# Patient Record
Sex: FEMALE | Race: BLACK OR AFRICAN AMERICAN | Employment: OTHER | URBAN - METROPOLITAN AREA
[De-identification: names, ages, dates, MRNs, and addresses within clinical notes are randomized per-mention and may not be internally consistent; named-entity substitution may affect disease eponyms.]

---

## 2017-01-03 ENCOUNTER — NURSE ONLY (OUTPATIENT)
Dept: ENT CLINIC | Age: 57
End: 2017-01-03

## 2017-01-03 ENCOUNTER — OFFICE VISIT (OUTPATIENT)
Dept: INTERNAL MEDICINE CLINIC | Age: 57
End: 2017-01-03

## 2017-01-03 VITALS
WEIGHT: 232 LBS | BODY MASS INDEX: 41.1 KG/M2 | RESPIRATION RATE: 18 BRPM | HEART RATE: 100 BPM | SYSTOLIC BLOOD PRESSURE: 130 MMHG | TEMPERATURE: 98 F | DIASTOLIC BLOOD PRESSURE: 80 MMHG

## 2017-01-03 DIAGNOSIS — J20.9 ACUTE BRONCHITIS, UNSPECIFIED ORGANISM: ICD-10-CM

## 2017-01-03 DIAGNOSIS — J01.01 ACUTE RECURRENT MAXILLARY SINUSITIS: ICD-10-CM

## 2017-01-03 DIAGNOSIS — J45.909 REACTIVE AIRWAY DISEASE, UNSPECIFIED ASTHMA SEVERITY, UNCOMPLICATED: Primary | ICD-10-CM

## 2017-01-03 DIAGNOSIS — J30.2 SEASONAL ALLERGIC RHINITIS, UNSPECIFIED ALLERGIC RHINITIS TRIGGER: Primary | ICD-10-CM

## 2017-01-03 PROCEDURE — 99213 OFFICE O/P EST LOW 20 MIN: CPT | Performed by: INTERNAL MEDICINE

## 2017-01-03 PROCEDURE — 95117 IMMUNOTHERAPY INJECTIONS: CPT | Performed by: OTOLARYNGOLOGY

## 2017-01-03 RX ORDER — FLUTICASONE PROPIONATE 50 MCG
2 SPRAY, SUSPENSION (ML) NASAL DAILY
Qty: 1 BOTTLE | Refills: 3 | Status: SHIPPED | OUTPATIENT
Start: 2017-01-03 | End: 2017-02-15 | Stop reason: ALTCHOICE

## 2017-01-03 RX ORDER — FLUCONAZOLE 150 MG/1
150 TABLET ORAL ONCE
Qty: 1 TABLET | Refills: 1 | Status: SHIPPED | OUTPATIENT
Start: 2017-01-03 | End: 2017-01-03

## 2017-01-03 RX ORDER — PREDNISONE 10 MG/1
TABLET ORAL
Qty: 23 TABLET | Refills: 0 | Status: SHIPPED | OUTPATIENT
Start: 2017-01-03 | End: 2017-01-26 | Stop reason: ALTCHOICE

## 2017-01-10 ENCOUNTER — OFFICE VISIT (OUTPATIENT)
Dept: ENT CLINIC | Age: 57
End: 2017-01-10

## 2017-01-10 ENCOUNTER — NURSE ONLY (OUTPATIENT)
Dept: ENT CLINIC | Age: 57
End: 2017-01-10

## 2017-01-10 VITALS
HEIGHT: 63 IN | BODY MASS INDEX: 41.11 KG/M2 | WEIGHT: 232 LBS | DIASTOLIC BLOOD PRESSURE: 107 MMHG | HEART RATE: 108 BPM | SYSTOLIC BLOOD PRESSURE: 183 MMHG

## 2017-01-10 DIAGNOSIS — J30.2 SEASONAL ALLERGIC RHINITIS, UNSPECIFIED ALLERGIC RHINITIS TRIGGER: Primary | ICD-10-CM

## 2017-01-10 DIAGNOSIS — J30.1 SEASONAL ALLERGIC RHINITIS DUE TO POLLEN: Primary | ICD-10-CM

## 2017-01-10 PROCEDURE — 95117 IMMUNOTHERAPY INJECTIONS: CPT | Performed by: OTOLARYNGOLOGY

## 2017-01-16 ENCOUNTER — PATIENT MESSAGE (OUTPATIENT)
Dept: INTERNAL MEDICINE CLINIC | Age: 57
End: 2017-01-16

## 2017-01-23 ENCOUNTER — OFFICE VISIT (OUTPATIENT)
Dept: INTERNAL MEDICINE CLINIC | Age: 57
End: 2017-01-23

## 2017-01-23 VITALS
DIASTOLIC BLOOD PRESSURE: 82 MMHG | WEIGHT: 233 LBS | BODY MASS INDEX: 41.27 KG/M2 | HEART RATE: 106 BPM | SYSTOLIC BLOOD PRESSURE: 126 MMHG

## 2017-01-23 DIAGNOSIS — J45.30 REACTIVE AIRWAY DISEASE, MILD PERSISTENT, UNCOMPLICATED: ICD-10-CM

## 2017-01-23 DIAGNOSIS — R73.9 STEROID-INDUCED HYPERGLYCEMIA: ICD-10-CM

## 2017-01-23 DIAGNOSIS — T38.0X5A STEROID-INDUCED HYPERGLYCEMIA: ICD-10-CM

## 2017-01-23 PROCEDURE — 99213 OFFICE O/P EST LOW 20 MIN: CPT | Performed by: INTERNAL MEDICINE

## 2017-01-26 ENCOUNTER — OFFICE VISIT (OUTPATIENT)
Dept: GYNECOLOGY | Age: 57
End: 2017-01-26

## 2017-01-26 VITALS
BODY MASS INDEX: 41.18 KG/M2 | HEIGHT: 63 IN | HEART RATE: 100 BPM | WEIGHT: 232.38 LBS | RESPIRATION RATE: 17 BRPM | DIASTOLIC BLOOD PRESSURE: 78 MMHG | TEMPERATURE: 98.6 F | SYSTOLIC BLOOD PRESSURE: 117 MMHG

## 2017-01-26 DIAGNOSIS — Z78.0 MENOPAUSE: ICD-10-CM

## 2017-01-26 DIAGNOSIS — R10.2 PELVIC PAIN IN FEMALE: ICD-10-CM

## 2017-01-26 DIAGNOSIS — Z01.419 WELL WOMAN EXAM WITH ROUTINE GYNECOLOGICAL EXAM: Primary | ICD-10-CM

## 2017-01-26 DIAGNOSIS — Z12.31 ENCOUNTER FOR SCREENING MAMMOGRAM FOR MALIGNANT NEOPLASM OF BREAST: ICD-10-CM

## 2017-01-26 DIAGNOSIS — B37.9 YEAST INFECTION: ICD-10-CM

## 2017-01-26 PROCEDURE — G0101 CA SCREEN;PELVIC/BREAST EXAM: HCPCS | Performed by: OBSTETRICS & GYNECOLOGY

## 2017-01-26 RX ORDER — FLUCONAZOLE 150 MG/1
150 TABLET ORAL ONCE
Qty: 1 TABLET | Refills: 1 | Status: SHIPPED | OUTPATIENT
Start: 2017-01-26 | End: 2017-01-26

## 2017-01-27 ASSESSMENT — ENCOUNTER SYMPTOMS
RESPIRATORY NEGATIVE: 1
EYES NEGATIVE: 1
GASTROINTESTINAL NEGATIVE: 1

## 2017-01-31 ENCOUNTER — HOSPITAL ENCOUNTER (OUTPATIENT)
Dept: MAMMOGRAPHY | Age: 57
Discharge: HOME | End: 2017-01-31
Attending: OBSTETRICS & GYNECOLOGY | Admitting: OBSTETRICS & GYNECOLOGY

## 2017-01-31 DIAGNOSIS — Z12.31 VISIT FOR SCREENING MAMMOGRAM: ICD-10-CM

## 2017-01-31 DIAGNOSIS — R10.2 PELVIC PAIN IN FEMALE: ICD-10-CM

## 2017-01-31 DIAGNOSIS — R10.2 PELVIC AND PERINEAL PAIN: ICD-10-CM

## 2017-01-31 DIAGNOSIS — Z01.419 WELL WOMAN EXAM WITH ROUTINE GYNECOLOGICAL EXAM: ICD-10-CM

## 2017-01-31 DIAGNOSIS — Z12.31 ENCOUNTER FOR SCREENING MAMMOGRAM FOR MALIGNANT NEOPLASM OF BREAST: ICD-10-CM

## 2017-01-31 LAB
HPV COMMENT: NORMAL
HPV TYPE 16: NOT DETECTED
HPV TYPE 18: NOT DETECTED
HPVOH (OTHER TYPES): NOT DETECTED

## 2017-01-31 RX ORDER — PEN NEEDLE, DIABETIC 31 GX5/16"
NEEDLE, DISPOSABLE MISCELLANEOUS
Qty: 100 EACH | Refills: 3 | Status: SHIPPED | OUTPATIENT
Start: 2017-01-31 | End: 2019-10-29

## 2017-02-03 ENCOUNTER — NURSE ONLY (OUTPATIENT)
Dept: ENT CLINIC | Age: 57
End: 2017-02-03

## 2017-02-03 DIAGNOSIS — J30.2 SEASONAL ALLERGIC RHINITIS, UNSPECIFIED ALLERGIC RHINITIS TRIGGER: Primary | ICD-10-CM

## 2017-02-03 PROCEDURE — 95117 IMMUNOTHERAPY INJECTIONS: CPT | Performed by: OTOLARYNGOLOGY

## 2017-02-13 ENCOUNTER — OFFICE VISIT (OUTPATIENT)
Dept: PULMONOLOGY | Age: 57
End: 2017-02-13

## 2017-02-13 VITALS
OXYGEN SATURATION: 95 % | HEIGHT: 63 IN | DIASTOLIC BLOOD PRESSURE: 82 MMHG | HEART RATE: 110 BPM | RESPIRATION RATE: 16 BRPM | SYSTOLIC BLOOD PRESSURE: 130 MMHG | BODY MASS INDEX: 41.64 KG/M2 | WEIGHT: 235 LBS

## 2017-02-13 DIAGNOSIS — J68.3 REACTIVE AIRWAYS DYSFUNCTION SYNDROME, MILD INTERMITTENT, UNCOMPLICATED (HCC): Primary | ICD-10-CM

## 2017-02-13 DIAGNOSIS — R94.2 ABNORMAL PFTS: ICD-10-CM

## 2017-02-13 DIAGNOSIS — G47.33 OBSTRUCTIVE SLEEP APNEA (ADULT) (PEDIATRIC): ICD-10-CM

## 2017-02-13 PROCEDURE — 99215 OFFICE O/P EST HI 40 MIN: CPT | Performed by: INTERNAL MEDICINE

## 2017-02-15 ENCOUNTER — OFFICE VISIT (OUTPATIENT)
Dept: INTERNAL MEDICINE CLINIC | Age: 57
End: 2017-02-15

## 2017-02-15 VITALS
RESPIRATION RATE: 16 BRPM | HEART RATE: 100 BPM | SYSTOLIC BLOOD PRESSURE: 126 MMHG | BODY MASS INDEX: 39.78 KG/M2 | DIASTOLIC BLOOD PRESSURE: 80 MMHG | HEIGHT: 64 IN | WEIGHT: 233 LBS

## 2017-02-15 DIAGNOSIS — E55.9 VITAMIN D DEFICIENCY: ICD-10-CM

## 2017-02-15 DIAGNOSIS — E78.5 DYSLIPIDEMIA: ICD-10-CM

## 2017-02-15 DIAGNOSIS — E66.9 OBESITY (BMI 30-39.9): ICD-10-CM

## 2017-02-15 DIAGNOSIS — I10 BENIGN ESSENTIAL HTN: Chronic | ICD-10-CM

## 2017-02-15 PROCEDURE — 99214 OFFICE O/P EST MOD 30 MIN: CPT | Performed by: INTERNAL MEDICINE

## 2017-02-15 RX ORDER — LOSARTAN POTASSIUM 50 MG/1
TABLET ORAL
Qty: 30 TABLET | Refills: 0 | Status: SHIPPED | OUTPATIENT
Start: 2017-02-15 | End: 2017-05-01 | Stop reason: SDUPTHER

## 2017-02-15 ASSESSMENT — PATIENT HEALTH QUESTIONNAIRE - PHQ9
2. FEELING DOWN, DEPRESSED OR HOPELESS: 0
SUM OF ALL RESPONSES TO PHQ QUESTIONS 1-9: 0
SUM OF ALL RESPONSES TO PHQ9 QUESTIONS 1 & 2: 0
1. LITTLE INTEREST OR PLEASURE IN DOING THINGS: 0

## 2017-02-17 ENCOUNTER — TELEPHONE (OUTPATIENT)
Dept: INTERNAL MEDICINE CLINIC | Age: 57
End: 2017-02-17

## 2017-02-24 ENCOUNTER — NURSE ONLY (OUTPATIENT)
Dept: ENT CLINIC | Age: 57
End: 2017-02-24

## 2017-02-24 DIAGNOSIS — J30.2 SEASONAL ALLERGIC RHINITIS, UNSPECIFIED ALLERGIC RHINITIS TRIGGER: Primary | ICD-10-CM

## 2017-02-24 PROCEDURE — 95117 IMMUNOTHERAPY INJECTIONS: CPT | Performed by: OTOLARYNGOLOGY

## 2017-03-03 ENCOUNTER — OFFICE VISIT (OUTPATIENT)
Dept: SLEEP MEDICINE | Age: 57
End: 2017-03-03

## 2017-03-03 VITALS
SYSTOLIC BLOOD PRESSURE: 112 MMHG | DIASTOLIC BLOOD PRESSURE: 76 MMHG | HEART RATE: 116 BPM | WEIGHT: 235 LBS | BODY MASS INDEX: 41.64 KG/M2 | HEIGHT: 63 IN | OXYGEN SATURATION: 98 %

## 2017-03-03 DIAGNOSIS — G47.33 OBSTRUCTIVE SLEEP APNEA (ADULT) (PEDIATRIC): Primary | Chronic | ICD-10-CM

## 2017-03-03 DIAGNOSIS — E11.9 CONTROLLED TYPE 2 DIABETES MELLITUS WITHOUT COMPLICATION, UNSPECIFIED LONG TERM INSULIN USE STATUS: Chronic | ICD-10-CM

## 2017-03-03 DIAGNOSIS — E66.9 NON MORBID OBESITY, UNSPECIFIED OBESITY TYPE: Chronic | ICD-10-CM

## 2017-03-03 DIAGNOSIS — J30.2 SEASONAL ALLERGIC RHINITIS, UNSPECIFIED ALLERGIC RHINITIS TRIGGER: Chronic | ICD-10-CM

## 2017-03-03 DIAGNOSIS — J68.3 REACTIVE AIRWAYS DYSFUNCTION SYNDROME, MILD INTERMITTENT, UNCOMPLICATED (HCC): Chronic | ICD-10-CM

## 2017-03-03 DIAGNOSIS — I10 HYPERTENSION, ESSENTIAL: Chronic | ICD-10-CM

## 2017-03-03 PROCEDURE — 99214 OFFICE O/P EST MOD 30 MIN: CPT | Performed by: NURSE PRACTITIONER

## 2017-03-03 ASSESSMENT — ENCOUNTER SYMPTOMS
RHINORRHEA: 0
ABDOMINAL DISTENTION: 0
APNEA: 0
ABDOMINAL PAIN: 0
COUGH: 0
SINUS PRESSURE: 0
SHORTNESS OF BREATH: 0

## 2017-03-03 ASSESSMENT — SLEEP AND FATIGUE QUESTIONNAIRES
HOW LIKELY ARE YOU TO NOD OFF OR FALL ASLEEP WHEN YOU ARE A PASSENGER IN A CAR FOR AN HOUR WITHOUT A BREAK: 1
HOW LIKELY ARE YOU TO NOD OFF OR FALL ASLEEP WHILE SITTING QUIETLY AFTER LUNCH WITHOUT ALCOHOL: 1
ESS TOTAL SCORE: 10
HOW LIKELY ARE YOU TO NOD OFF OR FALL ASLEEP WHILE SITTING INACTIVE IN A PUBLIC PLACE: 1
HOW LIKELY ARE YOU TO NOD OFF OR FALL ASLEEP IN A CAR, WHILE STOPPED FOR A FEW MINUTES IN TRAFFIC: 1
HOW LIKELY ARE YOU TO NOD OFF OR FALL ASLEEP WHILE LYING DOWN TO REST IN THE AFTERNOON WHEN CIRCUMSTANCES PERMIT: 1
HOW LIKELY ARE YOU TO NOD OFF OR FALL ASLEEP WHILE WATCHING TV: 1
HOW LIKELY ARE YOU TO NOD OFF OR FALL ASLEEP WHILE SITTING AND READING: 2
HOW LIKELY ARE YOU TO NOD OFF OR FALL ASLEEP WHILE SITTING AND TALKING TO SOMEONE: 2

## 2017-03-07 ENCOUNTER — TELEPHONE (OUTPATIENT)
Dept: INTERNAL MEDICINE CLINIC | Age: 57
End: 2017-03-07

## 2017-03-07 RX ORDER — CLOTRIMAZOLE AND BETAMETHASONE DIPROPIONATE 10; .64 MG/G; MG/G
CREAM TOPICAL
Qty: 1 TUBE | Refills: 0 | Status: SHIPPED | OUTPATIENT
Start: 2017-03-07 | End: 2017-08-15 | Stop reason: ALTCHOICE

## 2017-03-10 ENCOUNTER — NURSE ONLY (OUTPATIENT)
Dept: ENT CLINIC | Age: 57
End: 2017-03-10

## 2017-03-10 DIAGNOSIS — J30.2 SEASONAL ALLERGIC RHINITIS, UNSPECIFIED ALLERGIC RHINITIS TRIGGER: Primary | ICD-10-CM

## 2017-03-10 PROCEDURE — 95117 IMMUNOTHERAPY INJECTIONS: CPT | Performed by: OTOLARYNGOLOGY

## 2017-04-04 ENCOUNTER — NURSE ONLY (OUTPATIENT)
Dept: ENT CLINIC | Age: 57
End: 2017-04-04

## 2017-04-04 DIAGNOSIS — J30.2 SEASONAL ALLERGIC RHINITIS, UNSPECIFIED ALLERGIC RHINITIS TRIGGER: Primary | ICD-10-CM

## 2017-04-04 PROCEDURE — 95117 IMMUNOTHERAPY INJECTIONS: CPT | Performed by: OTOLARYNGOLOGY

## 2017-04-18 ENCOUNTER — NURSE ONLY (OUTPATIENT)
Dept: ENT CLINIC | Age: 57
End: 2017-04-18

## 2017-04-18 ENCOUNTER — TELEPHONE (OUTPATIENT)
Dept: ENT CLINIC | Age: 57
End: 2017-04-18

## 2017-04-18 DIAGNOSIS — J32.9 CHRONIC SINUSITIS, UNSPECIFIED LOCATION: Primary | ICD-10-CM

## 2017-04-18 DIAGNOSIS — J30.1 SEASONAL ALLERGIC RHINITIS DUE TO POLLEN: Primary | ICD-10-CM

## 2017-04-18 DIAGNOSIS — J32.9 CHRONIC SINUSITIS, UNSPECIFIED LOCATION: ICD-10-CM

## 2017-04-18 PROCEDURE — 95117 IMMUNOTHERAPY INJECTIONS: CPT | Performed by: OTOLARYNGOLOGY

## 2017-04-18 PROCEDURE — 96372 THER/PROPH/DIAG INJ SC/IM: CPT | Performed by: OTOLARYNGOLOGY

## 2017-04-18 RX ORDER — AZITHROMYCIN 250 MG/1
250 TABLET, FILM COATED ORAL DAILY
Qty: 1 PACKET | Refills: 0 | Status: SHIPPED | OUTPATIENT
Start: 2017-04-18 | End: 2017-05-04 | Stop reason: ALTCHOICE

## 2017-04-18 RX ORDER — METHYLPREDNISOLONE ACETATE 40 MG/ML
40 INJECTION, SUSPENSION INTRA-ARTICULAR; INTRALESIONAL; INTRAMUSCULAR; SOFT TISSUE ONCE
Qty: 1 ML | Refills: 0
Start: 2017-04-18 | End: 2017-04-18

## 2017-04-26 LAB
A/G RATIO: 2 (ref 1.1–2.2)
ALBUMIN SERPL-MCNC: 4.8 G/DL (ref 3.4–5)
ALP BLD-CCNC: 98 U/L (ref 40–129)
ALT SERPL-CCNC: 29 U/L (ref 10–40)
ANION GAP SERPL CALCULATED.3IONS-SCNC: 16 MMOL/L (ref 3–16)
AST SERPL-CCNC: 18 U/L (ref 15–37)
BASOPHILS ABSOLUTE: 0.1 K/UL (ref 0–0.2)
BASOPHILS RELATIVE PERCENT: 0.8 %
BILIRUB SERPL-MCNC: 0.6 MG/DL (ref 0–1)
BUN BLDV-MCNC: 12 MG/DL (ref 7–20)
CALCIUM SERPL-MCNC: 10.1 MG/DL (ref 8.3–10.6)
CHLORIDE BLD-SCNC: 99 MMOL/L (ref 99–110)
CHOLESTEROL, TOTAL: 118 MG/DL (ref 0–199)
CO2: 24 MMOL/L (ref 21–32)
CREAT SERPL-MCNC: 0.5 MG/DL (ref 0.6–1.1)
EOSINOPHILS ABSOLUTE: 0.1 K/UL (ref 0–0.6)
EOSINOPHILS RELATIVE PERCENT: 0.8 %
GFR AFRICAN AMERICAN: >60
GFR NON-AFRICAN AMERICAN: >60
GLOBULIN: 2.4 G/DL
GLUCOSE BLD-MCNC: 100 MG/DL (ref 70–99)
HCT VFR BLD CALC: 41.5 % (ref 36–48)
HDLC SERPL-MCNC: 28 MG/DL (ref 40–60)
HEMOGLOBIN: 13.1 G/DL (ref 12–16)
LDL CHOLESTEROL CALCULATED: 72 MG/DL
LYMPHOCYTES ABSOLUTE: 2.9 K/UL (ref 1–5.1)
LYMPHOCYTES RELATIVE PERCENT: 22.5 %
MCH RBC QN AUTO: 26.5 PG (ref 26–34)
MCHC RBC AUTO-ENTMCNC: 31.5 G/DL (ref 31–36)
MCV RBC AUTO: 84.2 FL (ref 80–100)
MONOCYTES ABSOLUTE: 0.6 K/UL (ref 0–1.3)
MONOCYTES RELATIVE PERCENT: 4.4 %
NEUTROPHILS ABSOLUTE: 9.4 K/UL (ref 1.7–7.7)
NEUTROPHILS RELATIVE PERCENT: 71.5 %
PDW BLD-RTO: 13.7 % (ref 12.4–15.4)
PLATELET # BLD: 272 K/UL (ref 135–450)
PMV BLD AUTO: 10.7 FL (ref 5–10.5)
POTASSIUM SERPL-SCNC: 4.1 MMOL/L (ref 3.5–5.1)
RBC # BLD: 4.93 M/UL (ref 4–5.2)
SODIUM BLD-SCNC: 139 MMOL/L (ref 136–145)
TOTAL PROTEIN: 7.2 G/DL (ref 6.4–8.2)
TRIGL SERPL-MCNC: 92 MG/DL (ref 0–150)
VITAMIN D 25-HYDROXY: 43.5 NG/ML
VLDLC SERPL CALC-MCNC: 18 MG/DL
WBC # BLD: 13.1 K/UL (ref 4–11)

## 2017-04-27 LAB
ESTIMATED AVERAGE GLUCOSE: 116.9 MG/DL
HBA1C MFR BLD: 5.7 %

## 2017-04-28 ENCOUNTER — NURSE ONLY (OUTPATIENT)
Dept: ENT CLINIC | Age: 57
End: 2017-04-28

## 2017-04-28 DIAGNOSIS — J30.1 SEASONAL ALLERGIC RHINITIS DUE TO POLLEN: Primary | ICD-10-CM

## 2017-04-28 PROCEDURE — 95117 IMMUNOTHERAPY INJECTIONS: CPT | Performed by: OTOLARYNGOLOGY

## 2017-05-01 ENCOUNTER — TELEPHONE (OUTPATIENT)
Dept: INTERNAL MEDICINE CLINIC | Age: 57
End: 2017-05-01

## 2017-05-01 RX ORDER — LOSARTAN POTASSIUM 50 MG/1
TABLET ORAL
Qty: 10 TABLET | Refills: 0 | Status: SHIPPED | OUTPATIENT
Start: 2017-05-01 | End: 2017-05-04 | Stop reason: SDUPTHER

## 2017-05-01 RX ORDER — LOSARTAN POTASSIUM 50 MG/1
TABLET ORAL
Qty: 90 TABLET | Refills: 3 | Status: SHIPPED | OUTPATIENT
Start: 2017-05-01 | End: 2017-05-01 | Stop reason: SDUPTHER

## 2017-05-01 RX ORDER — LOSARTAN POTASSIUM 50 MG/1
TABLET ORAL
Qty: 90 TABLET | Refills: 1 | Status: SHIPPED | OUTPATIENT
Start: 2017-05-01 | End: 2018-07-23 | Stop reason: SDUPTHER

## 2017-05-04 ENCOUNTER — OFFICE VISIT (OUTPATIENT)
Dept: INTERNAL MEDICINE CLINIC | Age: 57
End: 2017-05-04

## 2017-05-04 VITALS
SYSTOLIC BLOOD PRESSURE: 141 MMHG | BODY MASS INDEX: 38.41 KG/M2 | HEIGHT: 64 IN | HEART RATE: 92 BPM | DIASTOLIC BLOOD PRESSURE: 89 MMHG | WEIGHT: 225 LBS

## 2017-05-04 DIAGNOSIS — E55.9 VITAMIN D DEFICIENCY: ICD-10-CM

## 2017-05-04 DIAGNOSIS — Z98.890 HISTORY OF HERNIA REPAIR: ICD-10-CM

## 2017-05-04 DIAGNOSIS — Z87.19 HISTORY OF HERNIA REPAIR: ICD-10-CM

## 2017-05-04 DIAGNOSIS — R10.33 PERIUMBILICAL ABDOMINAL PAIN: ICD-10-CM

## 2017-05-04 DIAGNOSIS — K43.9 VENTRAL HERNIA WITHOUT OBSTRUCTION OR GANGRENE: ICD-10-CM

## 2017-05-04 DIAGNOSIS — J45.20 REACTIVE AIRWAY DISEASE, MILD INTERMITTENT, UNCOMPLICATED: ICD-10-CM

## 2017-05-04 DIAGNOSIS — I10 BENIGN ESSENTIAL HTN: Chronic | ICD-10-CM

## 2017-05-04 DIAGNOSIS — E66.9 OBESITY (BMI 30-39.9): ICD-10-CM

## 2017-05-04 DIAGNOSIS — E78.5 DYSLIPIDEMIA: ICD-10-CM

## 2017-05-04 DIAGNOSIS — E11.9 TYPE 2 DIABETES MELLITUS WITHOUT COMPLICATION, WITHOUT LONG-TERM CURRENT USE OF INSULIN (HCC): Primary | ICD-10-CM

## 2017-05-04 PROCEDURE — 99214 OFFICE O/P EST MOD 30 MIN: CPT | Performed by: INTERNAL MEDICINE

## 2017-05-10 ENCOUNTER — HOSPITAL ENCOUNTER (OUTPATIENT)
Dept: CT IMAGING | Facility: MEDICAL CENTER | Age: 57
Discharge: HOME | End: 2017-05-11

## 2017-05-10 ENCOUNTER — HOSPITAL ENCOUNTER (OUTPATIENT)
Dept: GENERAL RADIOLOGY | Facility: MEDICAL CENTER | Age: 57
Discharge: HOME | End: 2017-05-10
Attending: INTERNAL MEDICINE | Admitting: INTERNAL MEDICINE

## 2017-05-10 DIAGNOSIS — K43.9 VENTRAL HERNIA WITHOUT OBSTRUCTION OR GANGRENE: ICD-10-CM

## 2017-05-10 DIAGNOSIS — Z98.890 HISTORY OF HERNIA REPAIR: ICD-10-CM

## 2017-05-10 DIAGNOSIS — R10.33 PERIUMBILICAL PAIN: ICD-10-CM

## 2017-05-10 DIAGNOSIS — N28.1 KIDNEY CYSTS: Primary | ICD-10-CM

## 2017-05-10 DIAGNOSIS — R10.33 PERIUMBILICAL ABDOMINAL PAIN: ICD-10-CM

## 2017-05-10 DIAGNOSIS — Z87.19 HISTORY OF HERNIA REPAIR: ICD-10-CM

## 2017-05-12 ENCOUNTER — NURSE ONLY (OUTPATIENT)
Dept: ENT CLINIC | Age: 57
End: 2017-05-12

## 2017-05-12 DIAGNOSIS — J30.1 SEASONAL ALLERGIC RHINITIS DUE TO POLLEN: Primary | ICD-10-CM

## 2017-05-12 PROCEDURE — 95117 IMMUNOTHERAPY INJECTIONS: CPT | Performed by: OTOLARYNGOLOGY

## 2017-05-23 ENCOUNTER — OFFICE VISIT (OUTPATIENT)
Dept: ENT CLINIC | Age: 57
End: 2017-05-23

## 2017-05-23 ENCOUNTER — NURSE ONLY (OUTPATIENT)
Dept: ENT CLINIC | Age: 57
End: 2017-05-23

## 2017-05-23 VITALS
HEART RATE: 95 BPM | BODY MASS INDEX: 39.87 KG/M2 | WEIGHT: 225 LBS | HEIGHT: 63 IN | DIASTOLIC BLOOD PRESSURE: 108 MMHG | SYSTOLIC BLOOD PRESSURE: 162 MMHG

## 2017-05-23 DIAGNOSIS — J30.1 SEASONAL ALLERGIC RHINITIS DUE TO POLLEN: ICD-10-CM

## 2017-05-23 DIAGNOSIS — J30.1 SEASONAL ALLERGIC RHINITIS DUE TO POLLEN: Primary | ICD-10-CM

## 2017-05-23 DIAGNOSIS — J32.9 CHRONIC SINUSITIS, UNSPECIFIED LOCATION: Primary | ICD-10-CM

## 2017-05-23 PROCEDURE — 96372 THER/PROPH/DIAG INJ SC/IM: CPT | Performed by: OTOLARYNGOLOGY

## 2017-05-23 PROCEDURE — 95117 IMMUNOTHERAPY INJECTIONS: CPT | Performed by: OTOLARYNGOLOGY

## 2017-05-23 PROCEDURE — 99213 OFFICE O/P EST LOW 20 MIN: CPT | Performed by: OTOLARYNGOLOGY

## 2017-05-23 RX ORDER — MONTELUKAST SODIUM 10 MG/1
10 TABLET ORAL NIGHTLY
Qty: 30 TABLET | Refills: 1 | Status: SHIPPED | OUTPATIENT
Start: 2017-05-23 | End: 2017-08-15 | Stop reason: ALTCHOICE

## 2017-05-23 RX ORDER — METHYLPREDNISOLONE ACETATE 40 MG/ML
40 INJECTION, SUSPENSION INTRA-ARTICULAR; INTRALESIONAL; INTRAMUSCULAR; SOFT TISSUE ONCE
Status: COMPLETED | OUTPATIENT
Start: 2017-05-23 | End: 2017-05-23

## 2017-05-23 RX ADMIN — METHYLPREDNISOLONE ACETATE 40 MG: 40 INJECTION, SUSPENSION INTRA-ARTICULAR; INTRALESIONAL; INTRAMUSCULAR; SOFT TISSUE at 15:55

## 2017-05-24 ENCOUNTER — TELEPHONE (OUTPATIENT)
Dept: ENT CLINIC | Age: 57
End: 2017-05-24

## 2017-06-01 ENCOUNTER — OFFICE VISIT (OUTPATIENT)
Dept: PULMONOLOGY | Age: 57
End: 2017-06-01

## 2017-06-01 VITALS
OXYGEN SATURATION: 95 % | BODY MASS INDEX: 41.22 KG/M2 | WEIGHT: 224 LBS | SYSTOLIC BLOOD PRESSURE: 127 MMHG | HEART RATE: 107 BPM | HEIGHT: 62 IN | DIASTOLIC BLOOD PRESSURE: 80 MMHG

## 2017-06-01 DIAGNOSIS — E66.9 NON MORBID OBESITY, UNSPECIFIED OBESITY TYPE: Chronic | ICD-10-CM

## 2017-06-01 DIAGNOSIS — I10 HYPERTENSION, ESSENTIAL: Chronic | ICD-10-CM

## 2017-06-01 DIAGNOSIS — G47.33 OBSTRUCTIVE SLEEP APNEA (ADULT) (PEDIATRIC): Primary | Chronic | ICD-10-CM

## 2017-06-01 DIAGNOSIS — J30.2 SEASONAL ALLERGIC RHINITIS, UNSPECIFIED ALLERGIC RHINITIS TRIGGER: Chronic | ICD-10-CM

## 2017-06-01 DIAGNOSIS — J68.3 REACTIVE AIRWAYS DYSFUNCTION SYNDROME, MILD INTERMITTENT, UNCOMPLICATED (HCC): Chronic | ICD-10-CM

## 2017-06-01 DIAGNOSIS — E11.9 CONTROLLED TYPE 2 DIABETES MELLITUS WITHOUT COMPLICATION, UNSPECIFIED LONG TERM INSULIN USE STATUS: Chronic | ICD-10-CM

## 2017-06-01 PROCEDURE — 99214 OFFICE O/P EST MOD 30 MIN: CPT | Performed by: NURSE PRACTITIONER

## 2017-06-01 ASSESSMENT — SLEEP AND FATIGUE QUESTIONNAIRES
HOW LIKELY ARE YOU TO NOD OFF OR FALL ASLEEP WHILE SITTING INACTIVE IN A PUBLIC PLACE: 3
HOW LIKELY ARE YOU TO NOD OFF OR FALL ASLEEP WHILE WATCHING TV: 2
HOW LIKELY ARE YOU TO NOD OFF OR FALL ASLEEP WHILE SITTING QUIETLY AFTER LUNCH WITHOUT ALCOHOL: 2
HOW LIKELY ARE YOU TO NOD OFF OR FALL ASLEEP IN A CAR, WHILE STOPPED FOR A FEW MINUTES IN TRAFFIC: 1
HOW LIKELY ARE YOU TO NOD OFF OR FALL ASLEEP WHILE SITTING AND READING: 2
HOW LIKELY ARE YOU TO NOD OFF OR FALL ASLEEP WHEN YOU ARE A PASSENGER IN A CAR FOR AN HOUR WITHOUT A BREAK: 2
HOW LIKELY ARE YOU TO NOD OFF OR FALL ASLEEP WHILE LYING DOWN TO REST IN THE AFTERNOON WHEN CIRCUMSTANCES PERMIT: 2
ESS TOTAL SCORE: 16
HOW LIKELY ARE YOU TO NOD OFF OR FALL ASLEEP WHILE SITTING AND TALKING TO SOMEONE: 2

## 2017-06-01 ASSESSMENT — ENCOUNTER SYMPTOMS
ABDOMINAL DISTENTION: 0
ABDOMINAL PAIN: 0
COUGH: 0
APNEA: 0
SINUS PRESSURE: 0
SHORTNESS OF BREATH: 0
RHINORRHEA: 0

## 2017-06-09 ENCOUNTER — NURSE ONLY (OUTPATIENT)
Dept: ENT CLINIC | Age: 57
End: 2017-06-09

## 2017-06-09 ENCOUNTER — TELEPHONE (OUTPATIENT)
Dept: ENT CLINIC | Age: 57
End: 2017-06-09

## 2017-06-09 DIAGNOSIS — J30.1 SEASONAL ALLERGIC RHINITIS DUE TO POLLEN: Primary | ICD-10-CM

## 2017-06-09 PROCEDURE — 95117 IMMUNOTHERAPY INJECTIONS: CPT | Performed by: OTOLARYNGOLOGY

## 2017-06-19 ENCOUNTER — NURSE ONLY (OUTPATIENT)
Dept: ENT CLINIC | Age: 57
End: 2017-06-19

## 2017-06-19 DIAGNOSIS — J30.1 SEASONAL ALLERGIC RHINITIS DUE TO POLLEN: Primary | ICD-10-CM

## 2017-06-19 PROCEDURE — 95117 IMMUNOTHERAPY INJECTIONS: CPT | Performed by: OTOLARYNGOLOGY

## 2017-06-30 ENCOUNTER — NURSE ONLY (OUTPATIENT)
Dept: ENT CLINIC | Age: 57
End: 2017-06-30

## 2017-06-30 DIAGNOSIS — J30.1 SEASONAL ALLERGIC RHINITIS DUE TO POLLEN: Primary | ICD-10-CM

## 2017-06-30 PROCEDURE — 95117 IMMUNOTHERAPY INJECTIONS: CPT | Performed by: OTOLARYNGOLOGY

## 2017-07-07 ENCOUNTER — NURSE ONLY (OUTPATIENT)
Dept: ENT CLINIC | Age: 57
End: 2017-07-07

## 2017-07-07 DIAGNOSIS — J30.1 SEASONAL ALLERGIC RHINITIS DUE TO POLLEN: Primary | ICD-10-CM

## 2017-07-07 PROCEDURE — 95117 IMMUNOTHERAPY INJECTIONS: CPT | Performed by: OTOLARYNGOLOGY

## 2017-07-24 ENCOUNTER — NURSE ONLY (OUTPATIENT)
Dept: ENT CLINIC | Age: 57
End: 2017-07-24

## 2017-07-24 DIAGNOSIS — J30.1 SEASONAL ALLERGIC RHINITIS DUE TO POLLEN: Primary | ICD-10-CM

## 2017-07-24 PROCEDURE — 95117 IMMUNOTHERAPY INJECTIONS: CPT | Performed by: OTOLARYNGOLOGY

## 2017-08-07 ENCOUNTER — NURSE ONLY (OUTPATIENT)
Dept: ENT CLINIC | Age: 57
End: 2017-08-07

## 2017-08-07 DIAGNOSIS — J30.1 SEASONAL ALLERGIC RHINITIS DUE TO POLLEN: Primary | ICD-10-CM

## 2017-08-07 PROCEDURE — 95117 IMMUNOTHERAPY INJECTIONS: CPT | Performed by: OTOLARYNGOLOGY

## 2017-08-08 ENCOUNTER — TELEPHONE (OUTPATIENT)
Dept: INTERNAL MEDICINE CLINIC | Age: 57
End: 2017-08-08

## 2017-08-08 RX ORDER — VALACYCLOVIR HYDROCHLORIDE 1 G/1
2000 TABLET, FILM COATED ORAL 2 TIMES DAILY
Qty: 4 TABLET | Refills: 0 | Status: SHIPPED | OUTPATIENT
Start: 2017-08-08 | End: 2017-08-09

## 2017-08-15 ENCOUNTER — NURSE ONLY (OUTPATIENT)
Dept: ENT CLINIC | Age: 57
End: 2017-08-15

## 2017-08-15 ENCOUNTER — OFFICE VISIT (OUTPATIENT)
Dept: INTERNAL MEDICINE CLINIC | Age: 57
End: 2017-08-15

## 2017-08-15 VITALS
SYSTOLIC BLOOD PRESSURE: 138 MMHG | WEIGHT: 226 LBS | BODY MASS INDEX: 40.04 KG/M2 | HEIGHT: 63 IN | DIASTOLIC BLOOD PRESSURE: 80 MMHG | HEART RATE: 110 BPM | RESPIRATION RATE: 16 BRPM

## 2017-08-15 DIAGNOSIS — J30.1 SEASONAL ALLERGIC RHINITIS DUE TO POLLEN: Primary | ICD-10-CM

## 2017-08-15 DIAGNOSIS — G47.33 OBSTRUCTIVE SLEEP APNEA (ADULT) (PEDIATRIC): Chronic | ICD-10-CM

## 2017-08-15 DIAGNOSIS — E78.2 HYPERLIPIDEMIA, MIXED: ICD-10-CM

## 2017-08-15 DIAGNOSIS — E11.9 TYPE 2 DIABETES MELLITUS WITHOUT COMPLICATION, WITHOUT LONG-TERM CURRENT USE OF INSULIN (HCC): Primary | ICD-10-CM

## 2017-08-15 DIAGNOSIS — E66.01 MORBID OBESITY WITH BMI OF 40.0-44.9, ADULT (HCC): ICD-10-CM

## 2017-08-15 DIAGNOSIS — I10 BENIGN ESSENTIAL HTN: Chronic | ICD-10-CM

## 2017-08-15 PROCEDURE — 95117 IMMUNOTHERAPY INJECTIONS: CPT | Performed by: OTOLARYNGOLOGY

## 2017-08-15 PROCEDURE — 99214 OFFICE O/P EST MOD 30 MIN: CPT | Performed by: INTERNAL MEDICINE

## 2017-08-23 ENCOUNTER — TELEPHONE (OUTPATIENT)
Dept: INTERNAL MEDICINE CLINIC | Age: 57
End: 2017-08-23

## 2017-08-23 LAB
A/G RATIO: 1.9 (ref 1.1–2.2)
ALBUMIN SERPL-MCNC: 4.4 G/DL (ref 3.4–5)
ALP BLD-CCNC: 115 U/L (ref 40–129)
ALT SERPL-CCNC: 25 U/L (ref 10–40)
ANION GAP SERPL CALCULATED.3IONS-SCNC: 17 MMOL/L (ref 3–16)
AST SERPL-CCNC: 19 U/L (ref 15–37)
BILIRUB SERPL-MCNC: 0.9 MG/DL (ref 0–1)
BUN BLDV-MCNC: 11 MG/DL (ref 7–20)
CALCIUM SERPL-MCNC: 9.8 MG/DL (ref 8.3–10.6)
CHLORIDE BLD-SCNC: 99 MMOL/L (ref 99–110)
CHOLESTEROL, TOTAL: 114 MG/DL (ref 0–199)
CO2: 25 MMOL/L (ref 21–32)
CREAT SERPL-MCNC: 0.6 MG/DL (ref 0.6–1.1)
GFR AFRICAN AMERICAN: >60
GFR NON-AFRICAN AMERICAN: >60
GLOBULIN: 2.3 G/DL
GLUCOSE BLD-MCNC: 139 MG/DL (ref 70–99)
HDLC SERPL-MCNC: 25 MG/DL (ref 40–60)
LDL CHOLESTEROL CALCULATED: 57 MG/DL
POTASSIUM SERPL-SCNC: 3.9 MMOL/L (ref 3.5–5.1)
SODIUM BLD-SCNC: 141 MMOL/L (ref 136–145)
TOTAL PROTEIN: 6.7 G/DL (ref 6.4–8.2)
TRIGL SERPL-MCNC: 160 MG/DL (ref 0–150)
VLDLC SERPL CALC-MCNC: 32 MG/DL

## 2017-08-24 LAB
ESTIMATED AVERAGE GLUCOSE: 125.5 MG/DL
HBA1C MFR BLD: 6 %

## 2017-08-25 ENCOUNTER — NURSE ONLY (OUTPATIENT)
Dept: ENT CLINIC | Age: 57
End: 2017-08-25

## 2017-08-25 DIAGNOSIS — J30.1 SEASONAL ALLERGIC RHINITIS DUE TO POLLEN: Primary | ICD-10-CM

## 2017-08-25 PROCEDURE — 95117 IMMUNOTHERAPY INJECTIONS: CPT | Performed by: OTOLARYNGOLOGY

## 2017-09-07 ENCOUNTER — HOSPITAL ENCOUNTER (OUTPATIENT)
Dept: ULTRASOUND IMAGING | Age: 57
Discharge: HOME | End: 2017-09-07
Attending: INTERNAL MEDICINE | Admitting: INTERNAL MEDICINE

## 2017-09-07 DIAGNOSIS — N28.1 ACQUIRED CYST OF KIDNEY: ICD-10-CM

## 2017-09-07 DIAGNOSIS — N28.1 KIDNEY CYSTS: ICD-10-CM

## 2017-09-08 ENCOUNTER — TELEPHONE (OUTPATIENT)
Dept: INTERNAL MEDICINE CLINIC | Age: 57
End: 2017-09-08

## 2017-09-11 ENCOUNTER — NURSE ONLY (OUTPATIENT)
Dept: ENT CLINIC | Age: 57
End: 2017-09-11

## 2017-09-11 DIAGNOSIS — J30.2 SEASONAL ALLERGIC RHINITIS, UNSPECIFIED ALLERGIC RHINITIS TRIGGER: ICD-10-CM

## 2017-09-11 PROCEDURE — 95117 IMMUNOTHERAPY INJECTIONS: CPT | Performed by: OTOLARYNGOLOGY

## 2017-09-15 RX ORDER — AMLODIPINE BESYLATE 5 MG/1
TABLET ORAL
Qty: 90 TABLET | Refills: 2 | Status: SHIPPED | OUTPATIENT
Start: 2017-09-15 | End: 2018-06-12 | Stop reason: SDUPTHER

## 2017-09-18 ENCOUNTER — NURSE ONLY (OUTPATIENT)
Dept: ENT CLINIC | Age: 57
End: 2017-09-18

## 2017-09-18 DIAGNOSIS — J30.2 SEASONAL ALLERGIC RHINITIS, UNSPECIFIED ALLERGIC RHINITIS TRIGGER: ICD-10-CM

## 2017-09-18 PROCEDURE — 95117 IMMUNOTHERAPY INJECTIONS: CPT | Performed by: OTOLARYNGOLOGY

## 2017-09-25 ENCOUNTER — NURSE ONLY (OUTPATIENT)
Dept: ENT CLINIC | Age: 57
End: 2017-09-25

## 2017-09-25 DIAGNOSIS — J30.2 SEASONAL ALLERGIC RHINITIS, UNSPECIFIED ALLERGIC RHINITIS TRIGGER: ICD-10-CM

## 2017-09-25 PROCEDURE — 95117 IMMUNOTHERAPY INJECTIONS: CPT | Performed by: OTOLARYNGOLOGY

## 2017-09-27 ENCOUNTER — OFFICE VISIT (OUTPATIENT)
Dept: INTERNAL MEDICINE CLINIC | Age: 57
End: 2017-09-27

## 2017-09-27 VITALS — WEIGHT: 225 LBS | DIASTOLIC BLOOD PRESSURE: 81 MMHG | SYSTOLIC BLOOD PRESSURE: 126 MMHG | BODY MASS INDEX: 39.86 KG/M2

## 2017-09-27 DIAGNOSIS — L91.8 INFLAMED SKIN TAG: Primary | ICD-10-CM

## 2017-09-27 DIAGNOSIS — Z23 FLU VACCINE NEED: ICD-10-CM

## 2017-09-27 PROCEDURE — 11200 RMVL SKIN TAGS UP TO&INC 15: CPT | Performed by: INTERNAL MEDICINE

## 2017-09-27 PROCEDURE — G0008 ADMIN INFLUENZA VIRUS VAC: HCPCS | Performed by: INTERNAL MEDICINE

## 2017-09-27 PROCEDURE — 99999 PR OFFICE/OUTPT VISIT,PROCEDURE ONLY: CPT | Performed by: INTERNAL MEDICINE

## 2017-09-27 PROCEDURE — 90686 IIV4 VACC NO PRSV 0.5 ML IM: CPT | Performed by: INTERNAL MEDICINE

## 2017-09-28 ENCOUNTER — TELEPHONE (OUTPATIENT)
Dept: ORTHOPEDIC SURGERY | Age: 57
End: 2017-09-28

## 2017-09-28 ENCOUNTER — OFFICE VISIT (OUTPATIENT)
Dept: ORTHOPEDIC SURGERY | Age: 57
End: 2017-09-28

## 2017-09-28 VITALS
RESPIRATION RATE: 16 BRPM | WEIGHT: 225 LBS | TEMPERATURE: 97.5 F | DIASTOLIC BLOOD PRESSURE: 85 MMHG | BODY MASS INDEX: 39.87 KG/M2 | HEIGHT: 63 IN | SYSTOLIC BLOOD PRESSURE: 139 MMHG | HEART RATE: 110 BPM

## 2017-09-28 DIAGNOSIS — M17.11 PRIMARY OSTEOARTHRITIS OF RIGHT KNEE: Primary | ICD-10-CM

## 2017-09-28 DIAGNOSIS — Z96.652 HX OF TOTAL KNEE ARTHROPLASTY, LEFT: ICD-10-CM

## 2017-09-28 PROCEDURE — 99214 OFFICE O/P EST MOD 30 MIN: CPT | Performed by: PHYSICIAN ASSISTANT

## 2017-09-28 PROCEDURE — 20610 DRAIN/INJ JOINT/BURSA W/O US: CPT | Performed by: PHYSICIAN ASSISTANT

## 2017-10-02 ENCOUNTER — OFFICE VISIT (OUTPATIENT)
Dept: ORTHOPEDIC SURGERY | Age: 57
End: 2017-10-02

## 2017-10-02 VITALS
DIASTOLIC BLOOD PRESSURE: 96 MMHG | SYSTOLIC BLOOD PRESSURE: 147 MMHG | WEIGHT: 225 LBS | HEIGHT: 63 IN | HEART RATE: 97 BPM | BODY MASS INDEX: 39.87 KG/M2 | TEMPERATURE: 97.4 F

## 2017-10-02 DIAGNOSIS — M50.30 DDD (DEGENERATIVE DISC DISEASE), CERVICAL: ICD-10-CM

## 2017-10-02 DIAGNOSIS — M54.12 CERVICAL RADICULAR PAIN: Primary | ICD-10-CM

## 2017-10-02 PROCEDURE — 99213 OFFICE O/P EST LOW 20 MIN: CPT | Performed by: PHYSICIAN ASSISTANT

## 2017-10-02 RX ORDER — CELECOXIB 200 MG/1
200 CAPSULE ORAL DAILY
Qty: 30 CAPSULE | Refills: 2 | Status: SHIPPED | OUTPATIENT
Start: 2017-10-02 | End: 2017-12-22 | Stop reason: ALTCHOICE

## 2017-10-03 NOTE — PROGRESS NOTES
Subjective:      Patient ID: Lul Peña is a 60 y.o.  female. Chief Complaint   Patient presents with    Neck Pain        HPI:She is here for an initial evaluation of cervical pain. Onset of symptoms Years. She has a history of previous ACDF 5/12/2011. These symptoms have been progressive in nature. There is not a history of injury. Pain is constant, moderate, severe. Pain description: dull, burning, throbbing, radiating to left shoulder, arm and hand. Pain occurs: Constant. Pain is aggravated with looking up, looking down, looking to the either side. Pain severity: moderate. Pain is worse following activities. Pain improves with rest.  Previous treatments: rest, ice, heat, acetaminophen- tylenol. Treatments have provided: no relief. Review of Systems:   A full list of the ROS have been reviewed. These are recorded and signed in the chart.     Past Medical History:   Diagnosis Date    Allergic rhinitis     Arthritis     Diverticulitis     Diverticulosis     Dyslipidemia     Dyslipidemia     Environmental allergies     GERD (gastroesophageal reflux disease)     Headache     HTN (hypertension)     Impaired fasting glucose     Miscarriage     Obstructive sleep apnea (adult) (pediatric)     Osteoarthritis     Reactive airway disease     Single stillborn delivery outcome     Type II or unspecified type diabetes mellitus without mention of complication, not stated as uncontrolled        Family History   Problem Relation Age of Onset    Diabetes Father     Heart Disease Father      CAD    High Blood Pressure Father     High Cholesterol Father     Kidney Disease Brother     Diabetes Son    [facility] Other Son      DEANA    No Known Problems Mother     Heart Disease Sister      1/2 sister    No Known Problems Brother     No Known Problems Sister     No Known Problems Sister     No Known Problems Sister     No Known Problems Maternal Aunt     No Known Problems Maternal Uncle     tablet Take 1 tablet by mouth 2 times daily (with meals) 180 tablet 1    losartan (COZAAR) 50 MG tablet TAKE 1 TABLET DAILY 90 tablet 1    ONE TOUCH LANCETS MISC 1 each by Does not apply route 2 times daily Please supply pt with Delica Lancets  Test blood sugars  each 3    Calcium Carb-Cholecalciferol (CALTRATE 600+D) 600-800 MG-UNIT TABS Take 1 tablet by mouth 2 times daily      CLICKFINE PEN NEEDLES 31G X 8 MM MISC USE ONCE DAILY 100 each 3    ONE TOUCH ULTRA TEST strip TEST BLOOD SUGAR BEFORE MEALS THREE TIMES A  strip 4    albuterol sulfate HFA (PROAIR HFA) 108 (90 BASE) MCG/ACT inhaler Inhale 2 puffs into the lungs every 6 hours as needed for Wheezing 1 Inhaler 0    ibuprofen (ADVIL;MOTRIN) 800 MG tablet Take 1 tablet by mouth every 8 hours as needed for Pain 30 tablet 1    cetirizine (ZYRTEC ALLERGY) 10 MG tablet Take 1 tablet by mouth daily      aspirin EC 81 MG EC tablet Take 81 mg by mouth daily. LAST DOSE 8-9-14 30 tablet 11     No current facility-administered medications for this visit. Objective:     She is alert, oriented x 3, pleasant, well nourished, developed and in no acute distress. BP (!) 147/96  Pulse 97  Temp 97.4 °F (36.3 °C) (Temporal)   Ht 5' 3\" (1.6 m)  Wt 225 lb (102.1 kg)  LMP 01/26/1996  BMI 39.86 kg/m2     Cervical Spine Exam:  There is not deformity. There is  loss of motion. There is  muscular spasm. There is  trapezius/ rhomboid tenderness. There is not spinous process tenderness. There is not cervical lymphadenopathy. Spurling Test is Positive. NEUROLOGICAL EXAM:  Examination of the upper and lower extremities are intact with sensation to light touch. Motor testing 4+ to 5/5 in all major motor groups including hand intrinsics. Normal heel to toe gait. Bourne's Sign absent. SLR negative.       Reflexes:  Biceps               Left 1+  Right 1+  Triceps              Left 1+  Right 1+  Brachioradialis  Left 1+  Right Therapy   2. DDD (degenerative disc disease), cervical M50.30 722.4 Ambulatory referral to Physical Therapy        Plan:       More than likely she is sustaining some cervical stenosis causing cervical radicular pain. I have recommended outpatient physical therapy. She was prescribed Celebrex 200 mg 1 daily with GI precautions  Follow-up in 4 weeks. If no improvement consider repeat  MRI with contrast of the cervical spine. Follow Up: 4 weeks. Call or return to clinic prn if these symptoms worsen or fail to improve as anticipated.

## 2017-10-10 ENCOUNTER — HOSPITAL ENCOUNTER (OUTPATIENT)
Dept: PHYSICAL THERAPY | Age: 57
Discharge: HOME | End: 2017-10-31
Attending: PHYSICIAN ASSISTANT | Admitting: PHYSICIAN ASSISTANT

## 2017-10-10 DIAGNOSIS — R10.33 PERIUMBILICAL PAIN: ICD-10-CM

## 2017-10-10 NOTE — FLOWSHEET NOTE
Physical Therapy Daily Treatment Note  Date:  10/10/2017    Patient Name:  Kathryn Fritz    :  1960  MRN: [id number]  Restrictions/Precautions:  HTN, OA, DM, L TKA, C4-7 ACDF , B shoulder impingement sx, L RCR, shortness of breath with new exercise  Medical/Treatment Diagnosis Information:  · Diagnosis: cervical radicular pain M54.12, cervical DDD M50.30   · Treatment Diagnosis: neck pain and L UE radiculopathy  Insurance/Certification information:  PT Insurance Information: [employer] Medicare, prior auth required  Physician Information:  Referring Practitioner: OLIVIA Jorge MD Follow-up: ?  Plan of care signed (Y/N): Sent to PA via [employer]   Visit# / total visits:   (POC: 10)  Pain level: 7/10     G-Code noted on 10/10/2017:   PT G-Codes  Functional Assessment Tool Used: NDI  Score: 46% impaired  Functional Limitation: Changing and maintaining body position  Changing and Maintaining Body Position Current Status (): At least 40 percent but less than 60 percent impaired, limited or restricted  Changing and Maintaining Body Position Goal Status (): At least 1 percent but less than 20 percent impaired, limited or restricted    Progress Note: []  Yes  [x]  No  Next due by: Visit #10      Subjective:  10/10/17: Pt reports history of C4-7 ACDF  with good results from surgery. However has had on and off complaints for about 4 years now with neck pain but about 6 months ago began experiencing increased L neck and head pain, and L UE pain rated 6-10/10 that is aching and sharp and also with N/T to L hand on occasion - not currently. Symptoms are worse with turning head, staying in one position too long such as sitting, ironing, sleeping and better with trying to relax and using ace wrap on arm and hand wrap. Pt has reported that she drops items more and sometimes has to pull over when driving due to intense pain. Pt has been limited in exercise as she like to do water aerobics.   Pt has weeks  Short term goal 1: Pt demo good postrual awareness to assist in preventing flare-ups  Short term goal 2: Decreased pain 5/10 at worst with decreased paresthesias 50% for improved sleep tolerance  Long term goals  Time Frame for Long term goals : 5 weeks  Long term goal 1: Pt will demo good understanding and knowledge of HEP  Long term goal 2: Decreased pain 2/10 at worst with no paresthesias for sleep/prolonged sit tolerance with rare complaints  Long term goal 3: Cervical AROM with = rot and SB without pain to allow for turning head when driving without increased pain  Long term goal 4: Strength L UE 5/5 with L  = to or greater than R to allow for no recent dropping of items, ironing without increased pain  Long term goal 5: Decreased impairment per NDI <20% impaired    Plan:   [] Continue per plan of care [] Alter current plan (see comments)  [x] Plan of care initiated [] Hold pending MD visit [] Discharge    Plan for Next Session:  Review HEP, add exercises as tolerated    Electronically signed by:   Melinda Izquierdo DPT [id number]

## 2017-10-10 NOTE — PROGRESS NOTES
cannot do any work at all   [address]  []A. I can lift heavy weights without extra pain  []B. I can lift heavy weights, but it gives me extra pain  []C. Pain prevents me from lifting heavy weights off the floor but I can manage if they are conveniently positioned, for example, on a table  [x]D. Pain prevents me from lifting heavy weights, but I can manage light to medium weights if they are conveniently positioned  []E. I can lift very light weights  []F. I cannot lift or carry anything at all SECTION 8 - Driving  []A. I can drive my car without any neck pain  []B. I can drive my car as long as I want with slight pain in my neck  []C. I can drive my car as long as I want with moderate pain in my neck  []D. I cannot drive my car as long as I want because of moderate pain  [x]E. I can hardly drive at all because of severe pain in my neck  []F. I cannot drive my car at all   SECTION 4 - Reading  []A. I can read as much as I want to with no pain in my neck  []B. I can read as much as I want to with slight pain in my neck  []C. I can read as much as I want to with moderate pain in my neck   [x]D. I cannot read as much as I want because of moderate pain in my neck  []E. I cannot read as much as I want because of severe pain in my neck  []F. I cannot read at all SECTION 9 - Sleeping  []A. I have no trouble sleeping  []B. My sleep is slightly disturbed (< 1 hour sleepless)  []C. My sleep is mildly disturbed (1-2 hours sleepless)  [x]D. My sleep is moderately disturbed (2-3 hours sleepless)  []E. My sleep is greatly disturbed (3-5 hours sleepless)  []F. My sleep is completely disturbed (5-7 hours sleepless)   SECTION 5 - Headaches  []A. I have no headaches at all  []B. I have slight headaches which come infrequently  []C. I have moderate headaches which come infrequently  [x]D. I have moderate headaches which come frequently  []E. I have severe headaches which come frequently  []F.  I have headaches almost all the time SECTION 10 - Recreation  []A. I am able to engage in all of my recreational activities with no neck pain at all  []B. I am able to engage in all of my recreational activities with some pain in my neck  []C. I am able to engage in most, but not all of my recreational activities because of pain in my neck  [x]D. I am able to engage in a few of my recreational activities because of my neck  []E. I can hardly do any recreational activities because of pain in my neck  []F. I cannot do any recreational activities at all     COMMENTS:     Patient Score: 23    Scoring Method for the Neck Disability Index      1. Each of the 10 sections is scored separately (0 to 5 points each) then added up (max. Total = 50). EXAMPLE:  Section 1. Pain Intensity  Item Score Item Description Point Value   A I have no pain at the moment 0   B The pain is very mild at the moment 1   C The pain is moderate at the moment 2   D The pain is fairly severe at the moment 3   E The pain is very severe at the moment 4   F The pain is the worst imaginable 5     2. If all 10 sections are completed, simply double the patient's score. 3. If a section is omitted, divide the patient's total score by the number of sections completed times 5. FORMULA: [(Patient's score) / (# Sections Completed X 5)] X 100 = ____% Disability    EXAMPLE:  If number of sections completed = 9  If patient's score = 22  The equation = [22 / (9 X 5)] X 100 = 48.9% Disability    4. Interpretation of disability scores:    SCORE   0-20% = Minimal disability   20-40% = Moderate disability   40-60% = Severe disability   60-80% = Crippled   % = Bed-bound or exaggerating     Rayray Dixon The Neck Disability Index: A study of reliability and validity.  J Manipulative Physiol Ther 1991;14:409-415    G-Code [place]:  NDI Total Score Disability Index CMS Modifier   0 0% []CH   1-19 1-19% []CI   10-19 20-39% []CJ   20-29 40-59% [x]CK   30-39 60-79% []CL   40-49 80-99% []CM   50 100% []CN

## 2017-10-10 NOTE — PLAN OF CARE
Outpatient Physical Therapy  Phone: [phone] Fax: [phone]    To: Referring Practitioner: OLIVIA Young  From: Emery Almanzar PT, DPT [id number]   Date: 10/10/2017  Patient: Jyothi Leblanc     : 1960 MRN: [id number]  Diagnosis: Diagnosis: cervical radicular pain M54.12, cervical DDD M50.30    Treatment Diagnosis: Treatment Diagnosis: neck pain and L UE radiculopathy     Physical Therapy Certification/Re-Certification Form  Dear OLIVIA Means,   The following patient has been evaluated for physical therapy services and for therapy to continue, Medicare requires monthly physician review of the treatment plan. Please review the attached evaluation and/or summary of the patient's plan of care, and verify that you agree therapy should continue by signing the attached document and sending it back to our office.     Plan of Care/Treatment to date:  [x] Therapeutic Exercise (Review/Progress HEP and provide verbal/tactile cueing for activities related to strengthening, flexibility,  endurance, ROM.)       [x] Therapeutic Activity (Provide verbal/tactile cueing for dynamic activities to promote functional tasks.)          [] Gait Training (Provide verbal/tactile/visual cueing for facilitation of normalized gait pattern without or with the least restrictive AD to decrease pain and/or risk for falling.)          [x] Neuromuscular Re-education (Review/Progress HEP and provide verbal/tactile cueing for activities related to improving balance, coordination, kinesthetic sense, posture, motor skill, proprioception.)         [x] Manual Therapy (Provide manual therapy to mobilize soft tissue/joints for the purpose of modulating pain, promoting relaxation, increasing ROM, reducing/eliminating soft tissue swelling/inflammation/tightness, improving soft tissue extensibility)                [x] Modalities (For modulating pain/tenderness/paresthesias, reducing swelling/inflammation/tightness, improving soft tissue

## 2017-10-10 NOTE — PROGRESS NOTES
currently. Symptoms are worse with turning head, staying in one position too long such as sitting, ironing, sleeping and better with trying to relax and using ace wrap on arm and hand wrap. Pt has reported that she drops items more and sometimes has to pull over when driving due to intense pain. Pt has been limited in exercise as she like to do water aerobics. Pt has been retired 10 years. PLOF: not with consistent pain to neck, no L UE pain and no limitations       Social/Functional History  Social/Functional History  Lives With: Alone  Type of Home: Apartment  Objective     Observation/Palpation  Posture:  (mild forward shoulder posture and forward head posture)  Palpation: mild tenderness along cervical spine, with moderate tightness L UT/levator    AROM RUE (degrees)  RUE AROM : WFL  AROM LUE (degrees)  LUE AROM : WFL  LUE General AROM: with the exception of ER reach to L UT  Spine  Cervical: flexion 75%, ext 25%, L rot 50%, R rot 25%, B SB 25% each with pain/pulling    Strength RUE  Strength RUE: WFL  Comment:  40#, 45#, 35# on JAN Lv 2  Strength LUE  Comment: elbow 5/5 but with pain, shoulder 4-/5 flexion/abduction, ER 3+/5 IR 4+/5, wrist 5/5,  16#, 16#, 11# on JAN Lv 1  Sensation  Overall Sensation Status:  (intact to light touch B UE)    Assessment   Conditions Requiring Skilled Therapeutic Intervention  Body structures, Functions, Activity limitations: Decreased functional mobility ; Decreased ROM; Decreased strength  Assessment: Pt presents with pain, paresthesias, decreased ROM/strength, postural deficits limiting N sleep, sitting, turning head, driving, ironing, holding items  Treatment Diagnosis: neck pain and L UE radiculopathy  Prognosis: Good  Decision Making: Medium Complexity  REQUIRES PT FOLLOW UP: Yes  Activity Tolerance  Activity Tolerance: Patient Tolerated treatment well         Plan   Plan  Times per week: 2  Plan weeks: 5  Plan Comment: Plan of care initiated    ADOLFO-Code  PT G-Codes  Functional Assessment Tool Used: NDI  Score: 46% impaired  Functional Limitation: Changing and maintaining body position  Changing and Maintaining Body Position Current Status (): At least 40 percent but less than 60 percent impaired, limited or restricted  Changing and Maintaining Body Position Goal Status ():  At least 1 percent but less than 20 percent impaired, limited or restricted    Goals  Short term goals  Time Frame for Short term goals: 3 weeks  Short term goal 1: Pt demo good postrual awareness to assist in preventing flare-ups  Short term goal 2: Decreased pain 5/10 at worst with decreased paresthesias 50% for improved sleep tolerance  Long term goals  Time Frame for Long term goals : 5 weeks  Long term goal 1: Pt will demo good understanding and knowledge of HEP  Long term goal 2: Decreased pain 2/10 at worst with no paresthesias for sleep/prolonged sit tolerance with rare complaints  Long term goal 3: Cervical AROM with = rot and SB without pain to allow for turning head when driving without increased pain  Long term goal 4: Strength L UE 5/5 with L  = to or greater than R to allow for no recent dropping of items, ironing without increased pain  Long term goal 5: Decreased impairment per NDI <20% impaired  Patient Goals   Patient goals : Dee Dee Bravo to stop the muscle strain in the head and neck\"       Therapy Time   Individual Concurrent Group Co-treatment   Time In 1126         Time Out 1200         Minutes 34         Timed Code Treatment Minutes: 14 Minutes + 20 min iris Ojeda, PT, DPT [id number]

## 2017-10-12 ENCOUNTER — NURSE ONLY (OUTPATIENT)
Dept: ENT CLINIC | Age: 57
End: 2017-10-12

## 2017-10-12 DIAGNOSIS — J30.2 SEASONAL ALLERGIC RHINITIS, UNSPECIFIED ALLERGIC RHINITIS TRIGGER: ICD-10-CM

## 2017-10-12 PROCEDURE — 95117 IMMUNOTHERAPY INJECTIONS: CPT | Performed by: OTOLARYNGOLOGY

## 2017-10-12 NOTE — PROGRESS NOTES
After obtaining consent, and per orders of Dr Carmen Hutton, injections of allergy serum given in left and right arm by Alma Rosa Mckinney. Patient instructed to remain in clinic for 20 minutes afterwards, and to report any adverse reaction to me immediately.

## 2017-10-30 ENCOUNTER — NURSE ONLY (OUTPATIENT)
Dept: ENT CLINIC | Age: 57
End: 2017-10-30

## 2017-10-30 ENCOUNTER — OFFICE VISIT (OUTPATIENT)
Dept: ORTHOPEDIC SURGERY | Age: 57
End: 2017-10-30

## 2017-10-30 VITALS
SYSTOLIC BLOOD PRESSURE: 142 MMHG | TEMPERATURE: 97.2 F | BODY MASS INDEX: 39.87 KG/M2 | WEIGHT: 225 LBS | HEART RATE: 104 BPM | RESPIRATION RATE: 16 BRPM | DIASTOLIC BLOOD PRESSURE: 97 MMHG | HEIGHT: 63 IN

## 2017-10-30 DIAGNOSIS — M75.82 ROTATOR CUFF TENDONITIS, LEFT: ICD-10-CM

## 2017-10-30 DIAGNOSIS — M54.12 CERVICAL RADICULAR PAIN: Primary | ICD-10-CM

## 2017-10-30 DIAGNOSIS — M17.11 PRIMARY OSTEOARTHRITIS OF RIGHT KNEE: ICD-10-CM

## 2017-10-30 DIAGNOSIS — J30.2 CHRONIC SEASONAL ALLERGIC RHINITIS DUE TO OTHER ALLERGEN: ICD-10-CM

## 2017-10-30 PROCEDURE — G8484 FLU IMMUNIZE NO ADMIN: HCPCS | Performed by: PHYSICIAN ASSISTANT

## 2017-10-30 PROCEDURE — G8427 DOCREV CUR MEDS BY ELIG CLIN: HCPCS | Performed by: PHYSICIAN ASSISTANT

## 2017-10-30 PROCEDURE — G8417 CALC BMI ABV UP PARAM F/U: HCPCS | Performed by: PHYSICIAN ASSISTANT

## 2017-10-30 PROCEDURE — 4004F PT TOBACCO SCREEN RCVD TLK: CPT | Performed by: PHYSICIAN ASSISTANT

## 2017-10-30 PROCEDURE — 20610 DRAIN/INJ JOINT/BURSA W/O US: CPT | Performed by: PHYSICIAN ASSISTANT

## 2017-10-30 PROCEDURE — 99213 OFFICE O/P EST LOW 20 MIN: CPT | Performed by: PHYSICIAN ASSISTANT

## 2017-10-30 PROCEDURE — 3014F SCREEN MAMMO DOC REV: CPT | Performed by: PHYSICIAN ASSISTANT

## 2017-10-30 PROCEDURE — 3017F COLORECTAL CA SCREEN DOC REV: CPT | Performed by: PHYSICIAN ASSISTANT

## 2017-10-30 PROCEDURE — 95117 IMMUNOTHERAPY INJECTIONS: CPT | Performed by: OTOLARYNGOLOGY

## 2017-10-30 NOTE — PROGRESS NOTES
After obtaining consent, and per orders of Dr Dorota Junior, injections of allergy serum given in left and right arm by Ayden Lewis. Patient instructed to remain in clinic for 20 minutes afterwards, and to report any adverse reaction to me immediately.

## 2017-10-30 NOTE — PROGRESS NOTES
Maternal Grandmother     No Known Problems Maternal Grandfather     No Known Problems Paternal Grandmother     No Known Problems Paternal Grandfather     No Known Problems Other     Rheum Arthritis Neg Hx     Osteoarthritis Neg Hx     Asthma Neg Hx     Breast Cancer Neg Hx     Cancer Neg Hx     Heart Failure Neg Hx     Hypertension Neg Hx     Migraines Neg Hx     Ovarian Cancer Neg Hx     Rashes/Skin Problems Neg Hx     Seizures Neg Hx     Stroke Neg Hx     Thyroid Disease Neg Hx        Past Surgical History:   Procedure Laterality Date    COLON SURGERY  2008    10\" removed   [address]    right still left    JOINT REPLACEMENT  2007    Left knee partial    KNEE SURGERY  1990's    rt knee reconstructed    NECK SURGERY  05/12/2011    c 4,5,6,7 (ACDF)    OVARY REMOVAL      ROTATOR CUFF REPAIR  2009    Left shoulder    SHOULDER SURGERY  1990's    lt & rt shoulder impingment    SINUS ENDOSCOPY  9-15-14    FUNCTIONAL ENDOSCOPIC SINUS SURGERY       TONSILLECTOMY AND ADENOIDECTOMY      TOTAL KNEE ARTHROPLASTY Left 03/09/2010    Eros to Total    TUBAL LIGATION      UMBILICAL HERNIA REPAIR  8/2010    Dr. Tova Jeffrey       Social History     Occupational History    retired      Social History Main Topics    Smoking status: Current Some Day Smoker     Packs/day: 1.00     Years: 20.00     Types: Cigars     Last attempt to quit: 8/11/2014    Smokeless tobacco: Never Used      Comment: 5 cig a week     Alcohol use No    Drug use: No    Sexual activity: No       Current Outpatient Prescriptions   Medication Sig Dispense Refill    celecoxib (CELEBREX) 200 MG capsule Take 1 capsule by mouth daily 30 capsule 2    amLODIPine (NORVASC) 5 MG tablet TAKE 1 TABLET DAILY 90 tablet 2    atorvastatin (LIPITOR) 10 MG tablet TAKE 1 TABLET DAILY 90 tablet 1    metFORMIN (GLUCOPHAGE) 1000 MG tablet Take 1 tablet by mouth 2 times daily (with meals) 180 tablet 1    losartan (COZAAR) 50 light touch. Motor testing  5/5 in all major motor groups including hand intrinsics. Radial, Median and Ulnar nerves are intact. Bourne's Sign absent. Examination of the upper extremities shows intact perfusion to all extremities. No cyanosis. Digits are warm to touch. Capillary refill is less than 2 seconds. No edema noted. Examination of the skin over the upper extremities:  Reveals the skin to be intact without lacerations or abrasions. There are no significant erythema, rashes or skin lesions. X Rays: not performed in the office today:   Diagnosis:        ICD-10-CM ICD-9-CM    1. Cervical radicular pain M54.12 723.4    2. Primary osteoarthritis of right knee M17.11 715.16 MD ARTHROCENTESIS ASPIR&/INJ MAJOR JT/BURSA W/O US      EUFLEXXA INJ PER DOSE   3. Rotator cuff tendonitis, left M75.82 726.10 MD ARTHROCENTESIS ASPIR&/INJ MAJOR JT/BURSA W/O US      MD TRIAMCINOLONE ACETONIDE INJ        Assessment/ Plan:      The natural history of the patient's diagnosis as well as the treatment options were discussed in full and questions were answered. Risks and benefits of the treatment options also reviewed in detail. She continues have neck pain related to degenerative disc disease. She'll continue physical therapy. She has left shoulder rotator cuff tendinitis possible rotator cuff tear. She's had one prior surgery on the left shoulder. I am recommending left shoulder subacromial injection today. Cortisone  Injection  PROCEDURE NOTE:  Pre op Diagnosis: Shoulder pain  Post op Diagnosis: Same  With the patient's permission, her left shoulder was prepped  in standard sterile fashion with  Alcohol and 2 cc of 0.25% Marcaine and 1 cc of Kenalog 40 mg was injected into the left lateral subacromial space  without difficulty. She   tolerated this well without difficulty. A band-aid was applied.  She   was advised to ice the shoulder for 15-20 minutes to relieve any injection site related pain. Discussed proceeding with Visco supplementation injection for treatment of her right knee arthritis. Discussed Euflexxa series of 3 to begin today. DIAGNOSIS: DJD Right knee  With the patient's permission, her Right knee was prepped in standard sterile fashion with  Alcohol. The prefilled injection of the preferred visco supplementation ( Euflexxa 20 mg/ 2 ML ) was injected into the Right lateral joint space compartment without difficulty. The patient tolerated the procedure(s) well without difficulty. A band-aid was applied. POST-PROCEDURE INSTRUCTIONS GIVEN TO PATIENT:   She was advised to ice the knee for 15-20 minutes to relieve any injection site related pain. Decrease activity for the next 24 to 48 hours. May use prescription or OTC pain relievers as needed    FOLLOW-UP:   As directed or call or return to clinic if these symptoms worsen or fail to improve as anticipated. If at any time you are concerned you may contact the office for further instructions or care.

## 2017-10-30 NOTE — PROGRESS NOTES
Euflexxa:  NDC: [address]-78  Lot #: [id number]  Expiration Date: 10.27.2018    Kenalog:  NDC: 0033-5901-73  Lot Number: [id number] Date: 02.2019

## 2017-11-01 ENCOUNTER — HOSPITAL ENCOUNTER (OUTPATIENT)
Dept: OTHER | Age: 57
Discharge: HOME | End: 2017-11-30
Attending: PHYSICIAN ASSISTANT | Admitting: PHYSICIAN ASSISTANT

## 2017-11-07 ENCOUNTER — OFFICE VISIT (OUTPATIENT)
Dept: INTERNAL MEDICINE CLINIC | Age: 57
End: 2017-11-07

## 2017-11-07 ENCOUNTER — OFFICE VISIT (OUTPATIENT)
Dept: ORTHOPEDIC SURGERY | Age: 57
End: 2017-11-07

## 2017-11-07 VITALS
BODY MASS INDEX: 39.5 KG/M2 | SYSTOLIC BLOOD PRESSURE: 138 MMHG | HEART RATE: 92 BPM | DIASTOLIC BLOOD PRESSURE: 92 MMHG | WEIGHT: 223 LBS | RESPIRATION RATE: 16 BRPM

## 2017-11-07 DIAGNOSIS — K52.9 GASTROENTERITIS: Primary | ICD-10-CM

## 2017-11-07 DIAGNOSIS — D72.829 LEUKOCYTOSIS, UNSPECIFIED TYPE: ICD-10-CM

## 2017-11-07 DIAGNOSIS — R10.9 ABDOMINAL PAIN, UNSPECIFIED ABDOMINAL LOCATION: Primary | ICD-10-CM

## 2017-11-07 DIAGNOSIS — R31.29 MICROSCOPIC HEMATURIA: ICD-10-CM

## 2017-11-07 DIAGNOSIS — M17.11 PRIMARY OSTEOARTHRITIS OF RIGHT KNEE: Primary | ICD-10-CM

## 2017-11-07 LAB
ANION GAP SERPL CALCULATED.3IONS-SCNC: 18 MMOL/L (ref 3–16)
BACTERIA: ABNORMAL /HPF
BASOPHILS ABSOLUTE: 0.1 K/UL (ref 0–0.2)
BASOPHILS RELATIVE PERCENT: 0.5 %
BILIRUBIN URINE: ABNORMAL
BLOOD, URINE: NEGATIVE
BUN BLDV-MCNC: 12 MG/DL (ref 7–20)
CALCIUM SERPL-MCNC: 10.3 MG/DL (ref 8.3–10.6)
CHLORIDE BLD-SCNC: 98 MMOL/L (ref 99–110)
CLARITY: ABNORMAL
CO2: 22 MMOL/L (ref 21–32)
COLOR: ABNORMAL
CREAT SERPL-MCNC: 0.6 MG/DL (ref 0.6–1.1)
EOSINOPHILS ABSOLUTE: 0.1 K/UL (ref 0–0.6)
EOSINOPHILS RELATIVE PERCENT: 0.6 %
EPITHELIAL CELLS, UA: ABNORMAL /HPF
GFR AFRICAN AMERICAN: >60
GFR NON-AFRICAN AMERICAN: >60
GLUCOSE BLD-MCNC: 139 MG/DL (ref 70–99)
GLUCOSE URINE: NEGATIVE MG/DL
HCT VFR BLD CALC: 46.7 % (ref 36–48)
HEMOGLOBIN: 14.6 G/DL (ref 12–16)
KETONES, URINE: NEGATIVE MG/DL
LEUKOCYTE ESTERASE, URINE: NEGATIVE
LYMPHOCYTES ABSOLUTE: 4.1 K/UL (ref 1–5.1)
LYMPHOCYTES RELATIVE PERCENT: 19.2 %
MCH RBC QN AUTO: 27.4 PG (ref 26–34)
MCHC RBC AUTO-ENTMCNC: 31.4 G/DL (ref 31–36)
MCV RBC AUTO: 87.3 FL (ref 80–100)
MICROSCOPIC EXAMINATION: YES
MONOCYTES ABSOLUTE: 1 K/UL (ref 0–1.3)
MONOCYTES RELATIVE PERCENT: 4.9 %
NEUTROPHILS ABSOLUTE: 15.8 K/UL (ref 1.7–7.7)
NEUTROPHILS RELATIVE PERCENT: 74.8 %
NITRITE, URINE: NEGATIVE
PDW BLD-RTO: 13.4 % (ref 12.4–15.4)
PH UA: 6
PLATELET # BLD: 303 K/UL (ref 135–450)
PLATELET SLIDE REVIEW: ABNORMAL
PMV BLD AUTO: 10.8 FL (ref 5–10.5)
POTASSIUM SERPL-SCNC: 4.1 MMOL/L (ref 3.5–5.1)
PROTEIN UA: NEGATIVE MG/DL
RBC # BLD: 5.35 M/UL (ref 4–5.2)
RBC UA: ABNORMAL /HPF (ref 0–2)
SODIUM BLD-SCNC: 138 MMOL/L (ref 136–145)
SPECIFIC GRAVITY UA: 1.02
UROBILINOGEN, URINE: 1 E.U./DL
WBC # BLD: 21.1 K/UL (ref 4–11)
WBC UA: ABNORMAL /HPF (ref 0–5)
YEAST: PRESENT /HPF

## 2017-11-07 PROCEDURE — G8427 DOCREV CUR MEDS BY ELIG CLIN: HCPCS | Performed by: INTERNAL MEDICINE

## 2017-11-07 PROCEDURE — G8484 FLU IMMUNIZE NO ADMIN: HCPCS | Performed by: INTERNAL MEDICINE

## 2017-11-07 PROCEDURE — 81001 URINALYSIS AUTO W/SCOPE: CPT | Performed by: INTERNAL MEDICINE

## 2017-11-07 PROCEDURE — G8417 CALC BMI ABV UP PARAM F/U: HCPCS | Performed by: INTERNAL MEDICINE

## 2017-11-07 PROCEDURE — 20610 DRAIN/INJ JOINT/BURSA W/O US: CPT | Performed by: PHYSICIAN ASSISTANT

## 2017-11-07 PROCEDURE — 4004F PT TOBACCO SCREEN RCVD TLK: CPT | Performed by: INTERNAL MEDICINE

## 2017-11-07 PROCEDURE — 99214 OFFICE O/P EST MOD 30 MIN: CPT | Performed by: INTERNAL MEDICINE

## 2017-11-07 PROCEDURE — 3014F SCREEN MAMMO DOC REV: CPT | Performed by: INTERNAL MEDICINE

## 2017-11-07 PROCEDURE — 3017F COLORECTAL CA SCREEN DOC REV: CPT | Performed by: INTERNAL MEDICINE

## 2017-11-07 RX ORDER — SACCHAROMYCES BOULARDII 250 MG
250 CAPSULE ORAL 2 TIMES DAILY
Qty: 28 CAPSULE | Refills: 0 | COMMUNITY
Start: 2017-11-07 | End: 2017-11-21

## 2017-11-07 NOTE — PATIENT INSTRUCTIONS
clean. Wash your hands, cutting boards, and countertops with hot soapy water frequently. · Cook meat until it is well done. · Do not eat raw eggs or uncooked sauces made with raw eggs. · Do not take chances. If food looks or tastes spoiled, throw it out. When should you call for help? Call 911 anytime you think you may need emergency care. For example, call if:  · You vomit blood or what looks like coffee grounds. · You passed out (lost consciousness). · You pass maroon or very bloody stools. Call your doctor now or seek immediate medical care if:  · You have severe belly pain. · You have signs of needing more fluids. You have sunken eyes, a dry mouth, and pass only a little dark urine. · You feel like you are going to faint. · You have increased belly pain that does not go away in 1 to 2 days. · You have new or increased nausea, or you are vomiting. · You have a new or higher fever. · Your stools are black and tarlike or have streaks of blood. Watch closely for changes in your health, and be sure to contact your doctor if:  · You are dizzy or lightheaded. · You urinate less than usual, or your urine is dark yellow or brown. · You do not feel better with each day that goes by. Where can you learn more? Go to https://Yingying Licaipechiomaeb.DataKraft. org and sign in to your [employer] account. Enter N142 in the [facility] box to learn more about \"Gastroenteritis: Care Instructions. \"     If you do not have an account, please click on the \"Sign Up Now\" link. Current as of: March 3, 2017  Content Version: 11.3  © 5981-2613 [employer]. Care instructions adapted under license by [address] If you have questions about a medical condition or this instruction, always ask your healthcare professional. [address] any warranty or liability for your use of this information.

## 2017-11-07 NOTE — PROGRESS NOTES
[facility]) Physicians  Internal Medicine  Patient Encounter  Miguel Meneses D.O., [facility]        Chief Complaint   Patient presents with    Follow-Up from Hospital    Diarrhea       HPI: 60 y.o. female Seen today status post ER visit. She presented to [facility] on 10/31/2017 with acute onset of right lower quadrant abdominal pain associated with multiple bouts of watery diarrhea. She states she was slumped over in pain. The pain was sharp and stabbing, but also cramping. She had 2 episodes of vomiting in the ER. Symptoms started, where she is locating now in the right mid and RLQ pain. The diarrhea started before the onset of the pain. She did have nausea when the pain started. No hematuria, hematochezia, Melena, hematemesis. No fever or chills. Hospital records were reviewed in the electronic medical record. Patient underwent lab work and CT scanning. Lab revealed a leukocytosis with a white blood cell count of 18,000 But no band forms. In addition her urinalysis showed large hematuria with  red blood cells per high-powered field. There were no bacteria. CT scan showed no evidence of colitis. She was diagnosed with gastroenteritis. Pt does report she was exposed to her great nephew who was ill with fever and GI illness. She was given IV antibiotics, but not sent home with any. She was told to see the Gastroenterologist.  She had a colonoscopy 4 years ago. She continues to have soreness in the abdomen, but not as bad. She is no longer having diarrhea. She no longer has nausea. She has more pain with pressure. She is able to eat without pain or nausea. Stools are forming. She denies heartburn.          Past Medical History:   Diagnosis Date    Allergic rhinitis     Arthritis     Diverticulitis     Diverticulosis     Dyslipidemia     Dyslipidemia     Environmental allergies     GERD (gastroesophageal reflux disease)     Headache(784.0)     HTN (hypertension)     Impaired fasting glucose     Miscarriage     Obstructive sleep apnea (adult) (pediatric)     Osteoarthritis     Reactive airway disease     Single stillborn delivery outcome     Type II or unspecified type diabetes mellitus without mention of complication, not stated as uncontrolled        Review of Systems - As per HPI      OBJECTIVE:  BP (!) 138/92   Pulse 92   Resp 16   Wt 223 lb (101.2 kg)   LMP 01/26/1996   BMI 39.50 kg/m²   GEN: NAD, A&O, Non-toxic  HEENT: NC/AT, BARBY, EOMI, Oral cavity Clear,  TM's NL, Nasal cavity clear. NECK: Supple. No thyromegaly. LYMPH: No C/SC nodes. CV: S1 S2 NL, RRR. No murmurs. No ectopy  PULM: CTA, symmentric air exchange  EXT: No C/C/E  GI: Abdomen is soft, BS+, No hepatomegaly. No RUQ TTP, (-) Jeong's. Minimal periumbilical TTP. No RLQ TTP. No R/R.        ASSESSMENT[de-identified]  Denita Laird was seen today for follow-up from hospital and diarrhea. Diagnoses and all orders for this visit:    Gastroenteritis  -     CBC Auto Differential  -     Basic Metabolic Panel  -     saccharomyces boulardii (FLORASTOR) 250 MG capsule; Take 1 capsule by mouth 2 times daily for 14 days    Leukocytosis, unspecified type  -     CBC Auto Differential    Microscopic hematuria  -     CBC Auto Differential  -     URINALYSIS WITH MICROSCOPIC        Additional Plan:  1. Advised patient to go to the emergency room should her symptoms worsen. 2. Patient may need gastroenterology. 3. Patient may need neurology    Discussed medications with patient who voiced understanding of their use, indication and potential side effects. Pt also understands the above recommendations. All questions answered.

## 2017-11-08 NOTE — PROGRESS NOTES
Subjective:    She  is here for Euflexxa injection #2 for the  right knee. Objective:   Last menstrual period 01/26/1996, not currently breastfeeding. There is a mild joint effusion noted of the right knee. There is mild pain with range of motion testing. There is no significant  instability noted. Assessment:    ICD-10-CM ICD-9-CM    1. Primary osteoarthritis of right knee M17.11 715.16 VA ARTHROCENTESIS ASPIR&/INJ MAJOR JT/BURSA W/O US      EUFLEXXA INJ PER DOSE       Plan:  Proceed with repeat injection in the series of 3 injections. PROCEDURE NOTE:  PRE-PROCEDURE DIAGNOSIS: DJD knee  POST-PROCEDURE DIAGNOSIS: DJD knee  With the patient's permission, her right knee was prepped in standard sterile fashion with  Alcohol. The prefilled injection of the preferred visco supplementation ( Euflexxa 20 mg/ 2 ML ) was injected into the  lateral joint space compartment without difficulty. The patient tolerated the procedure(s) well without difficulty. A band-aid was applied. POST-PROCEDURE INSTRUCTIONS GIVEN TO PATIENT:   She was advised to ice the knee for 15-20 minutes to relieve any injection site related pain. Decrease activity for the next 24 to 48 hours. May use prescription or OTC pain relievers as needed    FOLLOW-UP: 1 week  As directed or call or return to clinic if these symptoms worsen or fail to improve as anticipated. If at any time you are concerned you may contact the office for further instructions or care.

## 2017-11-09 DIAGNOSIS — D72.829 LEUKOCYTOSIS, UNSPECIFIED TYPE: ICD-10-CM

## 2017-11-09 DIAGNOSIS — R10.9 ABDOMINAL PAIN, UNSPECIFIED ABDOMINAL LOCATION: ICD-10-CM

## 2017-11-09 LAB
AMYLASE: 29 U/L (ref 25–115)
LIPASE: 29 U/L (ref 13–60)

## 2017-11-14 ENCOUNTER — OFFICE VISIT (OUTPATIENT)
Dept: ORTHOPEDIC SURGERY | Age: 57
End: 2017-11-14

## 2017-11-14 DIAGNOSIS — M17.11 PRIMARY OSTEOARTHRITIS OF RIGHT KNEE: Primary | ICD-10-CM

## 2017-11-14 PROCEDURE — 20610 DRAIN/INJ JOINT/BURSA W/O US: CPT | Performed by: PHYSICIAN ASSISTANT

## 2017-11-15 ENCOUNTER — HOSPITAL ENCOUNTER (OUTPATIENT)
Dept: ULTRASOUND IMAGING | Age: 57
Discharge: HOME | End: 2017-11-15
Attending: INTERNAL MEDICINE | Admitting: INTERNAL MEDICINE

## 2017-11-15 DIAGNOSIS — R10.9 ABDOMINAL PAIN, UNSPECIFIED ABDOMINAL LOCATION: ICD-10-CM

## 2017-11-15 DIAGNOSIS — D72.829 LEUKOCYTOSIS, UNSPECIFIED TYPE: ICD-10-CM

## 2017-11-15 DIAGNOSIS — R10.9 ABDOMINAL PAIN: ICD-10-CM

## 2017-11-27 ENCOUNTER — NURSE ONLY (OUTPATIENT)
Dept: ENT CLINIC | Age: 57
End: 2017-11-27

## 2017-11-27 DIAGNOSIS — J30.2 CHRONIC SEASONAL ALLERGIC RHINITIS DUE TO OTHER ALLERGEN: ICD-10-CM

## 2017-11-27 PROCEDURE — 95117 IMMUNOTHERAPY INJECTIONS: CPT | Performed by: OTOLARYNGOLOGY

## 2017-11-27 NOTE — PROGRESS NOTES
After obtaining consent, and per orders of Dr Barbara Senior, injections of allergy serum given in left and right arm by Idalmis Glez. Patient instructed to remain in clinic for 20 minutes afterwards, and to report any adverse reaction to me immediately.

## 2017-12-01 ENCOUNTER — HOSPITAL ENCOUNTER (OUTPATIENT)
Dept: OTHER | Age: 57
Discharge: HOME | End: 2017-12-31
Attending: PHYSICIAN ASSISTANT | Admitting: PHYSICIAN ASSISTANT

## 2017-12-07 ENCOUNTER — NURSE ONLY (OUTPATIENT)
Dept: ENT CLINIC | Age: 57
End: 2017-12-07

## 2017-12-07 DIAGNOSIS — J30.2 CHRONIC SEASONAL ALLERGIC RHINITIS DUE TO OTHER ALLERGEN: ICD-10-CM

## 2017-12-07 PROCEDURE — 95117 IMMUNOTHERAPY INJECTIONS: CPT | Performed by: OTOLARYNGOLOGY

## 2017-12-18 ENCOUNTER — NURSE ONLY (OUTPATIENT)
Dept: ENT CLINIC | Age: 57
End: 2017-12-18

## 2017-12-18 DIAGNOSIS — J30.2 CHRONIC SEASONAL ALLERGIC RHINITIS DUE TO OTHER ALLERGEN: ICD-10-CM

## 2017-12-18 PROCEDURE — 95117 IMMUNOTHERAPY INJECTIONS: CPT | Performed by: OTOLARYNGOLOGY

## 2017-12-18 NOTE — PROGRESS NOTES
After obtaining consent, and per orders of Dr Magdalena Morelos, injections of allergy serum given in left and right arm by Alex Alexander. Patient instructed to remain in clinic for 20 minutes afterwards, and to report any adverse reaction to me immediately.

## 2017-12-22 ENCOUNTER — OFFICE VISIT (OUTPATIENT)
Dept: ENT CLINIC | Age: 57
End: 2017-12-22

## 2017-12-22 VITALS — DIASTOLIC BLOOD PRESSURE: 80 MMHG | SYSTOLIC BLOOD PRESSURE: 138 MMHG | HEART RATE: 96 BPM

## 2017-12-22 DIAGNOSIS — J32.8 OTHER CHRONIC SINUSITIS: Primary | ICD-10-CM

## 2017-12-22 PROCEDURE — G8427 DOCREV CUR MEDS BY ELIG CLIN: HCPCS | Performed by: OTOLARYNGOLOGY

## 2017-12-22 PROCEDURE — G8484 FLU IMMUNIZE NO ADMIN: HCPCS | Performed by: OTOLARYNGOLOGY

## 2017-12-22 PROCEDURE — 3014F SCREEN MAMMO DOC REV: CPT | Performed by: OTOLARYNGOLOGY

## 2017-12-22 PROCEDURE — 99213 OFFICE O/P EST LOW 20 MIN: CPT | Performed by: OTOLARYNGOLOGY

## 2017-12-22 PROCEDURE — G8417 CALC BMI ABV UP PARAM F/U: HCPCS | Performed by: OTOLARYNGOLOGY

## 2017-12-22 PROCEDURE — 96372 THER/PROPH/DIAG INJ SC/IM: CPT | Performed by: OTOLARYNGOLOGY

## 2017-12-22 PROCEDURE — 1036F TOBACCO NON-USER: CPT | Performed by: OTOLARYNGOLOGY

## 2017-12-22 PROCEDURE — 3017F COLORECTAL CA SCREEN DOC REV: CPT | Performed by: OTOLARYNGOLOGY

## 2017-12-22 RX ORDER — DOXYCYCLINE HYCLATE 100 MG
100 TABLET ORAL 2 TIMES DAILY
Qty: 14 TABLET | Refills: 0 | Status: SHIPPED | OUTPATIENT
Start: 2017-12-22 | End: 2018-01-02 | Stop reason: ALTCHOICE

## 2017-12-22 RX ORDER — METHYLPREDNISOLONE ACETATE 40 MG/ML
40 INJECTION, SUSPENSION INTRA-ARTICULAR; INTRALESIONAL; INTRAMUSCULAR; SOFT TISSUE ONCE
Status: COMPLETED | OUTPATIENT
Start: 2017-12-22 | End: 2017-12-22

## 2017-12-22 RX ADMIN — METHYLPREDNISOLONE ACETATE 40 MG: 40 INJECTION, SUSPENSION INTRA-ARTICULAR; INTRALESIONAL; INTRAMUSCULAR; SOFT TISSUE at 13:25

## 2017-12-22 NOTE — PROGRESS NOTES
The patient's allergies had been relatively well controlled until approximately a week ago. As opposed to last December, she has been compliant with her shots  Today there is a one-week history of facial pressure, particularly with bending. Both nasal drainage and postnasal drips been discolored. She has been around children with suspected contagious disease  She had tempted to address these exacerbations with Zyrtec, Benadryl, and Flonase, but with no change  There have been no signs of infection such as fever, discolored drainage, or tender glands    There are no symptoms referable to the lower respiratory system      She has started no new medications in the last week that or prescription  Her hypertension and type II diabetes are well controlled medically  She has not been exposed to fumes, chemicals, or hazardous waste    Exam:    The patient appears well developed and well nourished.; oriented to person, place, and time. The head is normocephalic and atraumatic; no facial scars or weakness are noted. The facial skeleton is normal.The voice has a normal quality and tonality to it. Eyes: Conjunctivae and lids normal. Full EOM. The skin is warm and dry. No pallor. The pinnae are normal in appearance. Otoscopy reveals clear ear canals without cerumen or keratin debris. Both eardrums are normal with good aeration of the middle ear space. There is no attic retraction, and a normal light reflex. The external nose is unremarkable including the dorsum, columella, nasion, and alae. The turbinates are 2+ boggy , and respond well to vasoconstriction. The middle and inferior meatuses appeared clear. Yellow viscous drainage is evacuated from the floor of the nose on the left. It appears to be emanating from the middle meatus There is no polyp, ulceration, or masses visualized either naris. The septum is not deviated or deflected to a significant degree.        The mirror exam of the nasopharynx shows no mucosal disease or obstruction. The lips and oral cavity are normal with no discrete mucosal disease, and normally hydrated. Tongue mobility is normal. The fungiform and vallate papillae are normal appearing. Dentition is unremarkable including the gingiva The soft palate and uvula are of normal configuration, with no displacement of the palatoglossal or palatopharyngeal folds, and no obstruction to the oropharynx. The posterior pharyngeal wall is normal but with similar appearing mucus as above    The neck is supple with full range of motion. The bony and cartilaginous landmarks are normal to palpation. There is no suspicious mass or adenopathy. The thyroid gland is normal to palpation, and the trachea is midline. Chest reveals normal effort, no stridor. No tachypnea. No respiratory distress. Impression:  Allergic rhinitis controlled  Secondary sinusitis    Plan:  Continue allergy desensitization shots  Add Depo-Medrol 1 mL IM  Doxycycline 100 mg b.i.d.   Continue budesonide daily  Check back in a few days as needed

## 2017-12-26 RX ORDER — LOSARTAN POTASSIUM 50 MG/1
TABLET ORAL
Qty: 30 TABLET | Refills: 0 | Status: SHIPPED | OUTPATIENT
Start: 2017-12-26 | End: 2018-01-02 | Stop reason: SDUPTHER

## 2017-12-28 ENCOUNTER — TELEPHONE (OUTPATIENT)
Dept: ENT CLINIC | Age: 57
End: 2017-12-28

## 2017-12-28 DIAGNOSIS — J32.8 OTHER CHRONIC SINUSITIS: Primary | ICD-10-CM

## 2017-12-28 RX ORDER — GUAIFENESIN 1200 MG/1
TABLET, EXTENDED RELEASE ORAL
Qty: 30 TABLET | Refills: 1 | Status: SHIPPED | OUTPATIENT
Start: 2017-12-28 | End: 2018-04-02 | Stop reason: ALTCHOICE

## 2018-01-02 ENCOUNTER — OFFICE VISIT (OUTPATIENT)
Dept: INTERNAL MEDICINE CLINIC | Age: 58
End: 2018-01-02

## 2018-01-02 VITALS
WEIGHT: 222 LBS | SYSTOLIC BLOOD PRESSURE: 122 MMHG | RESPIRATION RATE: 12 BRPM | DIASTOLIC BLOOD PRESSURE: 90 MMHG | HEIGHT: 63 IN | BODY MASS INDEX: 39.34 KG/M2 | HEART RATE: 105 BPM

## 2018-01-02 DIAGNOSIS — I10 BENIGN ESSENTIAL HTN: Chronic | ICD-10-CM

## 2018-01-02 DIAGNOSIS — E78.2 HYPERLIPIDEMIA, MIXED: ICD-10-CM

## 2018-01-02 DIAGNOSIS — E66.9 OBESITY (BMI 30-39.9): ICD-10-CM

## 2018-01-02 DIAGNOSIS — E11.9 TYPE 2 DIABETES MELLITUS WITHOUT COMPLICATION, WITHOUT LONG-TERM CURRENT USE OF INSULIN (HCC): Primary | ICD-10-CM

## 2018-01-02 PROBLEM — E66.01 MORBID OBESITY WITH BMI OF 40.0-44.9, ADULT (HCC): Status: RESOLVED | Noted: 2017-08-15 | Resolved: 2018-01-02

## 2018-01-02 LAB
A/G RATIO: 1.6 (ref 1.1–2.2)
ALBUMIN SERPL-MCNC: 4.6 G/DL (ref 3.4–5)
ALP BLD-CCNC: 113 U/L (ref 40–129)
ALT SERPL-CCNC: 23 U/L (ref 10–40)
ANION GAP SERPL CALCULATED.3IONS-SCNC: 21 MMOL/L (ref 3–16)
AST SERPL-CCNC: 17 U/L (ref 15–37)
BILIRUB SERPL-MCNC: 0.7 MG/DL (ref 0–1)
BUN BLDV-MCNC: 13 MG/DL (ref 7–20)
CALCIUM SERPL-MCNC: 10.4 MG/DL (ref 8.3–10.6)
CHLORIDE BLD-SCNC: 95 MMOL/L (ref 99–110)
CHOLESTEROL, TOTAL: 143 MG/DL (ref 0–199)
CO2: 24 MMOL/L (ref 21–32)
CREAT SERPL-MCNC: 0.6 MG/DL (ref 0.6–1.1)
CREATININE URINE: 316 MG/DL (ref 28–259)
GFR AFRICAN AMERICAN: >60
GFR NON-AFRICAN AMERICAN: >60
GLOBULIN: 2.9 G/DL
GLUCOSE BLD-MCNC: 111 MG/DL (ref 70–99)
HDLC SERPL-MCNC: 33 MG/DL (ref 40–60)
LDL CHOLESTEROL CALCULATED: 74 MG/DL
MICROALBUMIN UR-MCNC: 15.9 MG/DL
MICROALBUMIN/CREAT UR-RTO: 50.3 MG/G (ref 0–30)
POTASSIUM SERPL-SCNC: 4.4 MMOL/L (ref 3.5–5.1)
SODIUM BLD-SCNC: 140 MMOL/L (ref 136–145)
TOTAL PROTEIN: 7.5 G/DL (ref 6.4–8.2)
TRIGL SERPL-MCNC: 179 MG/DL (ref 0–150)
VLDLC SERPL CALC-MCNC: 36 MG/DL

## 2018-01-02 PROCEDURE — 99214 OFFICE O/P EST MOD 30 MIN: CPT | Performed by: INTERNAL MEDICINE

## 2018-01-02 NOTE — PROGRESS NOTES
[facility]) Physicians  Internal Medicine  Patient Encounter  Gregory Burks D.O., [facility]        Chief Complaint   Patient presents with   Lexie Kenney    Medication Check       HPI: 59 y.o. female seen today for follow up regarding the status of her current chronic medical problems below along with a medication review. She does report left sided neck pain. She is seeing [facility] for Dr. Ernie Mckeon. Diabetes Mellitus Type II, Follow-up--   Lab Results   Component Value Date    LABA1C 6.0 08/23/2017      Lab Results   Component Value Date    .5 08/23/2017    Initial A1C 11/25/2014---> 11.4%. She has been compliant with glucometer checks-- 103-120's. She has been compliant with Metformin. She denies polyuria or polydipsia. No weight changes. She also denies dysesthesias in her feet. She does not exercise regularly. She is not very strict with her low carb diet. Last Eye Exam: 8/16/2017. She denies vision changes. Foot exam-- 2/15/2017  U. Microalbumin/Cr: 12/6/2016, Overdue    Pt is on  ARB-- Losartan. Complications-- None. Insulin Treated? No.   ASA: Yes. Tobacco: No   LDL-- 57  + Statin      HTN--  She has been compliant with her medications. She is on Losartan and Norvasc. She denies lightheadedness, syncope. She denies swelling or HA's. She does not add salt to foods. Hyperlipidemia--   Lab Results   Component Value Date    LDLCALC 57 08/23/2017   Pt has been compliant with Lipitor with onset of adverse effects. She denies new polymyalgias, weakness or cramping. She does not adhere to strict low fat diet. Obesity-- She has not been very successful with weight loss. She is not strict with lifestyle changes. No following a healthy diet.       Vit D Def-- last level was 43.5  She is on Calcium/Vit D BID, 600800 twice a day    Past Medical History:   Diagnosis Date    Allergic rhinitis     Arthritis     Diverticulitis     Diverticulosis     Dyslipidemia well controlled  Continue current medications  Continue to observe for hypertension  Continue to work on a no added salt diet  - Lipid Panel  - Comprehensive Metabolic Panel    3. Hyperlipidemia, mixed  Condition stability is uncertain. Due for lab  Continue Lipitor  Continue lifestyle approach with low-fat food options  - Lipid Panel  - Comprehensive Metabolic Panel    4. Obesity-- BMI 30.039.9  Counseled patient on the need for more aggressive lifestyle modification and weight loss efforts. Strategies discussed.   Literature provided

## 2018-01-02 NOTE — PATIENT INSTRUCTIONS
too.  · Use cookbooks or online recipes to plan several main meals. Plan some quick meals for busy nights. You also can double some recipes that freeze well. Then you can save half for other busy nights when you don't have time to cook. · Make sure you have the ingredients you need for your recipes. If you're running low on basic items, put these items on your shopping list too. · List foods that you use to make breakfasts, lunches, and snacks. List plenty of fruits and vegetables. · Post this list on the refrigerator. Add to it as you think of more things you need. · Take the list to the store to do your weekly shopping. Follow-up care is a key part of your treatment and safety. Be sure to make and go to all appointments, and call your doctor if you are having problems. It's also a good idea to know your test results and keep a list of the medicines you take. Where can you learn more? Go to https://Tutee.FanIQ. org and sign in to your [employer] account. Enter Y413 in the [employer] box to learn more about \"Learning About Meal Planning for Diabetes. \"     If you do not have an account, please click on the \"Sign Up Now\" link. Current as of: March 13, 2017  Content Version: 11.4  © 0400-8089 [employer]. Care instructions adapted under license by [facility] ([facility]). If you have questions about a medical condition or this instruction, always ask your healthcare professional. [place] [address] any warranty or liability for your use of this information. Patient Education        Starting a Weight Loss Plan: Care Instructions  Your Care Instructions    If you are thinking about losing weight, it can be hard to know where to start. Your doctor can help you set up a weight loss plan that best meets your needs. You may want to take a class on nutrition or exercise, or join a weight loss support group.  If you have questions about how to make changes to your eating or exercise habits, ask your doctor about seeing a registered dietitian or an exercise specialist.  It can be a big challenge to lose weight. But you do not have to make huge changes at once. Make small changes, and stick with them. When those changes become habit, add a few more changes. If you do not think you are ready to make changes right now, try to pick a date in the future. Make an appointment to see your doctor to discuss whether the time is right for you to start a plan. Follow-up care is a key part of your treatment and safety. Be sure to make and go to all appointments, and call your doctor if you are having problems. It's also a good idea to know your test results and keep a list of the medicines you take. How can you care for yourself at home? · Set realistic goals. Many people expect to lose much more weight than is likely. A weight loss of 5% to 10% of your body weight may be enough to improve your health. · Get family and friends involved to provide support. Talk to them about why you are trying to lose weight, and ask them to help. They can help by participating in exercise and having meals with you, even if they may be eating something different. · Find what works best for you. If you do not have time or do not like to cook, a program that offers meal replacement bars or shakes may be better for you. Or if you like to prepare meals, finding a plan that includes daily menus and recipes may be best.  · Ask your doctor about other health professionals who can help you achieve your weight loss goals. ¨ A dietitian can help you make healthy changes in your diet. ¨ An exercise specialist or  can help you develop a safe and effective exercise program.  ¨ A counselor or psychiatrist can help you cope with issues such as depression, anxiety, or family problems that can make it hard to focus on weight loss.   · Consider joining a support group for people who are trying to lose

## 2018-01-03 ENCOUNTER — TELEPHONE (OUTPATIENT)
Dept: ORTHOPEDIC SURGERY | Age: 58
End: 2018-01-03

## 2018-01-03 ENCOUNTER — OFFICE VISIT (OUTPATIENT)
Dept: ORTHOPEDIC SURGERY | Age: 58
End: 2018-01-03

## 2018-01-03 VITALS
TEMPERATURE: 97.5 F | HEIGHT: 63 IN | BODY MASS INDEX: 39.87 KG/M2 | HEART RATE: 113 BPM | WEIGHT: 225 LBS | DIASTOLIC BLOOD PRESSURE: 89 MMHG | SYSTOLIC BLOOD PRESSURE: 123 MMHG

## 2018-01-03 DIAGNOSIS — M50.30 DDD (DEGENERATIVE DISC DISEASE), CERVICAL: ICD-10-CM

## 2018-01-03 DIAGNOSIS — M54.12 CERVICAL RADICULAR PAIN: Primary | ICD-10-CM

## 2018-01-03 LAB
ESTIMATED AVERAGE GLUCOSE: 116.9 MG/DL
HBA1C MFR BLD: 5.7 %

## 2018-01-03 PROCEDURE — G8417 CALC BMI ABV UP PARAM F/U: HCPCS | Performed by: PHYSICIAN ASSISTANT

## 2018-01-03 PROCEDURE — G8484 FLU IMMUNIZE NO ADMIN: HCPCS | Performed by: PHYSICIAN ASSISTANT

## 2018-01-03 PROCEDURE — 99213 OFFICE O/P EST LOW 20 MIN: CPT | Performed by: PHYSICIAN ASSISTANT

## 2018-01-03 PROCEDURE — 3017F COLORECTAL CA SCREEN DOC REV: CPT | Performed by: PHYSICIAN ASSISTANT

## 2018-01-03 PROCEDURE — 1036F TOBACCO NON-USER: CPT | Performed by: PHYSICIAN ASSISTANT

## 2018-01-03 PROCEDURE — G8427 DOCREV CUR MEDS BY ELIG CLIN: HCPCS | Performed by: PHYSICIAN ASSISTANT

## 2018-01-03 PROCEDURE — 3014F SCREEN MAMMO DOC REV: CPT | Performed by: PHYSICIAN ASSISTANT

## 2018-01-03 NOTE — PROGRESS NOTES
Subjective:      Patient ID: Wilfred Grimaldo is a 59 y.o.  female. Chief Complaint   Patient presents with    Neck Pain     neck pain        HPI: She is here for follow-up treatment. She states her left shoulder is doing better since subacromial injection on 10/30/2017. She had been going to physical therapy but had to stop due to recent illness. Still having significant left-sided neck pain and left arm pain. She does have a history of prior cervical fusions. Review of Systems:   Negative for fever or chills. Positive for occasional numbness and/or tingling left upper extremity.     Past Medical History:   Diagnosis Date    Allergic rhinitis     Arthritis     Diverticulitis     Diverticulosis     Dyslipidemia     Dyslipidemia     Environmental allergies     GERD (gastroesophageal reflux disease)     Headache(784.0)     HTN (hypertension)     Impaired fasting glucose     Miscarriage     Obstructive sleep apnea (adult) (pediatric)     Osteoarthritis     Reactive airway disease     Single stillborn delivery outcome     Type II or unspecified type diabetes mellitus without mention of complication, not stated as uncontrolled        Family History   Problem Relation Age of Onset    Diabetes Father     Heart Disease Father      CAD    High Blood Pressure Father     High Cholesterol Father     Kidney Disease Brother     Diabetes Son     Other Son      DEANA    No Known Problems Mother     Heart Disease Sister      1/2 sister    No Known Problems Brother     No Known Problems Sister     No Known Problems Sister     No Known Problems Sister     No Known Problems Maternal Aunt     No Known Problems Maternal Uncle     No Known Problems Paternal Aunt     No Known Problems Paternal Uncle     No Known Problems Maternal Grandmother     No Known Problems Maternal Grandfather     No Known Problems Paternal Grandmother     No Known Problems Paternal Grandfather     No Known Problems Other     Rheum Arthritis Neg Hx     Osteoarthritis Neg Hx     Asthma Neg Hx     Breast Cancer Neg Hx     Cancer Neg Hx     Heart Failure Neg Hx     Hypertension Neg Hx     Migraines Neg Hx     Ovarian Cancer Neg Hx     Rashes/Skin Problems Neg Hx     Seizures Neg Hx     Stroke Neg Hx     Thyroid Disease Neg Hx        Past Surgical History:   Procedure Laterality Date    COLON SURGERY  2008    10\" removed   [address]    right still left    JOINT REPLACEMENT  2007    Left knee partial    KNEE SURGERY  1990's    rt knee reconstructed    NECK SURGERY  05/12/2011    c 4,5,6,7 (ACDF)    OVARY REMOVAL      ROTATOR CUFF REPAIR  2009    Left shoulder    SHOULDER SURGERY  1990's    lt & rt shoulder impingment    SINUS ENDOSCOPY  9-15-14    FUNCTIONAL ENDOSCOPIC SINUS SURGERY       TONSILLECTOMY AND ADENOIDECTOMY      TOTAL KNEE ARTHROPLASTY Left 03/09/2010    Eros to Total    TUBAL LIGATION      UMBILICAL HERNIA REPAIR  8/2010    Dr. Ioana Delaney       Social History     Occupational History    retired      Social History Main Topics    Smoking status: Former Smoker     Packs/day: 1.00     Years: 20.00     Types: Cigars     Quit date: 8/11/2014    Smokeless tobacco: Never Used    Alcohol use Yes      Comment: rare    Drug use: No    Sexual activity: No       Current Outpatient Prescriptions   Medication Sig Dispense Refill    guaiFENesin 1200 MG TB12 1 - 2 tabs evry 12 hours as needed for congestion 30 tablet 1    amLODIPine (NORVASC) 5 MG tablet TAKE 1 TABLET DAILY 90 tablet 2    atorvastatin (LIPITOR) 10 MG tablet TAKE 1 TABLET DAILY 90 tablet 1    metFORMIN (GLUCOPHAGE) 1000 MG tablet Take 1 tablet by mouth 2 times daily (with meals) 180 tablet 1    losartan (COZAAR) 50 MG tablet TAKE 1 TABLET DAILY 90 tablet 1    ONE TOUCH LANCETS MISC 1 each by Does not apply route 2 times daily Please supply pt with Delica Lancets  Test blood sugars  each 3    Calcium motion. NEUROLOGICAL EXAM:  Examination of the upper and lower extremities are intact with sensation to light touch, motor testing 4+ to 5/5 in all major motor groups including hand intrinsics. Normal heel to toe gait, Negative Romberg, Negative babinski's Sign, Bourne's Sign absent. SLR negative. VASCULAR EXAM:  Examination of the upper and lower extremities shows intact perfusion to all extremities, no cyanosis, digits are warm to touch, capillary refill is less than 2 seconds. No significant edema noted. SKIN:  Examination of the skin reveals the skin to be intact without lacerations, abrasions, significant erythema, rashes or skin lesions. X Rays: not performed in the office today:   Previous x-ray shows degenerative disc disease at C2 3 as well as C3 4. She has Cervical fusion from C4 through C7. Diagnosis:        ICD-10-CM ICD-9-CM    1. Cervical radicular pain M54.12 723.4 MRI Cervical Spine W WO Contrast   2. DDD (degenerative disc disease), cervical M50.30 722.4 MRI Cervical Spine W WO Contrast        Assessment/ Plan:      The natural history of the patient's diagnosis as well as the treatment options were discussed in full and questions were answered. Risks and benefits of the treatment options also reviewed in detail. She continues to have significant cervical radicular pain. Plan is to obtain an updated MRI of the cervical spine with contrast.  I will have her see Dr. Luz Dyer after MRI. Follow Up:   Call or return to clinic prn if these symptoms worsen or fail to improve as anticipated.

## 2018-01-11 ENCOUNTER — NURSE ONLY (OUTPATIENT)
Dept: ENT CLINIC | Age: 58
End: 2018-01-11

## 2018-01-11 DIAGNOSIS — J30.2 CHRONIC SEASONAL ALLERGIC RHINITIS DUE TO OTHER ALLERGEN: ICD-10-CM

## 2018-01-11 PROCEDURE — 95117 IMMUNOTHERAPY INJECTIONS: CPT | Performed by: OTOLARYNGOLOGY

## 2018-01-15 ENCOUNTER — TELEPHONE (OUTPATIENT)
Dept: INTERNAL MEDICINE CLINIC | Age: 58
End: 2018-01-15

## 2018-01-17 ENCOUNTER — OFFICE VISIT (OUTPATIENT)
Dept: ORTHOPEDIC SURGERY | Age: 58
End: 2018-01-17

## 2018-01-17 VITALS
HEIGHT: 63 IN | DIASTOLIC BLOOD PRESSURE: 86 MMHG | WEIGHT: 225 LBS | HEART RATE: 78 BPM | TEMPERATURE: 97.7 F | BODY MASS INDEX: 39.87 KG/M2 | SYSTOLIC BLOOD PRESSURE: 137 MMHG

## 2018-01-17 DIAGNOSIS — M50.30 DEGENERATIVE DISC DISEASE, CERVICAL: Primary | ICD-10-CM

## 2018-01-17 PROCEDURE — G8417 CALC BMI ABV UP PARAM F/U: HCPCS | Performed by: ORTHOPAEDIC SURGERY

## 2018-01-17 PROCEDURE — 3014F SCREEN MAMMO DOC REV: CPT | Performed by: ORTHOPAEDIC SURGERY

## 2018-01-17 PROCEDURE — 99214 OFFICE O/P EST MOD 30 MIN: CPT | Performed by: ORTHOPAEDIC SURGERY

## 2018-01-17 PROCEDURE — 3017F COLORECTAL CA SCREEN DOC REV: CPT | Performed by: ORTHOPAEDIC SURGERY

## 2018-01-17 PROCEDURE — G8427 DOCREV CUR MEDS BY ELIG CLIN: HCPCS | Performed by: ORTHOPAEDIC SURGERY

## 2018-01-17 PROCEDURE — G8484 FLU IMMUNIZE NO ADMIN: HCPCS | Performed by: ORTHOPAEDIC SURGERY

## 2018-01-17 NOTE — PROGRESS NOTES
Take 81 mg by mouth daily. LAST DOSE 8-9-14 30 tablet 11     No current facility-administered medications for this visit. Physical Exam:  Pain Score:   4 (Left)  /86   Pulse 78   Temp 97.7 °F (36.5 °C) (Oral)   Ht 5' 3\" (1.6 m)   Wt 225 lb (102.1 kg)   LMP 01/26/1996   BMI 39.86 kg/m²     Constitutional: Alert and in no distress. Oriented to person place and time. Neuro-Muscular exam:     Cervical spine:  Range of motion is limited in all planes. No obvious deformity or spasm is identified. The occipital nerves are not tender. Upper extremities:  Demonstrate a full free range of motion of the shoulders, elbows, wrists and hands. No gross asymmetry. Motor function is 5/5, sensory intact, and DTR's are 1-2+ bilaterally. Sensory exam is normal.  Pulses are 1+ bilaterally. Laura's reflexes are absent bilaterally. Shoulders: No evidence of any winging or atrophy. Impingement sign is negative on the right but positive on the left. Apprehension sign is negative bilaterally. Elbows: show no evidence of any asymmetry. There is no evidence of any effusion. Range of motion is full with no varus valgus laxity. Wrists and hands: show no evidence of any swelling or asymmetry. Digits maintain a full range of motion. There is no clubbing or cyanosis. Lower extremities: Standing limb alignment is normal. Gait is within normal limits without Trendelenburg sign. Hips: show negative logroll bilaterally. Trochanters are nontender. Knees: demonstrate normal alignment. There is no evidence of any effusion. Range of motion is full. Lachman sign negative anterior posterior stress testing Gibson sign negative bilaterally. No sign of laxity to varus valgus stress testing. The patellofemoral joint tracks normally without pain. Ankles and feet show full range of motion with no evidence of hind, mid or forefoot deformity. Lumbosacral spine: reveals no tenderness and no spasm.  Range of

## 2018-01-19 ENCOUNTER — OFFICE VISIT (OUTPATIENT)
Dept: ENT CLINIC | Age: 58
End: 2018-01-19

## 2018-01-19 VITALS — DIASTOLIC BLOOD PRESSURE: 80 MMHG | HEART RATE: 92 BPM | SYSTOLIC BLOOD PRESSURE: 126 MMHG

## 2018-01-19 DIAGNOSIS — M54.2 CHRONIC NECK PAIN: Primary | ICD-10-CM

## 2018-01-19 DIAGNOSIS — G89.29 CHRONIC NECK PAIN: Primary | ICD-10-CM

## 2018-01-19 PROCEDURE — 3017F COLORECTAL CA SCREEN DOC REV: CPT | Performed by: OTOLARYNGOLOGY

## 2018-01-19 PROCEDURE — 4004F PT TOBACCO SCREEN RCVD TLK: CPT | Performed by: OTOLARYNGOLOGY

## 2018-01-19 PROCEDURE — 3014F SCREEN MAMMO DOC REV: CPT | Performed by: OTOLARYNGOLOGY

## 2018-01-19 PROCEDURE — G8417 CALC BMI ABV UP PARAM F/U: HCPCS | Performed by: OTOLARYNGOLOGY

## 2018-01-19 PROCEDURE — G8428 CUR MEDS NOT DOCUMENT: HCPCS | Performed by: OTOLARYNGOLOGY

## 2018-01-19 PROCEDURE — 99213 OFFICE O/P EST LOW 20 MIN: CPT | Performed by: OTOLARYNGOLOGY

## 2018-01-19 PROCEDURE — G8484 FLU IMMUNIZE NO ADMIN: HCPCS | Performed by: OTOLARYNGOLOGY

## 2018-01-24 ENCOUNTER — HOSPITAL ENCOUNTER (OUTPATIENT)
Dept: CT IMAGING | Age: 58
Discharge: HOME | End: 2018-01-24
Attending: OTOLARYNGOLOGY | Admitting: OTOLARYNGOLOGY

## 2018-01-24 ENCOUNTER — TELEPHONE (OUTPATIENT)
Dept: ENT CLINIC | Age: 58
End: 2018-01-24

## 2018-01-24 DIAGNOSIS — R10.33 PERIUMBILICAL PAIN: ICD-10-CM

## 2018-01-24 DIAGNOSIS — E04.2 MULTIPLE THYROID NODULES: Primary | ICD-10-CM

## 2018-01-24 DIAGNOSIS — M54.2 CHRONIC NECK PAIN: ICD-10-CM

## 2018-01-24 DIAGNOSIS — G89.29 CHRONIC NECK PAIN: ICD-10-CM

## 2018-01-24 NOTE — PROGRESS NOTES
The patient is followed for neck pain thought to be due to arthritic changes in the cervical spine and status post cervical disc fusion. With ongoing pain it was suggested to her that the origin of this could be her thyroid nodules and she is seeking evaluation in this regard. Denies difficulty chewing or swallowing. No sores have been seen in the mouth. There has been no hoarseness or change in voice. There is no stridor or difficulty breathing. There is no past history of anterior neck or throat trauma. The patient is followed here for allergic rhinitis and has done very well with her desensitization shots    Exam:    The patient appears well developed and well nourished.; oriented to person, place, and time. The head is normocephalic and atraumatic; no facial scars or weakness are noted. The facial skeleton is normal.The voice has a normal quality and tonality to it. Eyes: Conjunctivae and lids normal. Full EOM. The skin is warm and dry. No pallor. The external nose is unremarkable including the dorsum, columella, nasion, and alae. The turbinates respond well to vasoconstriction. The middle and inferior meatuses appeared clear. There is no polyp, ulceration, or masses visualized either naris. The septum is not deviated or deflected to a significant degree. The lips and oral cavity are normal with no discrete mucosal disease, and normally hydrated. Tongue mobility is normal. The fungiform and vallate papillae are normal appearing. Dentition is unremarkable including the gingiva The soft palate and uvula are of normal configuration, with no displacement of the palatoglossal or palatopharyngeal folds, and no obstruction to the oropharynx. The posterior pharyngeal wall is normal appearing without exudate or drainage. IL: Mirror exam shows a normal base of tongue, vallecula, and epiglottis. The aryepiglottic folds appeared normal. No pooling of saliva in the piriform sinuses.     The post cricoid space is clear. Normal appearing plica ventricularis and arytenoids. Both cords are mobile on adduction and abduction. The neck is supple with full range of motion. The bony and cartilaginous landmarks are normal to palpation. There is no suspicious mass or adenopathy. The thyroid gland is normal to palpation, and the trachea is midline. There is no point tenderness anywhere in the neck. Impression:  The thyroid nodules are not palpable. I'm not finding any soft tissue reasons for neck pain.   Workup will ensue with further imaging  Plan:  CT neck

## 2018-01-25 ENCOUNTER — NURSE ONLY (OUTPATIENT)
Dept: ENT CLINIC | Age: 58
End: 2018-01-25

## 2018-01-25 DIAGNOSIS — J30.2 CHRONIC SEASONAL ALLERGIC RHINITIS DUE TO OTHER ALLERGEN: ICD-10-CM

## 2018-01-25 PROCEDURE — 95117 IMMUNOTHERAPY INJECTIONS: CPT | Performed by: OTOLARYNGOLOGY

## 2018-01-29 ENCOUNTER — HOSPITAL ENCOUNTER (OUTPATIENT)
Dept: ULTRASOUND IMAGING | Age: 58
Discharge: HOME | End: 2018-01-29
Attending: OTOLARYNGOLOGY | Admitting: OTOLARYNGOLOGY

## 2018-01-29 ENCOUNTER — TELEPHONE (OUTPATIENT)
Dept: ENT CLINIC | Age: 58
End: 2018-01-29

## 2018-01-29 DIAGNOSIS — E04.2 MULTIPLE THYROID NODULES: ICD-10-CM

## 2018-01-29 DIAGNOSIS — M54.2 CERVICALGIA: ICD-10-CM

## 2018-01-29 DIAGNOSIS — E07.9 THYROID LESION: Primary | ICD-10-CM

## 2018-01-30 ENCOUNTER — TELEPHONE (OUTPATIENT)
Dept: ENT CLINIC | Age: 58
End: 2018-01-30

## 2018-01-30 ENCOUNTER — TELEPHONE (OUTPATIENT)
Dept: INTERNAL MEDICINE CLINIC | Age: 58
End: 2018-01-30

## 2018-01-31 NOTE — TELEPHONE ENCOUNTER
Called and spoke to patient. Instructed to hold ASA 1 week before procedure. Patient expresses understanding. Also if taking fish oil and NSAIDS TO HOLD - Not taking. Instructed if at all possible to have someone come with her incase she needs ride home. Patient expresses understanding.

## 2018-02-05 ENCOUNTER — NURSE ONLY (OUTPATIENT)
Dept: ENT CLINIC | Age: 58
End: 2018-02-05

## 2018-02-05 DIAGNOSIS — J30.2 CHRONIC SEASONAL ALLERGIC RHINITIS DUE TO OTHER ALLERGEN: ICD-10-CM

## 2018-02-05 PROCEDURE — 95117 IMMUNOTHERAPY INJECTIONS: CPT | Performed by: OTOLARYNGOLOGY

## 2018-02-08 ENCOUNTER — HOSPITAL ENCOUNTER (OUTPATIENT)
Dept: ULTRASOUND IMAGING | Age: 58
Discharge: HOME | End: 2018-02-08

## 2018-02-08 VITALS
DIASTOLIC BLOOD PRESSURE: 84 MMHG | HEART RATE: 108 BPM | RESPIRATION RATE: 16 BRPM | SYSTOLIC BLOOD PRESSURE: 164 MMHG | OXYGEN SATURATION: 97 % | TEMPERATURE: 97.5 F

## 2018-02-08 DIAGNOSIS — E04.1 THYROID NODULE: ICD-10-CM

## 2018-02-08 RX ORDER — LIDOCAINE HYDROCHLORIDE 10 MG/ML
5 INJECTION, SOLUTION EPIDURAL; INFILTRATION; INTRACAUDAL; PERINEURAL ONCE
Status: COMPLETED | OUTPATIENT
Start: 2018-02-08 | End: 2018-02-08

## 2018-02-08 RX ORDER — FEXOFENADINE HCL 180 MG/1
180 TABLET ORAL DAILY
COMMUNITY
End: 2018-02-22 | Stop reason: SDUPTHER

## 2018-02-08 RX ORDER — BACITRACIN, NEOMYCIN, POLYMYXIN B 400; 3.5; 5 [USP'U]/G; MG/G; [USP'U]/G
OINTMENT TOPICAL ONCE
Status: COMPLETED | OUTPATIENT
Start: 2018-02-08 | End: 2018-02-08

## 2018-02-08 RX ADMIN — BACITRACIN, NEOMYCIN, POLYMYXIN B: 400; 3.5; 5 OINTMENT TOPICAL at 09:55

## 2018-02-08 RX ADMIN — LIDOCAINE HYDROCHLORIDE 5 ML: 10 INJECTION, SOLUTION EPIDURAL; INFILTRATION; INTRACAUDAL; PERINEURAL at 09:42

## 2018-02-08 ASSESSMENT — PAIN - FUNCTIONAL ASSESSMENT: PAIN_FUNCTIONAL_ASSESSMENT: 0-10

## 2018-02-12 ENCOUNTER — TELEPHONE (OUTPATIENT)
Dept: ENT CLINIC | Age: 58
End: 2018-02-12

## 2018-02-12 ENCOUNTER — TELEPHONE (OUTPATIENT)
Dept: INTERNAL MEDICINE CLINIC | Age: 58
End: 2018-02-12

## 2018-02-12 PROBLEM — J18.9 PNEUMONIA: Status: ACTIVE | Noted: 2018-02-12

## 2018-02-12 NOTE — TELEPHONE ENCOUNTER
US biopsy results called to patient. Instructed to get US in 1 year. Patient expresses understanding.

## 2018-02-14 ENCOUNTER — TELEPHONE (OUTPATIENT)
Dept: INTERNAL MEDICINE CLINIC | Age: 58
End: 2018-02-14

## 2018-02-15 ENCOUNTER — TELEPHONE (OUTPATIENT)
Dept: ENT CLINIC | Age: 58
End: 2018-02-15

## 2018-02-22 ENCOUNTER — OFFICE VISIT (OUTPATIENT)
Dept: INTERNAL MEDICINE CLINIC | Age: 58
End: 2018-02-22

## 2018-02-22 VITALS
DIASTOLIC BLOOD PRESSURE: 80 MMHG | SYSTOLIC BLOOD PRESSURE: 134 MMHG | RESPIRATION RATE: 16 BRPM | HEART RATE: 112 BPM | WEIGHT: 222 LBS | OXYGEN SATURATION: 95 % | BODY MASS INDEX: 39.33 KG/M2 | TEMPERATURE: 97.9 F

## 2018-02-22 DIAGNOSIS — D72.829 LEUKOCYTOSIS, UNSPECIFIED TYPE: ICD-10-CM

## 2018-02-22 DIAGNOSIS — J18.9 PNEUMONIA OF RIGHT UPPER LOBE DUE TO INFECTIOUS ORGANISM: Primary | ICD-10-CM

## 2018-02-22 DIAGNOSIS — B37.31 VAGINAL YEAST INFECTION: ICD-10-CM

## 2018-02-22 PROCEDURE — 99213 OFFICE O/P EST LOW 20 MIN: CPT | Performed by: INTERNAL MEDICINE

## 2018-02-22 PROCEDURE — G8484 FLU IMMUNIZE NO ADMIN: HCPCS | Performed by: INTERNAL MEDICINE

## 2018-02-22 PROCEDURE — G8428 CUR MEDS NOT DOCUMENT: HCPCS | Performed by: INTERNAL MEDICINE

## 2018-02-22 PROCEDURE — 4004F PT TOBACCO SCREEN RCVD TLK: CPT | Performed by: INTERNAL MEDICINE

## 2018-02-22 PROCEDURE — G8417 CALC BMI ABV UP PARAM F/U: HCPCS | Performed by: INTERNAL MEDICINE

## 2018-02-22 PROCEDURE — 3014F SCREEN MAMMO DOC REV: CPT | Performed by: INTERNAL MEDICINE

## 2018-02-22 PROCEDURE — 3017F COLORECTAL CA SCREEN DOC REV: CPT | Performed by: INTERNAL MEDICINE

## 2018-02-22 PROCEDURE — 1111F DSCHRG MED/CURRENT MED MERGE: CPT | Performed by: INTERNAL MEDICINE

## 2018-02-22 NOTE — PATIENT INSTRUCTIONS
OK to start a Probiotic-- Florastor 250 mg twice a day for 2 weeks. Use Over the counter treatment for yeast infection    Get repeat chest x-ray 3/2/2018      Patient Education        Pneumonia: Care Instructions  Your Care Instructions    Pneumonia is an infection of the lungs. Most cases are caused by infections from bacteria or viruses. Pneumonia may be mild or very severe. If it is caused by bacteria, you will be treated with antibiotics. It may take a few weeks to a few months to recover fully from pneumonia, depending on how sick you were and whether your overall health is good. Follow-up care is a key part of your treatment and safety. Be sure to make and go to all appointments, and call your doctor if you are having problems. It's also a good idea to know your test results and keep a list of the medicines you take. How can you care for yourself at home? · Take your antibiotics exactly as directed. Do not stop taking the medicine just because you are feeling better. You need to take the full course of antibiotics. · Take your medicines exactly as prescribed. Call your doctor if you think you are having a problem with your medicine. · Get plenty of rest and sleep. You may feel weak and tired for a while, but your energy level will improve with time. · To prevent dehydration, drink plenty of fluids, enough so that your urine is light yellow or clear like water. Choose water and other caffeine-free clear liquids until you feel better. If you have kidney, heart, or liver disease and have to limit fluids, talk with your doctor before you increase the amount of fluids you drink. · Take care of your cough so you can rest. A cough that brings up mucus from your lungs is common with pneumonia. It is one way your body gets rid of the infection. But if coughing keeps you from resting or causes severe fatigue and chest-wall pain, talk to your doctor.  He or she may suggest that you take a medicine to reduce the cough.  · Use a vaporizer or humidifier to add moisture to your bedroom. Follow the directions for cleaning the machine. · Do not smoke or allow others to smoke around you. Smoke will make your cough last longer. If you need help quitting, talk to your doctor about stop-smoking programs and medicines. These can increase your chances of quitting for good. · Take an over-the-counter pain medicine, such as acetaminophen (Tylenol), ibuprofen (Advil, Motrin), or naproxen (Aleve). Read and follow all instructions on the label. · Do not take two or more pain medicines at the same time unless the doctor told you to. Many pain medicines have acetaminophen, which is Tylenol. Too much acetaminophen (Tylenol) can be harmful. · If you were given a spirometer to measure how well your lungs are working, use it as instructed. This can help your doctor tell how your recovery is going. · To prevent pneumonia in the future, talk to your doctor about getting a flu vaccine (once a year) and a pneumococcal vaccine (one time only for most people). When should you call for help? Call 911 anytime you think you may need emergency care. For example, call if:  ? · You have severe trouble breathing. ?Call your doctor now or seek immediate medical care if:  ? · You cough up dark brown or bloody mucus (sputum). ? · You have new or worse trouble breathing. ? · You are dizzy or lightheaded, or you feel like you may faint. ? Watch closely for changes in your health, and be sure to contact your doctor if:  ? · You have a new or higher fever. ? · You are coughing more deeply or more often. ? · You are not getting better after 2 days (48 hours). ? · You do not get better as expected. Where can you learn more? Go to https://[email or web address].MoBank. org and sign in to your [employer] account. Enter D336 in the [employer] box to learn more about \"Pneumonia: Care Instructions. \"     If you do not have an account, please

## 2018-02-23 ENCOUNTER — TELEPHONE (OUTPATIENT)
Dept: INTERNAL MEDICINE CLINIC | Age: 58
End: 2018-02-23

## 2018-03-02 ENCOUNTER — TELEPHONE (OUTPATIENT)
Dept: INTERNAL MEDICINE CLINIC | Age: 58
End: 2018-03-02

## 2018-03-02 RX ORDER — BENZONATATE 100 MG/1
100 CAPSULE ORAL 3 TIMES DAILY PRN
Qty: 30 CAPSULE | Refills: 0 | Status: SHIPPED | OUTPATIENT
Start: 2018-03-02 | End: 2018-03-09

## 2018-03-05 ENCOUNTER — TELEPHONE (OUTPATIENT)
Dept: INTERNAL MEDICINE CLINIC | Age: 58
End: 2018-03-05

## 2018-03-05 NOTE — TELEPHONE ENCOUNTER
Pt is not feeling well. She is very fatigued and not having a appetite. And can't sleep. Very nauseated. Pt's blood sugars are 139-160 fasting. Pt is taking meds. Coughing up green -yellow mucous   No fever temp has been 97- 99.1  Pt stated that symptoms started last week. Please advise.

## 2018-03-06 ENCOUNTER — OFFICE VISIT (OUTPATIENT)
Dept: INTERNAL MEDICINE CLINIC | Age: 58
End: 2018-03-06

## 2018-03-06 VITALS
OXYGEN SATURATION: 94 % | RESPIRATION RATE: 16 BRPM | BODY MASS INDEX: 39.68 KG/M2 | WEIGHT: 224 LBS | DIASTOLIC BLOOD PRESSURE: 90 MMHG | HEART RATE: 110 BPM | SYSTOLIC BLOOD PRESSURE: 130 MMHG

## 2018-03-06 DIAGNOSIS — R05.8 PRODUCTIVE COUGH: Primary | ICD-10-CM

## 2018-03-06 DIAGNOSIS — Z87.01 HISTORY OF BACTERIAL PNEUMONIA: ICD-10-CM

## 2018-03-06 PROCEDURE — G8484 FLU IMMUNIZE NO ADMIN: HCPCS | Performed by: INTERNAL MEDICINE

## 2018-03-06 PROCEDURE — 3014F SCREEN MAMMO DOC REV: CPT | Performed by: INTERNAL MEDICINE

## 2018-03-06 PROCEDURE — 99213 OFFICE O/P EST LOW 20 MIN: CPT | Performed by: INTERNAL MEDICINE

## 2018-03-06 PROCEDURE — G8417 CALC BMI ABV UP PARAM F/U: HCPCS | Performed by: INTERNAL MEDICINE

## 2018-03-06 PROCEDURE — G8428 CUR MEDS NOT DOCUMENT: HCPCS | Performed by: INTERNAL MEDICINE

## 2018-03-06 PROCEDURE — 1111F DSCHRG MED/CURRENT MED MERGE: CPT | Performed by: INTERNAL MEDICINE

## 2018-03-06 PROCEDURE — 4004F PT TOBACCO SCREEN RCVD TLK: CPT | Performed by: INTERNAL MEDICINE

## 2018-03-06 PROCEDURE — 3017F COLORECTAL CA SCREEN DOC REV: CPT | Performed by: INTERNAL MEDICINE

## 2018-03-06 RX ORDER — GUAIFENESIN 600 MG/1
600 TABLET, EXTENDED RELEASE ORAL 2 TIMES DAILY
COMMUNITY
Start: 2018-03-06 | End: 2018-09-14 | Stop reason: ALTCHOICE

## 2018-03-06 RX ORDER — METHYLPREDNISOLONE 4 MG/1
4 TABLET ORAL SEE ADMIN INSTRUCTIONS
Qty: 1 KIT | Refills: 0 | Status: SHIPPED | OUTPATIENT
Start: 2018-03-06 | End: 2018-03-12

## 2018-03-06 RX ORDER — DOXYCYCLINE HYCLATE 100 MG
100 TABLET ORAL 2 TIMES DAILY
Qty: 20 TABLET | Refills: 0 | Status: SHIPPED | OUTPATIENT
Start: 2018-03-06 | End: 2018-03-16

## 2018-03-06 NOTE — PROGRESS NOTES
[facility]) Physicians  Internal Medicine  Patient Encounter  He Mcbride D.O., [facility]        Chief Complaint   Patient presents with    Cough       HPI: 59 y.o. female seen again today for a residual cough. As previously noted she was hospitalized 2/12/20182/16/2018 with a community acquired pneumonia the right lung. Patient had completed her course of prednisone and antibiotics. She really never filled the DuoNeb solution for nebulizer therapy. She was not wheezing. She thought she was doing much better but continued to cough up sputum. During her exam on 2/22/2018 during her hospital follow-up her lungs were clear. Patient was to repeat her chest x-ray on 3/2/2018. She has not done this yet. She states she has not had time. She is not feeling her normal self. She is easily exhausted. She is still experiencing a productive cough. Sputum is dark green. She is not wheezing. She denies coughing more with talking. Cough is more at night. She had been treated with Augmentin     Past Medical History:   Diagnosis Date    Allergic rhinitis     Arthritis     Diverticulitis     Diverticulosis     Dyslipidemia     Dyslipidemia     Environmental allergies     GERD (gastroesophageal reflux disease)     Headache(784.0)     HTN (hypertension)     Impaired fasting glucose     Miscarriage     Obstructive sleep apnea (adult) (pediatric)     Osteoarthritis     Reactive airway disease     Single stillborn delivery outcome     Thyroid lesion 1/29/2018    Type II or unspecified type diabetes mellitus without mention of complication, not stated as uncontrolled          MEDICATIONS:  Prior to Visit Medications    Medication Sig Taking? Authorizing Provider   doxycycline hyclate (VIBRA-TABS) 100 MG tablet Take 1 tablet by mouth 2 times daily for 10 days Yes John Green, DO   methylPREDNISolone (MEDROL, SHERINE,) 4 MG tablet Take 1 tablet by mouth See Admin Instructions for 6 days Take by mouth.

## 2018-03-07 ENCOUNTER — TELEPHONE (OUTPATIENT)
Dept: ENT CLINIC | Age: 58
End: 2018-03-07

## 2018-03-07 NOTE — TELEPHONE ENCOUNTER
Patient calling and asking about her allergy shots,     She says that she had pneumonia, and she was in the hospital for 5 days,  She is back to work ,     She is coughing up phlegm, she is asking when should she start her allergy shots again, ?  Please advise

## 2018-03-12 ENCOUNTER — HOSPITAL ENCOUNTER (OUTPATIENT)
Dept: OTHER | Age: 58
Discharge: HOME | End: 2018-03-12
Attending: INTERNAL MEDICINE | Admitting: INTERNAL MEDICINE

## 2018-03-12 ENCOUNTER — NURSE ONLY (OUTPATIENT)
Dept: ENT CLINIC | Age: 58
End: 2018-03-12

## 2018-03-12 ENCOUNTER — TELEPHONE (OUTPATIENT)
Dept: ENT CLINIC | Age: 58
End: 2018-03-12

## 2018-03-12 DIAGNOSIS — J30.2 CHRONIC SEASONAL ALLERGIC RHINITIS DUE TO OTHER ALLERGEN: ICD-10-CM

## 2018-03-12 DIAGNOSIS — R05.8 PRODUCTIVE COUGH: ICD-10-CM

## 2018-03-12 DIAGNOSIS — Z87.01 HISTORY OF BACTERIAL PNEUMONIA: ICD-10-CM

## 2018-03-12 PROCEDURE — 95117 IMMUNOTHERAPY INJECTIONS: CPT | Performed by: OTOLARYNGOLOGY

## 2018-03-12 NOTE — TELEPHONE ENCOUNTER
Record request faxed to [facility]

## 2018-03-22 ENCOUNTER — NURSE ONLY (OUTPATIENT)
Dept: ENT CLINIC | Age: 58
End: 2018-03-22

## 2018-03-22 DIAGNOSIS — J30.2 CHRONIC SEASONAL ALLERGIC RHINITIS DUE TO OTHER ALLERGEN: ICD-10-CM

## 2018-03-22 PROCEDURE — 95117 IMMUNOTHERAPY INJECTIONS: CPT | Performed by: OTOLARYNGOLOGY

## 2018-03-29 ENCOUNTER — NURSE ONLY (OUTPATIENT)
Dept: ENT CLINIC | Age: 58
End: 2018-03-29

## 2018-03-29 DIAGNOSIS — J30.2 CHRONIC SEASONAL ALLERGIC RHINITIS DUE TO OTHER ALLERGEN: ICD-10-CM

## 2018-03-29 PROCEDURE — 95117 IMMUNOTHERAPY INJECTIONS: CPT | Performed by: OTOLARYNGOLOGY

## 2018-03-29 NOTE — PROGRESS NOTES
After obtaining consent, and per orders of Dr Ariadna Naranjo, injections of allergy serum given in left and right arm by Nelly Miller. Patient instructed to remain in clinic for 20 minutes afterwards, and to report any adverse reaction to me immediately.

## 2018-04-02 ENCOUNTER — OFFICE VISIT (OUTPATIENT)
Dept: INTERNAL MEDICINE CLINIC | Age: 58
End: 2018-04-02

## 2018-04-02 VITALS
RESPIRATION RATE: 16 BRPM | BODY MASS INDEX: 39.69 KG/M2 | DIASTOLIC BLOOD PRESSURE: 80 MMHG | OXYGEN SATURATION: 94 % | SYSTOLIC BLOOD PRESSURE: 138 MMHG | HEIGHT: 63 IN | WEIGHT: 224 LBS | HEART RATE: 100 BPM

## 2018-04-02 DIAGNOSIS — I10 BENIGN ESSENTIAL HTN: Chronic | ICD-10-CM

## 2018-04-02 DIAGNOSIS — E78.2 HYPERLIPIDEMIA, MIXED: ICD-10-CM

## 2018-04-02 DIAGNOSIS — G47.33 OBSTRUCTIVE SLEEP APNEA: Chronic | ICD-10-CM

## 2018-04-02 DIAGNOSIS — E66.9 OBESITY (BMI 30-39.9): ICD-10-CM

## 2018-04-02 DIAGNOSIS — E11.9 TYPE 2 DIABETES MELLITUS WITHOUT COMPLICATION, WITHOUT LONG-TERM CURRENT USE OF INSULIN (HCC): Primary | ICD-10-CM

## 2018-04-02 DIAGNOSIS — E55.9 VITAMIN D DEFICIENCY: ICD-10-CM

## 2018-04-02 DIAGNOSIS — Z23 NEED FOR SHINGLES VACCINE: ICD-10-CM

## 2018-04-02 LAB
A/G RATIO: 1.7 (ref 1.1–2.2)
ALBUMIN SERPL-MCNC: 4.5 G/DL (ref 3.4–5)
ALP BLD-CCNC: 99 U/L (ref 40–129)
ALT SERPL-CCNC: 27 U/L (ref 10–40)
ANION GAP SERPL CALCULATED.3IONS-SCNC: 16 MMOL/L (ref 3–16)
AST SERPL-CCNC: 18 U/L (ref 15–37)
BILIRUB SERPL-MCNC: 0.5 MG/DL (ref 0–1)
BUN BLDV-MCNC: 7 MG/DL (ref 7–20)
CALCIUM SERPL-MCNC: 9.7 MG/DL (ref 8.3–10.6)
CHLORIDE BLD-SCNC: 100 MMOL/L (ref 99–110)
CHOLESTEROL, TOTAL: 115 MG/DL (ref 0–199)
CO2: 25 MMOL/L (ref 21–32)
CREAT SERPL-MCNC: <0.5 MG/DL (ref 0.6–1.1)
GFR AFRICAN AMERICAN: >60
GFR NON-AFRICAN AMERICAN: >60
GLOBULIN: 2.6 G/DL
GLUCOSE BLD-MCNC: 121 MG/DL (ref 70–99)
HDLC SERPL-MCNC: 27 MG/DL (ref 40–60)
LDL CHOLESTEROL CALCULATED: 62 MG/DL
POTASSIUM SERPL-SCNC: 4.2 MMOL/L (ref 3.5–5.1)
SODIUM BLD-SCNC: 141 MMOL/L (ref 136–145)
TOTAL PROTEIN: 7.1 G/DL (ref 6.4–8.2)
TRIGL SERPL-MCNC: 131 MG/DL (ref 0–150)
TSH SERPL DL<=0.05 MIU/L-ACNC: 0.82 UIU/ML (ref 0.27–4.2)
VITAMIN D 25-HYDROXY: 40.3 NG/ML
VLDLC SERPL CALC-MCNC: 26 MG/DL

## 2018-04-02 PROCEDURE — G8427 DOCREV CUR MEDS BY ELIG CLIN: HCPCS | Performed by: INTERNAL MEDICINE

## 2018-04-02 PROCEDURE — 1036F TOBACCO NON-USER: CPT | Performed by: INTERNAL MEDICINE

## 2018-04-02 PROCEDURE — 99214 OFFICE O/P EST MOD 30 MIN: CPT | Performed by: INTERNAL MEDICINE

## 2018-04-02 PROCEDURE — 3044F HG A1C LEVEL LT 7.0%: CPT | Performed by: INTERNAL MEDICINE

## 2018-04-02 PROCEDURE — 3017F COLORECTAL CA SCREEN DOC REV: CPT | Performed by: INTERNAL MEDICINE

## 2018-04-02 PROCEDURE — 3014F SCREEN MAMMO DOC REV: CPT | Performed by: INTERNAL MEDICINE

## 2018-04-02 PROCEDURE — G8417 CALC BMI ABV UP PARAM F/U: HCPCS | Performed by: INTERNAL MEDICINE

## 2018-04-03 LAB
ESTIMATED AVERAGE GLUCOSE: 128.4 MG/DL
HBA1C MFR BLD: 6.1 %

## 2018-04-04 ENCOUNTER — TELEPHONE (OUTPATIENT)
Dept: INTERNAL MEDICINE CLINIC | Age: 58
End: 2018-04-04

## 2018-04-04 RX ORDER — IBUPROFEN 800 MG/1
800 TABLET ORAL 3 TIMES DAILY
Qty: 15 TABLET | Refills: 0 | Status: SHIPPED | OUTPATIENT
Start: 2018-04-04 | End: 2018-05-16

## 2018-04-16 ENCOUNTER — NURSE ONLY (OUTPATIENT)
Dept: ENT CLINIC | Age: 58
End: 2018-04-16

## 2018-04-16 DIAGNOSIS — J30.2 CHRONIC SEASONAL ALLERGIC RHINITIS DUE TO OTHER ALLERGEN: ICD-10-CM

## 2018-04-16 PROCEDURE — 95117 IMMUNOTHERAPY INJECTIONS: CPT | Performed by: OTOLARYNGOLOGY

## 2018-04-24 ENCOUNTER — NURSE ONLY (OUTPATIENT)
Dept: ENT CLINIC | Age: 58
End: 2018-04-24

## 2018-04-24 DIAGNOSIS — J30.2 CHRONIC SEASONAL ALLERGIC RHINITIS DUE TO OTHER ALLERGEN: ICD-10-CM

## 2018-04-24 PROCEDURE — 95117 IMMUNOTHERAPY INJECTIONS: CPT | Performed by: OTOLARYNGOLOGY

## 2018-04-30 ENCOUNTER — NURSE ONLY (OUTPATIENT)
Dept: ENT CLINIC | Age: 58
End: 2018-04-30

## 2018-04-30 DIAGNOSIS — J30.2 CHRONIC SEASONAL ALLERGIC RHINITIS DUE TO OTHER ALLERGEN: ICD-10-CM

## 2018-04-30 PROCEDURE — 95117 IMMUNOTHERAPY INJECTIONS: CPT | Performed by: OTOLARYNGOLOGY

## 2018-05-10 ENCOUNTER — NURSE ONLY (OUTPATIENT)
Dept: ENT CLINIC | Age: 58
End: 2018-05-10

## 2018-05-10 DIAGNOSIS — J30.2 CHRONIC SEASONAL ALLERGIC RHINITIS DUE TO OTHER ALLERGEN: ICD-10-CM

## 2018-05-10 PROCEDURE — 95117 IMMUNOTHERAPY INJECTIONS: CPT | Performed by: OTOLARYNGOLOGY

## 2018-05-16 ENCOUNTER — OFFICE VISIT (OUTPATIENT)
Dept: ENT CLINIC | Age: 58
End: 2018-05-16

## 2018-05-16 VITALS — HEART RATE: 92 BPM | SYSTOLIC BLOOD PRESSURE: 114 MMHG | DIASTOLIC BLOOD PRESSURE: 70 MMHG

## 2018-05-16 DIAGNOSIS — J30.1 CHRONIC SEASONAL ALLERGIC RHINITIS DUE TO POLLEN: ICD-10-CM

## 2018-05-16 DIAGNOSIS — L23.9 ALLERGIC DERMATITIS: Primary | ICD-10-CM

## 2018-05-16 PROCEDURE — 3017F COLORECTAL CA SCREEN DOC REV: CPT | Performed by: OTOLARYNGOLOGY

## 2018-05-16 PROCEDURE — G8427 DOCREV CUR MEDS BY ELIG CLIN: HCPCS | Performed by: OTOLARYNGOLOGY

## 2018-05-16 PROCEDURE — 1036F TOBACCO NON-USER: CPT | Performed by: OTOLARYNGOLOGY

## 2018-05-16 PROCEDURE — G8417 CALC BMI ABV UP PARAM F/U: HCPCS | Performed by: OTOLARYNGOLOGY

## 2018-05-16 PROCEDURE — 99213 OFFICE O/P EST LOW 20 MIN: CPT | Performed by: OTOLARYNGOLOGY

## 2018-05-16 RX ORDER — METHYLPREDNISOLONE ACETATE 40 MG/ML
40 INJECTION, SUSPENSION INTRA-ARTICULAR; INTRALESIONAL; INTRAMUSCULAR; SOFT TISSUE ONCE
Status: DISCONTINUED | OUTPATIENT
Start: 2018-05-16 | End: 2018-05-16

## 2018-05-22 ENCOUNTER — OFFICE VISIT (OUTPATIENT)
Dept: INTERNAL MEDICINE CLINIC | Age: 58
End: 2018-05-22

## 2018-05-22 VITALS — BODY MASS INDEX: 39.33 KG/M2 | WEIGHT: 222 LBS | SYSTOLIC BLOOD PRESSURE: 130 MMHG | DIASTOLIC BLOOD PRESSURE: 84 MMHG

## 2018-05-22 DIAGNOSIS — I80.9 SUPERFICIAL PHLEBITIS: Primary | ICD-10-CM

## 2018-05-22 PROCEDURE — G8417 CALC BMI ABV UP PARAM F/U: HCPCS | Performed by: INTERNAL MEDICINE

## 2018-05-22 PROCEDURE — G8428 CUR MEDS NOT DOCUMENT: HCPCS | Performed by: INTERNAL MEDICINE

## 2018-05-22 PROCEDURE — 3017F COLORECTAL CA SCREEN DOC REV: CPT | Performed by: INTERNAL MEDICINE

## 2018-05-22 PROCEDURE — 1036F TOBACCO NON-USER: CPT | Performed by: INTERNAL MEDICINE

## 2018-05-22 PROCEDURE — 99213 OFFICE O/P EST LOW 20 MIN: CPT | Performed by: INTERNAL MEDICINE

## 2018-05-22 RX ORDER — IBUPROFEN 800 MG/1
800 TABLET ORAL
Qty: 30 TABLET | Refills: 1 | Status: SHIPPED | OUTPATIENT
Start: 2018-05-22 | End: 2018-07-23 | Stop reason: DRUGHIGH

## 2018-05-23 ENCOUNTER — NURSE ONLY (OUTPATIENT)
Dept: ENT CLINIC | Age: 58
End: 2018-05-23

## 2018-05-23 DIAGNOSIS — J30.2 CHRONIC SEASONAL ALLERGIC RHINITIS DUE TO OTHER ALLERGEN: ICD-10-CM

## 2018-05-23 PROCEDURE — 95004 PERQ TESTS W/ALRGNC XTRCS: CPT | Performed by: OTOLARYNGOLOGY

## 2018-05-31 ENCOUNTER — TELEPHONE (OUTPATIENT)
Dept: SLEEP MEDICINE | Age: 58
End: 2018-05-31

## 2018-06-11 ENCOUNTER — TELEPHONE (OUTPATIENT)
Dept: ENT CLINIC | Age: 58
End: 2018-06-11

## 2018-06-13 ENCOUNTER — TELEPHONE (OUTPATIENT)
Dept: ENT CLINIC | Age: 58
End: 2018-06-13

## 2018-06-13 DIAGNOSIS — J32.8 OTHER CHRONIC SINUSITIS: Primary | ICD-10-CM

## 2018-06-20 ENCOUNTER — TELEPHONE (OUTPATIENT)
Dept: ORTHOPEDIC SURGERY | Age: 58
End: 2018-06-20

## 2018-06-20 ENCOUNTER — OFFICE VISIT (OUTPATIENT)
Dept: ORTHOPEDIC SURGERY | Age: 58
End: 2018-06-20

## 2018-06-20 VITALS
WEIGHT: 222 LBS | HEIGHT: 63 IN | DIASTOLIC BLOOD PRESSURE: 74 MMHG | RESPIRATION RATE: 16 BRPM | BODY MASS INDEX: 39.34 KG/M2 | SYSTOLIC BLOOD PRESSURE: 116 MMHG | HEART RATE: 97 BPM | TEMPERATURE: 97.7 F

## 2018-06-20 DIAGNOSIS — M79.642 LEFT HAND PAIN: Primary | ICD-10-CM

## 2018-06-20 DIAGNOSIS — R22.42 MASS OF LEFT THIGH: ICD-10-CM

## 2018-06-20 PROCEDURE — 99214 OFFICE O/P EST MOD 30 MIN: CPT | Performed by: PHYSICIAN ASSISTANT

## 2018-06-20 PROCEDURE — L3908 WHO COCK-UP NONMOLDE PRE OTS: HCPCS | Performed by: PHYSICIAN ASSISTANT

## 2018-06-20 PROCEDURE — G8417 CALC BMI ABV UP PARAM F/U: HCPCS | Performed by: PHYSICIAN ASSISTANT

## 2018-06-20 PROCEDURE — 3017F COLORECTAL CA SCREEN DOC REV: CPT | Performed by: PHYSICIAN ASSISTANT

## 2018-06-20 PROCEDURE — G8427 DOCREV CUR MEDS BY ELIG CLIN: HCPCS | Performed by: PHYSICIAN ASSISTANT

## 2018-06-20 PROCEDURE — 1036F TOBACCO NON-USER: CPT | Performed by: PHYSICIAN ASSISTANT

## 2018-06-22 PROBLEM — R22.42 MASS OF LEFT THIGH: Status: ACTIVE | Noted: 2018-06-22

## 2018-06-22 PROBLEM — M79.642 LEFT HAND PAIN: Status: ACTIVE | Noted: 2018-06-22

## 2018-06-25 RX ORDER — LOSARTAN POTASSIUM 50 MG/1
TABLET ORAL
Qty: 90 TABLET | Refills: 3 | Status: SHIPPED | OUTPATIENT
Start: 2018-06-25 | End: 2018-10-24 | Stop reason: SDUPTHER

## 2018-07-23 ENCOUNTER — OFFICE VISIT (OUTPATIENT)
Dept: INTERNAL MEDICINE CLINIC | Age: 58
End: 2018-07-23

## 2018-07-23 VITALS
WEIGHT: 223 LBS | BODY MASS INDEX: 39.51 KG/M2 | HEIGHT: 63 IN | SYSTOLIC BLOOD PRESSURE: 130 MMHG | DIASTOLIC BLOOD PRESSURE: 80 MMHG | RESPIRATION RATE: 16 BRPM | HEART RATE: 100 BPM

## 2018-07-23 DIAGNOSIS — E11.9 TYPE 2 DIABETES MELLITUS WITHOUT COMPLICATION, WITHOUT LONG-TERM CURRENT USE OF INSULIN (HCC): Primary | ICD-10-CM

## 2018-07-23 DIAGNOSIS — I10 BENIGN ESSENTIAL HTN: Chronic | ICD-10-CM

## 2018-07-23 DIAGNOSIS — Z23 NEED FOR SHINGLES VACCINE: ICD-10-CM

## 2018-07-23 DIAGNOSIS — M65.4 DE QUERVAIN'S TENOSYNOVITIS, LEFT: ICD-10-CM

## 2018-07-23 DIAGNOSIS — E78.2 HYPERLIPIDEMIA, MIXED: ICD-10-CM

## 2018-07-23 DIAGNOSIS — E66.9 OBESITY (BMI 30-39.9): ICD-10-CM

## 2018-07-23 PROBLEM — J18.9 PNEUMONIA: Status: RESOLVED | Noted: 2018-02-12 | Resolved: 2018-07-23

## 2018-07-23 PROBLEM — M79.642 LEFT HAND PAIN: Status: RESOLVED | Noted: 2018-06-22 | Resolved: 2018-07-23

## 2018-07-23 PROCEDURE — 3017F COLORECTAL CA SCREEN DOC REV: CPT | Performed by: INTERNAL MEDICINE

## 2018-07-23 PROCEDURE — 3044F HG A1C LEVEL LT 7.0%: CPT | Performed by: INTERNAL MEDICINE

## 2018-07-23 PROCEDURE — G8428 CUR MEDS NOT DOCUMENT: HCPCS | Performed by: INTERNAL MEDICINE

## 2018-07-23 PROCEDURE — 2022F DILAT RTA XM EVC RTNOPTHY: CPT | Performed by: INTERNAL MEDICINE

## 2018-07-23 PROCEDURE — G8417 CALC BMI ABV UP PARAM F/U: HCPCS | Performed by: INTERNAL MEDICINE

## 2018-07-23 PROCEDURE — 99214 OFFICE O/P EST MOD 30 MIN: CPT | Performed by: INTERNAL MEDICINE

## 2018-07-23 PROCEDURE — 1036F TOBACCO NON-USER: CPT | Performed by: INTERNAL MEDICINE

## 2018-07-23 RX ORDER — IBUPROFEN 800 MG/1
800 TABLET ORAL 2 TIMES DAILY WITH MEALS
Qty: 30 TABLET | Refills: 1 | Status: SHIPPED | OUTPATIENT
Start: 2018-07-23 | End: 2018-08-15 | Stop reason: ALTCHOICE

## 2018-07-23 ASSESSMENT — PATIENT HEALTH QUESTIONNAIRE - PHQ9
2. FEELING DOWN, DEPRESSED OR HOPELESS: 0
1. LITTLE INTEREST OR PLEASURE IN DOING THINGS: 0
SUM OF ALL RESPONSES TO PHQ9 QUESTIONS 1 & 2: 0
SUM OF ALL RESPONSES TO PHQ QUESTIONS 1-9: 0

## 2018-07-23 NOTE — PATIENT INSTRUCTIONS
Patient Education        Janicelina Rezatravis Tenosynovitis: Care Instructions  Your Care Instructions  Ashley Rea (say \"Eugenio\") tenosynovitis is a problem that makes the bottom of your thumb and the side of your wrist hurt. When you have de Quervain's, the ropey fiber (tendon) that helps move your thumb away from your fingers becomes swollen. You may have pain when you move your wrist or pick things up. You may hear a creaking sound when you move your wrist or thumb. Symptoms often get better in a few weeks with home care. Your doctor may want you to start some gentle stretching exercises once your symptoms are gone. Sometimes treatment with an injection or surgery is needed. Follow-up care is a key part of your treatment and safety. Be sure to make and go to all appointments, and call your doctor if you are having problems. It's also a good idea to know your test results and keep a list of the medicines you take. How can you care for yourself at home? · Until your symptoms are better, stop the activities that caused the pain. · Avoid moving the hand and wrist that hurt. · Follow your doctor's directions for wearing a splint to keep your thumb and wrist from moving. · Try ice or heat. ¨ Put ice or a cold pack on your thumb and wrist for 10 to 20 minutes at a time. Put a thin cloth between the ice and your skin. ¨ You can use heat for 20 to 30 minutes, 2 or 3 times a day. Try using a heating pad, hot shower, or hot pack. · Ask your doctor if you can take an over-the-counter pain medicine, such as acetaminophen (Tylenol), ibuprofen (Advil, Motrin), or naproxen (Aleve). Be safe with medicines. Read and follow all instructions on the label. When should you call for help?   Watch closely for changes in your health, and be sure to contact your doctor if:    · You have new or worse pain.     · You have new or worse numbness or tingling in your hand or fingers.     · Your hand feels weaker.     · You do not get better as expected. Where can you learn more? Go to https://chpepiceweb.healthReferrizer. org and sign in to your [employer] account. Enter P739 in the [employer] box to learn more about \"De Quervain's Tenosynovitis: Care Instructions. \"     If you do not have an account, please click on the \"Sign Up Now\" link. Current as of: November 29, 2017  Content Version: 11.6  © 8243-6689 [employer], Guest[employer]. Care instructions adapted under license by [facility] ([facility]). If you have questions about a medical condition or this instruction, always ask your healthcare professional. [place] [address] any warranty or liability for your use of this information. Patient Education        Enma Bleacher Disease: Exercises  Your Care Instructions  Here are some examples of typical rehabilitation exercises for your condition. Start each exercise slowly. Ease off the exercise if you start to have pain. Your doctor or your physical or occupational therapist will tell you when you can start these exercises and which ones will work best for you. How to do the exercises  Thumb lifts    1. Place your hand on a flat surface, with your palm up. 2. Lift your thumb away from your palm to make a \"C\" shape. 3. Hold for about 6 seconds. 4. Repeat 8 to 12 times. Passive thumb MP flexion    1. Hold your hand in front of you, and turn your hand so your little finger faces down and your thumb faces up. (Your hand should be in the position used for shaking someone's hand.) You may also rest your hand on a flat surface. 2. Use the fingers on your other hand to bend your thumb down at the point where your thumb connects to your palm. 3. Hold for at least 15 to 30 seconds. 4. Repeat 2 to 4 times. Finkelstein stretch    1. Hold your arms out in front of you. (Your hand should be in the position used for shaking someone's hand.)  2. Bend your thumb toward your palm.   3. Use your other hand to gently stretch your thumb and wrist downward until you feel the stretch on the thumb side of your wrist.  4. Hold for at least 15 to 30 seconds. 5. Repeat 2 to 4 times. Resisted ulnar deviation    For this exercise, you will need elastic exercise material, such as surgical tubing or Thera-Band. 1. Sit leaning forward with your legs slightly spread and your elbow on your thigh. 2. Grasp one end of the band with your palm down, and step on the other end with the foot opposite the hand holding the band. 3. Slowly bend your wrist sideways and away from your knee. 4. Repeat 8 to 12 times. Follow-up care is a key part of your treatment and safety. Be sure to make and go to all appointments, and call your doctor if you are having problems. It's also a good idea to know your test results and keep a list of the medicines you take. Where can you learn more? Go to https://GungroopeTailored.happin!. org and sign in to your [employer] account. Enter M247 in the [employer] box to learn more about \"De Quervain's Disease: Exercises. \"     If you do not have an account, please click on the \"Sign Up Now\" link. Current as of: November 29, 2017  Content Version: 11.6  © 9386-8216 DearLocal[employer]orporated. Care instructions adapted under license by [facility] ([facility]). If you have questions about a medical condition or this instruction, always ask your healthcare professional. [place] [address] any warranty or liability for your use of this information.

## 2018-07-23 NOTE — PROGRESS NOTES
[facility]) Physicians  Internal Medicine  Patient Encounter  Romulo Brand D.O., Dennis Maharaj        Chief Complaint   Patient presents with   [facility]    Medication Check       HPI: 59 y.o. female seen today for follow up regarding the status of her current chronic medical problems below along with a medication review. Patient has no showed her last 2 appointments. She has some lab testing for food allergies ordered by ENT. She is seeing the PA for ortho. She was placed in a splint. Not sure what the diagnosis is. X-ray was done. Pt states that when she uses the wrist.  The pain is located over the lateral wrist involving the 1st dorsal compartment (snuff box). The pain radiates up the arm. The pain is worse with flexing the thumb. Pain is worse with repetitive usage. She had been using free weights. She has pain with gripping. Diabetes Mellitus Type II, Follow-up--   Lab Results   Component Value Date    LABA1C 6.1 04/02/2018      Lab Results   Component Value Date    .4 04/02/2018   Initial A1C 11/25/2014---> 11.4%. Her blood sugars have been good. She checks every morning. She denies hypoglycemia, dysesthesias, visual changes. Sugars <130. Last Eye Exam: 8/16/2017. Diabetic eye exam due in August 2018. Foot exam--1/2/2018  U. Microalbumin/Cr-- 1/3/2018  Pt is on  ARB-- Losartan. Complications-- None. Insulin Treated? No.   ASA: Yes. Tobacco: No   LDL-- 62  + Statin      HTN--  Patient remains compliant with her medical regimen. She denies any adverse side effects. She specifically denies HA's, lightheadedness, dizziness, swelling, stroke symptoms. She is staying hydrated. She does not add salt to her foods. Hyperlipidemia--   Lab Results   Component Value Date    LDLCALC 62 04/02/2018   Patient remains on atorvastatin 10 mg daily. She denies polymyalgias, weakness, cramping. Obesity-- her weight remains unchanged.   She puts forth very little

## 2018-07-25 ENCOUNTER — OFFICE VISIT (OUTPATIENT)
Dept: ORTHOPEDIC SURGERY | Age: 58
End: 2018-07-25

## 2018-07-25 VITALS
DIASTOLIC BLOOD PRESSURE: 96 MMHG | TEMPERATURE: 97.6 F | HEART RATE: 104 BPM | HEIGHT: 63 IN | SYSTOLIC BLOOD PRESSURE: 130 MMHG

## 2018-07-25 DIAGNOSIS — M65.4 DE QUERVAIN'S TENOSYNOVITIS, LEFT: Primary | ICD-10-CM

## 2018-07-25 PROCEDURE — 99213 OFFICE O/P EST LOW 20 MIN: CPT | Performed by: PHYSICIAN ASSISTANT

## 2018-07-25 PROCEDURE — L3908 WHO COCK-UP NONMOLDE PRE OTS: HCPCS | Performed by: PHYSICIAN ASSISTANT

## 2018-07-25 PROCEDURE — G8427 DOCREV CUR MEDS BY ELIG CLIN: HCPCS | Performed by: PHYSICIAN ASSISTANT

## 2018-07-25 PROCEDURE — 3017F COLORECTAL CA SCREEN DOC REV: CPT | Performed by: PHYSICIAN ASSISTANT

## 2018-07-25 PROCEDURE — G8417 CALC BMI ABV UP PARAM F/U: HCPCS | Performed by: PHYSICIAN ASSISTANT

## 2018-07-25 PROCEDURE — 1036F TOBACCO NON-USER: CPT | Performed by: PHYSICIAN ASSISTANT

## 2018-07-25 PROCEDURE — 20550 NJX 1 TENDON SHEATH/LIGAMENT: CPT | Performed by: PHYSICIAN ASSISTANT

## 2018-07-25 NOTE — PROGRESS NOTES
Use of a thumb abduction splint as well as NSAID's discussed. Procedures    Titan Thumb Orthosis Brittany-Schwartz Brace     Patient was prescribed a Brittany Schwartz Titan Wrist and Thumb Brace. The left wrist and thumb will require stabilization / immobilization from this semi-rigid / rigid orthosis to improve their function. The orthosis will assist in protecting the affected area, provide functional support and facilitate healing. The patient was educated and fit by a healthcare professional with expert knowledge and specialization in brace application while under the direct supervision of the treating physician. Verbal and written instructions for the use of and application of this item were provided. They were instructed to contact the office immediately should the brace result in increased pain, decreased sensation, increased swelling or worsening of the condition. Avoid repetitive activity when possible. Discussed the use of cortisone injections to help with the Tendonitis at the First Dorsal Compartment. Risks and benefits discussed. Cortisone Injection agreed upon today. PROCEDURE NOTE:   Pre op Diagnosis: Dequervain's Tenosynovitis Left wrist.  Post op Diagnosis: Same   With her permission, left first dorsal compartment was prepped in standard sterile fashion with Alcohol and 2 cc of 0.25% Marcaine and 1 cc of Kenalog 40 mg was injected into the first dorsal compartment tendon sheath  without difficulty. The patient tolerated this well without difficulty. A band-aid was applied. The patient was advised to ice the area for 15-20 minutes to relieve any injection site related pain. Follow Up: 6 weeks. Call or return to clinic prn if these symptoms worsen or fail to improve as anticipated.

## 2018-07-26 ENCOUNTER — TELEPHONE (OUTPATIENT)
Dept: INTERNAL MEDICINE CLINIC | Age: 58
End: 2018-07-26

## 2018-08-06 ENCOUNTER — HOSPITAL ENCOUNTER (EMERGENCY)
Age: 58
Discharge: HOME | End: 2018-08-06
Attending: EMERGENCY MEDICINE
Payer: MEDICARE

## 2018-08-06 ENCOUNTER — APPOINTMENT (OUTPATIENT)
Dept: GENERAL RADIOLOGY | Age: 58
End: 2018-08-06
Payer: MEDICARE

## 2018-08-06 VITALS
SYSTOLIC BLOOD PRESSURE: 138 MMHG | OXYGEN SATURATION: 92 % | TEMPERATURE: 97.1 F | HEART RATE: 93 BPM | RESPIRATION RATE: 16 BRPM | BODY MASS INDEX: 39.33 KG/M2 | DIASTOLIC BLOOD PRESSURE: 78 MMHG | WEIGHT: 222 LBS

## 2018-08-06 DIAGNOSIS — R73.9 HYPERGLYCEMIA: Primary | ICD-10-CM

## 2018-08-06 LAB
BACTERIA: ABNORMAL /HPF
BASE EXCESS VENOUS: 4 (ref -3–3)
BASOPHILS ABSOLUTE: 0.2 K/UL (ref 0–0.2)
BASOPHILS RELATIVE PERCENT: 1.2 %
BILIRUBIN URINE: NEGATIVE MG/DL
BLOOD, URINE: NEGATIVE
CALCIUM IONIZED: 1.12 MMOL/L (ref 1.12–1.32)
CASTS: ABNORMAL /LPF
CLARITY: ABNORMAL
CO2: 27 MMOL/L (ref 21–32)
COLOR: ABNORMAL
CRYSTALS, UA: ABNORMAL /HPF
EOSINOPHILS ABSOLUTE: 0.1 K/UL (ref 0–0.6)
EOSINOPHILS RELATIVE PERCENT: 0.6 %
EPITHELIAL CELLS, UA: ABNORMAL /HPF
GFR AFRICAN AMERICAN: >60
GFR NON-AFRICAN AMERICAN: >60
GLUCOSE BLD-MCNC: 257 MG/DL (ref 70–99)
GLUCOSE BLD-MCNC: 270 MG/DL (ref 70–99)
GLUCOSE BLD-MCNC: 357 MG/DL (ref 70–99)
GLUCOSE BLD-MCNC: 407 MG/DL (ref 70–99)
GLUCOSE URINE: 500 MG/DL
HCO3 VENOUS: 27.9 MMOL/L (ref 23–29)
HCT VFR BLD CALC: 42.9 % (ref 36–48)
HEMOGLOBIN: 13.7 G/DL (ref 12–16)
KETONES, URINE: ABNORMAL MG/DL
LACTATE: 1.38 MMOL/L (ref 0.4–2)
LACTATE: 2.53 MMOL/L (ref 0.4–2)
LEUKOCYTE ESTERASE, URINE: NEGATIVE
LYMPHOCYTES ABSOLUTE: 3 K/UL (ref 1–5.1)
LYMPHOCYTES RELATIVE PERCENT: 18.4 %
MCH RBC QN AUTO: 26.6 PG (ref 26–34)
MCHC RBC AUTO-ENTMCNC: 31.9 G/DL (ref 31–36)
MCV RBC AUTO: 83.4 FL (ref 80–100)
MICROSCOPIC EXAMINATION: YES
MONOCYTES ABSOLUTE: 0.8 K/UL (ref 0–1.3)
MONOCYTES RELATIVE PERCENT: 5 %
MUCUS: ABNORMAL /LPF
NEUTROPHILS ABSOLUTE: 12 K/UL (ref 1.7–7.7)
NEUTROPHILS RELATIVE PERCENT: 74.8 %
NITRITE, URINE: NEGATIVE
O2 SAT, VEN: 78 %
PCO2, VEN: 40.7 MM HG (ref 40–50)
PDW BLD-RTO: 13.2 % (ref 12.4–15.4)
PERFORMED ON: ABNORMAL
PERFORMED ON: NORMAL
PERFORMED ON: NORMAL
PH UA: 5
PH VENOUS: 7.44 (ref 7.35–7.45)
PLATELET # BLD: 244 K/UL (ref 135–450)
PMV BLD AUTO: 11 FL (ref 5–10.5)
PO2, VEN: 41 MM HG
POC ANION GAP: 11 (ref 10–20)
POC BUN: 14 MG/DL (ref 7–18)
POC CHLORIDE: 98 MMOL/L (ref 99–110)
POC CREATININE: 0.6 MG/DL (ref 0.6–1.1)
POC POTASSIUM: 3.8 MMOL/L (ref 3.5–5.1)
POC SAMPLE TYPE: ABNORMAL
POC SAMPLE TYPE: NORMAL
POC SAMPLE TYPE: NORMAL
POC SODIUM: 136 MMOL/L (ref 136–145)
POC TROPONIN I: 0 NG/ML (ref 0–0.1)
PROTEIN UA: ABNORMAL MG/DL
RBC # BLD: 5.15 M/UL (ref 4–5.2)
RBC UA: ABNORMAL /HPF (ref 0–2)
SPECIFIC GRAVITY UA: 1.02
TCO2 CALC VENOUS: 29 MMOL/L
UROBILINOGEN, URINE: 0.2 E.U./DL
WBC # BLD: 16.1 K/UL (ref 4–11)
WBC UA: ABNORMAL /HPF (ref 0–5)
YEAST: PRESENT /HPF

## 2018-08-06 PROCEDURE — 85025 COMPLETE CBC W/AUTO DIFF WBC: CPT

## 2018-08-06 PROCEDURE — 96361 HYDRATE IV INFUSION ADD-ON: CPT

## 2018-08-06 PROCEDURE — 84484 ASSAY OF TROPONIN QUANT: CPT

## 2018-08-06 PROCEDURE — 81001 URINALYSIS AUTO W/SCOPE: CPT

## 2018-08-06 PROCEDURE — 93005 ELECTROCARDIOGRAM TRACING: CPT | Performed by: PHYSICIAN ASSISTANT

## 2018-08-06 PROCEDURE — 2580000003 HC RX 258: Performed by: PHYSICIAN ASSISTANT

## 2018-08-06 PROCEDURE — 99285 EMERGENCY DEPT VISIT HI MDM: CPT

## 2018-08-06 PROCEDURE — 71046 X-RAY EXAM CHEST 2 VIEWS: CPT

## 2018-08-06 PROCEDURE — 6370000000 HC RX 637 (ALT 250 FOR IP): Performed by: PHYSICIAN ASSISTANT

## 2018-08-06 PROCEDURE — 80047 BASIC METABLC PNL IONIZED CA: CPT

## 2018-08-06 PROCEDURE — 82803 BLOOD GASES ANY COMBINATION: CPT

## 2018-08-06 PROCEDURE — 83605 ASSAY OF LACTIC ACID: CPT

## 2018-08-06 PROCEDURE — 96374 THER/PROPH/DIAG INJ IV PUSH: CPT

## 2018-08-06 RX ORDER — 0.9 % SODIUM CHLORIDE 0.9 %
1000 INTRAVENOUS SOLUTION INTRAVENOUS ONCE
Status: COMPLETED | OUTPATIENT
Start: 2018-08-06 | End: 2018-08-06

## 2018-08-06 RX ORDER — SODIUM CHLORIDE 9 MG/ML
1000 INJECTION, SOLUTION INTRAVENOUS ONCE
Status: COMPLETED | OUTPATIENT
Start: 2018-08-06 | End: 2018-08-06

## 2018-08-06 RX ADMIN — SODIUM CHLORIDE 1000 ML: 9 INJECTION, SOLUTION INTRAVENOUS at 18:29

## 2018-08-06 RX ADMIN — INSULIN HUMAN 5 UNITS: 100 INJECTION, SOLUTION PARENTERAL at 18:28

## 2018-08-06 RX ADMIN — SODIUM CHLORIDE 1000 ML: 9 INJECTION, SOLUTION INTRAVENOUS at 19:53

## 2018-08-06 RX ADMIN — METFORMIN HYDROCHLORIDE 1000 MG: 1000 TABLET, FILM COATED ORAL at 18:52

## 2018-08-06 ASSESSMENT — ENCOUNTER SYMPTOMS
ABDOMINAL PAIN: 0
DIARRHEA: 0
SORE THROAT: 0
CHEST TIGHTNESS: 0
TROUBLE SWALLOWING: 0
VOMITING: 0
WHEEZING: 0
BACK PAIN: 0
COUGH: 0
FACIAL SWELLING: 0
NAUSEA: 0
EYE PAIN: 0
SHORTNESS OF BREATH: 0

## 2018-08-06 NOTE — ED PROVIDER NOTES
[facility] [address] [facility]          PHYSICIAN ASSISTANT NOTE       Date of evaluation: 8/6/2018    Chief Complaint     Hyperglycemia      History of Present Illness     Hillary Medrano is a 59 y.o. female who presents Complaining of hyperglycemia and feeling lightheaded and dizzy. Patient states she is non-insulin-dependent diabetic and takes metformin. Patient recently went to [place] and since she's been back is having difficulty controlling her glucose. Patient states it's been in the 300-400 range at home. She did take her metformin dose this morning. She states she's been feeling lightheaded and dizzy which is similar to previous episodes of hypoglycemia. She denies any headache or syncope. Denies any chest pain or shortness of breath. Denies any abdominal pain, nausea, vomiting or diarrhea. Denies any recent illness or fevers or chills. Denies any syncopal episode. Patient denies any vertigo. Denies any paresthesias or weakness of extremities. Review of Systems     Review of Systems   Constitutional: Negative for chills, diaphoresis, fatigue and fever. HENT: Negative for congestion, facial swelling, sore throat and trouble swallowing. Eyes: Negative for pain and visual disturbance. Respiratory: Negative for cough, chest tightness, shortness of breath and wheezing. Cardiovascular: Negative for chest pain, palpitations and leg swelling. Gastrointestinal: Negative for abdominal pain, diarrhea, nausea and vomiting. Genitourinary: Negative for dysuria, flank pain and hematuria. Musculoskeletal: Negative for back pain, joint swelling and neck pain. Skin: Negative for rash. Neurological: Positive for dizziness and light-headedness. Negative for syncope, speech difficulty, weakness, numbness and headaches. Past Medical, Surgical, Family, and Social History     She has a past medical history of Allergic rhinitis; Arthritis;  Diverticulitis; Diverticulosis; Dyslipidemia; Dyslipidemia; Environmental allergies; GERD (gastroesophageal reflux disease); Headache(784.0); HTN (hypertension); Impaired fasting glucose; Miscarriage; Obstructive sleep apnea (adult) (pediatric); Osteoarthritis; Reactive airway disease; Single stillborn delivery outcome; Thyroid lesion; and Type II or unspecified type diabetes mellitus without mention of complication, not stated as uncontrolled. She has a past surgical history that includes joint replacement (2007); Rotator cuff repair (2009); Colon surgery (2008); shoulder surgery (1990's); knee surgery (1990's); Neck surgery (05/12/2011); Umbilical hernia repair (8/2010); hernia repair; Ovary removal; Sinus endoscopy (9-15-14); Tonsillectomy and adenoidectomy; Tubal ligation; Hysterectomy (1996); and Total knee arthroplasty (Left, 03/09/2010). Her family history includes Diabetes in her father and son; Heart Disease in her father and sister; High Blood Pressure in her father; High Cholesterol in her father; Kidney Disease in her brother; No Known Problems in her brother, maternal aunt, maternal grandfather, maternal grandmother, maternal uncle, mother, paternal aunt, paternal grandfather, paternal grandmother, paternal uncle, sister, sister, sister, and another family member; Other in her son. She reports that she quit smoking about 8 months ago. Her smoking use included Cigars. She has a 5.00 pack-year smoking history. She has never used smokeless tobacco. She reports that she drinks alcohol. She reports that she does not use drugs. Medications     Previous Medications    AMLODIPINE (NORVASC) 5 MG TABLET    TAKE 1 TABLET DAILY    ASPIRIN EC 81 MG EC TABLET    Take 81 mg by mouth daily.  LAST DOSE 8-9-14    ATORVASTATIN (LIPITOR) 10 MG TABLET    TAKE 1 TABLET DAILY    BLOOD GLUCOSE MONITORING SUPPL (ACURA BLOOD GLUCOSE METER) W/DEVICE KIT    1 Device by Does not apply route 2 times daily Please dispense Accuchek   E11.9 UA Present (A) /HPF    Crystals 1+ Ca.  Oxalate (A) /HPF   POCT Glucose   Result Value Ref Range    POC Glucose 357 (H) 70 - 99 mg/dl    Performed on ACCU-CHEK    POC URINE with Microscopic   Result Value Ref Range    Color, UA Not Entered Straw/Yellow    Clarity, UA Not Entered Clear    Glucose, Ur 500 (A) Negative mg/dL    Bilirubin Urine Negative Negative mg/dL    Ketones, Urine TRACE (A) Negative mg/dL    Specific Gravity, UA 1.020 1.005 - 1.030    Blood, Urine Negative Negative    pH, UA 5.0 5.0 - 8.0    Protein, UA TRACE (A) Negative mg/dL    Urobilinogen, Urine 0.2 <2.0 E.U./dL    Nitrite, Urine Negative Negative    Leukocyte Esterase, Urine Negative Negative    Microscopic Examination Yes    POCT Venous   Result Value Ref Range    POC Sodium 136 136 - 145 mmol/L    POC Potassium 3.8 3.5 - 5.1 mmol/L    POC Chloride 98 (L) 99 - 110 mmol/L    CO2 27 21 - 32 mmol/L    POC Anion Gap 11 10 - 20    POC Glucose 407 (H) 70 - 99 mg/dl    POC BUN 14 7 - 18 mg/dL    POC Creatinine 0.6 0.6 - 1.1 mg/dL    GFR Non-African American >60 >60    GFR African American >60     Calcium, Ion 1.12 1.12 - 1.32 mmol/L    Sample Type URSZULA     Performed on SEE BELOW    POCT Venous   Result Value Ref Range    pH, Urszula 7.444 7.350 - 7.450    pCO2, Urszula 40.7 40.0 - 50.0 mm Hg    pO2, Urszula 41 Not Established mm Hg    HCO3, Venous 27.9 23.0 - 29.0 mmol/L    Base Excess, Urszula 4 (H) -3 - 3    O2 Sat, Urszula 78 Not Established %    TC02 (Calc), Urszula 29 Not Established mmol/L    Sample Type URSZULA     Performed on SEE BELOW    POCT Venous   Result Value Ref Range    POC Troponin I 0.00 0.00 - 0.10 ng/mL    Sample Type URSZULA     Performed on SEE BELOW    POCT Venous   Result Value Ref Range    Lactate 2.53 (H) 0.40 - 2.00 mmol/L    Sample Type URSZULA     Performed on SEE BELOW    POCT Glucose   Result Value Ref Range    POC Glucose 257 (H) 70 - 99 mg/dl    Performed on ACCU-CHEK    POCT Venous   Result Value Ref Range    Lactate 1.38 0.40 - 2.00 mmol/L    Sample hemoglobin of 13.7 and 244 platelets. Upon medical record review patient does have baseline leukocytosis. She has no acute UTI or infection of her lungs. Initial lactate was 2.5. She did receive IV fluids with repeat lactate of 1.38. Tachycardia is resolving with IV fluids. She will be referred to her PCP for follow-up for her hyperglycemia. She is to continue metformin. She was given IV fluids, metformin and 5 units of insulin for hyperglycemia here. Repeat glucose was 270. Patient is to continue checking her glucose at home. Follow-up with her PCP this week. If fevers, vomiting, chest pain, short of breath or worsening symptoms she can return emergency department. At this point she is stable for discharge. This patient was also evaluated by the attending physician. All care plans were discussed and agreed upon. Clinical Impression     1.  Hyperglycemia        Disposition     PATIENT REFERRED TO:  Rivas Velazquez, [address]  [phone]    Schedule an appointment as soon as possible for a visit in 3 days      [facility] Emergency Department  [address] [facility] [id number] [place] [address] [address]  [phone]    If symptoms worsen      DISCHARGE MEDICATIONS:  New Prescriptions    No medications on file       DISPOSITION   discharged     [place]  08/06/18 [id number]

## 2018-08-07 NOTE — ED NOTES
Patient prepared for and ready to be discharged. Patient discharged at this time in no acute distress after verbalizing understanding of discharge instructions. Patient left after receiving After Visit Summary instructions.         Meet Ford RN  08/06/18 [id number]

## 2018-08-15 ENCOUNTER — OFFICE VISIT (OUTPATIENT)
Dept: INTERNAL MEDICINE CLINIC | Age: 58
End: 2018-08-15

## 2018-08-15 VITALS — BODY MASS INDEX: 38.09 KG/M2 | SYSTOLIC BLOOD PRESSURE: 120 MMHG | WEIGHT: 215 LBS | DIASTOLIC BLOOD PRESSURE: 80 MMHG

## 2018-08-15 DIAGNOSIS — E11.65 TYPE 2 DIABETES MELLITUS WITH HYPERGLYCEMIA, WITHOUT LONG-TERM CURRENT USE OF INSULIN (HCC): Primary | ICD-10-CM

## 2018-08-15 LAB — HBA1C MFR BLD: 9.3 %

## 2018-08-15 PROCEDURE — G8427 DOCREV CUR MEDS BY ELIG CLIN: HCPCS | Performed by: INTERNAL MEDICINE

## 2018-08-15 PROCEDURE — 1036F TOBACCO NON-USER: CPT | Performed by: INTERNAL MEDICINE

## 2018-08-15 PROCEDURE — 2022F DILAT RTA XM EVC RTNOPTHY: CPT | Performed by: INTERNAL MEDICINE

## 2018-08-15 PROCEDURE — 83036 HEMOGLOBIN GLYCOSYLATED A1C: CPT | Performed by: INTERNAL MEDICINE

## 2018-08-15 PROCEDURE — G8417 CALC BMI ABV UP PARAM F/U: HCPCS | Performed by: INTERNAL MEDICINE

## 2018-08-15 PROCEDURE — 3017F COLORECTAL CA SCREEN DOC REV: CPT | Performed by: INTERNAL MEDICINE

## 2018-08-15 PROCEDURE — 99214 OFFICE O/P EST MOD 30 MIN: CPT | Performed by: INTERNAL MEDICINE

## 2018-08-15 PROCEDURE — 3046F HEMOGLOBIN A1C LEVEL >9.0%: CPT | Performed by: INTERNAL MEDICINE

## 2018-08-15 NOTE — PATIENT INSTRUCTIONS
alcohol, talk to your doctor. It may not be recommended when you are taking certain diabetes medicines. Where can you learn more? Go to [email or web address] org and sign in to your [employer] account. Enter J897 in the [facility] box to learn more about \"Counting Carbohydrates: Care Instructions. \"     If you do not have an account, please click on the \"Sign Up Now\" link. Current as of: December 7, 2017  Content Version: 11.7  © 5334-7707 [employer]. Care instructions adapted under license by [facility] ([facility]). If you have questions about a medical condition or this instruction, always ask your healthcare professional. [address] any warranty or liability for your use of this information. Patient Education        Learning About Diabetes Food Guidelines  Your Care Instructions    Meal planning is important to manage diabetes. It helps keep your blood sugar at a target level (which you set with your doctor). You don't have to eat special foods. You can eat what your family eats, including sweets once in a while. But you do have to pay attention to how often you eat and how much you eat of certain foods. You may want to work with a dietitian or a certified diabetes educator (CDE) to help you plan meals and snacks. A dietitian or CDE can also help you lose weight if that is one of your goals. What should you know about eating carbs? Managing the amount of carbohydrate (carbs) you eat is an important part of healthy meals when you have diabetes. Carbohydrate is found in many foods. · Learn which foods have carbs. And learn the amounts of carbs in different foods. ¨ Bread, cereal, pasta, and rice have about 15 grams of carbs in a serving. A serving is 1 slice of bread (1 ounce), ½ cup of cooked cereal, or 1/3 cup of cooked pasta or rice. ¨ Fruits have 15 grams of carbs in a serving.  A serving is 1 small fresh fruit, such as an apple or orange; ½ of restaurant food. · If the meal you order has too much carbohydrate (such as potatoes, corn, or baked beans), ask to have a low-carbohydrate food instead. Ask for a salad or green vegetables. · If you use insulin, check your blood sugar before and after eating out to help you plan how much to eat in the future. · If you eat more carbohydrate at a meal than you had planned, take a walk or do other exercise. This will help lower your blood sugar. What else should you know? · Limit saturated fat, such as the fat from meat and dairy products. This is a healthy choice because people who have diabetes are at higher risk of heart disease. So choose lean cuts of meat and nonfat or low-fat dairy products. Use olive or canola oil instead of butter or shortening when cooking. · Don't skip meals. Your blood sugar may drop too low if you skip meals and take insulin or certain medicines for diabetes. · Check with your doctor before you drink alcohol. Alcohol can cause your blood sugar to drop too low. Alcohol can also cause a bad reaction if you take certain diabetes medicines. Follow-up care is a key part of your treatment and safety. Be sure to make and go to all appointments, and call your doctor if you are having problems. It's also a good idea to know your test results and keep a list of the medicines you take. Where can you learn more? Go to https://Restarocarolin.[employer]. org and sign in to your [employer] account. Enter Y155 in the [facility] box to learn more about \"Learning About Diabetes Food Guidelines. \"     If you do not have an account, please click on the \"Sign Up Now\" link. Current as of: December 7, 2017  Content Version: 11.7  © 8177-9788 [employer]. Care instructions adapted under license by [facility] ([facility]).  If you have questions about a medical condition or this instruction, always ask your healthcare professional. [facility] disclaims any warranty or liability for your use of this information. Patient Education        Learning About Meal Planning for Diabetes  Why plan your meals? Meal planning can be a key part of managing diabetes. Planning meals and snacks with the right balance of carbohydrate, protein, and fat can help you keep your blood sugar at the target level you set with your doctor. You don't have to eat special foods. You can eat what your family eats, including sweets once in a while. But you do have to pay attention to how often you eat and how much you eat of certain foods. You may want to work with a dietitian or a certified diabetes educator. He or she can give you tips and meal ideas and can answer your questions about meal planning. This health professional can also help you reach a healthy weight if that is one of your goals. What plan is right for you? Your dietitian or diabetes educator may suggest that you start with the plate format or carbohydrate counting. The plate format  The plate format is a simple way to help you manage how you eat. You plan meals by learning how much space each food should take on a plate. Using the plate format helps you spread carbohydrate throughout the day. It can make it easier to keep your blood sugar level within your target range. It also helps you see if you're eating healthy portion sizes. To use the plate format, you put non-starchy vegetables on half your plate. Add meat or meat substitutes on one-quarter of the plate. Put a grain or starchy vegetable (such as brown rice or a potato) on the final quarter of the plate. You can add a small piece of fruit and some low-fat or fat-free milk or yogurt, depending on your carbohydrate goal for each meal.  Here are some tips for using the plate format:  · Make sure that you are not using an oversized plate. A 9-inch plate is best. Many restaurants use larger plates. · Get used to using the plate format at home.  Then you can use it when you eat

## 2018-08-15 NOTE — PROGRESS NOTES
[facility]) Physicians  Internal Medicine  Patient Encounter  Farnaz Rome D.O., [facility]        Chief Complaint   Patient presents with    Follow-up    Diabetes       HPI: 59 y.o. female seen today for her diabetes. She was initially diagnosed with type 2 diabetes on 11/25/2014 when she presented with signs of glucose toxicity. At that time her A1c was 11.4%. Briefly, patient was usually treated with Janumet as well as Lantus. She entered what was likely a honeymoon period. She no longer required Lantus and she was able to come off of Janumet and transitioned to metformin alone. Her A1c was well controlled for the next 3 years. On 7/23/2018 her A1c was 7.8% up from 6.1% on 4/2/2018. On 8/6/2018 the patient presented to the emergency room with high sugars and feeling lightheaded and dizzy. Patient indicated that she had recently traveled to [place] and was really not paying much attention to her diet. She reported her blood sugars were running in the 300 and 400 range. Her blood glucose in the emergency room was 357. It was then checked via a basic metabolic profile and her glucose was 407. She was not acidotic. Her urinalysis showed glucosuria with trace ketones. She was given some IV fluids and a low dose of regular insulin (5 units). Repeat glucose was 270. She was discharged and recommended to follow-up her in the office. Patient has since been restarted on Januvia 100 mg. Patient was taking this incorrectly (twice a day despite the fact that she was told to take it once a day). Patient states that she would mean apology for not telling me the truth regarding her blood sugars. She told me she was checking her blood sugars when in fact, she was not. Since being on the Januvia her blood sugars have come down but are still running high. Her morning blood sugars are ranging from 250270. She no longer has polyuria, polydipsia. She did lose some weight.   She has not yet started exercising again the plans to go back to the [facility] and start her treadmill walking. She does not like to walk in her neighborhood that she lives in a [place] part of town. \"        Past Medical History:   Diagnosis Date    Allergic rhinitis     Arthritis     Diverticulitis     Diverticulosis     Dyslipidemia     Dyslipidemia     Environmental allergies     GERD (gastroesophageal reflux disease)     Headache(784.0)     HTN (hypertension)     Impaired fasting glucose     Miscarriage     Obstructive sleep apnea (adult) (pediatric)     Osteoarthritis     Reactive airway disease     Single stillborn delivery outcome     Thyroid lesion 1/29/2018    Type II or unspecified type diabetes mellitus without mention of complication, not stated as uncontrolled          MEDICATIONS:  Prior to Visit Medications    Medication Sig Taking?  Authorizing Provider   SITagliptin (JANUVIA) 100 MG tablet Take 100 mg by mouth daily Yes Historical Provider, MD   losartan (COZAAR) 50 MG tablet TAKE 1 TABLET DAILY Yes John Green DO   metFORMIN (GLUCOPHAGE) 1000 MG tablet TAKE 1 TABLET TWICE A DAY WITH MEALS Yes John Green DO   amLODIPine (NORVASC) 5 MG tablet TAKE 1 TABLET DAILY Yes John Green DO   atorvastatin (LIPITOR) 10 MG tablet TAKE 1 TABLET DAILY Yes John Green DO   SOFT TOUCH LANCETS MISC 1 kit by Does not apply route 2 times daily Test BS BID E11.9 Yes John Green DO   Blood Glucose Monitoring Suppl (ACURA BLOOD GLUCOSE METER) w/Device KIT 1 Device by Does not apply route 2 times daily Please dispense Accuchek   E11.9 Yes John Green DO   glucose blood VI test strips (ONE TOUCH ULTRA TEST) strip TEST BLOOD SUGAR QD Yes John Green DO   Calcium Carb-Cholecalciferol (CALTRATE 600+D) 600-800 MG-UNIT TABS Take 1 tablet by mouth 2 times daily Yes Historical Provider, MD JACOBSENFINE PEN NEEDLES 31G X 8 MM MISC USE ONCE DAILY Yes John Green DO   cetirizine (ZYRTEC ALLERGY) 10 MG tablet Take 1 tablet by mouth daily Yes John Green DO   aspirin EC 81 MG EC tablet Take 81 mg by mouth daily. LAST DOSE 8-9-14 Yes John Green DO   guaiFENesin (MUCINEX) 600 MG extended release tablet Take 1 tablet by mouth 2 times daily  John Green DO   fluticasone (FLONASE) 50 MCG/ACT nasal spray 2 sprays by Nasal route daily  Miguel Vance MD           Review of Systems - As per HPI      OBJECTIVE:  /80   Wt 215 lb (97.5 kg)   LMP 01/26/1996   BMI 38.09 kg/m²    Wt Readings from Last 3 Encounters:   08/15/18 215 lb (97.5 kg)   08/06/18 222 lb (100.7 kg)   07/23/18 223 lb (101.2 kg)       GEN: NAD, A&O, Non-toxic  HEENT: NC/AT, BARBY, EOMI, Oral cavity Clear,  TM's NL, Nasal cavity clear. NECK: Supple. No thyromegaly. LYMPH: No C/SC nodes. CV: S1 S2 NL, RRR. No murmurs, clicks or rubs. PULM: CTA  EXT: No edema. GI: Abdomen is soft, NT, BS+, No hepatomegaly. Obese    Results for POC orders placed in visit on 08/15/18   POCT glycosylated hemoglobin (Hb A1C)   Result Value Ref Range    Hemoglobin A1C 9.3 %          ASSESSMENT[de-identified]  Laney Guillaume was seen today for follow-up and diabetes. Diagnoses and all orders for this visit:    Type 2 diabetes mellitus with hyperglycemia, without long-term current use of insulin ([facility])  -     Diabetic Education-[facility]  -     POCT glycosylated hemoglobin (Hb A1C)        Additional Plan:  1. Will send patient back to diabetic education to get back on track with her ADA diet  2. Patient counseled on exercise. She agreed to engage in aerobic exercise for 30 minutes at least 5 days a week. 3.  Patient may need to start insulin again. Discussed medications with patient who voiced understanding of their use, indication and potential side effects. Pt also understands the above recommendations. All questions answered. 4.  She will return in one week to review blood sugars.   A log sheet has been provided to the patient for her to write down her blood

## 2018-08-16 LAB
EKG ATRIAL RATE: 102 BPM
EKG DIAGNOSIS: NORMAL
EKG P AXIS: 56 DEGREES
EKG P-R INTERVAL: 164 MS
EKG Q-T INTERVAL: 360 MS
EKG QRS DURATION: 84 MS
EKG QTC CALCULATION (BAZETT): 469 MS
EKG R AXIS: 34 DEGREES
EKG T AXIS: 30 DEGREES
EKG VENTRICULAR RATE: 102 BPM

## 2018-08-17 ENCOUNTER — TELEPHONE (OUTPATIENT)
Dept: INTERNAL MEDICINE CLINIC | Age: 58
End: 2018-08-17

## 2018-08-17 DIAGNOSIS — E11.65 TYPE 2 DIABETES MELLITUS WITH HYPERGLYCEMIA, WITHOUT LONG-TERM CURRENT USE OF INSULIN (HCC): ICD-10-CM

## 2018-08-31 ENCOUNTER — OFFICE VISIT (OUTPATIENT)
Dept: INTERNAL MEDICINE CLINIC | Age: 58
End: 2018-08-31

## 2018-08-31 VITALS
WEIGHT: 213.8 LBS | BODY MASS INDEX: 37.87 KG/M2 | DIASTOLIC BLOOD PRESSURE: 84 MMHG | RESPIRATION RATE: 14 BRPM | SYSTOLIC BLOOD PRESSURE: 128 MMHG | HEART RATE: 78 BPM

## 2018-08-31 DIAGNOSIS — E11.65 TYPE 2 DIABETES MELLITUS WITH HYPERGLYCEMIA, WITHOUT LONG-TERM CURRENT USE OF INSULIN (HCC): ICD-10-CM

## 2018-08-31 PROCEDURE — 2022F DILAT RTA XM EVC RTNOPTHY: CPT | Performed by: INTERNAL MEDICINE

## 2018-08-31 PROCEDURE — G8417 CALC BMI ABV UP PARAM F/U: HCPCS | Performed by: INTERNAL MEDICINE

## 2018-08-31 PROCEDURE — 3017F COLORECTAL CA SCREEN DOC REV: CPT | Performed by: INTERNAL MEDICINE

## 2018-08-31 PROCEDURE — 3046F HEMOGLOBIN A1C LEVEL >9.0%: CPT | Performed by: INTERNAL MEDICINE

## 2018-08-31 PROCEDURE — 1036F TOBACCO NON-USER: CPT | Performed by: INTERNAL MEDICINE

## 2018-08-31 PROCEDURE — 99213 OFFICE O/P EST LOW 20 MIN: CPT | Performed by: INTERNAL MEDICINE

## 2018-08-31 PROCEDURE — G8427 DOCREV CUR MEDS BY ELIG CLIN: HCPCS | Performed by: INTERNAL MEDICINE

## 2018-08-31 NOTE — PATIENT INSTRUCTIONS
Visit with Dr. Gregory Luna here in the office  Patient Education        Learning About Diabetes Food Guidelines  Your Care Instructions    Meal planning is important to manage diabetes. It helps keep your blood sugar at a target level (which you set with your doctor). You don't have to eat special foods. You can eat what your family eats, including sweets once in a while. But you do have to pay attention to how often you eat and how much you eat of certain foods. You may want to work with a dietitian or a certified diabetes educator (CDE) to help you plan meals and snacks. A dietitian or CDE can also help you lose weight if that is one of your goals. What should you know about eating carbs? Managing the amount of carbohydrate (carbs) you eat is an important part of healthy meals when you have diabetes. Carbohydrate is found in many foods. · Learn which foods have carbs. And learn the amounts of carbs in different foods. ¨ Bread, cereal, pasta, and rice have about 15 grams of carbs in a serving. A serving is 1 slice of bread (1 ounce), ½ cup of cooked cereal, or 1/3 cup of cooked pasta or rice. ¨ Fruits have 15 grams of carbs in a serving. A serving is 1 small fresh fruit, such as an apple or orange; ½ of a banana; ½ cup of cooked or canned fruit; ½ cup of fruit juice; 1 cup of melon or raspberries; or 2 tablespoons of dried fruit. ¨ Milk and no-sugar-added yogurt have 15 grams of carbs in a serving. A serving is 1 cup of milk or 2/3 cup of no-sugar-added yogurt. ¨ Starchy vegetables have 15 grams of carbs in a serving. A serving is ½ cup of mashed potatoes or sweet potato; 1 cup winter squash; ½ of a small baked potato; ½ cup of cooked beans; or ½ cup cooked corn or green peas. · Learn how much carbs to eat each day and at each meal. A dietitian or CDE can teach you how to keep track of the amount of carbs you eat. This is called carbohydrate counting.   · If you are not sure how to count carbohydrate grams, use the Plate Method to plan meals. It is a good, quick way to make sure that you have a balanced meal. It also helps you spread carbs throughout the day. ¨ Divide your plate by types of foods. Put non-starchy vegetables on half the plate, meat or other protein food on one-quarter of the plate, and a grain or starchy vegetable in the final quarter of the plate. To this you can add a small piece of fruit and 1 cup of milk or yogurt, depending on how many carbs you are supposed to eat at a meal.  · Try to eat about the same amount of carbs at each meal. Do not \"save up\" your daily allowance of carbs to eat at one meal.  · Proteins have very little or no carbs per serving. Examples of proteins are beef, chicken, turkey, fish, eggs, tofu, cheese, cottage cheese, and peanut butter. A serving size of meat is 3 ounces, which is about the size of a deck of cards. Examples of meat substitute serving sizes (equal to 1 ounce of meat) are 1/4 cup of cottage cheese, 1 egg, 1 tablespoon of peanut butter, and ½ cup of tofu. How can you eat out and still eat healthy? · Learn to estimate the serving sizes of foods that have carbohydrate. If you measure food at home, it will be easier to estimate the amount in a serving of restaurant food. · If the meal you order has too much carbohydrate (such as potatoes, corn, or baked beans), ask to have a low-carbohydrate food instead. Ask for a salad or green vegetables. · If you use insulin, check your blood sugar before and after eating out to help you plan how much to eat in the future. · If you eat more carbohydrate at a meal than you had planned, take a walk or do other exercise. This will help lower your blood sugar. What else should you know? · Limit saturated fat, such as the fat from meat and dairy products. This is a healthy choice because people who have diabetes are at higher risk of heart disease. So choose lean cuts of meat and nonfat or low-fat dairy products.  Use olive or canola oil instead of butter or shortening when cooking. · Don't skip meals. Your blood sugar may drop too low if you skip meals and take insulin or certain medicines for diabetes. · Check with your doctor before you drink alcohol. Alcohol can cause your blood sugar to drop too low. Alcohol can also cause a bad reaction if you take certain diabetes medicines. Follow-up care is a key part of your treatment and safety. Be sure to make and go to all appointments, and call your doctor if you are having problems. It's also a good idea to know your test results and keep a list of the medicines you take. Where can you learn more? Go to https://Revon Systemspepiceweb.Visionary Fun. org and sign in to your [employer] account. Enter Y944 in the KODA box to learn more about \"Learning About Diabetes Food Guidelines. \"     If you do not have an account, please click on the \"Sign Up Now\" link. Current as of: December 7, 2017  Content Version: 11.7  © 9326-9193 [employer]. Care instructions adapted under license by [facility] ([facility]). If you have questions about a medical condition or this instruction, always ask your healthcare professional. [place] [address] any warranty or liability for your use of this information. Patient Education        Learning About Meal Planning for Diabetes  Why plan your meals? Meal planning can be a key part of managing diabetes. Planning meals and snacks with the right balance of carbohydrate, protein, and fat can help you keep your blood sugar at the target level you set with your doctor. You don't have to eat special foods. You can eat what your family eats, including sweets once in a while. But you do have to pay attention to how often you eat and how much you eat of certain foods. You may want to work with a dietitian or a certified diabetes educator. He or she can give you tips and meal ideas and can answer your questions about meal planning.  This at a time. Don't forget to include snacks too. · Use cookbooks or online recipes to plan several main meals. Plan some quick meals for busy nights. You also can double some recipes that freeze well. Then you can save half for other busy nights when you don't have time to cook. · Make sure you have the ingredients you need for your recipes. If you're running low on basic items, put these items on your shopping list too. · List foods that you use to make breakfasts, lunches, and snacks. List plenty of fruits and vegetables. · Post this list on the refrigerator. Add to it as you think of more things you need. · Take the list to the store to do your weekly shopping. Follow-up care is a key part of your treatment and safety. Be sure to make and go to all appointments, and call your doctor if you are having problems. It's also a good idea to know your test results and keep a list of the medicines you take. Where can you learn more? Go to https://Appeon CorporationpeuMentioned.Jammit. org and sign in to your [employer] account. Enter H832 in the Platter box to learn more about \"Learning About Meal Planning for Diabetes. \"     If you do not have an account, please click on the \"Sign Up Now\" link. Current as of: December 7, 2017  Content Version: 11.7  © 4537-0657 [employer]. Care instructions adapted under license by [facility] ([facility]). If you have questions about a medical condition or this instruction, always ask your healthcare professional. [place] [address] any warranty or liability for your use of this information. Patient Education        Diabetes Blood Sugar Emergencies: Your Action Plan  How can you prevent a blood sugar emergency? An important part of living with diabetes is keeping your blood sugar in your target range. You'll need to know what to do if it's too high or too low. Managing your blood sugar levels helps you avoid emergencies.  This care sheet will teach you about the signs of high and low blood sugar. It will help you make an action plan with your doctor for when these signs occur. Low blood sugar is more likely to happen if you take certain medicines for diabetes. It can also happen if you skip a meal, drink alcohol, or exercise more than usual.  You may get high blood sugar if you eat differently than you normally do. One example is eating more carbohydrate than usual. Having a cold, the flu, or other sudden illness can also cause high blood sugar levels. Levels can also rise if you miss a dose of medicine. Any change in how you take your medicine may affect your blood sugar level. So it's important to work with your doctor before you make any changes. Check your blood sugar  Work with your doctor to fill in the blank spaces below that apply to you. Track your levels, know your target range, and write down ways you can get your blood sugar back in your target range. A log book can help you track your levels. Take the book to all of your medical appointments. · Check your blood sugar _____ times a day, at these times:________________________________________________. (For example: Before meals, at bedtime, before exercise, during exercise, other.)  · Your blood sugar target range before a meal is ___________________. Your blood sugar target range after a meal is _______________________. · Do toqi-___________________________________________________-er get your blood sugar back within your safe range if your blood sugar results are _________________________________________. (For example: Less than 70 or above 250 mg/dL.)  Call your doctor when your blood sugar results are ___________________________________. (For example: Less than 70 or above 250 mg/dL.)  What are the symptoms of low and high blood sugar? Common symptoms of low blood sugar are sweating and feeling shaky, weak, hungry, or confused. Symptoms can start quickly.   Common symptoms of high blood sugar

## 2018-09-02 ENCOUNTER — HOSPITAL ENCOUNTER (EMERGENCY)
Age: 58
Discharge: HOME | End: 2018-09-02
Attending: EMERGENCY MEDICINE
Payer: MEDICARE

## 2018-09-02 VITALS
OXYGEN SATURATION: 96 % | DIASTOLIC BLOOD PRESSURE: 105 MMHG | TEMPERATURE: 98.3 F | RESPIRATION RATE: 18 BRPM | SYSTOLIC BLOOD PRESSURE: 166 MMHG | HEART RATE: 102 BPM

## 2018-09-02 DIAGNOSIS — J30.9 CHRONIC ALLERGIC RHINITIS: ICD-10-CM

## 2018-09-02 DIAGNOSIS — J01.90 ACUTE SINUSITIS, RECURRENCE NOT SPECIFIED, UNSPECIFIED LOCATION: Primary | ICD-10-CM

## 2018-09-02 PROCEDURE — 99283 EMERGENCY DEPT VISIT LOW MDM: CPT

## 2018-09-02 RX ORDER — GUAIFENESIN/DEXTROMETHORPHAN 100-10MG/5
5 SYRUP ORAL 3 TIMES DAILY PRN
Qty: 120 ML | Refills: 0 | Status: SHIPPED | OUTPATIENT
Start: 2018-09-02 | End: 2018-09-12

## 2018-09-02 RX ORDER — DIPHENHYDRAMINE HCL 25 MG
25 CAPSULE ORAL EVERY 6 HOURS PRN
COMMUNITY
End: 2018-09-14 | Stop reason: ALTCHOICE

## 2018-09-02 RX ORDER — DOXYCYCLINE HYCLATE 100 MG
100 TABLET ORAL 2 TIMES DAILY
Qty: 14 TABLET | Refills: 0 | Status: SHIPPED | OUTPATIENT
Start: 2018-09-02 | End: 2018-09-09

## 2018-09-02 ASSESSMENT — ENCOUNTER SYMPTOMS
TROUBLE SWALLOWING: 0
SINUS PRESSURE: 1
COUGH: 1
VOMITING: 0
VOICE CHANGE: 0
ABDOMINAL PAIN: 0
SORE THROAT: 1
NAUSEA: 0
RHINORRHEA: 1

## 2018-09-02 ASSESSMENT — PAIN SCALES - GENERAL: PAINLEVEL_OUTOF10: 8

## 2018-09-02 ASSESSMENT — PAIN DESCRIPTION - LOCATION: LOCATION: FACE

## 2018-09-02 ASSESSMENT — PAIN DESCRIPTION - PAIN TYPE: TYPE: ACUTE PAIN

## 2018-09-02 ASSESSMENT — PAIN DESCRIPTION - DESCRIPTORS: DESCRIPTORS: PRESSURE

## 2018-09-02 NOTE — ED PROVIDER NOTES
1 [facility]  EMERGENCY DEPARTMENT ENCOUNTER          NURSE PRACTITIONER NOTE       Date of evaluation: 9/2/2018    Chief Complaint     Nasal Congestion (Pt reports congestion, sore throat, and cough for the past 2 days. Pt states she feels like she has a sinus infection, she has been taking zyrtec and benadryl with little relief in symptoms); Pharyngitis; and Cough    History of Present Illness     Marvel Fernandes is a 59 y.o. female who presents to the emergency department with a complaint Of nasal congestion, sore throat, cough for the past 2 days. Patient states that she has a history of sinus infections and chronic allergies. She states that she usually is followed by ENT who gives her allergy shots. She states that her symptoms began on Friday. She states that she has a pressure in her face and notes some puffiness to her right cheek. She states that she has some postnasal drainage that is causing her to have a sore throat as well as cough. She states when she blows her nose her mucous is green to yellow. She has had no measured fevers but felt like she may have had chills. States that she typically gets put on a Z-Adrián one her symptoms start like this as she is prone to developing sinusitis or pneumonia. With the exception of the above, there are no further alleviating or exacerbating factors, and no other associated symptoms. Review of Systems     Review of Systems   Constitutional: Positive for chills. Negative for fever. HENT: Positive for congestion, postnasal drip, rhinorrhea, sinus pressure and sore throat. Negative for trouble swallowing and voice change. Respiratory: Positive for cough. Cardiovascular: Negative for chest pain. Gastrointestinal: Negative for abdominal pain, nausea and vomiting. All other systems reviewed and are negative. Past Medical, Surgical, Family, and Social History     She has a past medical history of Allergic rhinitis;  Arthritis; Accuchek   E11.9    CALCIUM CARB-CHOLECALCIFEROL (CALTRATE 600+D) 600-800 MG-UNIT TABS    Take 1 tablet by mouth 2 times daily    CETIRIZINE (ZYRTEC ALLERGY) 10 MG TABLET    Take 1 tablet by mouth daily    CLICKFINE PEN NEEDLES 31G X 8 MM MISC    USE ONCE DAILY    FLUTICASONE (FLONASE) 50 MCG/ACT NASAL SPRAY    2 sprays by Nasal route daily    GLUCOSE BLOOD VI TEST STRIPS (ONE TOUCH ULTRA TEST) STRIP    TEST BLOOD SUGAR QD    GUAIFENESIN (MUCINEX) 600 MG EXTENDED RELEASE TABLET    Take 1 tablet by mouth 2 times daily    INSULIN GLARGINE (LANTUS SOLOSTAR) 100 UNIT/ML INJECTION PEN    Inject 10 Units into the skin nightly    INSULIN PEN NEEDLE 32G X 4 MM MISC    1 each by Does not apply route daily    LOSARTAN (COZAAR) 50 MG TABLET    TAKE 1 TABLET DAILY    METFORMIN (GLUCOPHAGE) 1000 MG TABLET    TAKE 1 TABLET TWICE A DAY WITH MEALS    SITAGLIPTIN (JANUVIA) 100 MG TABLET    Take 100 mg by mouth daily    SOFT TOUCH LANCETS MISC    1 kit by Does not apply route 2 times daily Test BS BID E11.9       Allergies     She is allergic to demerol; levaquin [levofloxacin in d5w]; morphine; and tramadol. Physical Exam     INITIAL VITALS: BP: (!) 166/105, Temp: 98.3 °F (36.8 °C), Pulse: 102, Resp: 18, SpO2: 96 %     Physical Exam   Constitutional: She is oriented to person, place, and time. She appears well-developed and well-nourished. HENT:   Head: Normocephalic and atraumatic. Right Ear: Tympanic membrane normal.   Left Ear: Tympanic membrane normal.   Nose: Mucosal edema and rhinorrhea present. No sinus tenderness. Right sinus exhibits maxillary sinus tenderness and frontal sinus tenderness. Mouth/Throat: Oropharynx is clear and moist. No oropharyngeal exudate. Eyes: Pupils are equal, round, and reactive to light. EOM are normal. Right eye exhibits no discharge. Left eye exhibits no discharge. No scleral icterus. Neck: Neck supple. Cardiovascular: Normal rate and regular rhythm.     Pulmonary/Chest: Effort normal and breath sounds normal. No respiratory distress. She has no wheezes. She has no rales. She exhibits no tenderness. Abdominal: Soft. Musculoskeletal: Normal range of motion. Neurological: She is alert and oriented to person, place, and time. Skin: Skin is warm and dry. Psychiatric: She has a normal mood and affect. Nursing note and vitals reviewed. Diagnostic Results     RADIOLOGY:  No orders to display       LABS:   No results found for this visit on 09/02/18. RECENT VITALS:  BP: (!) 166/105, Temp: 98.3 °F (36.8 °C), Pulse: 102, Resp: 18, SpO2: 96 %     Procedures     NA    ED Course     Nursing Notes, Past Medical Hx, Past Surgical Hx, Social Hx, Allergies, and Family Hx were reviewed. The patient was given the following medications:  No orders of the defined types were placed in this encounter. CONSULTS:  None    MEDICAL DECISION MAKING / ASSESSMENT / PLAN     Briefly this is a is a 59 y.o. female who presents to the emergency department with nasal congestion. Patient presented afebrile with normal vitals. Patient was in no acute distress, nontoxic and non-hypoxic. Patient was able to complete full sentences at bedside. Patient was not using accessory muscles. Patient was able to cooperate with history and physical exam.  Patient's previous charts, labs and imaging was reviewed. Please see HPI and physical for further details. Patient presents with URI type symptoms. She is well-appearing and in no acute distress. On examination she has mucosal edema and boggy turbinates. Her oropharynx is mildly erythematous with no evidence of PTA or RPA. She has some postnasal drainage. Her lung sounds are clear. I did have a long discussion with the patient regarding chronic rhinitis as well as the use of antibiotics with a viral URI type symptoms, however the patient states that her ENT physician will prescribe her antibiotics when her secretions are yellow or green.   I

## 2018-09-05 ENCOUNTER — TELEPHONE (OUTPATIENT)
Dept: INTERNAL MEDICINE CLINIC | Age: 58
End: 2018-09-05

## 2018-09-05 NOTE — TELEPHONE ENCOUNTER
Contacted pt regarding visit to ED on 18. Patient states she started feeling bad on Friday & vomitted. Went to ED on  was given Doxy & Robitussin. Head is pounding, face/sinus swelling, diarrhea, productive cough with yellow/green sputum, muscle cramping, nausea, unable to eat but trying to stay hydrated. Blood sugar was 224 this morning. No fever. Has been taking her 800mg ibuprofen BID along with Doxy & Robitussin since Monday. Patient miserable. Please advise      [facility] Transitions Initial Follow Up Call    Outreach made within 2 business days of discharge: Yes    Patient: Ashlie Ricketts Patient : 1960   MRN: [id number]  Reason for Admission: There are no discharge diagnoses documented for the most recent discharge. Discharge Date: 18       Spoke with: Mickiel Schwab     TCM Interactive Patient Contact:  Was patient able to fill all prescriptions: Yes  Is patient taking all medications as directed in the discharge summary? Yes  Does patient understand their discharge instructions:  Yes

## 2018-09-06 NOTE — TELEPHONE ENCOUNTER
Called patient she is feeling better. She will call if symptoms do not improve.  Given follow up for next week

## 2018-09-11 ENCOUNTER — TELEPHONE (OUTPATIENT)
Dept: ENT CLINIC | Age: 58
End: 2018-09-11

## 2018-09-11 DIAGNOSIS — J30.9 ALLERGIC SINUSITIS: Primary | ICD-10-CM

## 2018-09-11 RX ORDER — MONTELUKAST SODIUM 10 MG/1
10 TABLET ORAL NIGHTLY
Qty: 15 TABLET | Refills: 1 | Status: SHIPPED | OUTPATIENT
Start: 2018-09-11 | End: 2018-09-14 | Stop reason: ALTCHOICE

## 2018-09-11 NOTE — TELEPHONE ENCOUNTER
Singulair sent to pharmacy per Dr. Estephania Perez. Patient called with above info. Patient expresses understanding.

## 2018-09-14 ENCOUNTER — OFFICE VISIT (OUTPATIENT)
Dept: INTERNAL MEDICINE CLINIC | Age: 58
End: 2018-09-14

## 2018-09-14 VITALS
DIASTOLIC BLOOD PRESSURE: 82 MMHG | OXYGEN SATURATION: 95 % | WEIGHT: 213 LBS | RESPIRATION RATE: 16 BRPM | BODY MASS INDEX: 37.73 KG/M2 | TEMPERATURE: 98.3 F | HEART RATE: 90 BPM | SYSTOLIC BLOOD PRESSURE: 130 MMHG

## 2018-09-14 DIAGNOSIS — J06.9 URI WITH COUGH AND CONGESTION: Primary | ICD-10-CM

## 2018-09-14 PROCEDURE — 99213 OFFICE O/P EST LOW 20 MIN: CPT | Performed by: INTERNAL MEDICINE

## 2018-09-14 PROCEDURE — G8427 DOCREV CUR MEDS BY ELIG CLIN: HCPCS | Performed by: INTERNAL MEDICINE

## 2018-09-14 PROCEDURE — 1036F TOBACCO NON-USER: CPT | Performed by: INTERNAL MEDICINE

## 2018-09-14 PROCEDURE — G8417 CALC BMI ABV UP PARAM F/U: HCPCS | Performed by: INTERNAL MEDICINE

## 2018-09-14 PROCEDURE — 3017F COLORECTAL CA SCREEN DOC REV: CPT | Performed by: INTERNAL MEDICINE

## 2018-09-14 RX ORDER — GUAIFENESIN AND DEXTROMETHORPHAN HYDROBROMIDE 600; 30 MG/1; MG/1
1 TABLET, EXTENDED RELEASE ORAL 2 TIMES DAILY
Qty: 28 TABLET | Refills: 0 | COMMUNITY
Start: 2018-09-14 | End: 2018-10-24 | Stop reason: ALTCHOICE

## 2018-09-14 RX ORDER — BENZONATATE 200 MG/1
200 CAPSULE ORAL 3 TIMES DAILY PRN
Qty: 30 CAPSULE | Refills: 0 | Status: SHIPPED | OUTPATIENT
Start: 2018-09-14 | End: 2018-10-24 | Stop reason: ALTCHOICE

## 2018-09-14 NOTE — PATIENT INSTRUCTIONS
mucus from your lungs by breathing deeply and coughing. · Gargle with warm salt water once an hour. This can help reduce swelling and throat pain. Use 1 teaspoon of salt mixed in 1 cup of warm water. · Do not smoke or allow others to smoke around you. If you need help quitting, talk to your doctor about stop-smoking programs and medicines. These can increase your chances of quitting for good. To avoid spreading the virus  · Cough or sneeze into a tissue. Then throw the tissue away. · If you don't have a tissue, use your hand to cover your cough or sneeze. Then clean your hand. You can also cough into your sleeve. · Wash your hands often. Use soap and warm water. Wash for 15 to 20 seconds each time. · If you don't have soap and water near you, you can clean your hands with alcohol wipes or gel. When should you call for help? Call your doctor now or seek immediate medical care if:    · You have a new or higher fever.     · Your fever lasts more than 48 hours.     · You have trouble breathing.     · You have a fever with a stiff neck or a severe headache.     · You are sensitive to light.     · You feel very sleepy or confused.    Watch closely for changes in your health, and be sure to contact your doctor if:    · You do not get better as expected. Where can you learn more? Go to https://Piaochong.compeCloudSwayeb.IRIS-RFID. org and sign in to your [employer] account. Enter U847 in the Pharmworks box to learn more about \"Viral Respiratory Infection: Care Instructions. \"     If you do not have an account, please click on the \"Sign Up Now\" link. Current as of: December 6, 2017  Content Version: 11.7  © 8267-5520 [employer]. Care instructions adapted under license by [facility] ([facility]). If you have questions about a medical condition or this instruction, always ask your healthcare professional. [address] any warranty or liability for your use of this information.

## 2018-09-14 NOTE — PROGRESS NOTES
[facility]) Physicians  Internal Medicine  Patient Encounter  Darcy Flor D.O., Nevin Fuentes        Chief Complaint   Patient presents with    Follow-Up from Hospital     seen at [facility] on 9/2/18 for sinus problem       HPI: 59 y.o. female seen status post ER visit on 9/2/2018 for a sinus issue. She had presented with a sore throat, cough, sinus congestion. Symptoms started 2 days prior to presentation. She does have a history of recurrent allergies and sinus problems. She has a remote problem of fungal sinusitis. On exam the ER physician noted mucosal edema, rhinorrhea, maxillary sinus tenderness and frontal sinus tenderness. She was diagnosed with an upper respiratory infection. This was attributed to a viral infection she was recommended to follow-up with her ENT. Interestingly, she was diagnosed with acute maxillary sinusitis and in fact was put on an antibiotic. She was given doxycycline. The ENT office responded to a telephone call-- She was given Singulair. She states she is feeling better. She continues to cough. She was using Robitussin DM. She remains on Zyrtec 10 mg daily. She is using the Flonase. Cough is more at night. Past Medical History:   Diagnosis Date    Allergic rhinitis     Arthritis     Diverticulitis     Diverticulosis     Dyslipidemia     Dyslipidemia     Environmental allergies     GERD (gastroesophageal reflux disease)     Headache(784.0)     HTN (hypertension)     Impaired fasting glucose     Miscarriage     Obstructive sleep apnea (adult) (pediatric)     Osteoarthritis     Reactive airway disease     Single stillborn delivery outcome     Thyroid lesion 1/29/2018    Type II or unspecified type diabetes mellitus without mention of complication, not stated as uncontrolled          MEDICATIONS:  Prior to Visit Medications    Medication Sig Taking?  Authorizing Provider   benzonatate (TESSALON) 200 MG capsule Take 1 capsule by mouth 3 times daily as needed for Cough Yes John Green DO   Dextromethorphan-Guaifenesin (MUCINEX DM)  MG TB12 Take 1 tablet by mouth 2 times daily Yes John Green DO   insulin glargine (LANTUS SOLOSTAR) 100 UNIT/ML injection pen Inject 10 Units into the skin nightly Yes John Green DO   SITagliptin (JANUVIA) 100 MG tablet Take 100 mg by mouth daily Yes Historical Provider, MD   losartan (COZAAR) 50 MG tablet TAKE 1 TABLET DAILY Yes John Green DO   metFORMIN (GLUCOPHAGE) 1000 MG tablet TAKE 1 TABLET TWICE A DAY WITH MEALS Yes John Green DO   amLODIPine (NORVASC) 5 MG tablet TAKE 1 TABLET DAILY Yes John Green DO   atorvastatin (LIPITOR) 10 MG tablet TAKE 1 TABLET DAILY Yes John Green DO   Calcium Carb-Cholecalciferol (CALTRATE 600+D) 600-800 MG-UNIT TABS Take 1 tablet by mouth 2 times daily Yes Historical Provider, MD   cetirizine (ZYRTEC ALLERGY) 10 MG tablet Take 1 tablet by mouth daily Yes Jhon Green DO   aspirin EC 81 MG EC tablet Take 81 mg by mouth daily.  LAST DOSE 8-9-14 Yes John Green DO   Insulin Pen Needle 32G X 4 MM MISC 1 each by Does not apply route daily  John Green DO   fluticasone (FLONASE) 50 MCG/ACT nasal spray 2 sprays by Nasal route daily  Saroj Yu MD   SOFT TOUCH LANCETS MISC 1 kit by Does not apply route 2 times daily Test BS BID E11.9  John Green DO   Blood Glucose Monitoring Suppl (ACURA BLOOD GLUCOSE METER) w/Device KIT 1 Device by Does not apply route 2 times daily Please dispense Accuchek   E11.9  John Green DO   glucose blood VI test strips (ONE TOUCH ULTRA TEST) strip TEST BLOOD SUGAR QD  John Green DO   CLICKFINE PEN NEEDLES 31G X 8 MM MISC USE ONCE DAILY  John Green DO           Review of Systems - As per HPI      OBJECTIVE:  /82   Pulse 90   Temp 98.3 °F (36.8 °C)   Resp 16   Wt 213 lb (96.6 kg)   LMP 01/26/1996   SpO2 95%   BMI 37.73 kg/m²   GEN: NAD, A&O, Non-toxic  HEENT: NC/AT, BARBY, EOMI, Oral cavity Clear, TM's NL, + Nasal mucosal congestion, CLEAR rhinorrhea.  + Postnasal drainage  NECK: Supple. No thyromegaly. LYMPH: No C/SC nodes. CV: Reg rhythm, tachy. PULM: CTA, symmentric air exchange. No wheezing. ASSESSMENT[de-identified]  Laney Guillaume was seen today for follow-up from hospital.    Diagnoses and all orders for this visit:    URI with cough and congestion  -     benzonatate (TESSALON) 200 MG capsule; Take 1 capsule by mouth 3 times daily as needed for Cough  -     Dextromethorphan-Guaifenesin (MUCINEX DM)  MG TB12; Take 1 tablet by mouth 2 times daily      No evidence of a bacterial infection and thus antibiotics are not required at this time. Additional Plan:  1. Continue to push fluids and stay well hydrated  2. Continue blood sugar control  3. Stop Singulair. Discussed medications with patient who voiced understanding of their use, indication and potential side effects. Pt also understands the above recommendations. All questions answered.

## 2018-09-19 ENCOUNTER — TELEPHONE (OUTPATIENT)
Dept: INTERNAL MEDICINE CLINIC | Age: 58
End: 2018-09-19

## 2018-09-26 ENCOUNTER — HOSPITAL ENCOUNTER (OUTPATIENT)
Dept: DIABETES SERVICES | Age: 58
Setting detail: THERAPIES SERIES
Discharge: HOME | End: 2018-09-26
Payer: MEDICARE

## 2018-09-26 DIAGNOSIS — E11.9 TYPE 2 DIABETES MELLITUS WITHOUT COMPLICATION, WITHOUT LONG-TERM CURRENT USE OF INSULIN (HCC): Primary | ICD-10-CM

## 2018-09-26 PROCEDURE — 97803 MED NUTRITION INDIV SUBSEQ: CPT | Performed by: DIETITIAN, REGISTERED

## 2018-09-26 ASSESSMENT — SLEEP AND FATIGUE QUESTIONNAIRES: HAVE YOU EVER BEEN TESTED FOR SLEEP APNEA: YES

## 2018-09-26 ASSESSMENT — PROBLEM AREAS IN DIABETES QUESTIONNAIRE (PAID)
FEELING DEPRESSED WHEN YOU THINK ABOUT LIVING WITH DIABETES: 1
COPING WITH COMPLICATIONS OF DIABETES: 0
WORRYING ABOUT THE FUTURE AND THE POSSIBILITY OF SERIOUS COMPLICATIONS: 0
FEELING THAT DIABETES IS TAKING UP TOO MUCH OF YOUR MENTAL AND PHYSICAL ENERGY EVERY DAY: 0

## 2018-09-26 NOTE — PROGRESS NOTES
Medical Nutrition Therapy for Diabetes    Patient returned for Annual Review. States she had \"fallen off\" in taking care of DM, but is now back on track and motivated to reduce A1c. DIABETES ASESSMENT  Health Literacy: adequate   Pre-Test Score:7/8   /Sleep Screen: Patient indicates previous dx of Obstructive Sleep Apnea, but does not currently use C-PAP or BI-PAP due to needs filters.   PAID5 2      NUTRITION ASSESSMENT    Biochemical Data:    A1c: 9.3      Anthropometric Measurements:    Ht: 5'3\"  Wt: 203#   BMI: 36      Food and Nutrition History:   -eats breakfast, lunch and snack but no dinner  -has recently given up bread        NUTRITION DIAGNOSIS    #1 Problem: Altered Nutrition-Related Laboratory Values   Related to: Endocrine/Diabetes   As Evidenced by: Elevated Plasma glucose and/or HgbA1c levels        NUTRITION INTERVENTION  Nutrition Prescription: 30 g carb per meal      Initial instruction included:  [x] carb counting  [] activity/exercise  [x] label reading  [x] monitoring   [x] medications  [] hypoglycemia prevention/treatment  [] insulin management  [] other:    Interventions:  [x] Control Carbohydrate intake by   [x] using \"Healthy Plate Guide\"   [] carb counting   [] label reading      [x] Improve nutrient intake by    [x] add vegetables at snack       Education and Support Plan: Use Annual Review benefit for one class    Referring Provider: Solange Hope, DO      Note routed to PCP

## 2018-10-24 ENCOUNTER — OFFICE VISIT (OUTPATIENT)
Dept: INTERNAL MEDICINE CLINIC | Age: 58
End: 2018-10-24
Payer: MEDICARE

## 2018-10-24 VITALS
DIASTOLIC BLOOD PRESSURE: 78 MMHG | SYSTOLIC BLOOD PRESSURE: 120 MMHG | BODY MASS INDEX: 38.26 KG/M2 | WEIGHT: 216 LBS | HEART RATE: 98 BPM

## 2018-10-24 DIAGNOSIS — H00.022 HORDEOLUM INTERNUM OF RIGHT LOWER EYELID: Primary | ICD-10-CM

## 2018-10-24 DIAGNOSIS — J30.2 SEASONAL ALLERGIC RHINITIS, UNSPECIFIED TRIGGER: ICD-10-CM

## 2018-10-24 PROCEDURE — 99213 OFFICE O/P EST LOW 20 MIN: CPT | Performed by: INTERNAL MEDICINE

## 2018-10-24 PROCEDURE — 3017F COLORECTAL CA SCREEN DOC REV: CPT | Performed by: INTERNAL MEDICINE

## 2018-10-24 PROCEDURE — G8484 FLU IMMUNIZE NO ADMIN: HCPCS | Performed by: INTERNAL MEDICINE

## 2018-10-24 PROCEDURE — 1036F TOBACCO NON-USER: CPT | Performed by: INTERNAL MEDICINE

## 2018-10-24 PROCEDURE — G8417 CALC BMI ABV UP PARAM F/U: HCPCS | Performed by: INTERNAL MEDICINE

## 2018-10-24 PROCEDURE — G8428 CUR MEDS NOT DOCUMENT: HCPCS | Performed by: INTERNAL MEDICINE

## 2018-10-24 RX ORDER — LOSARTAN POTASSIUM 50 MG/1
TABLET ORAL
Qty: 90 TABLET | Refills: 0 | Status: SHIPPED | OUTPATIENT
Start: 2018-10-24 | End: 2019-02-18 | Stop reason: SDUPTHER

## 2018-10-24 RX ORDER — ATORVASTATIN CALCIUM 10 MG/1
TABLET, FILM COATED ORAL
Qty: 90 TABLET | Refills: 1 | Status: SHIPPED | OUTPATIENT
Start: 2018-10-24 | End: 2019-04-20 | Stop reason: SDUPTHER

## 2018-10-24 RX ORDER — NEOMYCIN/POLYMYXIN B/HYDROCORT 3.5-10K-1
2 SUSPENSION, DROPS(FINAL DOSAGE FORM)(ML) OPHTHALMIC (EYE) 4 TIMES DAILY
Qty: 1 BOTTLE | Refills: 0 | Status: SHIPPED | OUTPATIENT
Start: 2018-10-24 | End: 2018-11-03

## 2018-10-24 NOTE — PATIENT INSTRUCTIONS
Patient Education        Styes and Chalazia: Care Instructions  Your Care Instructions    Styes and chalazia (say \"vtv-SES-mua-uh\") are both conditions that can cause swelling of the eyelid. A stye is an infection in the root of an eyelash. The infection causes a tender red lump on the edge of the eyelid. The infection can spread until the whole eyelid becomes red and inflamed. Styes usually break open, and a tiny amount of pus drains. They usually clear up on their own in about a week, but they sometimes need treatment with antibiotics. A chalazion is a lump or cyst in the eyelid (chalazion is singular; chalazia is plural). It is caused by swelling and inflammation of deep oil glands inside the eyelid. Chalazia are usually not infected. They can take a few months to heal.  If a chalazion becomes more swollen and painful or does not go away, you may need to have it drained by your doctor. Follow-up care is a key part of your treatment and safety. Be sure to make and go to all appointments, and call your doctor if you are having problems. It's also a good idea to know your test results and keep a list of the medicines you take. How can you care for yourself at home? · Do not rub your eyes. Do not squeeze or try to open a stye or chalazion. · To help a stye or chalazion heal faster:  ¨ Put a warm, moist compress on your eye for 5 to 10 minutes, 3 to 6 times a day. Heat often brings a stye to a point where it drains on its own. Keep in mind that warm compresses will often increase swelling a little at first.  ¨ Do not use hot water or heat a wet cloth in a microwave oven. The compress may get too hot and can burn the eyelid. · Always wash your hands before and after you use a compress or touch your eyes. · If the doctor gave you antibiotic drops or ointment, use the medicine exactly as directed. Use the medicine for as long as instructed, even if your eye starts to feel better.   · To put in eyedrops or

## 2018-10-29 ENCOUNTER — TELEPHONE (OUTPATIENT)
Dept: INTERNAL MEDICINE CLINIC | Age: 58
End: 2018-10-29

## 2018-10-31 ENCOUNTER — HOSPITAL ENCOUNTER (OUTPATIENT)
Dept: DIABETES SERVICES | Age: 58
Setting detail: THERAPIES SERIES
Discharge: HOME | End: 2018-10-31
Payer: MEDICARE

## 2018-10-31 PROCEDURE — G0109 DIAB MANAGE TRN IND/GROUP: HCPCS | Performed by: DIETITIAN, REGISTERED

## 2019-01-03 ENCOUNTER — OFFICE VISIT (OUTPATIENT)
Dept: INTERNAL MEDICINE CLINIC | Age: 59
End: 2019-01-03
Payer: MEDICARE

## 2019-01-03 VITALS
BODY MASS INDEX: 38.62 KG/M2 | RESPIRATION RATE: 12 BRPM | HEIGHT: 63 IN | SYSTOLIC BLOOD PRESSURE: 130 MMHG | DIASTOLIC BLOOD PRESSURE: 80 MMHG | WEIGHT: 218 LBS | HEART RATE: 94 BPM

## 2019-01-03 DIAGNOSIS — M18.12 ARTHRITIS OF CARPOMETACARPAL (CMC) JOINT OF LEFT THUMB: ICD-10-CM

## 2019-01-03 DIAGNOSIS — E55.9 VITAMIN D DEFICIENCY: ICD-10-CM

## 2019-01-03 DIAGNOSIS — I10 BENIGN ESSENTIAL HTN: Chronic | ICD-10-CM

## 2019-01-03 DIAGNOSIS — E11.65 UNCONTROLLED TYPE 2 DIABETES MELLITUS WITH HYPERGLYCEMIA (HCC): Primary | ICD-10-CM

## 2019-01-03 DIAGNOSIS — E78.2 HYPERLIPIDEMIA, MIXED: ICD-10-CM

## 2019-01-03 PROBLEM — E11.9 TYPE 2 DIABETES MELLITUS WITHOUT COMPLICATION, WITHOUT LONG-TERM CURRENT USE OF INSULIN (HCC): Status: RESOLVED | Noted: 2017-01-23 | Resolved: 2019-01-03

## 2019-01-03 PROBLEM — E07.9 THYROID LESION: Status: RESOLVED | Noted: 2018-01-29 | Resolved: 2019-01-03

## 2019-01-03 PROCEDURE — 1036F TOBACCO NON-USER: CPT | Performed by: INTERNAL MEDICINE

## 2019-01-03 PROCEDURE — 3046F HEMOGLOBIN A1C LEVEL >9.0%: CPT | Performed by: INTERNAL MEDICINE

## 2019-01-03 PROCEDURE — G8417 CALC BMI ABV UP PARAM F/U: HCPCS | Performed by: INTERNAL MEDICINE

## 2019-01-03 PROCEDURE — G8427 DOCREV CUR MEDS BY ELIG CLIN: HCPCS | Performed by: INTERNAL MEDICINE

## 2019-01-03 PROCEDURE — 99214 OFFICE O/P EST MOD 30 MIN: CPT | Performed by: INTERNAL MEDICINE

## 2019-01-03 PROCEDURE — 2022F DILAT RTA XM EVC RTNOPTHY: CPT | Performed by: INTERNAL MEDICINE

## 2019-01-03 PROCEDURE — 3017F COLORECTAL CA SCREEN DOC REV: CPT | Performed by: INTERNAL MEDICINE

## 2019-01-03 PROCEDURE — G8484 FLU IMMUNIZE NO ADMIN: HCPCS | Performed by: INTERNAL MEDICINE

## 2019-01-07 RX ORDER — IBUPROFEN 800 MG/1
800 TABLET ORAL 2 TIMES DAILY WITH MEALS
Qty: 30 TABLET | Refills: 1 | Status: SHIPPED | OUTPATIENT
Start: 2019-01-07 | End: 2019-04-02 | Stop reason: SDUPTHER

## 2019-01-16 ENCOUNTER — TELEPHONE (OUTPATIENT)
Dept: INTERNAL MEDICINE CLINIC | Age: 59
End: 2019-01-16

## 2019-01-16 ENCOUNTER — OFFICE VISIT (OUTPATIENT)
Dept: ORTHOPEDIC SURGERY | Age: 59
End: 2019-01-16
Payer: MEDICARE

## 2019-01-16 VITALS
WEIGHT: 218 LBS | SYSTOLIC BLOOD PRESSURE: 153 MMHG | HEIGHT: 63 IN | BODY MASS INDEX: 38.62 KG/M2 | HEART RATE: 89 BPM | DIASTOLIC BLOOD PRESSURE: 106 MMHG

## 2019-01-16 DIAGNOSIS — M65.4 DE QUERVAIN'S TENOSYNOVITIS, LEFT: Primary | ICD-10-CM

## 2019-01-16 PROCEDURE — G8484 FLU IMMUNIZE NO ADMIN: HCPCS | Performed by: PHYSICIAN ASSISTANT

## 2019-01-16 PROCEDURE — G8427 DOCREV CUR MEDS BY ELIG CLIN: HCPCS | Performed by: PHYSICIAN ASSISTANT

## 2019-01-16 PROCEDURE — 1036F TOBACCO NON-USER: CPT | Performed by: PHYSICIAN ASSISTANT

## 2019-01-16 PROCEDURE — 99214 OFFICE O/P EST MOD 30 MIN: CPT | Performed by: PHYSICIAN ASSISTANT

## 2019-01-16 PROCEDURE — 3017F COLORECTAL CA SCREEN DOC REV: CPT | Performed by: PHYSICIAN ASSISTANT

## 2019-01-16 PROCEDURE — G8417 CALC BMI ABV UP PARAM F/U: HCPCS | Performed by: PHYSICIAN ASSISTANT

## 2019-01-21 ENCOUNTER — TELEPHONE (OUTPATIENT)
Dept: INTERNAL MEDICINE CLINIC | Age: 59
End: 2019-01-21

## 2019-01-22 ENCOUNTER — TELEPHONE (OUTPATIENT)
Dept: INTERNAL MEDICINE CLINIC | Age: 59
End: 2019-01-22

## 2019-01-25 ENCOUNTER — OFFICE VISIT (OUTPATIENT)
Dept: INTERNAL MEDICINE CLINIC | Age: 59
End: 2019-01-25
Payer: MEDICARE

## 2019-01-25 VITALS
WEIGHT: 222 LBS | DIASTOLIC BLOOD PRESSURE: 80 MMHG | SYSTOLIC BLOOD PRESSURE: 120 MMHG | HEIGHT: 63 IN | HEART RATE: 80 BPM | BODY MASS INDEX: 39.34 KG/M2 | RESPIRATION RATE: 12 BRPM

## 2019-01-25 DIAGNOSIS — E11.9 TYPE 2 DIABETES MELLITUS WITHOUT COMPLICATION, WITH LONG-TERM CURRENT USE OF INSULIN (HCC): ICD-10-CM

## 2019-01-25 DIAGNOSIS — M65.4 DE QUERVAIN'S TENOSYNOVITIS, LEFT: ICD-10-CM

## 2019-01-25 DIAGNOSIS — I10 BENIGN ESSENTIAL HTN: Chronic | ICD-10-CM

## 2019-01-25 DIAGNOSIS — Z01.818 PREOP EXAM FOR INTERNAL MEDICINE: Primary | ICD-10-CM

## 2019-01-25 DIAGNOSIS — G47.33 OBSTRUCTIVE SLEEP APNEA: Chronic | ICD-10-CM

## 2019-01-25 DIAGNOSIS — Z79.4 TYPE 2 DIABETES MELLITUS WITHOUT COMPLICATION, WITH LONG-TERM CURRENT USE OF INSULIN (HCC): ICD-10-CM

## 2019-01-25 PROBLEM — E11.65 UNCONTROLLED TYPE 2 DIABETES MELLITUS WITH HYPERGLYCEMIA (HCC): Status: RESOLVED | Noted: 2019-01-03 | Resolved: 2019-01-25

## 2019-01-25 LAB
CREATININE URINE: 278.8 MG/DL (ref 28–259)
MICROALBUMIN UR-MCNC: 5.6 MG/DL
MICROALBUMIN/CREAT UR-RTO: 20.1 MG/G (ref 0–30)

## 2019-01-25 PROCEDURE — G8484 FLU IMMUNIZE NO ADMIN: HCPCS | Performed by: INTERNAL MEDICINE

## 2019-01-25 PROCEDURE — 2022F DILAT RTA XM EVC RTNOPTHY: CPT | Performed by: INTERNAL MEDICINE

## 2019-01-25 PROCEDURE — 93000 ELECTROCARDIOGRAM COMPLETE: CPT | Performed by: INTERNAL MEDICINE

## 2019-01-25 PROCEDURE — 1036F TOBACCO NON-USER: CPT | Performed by: INTERNAL MEDICINE

## 2019-01-25 PROCEDURE — 99214 OFFICE O/P EST MOD 30 MIN: CPT | Performed by: INTERNAL MEDICINE

## 2019-01-25 PROCEDURE — G8427 DOCREV CUR MEDS BY ELIG CLIN: HCPCS | Performed by: INTERNAL MEDICINE

## 2019-01-25 PROCEDURE — G8417 CALC BMI ABV UP PARAM F/U: HCPCS | Performed by: INTERNAL MEDICINE

## 2019-01-25 PROCEDURE — 3044F HG A1C LEVEL LT 7.0%: CPT | Performed by: INTERNAL MEDICINE

## 2019-01-25 PROCEDURE — 3017F COLORECTAL CA SCREEN DOC REV: CPT | Performed by: INTERNAL MEDICINE

## 2019-01-28 ENCOUNTER — ANESTHESIA EVENT (OUTPATIENT)
Dept: OPERATING ROOM | Age: 59
End: 2019-01-28
Payer: MEDICARE

## 2019-01-29 ENCOUNTER — HOSPITAL ENCOUNTER (OUTPATIENT)
Age: 59
Setting detail: OUTPATIENT SURGERY
Discharge: HOME | End: 2019-01-29
Attending: ORTHOPAEDIC SURGERY | Admitting: ORTHOPAEDIC SURGERY
Payer: MEDICARE

## 2019-01-29 ENCOUNTER — ANESTHESIA (OUTPATIENT)
Dept: OPERATING ROOM | Age: 59
End: 2019-01-29
Payer: MEDICARE

## 2019-01-29 VITALS
SYSTOLIC BLOOD PRESSURE: 189 MMHG | DIASTOLIC BLOOD PRESSURE: 79 MMHG | OXYGEN SATURATION: 90 % | RESPIRATION RATE: 13 BRPM

## 2019-01-29 VITALS
RESPIRATION RATE: 18 BRPM | WEIGHT: 218 LBS | SYSTOLIC BLOOD PRESSURE: 134 MMHG | HEIGHT: 63 IN | OXYGEN SATURATION: 94 % | HEART RATE: 80 BPM | BODY MASS INDEX: 38.62 KG/M2 | TEMPERATURE: 97.6 F | DIASTOLIC BLOOD PRESSURE: 91 MMHG

## 2019-01-29 LAB
GLUCOSE BLD-MCNC: 124 MG/DL (ref 70–99)
GLUCOSE BLD-MCNC: 156 MG/DL (ref 70–99)
PERFORMED ON: ABNORMAL
PERFORMED ON: ABNORMAL

## 2019-01-29 PROCEDURE — 2709999900 HC NON-CHARGEABLE SUPPLY: Performed by: ORTHOPAEDIC SURGERY

## 2019-01-29 PROCEDURE — 2580000003 HC RX 258: Performed by: ORTHOPAEDIC SURGERY

## 2019-01-29 PROCEDURE — 7100000011 HC PHASE II RECOVERY - ADDTL 15 MIN: Performed by: ORTHOPAEDIC SURGERY

## 2019-01-29 PROCEDURE — 2500000003 HC RX 250 WO HCPCS: Performed by: ANESTHESIOLOGY

## 2019-01-29 PROCEDURE — 7100000010 HC PHASE II RECOVERY - FIRST 15 MIN: Performed by: ORTHOPAEDIC SURGERY

## 2019-01-29 PROCEDURE — 2580000003 HC RX 258: Performed by: ANESTHESIOLOGY

## 2019-01-29 PROCEDURE — 6360000002 HC RX W HCPCS: Performed by: NURSE ANESTHETIST, CERTIFIED REGISTERED

## 2019-01-29 PROCEDURE — 2500000003 HC RX 250 WO HCPCS: Performed by: NURSE ANESTHETIST, CERTIFIED REGISTERED

## 2019-01-29 PROCEDURE — 3700000001 HC ADD 15 MINUTES (ANESTHESIA): Performed by: ORTHOPAEDIC SURGERY

## 2019-01-29 PROCEDURE — 3700000000 HC ANESTHESIA ATTENDED CARE: Performed by: ORTHOPAEDIC SURGERY

## 2019-01-29 PROCEDURE — 7100000000 HC PACU RECOVERY - FIRST 15 MIN: Performed by: ORTHOPAEDIC SURGERY

## 2019-01-29 PROCEDURE — 3600000014 HC SURGERY LEVEL 4 ADDTL 15MIN: Performed by: ORTHOPAEDIC SURGERY

## 2019-01-29 PROCEDURE — 3600000004 HC SURGERY LEVEL 4 BASE: Performed by: ORTHOPAEDIC SURGERY

## 2019-01-29 PROCEDURE — 7100000001 HC PACU RECOVERY - ADDTL 15 MIN: Performed by: ORTHOPAEDIC SURGERY

## 2019-01-29 RX ORDER — SODIUM CHLORIDE 0.9 % (FLUSH) 0.9 %
10 SYRINGE (ML) INJECTION PRN
Status: DISCONTINUED | OUTPATIENT
Start: 2019-01-29 | End: 2019-01-29 | Stop reason: HOSPADM

## 2019-01-29 RX ORDER — SODIUM CHLORIDE, SODIUM LACTATE, POTASSIUM CHLORIDE, CALCIUM CHLORIDE 600; 310; 30; 20 MG/100ML; MG/100ML; MG/100ML; MG/100ML
INJECTION, SOLUTION INTRAVENOUS CONTINUOUS
Status: DISCONTINUED | OUTPATIENT
Start: 2019-01-29 | End: 2019-01-29 | Stop reason: HOSPADM

## 2019-01-29 RX ORDER — MAGNESIUM HYDROXIDE 1200 MG/15ML
LIQUID ORAL CONTINUOUS PRN
Status: DISCONTINUED | OUTPATIENT
Start: 2019-01-29 | End: 2019-01-29 | Stop reason: HOSPADM

## 2019-01-29 RX ORDER — SODIUM CHLORIDE 0.9 % (FLUSH) 0.9 %
10 SYRINGE (ML) INJECTION EVERY 12 HOURS SCHEDULED
Status: DISCONTINUED | OUTPATIENT
Start: 2019-01-29 | End: 2019-01-29 | Stop reason: HOSPADM

## 2019-01-29 RX ORDER — ONDANSETRON 2 MG/ML
4 INJECTION INTRAMUSCULAR; INTRAVENOUS
Status: DISCONTINUED | OUTPATIENT
Start: 2019-01-29 | End: 2019-01-29 | Stop reason: HOSPADM

## 2019-01-29 RX ORDER — LIDOCAINE HYDROCHLORIDE 20 MG/ML
INJECTION, SOLUTION INFILTRATION; PERINEURAL PRN
Status: DISCONTINUED | OUTPATIENT
Start: 2019-01-29 | End: 2019-01-29 | Stop reason: SDUPTHER

## 2019-01-29 RX ORDER — PROPOFOL 10 MG/ML
INJECTION, EMULSION INTRAVENOUS PRN
Status: DISCONTINUED | OUTPATIENT
Start: 2019-01-29 | End: 2019-01-29 | Stop reason: SDUPTHER

## 2019-01-29 RX ORDER — FENTANYL CITRATE 50 UG/ML
50 INJECTION, SOLUTION INTRAMUSCULAR; INTRAVENOUS EVERY 5 MIN PRN
Status: DISCONTINUED | OUTPATIENT
Start: 2019-01-29 | End: 2019-01-29 | Stop reason: HOSPADM

## 2019-01-29 RX ORDER — MEPERIDINE HYDROCHLORIDE 25 MG/ML
12.5 INJECTION INTRAMUSCULAR; INTRAVENOUS; SUBCUTANEOUS EVERY 5 MIN PRN
Status: DISCONTINUED | OUTPATIENT
Start: 2019-01-29 | End: 2019-01-29 | Stop reason: HOSPADM

## 2019-01-29 RX ORDER — LABETALOL HYDROCHLORIDE 5 MG/ML
5 INJECTION, SOLUTION INTRAVENOUS EVERY 10 MIN PRN
Status: DISCONTINUED | OUTPATIENT
Start: 2019-01-29 | End: 2019-01-29 | Stop reason: HOSPADM

## 2019-01-29 RX ORDER — MIDAZOLAM HYDROCHLORIDE 1 MG/ML
INJECTION INTRAMUSCULAR; INTRAVENOUS PRN
Status: DISCONTINUED | OUTPATIENT
Start: 2019-01-29 | End: 2019-01-29 | Stop reason: SDUPTHER

## 2019-01-29 RX ORDER — MORPHINE SULFATE 4 MG/ML
2 INJECTION, SOLUTION INTRAMUSCULAR; INTRAVENOUS EVERY 5 MIN PRN
Status: DISCONTINUED | OUTPATIENT
Start: 2019-01-29 | End: 2019-01-29 | Stop reason: HOSPADM

## 2019-01-29 RX ADMIN — PROPOFOL 30 MG: 10 INJECTION, EMULSION INTRAVENOUS at 08:58

## 2019-01-29 RX ADMIN — MIDAZOLAM HYDROCHLORIDE 2 MG: 2 INJECTION, SOLUTION INTRAMUSCULAR; INTRAVENOUS at 08:54

## 2019-01-29 RX ADMIN — PROPOFOL 40 MG: 10 INJECTION, EMULSION INTRAVENOUS at 09:05

## 2019-01-29 RX ADMIN — PROPOFOL 50 MG: 10 INJECTION, EMULSION INTRAVENOUS at 08:56

## 2019-01-29 RX ADMIN — PROPOFOL 40 MG: 10 INJECTION, EMULSION INTRAVENOUS at 09:02

## 2019-01-29 RX ADMIN — LABETALOL 20 MG/4 ML (5 MG/ML) INTRAVENOUS SYRINGE 5 MG: at 09:56

## 2019-01-29 RX ADMIN — SODIUM CHLORIDE, POTASSIUM CHLORIDE, SODIUM LACTATE AND CALCIUM CHLORIDE: 600; 310; 30; 20 INJECTION, SOLUTION INTRAVENOUS at 07:23

## 2019-01-29 RX ADMIN — LIDOCAINE HYDROCHLORIDE 60 MG: 20 INJECTION, SOLUTION INFILTRATION; PERINEURAL at 08:56

## 2019-01-29 ASSESSMENT — PULMONARY FUNCTION TESTS
PIF_VALUE: 0
PIF_VALUE: 1
PIF_VALUE: 0
PIF_VALUE: 0
PIF_VALUE: 1
PIF_VALUE: 1
PIF_VALUE: 0
PIF_VALUE: 1
PIF_VALUE: 1
PIF_VALUE: 0

## 2019-01-29 ASSESSMENT — PAIN SCALES - GENERAL
PAINLEVEL_OUTOF10: 0
PAINLEVEL_OUTOF10: 0

## 2019-01-29 ASSESSMENT — PAIN - FUNCTIONAL ASSESSMENT: PAIN_FUNCTIONAL_ASSESSMENT: 0-10

## 2019-01-29 ASSESSMENT — PAIN DESCRIPTION - DESCRIPTORS: DESCRIPTORS: PRESSURE;THROBBING;SHARP

## 2019-02-06 ENCOUNTER — OFFICE VISIT (OUTPATIENT)
Dept: ORTHOPEDIC SURGERY | Age: 59
End: 2019-02-06

## 2019-02-06 VITALS — BODY MASS INDEX: 38.62 KG/M2 | HEIGHT: 63 IN | WEIGHT: 218 LBS | RESPIRATION RATE: 16 BRPM

## 2019-02-06 DIAGNOSIS — M50.30 DEGENERATIVE DISC DISEASE, CERVICAL: Primary | ICD-10-CM

## 2019-02-06 DIAGNOSIS — M65.4 DE QUERVAIN'S TENOSYNOVITIS, LEFT: ICD-10-CM

## 2019-02-06 PROCEDURE — 99024 POSTOP FOLLOW-UP VISIT: CPT | Performed by: PHYSICIAN ASSISTANT

## 2019-02-06 PROCEDURE — APPSS15 APP SPLIT SHARED TIME 0-15 MINUTES: Performed by: PHYSICIAN ASSISTANT

## 2019-02-11 ENCOUNTER — TELEPHONE (OUTPATIENT)
Dept: INTERNAL MEDICINE CLINIC | Age: 59
End: 2019-02-11

## 2019-02-11 ENCOUNTER — TELEPHONE (OUTPATIENT)
Dept: ORTHOPEDIC SURGERY | Age: 59
End: 2019-02-11

## 2019-02-13 ENCOUNTER — HOSPITAL ENCOUNTER (EMERGENCY)
Age: 59
Discharge: HOME | End: 2019-02-14
Payer: MEDICARE

## 2019-02-13 VITALS
TEMPERATURE: 98.6 F | DIASTOLIC BLOOD PRESSURE: 82 MMHG | OXYGEN SATURATION: 99 % | SYSTOLIC BLOOD PRESSURE: 120 MMHG | RESPIRATION RATE: 19 BRPM | HEART RATE: 78 BPM

## 2019-02-13 DIAGNOSIS — Z48.02 VISIT FOR SUTURE REMOVAL: Primary | ICD-10-CM

## 2019-02-13 PROCEDURE — 4500000002 HC ER NO CHARGE

## 2019-02-13 ASSESSMENT — PAIN SCALES - GENERAL: PAINLEVEL_OUTOF10: 8

## 2019-02-14 ASSESSMENT — ENCOUNTER SYMPTOMS
CHEST TIGHTNESS: 0
WHEEZING: 0
COUGH: 0
DIARRHEA: 0
VOMITING: 0
SHORTNESS OF BREATH: 0
ABDOMINAL PAIN: 0
NAUSEA: 0

## 2019-02-18 RX ORDER — LOSARTAN POTASSIUM 50 MG/1
TABLET ORAL
Qty: 90 TABLET | Refills: 0 | Status: SHIPPED | OUTPATIENT
Start: 2019-02-18 | End: 2019-05-20 | Stop reason: DRUGHIGH

## 2019-04-02 ENCOUNTER — TELEPHONE (OUTPATIENT)
Dept: INTERNAL MEDICINE CLINIC | Age: 59
End: 2019-04-02

## 2019-04-02 RX ORDER — IBUPROFEN 800 MG/1
800 TABLET ORAL 2 TIMES DAILY WITH MEALS
Qty: 30 TABLET | Refills: 1 | Status: SHIPPED | OUTPATIENT
Start: 2019-04-02 | End: 2019-08-08 | Stop reason: SDUPTHER

## 2019-04-02 RX ORDER — AMLODIPINE BESYLATE 5 MG/1
TABLET ORAL
Qty: 30 TABLET | Refills: 3 | Status: SHIPPED | OUTPATIENT
Start: 2019-04-02 | End: 2019-08-08 | Stop reason: SDUPTHER

## 2019-04-02 NOTE — TELEPHONE ENCOUNTER
Patient requesting refill for the following medication: ibuprofen (ADVIL;MOTRIN) 800 MG tablet  Please send to pharmacy:     [facility] [address], [facility] [address]

## 2019-04-22 RX ORDER — ATORVASTATIN CALCIUM 10 MG/1
TABLET, FILM COATED ORAL
Qty: 90 TABLET | Refills: 0 | Status: SHIPPED | OUTPATIENT
Start: 2019-04-22 | End: 2019-07-30 | Stop reason: SDUPTHER

## 2019-05-07 RX ORDER — CLOTRIMAZOLE AND BETAMETHASONE DIPROPIONATE 10; .64 MG/G; MG/G
CREAM TOPICAL 2 TIMES DAILY
Qty: 15 G | Refills: 0 | Status: SHIPPED | OUTPATIENT
Start: 2019-05-07 | End: 2019-05-08 | Stop reason: ALTCHOICE

## 2019-05-08 ENCOUNTER — OFFICE VISIT (OUTPATIENT)
Dept: INTERNAL MEDICINE CLINIC | Age: 59
End: 2019-05-08
Payer: MEDICARE

## 2019-05-08 VITALS
WEIGHT: 216 LBS | DIASTOLIC BLOOD PRESSURE: 82 MMHG | SYSTOLIC BLOOD PRESSURE: 140 MMHG | HEART RATE: 100 BPM | BODY MASS INDEX: 38.26 KG/M2

## 2019-05-08 DIAGNOSIS — B37.2 CANDIDAL INTERTRIGO: Primary | ICD-10-CM

## 2019-05-08 DIAGNOSIS — E11.65 TYPE 2 DIABETES MELLITUS WITH HYPERGLYCEMIA, WITHOUT LONG-TERM CURRENT USE OF INSULIN (HCC): ICD-10-CM

## 2019-05-08 PROCEDURE — 1036F TOBACCO NON-USER: CPT | Performed by: INTERNAL MEDICINE

## 2019-05-08 PROCEDURE — G8417 CALC BMI ABV UP PARAM F/U: HCPCS | Performed by: INTERNAL MEDICINE

## 2019-05-08 PROCEDURE — 3044F HG A1C LEVEL LT 7.0%: CPT | Performed by: INTERNAL MEDICINE

## 2019-05-08 PROCEDURE — 3017F COLORECTAL CA SCREEN DOC REV: CPT | Performed by: INTERNAL MEDICINE

## 2019-05-08 PROCEDURE — G8428 CUR MEDS NOT DOCUMENT: HCPCS | Performed by: INTERNAL MEDICINE

## 2019-05-08 PROCEDURE — 2022F DILAT RTA XM EVC RTNOPTHY: CPT | Performed by: INTERNAL MEDICINE

## 2019-05-08 PROCEDURE — 99213 OFFICE O/P EST LOW 20 MIN: CPT | Performed by: INTERNAL MEDICINE

## 2019-05-08 NOTE — PROGRESS NOTES
[facility]) Physicians  Internal Medicine  Patient Encounter  Dennys José D.O., Manuel Rick        Chief Complaint   Patient presents with    Rash       HPI: 59 y.o. female seen today with C/O recurrent red akbar under the left breast.  She has used Lotrisone. She states the rash is malodorous. She denies cracking skin. The rash itches. She has used cornstarch. She tries to keep the area clean and dry. Her sugarss have been 170-266. She ran out of insulin 2 weeks ago. On the insulin, AM sugars are . Past Medical History:   Diagnosis Date    Allergic rhinitis     Arthritis     Diverticulitis     Diverticulosis     Dyslipidemia     Dyslipidemia     Environmental allergies     GERD (gastroesophageal reflux disease)     Headache(784.0)     HTN (hypertension)     Impaired fasting glucose     Miscarriage     Obstructive sleep apnea (adult) (pediatric)     Osteoarthritis     Reactive airway disease     Single stillborn delivery outcome     Thyroid lesion 1/29/2018    Type II or unspecified type diabetes mellitus without mention of complication, not stated as uncontrolled          MEDICATIONS:  Prior to Visit Medications    Medication Sig Taking?  Authorizing Provider   insulin glargine (LANTUS SOLOSTAR) 100 UNIT/ML injection pen Inject 10 Units into the skin nightly Yes John Green DO   econazole nitrate 1 % cream Apply topically 2 times daily for 2-4 weeks Yes John Green DO   atorvastatin (LIPITOR) 10 MG tablet TAKE 1 TABLET BY MOUTH ONE TIME A DAY  John Grene DO   metFORMIN (GLUCOPHAGE) 1000 MG tablet TAKE 1 TABLET TWICE A DAY WITH MEALS  John Green DO   amLODIPine (NORVASC) 5 MG tablet TAKE 1 TABLET DAILY  John Green DO   ibuprofen (ADVIL;MOTRIN) 800 MG tablet Take 1 tablet by mouth 2 times daily (with meals)  John Green DO   losartan (COZAAR) 50 MG tablet TAKE ONE TABLET BY MOUTH DAILY  John Green DO   Insulin Pen Needle 32G X 4 MM MISC 1 each by Does not apply route daily  John Green DO   fluticasone (FLONASE) 50 MCG/ACT nasal spray 2 sprays by Nasal route daily  Laurie Larsen MD   SOFT TOUCH LANCETS MISC 1 kit by Does not apply route 2 times daily Test BS BID E11.9  John Green DO   Blood Glucose Monitoring Suppl (ACURA BLOOD GLUCOSE METER) w/Device KIT 1 Device by Does not apply route 2 times daily Please dispense Accuchek   E11.9  John Green DO   glucose blood VI test strips (ONE TOUCH ULTRA TEST) strip TEST BLOOD SUGAR QD  John Green DO   Calcium Carb-Cholecalciferol (CALTRATE 600+D) 600-800 MG-UNIT TABS Take 1 tablet by mouth 2 times daily  Historical Provider, MD CHRISTIANSEN PEN NEEDLES 31G X 8 MM MISC USE ONCE DAILY  John Green DO   cetirizine (ZYRTEC ALLERGY) 10 MG tablet Take 1 tablet by mouth daily  John Green DO   aspirin EC 81 MG EC tablet Take 81 mg by mouth daily. LAST DOSE 8-9-14  John Green DO           Review of Systems - As per HPI    OBJECTIVE:  BP (!) 140/82   Pulse 100   Wt 216 lb (98 kg)   LMP 01/26/1996   BMI 38.26 kg/m²   GEN: NAD, A&O, Non-toxic  SKIN: SKIN: The inframammary intertriginous fold reveals a lightly erythematous circular rash. No evidence of maceration, cracking, fissuring. No exudate noted. ASSESSMENT[de-identified]  Gina Rizvi was seen today for rash. Diagnoses and all orders for this visit:    Candidal intertrigo  -     econazole nitrate 1 % cream; Apply topically 2 times daily for 2-4 weeks    Type 2 diabetes mellitus with hyperglycemia, without long-term current use of insulin (HCC)  -     insulin glargine (LANTUS SOLOSTAR) 100 UNIT/ML injection pen; Inject 10 Units into the skin nightly        Additional Plan:  1. Advised patient never grown out of her insulin. This was refilled today. 2.  Need to get the sugars back under control which will help prevent the recurrent yeast infections  3. Will use an azole agent due to its antifungal and anti-inflammatory properties.     Discussed medications with patient who voiced understanding of their use, indication and potential side effects. Pt also understands the above recommendations. All questions answered. This note was generated completely or in part utilizing Dragon dictation speech recognition software. Occasionally, words are mistranscribed and despite editing, the text may contain inaccuracies due to incorrect word recognition.   If further clarification is needed please contact the office at [phone]

## 2019-05-20 ENCOUNTER — OFFICE VISIT (OUTPATIENT)
Dept: INTERNAL MEDICINE CLINIC | Age: 59
End: 2019-05-20
Payer: MEDICARE

## 2019-05-20 VITALS
WEIGHT: 215 LBS | DIASTOLIC BLOOD PRESSURE: 90 MMHG | SYSTOLIC BLOOD PRESSURE: 130 MMHG | TEMPERATURE: 98.2 F | OXYGEN SATURATION: 97 % | RESPIRATION RATE: 16 BRPM | BODY MASS INDEX: 38.09 KG/M2 | HEART RATE: 108 BPM

## 2019-05-20 DIAGNOSIS — J06.9 VIRAL URI WITH COUGH: ICD-10-CM

## 2019-05-20 DIAGNOSIS — E78.2 HYPERLIPIDEMIA, MIXED: ICD-10-CM

## 2019-05-20 DIAGNOSIS — E11.65 TYPE 2 DIABETES MELLITUS WITH HYPERGLYCEMIA, WITHOUT LONG-TERM CURRENT USE OF INSULIN (HCC): Primary | ICD-10-CM

## 2019-05-20 DIAGNOSIS — Z12.39 SCREENING FOR BREAST CANCER: ICD-10-CM

## 2019-05-20 DIAGNOSIS — I10 BENIGN ESSENTIAL HTN: ICD-10-CM

## 2019-05-20 PROCEDURE — G8417 CALC BMI ABV UP PARAM F/U: HCPCS | Performed by: INTERNAL MEDICINE

## 2019-05-20 PROCEDURE — 3017F COLORECTAL CA SCREEN DOC REV: CPT | Performed by: INTERNAL MEDICINE

## 2019-05-20 PROCEDURE — 2022F DILAT RTA XM EVC RTNOPTHY: CPT | Performed by: INTERNAL MEDICINE

## 2019-05-20 PROCEDURE — 3044F HG A1C LEVEL LT 7.0%: CPT | Performed by: INTERNAL MEDICINE

## 2019-05-20 PROCEDURE — G8427 DOCREV CUR MEDS BY ELIG CLIN: HCPCS | Performed by: INTERNAL MEDICINE

## 2019-05-20 PROCEDURE — 1036F TOBACCO NON-USER: CPT | Performed by: INTERNAL MEDICINE

## 2019-05-20 PROCEDURE — 99214 OFFICE O/P EST MOD 30 MIN: CPT | Performed by: INTERNAL MEDICINE

## 2019-05-20 RX ORDER — LOSARTAN POTASSIUM 100 MG/1
100 TABLET ORAL DAILY
Qty: 90 TABLET | Refills: 1 | Status: SHIPPED | OUTPATIENT
Start: 2019-05-20 | End: 2019-08-09 | Stop reason: SDUPTHER

## 2019-05-20 RX ORDER — GUAIFENESIN 600 MG/1
600 TABLET, EXTENDED RELEASE ORAL 2 TIMES DAILY
Qty: 30 TABLET | Refills: 0 | COMMUNITY
Start: 2019-05-20 | End: 2019-06-04

## 2019-05-20 RX ORDER — BENZONATATE 200 MG/1
200 CAPSULE ORAL 3 TIMES DAILY PRN
Qty: 30 CAPSULE | Refills: 0 | Status: SHIPPED | OUTPATIENT
Start: 2019-05-20 | End: 2019-09-12 | Stop reason: CLARIF

## 2019-05-20 NOTE — PATIENT INSTRUCTIONS
Your blood pressure is running too high. We are increasing your Losartan to 100 mg daily. Continue monitoring her blood pressure at home and report readings in one week. Please do better with Lifestyle modification including low calorie diet focusing on Low fat/low cholesterol and low carbohydrate intake, along with  increasing cardiovascular (aerobic) exercise.

## 2019-05-20 NOTE — PROGRESS NOTES
[facility]) Physicians  Internal Medicine  Patient Encounter  Valdemar Olmstead D.O., [facility]        Chief Complaint   Patient presents with    Cough    Nasal Congestion       HPI: 59 y.o. female seen today for follow up regarding the status of her current chronic medical problems below along with a medication review. She states she has developed a cough and nasal congestion. She had hoarseness. Her symptoms started 6 days ago. She had a temp of 101 the first night and then 100.9. She has not had a temp since then. She states her throat is sore. She is blowing out mucous from her nose. She has used Mucinex as needed. She has been using the Flonase, but not regularly. Health Maintenance overdue. Mammogram      Diabetes Mellitus Type II, Follow-up--   Lab Results   Component Value Date    LABA1C 6.6 01/03/2019      Lab Results   Component Value Date    .7 01/03/2019   Initial A1C 11/25/2014---> 11.4%. Previous history --->  Patient presented back in August with symptoms of recurrent glucose toxicity such as polyuria and polydipsia. Patient had become noncompliant with her medications and diet regimen. Her A1c had increased to 9.5%. Patient got back on track with her medications and her A1c improved. She is overdue for lab. She denies any recurrent problems with excessive thirst or frequent urination. She struggles to make any impact with regards to her weight. She states she is taking her medication. She has been non-compliant with diet and exercise. AM-- 132-179,  Before Lunch--160-179, Before dinner-- 200's. Last Eye Exam: 1/2/2019. Foot exa1/3/2019   U. Microalbumin/Cr: 1/25/2019  Pt is on  ARB-- Losartan. Complications-- None. Insulin Treated? No.   ASA: Yes. Tobacco: No   LDL-- 72   + Statin      HTN--  Patient is on amlodipine 5 mg daily, losartan 50 mg daily. She denies any headaches, lightheadedness, syncope, swelling.   She does not adhere to a strict low sodium diet. Hyperlipidemia--   Lab Results   Component Value Date    LDLCALC 72 01/03/2019   Patient has been on Lipitor for several years. She denies any statin-induced myalgias or muscle cramping. Obesity-- Patient admits that she continues to struggle making any impact with weight loss. She is non-compliant with dietary modification. DEANA-- Patient states she has not used her CPAP over a year. She states she needs parts for her machine. She has not made any follow-up with the sleep specialist.    Past Medical History:   Diagnosis Date    Allergic rhinitis     Arthritis     Diverticulitis     Diverticulosis     Dyslipidemia     Dyslipidemia     Environmental allergies     GERD (gastroesophageal reflux disease)     Headache(784.0)     HTN (hypertension)     Impaired fasting glucose     Miscarriage     Obstructive sleep apnea (adult) (pediatric)     Osteoarthritis     Reactive airway disease     Single stillborn delivery outcome     Thyroid lesion 1/29/2018    Type II or unspecified type diabetes mellitus without mention of complication, not stated as uncontrolled        Prior to Visit Medications    Medication Sig Taking?  Authorizing Provider   insulin glargine (LANTUS SOLOSTAR) 100 UNIT/ML injection pen Inject 10 Units into the skin nightly Yes John Green DO   atorvastatin (LIPITOR) 10 MG tablet TAKE 1 TABLET BY MOUTH ONE TIME A DAY Yes John Green DO   metFORMIN (GLUCOPHAGE) 1000 MG tablet TAKE 1 TABLET TWICE A DAY WITH MEALS Yes John Green DO   amLODIPine (NORVASC) 5 MG tablet TAKE 1 TABLET DAILY Yes John Green DO   losartan (COZAAR) 50 MG tablet TAKE ONE TABLET BY MOUTH DAILY Yes Jonh Green DO   Calcium Carb-Cholecalciferol (CALTRATE 600+D) 600-800 MG-UNIT TABS Take 1 tablet by mouth 2 times daily Yes Historical Provider, MD   cetirizine (ZYRTEC ALLERGY) 10 MG tablet Take 1 tablet by mouth daily Yes John Green DO   aspirin EC 81 MG EC tablet Take 81 mg by mouth daily. LAST DOSE 8-9-14 Yes John Green DO   econazole nitrate 1 % cream Apply topically 2 times daily for 2-4 weeks  John Green DO   ibuprofen (ADVIL;MOTRIN) 800 MG tablet Take 1 tablet by mouth 2 times daily (with meals)  John Green DO   Insulin Pen Needle 32G X 4 MM MISC 1 each by Does not apply route daily  John Green DO   fluticasone (FLONASE) 50 MCG/ACT nasal spray 2 sprays by Nasal route daily  Trent López MD   SOFT TOUCH LANCETS MISC 1 kit by Does not apply route 2 times daily Test BS BID E11.9  John Green DO   Blood Glucose Monitoring Suppl (ACURA BLOOD GLUCOSE METER) w/Device KIT 1 Device by Does not apply route 2 times daily Please dispense Accuchek   E11.9  John Green DO   glucose blood VI test strips (ONE TOUCH ULTRA TEST) strip TEST BLOOD SUGAR QD  John Green DO   CLICKFINE PEN NEEDLES 31G X 8 MM MISC USE ONCE DAILY  John Green DO       Review of Systems - As per HPI   Skin: Skin tag on her thigh can become irritated and caught on clothing. She would like this removed      OBJECTIVE:  BP (!) 130/90   Pulse 108   Temp 98.2 °F (36.8 °C)   Resp 16   Wt 215 lb (97.5 kg)   LMP 01/26/1996   SpO2 97%   BMI 38.09 kg/m²   Wt Readings from Last 3 Encounters:   05/20/19 215 lb (97.5 kg)   05/08/19 216 lb (98 kg)   02/06/19 218 lb (98.9 kg)       GEN: NAD, A&O, Non-toxic, Obese  HEENT: NC/AT, BARBY, EOMI, Oral cavity Clear, anicteric, TM's NL, tongue midline.  + Nasal mucosal congestion, pale mucosa. No discharge seen. NECK: Supple. No thyromegaly or nodules. Had no JVD. LYMPH: No C/SC nodes. CV: Rhythm is regular. Rate normal.  No murmurs. No ectopy  PULM: CTA  EXT: No edema  NEURO: Monofilament testing intact both feet. Vibratory testing in both feet is normal  VASC:  No carotid bruits. Pedal Pulses symmetric  SKIN:  Feet normal color and temperature, no large calluses, ulcers or wounds.     MS: No

## 2019-05-23 ENCOUNTER — TELEPHONE (OUTPATIENT)
Dept: INTERNAL MEDICINE CLINIC | Age: 59
End: 2019-05-23

## 2019-07-01 ENCOUNTER — TELEPHONE (OUTPATIENT)
Dept: INTERNAL MEDICINE CLINIC | Age: 59
End: 2019-07-01

## 2019-07-30 RX ORDER — ATORVASTATIN CALCIUM 10 MG/1
TABLET, FILM COATED ORAL
Qty: 90 TABLET | Refills: 0 | Status: SHIPPED | OUTPATIENT
Start: 2019-07-30 | End: 2019-08-08 | Stop reason: SDUPTHER

## 2019-07-30 NOTE — TELEPHONE ENCOUNTER
Last appointment: 5/20/2019  Next appointment: 8/22/2019  Last refill: 4/22/19    Please advise as DOD  Thank you

## 2019-08-05 RX ORDER — LANCETS
EACH MISCELLANEOUS
Qty: 100 EACH | Refills: 4 | Status: SHIPPED | OUTPATIENT
Start: 2019-08-05

## 2019-08-05 RX ORDER — IBUPROFEN 800 MG/1
TABLET ORAL
Qty: 30 TABLET | Refills: 0 | OUTPATIENT
Start: 2019-08-05

## 2019-08-05 RX ORDER — BLOOD SUGAR DIAGNOSTIC
STRIP MISCELLANEOUS
Qty: 100 STRIP | Refills: 2 | Status: SHIPPED | OUTPATIENT
Start: 2019-08-05 | End: 2020-06-01

## 2019-08-07 ENCOUNTER — TELEPHONE (OUTPATIENT)
Dept: ORTHOPEDIC SURGERY | Age: 59
End: 2019-08-07

## 2019-08-08 ENCOUNTER — TELEPHONE (OUTPATIENT)
Dept: INTERNAL MEDICINE CLINIC | Age: 59
End: 2019-08-08

## 2019-08-08 DIAGNOSIS — I10 BENIGN ESSENTIAL HTN: ICD-10-CM

## 2019-08-08 DIAGNOSIS — E11.65 TYPE 2 DIABETES MELLITUS WITH HYPERGLYCEMIA, WITHOUT LONG-TERM CURRENT USE OF INSULIN (HCC): ICD-10-CM

## 2019-08-09 RX ORDER — IBUPROFEN 800 MG/1
800 TABLET ORAL 2 TIMES DAILY PRN
Qty: 180 TABLET | Refills: 0 | Status: SHIPPED | OUTPATIENT
Start: 2019-08-09 | End: 2020-06-01 | Stop reason: SDUPTHER

## 2019-08-09 RX ORDER — IBUPROFEN 800 MG/1
TABLET ORAL
Qty: 30 TABLET | Refills: 0 | Status: SHIPPED | OUTPATIENT
Start: 2019-08-09 | End: 2019-08-16 | Stop reason: SDUPTHER

## 2019-08-09 RX ORDER — AMLODIPINE BESYLATE 5 MG/1
TABLET ORAL
Qty: 90 TABLET | Refills: 3 | Status: SHIPPED | OUTPATIENT
Start: 2019-08-09 | End: 2020-03-26

## 2019-08-09 RX ORDER — LOSARTAN POTASSIUM 100 MG/1
100 TABLET ORAL DAILY
Qty: 90 TABLET | Refills: 1 | Status: SHIPPED | OUTPATIENT
Start: 2019-08-09 | End: 2020-05-21 | Stop reason: SDUPTHER

## 2019-08-09 RX ORDER — ATORVASTATIN CALCIUM 10 MG/1
10 TABLET, FILM COATED ORAL NIGHTLY
Qty: 90 TABLET | Refills: 3 | Status: SHIPPED | OUTPATIENT
Start: 2019-08-09 | End: 2019-11-01

## 2019-08-14 ENCOUNTER — HOSPITAL ENCOUNTER (OUTPATIENT)
Dept: MAMMOGRAPHY | Age: 59
Discharge: HOME | End: 2019-08-14
Payer: MEDICARE

## 2019-08-14 DIAGNOSIS — Z12.39 SCREENING FOR BREAST CANCER: ICD-10-CM

## 2019-08-14 PROCEDURE — 77063 BREAST TOMOSYNTHESIS BI: CPT

## 2019-08-15 ENCOUNTER — OFFICE VISIT (OUTPATIENT)
Dept: ORTHOPEDIC SURGERY | Age: 59
End: 2019-08-15
Payer: MEDICARE

## 2019-08-15 VITALS
BODY MASS INDEX: 38.09 KG/M2 | DIASTOLIC BLOOD PRESSURE: 86 MMHG | WEIGHT: 215 LBS | TEMPERATURE: 97.1 F | SYSTOLIC BLOOD PRESSURE: 139 MMHG | HEIGHT: 63 IN | HEART RATE: 99 BPM | RESPIRATION RATE: 16 BRPM

## 2019-08-15 DIAGNOSIS — Z96.652 TOTAL KNEE REPLACEMENT STATUS, LEFT: ICD-10-CM

## 2019-08-15 DIAGNOSIS — M25.562 ACUTE PAIN OF LEFT KNEE: Primary | ICD-10-CM

## 2019-08-15 PROCEDURE — G8417 CALC BMI ABV UP PARAM F/U: HCPCS | Performed by: ORTHOPAEDIC SURGERY

## 2019-08-15 PROCEDURE — 99213 OFFICE O/P EST LOW 20 MIN: CPT | Performed by: ORTHOPAEDIC SURGERY

## 2019-08-15 PROCEDURE — 1036F TOBACCO NON-USER: CPT | Performed by: ORTHOPAEDIC SURGERY

## 2019-08-15 PROCEDURE — G8427 DOCREV CUR MEDS BY ELIG CLIN: HCPCS | Performed by: ORTHOPAEDIC SURGERY

## 2019-08-15 PROCEDURE — 3017F COLORECTAL CA SCREEN DOC REV: CPT | Performed by: ORTHOPAEDIC SURGERY

## 2019-08-16 RX ORDER — IBUPROFEN 800 MG/1
TABLET ORAL
Qty: 60 TABLET | Refills: 0 | Status: SHIPPED | OUTPATIENT
Start: 2019-08-16 | End: 2019-09-26 | Stop reason: SDUPTHER

## 2019-08-19 ENCOUNTER — TELEPHONE (OUTPATIENT)
Dept: ORTHOPEDIC SURGERY | Age: 59
End: 2019-08-19

## 2019-08-19 DIAGNOSIS — Z96.652 TOTAL KNEE REPLACEMENT STATUS, LEFT: Primary | ICD-10-CM

## 2019-08-19 DIAGNOSIS — M25.562 ACUTE PAIN OF LEFT KNEE: ICD-10-CM

## 2019-08-19 NOTE — TELEPHONE ENCOUNTER
Pt called and said she's in a lot of pain and doesn't know what to do. She is taking ibuprofen and it helps a little but not enough and wants to know should she come in earlier or is there something else that can be recommended. Please call to advise.

## 2019-08-21 RX ORDER — ACETAMINOPHEN AND CODEINE PHOSPHATE 300; 30 MG/1; MG/1
1 TABLET ORAL EVERY 8 HOURS PRN
Qty: 21 TABLET | Refills: 0 | Status: SHIPPED | OUTPATIENT
Start: 2019-08-21 | End: 2019-08-28

## 2019-09-10 RX ORDER — AMLODIPINE BESYLATE 5 MG/1
TABLET ORAL
Qty: 60 TABLET | Refills: 2 | Status: SHIPPED | OUTPATIENT
Start: 2019-09-10 | End: 2019-09-12 | Stop reason: CLARIF

## 2019-09-10 NOTE — TELEPHONE ENCOUNTER
Last appointment: 5/20/2019  Next appointment: Visit date not found  Last refill: script was sent to [facility] 8/9/19 was sent incorrectly.  Needs to go to local pharmacy  Thank you

## 2019-09-11 ENCOUNTER — OFFICE VISIT (OUTPATIENT)
Dept: ORTHOPEDIC SURGERY | Age: 59
End: 2019-09-11
Payer: MEDICARE

## 2019-09-11 VITALS
BODY MASS INDEX: 33.86 KG/M2 | SYSTOLIC BLOOD PRESSURE: 128 MMHG | RESPIRATION RATE: 16 BRPM | HEART RATE: 92 BPM | HEIGHT: 63 IN | DIASTOLIC BLOOD PRESSURE: 74 MMHG | WEIGHT: 191.1 LBS

## 2019-09-11 DIAGNOSIS — M25.552 PAIN OF LEFT HIP JOINT: ICD-10-CM

## 2019-09-11 DIAGNOSIS — M77.8 TENDONITIS OF WRIST, LEFT: ICD-10-CM

## 2019-09-11 DIAGNOSIS — M25.532 LEFT WRIST PAIN: Primary | ICD-10-CM

## 2019-09-11 DIAGNOSIS — M70.62 TROCHANTERIC BURSITIS OF LEFT HIP: ICD-10-CM

## 2019-09-11 PROCEDURE — G8427 DOCREV CUR MEDS BY ELIG CLIN: HCPCS | Performed by: PHYSICIAN ASSISTANT

## 2019-09-11 PROCEDURE — 1036F TOBACCO NON-USER: CPT | Performed by: PHYSICIAN ASSISTANT

## 2019-09-11 PROCEDURE — 20610 DRAIN/INJ JOINT/BURSA W/O US: CPT | Performed by: PHYSICIAN ASSISTANT

## 2019-09-11 PROCEDURE — G8417 CALC BMI ABV UP PARAM F/U: HCPCS | Performed by: PHYSICIAN ASSISTANT

## 2019-09-11 PROCEDURE — 3017F COLORECTAL CA SCREEN DOC REV: CPT | Performed by: PHYSICIAN ASSISTANT

## 2019-09-11 PROCEDURE — 99213 OFFICE O/P EST LOW 20 MIN: CPT | Performed by: PHYSICIAN ASSISTANT

## 2019-09-11 PROCEDURE — 20605 DRAIN/INJ JOINT/BURSA W/O US: CPT | Performed by: PHYSICIAN ASSISTANT

## 2019-09-12 ENCOUNTER — OFFICE VISIT (OUTPATIENT)
Dept: INTERNAL MEDICINE CLINIC | Age: 59
End: 2019-09-12
Payer: MEDICARE

## 2019-09-12 VITALS
BODY MASS INDEX: 33.83 KG/M2 | SYSTOLIC BLOOD PRESSURE: 130 MMHG | WEIGHT: 191 LBS | DIASTOLIC BLOOD PRESSURE: 80 MMHG | OXYGEN SATURATION: 98 % | TEMPERATURE: 98.6 F | HEART RATE: 78 BPM

## 2019-09-12 DIAGNOSIS — R81 GLUCOSURIA: ICD-10-CM

## 2019-09-12 DIAGNOSIS — Z79.4 TYPE 2 DIABETES MELLITUS WITH HYPERGLYCEMIA, WITH LONG-TERM CURRENT USE OF INSULIN (HCC): Primary | ICD-10-CM

## 2019-09-12 DIAGNOSIS — E78.2 HYPERLIPIDEMIA, MIXED: ICD-10-CM

## 2019-09-12 DIAGNOSIS — I10 BENIGN ESSENTIAL HTN: ICD-10-CM

## 2019-09-12 DIAGNOSIS — E11.65 TYPE 2 DIABETES MELLITUS WITH HYPERGLYCEMIA, WITH LONG-TERM CURRENT USE OF INSULIN (HCC): Primary | ICD-10-CM

## 2019-09-12 LAB
BILIRUBIN, POC: NORMAL
BLOOD URINE, POC: NORMAL
CLARITY, POC: CLEAR
COLOR, POC: YELLOW
GLUCOSE URINE, POC: 2000
HBA1C MFR BLD: 11.1 %
KETONES, POC: NORMAL
LEUKOCYTE EST, POC: NORMAL
NITRITE, POC: NORMAL
PH, POC: 5
PROTEIN, POC: NORMAL
SPECIFIC GRAVITY, POC: 1.02
UROBILINOGEN, POC: 0.2

## 2019-09-12 PROCEDURE — 99214 OFFICE O/P EST MOD 30 MIN: CPT | Performed by: INTERNAL MEDICINE

## 2019-09-12 PROCEDURE — 2022F DILAT RTA XM EVC RTNOPTHY: CPT | Performed by: INTERNAL MEDICINE

## 2019-09-12 PROCEDURE — 83036 HEMOGLOBIN GLYCOSYLATED A1C: CPT | Performed by: INTERNAL MEDICINE

## 2019-09-12 PROCEDURE — 81002 URINALYSIS NONAUTO W/O SCOPE: CPT | Performed by: INTERNAL MEDICINE

## 2019-09-12 PROCEDURE — G8427 DOCREV CUR MEDS BY ELIG CLIN: HCPCS | Performed by: INTERNAL MEDICINE

## 2019-09-12 PROCEDURE — 1036F TOBACCO NON-USER: CPT | Performed by: INTERNAL MEDICINE

## 2019-09-12 PROCEDURE — G8417 CALC BMI ABV UP PARAM F/U: HCPCS | Performed by: INTERNAL MEDICINE

## 2019-09-12 PROCEDURE — 3046F HEMOGLOBIN A1C LEVEL >9.0%: CPT | Performed by: INTERNAL MEDICINE

## 2019-09-12 PROCEDURE — 3017F COLORECTAL CA SCREEN DOC REV: CPT | Performed by: INTERNAL MEDICINE

## 2019-09-12 NOTE — PROGRESS NOTES
This dictation was done with [employer] dictation and may contain mechanical errors related to translation. The review of systems was currently provided by the patient and reviewed with the medical assistant at today's visit. Please see media. Subjective:  Luís Villalobos is a 59 y.o. who is here as an established patient of [facility] complaining of pain around her left wrist and her left hip. The left hip has been diagnosed with bursitis and she has de Quervain's tenosynovitis in the left wrist.  Since July 21 things have become worse she has edema and feels 10 out of 10 pain. She does have a history of a de Quervain's release since 7/25/2018. The wrist symptoms  has gone away and has had only returned recently      Patient Active Problem List   Diagnosis    Alkaline phosphatase elevation    Vitamin D deficiency    Benign essential HTN    Osteoarthritis of right knee    Sinusitis, chronic    Seasonal allergies    Hx of total knee arthroplasty    Obesity (BMI 30-39. 9)    Obstructive sleep apnea    Hyperlipidemia, mixed    Degenerative disc disease, cervical    Cervical radicular pain    Allergic dermatitis    Mass of left thigh    De Quervain's tenosynovitis, left    Glucosuria    Type 2 diabetes mellitus with hyperglycemia, with long-term current use of insulin ([facility])           Current Outpatient Medications on File Prior to Visit   Medication Sig Dispense Refill    ibuprofen (IBU) 800 MG tablet TAKE ONE TABLET BY MOUTH TWICE A DAY WITH MEALS 60 tablet 0    metFORMIN (GLUCOPHAGE) 1000 MG tablet TAKE 1 TABLET TWICE A DAY WITH MEALS 180 tablet 3    insulin glargine (LANTUS SOLOSTAR) 100 UNIT/ML injection pen Inject 10 Units into the skin nightly 15 pen 3    amLODIPine (NORVASC) 5 MG tablet TAKE 1 TABLET DAILY 90 tablet 3    losartan (COZAAR) 100 MG tablet Take 1 tablet by mouth daily 90 tablet 1    atorvastatin (LIPITOR) 10 MG tablet Take 1 tablet by mouth nightly 90 tablet 3    Finkelstein's test consistent with de Quervain's tenosynovitis. She has a well-healed incision no signs of infection she has good capillary refill good wrist extension and finger opposition strength testing. Neuro exam grossly intact both lower extremities. Intact sensation to light touch. Motor exam 4+ to 5/5 in all major motor groups. Negative Bourne's sign. Skin is warm, dry and intact with out erythema or significant increased temperature around the knee joint(s). There are no cutaneous lesions or lymphadenopathy present. X-RAYS:  X-rays were done of her wrist today including AP lateral and oblique this showed that the carpal bones were intact no fractures or other bone abnormalities were noted no widening of scapholunate bone no fracture seen in the navicular. And this was shown to the patient she has very minimal early [facility] arthritis of the left thumb. X-rays of the hip show very minimal degenerative changes in the hip sockets bilaterally there is soft tissue swelling in the lateral compartment consistent with bursitis on the left hip. An AP pelvis and a 2 view left hip were taken and this was shown to the patient    Assessment:  Left hip bursitis and left wrist tenosynovitis    Plan:  During today's visit, there was approximately 30 minutes of face-to-face discussion in regards to the patient's current condition and treatment options. More than 50 % of the time was counseling and coordination of care. With her consent she was injected with 1 cc of Kenalog and 2 cc of Marcaine into the left hip lateral trochanteric area for the bursitis. She is also given an injection of cortisone into the first compartment for relief of the Jamia synovitis around with a de Quervain's release was done. This was 1 cc Kenalog and 2 cc of Marcaine and she tolerated well.       PROCEDURE NOTE:   She was given injections we talked about the use of wraps and compression and she will follow-up with us in 3 to

## 2019-09-12 NOTE — PROGRESS NOTES
stillborn delivery outcome     Thyroid lesion 1/29/2018    Type II or unspecified type diabetes mellitus without mention of complication, not stated as uncontrolled          MEDICATIONS:  Prior to Visit Medications    Medication Sig Taking?  Authorizing Provider   SITagliptin (JANUVIA) 100 MG tablet Take 1 tablet by mouth daily Yes John Green DO   ibuprofen (IBU) 800 MG tablet TAKE ONE TABLET BY MOUTH TWICE A DAY WITH MEALS Yes John Green DO   metFORMIN (GLUCOPHAGE) 1000 MG tablet TAKE 1 TABLET TWICE A DAY WITH MEALS Yes John Green DO   insulin glargine (LANTUS SOLOSTAR) 100 UNIT/ML injection pen Inject 10 Units into the skin nightly Yes John Green DO   amLODIPine (NORVASC) 5 MG tablet TAKE 1 TABLET DAILY Yes John Green DO   losartan (COZAAR) 100 MG tablet Take 1 tablet by mouth daily Yes John Green DO   atorvastatin (LIPITOR) 10 MG tablet Take 1 tablet by mouth nightly Yes John Green DO   ibuprofen (ADVIL;MOTRIN) 800 MG tablet Take 1 tablet by mouth 2 times daily as needed for Pain (.  Take with food) Yes John Green DO   ACCU-CHEK DALE PLUS strip TEST BLOOD SUGAR TWO TIMES A DAY Yes John Green DO   ACCU-CHEK SOFTCLIX LANCETS MISC TEST BLOOD SUGAR TWO TIMES A DAY Yes John Green DO   econazole nitrate 1 % cream Apply topically 2 times daily for 2-4 weeks Yes John Green DO   Insulin Pen Needle 32G X 4 MM MISC 1 each by Does not apply route daily Yes John Green DO   fluticasone (FLONASE) 50 MCG/ACT nasal spray 2 sprays by Nasal route daily Yes Paty Salgado MD   Blood Glucose Monitoring Suppl (ACURA BLOOD GLUCOSE METER) w/Device KIT 1 Device by Does not apply route 2 times daily Please dispense Accuchek   E11.9 Yes John Green DO   glucose blood VI test strips (ONE TOUCH ULTRA TEST) strip TEST BLOOD SUGAR QD Yes John Green DO   Calcium Carb-Cholecalciferol (CALTRATE 600+D) 600-800 MG-UNIT TABS Take 1 tablet by mouth 2 times daily Yes Historical

## 2019-09-12 NOTE — PATIENT INSTRUCTIONS
Stay well-hydrated and satisfy her thirst    She is to increase her Lantus to 15 units nightly    Check blood sugars before each meal and at bedtime    Continue metformin    Get back on track with your diabetic diet

## 2019-09-13 ENCOUNTER — TELEPHONE (OUTPATIENT)
Dept: ORTHOPEDIC SURGERY | Age: 59
End: 2019-09-13

## 2019-09-13 RX ORDER — CYCLOBENZAPRINE HCL 10 MG
10 TABLET ORAL 3 TIMES DAILY PRN
Qty: 60 TABLET | Refills: 0 | Status: SHIPPED | OUTPATIENT
Start: 2019-09-13 | End: 2019-09-26 | Stop reason: ALTCHOICE

## 2019-09-21 ENCOUNTER — HOSPITAL ENCOUNTER (EMERGENCY)
Age: 59
Discharge: HOME | End: 2019-09-21
Attending: EMERGENCY MEDICINE
Payer: MEDICARE

## 2019-09-21 VITALS
SYSTOLIC BLOOD PRESSURE: 155 MMHG | OXYGEN SATURATION: 94 % | HEART RATE: 92 BPM | DIASTOLIC BLOOD PRESSURE: 111 MMHG | WEIGHT: 191 LBS | BODY MASS INDEX: 33.84 KG/M2 | HEIGHT: 63 IN | TEMPERATURE: 98.1 F | RESPIRATION RATE: 18 BRPM

## 2019-09-21 DIAGNOSIS — J02.9 ACUTE PHARYNGITIS, UNSPECIFIED ETIOLOGY: Primary | ICD-10-CM

## 2019-09-21 LAB — S PYO AG THROAT QL: NEGATIVE

## 2019-09-21 PROCEDURE — 6370000000 HC RX 637 (ALT 250 FOR IP): Performed by: EMERGENCY MEDICINE

## 2019-09-21 PROCEDURE — 93005 ELECTROCARDIOGRAM TRACING: CPT | Performed by: EMERGENCY MEDICINE

## 2019-09-21 PROCEDURE — 6360000002 HC RX W HCPCS: Performed by: EMERGENCY MEDICINE

## 2019-09-21 PROCEDURE — 87880 STREP A ASSAY W/OPTIC: CPT

## 2019-09-21 PROCEDURE — 99283 EMERGENCY DEPT VISIT LOW MDM: CPT

## 2019-09-21 PROCEDURE — 87081 CULTURE SCREEN ONLY: CPT

## 2019-09-21 RX ORDER — DEXAMETHASONE SODIUM PHOSPHATE 4 MG/ML
10 INJECTION, SOLUTION INTRA-ARTICULAR; INTRALESIONAL; INTRAMUSCULAR; INTRAVENOUS; SOFT TISSUE ONCE
Status: COMPLETED | OUTPATIENT
Start: 2019-09-21 | End: 2019-09-21

## 2019-09-21 RX ADMIN — LIDOCAINE HYDROCHLORIDE: 20 SOLUTION ORAL; TOPICAL at 13:30

## 2019-09-21 RX ADMIN — DEXAMETHASONE SODIUM PHOSPHATE 10 MG: 4 INJECTION, SOLUTION INTRAMUSCULAR; INTRAVENOUS at 14:06

## 2019-09-21 ASSESSMENT — ENCOUNTER SYMPTOMS
FACIAL SWELLING: 0
NAUSEA: 0
DIARRHEA: 0
VOMITING: 0
COUGH: 0
SHORTNESS OF BREATH: 0
SORE THROAT: 1
VOICE CHANGE: 0
ABDOMINAL PAIN: 0

## 2019-09-21 ASSESSMENT — PAIN DESCRIPTION - PAIN TYPE: TYPE: ACUTE PAIN

## 2019-09-21 ASSESSMENT — PAIN DESCRIPTION - LOCATION: LOCATION: THROAT

## 2019-09-21 ASSESSMENT — PAIN SCALES - GENERAL: PAINLEVEL_OUTOF10: 8

## 2019-09-21 NOTE — ED PROVIDER NOTES
encounter of 09/21/19   Strep Screen Group A Throat   Result Value Ref Range    Rapid Strep A Screen Negative Negative       ED BEDSIDE ULTRASOUND:  None. RECENT VITALS:  BP: (!) 155/111,Temp: 98.1 °F (36.7 °C), Pulse: 92, Resp: 18, SpO2: 94 %     Procedures     None. ED Course     Nursing Notes, Past Medical Hx, Past Surgical Hx, Social Hx,Allergies, and Family Hx were reviewed. The patient was given the following medications:  Orders Placed This Encounter   Medications    aluminum & magnesium hydroxide-simethicone (MAALOX) 30 mL, lidocaine viscous hcl (XYLOCAINE) 5 mL (GI COCKTAIL)    dexamethasone (DECADRON) injection 10 mg       CONSULTS:  None    MEDICAL DECISIONMAKING / ASSESSMENT / Balbina Rafaela is a 59 y.o. female who presents to the emergency department with a chief complaint of sore throat. She relates that she had some upper respiratory infection type symptoms over the past few days has had some worsening sore throat. She describes it as equal on both sides, and she had discomfort with swallowing some soup prompting her to come in. She is able to tolerate other liquids without any problem. She has an injected posterior oropharynx with no sign of peritonsillar abscess. She has no palpable neck tenderness. Described symptoms and history seem inconsistent with a deep space infection at this point, and she has no signs of Ludewig's angina. She initially had some tachycardia upon walking back to the room, although on recheck this has resolved. She does not appear systemically ill. At this point, does favor that this is representative of a viral pharyngitis. She is given a GI cocktail here which did \"sooth\" her throat, and she states she is feeling better on recheck. We discussed that a single dose of oral steroids can be of some benefit in such cases where the symptoms are particularly bothersome.   She does understand after our discussion that this would likely raise up her blood sugars, but she states she can keep an eye on it at home and adjust with her insulin as needed and does prefer to go ahead and try this. She is given a single 10 mg oral dose of Decadron here. Strict return precautions are given. Clinical Impression     1.  Acute pharyngitis, unspecified etiology        Disposition     PATIENT REFERRED TO:  Ghazala Tay, [address]    In 2 days        DISCHARGE MEDICATIONS:  New Prescriptions    No medications on file       DISPOSITION Discharge - Pending Orders Complete 09/21/2019 01:57:42 PM       Dirk Ho MD  09/21/19 [id number]

## 2019-09-23 LAB
EKG ATRIAL RATE: 101 BPM
EKG DIAGNOSIS: NORMAL
EKG P AXIS: 44 DEGREES
EKG P-R INTERVAL: 164 MS
EKG Q-T INTERVAL: 366 MS
EKG QRS DURATION: 80 MS
EKG QTC CALCULATION (BAZETT): 474 MS
EKG R AXIS: 14 DEGREES
EKG T AXIS: 20 DEGREES
EKG VENTRICULAR RATE: 101 BPM
S PYO THROAT QL CULT: NORMAL

## 2019-09-26 ENCOUNTER — OFFICE VISIT (OUTPATIENT)
Dept: INTERNAL MEDICINE CLINIC | Age: 59
End: 2019-09-26
Payer: MEDICARE

## 2019-09-26 VITALS
WEIGHT: 184 LBS | HEART RATE: 98 BPM | OXYGEN SATURATION: 100 % | SYSTOLIC BLOOD PRESSURE: 130 MMHG | DIASTOLIC BLOOD PRESSURE: 90 MMHG | BODY MASS INDEX: 32.59 KG/M2 | RESPIRATION RATE: 16 BRPM | TEMPERATURE: 98.6 F

## 2019-09-26 DIAGNOSIS — R47.81 SLURRED SPEECH: ICD-10-CM

## 2019-09-26 DIAGNOSIS — Z79.4 TYPE 2 DIABETES MELLITUS WITH HYPERGLYCEMIA, WITH LONG-TERM CURRENT USE OF INSULIN (HCC): Primary | ICD-10-CM

## 2019-09-26 DIAGNOSIS — Z13.31 POSITIVE DEPRESSION SCREENING: ICD-10-CM

## 2019-09-26 DIAGNOSIS — R41.82 ALTERED MENTAL STATUS, UNSPECIFIED ALTERED MENTAL STATUS TYPE: ICD-10-CM

## 2019-09-26 DIAGNOSIS — F32.2 SEVERE DEPRESSION (HCC): ICD-10-CM

## 2019-09-26 DIAGNOSIS — E11.65 TYPE 2 DIABETES MELLITUS WITH HYPERGLYCEMIA, WITH LONG-TERM CURRENT USE OF INSULIN (HCC): Primary | ICD-10-CM

## 2019-09-26 LAB
AMPHETAMINE SCREEN, URINE: ABNORMAL
BARBITURATE SCREEN URINE: ABNORMAL
BENZODIAZEPINE SCREEN, URINE: ABNORMAL
CANNABINOID SCREEN URINE: ABNORMAL
COCAINE METABOLITE SCREEN URINE: POSITIVE
Lab: ABNORMAL
METHADONE SCREEN, URINE: ABNORMAL
OPIATE SCREEN URINE: ABNORMAL
OXYCODONE URINE: ABNORMAL
PH UA: 5
PHENCYCLIDINE SCREEN URINE: ABNORMAL
PROPOXYPHENE SCREEN: ABNORMAL

## 2019-09-26 PROCEDURE — G8417 CALC BMI ABV UP PARAM F/U: HCPCS | Performed by: INTERNAL MEDICINE

## 2019-09-26 PROCEDURE — 3046F HEMOGLOBIN A1C LEVEL >9.0%: CPT | Performed by: INTERNAL MEDICINE

## 2019-09-26 PROCEDURE — 99214 OFFICE O/P EST MOD 30 MIN: CPT | Performed by: INTERNAL MEDICINE

## 2019-09-26 PROCEDURE — 3017F COLORECTAL CA SCREEN DOC REV: CPT | Performed by: INTERNAL MEDICINE

## 2019-09-26 PROCEDURE — G8431 POS CLIN DEPRES SCRN F/U DOC: HCPCS | Performed by: INTERNAL MEDICINE

## 2019-09-26 PROCEDURE — G8428 CUR MEDS NOT DOCUMENT: HCPCS | Performed by: INTERNAL MEDICINE

## 2019-09-26 PROCEDURE — 2022F DILAT RTA XM EVC RTNOPTHY: CPT | Performed by: INTERNAL MEDICINE

## 2019-09-26 PROCEDURE — 1036F TOBACCO NON-USER: CPT | Performed by: INTERNAL MEDICINE

## 2019-09-26 RX ORDER — ESCITALOPRAM OXALATE 10 MG/1
10 TABLET ORAL DAILY
Qty: 30 TABLET | Refills: 3 | Status: SHIPPED | OUTPATIENT
Start: 2019-09-26 | End: 2019-12-12

## 2019-09-26 ASSESSMENT — PATIENT HEALTH QUESTIONNAIRE - PHQ9
SUM OF ALL RESPONSES TO PHQ9 QUESTIONS 1 & 2: 6
3. TROUBLE FALLING OR STAYING ASLEEP: 3
1. LITTLE INTEREST OR PLEASURE IN DOING THINGS: 3
10. IF YOU CHECKED OFF ANY PROBLEMS, HOW DIFFICULT HAVE THESE PROBLEMS MADE IT FOR YOU TO DO YOUR WORK, TAKE CARE OF THINGS AT HOME, OR GET ALONG WITH OTHER PEOPLE: 1
2. FEELING DOWN, DEPRESSED OR HOPELESS: 3
4. FEELING TIRED OR HAVING LITTLE ENERGY: 3
5. POOR APPETITE OR OVEREATING: 3
7. TROUBLE CONCENTRATING ON THINGS, SUCH AS READING THE NEWSPAPER OR WATCHING TELEVISION: 3
6. FEELING BAD ABOUT YOURSELF - OR THAT YOU ARE A FAILURE OR HAVE LET YOURSELF OR YOUR FAMILY DOWN: 3
9. THOUGHTS THAT YOU WOULD BE BETTER OFF DEAD, OR OF HURTING YOURSELF: 0
SUM OF ALL RESPONSES TO PHQ QUESTIONS 1-9: 24
8. MOVING OR SPEAKING SO SLOWLY THAT OTHER PEOPLE COULD HAVE NOTICED. OR THE OPPOSITE, BEING SO FIGETY OR RESTLESS THAT YOU HAVE BEEN MOVING AROUND A LOT MORE THAN USUAL: 3
SUM OF ALL RESPONSES TO PHQ QUESTIONS 1-9: 24

## 2019-09-26 ASSESSMENT — COLUMBIA-SUICIDE SEVERITY RATING SCALE - C-SSRS
1. WITHIN THE PAST MONTH, HAVE YOU WISHED YOU WERE DEAD OR WISHED YOU COULD GO TO SLEEP AND NOT WAKE UP?: NO
6. HAVE YOU EVER DONE ANYTHING, STARTED TO DO ANYTHING, OR PREPARED TO DO ANYTHING TO END YOUR LIFE?: NO
2. HAVE YOU ACTUALLY HAD ANY THOUGHTS OF KILLING YOURSELF?: NO

## 2019-09-26 NOTE — PATIENT INSTRUCTIONS
Version: 12.1  © 5710-7845 Healthwise, [place]. Care instructions adapted under license by [facility] ([facility]). If you have questions about a medical condition or this instruction, always ask your healthcare professional. [address] any warranty or liability for your use of this information. Patient Education        Counting Carbohydrates: Care Instructions  Your Care Instructions    You don't have to eat special foods when you have diabetes. You just have to be careful to eat healthy foods. Carbohydrates (carbs) raise blood sugar higher and quicker than any other nutrient. Carbs are found in desserts, breads and cereals, and fruit. They're also in starchy vegetables. These include potatoes, corn, and grains such as rice and pasta. Carbs are also in milk and yogurt. The more carbs you eat at one time, the higher your blood sugar will rise. Spreading carbs all through the day helps keep your blood sugar levels within your target range. Counting carbs is one of the best ways to keep your blood sugar under control. If you use insulin, counting carbs helps you match the right amount of insulin to the number of grams of carbs in a meal. Then you can change your diet and insulin dose as needed. Testing your blood sugar several times a day can help you learn how carbs affect your blood sugar. A registered dietitian or certified diabetes educator can help you plan meals and snacks. Follow-up care is a key part of your treatment and safety. Be sure to make and go to all appointments, and call your doctor if you are having problems. It's also a good idea to know your test results and keep a list of the medicines you take. How can you care for yourself at home? Know your daily amount of carbohydrates  Your daily amount depends on several things, such as your weight, how active you are, which diabetes medicines you take, and what your goals are for your blood sugar levels.  A registered dietitian or certified diabetes educator can help you plan how many carbs to include in each meal and snack. For most adults, a guideline for the daily amount of carbs is:  · 45 to 60 grams at each meal. That's about the same as 3 to 4 carbohydrate servings. · 15 to 20 grams at each snack. That's about the same as 1 carbohydrate serving. Count carbs  Counting carbs lets you know how much rapid-acting insulin to take before you eat. If you use an insulin pump, you get a constant rate of insulin during the day. So the pump must be programmed at meals. This gives you extra insulin to cover the rise in blood sugar after meals. If you take insulin:  · Learn your own insulin-to-carb ratio. You and your diabetes health professional will figure out the ratio. You can do this by testing your blood sugar after meals. For example, you may need a certain amount of insulin for every 15 grams of carbs. · Add up the carb grams in a meal. Then you can figure out how many units of insulin to take based on your insulin-to-carb ratio. · Exercise lowers blood sugar. You can use less insulin than you would if you were not doing exercise. Keep in mind that timing matters. If you exercise within 1 hour after a meal, your body may need less insulin for that meal than it would if you exercised 3 hours after the meal. Test your blood sugar to find out how exercise affects your need for insulin. If you do or don't take insulin:  · Look at labels on packaged foods. This can tell you how many carbs are in a serving. You can also use guides from the American Diabetes Association. · Be aware of portions, or serving sizes. If a package has two servings and you eat the whole package, you need to double the number of grams of carbohydrate listed for one serving. · Protein, fat, and fiber do not raise blood sugar as much as carbs do.  If you eat a lot of these nutrients in a meal, your blood sugar will rise more slowly than it would

## 2019-09-26 NOTE — PROGRESS NOTES
call for any significant medication side effects or worsening symptoms of depression. Patient will follow-up in 1 week(s) with PCP.

## 2019-09-27 ENCOUNTER — TELEPHONE (OUTPATIENT)
Dept: INTERNAL MEDICINE CLINIC | Age: 59
End: 2019-09-27

## 2019-09-27 NOTE — TELEPHONE ENCOUNTER
Patient states pharmacy asked her to notify physician that a PA is needed for insulin aspart (NOVOLOG FLEXPEN) 100 UNIT/ML injection pen. Patient can be reached at phone number provided.

## 2019-09-30 ENCOUNTER — OFFICE VISIT (OUTPATIENT)
Dept: PSYCHOLOGY | Age: 59
End: 2019-09-30
Payer: MEDICARE

## 2019-09-30 ENCOUNTER — OFFICE VISIT (OUTPATIENT)
Dept: INTERNAL MEDICINE CLINIC | Age: 59
End: 2019-09-30

## 2019-09-30 DIAGNOSIS — Z79.4 TYPE 2 DIABETES MELLITUS WITH HYPERGLYCEMIA, WITH LONG-TERM CURRENT USE OF INSULIN (HCC): Primary | ICD-10-CM

## 2019-09-30 DIAGNOSIS — E11.65 TYPE 2 DIABETES MELLITUS WITH HYPERGLYCEMIA, WITH LONG-TERM CURRENT USE OF INSULIN (HCC): Primary | ICD-10-CM

## 2019-09-30 DIAGNOSIS — F17.200 TOBACCO USE DISORDER: ICD-10-CM

## 2019-09-30 DIAGNOSIS — F32.A DEPRESSION, UNSPECIFIED DEPRESSION TYPE: Primary | ICD-10-CM

## 2019-09-30 PROCEDURE — 90791 PSYCH DIAGNOSTIC EVALUATION: CPT | Performed by: PSYCHOLOGIST

## 2019-09-30 ASSESSMENT — PATIENT HEALTH QUESTIONNAIRE - PHQ9
7. TROUBLE CONCENTRATING ON THINGS, SUCH AS READING THE NEWSPAPER OR WATCHING TELEVISION: 0
5. POOR APPETITE OR OVEREATING: 0
SUM OF ALL RESPONSES TO PHQ QUESTIONS 1-9: 8
6. FEELING BAD ABOUT YOURSELF - OR THAT YOU ARE A FAILURE OR HAVE LET YOURSELF OR YOUR FAMILY DOWN: 1
SUM OF ALL RESPONSES TO PHQ QUESTIONS 1-9: 8
3. TROUBLE FALLING OR STAYING ASLEEP: 3
4. FEELING TIRED OR HAVING LITTLE ENERGY: 1
1. LITTLE INTEREST OR PLEASURE IN DOING THINGS: 1
2. FEELING DOWN, DEPRESSED OR HOPELESS: 1
9. THOUGHTS THAT YOU WOULD BE BETTER OFF DEAD, OR OF HURTING YOURSELF: 0
8. MOVING OR SPEAKING SO SLOWLY THAT OTHER PEOPLE COULD HAVE NOTICED. OR THE OPPOSITE, BEING SO FIGETY OR RESTLESS THAT YOU HAVE BEEN MOVING AROUND A LOT MORE THAN USUAL: 1
SUM OF ALL RESPONSES TO PHQ9 QUESTIONS 1 & 2: 2

## 2019-09-30 ASSESSMENT — ANXIETY QUESTIONNAIRES
4. TROUBLE RELAXING: 1-SEVERAL DAYS
2. NOT BEING ABLE TO STOP OR CONTROL WORRYING: 0-NOT AT ALL SURE
GAD7 TOTAL SCORE: 1
5. BEING SO RESTLESS THAT IT IS HARD TO SIT STILL: 0-NOT AT ALL SURE
3. WORRYING TOO MUCH ABOUT DIFFERENT THINGS: 0-NOT AT ALL SURE
7. FEELING AFRAID AS IF SOMETHING AWFUL MIGHT HAPPEN: 0-NOT AT ALL SURE
6. BECOMING EASILY ANNOYED OR IRRITABLE: 0-NOT AT ALL SURE
1. FEELING NERVOUS, ANXIOUS, OR ON EDGE: 0-NOT AT ALL SURE

## 2019-09-30 NOTE — PROGRESS NOTES
Medical Nutrition Therapy for Diabetes    Alli Montero  September 30, 2019      Patient Care Team:  David Kat DO as PCP - General (Internal Medicine)  David Kat DO as PCP - [facility] Empaneled Provider  Colette Whaley MD as Consulting Physician (Sleep Medicine)  NOHELIA Guevara CNP as Nurse Practitioner (Sleep Medicine)    Reason for visit: Needs help controlling DM    ASSESSMENT/PLAN:   NUTRITION DIAGNOSIS    #1 Problem: Altered Nutrition-Related Laboratory Values (NC-2.2)  Related to: Endocrine/Diabetes   As Evidenced by: Elevated Plasma glucose and/or HgbA1c levels           #2 Problem: Overweight/Obesity (NC-3.3)  Related to: Excessive energy intake or physical inactivity  As Evidenced by: BMI more than normative standard for age and sex (BMI=32.59)      NUTRITION INTERVENTION  Nutrition Prescription: Aim for 30  g Carb per meal; 15 - 20 g Carb per snack      Diabetes Education/Counseling included:  Carbohydrate Control, Activity/exercise, Label-reading, Monitoring and Hypoyglycemia prevention/treatment    Interventions:  Control Carbohydrate Intake using Plate Guide, Control Carbohydrate Intake using Carb Counting, Increase intake of vegetables, Increase intake of whole grains and Increase activity level by adding home exercises on days not going to gym    [address]    Patient Instructions   BEHAVIOR GOALS     When to eat: Eat first meal within an hour of waking, then have meal or snack every five hours while awake. What and how much to eat:   1) Plan meals to follow Plate Guide, aiming for three servings of vegetables, fruits, and whole grains daily.   2) Aim for 30 g Carb per meal (2 carb servings); 15 - 20 g Carb per snack (1 carb serving)    Physical Activity: [employer] 3 days per week, add home exercises 3 days per week    Checking Blood Sugar: Check blood sugar before and after meals    Medications: Rotate your injection sites for insulin       Keeping Rx for mealtime insulin, hasn't yet started  Stores  medications properlyYes  Comments:     Monitoring:   Has BG meter: Yes  Testing frequency: 4 to 6 times daily  Recent results:  - 578, other readings similar  Hypoglycemia? No    Anthropometric Measurements: Wt:   Wt Readings from Last 3 Encounters:   09/26/19 184 lb (83.5 kg)   09/21/19 191 lb (86.6 kg)   09/12/19 191 lb (86.6 kg)      BMI:   BMI Readings from Last 3 Encounters:   09/26/19 32.59 kg/m²   09/21/19 33.83 kg/m²   09/12/19 33.83 kg/m²       Patient's stated goal weight: 160 lb (20 years ago)  7% Weight loss goal weight: 178 lb    Physical Activity History:   Physical activity: [employer] Walking  Frequency of activity: 3 days per week  Duration of activity: 45 - 60 minutes  Obstacles to activity: feeling bad    Food and Nutrition History:   Nutrition Awareness/Previous DSMES: has attended DM class within past 3 month  Beverage consumption: regular soda, alkaline water  Alcohol consumption: just occasionally  Frequency of Meals Eaten away from home:daily  Food Availability Problems: No  Usual Food consumption:   FOOD RECALL  Sleep is disturbed due to pain--in bed 9pm - 5am    First meal--Usual time: 6a  Today: on the run [employer] and coffee  Different Day: grits (doesn't keep bread in house so she won't eat it)    Snack  Part of a meal    Second meal--Usual time: 11:30a  Recent: baked sweet potato, edgar greens, baked chicken  Different Day: kids meal at [employer]  Part of a meal    Third meal--Usual time: 5 - 7p  Recent: pork chop, sweet potato, greens  Different Day:  [employer] salad and personal pizza traditional crust (2 pieces)    Snack  none       Barriers:   -suffers from depression          Follow Up Plan: group class    Referring Provider: Hilario Lyons DO    Time spent with patient: 60 minutes

## 2019-09-30 NOTE — PATIENT INSTRUCTIONS
Deep breathing instructions:    1 Sit comfortably, with your knees bent and your shoulders, head and neck relaxed. You can close your eyes if comfortable or find a comfortable place to focus your eyes (e.g. spot on the wall/floor where eyes are not strained)  2 Place one hand on your upper chest and the other just below your rib cage. This will allow you to feel your diaphragm move as you breathe. 3 Breathe in slowly through your nose so that your stomach moves out against your hand. The hand on your chest should remain as still as possible. 4 Tighten your stomach muscles, letting them fall inward as you exhale through pursed lips. The hand on your upper chest must remain as still as possible. Note: You may notice an increased effort will be needed to use the diaphragm correctly. At first, you'll probably get tired while doing this exercise or find your thoughts wondering. But keep at it, because with continued practice, diaphragmatic breathing will become easy and automatic. How often should I practice this exercise? At first, practice this exercise 5-10 minutes about 3-4 times per day. Gradually increase the amount of time you spend doing this exercise, and perhaps even increase the effort of the exercise by placing a book on your abdomen. It is important to practice this skill regularly, even when you don't \"need\" it.     Lovely: [email or web address]    [employer] video:  [email or web address]

## 2019-10-03 ENCOUNTER — OFFICE VISIT (OUTPATIENT)
Dept: INTERNAL MEDICINE CLINIC | Age: 59
End: 2019-10-03
Payer: MEDICARE

## 2019-10-03 VITALS
SYSTOLIC BLOOD PRESSURE: 144 MMHG | BODY MASS INDEX: 32.77 KG/M2 | DIASTOLIC BLOOD PRESSURE: 80 MMHG | HEART RATE: 92 BPM | WEIGHT: 185 LBS

## 2019-10-03 DIAGNOSIS — R82.5 POSITIVE URINE DRUG SCREEN: ICD-10-CM

## 2019-10-03 DIAGNOSIS — Z79.4 TYPE 2 DIABETES MELLITUS WITH HYPERGLYCEMIA, WITH LONG-TERM CURRENT USE OF INSULIN (HCC): Primary | ICD-10-CM

## 2019-10-03 DIAGNOSIS — E11.65 TYPE 2 DIABETES MELLITUS WITH HYPERGLYCEMIA, WITH LONG-TERM CURRENT USE OF INSULIN (HCC): Primary | ICD-10-CM

## 2019-10-03 PROCEDURE — G8484 FLU IMMUNIZE NO ADMIN: HCPCS | Performed by: INTERNAL MEDICINE

## 2019-10-03 PROCEDURE — G8417 CALC BMI ABV UP PARAM F/U: HCPCS | Performed by: INTERNAL MEDICINE

## 2019-10-03 PROCEDURE — 3017F COLORECTAL CA SCREEN DOC REV: CPT | Performed by: INTERNAL MEDICINE

## 2019-10-03 PROCEDURE — G8428 CUR MEDS NOT DOCUMENT: HCPCS | Performed by: INTERNAL MEDICINE

## 2019-10-03 PROCEDURE — 3046F HEMOGLOBIN A1C LEVEL >9.0%: CPT | Performed by: INTERNAL MEDICINE

## 2019-10-03 PROCEDURE — 2022F DILAT RTA XM EVC RTNOPTHY: CPT | Performed by: INTERNAL MEDICINE

## 2019-10-03 PROCEDURE — 99213 OFFICE O/P EST LOW 20 MIN: CPT | Performed by: INTERNAL MEDICINE

## 2019-10-03 PROCEDURE — 1036F TOBACCO NON-USER: CPT | Performed by: INTERNAL MEDICINE

## 2019-10-10 ENCOUNTER — OFFICE VISIT (OUTPATIENT)
Dept: INTERNAL MEDICINE CLINIC | Age: 59
End: 2019-10-10
Payer: MEDICARE

## 2019-10-10 VITALS
DIASTOLIC BLOOD PRESSURE: 78 MMHG | BODY MASS INDEX: 32.95 KG/M2 | WEIGHT: 186 LBS | HEART RATE: 74 BPM | SYSTOLIC BLOOD PRESSURE: 122 MMHG | OXYGEN SATURATION: 98 %

## 2019-10-10 DIAGNOSIS — Z79.4 TYPE 2 DIABETES MELLITUS WITH HYPERGLYCEMIA, WITH LONG-TERM CURRENT USE OF INSULIN (HCC): Primary | ICD-10-CM

## 2019-10-10 DIAGNOSIS — E11.65 TYPE 2 DIABETES MELLITUS WITH HYPERGLYCEMIA, WITH LONG-TERM CURRENT USE OF INSULIN (HCC): Primary | ICD-10-CM

## 2019-10-10 PROCEDURE — 1036F TOBACCO NON-USER: CPT | Performed by: INTERNAL MEDICINE

## 2019-10-10 PROCEDURE — 3046F HEMOGLOBIN A1C LEVEL >9.0%: CPT | Performed by: INTERNAL MEDICINE

## 2019-10-10 PROCEDURE — 3017F COLORECTAL CA SCREEN DOC REV: CPT | Performed by: INTERNAL MEDICINE

## 2019-10-10 PROCEDURE — G8417 CALC BMI ABV UP PARAM F/U: HCPCS | Performed by: INTERNAL MEDICINE

## 2019-10-10 PROCEDURE — 99213 OFFICE O/P EST LOW 20 MIN: CPT | Performed by: INTERNAL MEDICINE

## 2019-10-10 PROCEDURE — G8427 DOCREV CUR MEDS BY ELIG CLIN: HCPCS | Performed by: INTERNAL MEDICINE

## 2019-10-10 PROCEDURE — G8484 FLU IMMUNIZE NO ADMIN: HCPCS | Performed by: INTERNAL MEDICINE

## 2019-10-10 PROCEDURE — 2022F DILAT RTA XM EVC RTNOPTHY: CPT | Performed by: INTERNAL MEDICINE

## 2019-10-10 RX ORDER — CLINDAMYCIN HYDROCHLORIDE 300 MG/1
300 CAPSULE ORAL 4 TIMES DAILY
COMMUNITY
End: 2019-12-12 | Stop reason: ALTCHOICE

## 2019-10-11 ENCOUNTER — OFFICE VISIT (OUTPATIENT)
Dept: PSYCHOLOGY | Age: 59
End: 2019-10-11
Payer: MEDICARE

## 2019-10-11 DIAGNOSIS — F17.200 TOBACCO USE DISORDER: ICD-10-CM

## 2019-10-11 DIAGNOSIS — F32.A DEPRESSION, UNSPECIFIED DEPRESSION TYPE: Primary | ICD-10-CM

## 2019-10-11 PROCEDURE — 90832 PSYTX W PT 30 MINUTES: CPT | Performed by: PSYCHOLOGIST

## 2019-10-11 ASSESSMENT — ANXIETY QUESTIONNAIRES
GAD7 TOTAL SCORE: 0
3. WORRYING TOO MUCH ABOUT DIFFERENT THINGS: 0-NOT AT ALL SURE
5. BEING SO RESTLESS THAT IT IS HARD TO SIT STILL: 0-NOT AT ALL SURE
6. BECOMING EASILY ANNOYED OR IRRITABLE: 0-NOT AT ALL SURE
2. NOT BEING ABLE TO STOP OR CONTROL WORRYING: 0-NOT AT ALL SURE
7. FEELING AFRAID AS IF SOMETHING AWFUL MIGHT HAPPEN: 0-NOT AT ALL SURE
1. FEELING NERVOUS, ANXIOUS, OR ON EDGE: 0-NOT AT ALL SURE
4. TROUBLE RELAXING: 0-NOT AT ALL SURE

## 2019-10-11 ASSESSMENT — PATIENT HEALTH QUESTIONNAIRE - PHQ9
5. POOR APPETITE OR OVEREATING: 0
8. MOVING OR SPEAKING SO SLOWLY THAT OTHER PEOPLE COULD HAVE NOTICED. OR THE OPPOSITE, BEING SO FIGETY OR RESTLESS THAT YOU HAVE BEEN MOVING AROUND A LOT MORE THAN USUAL: 0
2. FEELING DOWN, DEPRESSED OR HOPELESS: 0
3. TROUBLE FALLING OR STAYING ASLEEP: 0
4. FEELING TIRED OR HAVING LITTLE ENERGY: 0
SUM OF ALL RESPONSES TO PHQ QUESTIONS 1-9: 0
6. FEELING BAD ABOUT YOURSELF - OR THAT YOU ARE A FAILURE OR HAVE LET YOURSELF OR YOUR FAMILY DOWN: 0
SUM OF ALL RESPONSES TO PHQ9 QUESTIONS 1 & 2: 0
SUM OF ALL RESPONSES TO PHQ QUESTIONS 1-9: 0
7. TROUBLE CONCENTRATING ON THINGS, SUCH AS READING THE NEWSPAPER OR WATCHING TELEVISION: 0
1. LITTLE INTEREST OR PLEASURE IN DOING THINGS: 0
9. THOUGHTS THAT YOU WOULD BE BETTER OFF DEAD, OR OF HURTING YOURSELF: 0

## 2019-10-16 ENCOUNTER — TELEPHONE (OUTPATIENT)
Dept: INTERNAL MEDICINE CLINIC | Age: 59
End: 2019-10-16

## 2019-10-17 ENCOUNTER — OFFICE VISIT (OUTPATIENT)
Dept: ORTHOPEDIC SURGERY | Age: 59
End: 2019-10-17
Payer: MEDICARE

## 2019-10-17 VITALS — WEIGHT: 184 LBS | HEIGHT: 63 IN | BODY MASS INDEX: 32.6 KG/M2 | TEMPERATURE: 97.7 F

## 2019-10-17 DIAGNOSIS — M62.81 QUADRICEPS WEAKNESS: Primary | ICD-10-CM

## 2019-10-17 DIAGNOSIS — Z96.652 TOTAL KNEE REPLACEMENT STATUS, LEFT: ICD-10-CM

## 2019-10-17 PROCEDURE — G8427 DOCREV CUR MEDS BY ELIG CLIN: HCPCS | Performed by: PHYSICIAN ASSISTANT

## 2019-10-17 PROCEDURE — 99213 OFFICE O/P EST LOW 20 MIN: CPT | Performed by: PHYSICIAN ASSISTANT

## 2019-10-17 PROCEDURE — 3017F COLORECTAL CA SCREEN DOC REV: CPT | Performed by: PHYSICIAN ASSISTANT

## 2019-10-17 PROCEDURE — L1812 KO ELASTIC W/JOINTS PRE OTS: HCPCS | Performed by: PHYSICIAN ASSISTANT

## 2019-10-17 PROCEDURE — G8484 FLU IMMUNIZE NO ADMIN: HCPCS | Performed by: PHYSICIAN ASSISTANT

## 2019-10-17 PROCEDURE — G8417 CALC BMI ABV UP PARAM F/U: HCPCS | Performed by: PHYSICIAN ASSISTANT

## 2019-10-17 PROCEDURE — 1036F TOBACCO NON-USER: CPT | Performed by: PHYSICIAN ASSISTANT

## 2019-10-22 ENCOUNTER — TELEPHONE (OUTPATIENT)
Dept: INTERNAL MEDICINE CLINIC | Age: 59
End: 2019-10-22

## 2019-10-23 ENCOUNTER — TELEPHONE (OUTPATIENT)
Dept: INTERNAL MEDICINE CLINIC | Age: 59
End: 2019-10-23

## 2019-10-23 DIAGNOSIS — H53.8 BLURRY VISION: ICD-10-CM

## 2019-10-23 DIAGNOSIS — Z79.4 TYPE 2 DIABETES MELLITUS WITH HYPERGLYCEMIA, WITH LONG-TERM CURRENT USE OF INSULIN (HCC): Primary | ICD-10-CM

## 2019-10-23 DIAGNOSIS — E11.65 TYPE 2 DIABETES MELLITUS WITH HYPERGLYCEMIA, WITH LONG-TERM CURRENT USE OF INSULIN (HCC): Primary | ICD-10-CM

## 2019-10-24 ENCOUNTER — TELEPHONE (OUTPATIENT)
Dept: INTERNAL MEDICINE CLINIC | Age: 59
End: 2019-10-24

## 2019-10-25 ENCOUNTER — TELEPHONE (OUTPATIENT)
Dept: INTERNAL MEDICINE CLINIC | Age: 59
End: 2019-10-25

## 2019-10-29 ENCOUNTER — TELEPHONE (OUTPATIENT)
Dept: INTERNAL MEDICINE CLINIC | Age: 59
End: 2019-10-29

## 2019-10-29 DIAGNOSIS — E11.65 TYPE 2 DIABETES MELLITUS WITH HYPERGLYCEMIA, WITHOUT LONG-TERM CURRENT USE OF INSULIN (HCC): ICD-10-CM

## 2019-10-29 RX ORDER — PEN NEEDLE, DIABETIC 30 GX3/16"
NEEDLE, DISPOSABLE MISCELLANEOUS
Qty: 100 EACH | Refills: 3 | Status: SHIPPED | OUTPATIENT
Start: 2019-10-29 | End: 2019-10-29 | Stop reason: SDUPTHER

## 2019-10-29 RX ORDER — PEN NEEDLE, DIABETIC 30 GX3/16"
NEEDLE, DISPOSABLE MISCELLANEOUS
Qty: 200 EACH | Refills: 5 | Status: SHIPPED | OUTPATIENT
Start: 2019-10-29 | End: 2019-12-12 | Stop reason: SDUPTHER

## 2019-11-01 RX ORDER — ATORVASTATIN CALCIUM 10 MG/1
TABLET, FILM COATED ORAL
Qty: 90 TABLET | Refills: 0 | Status: SHIPPED | OUTPATIENT
Start: 2019-11-01 | End: 2020-07-02 | Stop reason: SDUPTHER

## 2019-11-04 RX ORDER — BLOOD-GLUCOSE METER
EACH MISCELLANEOUS
Qty: 1 KIT | Refills: 0 | Status: SHIPPED | OUTPATIENT
Start: 2019-11-04 | End: 2019-11-07 | Stop reason: SDUPTHER

## 2019-11-07 RX ORDER — BLOOD-GLUCOSE METER
EACH MISCELLANEOUS
Qty: 1 KIT | Refills: 0 | Status: SHIPPED | OUTPATIENT
Start: 2019-11-07 | End: 2021-01-12

## 2019-11-11 ENCOUNTER — CARE COORDINATION (OUTPATIENT)
Dept: CARE COORDINATION | Age: 59
End: 2019-11-11

## 2019-11-13 ENCOUNTER — TELEPHONE (OUTPATIENT)
Dept: INTERNAL MEDICINE CLINIC | Age: 59
End: 2019-11-13

## 2019-11-20 DIAGNOSIS — I10 BENIGN ESSENTIAL HTN: ICD-10-CM

## 2019-11-20 RX ORDER — LOSARTAN POTASSIUM 100 MG/1
TABLET ORAL
Qty: 30 TABLET | Refills: 6 | Status: SHIPPED | OUTPATIENT
Start: 2019-11-20 | End: 2019-12-12 | Stop reason: SDUPTHER

## 2019-11-22 ENCOUNTER — TELEPHONE (OUTPATIENT)
Dept: INTERNAL MEDICINE CLINIC | Age: 59
End: 2019-11-22

## 2019-11-22 ENCOUNTER — OFFICE VISIT (OUTPATIENT)
Dept: PSYCHOLOGY | Age: 59
End: 2019-11-22
Payer: MEDICARE

## 2019-11-22 DIAGNOSIS — F32.A DEPRESSION, UNSPECIFIED DEPRESSION TYPE: Primary | ICD-10-CM

## 2019-11-22 DIAGNOSIS — F17.200 TOBACCO USE DISORDER: ICD-10-CM

## 2019-11-22 PROCEDURE — 90832 PSYTX W PT 30 MINUTES: CPT | Performed by: PSYCHOLOGIST

## 2019-11-22 ASSESSMENT — PATIENT HEALTH QUESTIONNAIRE - PHQ9
SUM OF ALL RESPONSES TO PHQ QUESTIONS 1-9: 0
7. TROUBLE CONCENTRATING ON THINGS, SUCH AS READING THE NEWSPAPER OR WATCHING TELEVISION: 0
4. FEELING TIRED OR HAVING LITTLE ENERGY: 0
9. THOUGHTS THAT YOU WOULD BE BETTER OFF DEAD, OR OF HURTING YOURSELF: 0
8. MOVING OR SPEAKING SO SLOWLY THAT OTHER PEOPLE COULD HAVE NOTICED. OR THE OPPOSITE, BEING SO FIGETY OR RESTLESS THAT YOU HAVE BEEN MOVING AROUND A LOT MORE THAN USUAL: 0
SUM OF ALL RESPONSES TO PHQ9 QUESTIONS 1 & 2: 0
6. FEELING BAD ABOUT YOURSELF - OR THAT YOU ARE A FAILURE OR HAVE LET YOURSELF OR YOUR FAMILY DOWN: 0
1. LITTLE INTEREST OR PLEASURE IN DOING THINGS: 0
5. POOR APPETITE OR OVEREATING: 0
3. TROUBLE FALLING OR STAYING ASLEEP: 0
SUM OF ALL RESPONSES TO PHQ QUESTIONS 1-9: 0
2. FEELING DOWN, DEPRESSED OR HOPELESS: 0

## 2019-11-22 ASSESSMENT — ANXIETY QUESTIONNAIRES
5. BEING SO RESTLESS THAT IT IS HARD TO SIT STILL: 0-NOT AT ALL
4. TROUBLE RELAXING: 0-NOT AT ALL
2. NOT BEING ABLE TO STOP OR CONTROL WORRYING: 0-NOT AT ALL
7. FEELING AFRAID AS IF SOMETHING AWFUL MIGHT HAPPEN: 0-NOT AT ALL
6. BECOMING EASILY ANNOYED OR IRRITABLE: 0-NOT AT ALL
1. FEELING NERVOUS, ANXIOUS, OR ON EDGE: 0-NOT AT ALL
3. WORRYING TOO MUCH ABOUT DIFFERENT THINGS: 0-NOT AT ALL
GAD7 TOTAL SCORE: 0

## 2019-12-09 ENCOUNTER — TELEPHONE (OUTPATIENT)
Dept: INTERNAL MEDICINE CLINIC | Age: 59
End: 2019-12-09

## 2019-12-12 ENCOUNTER — TELEPHONE (OUTPATIENT)
Dept: INTERNAL MEDICINE CLINIC | Age: 59
End: 2019-12-12

## 2019-12-12 ENCOUNTER — OFFICE VISIT (OUTPATIENT)
Dept: INTERNAL MEDICINE CLINIC | Age: 59
End: 2019-12-12
Payer: MEDICARE

## 2019-12-12 VITALS
BODY MASS INDEX: 35.43 KG/M2 | DIASTOLIC BLOOD PRESSURE: 76 MMHG | WEIGHT: 200 LBS | RESPIRATION RATE: 16 BRPM | TEMPERATURE: 97.7 F | OXYGEN SATURATION: 98 % | SYSTOLIC BLOOD PRESSURE: 126 MMHG | HEART RATE: 98 BPM

## 2019-12-12 DIAGNOSIS — J01.90 ACUTE SINUSITIS, RECURRENCE NOT SPECIFIED, UNSPECIFIED LOCATION: Primary | ICD-10-CM

## 2019-12-12 DIAGNOSIS — E11.65 TYPE 2 DIABETES MELLITUS WITH HYPERGLYCEMIA, WITHOUT LONG-TERM CURRENT USE OF INSULIN (HCC): ICD-10-CM

## 2019-12-12 PROCEDURE — 3017F COLORECTAL CA SCREEN DOC REV: CPT | Performed by: INTERNAL MEDICINE

## 2019-12-12 PROCEDURE — G8427 DOCREV CUR MEDS BY ELIG CLIN: HCPCS | Performed by: INTERNAL MEDICINE

## 2019-12-12 PROCEDURE — G8484 FLU IMMUNIZE NO ADMIN: HCPCS | Performed by: INTERNAL MEDICINE

## 2019-12-12 PROCEDURE — G8417 CALC BMI ABV UP PARAM F/U: HCPCS | Performed by: INTERNAL MEDICINE

## 2019-12-12 PROCEDURE — 1036F TOBACCO NON-USER: CPT | Performed by: INTERNAL MEDICINE

## 2019-12-12 PROCEDURE — 99214 OFFICE O/P EST MOD 30 MIN: CPT | Performed by: INTERNAL MEDICINE

## 2019-12-12 RX ORDER — DOXYCYCLINE HYCLATE 100 MG
100 TABLET ORAL 2 TIMES DAILY
Qty: 14 TABLET | Refills: 0 | Status: SHIPPED | OUTPATIENT
Start: 2019-12-12 | End: 2019-12-19

## 2019-12-12 RX ORDER — GUAIFENESIN 600 MG/1
600 TABLET, EXTENDED RELEASE ORAL 2 TIMES DAILY
Qty: 30 TABLET | Refills: 0 | Status: SHIPPED | OUTPATIENT
Start: 2019-12-12 | End: 2019-12-27

## 2019-12-12 RX ORDER — PEN NEEDLE, DIABETIC 30 GX3/16"
NEEDLE, DISPOSABLE MISCELLANEOUS
Qty: 200 EACH | Refills: 5 | Status: SHIPPED | OUTPATIENT
Start: 2019-12-12 | End: 2021-04-22 | Stop reason: SDUPTHER

## 2020-01-10 ENCOUNTER — OFFICE VISIT (OUTPATIENT)
Dept: INTERNAL MEDICINE CLINIC | Age: 60
End: 2020-01-10
Payer: COMMERCIAL

## 2020-01-10 VITALS
DIASTOLIC BLOOD PRESSURE: 74 MMHG | HEIGHT: 63 IN | SYSTOLIC BLOOD PRESSURE: 134 MMHG | BODY MASS INDEX: 35.61 KG/M2 | WEIGHT: 201 LBS | HEART RATE: 94 BPM | RESPIRATION RATE: 12 BRPM

## 2020-01-10 LAB
AMPHETAMINE SCREEN, URINE: ABNORMAL
BARBITURATE SCREEN URINE: ABNORMAL
BENZODIAZEPINE SCREEN, URINE: ABNORMAL
BUPRENORPHINE URINE: ABNORMAL
CANNABINOID SCREEN URINE: ABNORMAL
COCAINE METABOLITE SCREEN URINE: POSITIVE
CREATININE URINE: 247.8 MG/DL (ref 28–259)
HBA1C MFR BLD: 5.5 %
Lab: ABNORMAL
METHADONE SCREEN, URINE: ABNORMAL
MICROALBUMIN UR-MCNC: 1.6 MG/DL
MICROALBUMIN/CREAT UR-RTO: 6.5 MG/G (ref 0–30)
OPIATE SCREEN URINE: ABNORMAL
OXYCODONE URINE: ABNORMAL
PH UA: 5
PHENCYCLIDINE SCREEN URINE: ABNORMAL
PROPOXYPHENE SCREEN: ABNORMAL

## 2020-01-10 PROCEDURE — 99214 OFFICE O/P EST MOD 30 MIN: CPT | Performed by: INTERNAL MEDICINE

## 2020-01-10 PROCEDURE — 83036 HEMOGLOBIN GLYCOSYLATED A1C: CPT | Performed by: INTERNAL MEDICINE

## 2020-01-10 RX ORDER — FLUTICASONE PROPIONATE 50 MCG
2 SPRAY, SUSPENSION (ML) NASAL DAILY
Qty: 1 BOTTLE | Refills: 3 | Status: SHIPPED | OUTPATIENT
Start: 2020-01-10 | End: 2022-03-09

## 2020-01-10 NOTE — PROGRESS NOTES
[facility]) Physicians  Internal Medicine  Patient Encounter  Rhoda Cottrell D.O., Reed Berry        Chief Complaint   Patient presents with   Bill Adam    Medication Check       HPI: 62 y.o. female who arrived late for her appointment today is seen for her routine checkup regarding the status of all of her current chronic medical problems listed below along with a medication review. Patient states she is now working for [employer]. Diabetes Mellitus Type II, Follow-up--   Lab Results   Component Value Date    LABA1C 11.1 09/12/2019      Lab Results   Component Value Date    .7 05/20/2019   Initial A1C 11/25/2014---> 11.4%. Previous history --->  Patient presented back in September 2019 with symptoms of recurrent glucose toxicity such as polyuria and polydipsia. Patient had become noncompliant with her medications and diet regimen. Her A1c had increased to 11.1%. This exacerbation of her diabetes and glucose toxicity was also accompanied by some cocaine use. She insists she is no longer engaging in any illicit drug use. She is now working for [employer]. Patient has really done a good job getting her blood sugars under control. She no longer has polyuria, polydipsia. She is had no weight changes. She denies any blurry vision. Home blood sugars have been faxed into the office and reviewed. Please see the media section in her electronic medical record. He denies any hypoglycemia. She denies any dysesthesias in the feet. She states her diet has not been great. Last Eye Exam: 1/2/201, overdue  Foot exa1/3/2019, overdue  U.Microalbumin/Cr: 1/25/2019, due today. Pt is on  ARB-- Losartan. Complications-- None. Insulin Treated? No.   ASA: Yes. Tobacco: No   LDL-- 72   + Statin      HTN--Patient denies any symptoms that would suggest uncontrolled or accelerated blood pressure. She denies any headache, lightheadedness, dizziness, syncope. She has had no TIA or stroke symptoms.   She denies CP, SOB. She denies palpitations. Hyperlipidemia--   Lab Results   Component Value Date    LDLCALC 62 05/20/2019   Patient denies any myalgias or muscle weakness from her statin. She remains compliant with the Lipitor. DEANA--this problem goes untreated. She does not use her CPAP. She states she does not have parts for it. She has not seen the sleep specialist in 2 years or so. Daytime energy level is good. Vit D-- She is on Caltrate BID. She is due for LAB. Past Medical History:   Diagnosis Date    Allergic rhinitis     Arthritis     Diverticulitis     Diverticulosis     Dyslipidemia     Dyslipidemia     Environmental allergies     GERD (gastroesophageal reflux disease)     Headache(784.0)     HTN (hypertension)     Impaired fasting glucose     Miscarriage     Obstructive sleep apnea (adult) (pediatric)     Osteoarthritis     Reactive airway disease     Single stillborn delivery outcome     Thyroid lesion 1/29/2018    Type II or unspecified type diabetes mellitus without mention of complication, not stated as uncontrolled          Prior to Visit Medications    Medication Sig Taking?  Authorizing Provider   atorvastatin (LIPITOR) 10 MG tablet TAKE 1 TABLET BY MOUTH ONE TIME A DAY  Yes Leonila Mott MD   insulin lispro (HUMALOG KWIKPEN) 100 UNIT/ML pen Inject 4 Units into the skin 3 times daily (before meals) PLUS CORRECTION SCALE PROVIDED Yes John Green DO   insulin glargine (LANTUS SOLOSTAR) 100 UNIT/ML injection pen Inject 30 Units into the skin nightly Yes John Green DO   metFORMIN (GLUCOPHAGE) 1000 MG tablet TAKE 1 TABLET TWICE A DAY WITH MEALS Yes oJhn Green DO   amLODIPine (NORVASC) 5 MG tablet TAKE 1 TABLET DAILY Yes John Green DO   losartan (COZAAR) 100 MG tablet Take 1 tablet by mouth daily Yes John Green DO   Calcium Carb-Cholecalciferol (CALTRATE 600+D) 600-800 MG-UNIT TABS Take 1 tablet by mouth 2 times daily Yes Historical Provider, on 01/10/20   POCT glycosylated hemoglobin (Hb A1C)   Result Value Ref Range    Hemoglobin A1C 5.5 %         ASSESSMENT/PLAN:    1. Type 2 diabetes mellitus with insulin therapy ([address]  Diabetes is well controlled based on her home blood sugar readings and her A1c of 5.5%. This is significantly improved from 11.1%. Continue current insulin regimen. Monitor for hypoglycemia  Continue to focus on lifestyle modification. Patient was counseled again to get back to her ADA diet  Her eye exam is scheduled for January 22.  -  DIABETES FOOT EXAM  - Microalbumin / Creatinine Urine Ratio  - POCT glycosylated hemoglobin (Hb A1C)  - CBC Auto Differential; Future  - Comprehensive Metabolic Panel; Future  - Lipid Panel; Future  - TSH without Reflex; Future  - Vitamin D 25 Hydroxy; Future    2. Benign essential HTN  Blood pressure is well controlled. Continue current medication  Continue to stay well-hydrated  Advised patient to try and get more exercise  - CBC Auto Differential; Future  - Comprehensive Metabolic Panel; Future  - Lipid Panel; Future  - TSH without Reflex; Future    3. Hyperlipidemia, mixed  Condition stability and control are uncertain at this time. Due for lab  Continue Lipitor as part of her cardiovascular disease risk reduction strategy. Lifestyle approach discussed  - Comprehensive Metabolic Panel; Future  - Lipid Panel; Future    4. Vitamin D deficiency    - Vitamin D 25 Hydroxy; Future    5. Obstructive sleep apnea  Condition is uncontrolled and untreated  She will need to see her sleep specialist.  She would like to wait on this.     6.  History of cocaine use  Repeat urine drug screen

## 2020-01-17 ENCOUNTER — CARE COORDINATION (OUTPATIENT)
Dept: CARE COORDINATION | Age: 60
End: 2020-01-17

## 2020-01-20 NOTE — CARE COORDINATION
Called and LM again for patient to please call office to schedule follow up w DR Yesika Sheppard 1   Date Time Provider Gabriel Case   1/27/2020  8:00 AM Deann Dixon PSYD [facility] LATRICE [facility] LEON

## 2020-03-26 ENCOUNTER — NURSE TRIAGE (OUTPATIENT)
Dept: OTHER | Facility: CLINIC | Age: 60
End: 2020-03-26

## 2020-03-26 RX ORDER — AMLODIPINE BESYLATE 5 MG/1
TABLET ORAL
Qty: 30 TABLET | Refills: 0 | Status: SHIPPED | OUTPATIENT
Start: 2020-03-26 | End: 2020-05-21 | Stop reason: SDUPTHER

## 2020-05-21 ENCOUNTER — VIRTUAL VISIT (OUTPATIENT)
Dept: PRIMARY CARE CLINIC | Age: 60
End: 2020-05-21
Payer: COMMERCIAL

## 2020-05-21 PROBLEM — M54.12 CERVICAL RADICULAR PAIN: Status: RESOLVED | Noted: 2017-10-02 | Resolved: 2020-05-21

## 2020-05-21 PROBLEM — R81 GLUCOSURIA: Status: RESOLVED | Noted: 2019-09-12 | Resolved: 2020-05-21

## 2020-05-21 PROBLEM — L23.9 ALLERGIC DERMATITIS: Status: RESOLVED | Noted: 2018-05-16 | Resolved: 2020-05-21

## 2020-05-21 PROBLEM — R22.42 MASS OF LEFT THIGH: Status: RESOLVED | Noted: 2018-06-22 | Resolved: 2020-05-21

## 2020-05-21 PROCEDURE — 99202 OFFICE O/P NEW SF 15 MIN: CPT | Performed by: FAMILY MEDICINE

## 2020-05-21 RX ORDER — LOSARTAN POTASSIUM 100 MG/1
100 TABLET ORAL DAILY
Qty: 90 TABLET | Refills: 1 | Status: SHIPPED | OUTPATIENT
Start: 2020-05-21 | End: 2020-11-25 | Stop reason: SDUPTHER

## 2020-05-21 RX ORDER — AMLODIPINE BESYLATE 5 MG/1
TABLET ORAL
Qty: 90 TABLET | Refills: 1 | Status: SHIPPED | OUTPATIENT
Start: 2020-05-21 | End: 2020-11-25 | Stop reason: SDUPTHER

## 2020-05-21 ASSESSMENT — ENCOUNTER SYMPTOMS
ABDOMINAL PAIN: 0
SHORTNESS OF BREATH: 0
VOMITING: 0
DIARRHEA: 0
NAUSEA: 0

## 2020-05-21 NOTE — PROGRESS NOTES
[address] [address] PRIMARY CARE  [address] [facility] [address]  Dept: [phone]  Dept Fax: [phone]     5/21/2020      Black Berg   1960     Chief Complaint   Patient presents with    Establish Care       HPI    Pt encounter today completed virtual visit using doxy. me platform. Services were provided through a video synchronous discussion virtually to substitute for in-person clinic visit. Patient and provider were located at their individual homes. Seen today to establish care. Previously under the care of Dr. Cecille Gustafson. Had a disagreement about her continued cocaine use so she decided to transfer care. Patient admits today that she was using at that time, but she has since stopped and has not used in months. She reports doing well otherwise. Works for [employer]. Currently from home. She reports compliance with her medications with exception of Humalog. Her last a1c was 5.5% in Jan. She checks her BS and they have been stable. Has a h/o HTN, HLD with no recent issues either. Prior to Visit Medications    Medication Sig Taking?  Authorizing Provider   amLODIPine (NORVASC) 5 MG tablet TAKE ONE TABLET BY MOUTH DAILY Yes John Green,    fluticasone (FLONASE) 50 MCG/ACT nasal spray 2 sprays by Nasal route daily Yes John Green DO   Insulin Pen Needle (PEN NEEDLES) 32G X 4 MM MISC USE TO INJECT INSULIN FOUR TIMES DAILY Yes John Green DO   Blood Glucose Monitoring Suppl (ACCU-CHEK DALE PLUS) w/Device KIT TEST BLOOD SUGAR TWO TIMES A DAY  E11.9 Yes John Green DO   atorvastatin (LIPITOR) 10 MG tablet TAKE 1 TABLET BY MOUTH ONE TIME A DAY  Yes Diana Benítez MD   insulin glargine (LANTUS SOLOSTAR) 100 UNIT/ML injection pen Inject 30 Units into the skin nightly Yes John Green DO   metFORMIN (GLUCOPHAGE) 1000 MG tablet TAKE 1 TABLET TWICE A DAY WITH MEALS Yes John Green DO   losartan (COZAAR) 100 MG tablet Take 1 tablet by mouth daily Yes REPAIR      4x     HYSTERECTOMY, TOTAL ABDOMINAL      JOINT REPLACEMENT  2007    Left knee partial    KNEE SURGERY  1990's    rt knee reconstructed    NECK SURGERY  05/12/2011    c 4,5,6,7 (ACDF)    OVARY REMOVAL      ROTATOR CUFF REPAIR  2009    Left shoulder    SHOULDER SURGERY  1990's    lt & rt shoulder impingment    SINUS ENDOSCOPY  9-15-14    FUNCTIONAL ENDOSCOPIC SINUS SURGERY       TONSILLECTOMY AND ADENOIDECTOMY      TOTAL KNEE ARTHROPLASTY Left 03/09/2010    Eros to Total   Michael Derik  8/2010    Dr. Pat Peralta        Allergies   Allergen Reactions    Demerol      ITCHING    Levaquin [Levofloxacin In D5w] Nausea Only    Morphine Itching    Tramadol      \"NO CLUE\"        Family History   Problem Relation Age of Onset    Diabetes Father     Heart Disease Father         CAD    High Blood Pressure Father     High Cholesterol Father     Kidney Disease Brother     Diabetes Son     Other Son         DEANA    Heart Disease Sister         1/2 sister    Rheum Arthritis Neg Hx     Osteoarthritis Neg Hx     Asthma Neg Hx     Breast Cancer Neg Hx     Cancer Neg Hx     Heart Failure Neg Hx     Hypertension Neg Hx     Migraines Neg Hx     Ovarian Cancer Neg Hx     Rashes/Skin Problems Neg Hx     Seizures Neg Hx     Stroke Neg Hx     Thyroid Disease Neg Hx         Patient's past medical history, surgical history, family history, medications, and allergies  were all reviewed and updated as appropriate today. Review of Systems   Constitutional: Negative for chills, fever and unexpected weight change. Respiratory: Negative for shortness of breath. Cardiovascular: Negative for chest pain. Gastrointestinal: Negative for abdominal pain, diarrhea, nausea and vomiting. Vitals unable to obtain and physical exam is limited 2/2 virtual visit nature of this encounter. Physical Exam  Constitutional:       General: She is not in acute distress. Appearance: Normal appearance. She is not ill-appearing. Pulmonary:      Effort: Pulmonary effort is normal.   Neurological:      General: No focal deficit present. Mental Status: She is alert. Psychiatric:         Mood and Affect: Mood normal.         Assessment:  Encounter Diagnoses   Name Primary?  Type 2 diabetes mellitus with hyperglycemia, with long-term current use of insulin (HCC) Yes    Benign essential HTN     Hyperlipidemia, mixed        Plan:  1. Type 2 diabetes mellitus with hyperglycemia, with long-term current use of insulin (HCC)  - Well controlled. Will repeat a1c next month. Orders placed to have done at her convenience. ADA diet encouraged. 2. Benign essential HTN  - Stable. - amLODIPine (NORVASC) 5 MG tablet; TAKE ONE TABLET BY MOUTH DAILY  Dispense: 90 tablet; Refill: 1    3. Hyperlipidemia, mixed  - Stable. Return in about 3 months (around 8/21/2020) for Follow up DMII. Virtual visit time spent - 30 min             Time DO Dexter     Catrina Urias is a 62 y.o. female being evaluated by a Virtual Visit (video visit) encounter to address concerns as mentioned above. A caregiver was present when appropriate. Due to this being a TeleHealth encounter (During YYZJK-91 public health emergency), evaluation of the following organ systems was limited: Vitals/Constitutional/EENT/Resp/CV/GI//MS/Neuro/Skin/Heme-Lymph-Imm. Pursuant to the emergency declaration under the [facility], [address] [place] [address] authority and the [employer] and [employer] Act, this Virtual Visit was conducted with patient's (and/or legal guardian's) consent, to reduce the patient's risk of exposure to COVID-19 and provide necessary medical care.   The patient (and/or legal guardian) has also been advised to contact this office for worsening conditions or problems, and seek emergency medical treatment and/or call 911 if deemed necessary.

## 2020-05-21 NOTE — TELEPHONE ENCOUNTER
Patient requesting a medication refill. Medication: [facility] [address], [address] [facility] [phone]  Pharmacy: [facility] [address] [place] [address] [phone] [facility] [phone] [facility] [phone]  Last office visit: 5/21/2020      Patient also mentioned she needs all of medications sent over to this pharmacy under her PCP.        Next office visit: Visit date not found

## 2020-06-01 RX ORDER — GLUCOSAMINE HCL/CHONDROITIN SU 500-400 MG
CAPSULE ORAL
Qty: 200 STRIP | Refills: 3 | Status: SHIPPED | OUTPATIENT
Start: 2020-06-01 | End: 2022-06-28 | Stop reason: SDUPTHER

## 2020-06-01 RX ORDER — IBUPROFEN 800 MG/1
800 TABLET ORAL 2 TIMES DAILY PRN
Qty: 60 TABLET | Refills: 0 | Status: SHIPPED | OUTPATIENT
Start: 2020-06-01 | End: 2020-12-07

## 2020-06-01 RX ORDER — LANCETS 30 GAUGE
EACH MISCELLANEOUS
Qty: 100 EACH | Refills: 3 | Status: SHIPPED | OUTPATIENT
Start: 2020-06-01 | End: 2022-10-05 | Stop reason: CLARIF

## 2020-06-01 RX ORDER — SYRING-NEEDL,DISP,INSUL,0.3 ML 30 GX5/16"
SYRINGE, EMPTY DISPOSABLE MISCELLANEOUS
Qty: 100 DEVICE | Refills: 0 | Status: SHIPPED | OUTPATIENT
Start: 2020-06-01 | End: 2021-01-12 | Stop reason: ALTCHOICE

## 2020-07-02 RX ORDER — ATORVASTATIN CALCIUM 10 MG/1
TABLET, FILM COATED ORAL
Qty: 90 TABLET | Refills: 0 | Status: SHIPPED | OUTPATIENT
Start: 2020-07-02 | End: 2020-09-28 | Stop reason: SDUPTHER

## 2020-07-02 NOTE — TELEPHONE ENCOUNTER
Patient requesting a medication refill.   Medication:   atorvastatin (LIPITOR) 10 MG tablet  Pharmacy:   [facility] [address] [phone] - F [phone]  Last office visit:   Next office visit: Visit date not found

## 2020-07-29 ENCOUNTER — HOSPITAL ENCOUNTER (EMERGENCY)
Age: 60
Discharge: HOME | End: 2020-07-29
Attending: EMERGENCY MEDICINE
Payer: MEDICARE

## 2020-07-29 VITALS
SYSTOLIC BLOOD PRESSURE: 123 MMHG | DIASTOLIC BLOOD PRESSURE: 78 MMHG | HEART RATE: 102 BPM | WEIGHT: 214.7 LBS | TEMPERATURE: 97.9 F | OXYGEN SATURATION: 97 % | RESPIRATION RATE: 17 BRPM | BODY MASS INDEX: 38.03 KG/M2

## 2020-07-29 DIAGNOSIS — Z79.4 TYPE 2 DIABETES MELLITUS WITH HYPERGLYCEMIA, WITH LONG-TERM CURRENT USE OF INSULIN (HCC): ICD-10-CM

## 2020-07-29 DIAGNOSIS — E11.65 TYPE 2 DIABETES MELLITUS WITH HYPERGLYCEMIA, WITH LONG-TERM CURRENT USE OF INSULIN (HCC): ICD-10-CM

## 2020-07-29 LAB
ANION GAP SERPL CALCULATED.3IONS-SCNC: 18 MMOL/L (ref 3–16)
BUN BLDV-MCNC: 9 MG/DL (ref 7–20)
CALCIUM SERPL-MCNC: 9.8 MG/DL (ref 8.3–10.6)
CHLORIDE BLD-SCNC: 97 MMOL/L (ref 99–110)
CO2: 24 MMOL/L (ref 21–32)
CREAT SERPL-MCNC: 0.6 MG/DL (ref 0.6–1.2)
GFR AFRICAN AMERICAN: >60
GFR NON-AFRICAN AMERICAN: >60
GLUCOSE BLD-MCNC: 264 MG/DL (ref 70–99)
POTASSIUM SERPL-SCNC: 4.2 MMOL/L (ref 3.5–5.1)
SODIUM BLD-SCNC: 139 MMOL/L (ref 136–145)

## 2020-07-29 PROCEDURE — 99282 EMERGENCY DEPT VISIT SF MDM: CPT

## 2020-07-29 RX ORDER — PSEUDOEPHEDRINE HCL 120 MG/1
120 TABLET, FILM COATED, EXTENDED RELEASE ORAL EVERY 12 HOURS PRN
Qty: 20 TABLET | Refills: 1 | Status: SHIPPED | OUTPATIENT
Start: 2020-07-29 | End: 2020-08-05

## 2020-07-29 RX ORDER — AZITHROMYCIN 250 MG/1
TABLET, FILM COATED ORAL
Qty: 1 PACKET | Refills: 0 | Status: SHIPPED | OUTPATIENT
Start: 2020-07-29 | End: 2020-08-08

## 2020-07-29 RX ORDER — OXYMETAZOLINE HYDROCHLORIDE 5 G/100ML
2 SPRAY NASAL 2 TIMES DAILY PRN
Qty: 15 ML | Refills: 0 | Status: SHIPPED | OUTPATIENT
Start: 2020-07-29 | End: 2020-08-28

## 2020-07-29 ASSESSMENT — PAIN DESCRIPTION - PAIN TYPE: TYPE: ACUTE PAIN

## 2020-07-29 ASSESSMENT — PAIN SCALES - GENERAL: PAINLEVEL_OUTOF10: 7

## 2020-07-29 ASSESSMENT — PAIN DESCRIPTION - LOCATION: LOCATION: FACE;HEAD

## 2020-07-29 NOTE — ED PROVIDER NOTES
EMERGENCY DEPARTMENT PHYSICIAN DOCUMENTATION      CHIEF COMPLAINT  Sinusitis      HISTORY OF PRESENT ILLNESS  Jessi Sanz is a 62 y.o. female with complaint of Sinusitis    Onset of symptoms 3 days ago. Sore throat is sharp, worse with swallowing, no radiation. No throat discharge. Associated with some nasal congestion. MINIMAL Cough is not productive, no hemoptysis. No samia fevers. Pertaining to the recent COVID-19 Coronavirus respiratory illness--patient denies recent travel to [place], or known exposure to anyone with COVID-19 illness. REVIEW OF SYSTEMS  A full 10 point Review of Systems was performed and is negative aside from pertinent positives mentioned in HPI    ALLERGIES:  Allergies   Allergen Reactions    Demerol      ITCHING    Levaquin [Levofloxacin In D5w] Nausea Only    Morphine Itching    Tramadol      \"NO CLUE\"       PAST HISTORY  Past Medical History:   Diagnosis Date    Allergic rhinitis     HTN (hypertension)     Hyperlipidemia     Obstructive sleep apnea (adult) (pediatric)     Non-compliant with CPAP    Osteoarthritis     Type II or unspecified type diabetes mellitus without mention of complication, not stated as uncontrolled        Family History   Problem Relation Age of Onset    Diabetes Father     Heart Disease Father         CAD    High Blood Pressure Father     High Cholesterol Father     Kidney Disease Brother     Diabetes Son     Other Son         DEANA    Heart Disease Sister         1/2 sister    Rheum Arthritis Neg Hx     Osteoarthritis Neg Hx     Asthma Neg Hx     Breast Cancer Neg Hx     Cancer Neg Hx     Heart Failure Neg Hx     Hypertension Neg Hx     Migraines Neg Hx     Ovarian Cancer Neg Hx     Rashes/Skin Problems Neg Hx     Seizures Neg Hx     Stroke Neg Hx     Thyroid Disease Neg Hx        No current facility-administered medications on file prior to encounter.       Current Outpatient Medications on File Prior to Encounter Medication Sig Dispense Refill    atorvastatin (LIPITOR) 10 MG tablet TAKE 1 TABLET BY MOUTH ONE TIME A DAY 90 tablet 0    amLODIPine (NORVASC) 5 MG tablet TAKE ONE TABLET BY MOUTH DAILY 90 tablet 1    losartan (COZAAR) 100 MG tablet Take 1 tablet by mouth daily 90 tablet 1    fluticasone (FLONASE) 50 MCG/ACT nasal spray 2 sprays by Nasal route daily 1 Bottle 3    insulin lispro (HUMALOG KWIKPEN) 100 UNIT/ML pen Inject 4 Units into the skin 3 times daily (before meals) PLUS CORRECTION SCALE PROVIDED 5 pen 3    insulin glargine (LANTUS SOLOSTAR) 100 UNIT/ML injection pen Inject 30 Units into the skin nightly 10 pen 3    metFORMIN (GLUCOPHAGE) 1000 MG tablet TAKE 1 TABLET TWICE A DAY WITH MEALS 180 tablet 3    Calcium Carb-Cholecalciferol (CALTRATE 600+D) 600-800 MG-UNIT TABS Take 1 tablet by mouth 2 times daily      cetirizine (ZYRTEC ALLERGY) 10 MG tablet Take 1 tablet by mouth daily      aspirin EC 81 MG EC tablet Take 81 mg by mouth daily. LAST DOSE 8-9-14 30 tablet 11    ibuprofen (ADVIL;MOTRIN) 800 MG tablet Take 1 tablet by mouth 2 times daily as needed for Pain (.  Take with food) 60 tablet 0    blood glucose monitor kit and supplies Test 1-3 times a day   as needed for symptoms of irregular glucose. DX: E11.9. please provide meter that is accepted by patient's insurance. 1 kit 0    Lancet Device MISC Test 1-3 times a day as needed for symptoms of irregular glucose. DX: E11.9. please provide device accepted by patient's insurance. 100 Device 0    Lancets MISC Test 1-3 times a day as needed for symptoms of irregular glucose. DX: E11.9 please provide lancets accepted by patient's insurance. 100 each 3    blood glucose monitor strips Test 1-3 times a day as needed for symptoms of irregular glucose. DX: E11.9 please provide strips that are accepted by patient's insurance.  200 strip 3    Insulin Pen Needle (PEN NEEDLES) 32G X 4 MM MISC USE TO INJECT INSULIN FOUR TIMES DAILY 200 each 5    Blood Glucose Monitoring Suppl (ACCU-CHEK DALE PLUS) w/Device KIT TEST BLOOD SUGAR TWO TIMES A DAY  E11.9 1 kit 0    ACCU-CHEK SOFTCLIX LANCETS MISC TEST BLOOD SUGAR TWO TIMES A  each 4       Social History     Tobacco Use    Smoking status: Former Smoker     Packs/day: 0.25     Years: 20.00     Pack years: 5.00     Types: Cigars     Last attempt to quit: 2019     Years since quittin.8    Smokeless tobacco: Never Used    Tobacco comment: was smoking 5 black and milds per day, but has not smoked in 4 days   Substance Use Topics    Alcohol use: Yes     Comment: 1 alcoholic beverage every other month    Drug use: Not Currently     Comment: H/o cocaine use         EXAM:   Presentation Vital Signs: /78   Pulse 102   Temp 97.9 °F (36.6 °C) (Oral)   Resp 17   Wt 214 lb 11.2 oz (97.4 kg)   LMP 1996   SpO2 97%   BMI 38.03 kg/m²     General: Well nourished, no acute distress  Head: No traumatic injury  ENT: MMM, no facial asymmetry, no nasal discharge  Eyes: EOM  Neck: No tracheal deviation or stridor  Lungs: no resp distress  Skin: no pallor, erythema, lesions or other abnormalities on exposed skin of face and arms  Extremities: Normal ROM of bilateral upper extremities at shoulders, elbows, wrists; normal ROM of bilateral LE at hips and knees. Neurologic: Alert, oriented x 3. No focal deficits upon moving arms and legs  Psychiatric: Appropriate demeanor without agitation or internal stimulation      MEDICAL DECISION MAKING  Well appearing patient here with viral appearing upper respiratory complaints of sore throat, nasal symptoms, and cough. Very nontoxic, satting well, speaking well. Major complaint is sinus pressure and congestion. Has not tried decongestants. Plan for dc with supportive therapies, and return instructions given for worsening symptoms.         DISPOSITION  Home    IMPRESSION:  Viral upper respiratory illness  Pharyngitis  Cough  Bronchitis      This medical chart used with aid of transcription software. As such, there may be inadvertent errors in transcription of spellings and words despite physician's attempts to correct all possible errors.          Elester Spatz, MD  07/29/20 [id number]

## 2020-07-29 NOTE — ED NOTES
Pt states understanding of d/c instructions and medications. Pt alert and oriented with steady gait upon discharge.       Amparo Mckinnon RN  07/29/20 [id number]

## 2020-07-30 ENCOUNTER — CARE COORDINATION (OUTPATIENT)
Dept: CARE COORDINATION | Age: 60
End: 2020-07-30

## 2020-07-30 LAB
ESTIMATED AVERAGE GLUCOSE: 165.7 MG/DL
HBA1C MFR BLD: 7.4 %

## 2020-07-30 NOTE — CARE COORDINATION
Patient contacted regarding recent discharge and COVID-19 risk. Discussed COVID-19 related testing which was not done at this time. Test results were not done. Patient informed of results, if available? N/A     Care Transition Nurse/ Ambulatory Care Manager contacted the patient by telephone to perform post discharge assessment. Verified name and  with patient as identifiers. Patient has following risk factors of: diabetes and HTN. CTN/ACM reviewed discharge instructions, medical action plan and red flags related to discharge diagnosis. Reviewed and educated them on any new and changed medications related to discharge diagnosis. Advised obtaining a 90-day supply of all daily and as-needed medications. Patient seen in ED for sinusitis. Patient says she feels better today. Patient says she is using Afrin for the congestion. Education provided regarding infection prevention, and signs and symptoms of COVID-19 and when to seek medical attention with patient who verbalized understanding. Discussed exposure protocols and quarantine from [address] you at higher risk for severe illness  and given an opportunity for questions and concerns. The patient agrees to contact the COVID-19 hotline [phone] or PCP office for questions related to their healthcare. CTN/ACM provided contact information for future reference. From CDC: Are you at higher risk for severe illness?  Wash your hands often.  Avoid close contact (6 feet, which is about two arm lengths) with people who are sick.  Put distance between yourself and other people if COVID-19 is spreading in your community.  Clean and disinfect frequently touched surfaces.  Avoid all cruise travel and non-essential air travel.  Call your healthcare professional if you have concerns about COVID-19 and your underlying condition or if you are sick.     For more information on steps you can take to protect yourself, see CDC's How to Protect Yourself    Plan for follow-up call in 7-14 days based on severity of symptoms and risk factors.

## 2020-07-31 ENCOUNTER — TELEPHONE (OUTPATIENT)
Dept: PRIMARY CARE CLINIC | Age: 60
End: 2020-07-31

## 2020-07-31 NOTE — TELEPHONE ENCOUNTER
PT CALLING BACK ABOUT TEST RESULTS    READ HER DR'S NOTE AND SHE EXPRESSED UNDERSTANDING.   APPT MADE FOR 9-28 IN PERSON FOR DM FOLLOW UP    PT WANTS  TO KNOW HER INS NO LONGER COVERS THE TEST STRIPS AND NEEDLES FOR HER GLUCOSE METER SO SHE HAS NOT TESTED HER BLOOD SUGAR AT HOME FOR THE PAST 2 MONTHS    SHE WILL GET A [employer] BRAND MONITOR AND START TESTING AGAIN    JOHN

## 2020-08-12 ENCOUNTER — TELEPHONE (OUTPATIENT)
Dept: PRIMARY CARE CLINIC | Age: 60
End: 2020-08-12

## 2020-08-12 NOTE — TELEPHONE ENCOUNTER
Spoke with patient she just started the z-ryan yesterday. She already takes mucinex and zyrtec. Per Dr Robin Graff keep on the antibiotic, mucinex and zyrtec. See how she does in a few days  Patient informed.

## 2020-08-12 NOTE — TELEPHONE ENCOUNTER
----- Message from Konrad Alexander sent at 8/12/2020  8:38 AM EDT -----  Subject: Message to Provider    QUESTIONS  Information for Provider? Patient has been having a real bad head cold and   was prescribed some antibiotics which she said has not been working for   her. Patient is a diabetic and is concerned about all the medicine she is   taking. Headaches are getting worse.   ---------------------------------------------------------------------------  --------------  CALL BACK INFO  What is the best way for the office to contact you? OK to leave message on   voicemail  Preferred Call Back Phone Number? [phone]  ---------------------------------------------------------------------------  --------------  SCRIPT ANSWERS  Relationship to Patient?  Self

## 2020-08-19 ENCOUNTER — CARE COORDINATION (OUTPATIENT)
Dept: CARE COORDINATION | Age: 60
End: 2020-08-19

## 2020-08-27 ENCOUNTER — TELEPHONE (OUTPATIENT)
Dept: PRIMARY CARE CLINIC | Age: 60
End: 2020-08-27

## 2020-08-27 NOTE — TELEPHONE ENCOUNTER
----- Message from Roseann Torres sent at 8/27/2020  9:09 AM EDT -----  Subject: Message to Provider    QUESTIONS  Information for Provider? Patient is having severe sinus issues and would   like to know if she should be seen or if there is something that can be   called in.  ---------------------------------------------------------------------------  --------------  CALL BACK INFO  What is the best way for the office to contact you? OK to leave message on   voicemail  Preferred Call Back Phone Number? [phone]  ---------------------------------------------------------------------------  --------------  SCRIPT ANSWERS  Relationship to Patient?  Self

## 2020-08-28 ENCOUNTER — VIRTUAL VISIT (OUTPATIENT)
Dept: PRIMARY CARE CLINIC | Age: 60
End: 2020-08-28
Payer: COMMERCIAL

## 2020-08-28 PROCEDURE — 99213 OFFICE O/P EST LOW 20 MIN: CPT | Performed by: FAMILY MEDICINE

## 2020-08-28 RX ORDER — AMOXICILLIN AND CLAVULANATE POTASSIUM 875; 125 MG/1; MG/1
1 TABLET, FILM COATED ORAL 2 TIMES DAILY
Qty: 20 TABLET | Refills: 0 | Status: SHIPPED | OUTPATIENT
Start: 2020-08-28 | End: 2020-09-07

## 2020-08-28 RX ORDER — FLUCONAZOLE 150 MG/1
150 TABLET ORAL
Qty: 2 TABLET | Refills: 0 | Status: SHIPPED | OUTPATIENT
Start: 2020-08-28 | End: 2020-09-03

## 2020-08-28 ASSESSMENT — ENCOUNTER SYMPTOMS
TROUBLE SWALLOWING: 0
SINUS PAIN: 1
SORE THROAT: 1
VOICE CHANGE: 1
RHINORRHEA: 1
COUGH: 0
SHORTNESS OF BREATH: 0
SINUS PRESSURE: 1

## 2020-08-28 NOTE — PROGRESS NOTES
[address] [facility] [address] PRIMARY CARE  [address] [place] [id number]  Dept: [phone]  Dept Fax: [phone]     8/28/2020      Angelique Martin   1960     Chief Complaint   Patient presents with    Sinus Problem       HPI    Pt encounter today completed virtual visit using doxy. me platform. Services were provided through a video synchronous discussion virtually to substitute for in-person clinic visit. Patient and provider were located at their individual homes. Patient seen today for c/o left sinus pressure/pain and nasal congestion with PND/sore throat. Symptoms initially started 4 weeks ago. Went to the ER and was given rx for zpack. She waited to take this until ~ 8/12/20. She initially felt better but then within 2 days, sx returned and worse. No fever. She has a h/o sinus issues and has required sinus procedure in the past due to persistent infection. Prior to Visit Medications    Medication Sig Taking? Authorizing Provider   oxymetazoline (12 HOUR NASAL SPRAY) 0.05 % nasal spray 2 sprays by Nasal route 2 times daily as needed for Congestion (or to stop nose bleed) Yes Sheila Perez MD   atorvastatin (LIPITOR) 10 MG tablet TAKE 1 TABLET BY MOUTH ONE TIME A DAY Yes Isabel Rico, DO   ibuprofen (ADVIL;MOTRIN) 800 MG tablet Take 1 tablet by mouth 2 times daily as needed for Pain (.  Take with food) Yes Isabel Do, DO   blood glucose monitor kit and supplies Test 1-3 times a day   as needed for symptoms of irregular glucose. DX: E11.9. please provide meter that is accepted by patient's insurance. Yes Isabel Elder, DO   Lancet Device MISC Test 1-3 times a day as needed for symptoms of irregular glucose. DX: E11.9. please provide device accepted by patient's insurance. Yes Isabel Rico, DO   Lancets MISC Test 1-3 times a day as needed for symptoms of irregular glucose.  DX: E11.9 please provide lancets accepted by caregiver was present when appropriate. Due to this being a TeleHealth encounter (During [facility] [facility] emergency), evaluation of the following organ systems was limited: Vitals/Constitutional/EENT/Resp/CV/GI//MS/Neuro/Skin/Heme-Lymph-Imm. Pursuant to the emergency declaration under the [address] [place] [address], [address] [place] [address] and the [employer] Act, this Virtual Visit was conducted with patient's (and/or legal guardian's) consent, to reduce the patient's risk of exposure to COVID-19 and provide necessary medical care. The patient (and/or legal guardian) has also been advised to contact this office for worsening conditions or problems, and seek emergency medical treatment and/or call 911 if deemed necessary.

## 2020-09-02 ENCOUNTER — TELEPHONE (OUTPATIENT)
Dept: PRIMARY CARE CLINIC | Age: 60
End: 2020-09-02

## 2020-09-02 NOTE — TELEPHONE ENCOUNTER
PT CALLING SAYING SHE DID A VIDEO VISIT LAST WEEK AND TODAY IS MISERABLE    SHE SAYS AMOXICILLIN WAS ORDERED, SHE CAN TELL IT IS WORKING BECAUSE SHE FEELS BETTER.  BUT, SHE CANNOT BREATHE THRU HER NOSE. HAS TRIED WARM COMPRESSES TO HER FACE, TRIED NASAL SPRAY, AND SALINE SOLUTION WITH NO SUCCESS. SHE SAYS SHE IS BLOWING \"BIG GOBS\" OF GREEN.     SHE SAYS NOW HER GLANDS ARE SWOLLEN, HARD TO SWALLOW, EARS TINGLING AND NO SLEEP IN 3 DAYS    SHE SAYS SHE IS MISERABLE AND WANTS TO KNOW WHAT TO DO

## 2020-09-02 NOTE — TELEPHONE ENCOUNTER
Okay, would she like to come in for a steroid shot? This will unfortunately raise her blood sugars, but will give her some relief.

## 2020-09-02 NOTE — TELEPHONE ENCOUNTER
Spoke with patient she states that she is using Afrin, Flonase and saline rinse. States is is causing her to be swollen. She has runny eyes and nasal drip. Using double dose of benedryl to dry herself up. Then complains her mucous is so thick it is hard to blow out. She has an appointment with her new ENT (her last one retired) on 9.9.20.   she wonders  If you have any other suggestions.

## 2020-09-02 NOTE — TELEPHONE ENCOUNTER
Okay, I would recommend OTC afrin to help with the nasal congestion. I also think she should go back to ENT.  I have placed referral.

## 2020-09-03 NOTE — TELEPHONE ENCOUNTER
Patient would like to have the steroid shot. She will be here tomorrow at 9:00am.  What should we give her? At present we only have depo medrol 40 mg.

## 2020-09-04 ENCOUNTER — NURSE ONLY (OUTPATIENT)
Dept: PRIMARY CARE CLINIC | Age: 60
End: 2020-09-04
Payer: COMMERCIAL

## 2020-09-04 PROCEDURE — 96372 THER/PROPH/DIAG INJ SC/IM: CPT | Performed by: FAMILY MEDICINE

## 2020-09-04 RX ORDER — METHYLPREDNISOLONE ACETATE 40 MG/ML
40 INJECTION, SUSPENSION INTRA-ARTICULAR; INTRALESIONAL; INTRAMUSCULAR; SOFT TISSUE ONCE
Status: COMPLETED | OUTPATIENT
Start: 2020-09-04 | End: 2020-09-04

## 2020-09-04 RX ADMIN — METHYLPREDNISOLONE ACETATE 40 MG: 40 INJECTION, SUSPENSION INTRA-ARTICULAR; INTRALESIONAL; INTRAMUSCULAR; SOFT TISSUE at 10:32

## 2020-09-08 ENCOUNTER — APPOINTMENT (OUTPATIENT)
Dept: CT IMAGING | Age: 60
End: 2020-09-08
Payer: MEDICARE

## 2020-09-08 ENCOUNTER — TELEPHONE (OUTPATIENT)
Dept: PRIMARY CARE CLINIC | Age: 60
End: 2020-09-08

## 2020-09-08 ENCOUNTER — HOSPITAL ENCOUNTER (EMERGENCY)
Age: 60
Discharge: HOME | End: 2020-09-08
Attending: EMERGENCY MEDICINE
Payer: MEDICARE

## 2020-09-08 ENCOUNTER — NURSE TRIAGE (OUTPATIENT)
Dept: OTHER | Facility: CLINIC | Age: 60
End: 2020-09-08

## 2020-09-08 VITALS
SYSTOLIC BLOOD PRESSURE: 147 MMHG | OXYGEN SATURATION: 95 % | RESPIRATION RATE: 16 BRPM | HEIGHT: 63 IN | DIASTOLIC BLOOD PRESSURE: 84 MMHG | HEART RATE: 86 BPM | WEIGHT: 214 LBS | BODY MASS INDEX: 37.92 KG/M2 | TEMPERATURE: 98.2 F

## 2020-09-08 LAB
ALBUMIN SERPL-MCNC: 4.2 G/DL (ref 3.4–5)
ALP BLD-CCNC: 114 U/L (ref 40–129)
ALT SERPL-CCNC: 19 U/L (ref 10–40)
ANION GAP SERPL CALCULATED.3IONS-SCNC: 13 MMOL/L (ref 3–16)
AST SERPL-CCNC: 22 U/L (ref 15–37)
BASOPHILS ABSOLUTE: 0.3 K/UL (ref 0–0.2)
BASOPHILS RELATIVE PERCENT: 1.3 %
BILIRUB SERPL-MCNC: 0.4 MG/DL (ref 0–1)
BILIRUBIN DIRECT: <0.2 MG/DL (ref 0–0.3)
BILIRUBIN, INDIRECT: NORMAL MG/DL (ref 0–1)
BUN BLDV-MCNC: 10 MG/DL (ref 7–20)
CALCIUM SERPL-MCNC: 9.5 MG/DL (ref 8.3–10.6)
CHLORIDE BLD-SCNC: 99 MMOL/L (ref 99–110)
CO2: 24 MMOL/L (ref 21–32)
CREAT SERPL-MCNC: 0.5 MG/DL (ref 0.6–1.2)
EOSINOPHILS ABSOLUTE: 0.6 K/UL (ref 0–0.6)
EOSINOPHILS RELATIVE PERCENT: 3.1 %
GFR AFRICAN AMERICAN: >60
GFR NON-AFRICAN AMERICAN: >60
GLUCOSE BLD-MCNC: 175 MG/DL (ref 70–99)
GLUCOSE BLD-MCNC: 190 MG/DL (ref 70–99)
HCT VFR BLD CALC: 39.6 % (ref 36–48)
HEMOGLOBIN: 12.7 G/DL (ref 12–16)
LIPASE: 20 U/L (ref 13–60)
LYMPHOCYTES ABSOLUTE: 2.9 K/UL (ref 1–5.1)
LYMPHOCYTES RELATIVE PERCENT: 13.5 %
MCH RBC QN AUTO: 27 PG (ref 26–34)
MCHC RBC AUTO-ENTMCNC: 32.2 G/DL (ref 31–36)
MCV RBC AUTO: 83.8 FL (ref 80–100)
MONOCYTES ABSOLUTE: 1.2 K/UL (ref 0–1.3)
MONOCYTES RELATIVE PERCENT: 5.7 %
NEUTROPHILS ABSOLUTE: 16.2 K/UL (ref 1.7–7.7)
NEUTROPHILS RELATIVE PERCENT: 76.4 %
PDW BLD-RTO: 13.6 % (ref 12.4–15.4)
PERFORMED ON: ABNORMAL
PLATELET # BLD: 299 K/UL (ref 135–450)
PMV BLD AUTO: 10.2 FL (ref 5–10.5)
POTASSIUM REFLEX MAGNESIUM: 4.4 MMOL/L (ref 3.5–5.1)
RBC # BLD: 4.72 M/UL (ref 4–5.2)
SODIUM BLD-SCNC: 136 MMOL/L (ref 136–145)
TOTAL PROTEIN: 7.3 G/DL (ref 6.4–8.2)
WBC # BLD: 21.2 K/UL (ref 4–11)

## 2020-09-08 PROCEDURE — 74177 CT ABD & PELVIS W/CONTRAST: CPT

## 2020-09-08 PROCEDURE — 6360000002 HC RX W HCPCS: Performed by: STUDENT IN AN ORGANIZED HEALTH CARE EDUCATION/TRAINING PROGRAM

## 2020-09-08 PROCEDURE — 6370000000 HC RX 637 (ALT 250 FOR IP): Performed by: STUDENT IN AN ORGANIZED HEALTH CARE EDUCATION/TRAINING PROGRAM

## 2020-09-08 PROCEDURE — 96374 THER/PROPH/DIAG INJ IV PUSH: CPT

## 2020-09-08 PROCEDURE — 2580000003 HC RX 258: Performed by: STUDENT IN AN ORGANIZED HEALTH CARE EDUCATION/TRAINING PROGRAM

## 2020-09-08 PROCEDURE — 80048 BASIC METABOLIC PNL TOTAL CA: CPT

## 2020-09-08 PROCEDURE — 96375 TX/PRO/DX INJ NEW DRUG ADDON: CPT

## 2020-09-08 PROCEDURE — 99284 EMERGENCY DEPT VISIT MOD MDM: CPT

## 2020-09-08 PROCEDURE — 85025 COMPLETE CBC W/AUTO DIFF WBC: CPT

## 2020-09-08 PROCEDURE — 82272 OCCULT BLD FECES 1-3 TESTS: CPT

## 2020-09-08 PROCEDURE — 80076 HEPATIC FUNCTION PANEL: CPT

## 2020-09-08 PROCEDURE — 6360000004 HC RX CONTRAST MEDICATION: Performed by: STUDENT IN AN ORGANIZED HEALTH CARE EDUCATION/TRAINING PROGRAM

## 2020-09-08 PROCEDURE — 83690 ASSAY OF LIPASE: CPT

## 2020-09-08 RX ORDER — AMOXICILLIN AND CLAVULANATE POTASSIUM 875; 125 MG/1; MG/1
1 TABLET, FILM COATED ORAL EVERY 8 HOURS
Qty: 20 TABLET | Refills: 0 | Status: SHIPPED | OUTPATIENT
Start: 2020-09-08 | End: 2020-09-15

## 2020-09-08 RX ORDER — ONDANSETRON 2 MG/ML
4 INJECTION INTRAMUSCULAR; INTRAVENOUS ONCE
Status: COMPLETED | OUTPATIENT
Start: 2020-09-08 | End: 2020-09-08

## 2020-09-08 RX ORDER — OXYCODONE HYDROCHLORIDE 5 MG/1
5 TABLET ORAL EVERY 6 HOURS PRN
Qty: 10 TABLET | Refills: 0 | Status: SHIPPED | OUTPATIENT
Start: 2020-09-08 | End: 2020-09-09

## 2020-09-08 RX ORDER — AMOXICILLIN AND CLAVULANATE POTASSIUM 875; 125 MG/1; MG/1
1 TABLET, FILM COATED ORAL ONCE
Status: COMPLETED | OUTPATIENT
Start: 2020-09-08 | End: 2020-09-08

## 2020-09-08 RX ORDER — SODIUM CHLORIDE, SODIUM LACTATE, POTASSIUM CHLORIDE, AND CALCIUM CHLORIDE .6; .31; .03; .02 G/100ML; G/100ML; G/100ML; G/100ML
1000 INJECTION, SOLUTION INTRAVENOUS ONCE
Status: COMPLETED | OUTPATIENT
Start: 2020-09-08 | End: 2020-09-08

## 2020-09-08 RX ADMIN — SODIUM CHLORIDE, SODIUM LACTATE, POTASSIUM CHLORIDE, AND CALCIUM CHLORIDE 1000 ML: .6; .31; .03; .02 INJECTION, SOLUTION INTRAVENOUS at 10:50

## 2020-09-08 RX ADMIN — HYDROMORPHONE HYDROCHLORIDE 0.5 MG: 1 INJECTION, SOLUTION INTRAMUSCULAR; INTRAVENOUS; SUBCUTANEOUS at 10:40

## 2020-09-08 RX ADMIN — ONDANSETRON 4 MG: 2 INJECTION INTRAMUSCULAR; INTRAVENOUS at 10:40

## 2020-09-08 RX ADMIN — IOPAMIDOL 80 ML: 755 INJECTION, SOLUTION INTRAVENOUS at 11:21

## 2020-09-08 RX ADMIN — AMOXICILLIN AND CLAVULANATE POTASSIUM 1 TABLET: 875; 125 TABLET, FILM COATED ORAL at 12:06

## 2020-09-08 ASSESSMENT — PAIN SCALES - GENERAL
PAINLEVEL_OUTOF10: 9
PAINLEVEL_OUTOF10: 10
PAINLEVEL_OUTOF10: 9

## 2020-09-08 ASSESSMENT — ENCOUNTER SYMPTOMS
DIARRHEA: 1
ABDOMINAL PAIN: 1
BACK PAIN: 0
NAUSEA: 0
SORE THROAT: 0
VOMITING: 0
SINUS PRESSURE: 1
COUGH: 0
BLOOD IN STOOL: 0
CHEST TIGHTNESS: 0
RHINORRHEA: 1
SHORTNESS OF BREATH: 0
SINUS PAIN: 1
EYES NEGATIVE: 1
CONSTIPATION: 0

## 2020-09-08 ASSESSMENT — PAIN DESCRIPTION - PAIN TYPE
TYPE: ACUTE PAIN
TYPE: ACUTE PAIN

## 2020-09-08 ASSESSMENT — PAIN DESCRIPTION - LOCATION
LOCATION: ABDOMEN
LOCATION: ABDOMEN

## 2020-09-08 ASSESSMENT — PAIN DESCRIPTION - DESCRIPTORS: DESCRIPTORS: CRAMPING

## 2020-09-08 ASSESSMENT — PAIN DESCRIPTION - ORIENTATION: ORIENTATION: RIGHT;LEFT;LOWER

## 2020-09-08 NOTE — ED PROVIDER NOTES
ED Attending Attestation Note     Date of evaluation: 9/8/2020    This patient was seen by the resident. I have seen and examined the patient, agree with the workup, evaluation, management and diagnosis. The care plan has been discussed. My assessment reveals patient has diffuse lower abdominal pain mostly left-sided. She finished Augmentin yesterday and her pain started after she stopped her Augmentin.      Matias Brown MD  09/08/20 1038

## 2020-09-08 NOTE — TELEPHONE ENCOUNTER
ABDOMINAL PAIN [facility]    Patient called pre-service center [facility]) to schedule appointment, with red flag complaint, transferred to RN access for triage. See above questions and answers. Caller talking full sentences and sounds in a lot of pain on phone. Discussed disposition and patient agreeable. Discussed potential consequences for not following disposition recommendation. Aware to call back with any concerns or persistent, worsening, or new symptoms develop. Please do not respond to the triage nurse through this encounter. Any subsequent communication should be directly with the patient.

## 2020-09-08 NOTE — ED PROVIDER NOTES
[address] [facility] RESIDENT NOTE       Date of evaluation: 9/8/2020    Chief Complaint     Diarrhea and Abdominal Pain      History of Present Illness     Broderick Ruffin is a 62 y.o. female with a history of allergic rhinitis status post sinus surgery approximately 7 years ago, hypertension, hyperlipidemia, DEANA, and diabetes who presents with 2 chief complaints. Her initial complaint is approximately 2 months of congestion. She reports that she has seen her primary care doctor twice and has been on 2 rounds of antibiotics including a Z-Adrián and Augmentin. She reports that she has had no relief and continues to have congestion and drainage of mucus from her nose. She denies fevers. She denies sore throat. She does report some fullness in her ears. She reports that she is seeing an ENT doctor tomorrow to have her sinuses evaluated. Review of Systems     Review of Systems   Constitutional: Positive for appetite change and chills. Negative for activity change, fatigue and fever. HENT: Positive for congestion, postnasal drip, rhinorrhea, sinus pressure and sinus pain. Negative for ear discharge, ear pain, hearing loss, sore throat and tinnitus. Eyes: Negative. Respiratory: Negative for cough, chest tightness and shortness of breath. Cardiovascular: Negative for chest pain and leg swelling. Gastrointestinal: Positive for abdominal pain and diarrhea. Negative for blood in stool, constipation, nausea and vomiting. Genitourinary: Negative for dysuria and frequency. Musculoskeletal: Negative for back pain and myalgias. Skin: Negative for pallor, rash and wound. Neurological: Negative for dizziness, weakness and headaches. All other systems reviewed and are negative.       Past Medical, Surgical, Family, and Social History     She has a past medical history of Allergic rhinitis, HTN (hypertension), Hyperlipidemia, Obstructive sleep apnea (adult) (pediatric), Osteoarthritis, and Type II or unspecified type diabetes mellitus without mention of complication, not stated as uncontrolled. She has a past surgical history that includes joint replacement (2007); Rotator cuff repair (2009); Colon surgery (2008); shoulder surgery (1990's); knee surgery (1990's); Neck surgery (05/12/2011); Umbilical hernia repair (8/2010); Ovary removal; Sinus endoscopy (9-15-14); Tonsillectomy and adenoidectomy; Tubal ligation; Total knee arthroplasty (Left, 03/09/2010); dupuytrens contracture surgery (Left, 1/29/2019); hernia repair; and Hysterectomy, total abdominal.  Her family history includes Diabetes in her father and son; Heart Disease in her father and sister; High Blood Pressure in her father; High Cholesterol in her father; Kidney Disease in her brother; Other in her son. She reports that she quit smoking about a year ago. Her smoking use included cigars. She has a 5.00 pack-year smoking history. She has never used smokeless tobacco. She reports current alcohol use. She reports previous drug use. Medications     Previous Medications    ACCU-CHEK SOFTCLIX LANCETS MISC    TEST BLOOD SUGAR TWO TIMES A DAY    AMLODIPINE (NORVASC) 5 MG TABLET    TAKE ONE TABLET BY MOUTH DAILY    ASPIRIN EC 81 MG EC TABLET    Take 81 mg by mouth daily. LAST DOSE 8-9-14    ATORVASTATIN (LIPITOR) 10 MG TABLET    TAKE 1 TABLET BY MOUTH ONE TIME A DAY    BLOOD GLUCOSE MONITOR KIT AND SUPPLIES    Test 1-3 times a day   as needed for symptoms of irregular glucose. DX: E11.9. please provide meter that is accepted by patient's insurance. BLOOD GLUCOSE MONITOR STRIPS    Test 1-3 times a day as needed for symptoms of irregular glucose. DX: E11.9 please provide strips that are accepted by patient's insurance.     BLOOD GLUCOSE MONITORING SUPPL (ACCU-CHEK DALE PLUS) W/DEVICE KIT    TEST BLOOD SUGAR TWO TIMES A DAY  E11.9    CALCIUM CARB-CHOLECALCIFEROL (CALTRATE 600+D) 600-800 MG-UNIT TABS    Take 1 tablet by mouth 2 times daily    CETIRIZINE (ZYRTEC ALLERGY) 10 MG TABLET    Take 1 tablet by mouth daily    FLUTICASONE (FLONASE) 50 MCG/ACT NASAL SPRAY    2 sprays by Nasal route daily    IBUPROFEN (ADVIL;MOTRIN) 800 MG TABLET    Take 1 tablet by mouth 2 times daily as needed for Pain (.  Take with food)    INSULIN GLARGINE (LANTUS SOLOSTAR) 100 UNIT/ML INJECTION PEN    Inject 30 Units into the skin nightly    INSULIN LISPRO (HUMALOG KWIKPEN) 100 UNIT/ML PEN    Inject 4 Units into the skin 3 times daily (before meals) PLUS CORRECTION SCALE PROVIDED    INSULIN PEN NEEDLE (PEN NEEDLES) 32G X 4 MM MISC    USE TO INJECT INSULIN FOUR TIMES DAILY    LANCET DEVICE MISC    Test 1-3 times a day as needed for symptoms of irregular glucose. DX: E11.9. please provide device accepted by patient's insurance. LANCETS MISC    Test 1-3 times a day as needed for symptoms of irregular glucose. DX: E11.9 please provide lancets accepted by patient's insurance. LOSARTAN (COZAAR) 100 MG TABLET    Take 1 tablet by mouth daily    METFORMIN (GLUCOPHAGE) 1000 MG TABLET    TAKE 1 TABLET TWICE A DAY WITH MEALS       Allergies     She is allergic to demerol; levaquin [levofloxacin in d5w]; morphine; and tramadol. Physical Exam     INITIAL VITALS: BP: (!) 121/91, Temp: 98.2 °F (36.8 °C), Pulse: 109, Resp: 18, SpO2: 95 %   Physical Exam  Vitals signs reviewed. Constitutional:       Appearance: She is well-developed. She is not toxic-appearing. HENT:      Head: Normocephalic and atraumatic. Mouth/Throat:      Mouth: Mucous membranes are moist.      Pharynx: No pharyngeal swelling or oropharyngeal exudate. Eyes:      General: No scleral icterus. Extraocular Movements: Extraocular movements intact. Pupils: Pupils are equal, round, and reactive to light. Cardiovascular:      Rate and Rhythm: Normal rate and regular rhythm. Pulmonary:      Effort: Pulmonary effort is normal. No respiratory distress.       Breath sounds: Normal breath sounds. No wheezing. Abdominal:      General: Bowel sounds are normal. There is no distension. Palpations: Abdomen is soft. Comments: LLQ > RLQ tenderness with no rebound. Genitourinary:     Rectum: Normal. No mass or tenderness. Skin:     General: Skin is warm and dry. Neurological:      General: No focal deficit present. Mental Status: She is alert and oriented to person, place, and time. DiagnosticResults     EKG not done. RADIOLOGY:  CT ABDOMEN PELVIS W IV CONTRAST Additional Contrast? None   Final Result   1. Uncomplicated colonic diverticulitis involving the distal aspect of the descending colon. 2. Hepatic steatosis. 3. Bilateral renal cortical cysts.              LABS:   Results for orders placed or performed during the hospital encounter of 49/11/91   Basic Metabolic Panel w/ Reflex to MG   Result Value Ref Range    Sodium 136 136 - 145 mmol/L    Potassium reflex Magnesium 4.4 3.5 - 5.1 mmol/L    Chloride 99 99 - 110 mmol/L    CO2 24 21 - 32 mmol/L    Anion Gap 13 3 - 16    Glucose 190 (H) 70 - 99 mg/dL    BUN 10 7 - 20 mg/dL    CREATININE 0.5 (L) 0.6 - 1.2 mg/dL    GFR Non-African American >60 >60    GFR African American >60 >60    Calcium 9.5 8.3 - 10.6 mg/dL   CBC Auto Differential   Result Value Ref Range    WBC 21.2 (H) 4.0 - 11.0 K/uL    RBC 4.72 4.00 - 5.20 M/uL    Hemoglobin 12.7 12.0 - 16.0 g/dL    Hematocrit 39.6 36.0 - 48.0 %    MCV 83.8 80.0 - 100.0 fL    MCH 27.0 26.0 - 34.0 pg    MCHC 32.2 31.0 - 36.0 g/dL    RDW 13.6 12.4 - 15.4 %    Platelets 886 574 - 931 K/uL    MPV 10.2 5.0 - 10.5 fL    Neutrophils % 76.4 %    Lymphocytes % 13.5 %    Monocytes % 5.7 %    Eosinophils % 3.1 %    Basophils % 1.3 %    Neutrophils Absolute 16.2 (H) 1.7 - 7.7 K/uL    Lymphocytes Absolute 2.9 1.0 - 5.1 K/uL    Monocytes Absolute 1.2 0.0 - 1.3 K/uL    Eosinophils Absolute 0.6 0.0 - 0.6 K/uL    Basophils Absolute 0.3 (H) 0.0 - 0.2 K/uL   Hepatic Function Panel   Result Value Ref Range    Total Protein 7.3 6.4 - 8.2 g/dL    Alb 4.2 3.4 - 5.0 g/dL    Alkaline Phosphatase 114 40 - 129 U/L    ALT 19 10 - 40 U/L    AST 22 15 - 37 U/L    Total Bilirubin 0.4 0.0 - 1.0 mg/dL    Bilirubin, Direct <0.2 0.0 - 0.3 mg/dL    Bilirubin, Indirect see below 0.0 - 1.0 mg/dL   Lipase   Result Value Ref Range    Lipase 20.0 13.0 - 60.0 U/L   POCT Glucose   Result Value Ref Range    POC Glucose 175 (H) 70 - 99 mg/dl    Performed on ACCU-CHEK        ED BEDSIDE ULTRASOUND:  None    RECENT VITALS:  BP: (!) 159/82, Temp: 98.2 °F (36.8 °C), Pulse: 86,Resp: 16, SpO2: 94 %     Procedures     None    ED Course     Nursing Notes, Past Medical Hx, Past Surgical Hx, Social Hx, Allergies, and Family Hx were reviewed. The patient was given the followingmedications:  Orders Placed This Encounter   Medications    ondansetron (ZOFRAN) injection 4 mg    HYDROmorphone (DILAUDID) injection 0.5 mg    lactated ringers bolus    iopamidol (ISOVUE-370) 76 % injection 80 mL    amoxicillin-clavulanate (AUGMENTIN) 875-125 MG per tablet 1 tablet    amoxicillin-clavulanate (AUGMENTIN) 875-125 MG per tablet     Sig: Take 1 tablet by mouth every 8 hours for 7 days     Dispense:  20 tablet     Refill:  0    oxyCODONE (ROXICODONE) 5 MG immediate release tablet     Sig: Take 1 tablet by mouth every 6 hours as needed for Pain for up to 1 day. WARNING:  May cause drowsiness. May impair ability to operate vehicles or machinery. Do not use in combination with alcohol. Dispense:  10 tablet     Refill:  0       CONSULTS:  None    MEDICAL DECISION MAKING / ASSESSMENT / Wendy Emely is a 62 y.o. female with a history of diverticulitis currently status post 2 rounds of antibiotics for sinusitis he presents with diarrhea and abdominal pain for the past 24 hours. She was well-appearing on exam with normal vital signs. She did have lower abdominal tenderness. White count returned at 21.   This, accommodation with her recent antibiotic use prompt of the decision to evaluate for abscess in the setting of possible diverticulitis. CT scan was ordered and showed no obvious intradermal abscess. It did show uncomplicated diverticulitis. I believe that her white count is probably related to both the diverticulitis and her steroid use for her sinusitis. She has no other systemic signs of infection including no fever, and normal vital signs. On reevaluation after 1 L of fluids, pain medication, nausea medication, the patient reported improvement in her pain. She does endorse some continued lower abdominal pain with movement. Have any diarrhea while in the department so we're unable to collect c diff. Given her known diverticulitis, I do think that C. diff is less likely. Given the patient wishes on Augmentin, chart review was performed to determine the best possible antibiotic choice. Given her symptoms started yesterday, it is unclear whether or not she has failed Augmentin or if her diverticulitis started after the last Augmentin dose. She did also have a Z-Adrián earlier this month. Given her recent antibiotic usage and desired to decrease her risks for C. diff, plan will be for 7 days of Augmentin to treat her diverticulitis  I informed the patient that she should return to the emergency department with any fevers, continued abdominal pain, or any other concerning symptoms. She was understanding of strict return precautions. Renal panel was remarkable for slight hyperglycemia likely related to her steroid use. Her creatinine was normal.    She has an appointment with ENT tomorrow to discuss her sinusitis. I also told her to call her primary doctor to be seen later this week for reevaluation of her abdominal pain. She was understanding and was discharged with a prescription for Augmentin and pain medication. This patient was also evaluated by the attending physician.  All care plans

## 2020-09-08 NOTE — TELEPHONE ENCOUNTER
PT CALLED EARLIER TODAY FROM [facility] SAYING SHE WAS THERE AND DX'D WITH DIVERTICULITIS, GIVEN ANTIBIOTICS AND PAIN MEDS.    SHE SAYS SHE WAS TOLD TO FOLLOW UP WITH PCP IN 3 DAYS    TOLD HER I HAD NO AVAILABILITY UNTIL NEXT WEEK AND NEEDED TO SPEAK WITH

## 2020-09-08 NOTE — ED TRIAGE NOTES
Pt is alert and oriented times four. Pt here today for diarrhea and lower abdominal pain. Pt states she had diarrhea all day yesterday. Pt states she started to get dark stool later on during the day. Pt rates her pain a 9 out of 10 to her lower abdominal area. Pt states her LBM was this am. Pt's abdomen is soft and tender to palpation. Pt also having congestion that she has had for 2 months. Pt states she will be seeing the ENT MD tomorrow. Pt placed on monitor. Call light in reach will continue to monitor.

## 2020-09-09 ENCOUNTER — CARE COORDINATION (OUTPATIENT)
Dept: CARE COORDINATION | Age: 60
End: 2020-09-09

## 2020-09-09 ENCOUNTER — OFFICE VISIT (OUTPATIENT)
Dept: ENT CLINIC | Age: 60
End: 2020-09-09
Payer: COMMERCIAL

## 2020-09-09 VITALS — TEMPERATURE: 97.8 F | DIASTOLIC BLOOD PRESSURE: 92 MMHG | SYSTOLIC BLOOD PRESSURE: 146 MMHG | HEART RATE: 94 BPM

## 2020-09-09 PROCEDURE — 99213 OFFICE O/P EST LOW 20 MIN: CPT | Performed by: OTOLARYNGOLOGY

## 2020-09-09 RX ORDER — METHYLPREDNISOLONE 4 MG/1
4 TABLET ORAL SEE ADMIN INSTRUCTIONS
Qty: 1 KIT | Refills: 0 | Status: SHIPPED | OUTPATIENT
Start: 2020-09-09 | End: 2020-09-28 | Stop reason: ALTCHOICE

## 2020-09-09 RX ORDER — MONTELUKAST SODIUM 10 MG/1
10 TABLET ORAL NIGHTLY
Qty: 30 TABLET | Refills: 1 | Status: SHIPPED | OUTPATIENT
Start: 2020-09-09 | End: 2020-11-25 | Stop reason: SDUPTHER

## 2020-09-09 NOTE — PROGRESS NOTES
Over the course of the past couple of months, the patient has been having significant problems relative to her sinuses with marked nasal congestion particularly severe on the left side. She has had no fever. She saw her PCP initially and was treated with Augmentin with some degree of improvement following a trial of a Z-Adrián which did not improve the situation. However, symptoms recurred and more recently she was diagnosed with diverticulitis for which she had been now placed on another course of Augmentin. She continues to have significant facial pressure and pain greater on the left than the right. She is tried Allegra as well as Zyrtec and Astelin nasal spray. She has been using Mucinex and occasionally irrigating her sinus. She does not smoke. Currently, she is in no obvious acute distress. Ear examination reveals normal-appearing tympanic membranes and ear canals. Oral examination is unremarkable. The neck is free of adenopathy, mass, thyroid enlargement. Nasal mucosa treated with cotton pledgets  impregnated with Afrin and lidocaine placed in middle meatuses against turbinates. Pledgets left in place for five minutes and removed enabling enhanced visualization. The exam revealed positive edema of mucosa. it was positive for erythema indicative of infection. There was not evidence of deviation of the septum which was not significant. There were not polyps present. .There were not other masses present. The turbinates were not enlarged beyond normal.  She did relate that several years ago she had undergone what appears to have been an FBSS with improvement initially. Findings currently are those of a sinus infection likely greater on the left than the right. Augmentin should be appropriate but we would like to add a Medrol Dosepak along with a sinus wash and continuation of nasal spray but the addition also of Singulair. Sinus CT scan will be obtained as well.   She may well need allergy testing if the results indicate more of an allergic problem. It is also suggested that she monitor her blood sugar levels little more closely while she is on the Medrol.

## 2020-09-09 NOTE — TELEPHONE ENCOUNTER
I CALLED AND SPOKE WITH PT.  SHE WILL DO VIRTUAL VISIT TOMORROW, THURS 9-10, AT 1030AM PER NOTE BELOW

## 2020-09-10 ENCOUNTER — VIRTUAL VISIT (OUTPATIENT)
Dept: PRIMARY CARE CLINIC | Age: 60
End: 2020-09-10
Payer: COMMERCIAL

## 2020-09-10 PROCEDURE — 99213 OFFICE O/P EST LOW 20 MIN: CPT | Performed by: FAMILY MEDICINE

## 2020-09-10 ASSESSMENT — ENCOUNTER SYMPTOMS
DIARRHEA: 0
SINUS PRESSURE: 1
ABDOMINAL PAIN: 1
VOMITING: 0
NAUSEA: 1
BLOOD IN STOOL: 0
COUGH: 0
CHEST TIGHTNESS: 0

## 2020-09-10 NOTE — PROGRESS NOTES
[address] PRIMARY CARE  [address] [place] [id number]  Dept: [phone]  Dept Fax: [phone]     9/10/2020      Toniann Pallas   1960     Chief Complaint   Patient presents with    ED Follow-up       HPI    Pt encounter today completed virtual visit using doxy. me platform. Services were provided through a video synchronous discussion virtually to substitute for in-person clinic visit. Patient and provider were located at their individual homes. Patient seen today for ER follow up. Recently seen and treated for recurrent sinusitis with Augmentin on 8/28/20. Seen by ENT x 1. Added steroid pack. Planning for CT scan of sinuses. In the interim, she developed LLQ abdominal pan and loose stools. Went to ER. CT scan with mild uncomplicated diverticulitis. Given additional course of augmentin to continue. She is improved. Pain is less and loose stools have stopped. Appetite is low, mostly doing liquids and yogurt. No vomiting or fever. Prior to Visit Medications    Medication Sig Taking? Authorizing Provider   methylPREDNISolone (MEDROL, SHERINE,) 4 MG tablet Take 1 tablet by mouth See Admin Instructions Take by mouth. Yes Nancy Kong MD   montelukast (SINGULAIR) 10 MG tablet Take 1 tablet by mouth nightly Yes Nancy Kong MD   amoxicillin-clavulanate (AUGMENTIN) 875-125 MG per tablet Take 1 tablet by mouth every 8 hours for 7 days Yes Stoney Shahid MD   atorvastatin (LIPITOR) 10 MG tablet TAKE 1 TABLET BY MOUTH ONE TIME A DAY Yes Isabel Rico, DO   ibuprofen (ADVIL;MOTRIN) 800 MG tablet Take 1 tablet by mouth 2 times daily as needed for Pain (.  Take with food) Yes Flor Altamirano, DO   blood glucose monitor kit and supplies Test 1-3 times a day   as needed for symptoms of irregular glucose. DX: E11.9. please provide meter that is accepted by patient's insurance.  Yes Isabel Alexandra, DO   Lancet Device MISC Test 1-3 times a day as needed for symptoms of irregular glucose. DX: E11.9. please provide device accepted by patient's insurance. Yes Isabel Rico, DO   Lancets MISC Test 1-3 times a day as needed for symptoms of irregular glucose. DX: E11.9 please provide lancets accepted by patient's insurance. Yes [address], DO   blood glucose monitor strips Test 1-3 times a day as needed for symptoms of irregular glucose. DX: E11.9 please provide strips that are accepted by patient's insurance. Yes [address], DO   amLODIPine (NORVASC) 5 MG tablet TAKE ONE TABLET BY MOUTH DAILY Yes Isabel Rico, DO   losartan (COZAAR) 100 MG tablet Take 1 tablet by mouth daily Yes [address], DO   fluticasone (FLONASE) 50 MCG/ACT nasal spray 2 sprays by Nasal route daily Yes John Green DO   Insulin Pen Needle (PEN NEEDLES) 32G X 4 MM MISC USE TO INJECT INSULIN FOUR TIMES DAILY Yes John Green DO   Blood Glucose Monitoring Suppl (ACCU-CHEK DALE PLUS) w/Device KIT TEST BLOOD SUGAR TWO TIMES A DAY  E11.9 Yes John Green DO   insulin lispro (HUMALOG KWIKPEN) 100 UNIT/ML pen Inject 4 Units into the skin 3 times daily (before meals) PLUS CORRECTION SCALE PROVIDED Yes John Green DO   insulin glargine (LANTUS SOLOSTAR) 100 UNIT/ML injection pen Inject 30 Units into the skin nightly Yes John Green DO   metFORMIN (GLUCOPHAGE) 1000 MG tablet TAKE 1 TABLET TWICE A DAY WITH MEALS Yes John Green DO   ACCU-CHEK SOFTCLIX LANCETS MISC TEST BLOOD SUGAR TWO TIMES A DAY Yes John Green DO   Calcium Carb-Cholecalciferol (CALTRATE 600+D) 600-800 MG-UNIT TABS Take 1 tablet by mouth 2 times daily Yes Historical Provider, MD   cetirizine (ZYRTEC ALLERGY) 10 MG tablet Take 1 tablet by mouth daily Yes John Green DO   aspirin EC 81 MG EC tablet Take 81 mg by mouth daily.  LAST DOSE 8-9-14 Yes Leonora Smith,        Past Medical History:   Diagnosis Date    Allergic rhinitis  HTN (hypertension)     Hyperlipidemia     Obstructive sleep apnea (adult) (pediatric)     Non-compliant with CPAP    Osteoarthritis     Type II or unspecified type diabetes mellitus without mention of complication, not stated as uncontrolled         Social History     Tobacco Use    Smoking status: Former Smoker     Packs/day: 0.25     Years: 20.00     Pack years: 5.00     Types: Cigars     Last attempt to quit: 2019     Years since quittin.9    Smokeless tobacco: Never Used    Tobacco comment: was smoking 5 black and milds per day, but has not smoked in 4 days   Substance Use Topics    Alcohol use: Yes     Comment: 1 alcoholic beverage every other month    Drug use: Not Currently     Comment: H/o cocaine use        Past Surgical History:   Procedure Laterality Date    COLON SURGERY  2008    10\" removed - chronic constipation    DUPUYTRENS CONTRACTURE SURGERY Left 2019    LEFT 1ST DORSAL COMPARMENT DE' [address] performed by Chao Aguilar MD at [facility]      4x     HYSTERECTOMY, TOTAL ABDOMINAL      JOINT REPLACEMENT      Left knee partial    KNEE SURGERY  's    rt knee reconstructed    NECK SURGERY  2011    c 4,5,6,7 (ACDF)    OVARY REMOVAL      ROTATOR CUFF REPAIR      Left shoulder    SHOULDER SURGERY  's    lt & rt shoulder impingment    SINUS ENDOSCOPY  9-15-14    FUNCTIONAL ENDOSCOPIC SINUS SURGERY       TONSILLECTOMY AND ADENOIDECTOMY      TOTAL KNEE ARTHROPLASTY Left 2010    Eros to Total    TUBAL LIGATION      UMBILICAL HERNIA REPAIR  2010    Dr. Linsey Beltran        Allergies   Allergen Reactions    Demerol      ITCHING    Levaquin [Levofloxacin In D5w] Nausea Only    Morphine Itching    Tramadol      \"NO CLUE\"        Family History   Problem Relation Age of Onset    Diabetes Father     Heart Disease Father         CAD    High Blood Pressure Father     High Cholesterol Father     Kidney Disease Brother    Harshal Tomas Diabetes Son    Osvaldo Ayala Other Son         DEANA    Heart Disease Sister         1/2 sister   Osvaldo Ayala Rheum Arthritis Neg Hx     Osteoarthritis Neg Hx     Asthma Neg Hx     Breast Cancer Neg Hx     Cancer Neg Hx     Heart Failure Neg Hx     Hypertension Neg Hx     Migraines Neg Hx     Ovarian Cancer Neg Hx     Rashes/Skin Problems Neg Hx     Seizures Neg Hx     Stroke Neg Hx     Thyroid Disease Neg Hx         Patient's past medical history, surgical history, family history, medications, and allergies  were all reviewed and updated as appropriate today. Review of Systems   Constitutional: Positive for appetite change. Negative for fever and unexpected weight change. HENT: Positive for congestion and sinus pressure. Respiratory: Negative for cough and chest tightness. Cardiovascular: Negative for chest pain. Gastrointestinal: Positive for abdominal pain (improved) and nausea. Negative for blood in stool, diarrhea and vomiting. Vitals unable to obtain and physical exam is limited 2/2 virtual visit nature of this encounter. Physical Exam  Constitutional:       General: She is not in acute distress. Appearance: Normal appearance. She is not ill-appearing. Pulmonary:      Effort: Pulmonary effort is normal.   Neurological:      General: No focal deficit present. Mental Status: She is alert. Psychiatric:         Mood and Affect: Mood normal.         Assessment:  Encounter Diagnoses   Name Primary?  Diverticulitis of colon Yes    Chronic recurrent sinusitis        Plan:    - Clinically improved. Continue course of Augmentin + steroids for recurrent sinusitis. Shirleyann Soda, full liquid diet, advance as tolerated. - Has follow up with ENT scheduled. - In office visit 9/28/20.   - Monitor blood sugars with oral steroids given by ENT. Call for persistent elevations. New Prescriptions    No medications on file        No orders of the defined types were placed in this encounter.

## 2020-09-11 LAB — POC OCCULT BLOOD STOOL: NEGATIVE

## 2020-09-15 ENCOUNTER — TELEPHONE (OUTPATIENT)
Dept: PRIMARY CARE CLINIC | Age: 60
End: 2020-09-15

## 2020-09-15 NOTE — TELEPHONE ENCOUNTER
Patient is requesting refill of medication metphormin.   Patient also says she has had no control over her bladder for the last 3 to 4 days has had to use adult pull ups not sure if its the steroids she has been on wanted to be advised of this

## 2020-09-16 ENCOUNTER — HOSPITAL ENCOUNTER (OUTPATIENT)
Dept: CT IMAGING | Age: 60
Discharge: HOME | End: 2020-09-16
Payer: MEDICARE

## 2020-09-16 PROCEDURE — 70486 CT MAXILLOFACIAL W/O DYE: CPT

## 2020-09-21 ENCOUNTER — TELEPHONE (OUTPATIENT)
Dept: ENT CLINIC | Age: 60
End: 2020-09-21

## 2020-09-21 NOTE — TELEPHONE ENCOUNTER
Call to pt, patient verbalize understand the Ct result, will give us call in a week to schedule a allergy test.

## 2020-09-28 ENCOUNTER — OFFICE VISIT (OUTPATIENT)
Dept: PRIMARY CARE CLINIC | Age: 60
End: 2020-09-28
Payer: COMMERCIAL

## 2020-09-28 VITALS
BODY MASS INDEX: 37.34 KG/M2 | HEART RATE: 107 BPM | WEIGHT: 210.8 LBS | SYSTOLIC BLOOD PRESSURE: 128 MMHG | DIASTOLIC BLOOD PRESSURE: 78 MMHG | TEMPERATURE: 97.2 F | OXYGEN SATURATION: 99 %

## 2020-09-28 PROBLEM — M65.4 DE QUERVAIN'S TENOSYNOVITIS, LEFT: Status: RESOLVED | Noted: 2018-07-25 | Resolved: 2020-09-28

## 2020-09-28 PROBLEM — E66.01 MORBIDLY OBESE (HCC): Status: ACTIVE | Noted: 2020-09-28

## 2020-09-28 PROCEDURE — 3051F HG A1C>EQUAL 7.0%<8.0%: CPT | Performed by: FAMILY MEDICINE

## 2020-09-28 PROCEDURE — 90686 IIV4 VACC NO PRSV 0.5 ML IM: CPT | Performed by: FAMILY MEDICINE

## 2020-09-28 PROCEDURE — 99214 OFFICE O/P EST MOD 30 MIN: CPT | Performed by: FAMILY MEDICINE

## 2020-09-28 PROCEDURE — 90471 IMMUNIZATION ADMIN: CPT | Performed by: FAMILY MEDICINE

## 2020-09-28 RX ORDER — ATORVASTATIN CALCIUM 10 MG/1
TABLET, FILM COATED ORAL
Qty: 90 TABLET | Refills: 1 | Status: SHIPPED | OUTPATIENT
Start: 2020-09-28 | End: 2020-11-25 | Stop reason: SDUPTHER

## 2020-09-28 RX ORDER — INSULIN LISPRO 100 [IU]/ML
4 INJECTION, SOLUTION INTRAVENOUS; SUBCUTANEOUS
Qty: 5 PEN | Refills: 3 | Status: SHIPPED | OUTPATIENT
Start: 2020-09-28 | End: 2022-03-09

## 2020-09-28 ASSESSMENT — ENCOUNTER SYMPTOMS
TROUBLE SWALLOWING: 0
NAUSEA: 0
VOMITING: 0
COUGH: 0
CONSTIPATION: 0
DIARRHEA: 0
ABDOMINAL PAIN: 0
BLOOD IN STOOL: 0
CHEST TIGHTNESS: 0
SHORTNESS OF BREATH: 0

## 2020-09-28 ASSESSMENT — PATIENT HEALTH QUESTIONNAIRE - PHQ9
SUM OF ALL RESPONSES TO PHQ QUESTIONS 1-9: 0
SUM OF ALL RESPONSES TO PHQ9 QUESTIONS 1 & 2: 0
1. LITTLE INTEREST OR PLEASURE IN DOING THINGS: 0
2. FEELING DOWN, DEPRESSED OR HOPELESS: 0
SUM OF ALL RESPONSES TO PHQ QUESTIONS 1-9: 0

## 2020-09-28 NOTE — PROGRESS NOTES
[address] [facility] [address] PRIMARY CARE  [address] [place] [id number]  Dept: [phone]  Dept Fax: [phone]     9/28/2020      Jess Blocker   1960     Chief Complaint   Patient presents with    Diabetes     first in office visit       HPI  Pt comes in today for diabetes follow up. No acute concerns. BS have started improve since stopping the steroids. She has been dealing with chronic sinusitis. Following with ENT. Is undergoing allergy testing. Much improved at present. Recent flare of diverticulitis has resolved as well. Last a1c = 7.4% in July. She is using humalog for premeal and correction scale which has helped. Taking meds as directed. No side effects. Denies hypoglycemia. She will be traveling to [place] for the the month of November. PHQ-9 Total Score: 0 (9/28/2020  9:20 AM)       Prior to Visit Medications    Medication Sig Taking? Authorizing Provider   atorvastatin (LIPITOR) 10 MG tablet TAKE 1 TABLET BY MOUTH ONE TIME A DAY Yes Isabel Rico DO   insulin lispro, 1 Unit Dial, (HUMALOG KWIKPEN) 100 UNIT/ML SOPN Inject 4 Units into the skin 3 times daily (before meals) Then USE AS CORRECTION SCALE PER PATIENT HOME INSTRUCTIONS Yes Gustabo Florez DO   metFORMIN (GLUCOPHAGE) 1000 MG tablet TAKE 1 TABLET TWICE A DAY WITH MEALS Yes Isabel Rico DO   montelukast (SINGULAIR) 10 MG tablet Take 1 tablet by mouth nightly Yes Dania Ibanez MD   ibuprofen (ADVIL;MOTRIN) 800 MG tablet Take 1 tablet by mouth 2 times daily as needed for Pain (.  Take with food) Yes Gustabo Florez DO   blood glucose monitor kit and supplies Test 1-3 times a day   as needed for symptoms of irregular glucose. DX: E11.9. please provide meter that is accepted by patient's insurance. Yes Isabel Elder DO   Lancet Device MISC Test 1-3 times a day as needed for symptoms of irregular glucose.  DX: E11.9. please provide device Years: 20.00     Pack years: 5.00     Types: Cigars     Last attempt to quit: 2019     Years since quittin.0    Smokeless tobacco: Never Used    Tobacco comment: was smoking 5 black and milds per day, but has not smoked in 4 days   Substance Use Topics    Alcohol use: Yes     Comment: 1 alcoholic beverage every other month    Drug use: Not Currently     Comment: H/o cocaine use        Past Surgical History:   Procedure Laterality Date    COLON SURGERY  2008    10\" removed - chronic constipation    DUPUYTRENS CONTRACTURE SURGERY Left 2019    LEFT 1ST DORSAL COMPARMENT DE' [facility] performed by Lamont Freeman MD at [facility] [address]       4x     HYSTERECTOMY, TOTAL ABDOMINAL      JOINT REPLACEMENT      Left knee partial    KNEE SURGERY  's    rt knee reconstructed    NECK SURGERY  2011    c 4,5,6,7 (ACDF)    OVARY REMOVAL      ROTATOR CUFF REPAIR  2009    Left shoulder    SHOULDER SURGERY      lt & rt shoulder impingment    SINUS ENDOSCOPY  9-15-14    FUNCTIONAL ENDOSCOPIC SINUS SURGERY       TONSILLECTOMY AND ADENOIDECTOMY      TOTAL KNEE ARTHROPLASTY Left 2010    Eros to Total   [address]  2010    Dr. Goldy Bermeo        Allergies   Allergen Reactions    Demerol      ITCHING    Levaquin [Levofloxacin In D5w] Nausea Only    Morphine Itching    Tramadol      \"NO CLUE\"        Family History   Problem Relation Age of Onset    Diabetes Father     Heart Disease Father         CAD    High Blood Pressure Father     High Cholesterol Father     Kidney Disease Brother     Diabetes Son     Other Son         DEANA    Heart Disease Sister         1/2 sister    Rheum Arthritis Neg Hx     Osteoarthritis Neg Hx     Asthma Neg Hx     Breast Cancer Neg Hx     Cancer Neg Hx     Heart Failure Neg Hx     Hypertension Neg Hx     Migraines Neg Hx     Ovarian Cancer Neg Hx     Rashes/Skin Problems Neg Hx     Effort: Pulmonary effort is normal. No respiratory distress. Breath sounds: Normal breath sounds. Abdominal:      General: Bowel sounds are normal. There is no distension. Palpations: Abdomen is soft. Tenderness: There is no abdominal tenderness. Musculoskeletal:      Right lower leg: No edema. Left lower leg: No edema. Lymphadenopathy:      Cervical: No cervical adenopathy. Skin:     General: Skin is warm and dry. Capillary Refill: Capillary refill takes less than 2 seconds. Neurological:      General: No focal deficit present. Mental Status: She is alert and oriented to person, place, and time. Mental status is at baseline. Psychiatric:         Mood and Affect: Mood normal.         Assessment:  Encounter Diagnoses   Name Primary?  Type 2 diabetes mellitus with hyperglycemia, with long-term current use of insulin (HCC) Yes    Hyperlipidemia, mixed     Benign essential HTN     Morbidly obese (HCC)     Seasonal allergies     Flu vaccine need        Plan:    - Fasting labs to be done in December before appt. - Chronic conditions stable. - Continue working on diet and exercise. - flu + mammo ordered. New Prescriptions    INSULIN LISPRO, 1 UNIT DIAL, (HUMALOG KWIKPEN) 100 UNIT/ML SOPN    Inject 4 Units into the skin 3 times daily (before meals) Then USE AS CORRECTION SCALE PER PATIENT HOME INSTRUCTIONS        Orders Placed This Encounter   Procedures    INFLUENZA, QUADV, 0.5ML, 6 MO AND OLDER, IM, PF, PREFILL SYR OR SDV (Perez Stover, PF)        Return in about 3 months (around 12/28/2020) for Follow up DMII - labs ahead of visit.           [address], DO

## 2020-09-30 ENCOUNTER — TELEPHONE (OUTPATIENT)
Dept: PRIMARY CARE CLINIC | Age: 60
End: 2020-09-30

## 2020-09-30 NOTE — TELEPHONE ENCOUNTER
Told patient that a microalbumin was ordered so when she has her other blood tests done she will need to give a urine specimen at that time.   She will be getting her blood/urine done in December when she comes back from [place]

## 2020-10-01 ENCOUNTER — HOSPITAL ENCOUNTER (OUTPATIENT)
Dept: MAMMOGRAPHY | Age: 60
Discharge: HOME | End: 2020-10-01
Payer: MEDICARE

## 2020-10-01 PROCEDURE — 77063 BREAST TOMOSYNTHESIS BI: CPT

## 2020-10-16 ENCOUNTER — TELEPHONE (OUTPATIENT)
Dept: PRIMARY CARE CLINIC | Age: 60
End: 2020-10-16

## 2020-10-16 RX ORDER — FLUCONAZOLE 150 MG/1
150 TABLET ORAL ONCE
Qty: 1 TABLET | Refills: 0 | Status: SHIPPED | OUTPATIENT
Start: 2020-10-16 | End: 2020-10-16

## 2020-10-16 NOTE — TELEPHONE ENCOUNTER
PT CALLING FOR 2 REASONS :    1) SHE SAYS [facility] WAS DISCUSSED AND RECENTLY HAS BEEN FEELING \"GASSY\" AND WANTED TO KNOW WHO IS RECOMMENDED. GAVE HER [facility] PHONE NUMBER & TOLD HER IF THEY WANT A FORMAL REFERRAL THEN ONE COULD BE FAXED TO THEM    2)  SHE SAYS FOR THE PAST 3 WKS SHE HAS HAD YEAST INFECTION. SHE SAYS SHE WAS DRINKING SOFT DRINKS TO GET RID OF THE \"GASSY\" FEELING. SHE SAYS SHE HAS BEEN USING MONOSTAT CREAM AND IT IS NOT WORKING. SHE SAYS SHE IS VERY UNCOMFORTABLE.   SHE WANTS TO KNOW IF \"THE PILL\" CAN BE CALLED IN    PLEASE LET HER KNOW WHAT DR DECIDES

## 2020-11-25 RX ORDER — IBUPROFEN 800 MG/1
800 TABLET ORAL 2 TIMES DAILY PRN
Qty: 5 TABLET | Refills: 0 | Status: CANCELLED | OUTPATIENT
Start: 2020-11-25

## 2020-11-28 RX ORDER — AMLODIPINE BESYLATE 5 MG/1
TABLET ORAL
Qty: 5 TABLET | Refills: 0 | Status: SHIPPED | OUTPATIENT
Start: 2020-11-28 | End: 2020-12-07

## 2020-11-28 RX ORDER — LOSARTAN POTASSIUM 100 MG/1
100 TABLET ORAL DAILY
Qty: 5 TABLET | Refills: 0 | Status: SHIPPED | OUTPATIENT
Start: 2020-11-28 | End: 2020-12-07

## 2020-11-28 RX ORDER — MONTELUKAST SODIUM 10 MG/1
10 TABLET ORAL NIGHTLY
Qty: 5 TABLET | Refills: 0 | Status: SHIPPED | OUTPATIENT
Start: 2020-11-28 | End: 2020-12-07

## 2020-11-28 RX ORDER — ATORVASTATIN CALCIUM 10 MG/1
TABLET, FILM COATED ORAL
Qty: 5 TABLET | Refills: 0 | Status: SHIPPED | OUTPATIENT
Start: 2020-11-28 | End: 2021-05-19

## 2020-12-04 ENCOUNTER — TELEPHONE (OUTPATIENT)
Dept: PRIMARY CARE CLINIC | Age: 60
End: 2020-12-04

## 2020-12-04 NOTE — TELEPHONE ENCOUNTER
PT CALLING SAYING [facility] FROM [place], [place] YESTERDAY    SHE SAYS SHE WAS ADVISED TO GET A COVID TEST DUE TO POSSIBLE EXPOSURE.   PROVIDED HER WITH [facility] TESTING SITE PHONE NUMBER    SHE ALSO RESCHEDULED HER IN-PERSON OFFICE VISIT NEXT WEEK TO A VIRTUAL VISIT ON 12-15 TO GIVE HERSELF ENOUGH TIME  [facility] AND STILL HAVE LABS DRAWN

## 2020-12-07 RX ORDER — MONTELUKAST SODIUM 10 MG/1
TABLET ORAL
Qty: 90 TABLET | Refills: 1 | Status: SHIPPED | OUTPATIENT
Start: 2020-12-07 | End: 2021-06-21

## 2020-12-07 RX ORDER — AMLODIPINE BESYLATE 5 MG/1
TABLET ORAL
Qty: 90 TABLET | Refills: 1 | Status: SHIPPED | OUTPATIENT
Start: 2020-12-07 | End: 2021-07-08

## 2020-12-07 RX ORDER — LOSARTAN POTASSIUM 100 MG/1
TABLET ORAL
Qty: 90 TABLET | Refills: 1 | Status: SHIPPED | OUTPATIENT
Start: 2020-12-07 | End: 2021-07-08

## 2020-12-07 RX ORDER — IBUPROFEN 800 MG/1
TABLET ORAL
Qty: 60 TABLET | Refills: 0 | Status: SHIPPED | OUTPATIENT
Start: 2020-12-07 | End: 2021-06-11 | Stop reason: ALTCHOICE

## 2020-12-10 ENCOUNTER — OFFICE VISIT (OUTPATIENT)
Dept: PRIMARY CARE CLINIC | Age: 60
End: 2020-12-10
Payer: MEDICARE

## 2020-12-10 PROCEDURE — 99211 OFF/OP EST MAY X REQ PHY/QHP: CPT | Performed by: NURSE PRACTITIONER

## 2020-12-11 LAB — SARS-COV-2, NAA: NOT DETECTED

## 2021-01-03 DIAGNOSIS — E11.65 TYPE 2 DIABETES MELLITUS WITH HYPERGLYCEMIA, WITH LONG-TERM CURRENT USE OF INSULIN (HCC): ICD-10-CM

## 2021-01-03 DIAGNOSIS — Z79.4 TYPE 2 DIABETES MELLITUS WITH HYPERGLYCEMIA, WITH LONG-TERM CURRENT USE OF INSULIN (HCC): ICD-10-CM

## 2021-01-03 RX ORDER — INSULIN GLARGINE 100 [IU]/ML
INJECTION, SOLUTION SUBCUTANEOUS
Qty: 5 PEN | Refills: 2 | OUTPATIENT
Start: 2021-01-03

## 2021-01-04 DIAGNOSIS — E11.65 TYPE 2 DIABETES MELLITUS WITH HYPERGLYCEMIA, WITH LONG-TERM CURRENT USE OF INSULIN (HCC): ICD-10-CM

## 2021-01-04 DIAGNOSIS — Z79.4 TYPE 2 DIABETES MELLITUS WITH HYPERGLYCEMIA, WITH LONG-TERM CURRENT USE OF INSULIN (HCC): ICD-10-CM

## 2021-01-04 RX ORDER — INSULIN GLARGINE 100 [IU]/ML
INJECTION, SOLUTION SUBCUTANEOUS
Qty: 5 PEN | Refills: 2 | OUTPATIENT
Start: 2021-01-04

## 2021-01-05 DIAGNOSIS — E11.65 TYPE 2 DIABETES MELLITUS WITH HYPERGLYCEMIA, WITH LONG-TERM CURRENT USE OF INSULIN (HCC): ICD-10-CM

## 2021-01-05 DIAGNOSIS — Z79.4 TYPE 2 DIABETES MELLITUS WITH HYPERGLYCEMIA, WITH LONG-TERM CURRENT USE OF INSULIN (HCC): ICD-10-CM

## 2021-01-06 RX ORDER — INSULIN GLARGINE 100 [IU]/ML
30 INJECTION, SOLUTION SUBCUTANEOUS NIGHTLY
Qty: 10 PEN | Refills: 3 | Status: SHIPPED | OUTPATIENT
Start: 2021-01-06 | End: 2022-03-07

## 2021-01-11 DIAGNOSIS — E11.65 TYPE 2 DIABETES MELLITUS WITH HYPERGLYCEMIA, WITH LONG-TERM CURRENT USE OF INSULIN (HCC): ICD-10-CM

## 2021-01-11 DIAGNOSIS — Z79.4 TYPE 2 DIABETES MELLITUS WITH HYPERGLYCEMIA, WITH LONG-TERM CURRENT USE OF INSULIN (HCC): ICD-10-CM

## 2021-01-11 LAB
A/G RATIO: 1.5 (ref 1.1–2.2)
ALBUMIN SERPL-MCNC: 4.2 G/DL (ref 3.4–5)
ALP BLD-CCNC: 120 U/L (ref 40–129)
ALT SERPL-CCNC: 10 U/L (ref 10–40)
ANION GAP SERPL CALCULATED.3IONS-SCNC: 17 MMOL/L (ref 3–16)
AST SERPL-CCNC: 11 U/L (ref 15–37)
BASOPHILS ABSOLUTE: 0.1 K/UL (ref 0–0.2)
BASOPHILS RELATIVE PERCENT: 0.7 %
BILIRUB SERPL-MCNC: 0.6 MG/DL (ref 0–1)
BUN BLDV-MCNC: 11 MG/DL (ref 7–20)
CALCIUM SERPL-MCNC: 10 MG/DL (ref 8.3–10.6)
CHLORIDE BLD-SCNC: 95 MMOL/L (ref 99–110)
CHOLESTEROL, TOTAL: 143 MG/DL (ref 0–199)
CO2: 23 MMOL/L (ref 21–32)
CREAT SERPL-MCNC: <0.5 MG/DL (ref 0.6–1.2)
CREATININE URINE: 189.4 MG/DL (ref 28–259)
EOSINOPHILS ABSOLUTE: 0.2 K/UL (ref 0–0.6)
EOSINOPHILS RELATIVE PERCENT: 1.2 %
GFR AFRICAN AMERICAN: >60
GFR NON-AFRICAN AMERICAN: >60
GLOBULIN: 2.8 G/DL
GLUCOSE BLD-MCNC: 272 MG/DL (ref 70–99)
HCT VFR BLD CALC: 39.7 % (ref 36–48)
HDLC SERPL-MCNC: 26 MG/DL (ref 40–60)
HEMOGLOBIN: 12.7 G/DL (ref 12–16)
LDL CHOLESTEROL CALCULATED: 83 MG/DL
LYMPHOCYTES ABSOLUTE: 3.3 K/UL (ref 1–5.1)
LYMPHOCYTES RELATIVE PERCENT: 23.9 %
MCH RBC QN AUTO: 26.7 PG (ref 26–34)
MCHC RBC AUTO-ENTMCNC: 31.9 G/DL (ref 31–36)
MCV RBC AUTO: 83.5 FL (ref 80–100)
MICROALBUMIN UR-MCNC: 10.3 MG/DL
MICROALBUMIN/CREAT UR-RTO: 54.4 MG/G (ref 0–30)
MONOCYTES ABSOLUTE: 0.7 K/UL (ref 0–1.3)
MONOCYTES RELATIVE PERCENT: 5.3 %
NEUTROPHILS ABSOLUTE: 9.4 K/UL (ref 1.7–7.7)
NEUTROPHILS RELATIVE PERCENT: 68.9 %
PDW BLD-RTO: 12.9 % (ref 12.4–15.4)
PLATELET # BLD: 279 K/UL (ref 135–450)
PMV BLD AUTO: 11.6 FL (ref 5–10.5)
POTASSIUM SERPL-SCNC: 4.1 MMOL/L (ref 3.5–5.1)
RBC # BLD: 4.75 M/UL (ref 4–5.2)
SODIUM BLD-SCNC: 135 MMOL/L (ref 136–145)
TOTAL PROTEIN: 7 G/DL (ref 6.4–8.2)
TRIGL SERPL-MCNC: 171 MG/DL (ref 0–150)
VLDLC SERPL CALC-MCNC: 34 MG/DL
WBC # BLD: 13.6 K/UL (ref 4–11)

## 2021-01-12 ENCOUNTER — VIRTUAL VISIT (OUTPATIENT)
Dept: PRIMARY CARE CLINIC | Age: 61
End: 2021-01-12
Payer: MEDICARE

## 2021-01-12 DIAGNOSIS — E66.01 MORBIDLY OBESE (HCC): ICD-10-CM

## 2021-01-12 DIAGNOSIS — E78.2 HYPERLIPIDEMIA, MIXED: ICD-10-CM

## 2021-01-12 DIAGNOSIS — Z79.4 TYPE 2 DIABETES MELLITUS WITH HYPERGLYCEMIA, WITH LONG-TERM CURRENT USE OF INSULIN (HCC): ICD-10-CM

## 2021-01-12 DIAGNOSIS — E11.65 TYPE 2 DIABETES MELLITUS WITH HYPERGLYCEMIA, WITH LONG-TERM CURRENT USE OF INSULIN (HCC): ICD-10-CM

## 2021-01-12 DIAGNOSIS — I10 BENIGN ESSENTIAL HTN: Primary | Chronic | ICD-10-CM

## 2021-01-12 LAB
ESTIMATED AVERAGE GLUCOSE: 248.9 MG/DL
HBA1C MFR BLD: 10.3 %

## 2021-01-12 PROCEDURE — 99213 OFFICE O/P EST LOW 20 MIN: CPT | Performed by: FAMILY MEDICINE

## 2021-01-12 ASSESSMENT — ENCOUNTER SYMPTOMS
VOMITING: 0
DIARRHEA: 0
SHORTNESS OF BREATH: 0
ABDOMINAL PAIN: 0
NAUSEA: 0

## 2021-01-12 NOTE — PROGRESS NOTES
[address] [facility] [address] PRIMARY CARE  [address] [place] [id number]  Dept: [phone]  Dept Fax: [phone]     1/12/2021      Shyla Gooden   1960     Chief Complaint   Patient presents with    Diabetes       HPI    Pt encounter today completed virtual visit using doxy. me platform. Services were provided through a video synchronous discussion virtually to substitute for in-person clinic visit. Patient and provider were located at their individual homes. DMII - Historically well controlled. Last a1c = 7.4%. Currently taking basal bolus regimen. Has only been taking 20 units of the Lantus instead of the 30 units and then doing 10-11 units of humalog pre-meal TID. Taking her metformin reliably. Admits to very poor diet in the last few months. Is working towards correcting that. She has not been reliable with her pre-meal insulin either. + abnormal urine micro/cr = 54. Previously normal a year ago. She is working for [employer] now. Has had some increased work related stress. Prior to Visit Medications    Medication Sig Taking?  Authorizing Provider   insulin glargine (LANTUS SOLOSTAR) 100 UNIT/ML injection pen Inject 30 Units into the skin nightly Yes Isabel Rico DO   montelukast (SINGULAIR) 10 MG tablet TAKE ONE TABLET BY MOUTH ONCE NIGHTLY Yes Isabel Rico DO   amLODIPine (NORVASC) 5 MG tablet TAKE ONE TABLET BY MOUTH DAILY Yes Isabel Rico DO   losartan (COZAAR) 100 MG tablet TAKE ONE TABLET BY MOUTH DAILY Yes Isabel Rico DO   ibuprofen (ADVIL;MOTRIN) 800 MG tablet TAKE ONE TABLET BY MOUTH TWICE A DAY AS NEEDED FOR PAIN , TAKE WITH FOOD Yes Isabel Rico DO   atorvastatin (LIPITOR) 10 MG tablet TAKE 1 TABLET BY MOUTH ONE TIME A DAY Yes Isabel Rico DO   insulin lispro, 1 Unit Dial, (HUMALOG KWIKPEN) 100 UNIT/ML SOPN Inject 4 Units into the skin 3 times daily (before meals) Then USE AS CORRECTION SCALE PER PATIENT HOME INSTRUCTIONS Yes Nidia Palomares, DO   metFORMIN (GLUCOPHAGE) 1000 MG tablet TAKE 1 TABLET TWICE A DAY WITH MEALS Yes Isabel Rico, DO   Lancets MISC Test 1-3 times a day as needed for symptoms of irregular glucose. DX: E11.9 please provide lancets accepted by patient's insurance. Yes Nidia Palomares, DO   blood glucose monitor strips Test 1-3 times a day as needed for symptoms of irregular glucose. DX: E11.9 please provide strips that are accepted by patient's insurance. Yes Isabel Rico, DO   fluticasone (FLONASE) 50 MCG/ACT nasal spray 2 sprays by Nasal route daily Yes John Green, DO   Insulin Pen Needle (PEN NEEDLES) 32G X 4 MM MISC USE TO INJECT INSULIN FOUR TIMES DAILY Yes John Green, DO   ACCU-CHEK SOFTCLIX LANCETS MISC TEST BLOOD SUGAR TWO TIMES A DAY Yes John Green DO   Calcium Carb-Cholecalciferol (CALTRATE 600+D) 600-800 MG-UNIT TABS Take 1 tablet by mouth 2 times daily Yes Historical Provider, MD   cetirizine (ZYRTEC ALLERGY) 10 MG tablet Take 1 tablet by mouth daily Yes John Green DO   aspirin EC 81 MG EC tablet Take 81 mg by mouth daily.  LAST DOSE 14 Yes John Green DO       Past Medical History:   Diagnosis Date    Allergic rhinitis     HTN (hypertension)     Hyperlipidemia     Obstructive sleep apnea (adult) (pediatric)     Non-compliant with CPAP    Osteoarthritis     Type II or unspecified type diabetes mellitus without mention of complication, not stated as uncontrolled         Social History     Tobacco Use    Smoking status: Former Smoker     Packs/day: 0.25     Years: 20.00     Pack years: 5.00     Types: Cigars     Quit date: 2019     Years since quittin.3    Smokeless tobacco: Never Used    Tobacco comment: was smoking 5 black and milds per day, but has not smoked in 4 days   Substance Use Topics    Alcohol use: Yes     Comment: 1 alcoholic beverage every other month    Drug use: Not Currently     Comment: H/o cocaine use        Past Surgical History:   Procedure Laterality Date    COLON SURGERY  2008    10\" removed - chronic constipation    DUPUYTRENS CONTRACTURE SURGERY Left 1/29/2019    LEFT 1ST DORSAL COMPARMENT DE' [facility] performed by Gabriel Olson MD at [address]      4x     HYSTERECTOMY, TOTAL ABDOMINAL      JOINT REPLACEMENT  2007    Left knee partial    KNEE SURGERY  1990's    rt knee reconstructed    NECK SURGERY  05/12/2011    c 4,5,6,7 (ACDF)    OVARY REMOVAL      ROTATOR CUFF REPAIR  2009    Left shoulder    SHOULDER SURGERY  1990's    lt & rt shoulder impingment    SINUS ENDOSCOPY  9-15-14    FUNCTIONAL ENDOSCOPIC SINUS SURGERY       TONSILLECTOMY AND ADENOIDECTOMY      TOTAL KNEE ARTHROPLASTY Left 03/09/2010    [place]ros to Total   [place] [facility]  8/2010    Dr. Saniya Blanca        Allergies   Allergen Reactions    Demerol Dermatitis     ITCHING    Levaquin [Levofloxacin In D5w] Nausea Only    Morphine Itching    Tramadol Other (See Comments)     \"NO CLUE\"        Family History   Problem Relation Age of Onset    Diabetes Father     Heart Disease Father         CAD    High Blood Pressure Father     High Cholesterol Father     Kidney Disease Brother     Diabetes Son     Other Son         DEANA    Heart Disease Sister         1/2 sister    Rheum Arthritis Neg Hx     Osteoarthritis Neg Hx     Asthma Neg Hx     Breast Cancer Neg Hx     Cancer Neg Hx     Heart Failure Neg Hx     Hypertension Neg Hx     Migraines Neg Hx     Ovarian Cancer Neg Hx     Rashes/Skin Problems Neg Hx     Seizures Neg Hx     Stroke Neg Hx     Thyroid Disease Neg Hx         Patient's past medical history, surgical history, family history, medications, and allergies  were all reviewed and updated as appropriate today. Review of Systems   Constitutional: Negative for chills, fever and unexpected weight change. Respiratory: Negative for shortness of breath. Cardiovascular: Negative for chest pain. Gastrointestinal: Negative for abdominal pain, diarrhea, nausea and vomiting. Vitals unable to obtain and physical exam is limited 2/2 virtual visit nature of this encounter. Physical Exam  Constitutional:       General: She is not in acute distress. Appearance: Normal appearance. She is not ill-appearing. Pulmonary:      Effort: Pulmonary effort is normal.   Neurological:      General: No focal deficit present. Mental Status: She is alert. Psychiatric:         Mood and Affect: Mood normal.         Assessment:  Encounter Diagnoses   Name Primary?  Type 2 diabetes mellitus with hyperglycemia, with long-term current use of insulin ([facility] [address]     Morbidly obese (HCC)     Benign essential HTN Yes    Hyperlipidemia, mixed        Plan:    - DMII uncontrolled. Reminded to go back to Lantus 30 units nightly with encouraged adherence to Humalog premeal + correction scale. She is motivated to improve her diet as well. - Repeat urine micro/cr ratio at next set of labs. - Chronic conditions stable otherwise. - Has colonoscopy scheduled in the near future. New Prescriptions    No medications on file        No orders of the defined types were placed in this encounter. Return in about 2 months (around 3/12/2021) for Follow up DMII - video visit. Virtual visit time spent (minutes) - [address] [place] [address]     Cesia Harris is a 56 y.o. female being evaluated by a Virtual Visit (video visit) encounter to address concerns as mentioned above. A caregiver was present when appropriate. Due to this being a TeleHealth encounter (During [facility]- public health emergency), evaluation of the following organ systems was limited: Vitals/Constitutional/EENT/Resp/CV/GI//MS/Neuro/Skin/Heme-Lymph-Imm.   Pursuant to the emergency declaration under the [facility], [facility] waiver authority and the [facility] Appropriations Act, this Virtual Visit was conducted with patient's (and/or legal guardian's) consent, to reduce the patient's risk of exposure to COVID-19 and provide necessary medical care. The patient (and/or legal guardian) has also been advised to contact this office for worsening conditions or problems, and seek emergency medical treatment and/or call 911 if deemed necessary.

## 2021-01-30 ENCOUNTER — NURSE TRIAGE (OUTPATIENT)
Dept: OTHER | Facility: CLINIC | Age: 61
End: 2021-01-30

## 2021-01-30 ENCOUNTER — HOSPITAL ENCOUNTER (EMERGENCY)
Age: 61
Discharge: HOME | End: 2021-01-30
Attending: EMERGENCY MEDICINE
Payer: MEDICARE

## 2021-01-30 ENCOUNTER — APPOINTMENT (OUTPATIENT)
Dept: GENERAL RADIOLOGY | Age: 61
End: 2021-01-30
Payer: MEDICARE

## 2021-01-30 VITALS
OXYGEN SATURATION: 94 % | SYSTOLIC BLOOD PRESSURE: 125 MMHG | HEART RATE: 94 BPM | RESPIRATION RATE: 18 BRPM | TEMPERATURE: 99.5 F | DIASTOLIC BLOOD PRESSURE: 57 MMHG

## 2021-01-30 DIAGNOSIS — S29.012A STRAIN OF MID-BACK, INITIAL ENCOUNTER: Primary | ICD-10-CM

## 2021-01-30 LAB
ANION GAP SERPL CALCULATED.3IONS-SCNC: 17 MMOL/L (ref 3–16)
BASOPHILS ABSOLUTE: 0 K/UL (ref 0–0.2)
BASOPHILS RELATIVE PERCENT: 0.3 %
BUN BLDV-MCNC: 12 MG/DL (ref 7–20)
CALCIUM SERPL-MCNC: 10 MG/DL (ref 8.3–10.6)
CHLORIDE BLD-SCNC: 97 MMOL/L (ref 99–110)
CO2: 21 MMOL/L (ref 21–32)
CREAT SERPL-MCNC: 0.7 MG/DL (ref 0.6–1.2)
D DIMER: <200 NG/ML DDU (ref 0–229)
EOSINOPHILS ABSOLUTE: 0.1 K/UL (ref 0–0.6)
EOSINOPHILS RELATIVE PERCENT: 0.8 %
GFR AFRICAN AMERICAN: >60
GFR NON-AFRICAN AMERICAN: >60
GLUCOSE BLD-MCNC: 361 MG/DL (ref 70–99)
HCT VFR BLD CALC: 40.4 % (ref 36–48)
HEMOGLOBIN: 12.7 G/DL (ref 12–16)
LYMPHOCYTES ABSOLUTE: 3.2 K/UL (ref 1–5.1)
LYMPHOCYTES RELATIVE PERCENT: 24.9 %
MCH RBC QN AUTO: 26.7 PG (ref 26–34)
MCHC RBC AUTO-ENTMCNC: 31.5 G/DL (ref 31–36)
MCV RBC AUTO: 84.5 FL (ref 80–100)
MONOCYTES ABSOLUTE: 0.9 K/UL (ref 0–1.3)
MONOCYTES RELATIVE PERCENT: 7.1 %
NEUTROPHILS ABSOLUTE: 8.6 K/UL (ref 1.7–7.7)
NEUTROPHILS RELATIVE PERCENT: 66.9 %
PDW BLD-RTO: 13.2 % (ref 12.4–15.4)
PLATELET # BLD: 281 K/UL (ref 135–450)
PMV BLD AUTO: 10.3 FL (ref 5–10.5)
PRO-BNP: 48 PG/ML (ref 0–124)
RAPID INFLUENZA  B AGN: NEGATIVE
RAPID INFLUENZA A AGN: NEGATIVE
RBC # BLD: 4.78 M/UL (ref 4–5.2)
SODIUM BLD-SCNC: 135 MMOL/L (ref 136–145)
TROPONIN: 0.01 NG/ML
WBC # BLD: 12.8 K/UL (ref 4–11)

## 2021-01-30 PROCEDURE — 83880 ASSAY OF NATRIURETIC PEPTIDE: CPT

## 2021-01-30 PROCEDURE — 36415 COLL VENOUS BLD VENIPUNCTURE: CPT

## 2021-01-30 PROCEDURE — 99283 EMERGENCY DEPT VISIT LOW MDM: CPT

## 2021-01-30 PROCEDURE — 71045 X-RAY EXAM CHEST 1 VIEW: CPT

## 2021-01-30 PROCEDURE — 80048 BASIC METABOLIC PNL TOTAL CA: CPT

## 2021-01-30 PROCEDURE — 93005 ELECTROCARDIOGRAM TRACING: CPT | Performed by: PHYSICIAN ASSISTANT

## 2021-01-30 PROCEDURE — 6370000000 HC RX 637 (ALT 250 FOR IP): Performed by: PHYSICIAN ASSISTANT

## 2021-01-30 PROCEDURE — 84484 ASSAY OF TROPONIN QUANT: CPT

## 2021-01-30 PROCEDURE — 85379 FIBRIN DEGRADATION QUANT: CPT

## 2021-01-30 PROCEDURE — U0003 INFECTIOUS AGENT DETECTION BY NUCLEIC ACID (DNA OR RNA); SEVERE ACUTE RESPIRATORY SYNDROME CORONAVIRUS 2 (SARS-COV-2) (CORONAVIRUS DISEASE [COVID-19]), AMPLIFIED PROBE TECHNIQUE, MAKING USE OF HIGH THROUGHPUT TECHNOLOGIES AS DESCRIBED BY CMS-2020-01-R: HCPCS

## 2021-01-30 PROCEDURE — 85025 COMPLETE CBC W/AUTO DIFF WBC: CPT

## 2021-01-30 PROCEDURE — 87804 INFLUENZA ASSAY W/OPTIC: CPT

## 2021-01-30 RX ORDER — ACETAMINOPHEN 500 MG
1000 TABLET ORAL ONCE
Status: COMPLETED | OUTPATIENT
Start: 2021-01-30 | End: 2021-01-30

## 2021-01-30 RX ORDER — METHOCARBAMOL 500 MG/1
500 TABLET, FILM COATED ORAL 3 TIMES DAILY PRN
Qty: 20 TABLET | Refills: 0 | Status: SHIPPED | OUTPATIENT
Start: 2021-01-30 | End: 2021-02-09

## 2021-01-30 RX ORDER — LIDOCAINE 4 G/G
1 PATCH TOPICAL ONCE
Status: DISCONTINUED | OUTPATIENT
Start: 2021-01-30 | End: 2021-01-30 | Stop reason: HOSPADM

## 2021-01-30 RX ADMIN — ACETAMINOPHEN 1000 MG: 500 TABLET ORAL at 15:59

## 2021-01-30 ASSESSMENT — PAIN SCALES - GENERAL
PAINLEVEL_OUTOF10: 10
PAINLEVEL_OUTOF10: 10

## 2021-01-30 ASSESSMENT — PAIN DESCRIPTION - ORIENTATION: ORIENTATION: LEFT

## 2021-01-30 ASSESSMENT — PAIN DESCRIPTION - DESCRIPTORS: DESCRIPTORS: SHARP

## 2021-01-30 NOTE — TELEPHONE ENCOUNTER
Reason for Disposition   [1] SEVERE back pain (e.g., excruciating) AND [2] sudden onset AND [3] age > 61    Answer Assessment - Initial Assessment Questions  1. ONSET: \"When did the pain begin? \"       10 days     2. LOCATION: \"Where does it hurt? \" (upper, mid or lower back)      Left shoulder blade    3. SEVERITY: \"How bad is the pain? \"  (e.g., Scale 1-10; mild, moderate, or severe)    - MILD (1-3): doesn't interfere with normal activities     - MODERATE (4-7): interferes with normal activities or awakens from sleep     - SEVERE (8-10): excruciating pain, unable to do any normal activities       10 sharp    4. PATTERN: \"Is the pain constant? \" (e.g., yes, no; constant, intermittent)       Depends on position pt is in, bending over makes worse, sometimes way pt is laying makes worse    5. RADIATION: \"Does the pain shoot into your legs or elsewhere? \"      Starting to feel pain in left arm    6. CAUSE:  \"What do you think is causing the back pain? \"       Thought it was from gas, but took gas medication and didn't make better    7. BACK OVERUSE:  Tash Dry recent lifting of heavy objects, strenuous work or exercise? \"      No    8. MEDICATIONS: \"What have you taken so far for the pain? \" (e.g., nothing, acetaminophen, NSAIDS)      Tums, Gas-X    9. NEUROLOGIC SYMPTOMS: \"Do you have any weakness, numbness, or problems with bowel/bladder control? \"     No    10. OTHER SYMPTOMS: \"Do you have any other symptoms? \" (e.g., fever, abdominal pain, burning with urination, blood in urine)        Some abdominal pain    11. PREGNANCY: \"Is there any chance you are pregnant? \" (e.g., yes, no; LMP)        No    Protocols used: BACK PAIN-ADULT-AH    Patient called Gloria Corw at [facility])  with red flag complaint. Brief description of triage: Left shoulder blade pain x10 days    Triage indicates for patient to go to ER now.     Care advice provided, patient verbalizes understanding; denies any other questions or concerns; instructed to call back for any new or worsening symptoms. Attention Provider: Thank you for allowing me to participate in the care of your patient. The patient was connected to triage in response to information provided to the ECC. Please do not respond through this encounter as the response is not directed to a shared pool.

## 2021-01-30 NOTE — ED PROVIDER NOTES
ED Attending Attestation Note     Date of evaluation: 1/30/2021    This patient was seen by the advance practice provider, Cristo Selby PA-C. I have seen and examined the patient, agree with the workup, evaluation, management and diagnosis. The care plan has been discussed. I have reviewed the ECG and concur with the EVY's interpretation. This is a 77-year-old female with a past medical history of diabetes, hyperlipidemia, and hypertension that presents today for left-sided shoulder pain. Patient states that the pain is been going on for almost a week. She states that the pain worsens with range of motion. Denies fevers. She states that she has had multiple problems with her left shoulder including rotator cuff issues. She denies fevers or chills. She denies cough. She denies any history of blood clots in her lungs in her legs. When asked about cardiac disease stratification, she relates that she had a stress test \"many years ago\" but was unsure how long ago that was. Denies any calf tenderness. On exam, she is in no acute distress, she does have pain with range of motion of the left shoulder, and there appears to be a scar on the same left shoulder. She does have normal sensation. She has 5 out of 5 strength. She has limited range of motion secondary to pain but she is able to flex and extend at the elbow. She has a 2+ radial pulse noted. Her belly is soft. She has no edema in the lower extremities. EKG: Performed at 1542, shows a normal sinus rhythm at a rate of 93. Normal axis. Intervals are normal and are as follows: MO = 180, QRS = 86, QTC = 484. She does have Q waves noted in room normal 3. No ST segment elevation. She has T wave inversions noted in V1, V2. Abnormal EKG. This EKG looks similar to the EKG on 9/21/2019. Did have a cardiac work-up with EKG, chest x-ray, and labs. Labs are reassuring with a negative D-dimer.   Suspicion for acute coronary syndrome or

## 2021-01-30 NOTE — ED PROVIDER NOTES
[facility]          PHYSICIAN ASSISTANT NOTE       Date of evaluation: 1/30/2021    Chief Complaint     Shoulder Pain (left shoulder pain x 12 days; worse with movement and deep breath; pt has used ice pack)      History of Present Illness     Darylene Downing is a 56 y.o. female with past medical history significant for hypertension, hyperlipidemia, obesity, insulin-dependent type 2 diabetes, DEANA, arthritis who presents with complaints of left-sided upper back pain. Patient rates this pain 10 out of 10 that worsens with certain movements, position changes, use of her left upper extremity, and with deep inspiration. Does also state that pain is starting to radiate down left upper extremity into her fingertips. Also endorses intermittent numbness in her left forearm. Denies any history of blood clots or use of estrogen supplements. Denies any peripheral edema or calf pain. States that she originally thought that her symptoms could be related to digestion issues, so she took Tums and magnesium with no relief of symptoms. Denies any sick contacts. Does endorse cough that she states is somewhat normal for her, but does worsen her symptoms. Denies any history of cardiac disease. Denies fevers, recent exposure to Covid, anterior chest pain, nausea, vomiting, diarrhea, difficulty breathing. Review of Systems     Review of Systems   See HPI, all others negative. Past Medical, Surgical, Family, and Social History     She has a past medical history of Allergic rhinitis, HTN (hypertension), Hyperlipidemia, Obstructive sleep apnea (adult) (pediatric), Osteoarthritis, and Type II or unspecified type diabetes mellitus without mention of complication, not stated as uncontrolled. She has a past surgical history that includes joint replacement (2007); Rotator cuff repair (2009); Colon surgery (2008); shoulder surgery (1990's); knee surgery (1990's);  Neck surgery (05/12/2011); Umbilical hernia repair (8/2010); Ovary removal; Sinus endoscopy (9-15-14); Tonsillectomy and adenoidectomy; Tubal ligation; Total knee arthroplasty (Left, 03/09/2010); dupuytrens contracture surgery (Left, 1/29/2019); hernia repair; and Hysterectomy, total abdominal.  Her family history includes Diabetes in her father and son; Heart Disease in her father and sister; High Blood Pressure in her father; High Cholesterol in her father; Kidney Disease in her brother; Other in her son. She reports that she quit smoking about 16 months ago. Her smoking use included cigars. She has a 5.00 pack-year smoking history. She has never used smokeless tobacco. She reports current alcohol use. She reports previous drug use. Medications     Previous Medications    ACCU-CHEK SOFTCLIX LANCETS MISC    TEST BLOOD SUGAR TWO TIMES A DAY    AMLODIPINE (NORVASC) 5 MG TABLET    TAKE ONE TABLET BY MOUTH DAILY    ASPIRIN EC 81 MG EC TABLET    Take 81 mg by mouth daily. LAST DOSE 8-9-14    ATORVASTATIN (LIPITOR) 10 MG TABLET    TAKE 1 TABLET BY MOUTH ONE TIME A DAY    BLOOD GLUCOSE MONITOR STRIPS    Test 1-3 times a day as needed for symptoms of irregular glucose. DX: E11.9 please provide strips that are accepted by patient's insurance.     CALCIUM CARB-CHOLECALCIFEROL (CALTRATE 600+D) 600-800 MG-UNIT TABS    Take 1 tablet by mouth 2 times daily    CETIRIZINE (ZYRTEC ALLERGY) 10 MG TABLET    Take 1 tablet by mouth daily    FLUTICASONE (FLONASE) 50 MCG/ACT NASAL SPRAY    2 sprays by Nasal route daily    IBUPROFEN (ADVIL;MOTRIN) 800 MG TABLET    TAKE ONE TABLET BY MOUTH TWICE A DAY AS NEEDED FOR PAIN , TAKE WITH FOOD    INSULIN GLARGINE (LANTUS SOLOSTAR) 100 UNIT/ML INJECTION PEN    Inject 30 Units into the skin nightly    INSULIN LISPRO, 1 UNIT DIAL, (HUMALOG KWIKPEN) 100 UNIT/ML SOPN    Inject 4 Units into the skin 3 times daily (before meals) Then USE AS CORRECTION SCALE PER PATIENT HOME INSTRUCTIONS    INSULIN PEN NEEDLE (PEN NEEDLES) 32G X 4 MM MISC    USE TO INJECT INSULIN FOUR TIMES DAILY    LANCETS MISC    Test 1-3 times a day as needed for symptoms of irregular glucose. DX: E11.9 please provide lancets accepted by patient's insurance. LOSARTAN (COZAAR) 100 MG TABLET    TAKE ONE TABLET BY MOUTH DAILY    METFORMIN (GLUCOPHAGE) 1000 MG TABLET    TAKE 1 TABLET TWICE A DAY WITH MEALS    MONTELUKAST (SINGULAIR) 10 MG TABLET    TAKE ONE TABLET BY MOUTH ONCE NIGHTLY       Allergies     She is allergic to demerol; levaquin [levofloxacin in d5w]; morphine; and tramadol. Physical Exam     INITIAL VITALS: BP: 115/79, Temp: 99.5 °F (37.5 °C), Pulse: 104, Resp: 18, SpO2: 96 %  Physical Exam  Constitutional:       General: She is not in acute distress. Appearance: Normal appearance. She is not ill-appearing or toxic-appearing. Comments: Overweight -American female   HENT:      Head: Normocephalic and atraumatic. Nose: Nose normal.      Mouth/Throat:      Mouth: Mucous membranes are moist.   Eyes:      Conjunctiva/sclera: Conjunctivae normal.   Neck:      Musculoskeletal: Normal range of motion. Cardiovascular:      Rate and Rhythm: Tachycardia present. Pulmonary:      Effort: Pulmonary effort is normal. No respiratory distress. Comments: 95% on room air. Abdominal:      General: There is no distension. Palpations: Abdomen is soft. Tenderness: There is no abdominal tenderness. There is no guarding or rebound. Musculoskeletal: Normal range of motion. Comments: Mild tenderness to left thoracic paraspinal musculature. No overlying skin changes noted. No calf tenderness or calf swelling bilaterally. Negative Homans bilaterally. Skin:     General: Skin is warm and dry. Neurological:      General: No focal deficit present. Mental Status: She is alert and oriented to person, place, and time. Diagnostic Results     EKG   Interpreted in conjunction with emergency department physician No att. providers found  Rhythm: normal sinus   Rate: 93  Axis: normal  Ectopy: none  Conduction: normal  ST Segments: normal  T Waves: normal  Q Waves:none  Clinical Impression: no acute changes  Comparison:  Consistent with old from Sept 2019 with exception of new T wave inversions in V2    RADIOLOGY:  XR CHEST PORTABLE   Final Result      No acute radiographic abnormality of the chest.          LABS:   Results for orders placed or performed during the hospital encounter of 01/30/21   Rapid influenza A/B antigens    Specimen: Nasopharyngeal   Result Value Ref Range    Rapid Influenza A Ag Negative Negative    Rapid Influenza B Ag Negative Negative   CBC Auto Differential   Result Value Ref Range    WBC 12.8 (H) 4.0 - 11.0 K/uL    RBC 4.78 4.00 - 5.20 M/uL    Hemoglobin 12.7 12.0 - 16.0 g/dL    Hematocrit 40.4 36.0 - 48.0 %    MCV 84.5 80.0 - 100.0 fL    MCH 26.7 26.0 - 34.0 pg    MCHC 31.5 31.0 - 36.0 g/dL    RDW 13.2 12.4 - 15.4 %    Platelets 055 975 - 027 K/uL    MPV 10.3 5.0 - 10.5 fL    Neutrophils % 66.9 %    Lymphocytes % 24.9 %    Monocytes % 7.1 %    Eosinophils % 0.8 %    Basophils % 0.3 %    Neutrophils Absolute 8.6 (H) 1.7 - 7.7 K/uL    Lymphocytes Absolute 3.2 1.0 - 5.1 K/uL    Monocytes Absolute 0.9 0.0 - 1.3 K/uL    Eosinophils Absolute 0.1 0.0 - 0.6 K/uL    Basophils Absolute 0.0 0.0 - 0.2 K/uL   Basic Metabolic Panel w/ Reflex to MG   Result Value Ref Range    Sodium 135 (L) 136 - 145 mmol/L    Chloride 97 (L) 99 - 110 mmol/L    CO2 21 21 - 32 mmol/L    Anion Gap 17 (H) 3 - 16    Glucose 361 (H) 70 - 99 mg/dL    BUN 12 7 - 20 mg/dL    CREATININE 0.7 0.6 - 1.2 mg/dL    GFR Non-African American >60 >60    GFR African American >60 >60    Calcium 10.0 8.3 - 10.6 mg/dL   Troponin   Result Value Ref Range    Troponin 0.01 <0.01 ng/mL   Brain Natriuretic Peptide   Result Value Ref Range    Pro-BNP 48 0 - 124 pg/mL   D-Dimer, Quantitative   Result Value Ref Range    D-Dimer, Quant <200 0 - 229 ng/mL DDU RECENT VITALS:  BP: 109/78, Temp: 99.5 °F (37.5 °C), Pulse: 95, Resp: 18, SpO2: 94 %     Procedures       ED Course     Nursing Notes, Past Medical Hx,Past Surgical Hx, Social Hx, Allergies, and Family Hx were reviewed. The patient was given the following medications:  Orders Placed This Encounter   Medications    lidocaine 4 % external patch 1 patch    acetaminophen (TYLENOL) tablet 1,000 mg    methocarbamol (ROBAXIN) 500 MG tablet     Sig: Take 1 tablet by mouth 3 times daily as needed (muscle spasms/pain)     Dispense:  20 tablet     Refill:  0       CONSULTS:  None    MEDICAL DECISION MAKING / ASSESSMENT / [place] Evelyn is a 56 y.o. female presenting with complaints of left scapular pain x10 days that has been progressively worsening. Lidoderm patch was applied to area and patient was given 1000 mg Tylenol for symptoms. Symptoms are reproducible on exam to left thoracic region with no overlying skin changes, making musculoskeletal diagnosis most likely. With patient being female, greater than 61, and diabetic, will also perform basic labs and chest x-ray to rule out atypical chest pain. D-dimer was also ordered due to patient's complaints of pleuritic symptoms, tachycardia, and O2 saturation of 95%. D-dimer was negative, CBC, BMP, troponin, BNP all also unremarkable. Chest x-ray shows no abnormality. Covid swab was sent and is pending at this time. Upon reevaluation, patient stated that she was feeling slightly better with the Tylenol and Lidoderm. Discussed that symptoms are likely musculoskeletal in nature, so encourage patient to perform gentle stretching exercises, heat therapy, alternate Tylenol and ibuprofen, and patient will be given a prescription for Robaxin to take as needed for muscle spasms/pain. Patient was agreeable with this plan and was discharged in stable condition with normal vitals and able to ambulate out of the ED without any difficulty.   Patient was given strict return precautions regarding any new or worsening complaints including anterior chest pain, difficulty breathing, or intractable pain. This patient was also evaluated by the attending physician. All care plans were discussed and agreed upon. Clinical Impression     1.  Strain of mid-back, initial encounter        Disposition     PATIENT REFERRED TO:  Lauryn Cage  [phone]    Call   As needed      DISCHARGE MEDICATIONS:  New Prescriptions    METHOCARBAMOL (ROBAXIN) 500 MG TABLET    Take 1 tablet by mouth 3 times daily as needed (muscle spasms/pain)       DISPOSITION         Salvador Nassar PA-C  01/30/21 0713

## 2021-01-31 LAB
EKG ATRIAL RATE: 93 BPM
EKG DIAGNOSIS: NORMAL
EKG P AXIS: 48 DEGREES
EKG P-R INTERVAL: 180 MS
EKG Q-T INTERVAL: 390 MS
EKG QRS DURATION: 86 MS
EKG QTC CALCULATION (BAZETT): 484 MS
EKG R AXIS: 18 DEGREES
EKG T AXIS: 30 DEGREES
EKG VENTRICULAR RATE: 93 BPM
SARS-COV-2, PCR: NOT DETECTED

## 2021-02-02 ENCOUNTER — VIRTUAL VISIT (OUTPATIENT)
Dept: PRIMARY CARE CLINIC | Age: 61
End: 2021-02-02
Payer: MEDICARE

## 2021-02-02 DIAGNOSIS — J06.9 VIRAL UPPER RESPIRATORY TRACT INFECTION: Primary | ICD-10-CM

## 2021-02-02 DIAGNOSIS — S46.912A MUSCLE STRAIN, SHOULDER REGION, LEFT, INITIAL ENCOUNTER: ICD-10-CM

## 2021-02-02 PROCEDURE — 99213 OFFICE O/P EST LOW 20 MIN: CPT | Performed by: NURSE PRACTITIONER

## 2021-02-02 ASSESSMENT — ENCOUNTER SYMPTOMS
VOMITING: 0
RHINORRHEA: 1
DIARRHEA: 0
COUGH: 0
NAUSEA: 0
ABDOMINAL PAIN: 0
SHORTNESS OF BREATH: 0
FACIAL SWELLING: 0
SINUS PRESSURE: 1
SORE THROAT: 0

## 2021-02-02 NOTE — PROGRESS NOTES
[address] [facility] [address] [facility]  [address] [place] [id number]  Dept: [phone]  Dept Fax: [phone]     2/2/2021      Srinivas Plaza   1960     Chief Complaint   Patient presents with    Fever       HPI  Pt encounter today completed virtual visit using doxy. me platform. Services were provided through a video synchronous discussion virtually to substitute for in-person clinic visit. Patient and provider were located at their individual homes. Fever-  C/o fever that started last night; max temp 100.7F. She called off work today. She has been taking ibuprofen and is now afebrile. She was seen in ER this past weekend for shoulder pain and covid test at that time was negative. She c/o fatigue, congestion, sneezing, rhinorrhea. She has been taking Zyrtec, Singulair, and Flonase daily. Denies n/v, diarrhea, sick contacts. She reports overall she is feeling better today. Left shoulder pain  -Patient reports she was having pain in her left shoulder for about 12 days before she went to ER on Saturday. She thought it was gas in the muscle so she was taking OTC gas-x, gas relief, heat, ice, shower, and \"beat the muscle\". She states she was diagnosed with a muscle strain in the ER and was sent home with tylenol and lidocaine patches. Pain in shoulder is improved with pain medications. Prior to Visit Medications    Medication Sig Taking?  Authorizing Provider   methocarbamol (ROBAXIN) 500 MG tablet Take 1 tablet by mouth 3 times daily as needed (muscle spasms/pain)  Leyla Cervantes PA-C   insulin glargine (LANTUS SOLOSTAR) 100 UNIT/ML injection pen Inject 30 Units into the skin nightly  Isabel Rico DO   montelukast (SINGULAIR) 10 MG tablet TAKE ONE TABLET BY MOUTH ONCE NIGHTLY  Isabel Rico DO   amLODIPine (NORVASC) 5 MG tablet TAKE ONE TABLET BY MOUTH DAILY  Isabel Rico,    losartan (COZAAR) 100 MG tablet TAKE ONE TABLET BY MOUTH DAILY  Isabel Rico DO   ibuprofen (ADVIL;MOTRIN) 800 MG tablet TAKE ONE TABLET BY MOUTH TWICE A DAY AS NEEDED FOR PAIN , TAKE WITH FOOD  Isabel Rico DO   atorvastatin (LIPITOR) 10 MG tablet TAKE 1 TABLET BY MOUTH ONE TIME A DAY  Isabel Rico DO   insulin lispro, 1 Unit Dial, (HUMALOG KWIKPEN) 100 UNIT/ML SOPN Inject 4 Units into the skin 3 times daily (before meals) Then USE AS CORRECTION SCALE PER PATIENT HOME INSTRUCTIONS  Isabel Torres DO   metFORMIN (GLUCOPHAGE) 1000 MG tablet TAKE 1 TABLET TWICE A DAY WITH MEALS  Isabel Elder DO   Lancets MISC Test 1-3 times a day as needed for symptoms of irregular glucose. DX: E11.9 please provide lancets accepted by patient's insurance. [address], DO   blood glucose monitor strips Test 1-3 times a day as needed for symptoms of irregular glucose. DX: E11.9 please provide strips that are accepted by patient's insurance. Isabel Rico DO   fluticasone (FLONASE) 50 MCG/ACT nasal spray 2 sprays by Nasal route daily  John Green DO   Insulin Pen Needle (PEN NEEDLES) 32G X 4 MM MISC USE TO INJECT INSULIN FOUR TIMES DAILY  John Green DO   ACCU-CHEK SOFTCLIX LANCETS MISC TEST BLOOD SUGAR TWO TIMES A DAY  John Green DO   Calcium Carb-Cholecalciferol (CALTRATE 600+D) 600-800 MG-UNIT TABS Take 1 tablet by mouth 2 times daily  Historical Provider, MD   cetirizine (ZYRTEC ALLERGY) 10 MG tablet Take 1 tablet by mouth daily  John Green DO   aspirin EC 81 MG EC tablet Take 81 mg by mouth daily.  LAST DOSE 8-9-14  Miguel Denson DO       Past Medical History:   Diagnosis Date    Allergic rhinitis     HTN (hypertension)     Hyperlipidemia     Obstructive sleep apnea (adult) (pediatric)     Non-compliant with CPAP    Osteoarthritis     Type II or unspecified type diabetes mellitus without mention of complication, not stated as uncontrolled         Social History     Tobacco Use    Smoking status: Former Smoker     Packs/day: 0.25     Years: 20.00     Pack years: 5.00     Types: Cigars     Quit date: 2019     Years since quittin.3    Smokeless tobacco: Never Used    Tobacco comment: was smoking 5 black and milds per day, but has not smoked in 4 days   Substance Use Topics    Alcohol use: Yes     Comment: 1 alcoholic beverage every other month    Drug use: Not Currently     Comment: H/o cocaine use        Past Surgical History:   Procedure Laterality Date    COLON SURGERY  2008    10\" removed - chronic constipation    DUPUYTRENS CONTRACTURE SURGERY Left 2019    LEFT 1ST DORSAL COMPARMENT DE' [facility] by Roberto Nunez MD at . [address]      4x     HYSTERECTOMY, TOTAL ABDOMINAL      JOINT REPLACEMENT      Left knee partial    KNEE SURGERY  's    rt knee reconstructed    NECK SURGERY  2011    c 4,5,6,7 (ACDF)    OVARY REMOVAL      ROTATOR CUFF REPAIR  2009    Left shoulder    SHOULDER SURGERY      lt & rt shoulder impingment    SINUS ENDOSCOPY  9-15-14    FUNCTIONAL ENDOSCOPIC SINUS SURGERY       TONSILLECTOMY AND ADENOIDECTOMY      TOTAL KNEE ARTHROPLASTY Left 2010    Eros to Total   [address]  2010    Dr. Manuel Lennon        Allergies   Allergen Reactions    Demerol Dermatitis     ITCHING    Levaquin [Levofloxacin In D5w] Nausea Only    Morphine Itching    Tramadol Other (See Comments)     \"NO CLUE\"        Family History   Problem Relation Age of Onset    Diabetes Father     Heart Disease Father         CAD    High Blood Pressure Father     High Cholesterol Father     Kidney Disease Brother     Diabetes Son    Viola Delaney Other Son         DEANA    Heart Disease Sister         1/2 sister    Rheum Arthritis Neg Hx     Osteoarthritis Neg Hx     Asthma Neg Hx     Breast Cancer Neg Hx     Cancer Neg Hx     Heart Failure Neg Hx     Hypertension Neg Hx     Migraines Neg Hx     Ovarian Cancer Neg Hx     Rashes/Skin Problems Neg Hx     Seizures Neg Hx     Stroke Neg Hx     Thyroid Disease Neg Hx         Patient's past medical history, surgical history, family history, medications, and allergies  were all reviewed and updated as appropriate today. Review of Systems   Constitutional: Positive for appetite change, diaphoresis, fatigue and fever. HENT: Positive for congestion, rhinorrhea, sinus pressure and sneezing. Negative for ear pain, facial swelling and sore throat. Respiratory: Negative for cough and shortness of breath. Cardiovascular: Negative for chest pain. Gastrointestinal: Negative for abdominal pain, diarrhea, nausea and vomiting. Musculoskeletal: Positive for myalgias (left shoulder, improving). Skin: Negative for rash. Neurological: Negative for dizziness and headaches. Hematological: Negative for adenopathy. Vitals unable to obtain 2/2 virtual visit nature of this encounter. Physical Exam  Constitutional:       Appearance: Normal appearance. HENT:      Head: Normocephalic and atraumatic. Eyes:      Extraocular Movements: Extraocular movements intact. Pulmonary:      Effort: Pulmonary effort is normal.   Neurological:      General: No focal deficit present. Mental Status: She is alert and oriented to person, place, and time. Psychiatric:         Mood and Affect: Mood normal.         Behavior: Behavior normal.         Thought Content: Thought content normal.         Judgment: Judgment normal.         Assessment:  Encounter Diagnoses   Name Primary?  Viral upper respiratory tract infection Yes    Muscle strain, shoulder region, left, initial encounter        Plan:  1. Viral upper respiratory tract infection  Improving; low suspicion for bacterial infection at this time.  Continue to treat symptomatically with Zyrtec, Flonase, Singulair; may take ibuprofen or acetaminophen prn.   F/u if sx not improving or worsening. 2. Muscle strain, shoulder region, left, initial encounter  Pain improving; continue to use lidocaine patches and prn ibuprofen. Encourage gentle stretching. May use heat/ice as needed. Discussed f/u if pain is worsening or new sx such as n/t. Return if symptoms worsen or fail to improve. Baron Laird, APRN - CNP     Dwayne Chakraborty is a 56 y.o. female being evaluated by a Virtual Visit (video visit) encounter to address concerns as mentioned above. A caregiver was present when appropriate. Due to this being a TeleHealth encounter (During [facility] emergency), evaluation of the following organ systems was limited: Vitals/Constitutional/EENT/Resp/CV/GI//MS/Neuro/Skin/Heme-Lymph-Imm. Pursuant to the emergency declaration under the [facility], [address] [place] [address] authority and the [employer] Act, this Virtual Visit was conducted with patient's (and/or legal guardian's) consent, to reduce the patient's risk of exposure to COVID-19 and provide necessary medical care. The patient (and/or legal guardian) has also been advised to contact this office for worsening conditions or problems, and seek emergency medical treatment and/or call 911 if deemed necessary.

## 2021-02-23 ENCOUNTER — TELEPHONE (OUTPATIENT)
Dept: PRIMARY CARE CLINIC | Age: 61
End: 2021-02-23

## 2021-02-23 ENCOUNTER — NURSE TRIAGE (OUTPATIENT)
Dept: OTHER | Facility: CLINIC | Age: 61
End: 2021-02-23

## 2021-02-23 NOTE — TELEPHONE ENCOUNTER
Reason for Disposition   Symptoms interfere with work or school    Answer Assessment - Initial Assessment Questions  1. CONCERN: \"What happened that made you call today? \"      \"This year has been hard for me\"- started a new job and has been overwhelmed and anxious - \"trying to deal with it\"- \"feel like I need some time out\"     2. ANXIETY SYMPTOM SCREENING: \"Can you describe how you have been feeling? \"  (e.g., tense, restless, panicky, anxious, keyed up, trouble sleeping, trouble concentrating)      Tense, restless, panicky, anxious, keyed up, trouble sleeping, trouble concentrating    3. ONSET: \"How long have you been feeling this way? \"      For about a month     4. RECURRENT: \"Have you felt this way before? \"  If yes: \"What happened that time? \" \"What helped these feelings go away in the past?\"      \" Never\"  felt this way before     5. RISK OF HARM - SUICIDAL IDEATION:  \"Do you ever have thoughts of hurting or killing yourself? \"  (e.g., yes, no, no but preoccupation with thoughts about death)    - INTENT:  \"Do you have thoughts of hurting or killing yourself right NOW? \" (e.g., yes, no, N/A)    - PLAN: \"Do you have a specific plan for how you would do this? \" (e.g., gun, knife, overdose, no plan, N/A)      Denies- no plans     6. RISK OF HARM - HOMICIDAL IDEATION:  \"Do you ever have thoughts of hurting or killing someone else? \"  (e.g., yes, no, no but preoccupation with thoughts about death)    - INTENT:  \"Do you have thoughts of hurting or killing someone right NOW? \" (e.g., yes, no, N/A)    - PLAN: \"Do you have a specific plan for how you would do this? \" (e.g., gun, knife, no plan, N/A)       \"Thoughts but no I would not do that\"     7. FUNCTIONAL IMPAIRMENT: \"How have things been going for you overall in your life? Have you had any more difficulties than usual doing your normal daily activities? \"  (e.g., better, same, worse; self-care, school, work, interactions)     Work has been more difficult - up half the night     8. SUPPORT: \"Who is with you now? \" \"Who do you live with?\" \"Do you have family or friends nearby who you can talk to?\"       States she is at work now- [employer]-   States she lives alone and is at peace alone - has friends that support each other     5. THERAPIST: \"Do you have a counselor or therapist? Name? \"      Denies    10. STRESSORS: \"Has there been any new stress or recent changes in your life? \"       Just work related stress    11. CAFFEINE ABUSE: \"Do you drink caffeinated beverages, and how much each day? \" (e.g., coffee, tea, milton)        Decaf only     12. SUBSTANCE ABUSE: \"Do you use any illegal drugs or alcohol? \"        Denies    13. OTHER SYMPTOMS: \"Do you have any other physical symptoms right now? \" (e.g., chest pain, palpitations, difficulty breathing, fever)        Denies     14. PREGNANCY: \"Is there any chance you are pregnant? \" \"When was your last menstrual period? \"        N/A    Protocols used: ANXIETY AND PANIC ATTACK-ADULT-[place]    Patient called  at [facility])  with red flag complaint. Brief description of triage: anxiety. See assessment above. Triage indicates for patient to See in office within 3 days. Pt denies feeling homicidal or suicidal. Pt states she does not need the suicide hot line number. Care advice provided, patient verbalizes understanding; denies any other questions or concerns; instructed to call back for any new or worsening symptoms. Writer provided warm transfer to Itzel Chavira  at [facility] for appointment scheduling. Attention Provider: Thank you for allowing me to participate in the care of your patient. The patient was connected to triage in response to information provided to the [facility]. Please do not respond through this encounter as the response is not directed to a shared pool.

## 2021-02-24 ENCOUNTER — VIRTUAL VISIT (OUTPATIENT)
Dept: PRIMARY CARE CLINIC | Age: 61
End: 2021-02-24
Payer: MEDICARE

## 2021-02-24 ENCOUNTER — TELEPHONE (OUTPATIENT)
Dept: PRIMARY CARE CLINIC | Age: 61
End: 2021-02-24

## 2021-02-24 DIAGNOSIS — F43.0 ACUTE STRESS DISORDER: Primary | ICD-10-CM

## 2021-02-24 PROCEDURE — G2012 BRIEF CHECK IN BY MD/QHP: HCPCS | Performed by: FAMILY MEDICINE

## 2021-02-24 NOTE — PROGRESS NOTES
Shyla Gooden is a 56 y.o. female evaluated via telephone on 2/24/2021. Consent:  She and/or health care decision maker is aware that that she may receive a bill for this telephone service, depending on her insurance coverage, and has provided verbal consent to proceed: Yes    Documentation:  I communicated with the patient and/or health care decision maker about anxiety/stress. HPI:     Patient reports work related stress for the last 1-2 months. States she works with a lot of lazy people and she is very frustrated. She is not eating or sleeping well as a result. Feels this is affecting her blood sugar control as her readings have been higher. 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 She plans to only work thru April 15th, once tax season has concluded. Patient is also stressed due to COVID risk and does not feel the office is following CDC guidelines to keep her safe.      Current Outpatient Medications on File Prior to Visit   Medication Sig Dispense Refill    insulin glargine (LANTUS SOLOSTAR) 100 UNIT/ML injection pen Inject 30 Units into the skin nightly 10 pen 3    montelukast (SINGULAIR) 10 MG tablet TAKE ONE TABLET BY MOUTH ONCE NIGHTLY 90 tablet 1    amLODIPine (NORVASC) 5 MG tablet TAKE ONE TABLET BY MOUTH DAILY 90 tablet 1    losartan (COZAAR) 100 MG tablet TAKE ONE TABLET BY MOUTH DAILY 90 tablet 1    ibuprofen (ADVIL;MOTRIN) 800 MG tablet TAKE ONE TABLET BY MOUTH TWICE A DAY AS NEEDED FOR PAIN , TAKE WITH FOOD 60 tablet 0    atorvastatin (LIPITOR) 10 MG tablet TAKE 1 TABLET BY MOUTH ONE TIME A DAY 5 tablet 0    insulin lispro, 1 Unit Dial, (HUMALOG KWIKPEN) 100 UNIT/ML SOPN Inject 4 Units into the skin 3 times daily (before meals) Then USE AS CORRECTION SCALE PER PATIENT HOME INSTRUCTIONS 5 pen 3    metFORMIN (GLUCOPHAGE) 1000 MG tablet TAKE 1 TABLET TWICE A DAY WITH MEALS 180 tablet 3    Lancets MISC Test 1-3 times a day as needed for symptoms of irregular glucose. DX: E11.9 please provide lancets accepted by patient's insurance. 100 each 3    blood glucose monitor strips Test 1-3 times a day as needed for symptoms of irregular glucose. DX: E11.9 please provide strips that are accepted by patient's insurance. 200 strip 3    fluticasone (FLONASE) 50 MCG/ACT nasal spray 2 sprays by Nasal route daily 1 Bottle 3    Insulin Pen Needle (PEN NEEDLES) 32G X 4 MM MISC USE TO INJECT INSULIN FOUR TIMES DAILY 200 each 5    ACCU-CHEK SOFTCLIX LANCETS MISC TEST BLOOD SUGAR TWO TIMES A  each 4    Calcium Carb-Cholecalciferol (CALTRATE 600+D) 600-800 MG-UNIT TABS Take 1 tablet by mouth 2 times daily      cetirizine (ZYRTEC ALLERGY) 10 MG tablet Take 1 tablet by mouth daily      aspirin EC 81 MG EC tablet Take 81 mg by mouth daily. LAST DOSE 8-9-14 30 tablet 11     No current facility-administered medications on file prior to visit. 1. Acute stress disorder  - I have recommend brief ENRICO due to mental health stress and the impact on her physical health. I have completed [facility] paperwork for leave to be the dates of 3/1/21 - 3/21/21. She will return on 3/22/21 with the expectation to complete her job duties thru 4/15/21. We have discussed stress management at length as well as coping skills. Has good support system with her [employer] and friend groups. No orders of the defined types were placed in this encounter. I affirm this is a Patient Initiated Episode with a Patient who has not had a related appointment within my department in the past 7 days or scheduled within the next 24 hours. Patient identification was verified at the start of the visit: Yes    Total Time: minutes: 11-20 minutes    Return if symptoms worsen or fail to improve.      Note: not billable if this call serves to triage the patient into an appointment for the relevant concern      [facility]

## 2021-02-24 NOTE — LETTER
Anselmo Stevens   :     FMLA  Certification of Health Care Provider for  Employees Serious Health Condition  (Family and Medical Leave Act)    SECTION I: For Completion by the Herberth Browne to the EMPLOYER:   The Family and Medical Leave Act [employer]) provides that an employer may require an employee seeking FMLA protections because of a need for leave due to a serious health condition to submit a medical certification issued by the employee's health care provider. Please complete Section I before giving this form to your employee. Your response is  voluntary. While you are not required to use this form, you may not ask the employee to provide more information than allowed under the [address] [facility] regulations, 29 [employer]. [employer]. [employer] [place] [phone]. Employers must generally maintain records and documents relating to medical certifications, recertifications, or medical histories if employees created for FMLA purposes as confidential medical records in separate files/records from the usual personnel files and in accordance with 29 C. F.R. § 1630.14(c)(1), if the Americans with Disabilities Act applies.     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 The [place] permits an employer to require that you submit a timely, complete, and sufficient medical certification to support a request for FMLA leave due to your own serious health condition. If requested by your employer, your response is required to obtain or retain the benefit of FMLA protections. 29 U. S.C. §§ 2613, 3443 (c)(3). Failure to provide a complete and sufficient medical certification may result in a denial of your FMLA request. 20 C. F.R. § 825.313. Your employer must give you at least 15 calendar days to return this form. 29 C. F.R. § 825.305(b). Your name: Michelle Trevino          SECTION III: For Completion by the Chava Regalado to the HEALTH CARE PROVIDER:  Your patient has requested leave under the FMLA. Answer, fully and completely, all applicable parts. Several questions seek a response as to the frequency or duration of condition, treatment, etc. Your answer should be your best estimate based on your medical knowledge, experience, and examination of the patient. Be as specific as you can; terms such as \"lifetime,\" \"unknown,\" or \"indeterminate\" may not be sufficient to determine FMLA coverage. Limit your responses to the condition for which the employee is seeking leave. Please be sure to sign the form on the last page. Provider's name: [address],       Type of practice/Medical specialty and business address: [address] [place] [address] PRIMARY CARE  [address] [place] [id number] [phone]    Telephone: Medical Correspondence: [phone] or [address]: [phone] or [phone]     PART A: MEDICAL FACTS    1. Approximate date condition commenced: 2/24/21. Probable duration of condition: TBD.          You below as applicable: Was the patient admitted for an overnight stay in a hospital, hospice, or           residential medical care facility? No  If so, dates of admission:       N/A. Date(s) you treated the patient for condition: 2/24/21. Will the patient need to have treatment visits at least twice per year due to the       Condition? Yes. Was medication, other than over-the counter medication, prescribed? No.                                                        Was the patient referred to other health care provider(s) for evaluation or       treatment (e.g., physical therapist)? No. If so, state the nature of        such treatments and expected duration of treatment:         N/A.     2.  Is the medical condition pregnancy? No. If so, expected delivery         date: N/A.    3.  Use the information provided by the employer in Section I to answer this         question. If the employer fails to provide a list of the employee's essential       functions or a job description, answer these questions based upon the        employee's own description of his/her job functions. Is the employee unable to perform any of his/her job functions due to the       condition: Yes. If so, Identify the job functions the employee is unable to perform:       Patient is unable to complete all of her duties at this time. 4.  Describe other relevant medical facts, if any, related to the condition for which       the employee seeks leave (such medical facts may include symptoms,         diagnosis, or any regimen of continuing treatment such as the use of        Specialized equipment): Patient is experiencing significant anxiety/stress/depressed mood that is affecting her physical and mental health. I have recommended for the sake of her chronic conditions that she take a brief leave of absence for medical reasons.      PART B: AMOUNT OF LEAVE NEEDED 5.  Will the employee be incapacitated for a single continuous period of time due       to his/her medical condition, including any time for treatment and recovery? Yes. If so, estimate the beginning and ending dates for the period of incapacity:       3/1/21 - 3/21/21. 6.  Will the employee need to attend follow-up treatment appointments or work        part-time or on a reduced schedule because of the employee's medical        condition? Yes. - Will have follow up appts, but does not need reduced hours upon return. If so, are the treatments or the reduced number of hours of work medically        Necessary? yes. Estimated treatment schedule, if any, including the dates of any scheduled          appointments and the time required for each appointment, including any            recovery period: She will be seen quarterly for follow up. .          Estimate the part-time or reduced work schedule the employee needs, if any:       N/A hour(s) per day:       N/A days per week from        N/A through N/A.     7.  Will the condition cause episodic flare-ups periodically preventing the       employee from performing his/her job functions? No.         Is it medically necessary for the employee to be absent from work during         flare-ups? N/A. If so, explain N/A. Based upon the patient's medical history and your knowledge of the medical condition, estimate the frequency of flare-ups and the duration of related incapacity that the patients may have over the next 6 months (e.g., 1 episode every 3 months lasting 1-2 days):    Frequency: N/A times per N/A week (s) N/A month(s)    Duration: N/A hours or N/A day(s) per episode.     Provider Signature:               Electronic Signature      Date and Time: 2/24/21 9:00 AM      PAPERWORK REDUCTION ACT NOTICE AND PUBLIC BURDEN STATEMENT If submitted, it is mandatory for employers to retain a copy of this disclosure in their records for three years. [address] [employer] 2779;86 C. F.R. § 825.500. Persons are not required to respond to this collection of information unless it displays a currently valid OMB control number. The Department of Labor estimates that it will take an average of 20 minutes for respondents to complete this collection of information, including the time for reviewing instructions, searching existing data sources, gathering and maintaining the data needed, and completing and reviewing the collection of information. If you have any comments regarding this burden estimate or any other aspect of this collection information, including suggestions for reducing this burden, send them to the , Wage and Hour Division, U.[place] Department of Labor, Room , [address] [place] [address], [facility]. DO NOT SEND COMPLETED FORM TO THE DEPARTMENT OF LABOR; RETURN TO THE PATIENT.

## 2021-02-24 NOTE — TELEPHONE ENCOUNTER
----- Message from Angi Dale DO sent at 2/24/2021  9:46 AM EST -----  Regarding: Soledad Fung,    Please see [facility] paperwork that was completed under Letters. Please print and have ready as patient will come to the office to  on Monday.      Dr. Catherine Sinclair

## 2021-03-30 ENCOUNTER — VIRTUAL VISIT (OUTPATIENT)
Dept: PRIMARY CARE CLINIC | Age: 61
End: 2021-03-30
Payer: MEDICARE

## 2021-03-30 DIAGNOSIS — E78.2 HYPERLIPIDEMIA, MIXED: ICD-10-CM

## 2021-03-30 DIAGNOSIS — E11.65 TYPE 2 DIABETES MELLITUS WITH HYPERGLYCEMIA, WITH LONG-TERM CURRENT USE OF INSULIN (HCC): Primary | ICD-10-CM

## 2021-03-30 DIAGNOSIS — I10 BENIGN ESSENTIAL HTN: Chronic | ICD-10-CM

## 2021-03-30 DIAGNOSIS — Z79.4 TYPE 2 DIABETES MELLITUS WITH HYPERGLYCEMIA, WITH LONG-TERM CURRENT USE OF INSULIN (HCC): Primary | ICD-10-CM

## 2021-03-30 PROCEDURE — G2012 BRIEF CHECK IN BY MD/QHP: HCPCS | Performed by: FAMILY MEDICINE

## 2021-03-30 NOTE — PROGRESS NOTES
Zulma Blanca is a 56 y.o. female evaluated via telephone on 3/30/2021. Consent:  She and/or health care decision maker is aware that that she may receive a bill for this telephone service, depending on her insurance coverage, and has provided verbal consent to proceed: Yes    Documentation:  I communicated with the patient and/or health care decision maker about DMII.     HPI:     Patient unable to connect for video visit. Telephone visit completed due to connection issues. She has a h/o uncontrolled DMII. Last a1c had increased to 10.3% from 7.4%. Insulin was adjusted to Lantus 30 units nightly and stressed importance of adherence to premeal insulin as prescribed + correction scale if needed. + associated microalbuminuria on last labs. She is on ARB. Reports BS running higher than usual in Jan/Feb due to work stress. 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 Is back at work for the next 2 weeks then will be done for the season. She states BS now running 100s consistently and no hypoglycemia. She is back trying to work on diet and exercise as well.      Current Outpatient Medications on File Prior to Visit   Medication Sig Dispense Refill    insulin glargine (LANTUS SOLOSTAR) 100 UNIT/ML injection pen Inject 30 Units into the skin nightly 10 pen 3    montelukast (SINGULAIR) 10 MG tablet TAKE ONE TABLET BY MOUTH ONCE NIGHTLY 90 tablet 1    amLODIPine (NORVASC) 5 MG tablet TAKE ONE TABLET BY MOUTH DAILY 90 tablet 1    losartan (COZAAR) 100 MG tablet TAKE ONE TABLET BY MOUTH DAILY 90 tablet 1    ibuprofen (ADVIL;MOTRIN) 800 MG tablet TAKE ONE TABLET BY MOUTH TWICE A DAY AS NEEDED FOR PAIN , TAKE WITH FOOD 60 tablet 0    atorvastatin (LIPITOR) 10 MG tablet TAKE 1 TABLET BY MOUTH ONE TIME A DAY 5 tablet 0    insulin lispro, 1 Unit Dial, (HUMALOG KWIKPEN) 100 UNIT/ML SOPN Inject 4 Units into the skin 3 times daily (before meals) Then USE AS CORRECTION SCALE PER PATIENT HOME INSTRUCTIONS 5 pen 3    metFORMIN (GLUCOPHAGE) 1000 MG tablet TAKE 1 TABLET TWICE A DAY WITH MEALS 180 tablet 3    Lancets MISC Test 1-3 times a day as needed for symptoms of irregular glucose. DX: E11.9 please provide lancets accepted by patient's insurance. 100 each 3    blood glucose monitor strips Test 1-3 times a day as needed for symptoms of irregular glucose. DX: E11.9 please provide strips that are accepted by patient's insurance. 200 strip 3    fluticasone (FLONASE) 50 MCG/ACT nasal spray 2 sprays by Nasal route daily 1 Bottle 3    Insulin Pen Needle (PEN NEEDLES) 32G X 4 MM MISC USE TO INJECT INSULIN FOUR TIMES DAILY 200 each 5    ACCU-CHEK SOFTCLIX LANCETS MISC TEST BLOOD SUGAR TWO TIMES A  each 4    Calcium Carb-Cholecalciferol (CALTRATE 600+D) 600-800 MG-UNIT TABS Take 1 tablet by mouth 2 times daily      cetirizine (ZYRTEC ALLERGY) 10 MG tablet Take 1 tablet by mouth daily      aspirin EC 81 MG EC tablet Take 81 mg by mouth daily. LAST DOSE 8-9-14 30 tablet 11     No current facility-administered medications on file prior to visit. 1. Type 2 diabetes mellitus with hyperglycemia, with long-term current use of insulin ([address]  - Home readings improved. Repeat a1c.   - HEMOGLOBIN A1C; Future  - BASIC METABOLIC PANEL; Future  - MICROALBUMIN / CREATININE URINE RATIO; Future    2. Benign essential HTN  - Stable. 3. Hyperlipidemia, mixed  - Stable. Heart healthy diet. Orders Placed This Encounter   Procedures    HEMOGLOBIN A1C    BASIC METABOLIC PANEL    MICROALBUMIN / CREATININE URINE RATIO        I affirm this is a Patient Initiated Episode with a Patient who has not had a related appointment within my department in the past 7 days or scheduled within the next 24 hours.     Patient identification was verified at the start of the visit: Yes    Total Time: minutes: 11-20 minutes    Return in about 3 months (around 6/30/2021) for Follow up DMII.      Note: not billable if this call serves to triage the patient into an appointment for the relevant concern      [facility]

## 2021-04-20 DIAGNOSIS — E11.65 TYPE 2 DIABETES MELLITUS WITH HYPERGLYCEMIA, WITHOUT LONG-TERM CURRENT USE OF INSULIN (HCC): ICD-10-CM

## 2021-04-20 NOTE — TELEPHONE ENCOUNTER
PT CALLING FOR REFILL ON PEN NEEDLES TO BE CALLED IN TO [facility] [phone]    LAST OV 3-30-21 HC VIRTUAL  NO FUTURE OV

## 2021-04-22 RX ORDER — PEN NEEDLE, DIABETIC 30 GX3/16"
NEEDLE, DISPOSABLE MISCELLANEOUS
Qty: 200 EACH | Refills: 5 | Status: SHIPPED | OUTPATIENT
Start: 2021-04-22 | End: 2022-09-22

## 2021-04-26 ENCOUNTER — APPOINTMENT (OUTPATIENT)
Dept: GENERAL RADIOLOGY | Age: 61
End: 2021-04-26
Payer: MEDICARE

## 2021-04-26 ENCOUNTER — HOSPITAL ENCOUNTER (EMERGENCY)
Age: 61
Discharge: HOME | End: 2021-04-26
Attending: EMERGENCY MEDICINE
Payer: MEDICARE

## 2021-04-26 VITALS
RESPIRATION RATE: 16 BRPM | OXYGEN SATURATION: 94 % | SYSTOLIC BLOOD PRESSURE: 145 MMHG | HEIGHT: 63 IN | DIASTOLIC BLOOD PRESSURE: 87 MMHG | TEMPERATURE: 98.1 F | HEART RATE: 94 BPM | BODY MASS INDEX: 36.09 KG/M2 | WEIGHT: 203.7 LBS

## 2021-04-26 DIAGNOSIS — G56.02 CARPAL TUNNEL SYNDROME OF LEFT WRIST: ICD-10-CM

## 2021-04-26 DIAGNOSIS — M19.032 OSTEOARTHRITIS OF LEFT WRIST, UNSPECIFIED OSTEOARTHRITIS TYPE: Primary | ICD-10-CM

## 2021-04-26 LAB
ANION GAP SERPL CALCULATED.3IONS-SCNC: 12 MMOL/L (ref 3–16)
BASOPHILS ABSOLUTE: 0.2 K/UL (ref 0–0.2)
BASOPHILS RELATIVE PERCENT: 1.3 %
BUN BLDV-MCNC: 8 MG/DL (ref 7–20)
CALCIUM SERPL-MCNC: 9.7 MG/DL (ref 8.3–10.6)
CHLORIDE BLD-SCNC: 102 MMOL/L (ref 99–110)
CO2: 26 MMOL/L (ref 21–32)
CREAT SERPL-MCNC: <0.5 MG/DL (ref 0.6–1.2)
EKG ATRIAL RATE: 83 BPM
EKG DIAGNOSIS: NORMAL
EKG P AXIS: 57 DEGREES
EKG P-R INTERVAL: 182 MS
EKG Q-T INTERVAL: 392 MS
EKG QRS DURATION: 80 MS
EKG QTC CALCULATION (BAZETT): 460 MS
EKG R AXIS: 21 DEGREES
EKG T AXIS: 31 DEGREES
EKG VENTRICULAR RATE: 83 BPM
EOSINOPHILS ABSOLUTE: 0.2 K/UL (ref 0–0.6)
EOSINOPHILS RELATIVE PERCENT: 1.2 %
GFR AFRICAN AMERICAN: >60
GFR NON-AFRICAN AMERICAN: >60
GLUCOSE BLD-MCNC: 227 MG/DL (ref 70–99)
HCT VFR BLD CALC: 38.5 % (ref 36–48)
HEMOGLOBIN: 12.2 G/DL (ref 12–16)
LYMPHOCYTES ABSOLUTE: 2.7 K/UL (ref 1–5.1)
LYMPHOCYTES RELATIVE PERCENT: 18.3 %
MCH RBC QN AUTO: 26.7 PG (ref 26–34)
MCHC RBC AUTO-ENTMCNC: 31.6 G/DL (ref 31–36)
MCV RBC AUTO: 84.5 FL (ref 80–100)
MONOCYTES ABSOLUTE: 1 K/UL (ref 0–1.3)
MONOCYTES RELATIVE PERCENT: 6.6 %
NEUTROPHILS ABSOLUTE: 10.6 K/UL (ref 1.7–7.7)
NEUTROPHILS RELATIVE PERCENT: 72.6 %
PDW BLD-RTO: 13.3 % (ref 12.4–15.4)
PLATELET # BLD: 283 K/UL (ref 135–450)
PMV BLD AUTO: 10.2 FL (ref 5–10.5)
POTASSIUM REFLEX MAGNESIUM: 3.8 MMOL/L (ref 3.5–5.1)
RBC # BLD: 4.56 M/UL (ref 4–5.2)
SODIUM BLD-SCNC: 140 MMOL/L (ref 136–145)
TROPONIN: <0.01 NG/ML
WBC # BLD: 14.6 K/UL (ref 4–11)

## 2021-04-26 PROCEDURE — 6370000000 HC RX 637 (ALT 250 FOR IP): Performed by: EMERGENCY MEDICINE

## 2021-04-26 PROCEDURE — 93010 ELECTROCARDIOGRAM REPORT: CPT | Performed by: INTERNAL MEDICINE

## 2021-04-26 PROCEDURE — 99283 EMERGENCY DEPT VISIT LOW MDM: CPT

## 2021-04-26 PROCEDURE — 73110 X-RAY EXAM OF WRIST: CPT

## 2021-04-26 PROCEDURE — 85025 COMPLETE CBC W/AUTO DIFF WBC: CPT

## 2021-04-26 PROCEDURE — 84484 ASSAY OF TROPONIN QUANT: CPT

## 2021-04-26 PROCEDURE — 80048 BASIC METABOLIC PNL TOTAL CA: CPT

## 2021-04-26 PROCEDURE — 93005 ELECTROCARDIOGRAM TRACING: CPT | Performed by: EMERGENCY MEDICINE

## 2021-04-26 RX ORDER — IBUPROFEN 400 MG/1
800 TABLET ORAL ONCE
Status: COMPLETED | OUTPATIENT
Start: 2021-04-26 | End: 2021-04-26

## 2021-04-26 RX ADMIN — IBUPROFEN 800 MG: 400 TABLET, FILM COATED ORAL at 12:13

## 2021-04-26 ASSESSMENT — ENCOUNTER SYMPTOMS
PHOTOPHOBIA: 0
DIARRHEA: 0
RHINORRHEA: 0
WHEEZING: 0
VOMITING: 0
SHORTNESS OF BREATH: 0
BACK PAIN: 0
ABDOMINAL DISTENTION: 0
NAUSEA: 0
COUGH: 0

## 2021-04-26 ASSESSMENT — PAIN DESCRIPTION - PAIN TYPE: TYPE: CHRONIC PAIN

## 2021-04-26 ASSESSMENT — PAIN SCALES - GENERAL: PAINLEVEL_OUTOF10: 10

## 2021-04-26 ASSESSMENT — PAIN DESCRIPTION - ORIENTATION: ORIENTATION: LEFT

## 2021-04-26 NOTE — ED PROVIDER NOTES
Emergency Department Provider Note  Location: [address] [place] [address]  4/26/2021     Patient Identification  Alvaro Calabrese is a 56 y.o. female    Chief Complaint  Arm Pain (pt thinks she has arthritis and is having pain in left arm from her fingers up to her shoulder for weeks)          HPI  (History provided by patient)  Patient is a 78-year-old female with history of hypertension hyperlipidemia, diabetes, arthritis of the wrists status post carpal tunnel surgery who presents with left wrist pain progressive waxing and waning over the past month. Patient states has had this pain for but is become more severe moderate to severe in nature. No known exacerbating factors however seems to be better when she easily rolls her wrist around in a [place] and rubs it. It radiates to the palmar aspect of the hand and up to mid volar forearm. Does not cross the elbow. Is not exacerbated with any other movements that she is aware of. There is no exertional symptoms no chest pain back pain shoulder pain no lightheadedness diaphoresis or other autonomic symptoms. She has tried Tylenol with little relief. Denies injury. I have reviewed the following nursing documentation:  Allergies: Allergies   Allergen Reactions    Demerol Dermatitis     ITCHING    Levaquin [Levofloxacin In D5w] Nausea Only    Morphine Itching    Tramadol Other (See Comments)     \"NO CLUE\"       Past medical history:  has a past medical history of Allergic rhinitis, HTN (hypertension), Hyperlipidemia, Obstructive sleep apnea (adult) (pediatric), Osteoarthritis, and Type II or unspecified type diabetes mellitus without mention of complication, not stated as uncontrolled. Past surgical history:  has a past surgical history that includes joint replacement (2007); Rotator cuff repair (2009); Colon surgery (2008); shoulder surgery (1990's); knee surgery (1990's); Neck surgery (05/12/2011); Umbilical hernia repair (8/2010);  Ovary removal; Sinus endoscopy (9-15-14); Tonsillectomy and adenoidectomy; Tubal ligation; Total knee arthroplasty (Left, 03/09/2010); dupuytrens contracture surgery (Left, 1/29/2019); hernia repair; and Hysterectomy, total abdominal.    Home medications:   Prior to Admission medications    Medication Sig Start Date End Date Taking? Authorizing Provider   Insulin Pen Needle (PEN NEEDLES) 32G X 4 MM MISC USE TO INJECT INSULIN FOUR TIMES DAILY 4/22/21   Isabel Rico,    insulin glargine (LANTUS SOLOSTAR) 100 UNIT/ML injection pen Inject 30 Units into the skin nightly 1/6/21   Isabel Rico DO   montelukast (SINGULAIR) 10 MG tablet TAKE ONE TABLET BY MOUTH ONCE NIGHTLY 12/7/20   Isabel Rico, DO   amLODIPine (NORVASC) 5 MG tablet TAKE ONE TABLET BY MOUTH DAILY 12/7/20   Isabel Rico, DO   losartan (COZAAR) 100 MG tablet TAKE ONE TABLET BY MOUTH DAILY 12/7/20   Isabel Amara Rico, DO   ibuprofen (ADVIL;MOTRIN) 800 MG tablet TAKE ONE TABLET BY MOUTH TWICE A DAY AS NEEDED FOR PAIN , TAKE WITH FOOD 12/7/20   [facility], [facility]   atorvastatin (LIPITOR) 10 MG tablet TAKE 1 TABLET BY MOUTH ONE TIME A DAY 11/28/20   [facility] De Souzah, DO   insulin lispro, 1 Unit Dial, (HUMALOG KWIKPEN) 100 UNIT/ML SOPN Inject 4 Units into the skin 3 times daily (before meals) Then USE AS CORRECTION SCALE PER PATIENT HOME INSTRUCTIONS 9/28/20   Isabeleleni Suarez, DO   metFORMIN (GLUCOPHAGE) 1000 MG tablet TAKE 1 TABLET TWICE A DAY WITH MEALS 9/15/20   Isabel Rico DO   Lancets MISC Test 1-3 times a day as needed for symptoms of irregular glucose. DX: E11.9 please provide lancets accepted by patient's insurance. 6/1/20   Opal Fee, DO   blood glucose monitor strips Test 1-3 times a day as needed for symptoms of irregular glucose. DX: E11.9 please provide strips that are accepted by patient's insurance.  6/1/20   Opal Fee, DO action: Given - by James Guerrero on 04/26/21 at 1213 JESSE HIRSCH          EKG  Normal sinus rhythm rate of 80 normal axis normal intervals no evidence of conduction normalities no diagnostic ischemic changes noted, . Radiology  Xr Wrist Left (min 3 Views)    Result Date: 4/26/2021  Left wrist HISTORY: Wrist pain     3 views demonstrate no fracture.  Mild arthritic changes are seen in the lateral wrist.        Labs  Results for orders placed or performed during the hospital encounter of 04/26/21   CBC Auto Differential   Result Value Ref Range    WBC 14.6 (H) 4.0 - 11.0 K/uL    RBC 4.56 4.00 - 5.20 M/uL    Hemoglobin 12.2 12.0 - 16.0 g/dL    Hematocrit 38.5 36.0 - 48.0 %    MCV 84.5 80.0 - 100.0 fL    MCH 26.7 26.0 - 34.0 pg    MCHC 31.6 31.0 - 36.0 g/dL    RDW 13.3 12.4 - 15.4 %    Platelets 015 694 - 997 K/uL    MPV 10.2 5.0 - 10.5 fL    Neutrophils % 72.6 %    Lymphocytes % 18.3 %    Monocytes % 6.6 %    Eosinophils % 1.2 %    Basophils % 1.3 %    Neutrophils Absolute 10.6 (H) 1.7 - 7.7 K/uL    Lymphocytes Absolute 2.7 1.0 - 5.1 K/uL    Monocytes Absolute 1.0 0.0 - 1.3 K/uL    Eosinophils Absolute 0.2 0.0 - 0.6 K/uL    Basophils Absolute 0.2 0.0 - 0.2 K/uL   Basic Metabolic Panel w/ Reflex to MG   Result Value Ref Range    Sodium 140 136 - 145 mmol/L    Potassium reflex Magnesium 3.8 3.5 - 5.1 mmol/L    Chloride 102 99 - 110 mmol/L    CO2 26 21 - 32 mmol/L    Anion Gap 12 3 - 16    Glucose 227 (H) 70 - 99 mg/dL    BUN 8 7 - 20 mg/dL    CREATININE <0.5 (L) 0.6 - 1.2 mg/dL    GFR Non-African American >60 >60    GFR African American >60 >60    Calcium 9.7 8.3 - 10.6 mg/dL   Troponin   Result Value Ref Range    Troponin <0.01 <0.01 ng/mL   EKG 12 Lead   Result Value Ref Range    Ventricular Rate 83 BPM    Atrial Rate 83 BPM    P-R Interval 182 ms    QRS Duration 80 ms    Q-T Interval 392 ms    QTc Calculation (Bazett) 460 ms    P Axis 57 degrees    R Axis 21 degrees    T Axis 31 degrees Diagnosis        Poor data quality, interpretation may be adversely affectedNormal sinus rhythmNormal ECG         MDM  Patient seen and evaluated. Relevant records reviewed. 26-year-old female who presents with left-sided wrist pain radiates to the forearm and hand. On exam patient is well-appearing no acute distress overall reassuring vital signs. Her exam is notable for equivocal Tinel sign. Her story seems consistent with worsening osteoarthritis. I have low concern for any septic joint or other active infection. Does not seem to be consistent with ACS and EKG and troponin are negative. Her symptoms been persistent for the past few days I do not see indication for serial troponins. She may be developing recurrent carpal tunnel as well. Patient would benefit from NSAID therapy, wrist splint which she already has and PCP follow-up with possible orthopedic follow-up. We will plan to discharge at this time. I doubt other acute process require emergent intervention at this point. Patient agreeable to plan expressed understanding of plan. Clinical Impression:  1. Osteoarthritis of left wrist, unspecified osteoarthritis type    2. Carpal tunnel syndrome of left wrist          Disposition:  Discharge to home in good condition. Blood pressure (!) 145/87, pulse 94, temperature 98.1 °F (36.7 °C), temperature source Oral, resp. rate 16, height 5' 3\" (1.6 m), weight 203 lb 11.2 oz (92.4 kg), last menstrual period 01/26/1996, SpO2 94 %, not currently breastfeeding. Patient was given scripts for the following medications. I counseled patient how to take these medications.    New Prescriptions    No medications on file       Disposition referral (if applicable):  DO Jose De Jesus [address] [place] [address]  [phone]    Schedule an appointment as soon as possible for a visit           Total critical care time is 0 minutes, which excludes separately billable procedures and updating family. Time spent is specifically for management of the presenting complaint and symptoms initially, direct bedside care, reevaluation, review of records, and consultation. There was a high probability of clinically significant life-threatening deterioration in the patient's condition, which required my urgent intervention. This chart was generated in part by using Dragon Dictation system and may contain errors related to that system including errors in grammar, punctuation, and spelling, as well as words and phrases that may be inappropriate. If there are any questions or concerns please feel free to contact the dictating provider for clarification.      Pennie Penaloza MD  [address] MD  04/26/21 2046

## 2021-04-26 NOTE — ED NOTES
Pt states that she has been having left arm pain for the past several weeks from her fingers to her shoulder and pt thinks it is arthritis although pt states that she cannot take the pain anymore and has been taking over the counter medication and wearing a brace on left wrist and pain not getting any better.       Hamida Belcher RN  04/26/21 4200

## 2021-05-03 ENCOUNTER — OFFICE VISIT (OUTPATIENT)
Dept: PRIMARY CARE CLINIC | Age: 61
End: 2021-05-03
Payer: MEDICARE

## 2021-05-03 VITALS
DIASTOLIC BLOOD PRESSURE: 71 MMHG | HEART RATE: 97 BPM | OXYGEN SATURATION: 98 % | BODY MASS INDEX: 36.31 KG/M2 | WEIGHT: 205 LBS | SYSTOLIC BLOOD PRESSURE: 108 MMHG | TEMPERATURE: 97 F

## 2021-05-03 DIAGNOSIS — M25.532 LEFT WRIST PAIN: Primary | ICD-10-CM

## 2021-05-03 DIAGNOSIS — R20.0 NUMBNESS AND TINGLING IN LEFT HAND: ICD-10-CM

## 2021-05-03 DIAGNOSIS — M19.032 ARTHRITIS OF LEFT WRIST: ICD-10-CM

## 2021-05-03 DIAGNOSIS — R20.2 NUMBNESS AND TINGLING IN LEFT HAND: ICD-10-CM

## 2021-05-03 PROCEDURE — 99213 OFFICE O/P EST LOW 20 MIN: CPT | Performed by: NURSE PRACTITIONER

## 2021-05-03 ASSESSMENT — ENCOUNTER SYMPTOMS
COUGH: 0
SHORTNESS OF BREATH: 0
COLOR CHANGE: 0

## 2021-05-03 NOTE — PROGRESS NOTES
[address] [facility] [address] PR[facility]  [address] [place] [id number]  Dept: [phone]  Dept Fax: [phone]     5/3/2021      Crystal Roe   1960     Chief Complaint   Patient presents with    Wrist Pain     follow to ER, [facility]       HPI     Patient presents for follow up from ER for left wrist pain. Pain in wrist has been ongoing for a couple of months but has been worsening over time; pain waxes and wanes. Pain starts in wrist and radiates into anterior forearm; it also at times is in fingers and hand. She describes pain as achy but at times is sharp, burning. She has tried splinting, ibuprofen, heat with minimal-moderate relief. Pain is worse with certain movements, typing, and when sleeping. Pain wakes her up in the middle of the night. At times she has tingling in fingers. She reports she always has tingling in last two fingers of left hand but at times will be in palm and thumb area. Xray in ER showed mild arthritis in left lateral wrist.       PHQ Scores 9/28/2020 11/22/2019 10/11/2019 9/30/2019 9/26/2019 7/23/2018 2/15/2017   PHQ2 Score 0 0 0 2 6 0 0   PHQ9 Score 0 0 0 8 24 0 0     Interpretation of Total Score Depression Severity: 1-4 = Minimal depression, 5-9 = Mild depression, 10-14 = Moderate depression, 15-19 = Moderately severe depression, 20-27 = Severe depression     Prior to Visit Medications    Medication Sig Taking?  Authorizing Provider   Insulin Pen Needle (PEN NEEDLES) 32G X 4 MM MISC USE TO INJECT INSULIN FOUR TIMES DAILY Yes Isabel Rico, DO   insulin glargine (LANTUS SOLOSTAR) 100 UNIT/ML injection pen Inject 30 Units into the skin nightly Yes Isabel Rico, DO   montelukast (SINGULAIR) 10 MG tablet TAKE ONE TABLET BY MOUTH ONCE NIGHTLY Yes Isabel Rico, DO   amLODIPine (NORVASC) 5 MG tablet TAKE ONE TABLET BY MOUTH DAILY Yes Isabel Rico, DO   losartan (COZAAR) 100 MG tablet TAKE ONE TABLET BY MOUTH DAILY Yes Isabel Rico DO   ibuprofen (ADVIL;MOTRIN) 800 MG tablet TAKE ONE TABLET BY MOUTH TWICE A DAY AS NEEDED FOR PAIN , TAKE WITH FOOD Yes Isabel Rico DO   atorvastatin (LIPITOR) 10 MG tablet TAKE 1 TABLET BY MOUTH ONE TIME A DAY Yes Isabel Rico DO   insulin lispro, 1 Unit Dial, (HUMALOG KWIKPEN) 100 UNIT/ML SOPN Inject 4 Units into the skin 3 times daily (before meals) Then USE AS CORRECTION SCALE PER PATIENT HOME INSTRUCTIONS Yes Jose Shields DO   metFORMIN (GLUCOPHAGE) 1000 MG tablet TAKE 1 TABLET TWICE A DAY WITH MEALS Yes Isabel Bagley DO   Lancets MISC Test 1-3 times a day as needed for symptoms of irregular glucose. DX: E11.9 please provide lancets accepted by patient's insurance. Yes Jose Shields DO   blood glucose monitor strips Test 1-3 times a day as needed for symptoms of irregular glucose. DX: E11.9 please provide strips that are accepted by patient's insurance. Yes Isabel Rico DO   fluticasone (FLONASE) 50 MCG/ACT nasal spray 2 sprays by Nasal route daily Yes John Green DO   ACCU-CHEK SOFTCLIX LANCETS MISC TEST BLOOD SUGAR TWO TIMES A DAY Yes John Green DO   Calcium Carb-Cholecalciferol (CALTRATE 600+D) 600-800 MG-UNIT TABS Take 1 tablet by mouth 2 times daily Yes Historical Provider, MD   cetirizine (ZYRTEC ALLERGY) 10 MG tablet Take 1 tablet by mouth daily Yes John Green DO   aspirin EC 81 MG EC tablet Take 81 mg by mouth daily.  LAST DOSE 8-9-14 Yes John Green DO       Past Medical History:   Diagnosis Date    Allergic rhinitis     HTN (hypertension)     Hyperlipidemia     Obstructive sleep apnea (adult) (pediatric)     Non-compliant with CPAP    Osteoarthritis     Type II or unspecified type diabetes mellitus without mention of complication, not stated as uncontrolled         Social History     Tobacco Use    Smoking status: Former Smoker Packs/day: 0.25     Years: 20.00     Pack years: 5.00     Types: Cigars     Quit date: 2019     Years since quittin.6    Smokeless tobacco: Never Used    Tobacco comment: was smoking 5 black and milds per day, but has not smoked in 4 days   Substance Use Topics    Alcohol use: Yes     Comment: 1 alcoholic beverage every other month    Drug use: Not Currently     Comment: H/o cocaine use        Past Surgical History:   Procedure Laterality Date    COLON SURGERY  2008    10\" removed - chronic constipation    DUPUYTRENS CONTRACTURE SURGERY Left 2019    LEFT 1ST DORSAL COMPARMENT DE' [address] [facility] performed by Dale Becerra MD at [facility]      4x     HYSTERECTOMY, TOTAL ABDOMINAL      JOINT REPLACEMENT      Left knee partial    KNEE SURGERY  's    rt knee reconstructed    NECK SURGERY  2011    c 4,5,6,7 (ACDF)    OVARY REMOVAL      ROTATOR CUFF REPAIR  2009    Left shoulder    SHOULDER SURGERY  's    lt & rt shoulder impingment    SINUS ENDOSCOPY  9-15-14    FUNCTIONAL ENDOSCOPIC SINUS SURGERY       TONSILLECTOMY AND ADENOIDECTOMY      TOTAL KNEE ARTHROPLASTY Left 2010    Eros to Total   [address]  2010    Dr. Spicer Hearing        Allergies   Allergen Reactions    Demerol Dermatitis     ITCHING    Levaquin [Levofloxacin In D5w] Nausea Only    Morphine Itching    Tramadol Other (See Comments)     \"NO CLUE\"        Family History   Problem Relation Age of Onset    Diabetes Father     Heart Disease Father         CAD    High Blood Pressure Father     High Cholesterol Father     Kidney Disease Brother     Diabetes Son     Other Son         DEANA    Heart Disease Sister         1/2 sister    Rheum Arthritis Neg Hx     Osteoarthritis Neg Hx     Asthma Neg Hx     Breast Cancer Neg Hx     Cancer Neg Hx     Heart Failure Neg Hx     Hypertension Neg Hx     Migraines Neg Hx     Ovarian Cancer Neg Hx     time.      Cranial Nerves: No cranial nerve deficit. Psychiatric:         Mood and Affect: Mood normal.         Behavior: Behavior normal.         Assessment:  Encounter Diagnoses   Name Primary?  Left wrist pain Yes    Numbness and tingling in left hand     Arthritis of left wrist        Plan:  1. Left wrist pain  2. Numbness and tingling in left hand  3. Arthritis of left wrist  -Pain could be from a combination of arthritic pain with some possible carpal tunnel or tenosynovitis. She has seen Dr. Sonal Fernandes in the past for other wrist issus (De quervain tenosynovitis). Will place referral; may need EMG/further evaluation. Discussed pain management with prn nsaids, topical analgesics, ice, use of splint/brace. - Pineda Matthew MD, Hand Surgery (Hand, Wrist, Upper Extremity), [place]          Return if symptoms worsen or fail to improve.              NOHELIA Hernadez - CNP

## 2021-05-06 ENCOUNTER — TELEPHONE (OUTPATIENT)
Dept: PRIMARY CARE CLINIC | Age: 61
End: 2021-05-06

## 2021-05-06 ENCOUNTER — NURSE TRIAGE (OUTPATIENT)
Dept: OTHER | Facility: CLINIC | Age: 61
End: 2021-05-06

## 2021-05-06 NOTE — TELEPHONE ENCOUNTER
C/o joint pain in left hand. Numbness, tingling and general pain in this location. Dx arthritis and tendonitis. It has been getting worse. Went to urgent care for this 4/26. Reason for Disposition   SEVERE pain (e.g., excruciating, unable to use hand at all)    Answer Assessment - Initial Assessment Questions  1. ONSET: \"When did the pain start? \"      Months     2. LOCATION: \"Where is the pain located? \"      Left hand     3. PAIN: \"How bad is the pain? \" (Scale 1-10; or mild, moderate, severe)    - MILD (1-3): doesn't interfere with normal activities    - MODERATE (4-7): interferes with normal activities (e.g., work or school) or awakens from sleep    - SEVERE (8-10): excruciating pain, unable to use hand at all      7-8/10 right now, worst at night. 4. WORK OR EXERCISE: \"Has there been any recent work or exercise that involved this part of the body? \"      *No Answer*    5. CAUSE: \"What do you think is causing the pain? \"      Arthritis     6. AGGRAVATING FACTORS: \"What makes the pain worse? \" (e.g., using computer)      Night time is the worst.     7. OTHER SYMPTOMS: \"Do you have any other symptoms? \" (e.g., neck pain, swelling, rash, numbness, fever)      Numbness and tingling. 8. PREGNANCY: \"Is there any chance you are pregnant? \" \"When was your last menstrual period? \"      No    Protocols used: HAND AND WRIST PAIN-ADULT-[place]    Received call from [address] at [facility] with Red Flag Complaint. Brief description of triage: joint pain in hand. Triage indicates for patient to be seen today, I recommended [facility] if unable to get in to PCP. Care advice provided, patient verbalizes understanding; denies any other questions or concerns; instructed to call back for any new or worsening symptoms. Writer provided warm transfer to [facility] for appointment scheduling. Attention Provider: Thank you for allowing me to participate in the care of your patient.   The patient was connected to triage in response to information provided to the [facility]. Please do not respond through this encounter as the response is not directed to a shared pool.

## 2021-05-06 NOTE — TELEPHONE ENCOUNTER
----- Message from Londonderry Amauri sent at 5/6/2021  3:37 PM EDT -----  Subject: Message to Provider    QUESTIONS  Information for Provider? Omega Parker was transferred by the nurse Triage   for severe joint pain in her left hand with a disposition to be seen   today. No availability at the office. I attempted to warm transfer her,   but she refused saying she needed to see a specialist and would wait to   get in to see Dr. Rhina Rasmussen as recommended. ---------------------------------------------------------------------------  --------------  Ezio URIOSTEGUI  What is the best way for the office to contact you? OK to leave message on   voicemail  Preferred Call Back Phone Number? [phone]  ---------------------------------------------------------------------------  --------------  SCRIPT ANSWERS  Relationship to Patient?  Self

## 2021-05-19 DIAGNOSIS — E78.2 HYPERLIPIDEMIA, MIXED: ICD-10-CM

## 2021-05-19 RX ORDER — ATORVASTATIN CALCIUM 10 MG/1
TABLET, FILM COATED ORAL
Qty: 30 TABLET | Refills: 0 | Status: SHIPPED | OUTPATIENT
Start: 2021-05-19 | End: 2021-06-21

## 2021-06-08 ENCOUNTER — OFFICE VISIT (OUTPATIENT)
Dept: ORTHOPEDIC SURGERY | Age: 61
End: 2021-06-08
Payer: MEDICARE

## 2021-06-08 VITALS — RESPIRATION RATE: 16 BRPM | HEIGHT: 62 IN | WEIGHT: 190 LBS | BODY MASS INDEX: 34.96 KG/M2

## 2021-06-08 DIAGNOSIS — M65.832 EXTENSOR TENOSYNOVITIS OF WRIST, LEFT: Primary | ICD-10-CM

## 2021-06-08 DIAGNOSIS — M65.30 TRIGGER FINGER, ACQUIRED: ICD-10-CM

## 2021-06-08 PROBLEM — M65.932 EXTENSOR TENOSYNOVITIS OF WRIST, LEFT: Status: ACTIVE | Noted: 2021-06-08

## 2021-06-08 PROCEDURE — 99213 OFFICE O/P EST LOW 20 MIN: CPT | Performed by: ORTHOPAEDIC SURGERY

## 2021-06-11 ENCOUNTER — TELEPHONE (OUTPATIENT)
Dept: PRIMARY CARE CLINIC | Age: 61
End: 2021-06-11

## 2021-06-11 DIAGNOSIS — M65.9 TENOSYNOVITIS: Primary | ICD-10-CM

## 2021-06-11 RX ORDER — IBUPROFEN 800 MG/1
800 TABLET ORAL 2 TIMES DAILY PRN
Qty: 60 TABLET | Refills: 0 | Status: SHIPPED | OUTPATIENT
Start: 2021-06-11 | End: 2021-07-07 | Stop reason: SDUPTHER

## 2021-06-11 NOTE — TELEPHONE ENCOUNTER
There is mention in 6/8/21 from Dr Jessy Nolen to contact PCP for possible placement of NSAID regimen recommended by [facility], [facility] ED.

## 2021-06-11 NOTE — TELEPHONE ENCOUNTER
PT CALLING SAYING SHE SAW A HAND SPECIALIST, DR RICCI, ON 6-8 AND WAS INSTRUCTED TO CALL PCP FOR MED FOR HER HANDS.   SHE SAYS SHE WAS TOLD A NOTE WAS PUT IN HER CHART ABOUT THIS    PLEASE LET HER KNOW WHAT DR LOPEZS

## 2021-06-21 DIAGNOSIS — E78.2 HYPERLIPIDEMIA, MIXED: ICD-10-CM

## 2021-06-21 DIAGNOSIS — J30.9 ALLERGIC SINUSITIS: ICD-10-CM

## 2021-06-21 RX ORDER — MONTELUKAST SODIUM 10 MG/1
TABLET ORAL
Qty: 60 TABLET | Refills: 5 | Status: SHIPPED | OUTPATIENT
Start: 2021-06-21 | End: 2022-07-14

## 2021-06-21 RX ORDER — ATORVASTATIN CALCIUM 10 MG/1
TABLET, FILM COATED ORAL
Qty: 30 TABLET | Refills: 5 | Status: SHIPPED | OUTPATIENT
Start: 2021-06-21 | End: 2021-11-24

## 2021-06-28 ENCOUNTER — TELEPHONE (OUTPATIENT)
Dept: ENT CLINIC | Age: 61
End: 2021-06-28

## 2021-06-28 DIAGNOSIS — J32.9 RECURRENT SINUSITIS: Primary | ICD-10-CM

## 2021-06-28 NOTE — TELEPHONE ENCOUNTER
Patient would like a medication to help with her sinus infection.  Please advise     Last Office Visit - 09/09/2020    Please call patient when meds are called in

## 2021-06-29 RX ORDER — AZITHROMYCIN 250 MG/1
250 TABLET, FILM COATED ORAL DAILY
Qty: 1 PACKET | Refills: 0 | Status: SHIPPED | OUTPATIENT
Start: 2021-06-29 | End: 2021-11-24 | Stop reason: ALTCHOICE

## 2021-07-07 ENCOUNTER — OFFICE VISIT (OUTPATIENT)
Dept: ORTHOPEDIC SURGERY | Age: 61
End: 2021-07-07
Payer: MEDICARE

## 2021-07-07 VITALS — RESPIRATION RATE: 16 BRPM | BODY MASS INDEX: 35.26 KG/M2 | HEIGHT: 63 IN | WEIGHT: 199 LBS

## 2021-07-07 DIAGNOSIS — M65.832 EXTENSOR TENOSYNOVITIS OF WRIST, LEFT: Primary | ICD-10-CM

## 2021-07-07 PROCEDURE — 99213 OFFICE O/P EST LOW 20 MIN: CPT | Performed by: ORTHOPAEDIC SURGERY

## 2021-07-07 NOTE — PROGRESS NOTES
Patient returns to the office for evaluation of persistent wrist swelling. She has been taking ibuprofen 800 mg. BID, PC. She has noted improvement of the dorsal swelling and discomfort. The patient's social history, past medical history, family history, medications, allergies and review of systems have been reviewed, dated 6/8/21 and are recorded in the chart. On examination there is less dorsal wrist swelling, no deformity and no tenderness over the dorsum of the hand. Range of motion is improved. There is minimal painless soft clicking of the extensor tendons as they pass beneath the extensor retinaculum. The patient is reassured that she is improving. She will continue the same treatment for up to an additional 2 months. The patient is given a return appointment for 2 months for follow up exam and consideration of surgery and is instructed to call sooner if there are any questions or problems. That appointment may be cancelled only if the patient has regained full, painless function and resolution of all swelling of their wrist by that time. Today I have spent 30 minutes with this patient including most or all of the following: reviewing the patient's record, independently interpreting tests and Xrays, obtaining a medical history, preforming a physical examination, making a diagnosis, counseling the patient/family/caregiver, ordering medications, tests or procedures, documenting clinical information in the electronic medical record and communicating the results of the encounter to the patient, family, caregiver, primary care and referring physician/practitioner.

## 2021-07-08 DIAGNOSIS — I10 BENIGN ESSENTIAL HTN: Chronic | ICD-10-CM

## 2021-07-08 RX ORDER — AMLODIPINE BESYLATE 5 MG/1
TABLET ORAL
Qty: 60 TABLET | Refills: 5 | Status: SHIPPED | OUTPATIENT
Start: 2021-07-08 | End: 2021-11-24 | Stop reason: SDUPTHER

## 2021-07-08 RX ORDER — LOSARTAN POTASSIUM 100 MG/1
TABLET ORAL
Qty: 60 TABLET | Refills: 5 | Status: SHIPPED | OUTPATIENT
Start: 2021-07-08 | End: 2022-08-01

## 2021-07-08 NOTE — TELEPHONE ENCOUNTER
PATIENT IS REQUESTING A REFILL ON HER AMLODIPINE AND HER LOSARTAN. Lise RIVERA TO KNOW THAT SHE IS GETTING HER A1C LABS DONE TOMORROW 7/9.     PHARMACY: [facility]    LAST VISIT: 5/3/21  NO FUTURE VISIT

## 2021-07-16 DIAGNOSIS — E11.65 TYPE 2 DIABETES MELLITUS WITH HYPERGLYCEMIA, WITH LONG-TERM CURRENT USE OF INSULIN (HCC): ICD-10-CM

## 2021-07-16 DIAGNOSIS — Z79.4 TYPE 2 DIABETES MELLITUS WITH HYPERGLYCEMIA, WITH LONG-TERM CURRENT USE OF INSULIN (HCC): ICD-10-CM

## 2021-07-16 LAB
ANION GAP SERPL CALCULATED.3IONS-SCNC: 13 MMOL/L (ref 3–16)
BUN BLDV-MCNC: 11 MG/DL (ref 7–20)
CALCIUM SERPL-MCNC: 9.6 MG/DL (ref 8.3–10.6)
CHLORIDE BLD-SCNC: 99 MMOL/L (ref 99–110)
CO2: 26 MMOL/L (ref 21–32)
CREAT SERPL-MCNC: 0.5 MG/DL (ref 0.6–1.2)
CREATININE URINE: 147 MG/DL (ref 28–259)
GFR AFRICAN AMERICAN: >60
GFR NON-AFRICAN AMERICAN: >60
GLUCOSE BLD-MCNC: 80 MG/DL (ref 70–99)
MICROALBUMIN UR-MCNC: 3.5 MG/DL
MICROALBUMIN/CREAT UR-RTO: 23.8 MG/G (ref 0–30)
POTASSIUM SERPL-SCNC: 4.3 MMOL/L (ref 3.5–5.1)
SODIUM BLD-SCNC: 138 MMOL/L (ref 136–145)

## 2021-07-17 LAB
ESTIMATED AVERAGE GLUCOSE: 139.9 MG/DL
HBA1C MFR BLD: 6.5 %

## 2021-08-20 NOTE — TELEPHONE ENCOUNTER
Pt calling for refill on Metformin to be called in to [address]    She says she has been out of this med for over a week and just now realized it

## 2021-09-15 ENCOUNTER — OFFICE VISIT (OUTPATIENT)
Dept: ORTHOPEDIC SURGERY | Age: 61
End: 2021-09-15
Payer: MEDICARE

## 2021-09-15 VITALS — RESPIRATION RATE: 16 BRPM | HEIGHT: 63 IN | WEIGHT: 199 LBS | BODY MASS INDEX: 35.26 KG/M2

## 2021-09-15 DIAGNOSIS — M65.832 EXTENSOR TENOSYNOVITIS OF WRIST, LEFT: Primary | ICD-10-CM

## 2021-09-15 PROCEDURE — 99212 OFFICE O/P EST SF 10 MIN: CPT | Performed by: ORTHOPAEDIC SURGERY

## 2021-10-27 DIAGNOSIS — M65.9 TENOSYNOVITIS: ICD-10-CM

## 2021-10-27 RX ORDER — IBUPROFEN 800 MG/1
TABLET ORAL
Qty: 60 TABLET | Refills: 1 | Status: SHIPPED
Start: 2021-10-27 | End: 2022-02-25 | Stop reason: SDUPTHER

## 2021-11-17 ENCOUNTER — OFFICE VISIT (OUTPATIENT)
Dept: ORTHOPEDIC SURGERY | Age: 61
End: 2021-11-17
Payer: MEDICARE

## 2021-11-17 VITALS — HEIGHT: 63 IN | WEIGHT: 199 LBS | BODY MASS INDEX: 35.26 KG/M2 | RESPIRATION RATE: 16 BRPM

## 2021-11-17 DIAGNOSIS — M65.832 EXTENSOR TENOSYNOVITIS OF WRIST, LEFT: Primary | ICD-10-CM

## 2021-11-17 PROCEDURE — 99212 OFFICE O/P EST SF 10 MIN: CPT | Performed by: ORTHOPAEDIC SURGERY

## 2021-11-17 NOTE — PROGRESS NOTES
Patient returns to the office for evaluation of   Chief Complaint   Patient presents with    Wrist Pain     left wrist extensor tenosynovitis intermittent flare up   The patient has had no significant problems and voices no complaints. She has continued to improve and is weaning off ibuprofen, using it prn only. The patient's social history, past medical history, family history, medications, allergies and review of systems have been reviewed, dated 4/26/21 and are recorded in the chart. On physical examination The area of extensor tenosynovitis is shrinking and is nontender. The tissue does not seem to move with the extensor tendons, as before. Hand function is full and painless. She is reassured that she continues to improve. There is no reason to consider surgery at this time. If symptoms worsen or the swelling increases she is asked to call or return.

## 2021-11-24 ENCOUNTER — NURSE TRIAGE (OUTPATIENT)
Dept: OTHER | Facility: CLINIC | Age: 61
End: 2021-11-24

## 2021-11-24 ENCOUNTER — OFFICE VISIT (OUTPATIENT)
Dept: PRIMARY CARE CLINIC | Age: 61
End: 2021-11-24
Payer: MEDICARE

## 2021-11-24 VITALS
DIASTOLIC BLOOD PRESSURE: 75 MMHG | SYSTOLIC BLOOD PRESSURE: 122 MMHG | BODY MASS INDEX: 37.02 KG/M2 | TEMPERATURE: 97.3 F | HEART RATE: 91 BPM | WEIGHT: 209 LBS

## 2021-11-24 DIAGNOSIS — E78.2 HYPERLIPIDEMIA, MIXED: ICD-10-CM

## 2021-11-24 DIAGNOSIS — I10 BENIGN ESSENTIAL HTN: Chronic | ICD-10-CM

## 2021-11-24 DIAGNOSIS — M25.512 ACUTE PAIN OF LEFT SHOULDER: Primary | ICD-10-CM

## 2021-11-24 PROCEDURE — 99213 OFFICE O/P EST LOW 20 MIN: CPT | Performed by: FAMILY MEDICINE

## 2021-11-24 PROCEDURE — 96372 THER/PROPH/DIAG INJ SC/IM: CPT | Performed by: FAMILY MEDICINE

## 2021-11-24 RX ORDER — KETOROLAC TROMETHAMINE 15 MG/ML
30 INJECTION, SOLUTION INTRAMUSCULAR; INTRAVENOUS ONCE
Status: COMPLETED | OUTPATIENT
Start: 2021-11-24 | End: 2021-11-24

## 2021-11-24 RX ORDER — ATORVASTATIN CALCIUM 10 MG/1
TABLET, FILM COATED ORAL
Qty: 90 TABLET | Refills: 0 | Status: SHIPPED | OUTPATIENT
Start: 2021-11-24 | End: 2022-04-25

## 2021-11-24 RX ORDER — AMLODIPINE BESYLATE 5 MG/1
TABLET ORAL
Qty: 60 TABLET | Refills: 5 | Status: SHIPPED | OUTPATIENT
Start: 2021-11-24

## 2021-11-24 RX ADMIN — KETOROLAC TROMETHAMINE 30 MG: 15 INJECTION, SOLUTION INTRAMUSCULAR; INTRAVENOUS at 11:16

## 2021-11-24 ASSESSMENT — PATIENT HEALTH QUESTIONNAIRE - PHQ9
SUM OF ALL RESPONSES TO PHQ QUESTIONS 1-9: 0
SUM OF ALL RESPONSES TO PHQ QUESTIONS 1-9: 0
2. FEELING DOWN, DEPRESSED OR HOPELESS: 0
SUM OF ALL RESPONSES TO PHQ9 QUESTIONS 1 & 2: 0
1. LITTLE INTEREST OR PLEASURE IN DOING THINGS: 0
SUM OF ALL RESPONSES TO PHQ QUESTIONS 1-9: 0

## 2021-11-24 NOTE — TELEPHONE ENCOUNTER
Reason for Disposition   Unable to use arm at all and because of shoulder pain or stiffness    Answer Assessment - Initial Assessment Questions  1. ONSET: \"When did the pain start? \"      About 3 1/2 weeks - pt reports that it feels like when she had a torn rotator cuff - like when she lifts up her L elbow, it causes a lot of L shoulder pain        Pt is L handed    2. LOCATION: \"Where is the pain located? \"      L shoulder, pain going down the arm and now some chest pain when taking a deep breath. Pt reports that the pain is constant in the L shoulder blade    3. PAIN: \"How bad is the pain? \" (Scale 1-10; or mild, moderate, severe)    - MILD (1-3): doesn't interfere with normal activities    - MODERATE (4-7): interferes with normal activities (e.g., work or school) or awakens from sleep    - SEVERE (8-10): excruciating pain, unable to do any normal activities, unable to move arm at all due to pain     Moderate - interferes with sleep and work    4. WORK OR EXERCISE: \"Has there been any recent work or exercise that involved this part of the body? \"      Pt reports she carries heavy things at times and did try to move some furniture around - wonders if that could be a part of the problem    5. CAUSE: \"What do you think is causing the shoulder pain? \"      Not sure, but did move some heavy items in the home    6. OTHER SYMPTOMS: \"Do you have any other symptoms? \" (e.g., neck pain, swelling, rash, fever, numbness, weakness)      Pt reports pain in the L side of the neck. A little weakness in the L arm    7. PREGNANCY: \"Is there any chance you are pregnant? \" \"When was your last menstrual period? \"       N/A    Protocols used: SHOULDER PAIN-ADULT-[place]    Received call from Rena Roa at [facility] with Red Flag Complaint. Brief description of triage: Pt reports she has L shoulder, L elbow and L shoulder blade pain.     Triage indicates for patient to be seen by a Provider today    Care advice provided, patient verbalizes understanding; denies any other questions or concerns; instructed to call back for any new or worsening symptoms. Writer provided warm transfer to Analia at [facility] for appointment scheduling. Attention Provider: Thank you for allowing me to participate in the care of your patient. The patient was connected to triage in response to information provided to the ECC/PSC. Please do not respond through this encounter as the response is not directed to a shared pool.

## 2021-11-24 NOTE — TELEPHONE ENCOUNTER
Francesca NEVILLE [facility] Support  Subject: Refill Request     QUESTIONS   Name of Medication? amLODIPine (NORVASC) 5 MG tablet   Patient-reported dosage and instructions? 1 tablet once a day   How many days do you have left? 0   Preferred Pharmacy? [id number] [facility] phone number (if available)? [phone]   ---------------------------------------------------------------------------   --------------   Yaritza URIOSTEGUI   What is the best way for the office to contact you? OK to leave message on   voicemail   Preferred Call Back Phone Number?  [phone]

## 2021-11-24 NOTE — PROGRESS NOTES
[address] PRIMARY CARE  [address] [place] [id number]  Dept: [phone]  Dept Fax: [phone]     11/24/2021      Antonio Wilkes   1960     Chief Complaint   Patient presents with    Shoulder Pain     left x 4 weeks. tried to move some furniture       HPI  Pt comes in today for left shoulder x 4 weeks. Has a h/o torn rotator cuff in the past. Was moving heavy furniture and shortly after the pain started. No improvement. Hurts to lift her arm and to sleep on that side. Has a h/o rotator cuff surgery \"years ago\" along 2 other surgeries to her shoulder that she is unsure of the details of. Pain radiates to the upper back. Taking ibuprofen at night because it is so painful to sleep. Heating pad has helped some. Follows with ortho hand for left wrist extensor tenosynovitis with intermittent flare up. Seen on 11/17/21. Did not discuss her shoulder concern with him at that time. No data recorded       Prior to Visit Medications    Medication Sig Taking?  Authorizing Provider   atorvastatin (LIPITOR) 10 MG tablet TAKE ONE TABLET BY MOUTH DAILY Yes Isabel Rico, DO   amLODIPine (NORVASC) 5 MG tablet TAKE ONE TABLET BY MOUTH DAILY Yes Isabel Rico DO   ibuprofen (ADVIL;MOTRIN) 800 MG tablet TAKE ONE TABLET BY MOUTH TWICE A DAY AS NEEDED FOR PAIN Yes Isabel Rico DO   metFORMIN (GLUCOPHAGE) 1000 MG tablet TAKE 1 TABLET TWICE A DAY WITH MEALS Yes Isabel Rico DO   losartan (COZAAR) 100 MG tablet TAKE ONE TABLET BY MOUTH DAILY Yes Isabel Rico DO   montelukast (SINGULAIR) 10 MG tablet TAKE ONE TABLET BY MOUTH ONCE NIGHTLY Yes Isabel Rico, DO   insulin glargine (LANTUS SOLOSTAR) 100 UNIT/ML injection pen Inject 30 Units into the skin nightly Yes Isabel Rico DO   fluticasone (FLONASE) 50 MCG/ACT nasal spray 2 sprays by Nasal route daily Yes John Green, DO   Calcium Carb-Cholecalciferol (CALTRATE 600+D) 600-800 MG-UNIT TABS Take 1 tablet by mouth 2 times daily Yes Historical Provider, MD   cetirizine (ZYRTEC ALLERGY) 10 MG tablet Take 1 tablet by mouth daily Yes John Green, DO   aspirin EC 81 MG EC tablet Take 81 mg by mouth daily. LAST DOSE 14 Yes John Green, DO   Insulin Pen Needle (PEN NEEDLES) 32G X 4 MM MISC USE TO INJECT INSULIN FOUR TIMES DAILY  Isabel Rico, DO   insulin lispro, 1 Unit Dial, (HUMALOG KWIKPEN) 100 UNIT/ML SOPN Inject 4 Units into the skin 3 times daily (before meals) Then USE AS CORRECTION SCALE PER PATIENT HOME INSTRUCTIONS  Patient not taking: Reported on 2021  Tallrodolfo Common, DO   Lancets MISC Test 1-3 times a day as needed for symptoms of irregular glucose. DX: E11.9 please provide lancets accepted by patient's insurance. Lauren Common, DO   blood glucose monitor strips Test 1-3 times a day as needed for symptoms of irregular glucose. DX: E11.9 please provide strips that are accepted by patient's insurance.   Lauren Wheat, DO   ACCU-CHEK SOFTCLIX LANCETS MISC TEST BLOOD SUGAR TWO TIMES A DAY  John Chahal, DO        Past Medical History:   Diagnosis Date    Allergic rhinitis     HTN (hypertension)     Hyperlipidemia     Obstructive sleep apnea (adult) (pediatric)     Non-compliant with CPAP    Osteoarthritis     Type II or unspecified type diabetes mellitus without mention of complication, not stated as uncontrolled         Social History     Tobacco Use    Smoking status: Former Smoker     Packs/day: 0.25     Years: 20.00     Pack years: 5.00     Types: Cigars     Quit date: 2019     Years since quittin.2    Smokeless tobacco: Never Used    Tobacco comment: was smoking 5 black and milds per day, but has not smoked in 4 days   Vaping Use    Vaping Use: Never used   Substance Use Topics    Alcohol use: Yes     Comment: 1 alcoholic beverage every other month    Drug use: Not Currently     Comment: H/o cocaine use        Past Surgical History:   Procedure Laterality Date    COLON SURGERY  2008    10\" removed - chronic constipation    DUPUYTRENS CONTRACTURE SURGERY Left 1/29/2019    LEFT 1ST DORSAL COMPARMENT DE' [address] [facility] performed by Dena Coreas MD at [facility]      4x     HYSTERECTOMY, TOTAL ABDOMINAL      JOINT REPLACEMENT  2007    Left knee partial    KNEE SURGERY  1990's    rt knee reconstructed    NECK SURGERY  05/12/2011    c 4,5,6,7 (ACDF)    OVARY REMOVAL      ROTATOR CUFF REPAIR  2009    Left shoulder    SHOULDER SURGERY  1990's    lt & rt shoulder impingment    SINUS ENDOSCOPY  9-15-14    FUNCTIONAL ENDOSCOPIC SINUS SURGERY       TONSILLECTOMY AND ADENOIDECTOMY      TOTAL KNEE ARTHROPLASTY Left 03/09/2010    Eros to Total   [facility]  8/2010    Dr. Magy Dye        Allergies   Allergen Reactions    Demerol Dermatitis     ITCHING    Levaquin [Levofloxacin In D5w] Nausea Only    Morphine Itching    Tramadol Other (See Comments)     \"NO CLUE\"        Family History   Problem Relation Age of Onset    Diabetes Father     Heart Disease Father         CAD    High Blood Pressure Father     High Cholesterol Father     Kidney Disease Brother     Diabetes Son     Other Son         DEANA    Heart Disease Sister         1/2 sister    Rheum Arthritis Neg Hx     Osteoarthritis Neg Hx     Asthma Neg Hx     Breast Cancer Neg Hx     Cancer Neg Hx     Heart Failure Neg Hx     Hypertension Neg Hx     Migraines Neg Hx     Ovarian Cancer Neg Hx     Rashes/Skin Problems Neg Hx     Seizures Neg Hx     Stroke Neg Hx     Thyroid Disease Neg Hx         Patient's past medical history, surgical history, family history, medications, and allergies  were all reviewed and updated as appropriate today. Review of Systems   Constitutional: Negative for fever. Musculoskeletal: Negative for joint swelling. Neurological: Negative for weakness. /75 (Cuff Size: Medium Adult)   Pulse 91   Temp 97.3 °F (36.3 °C) (Temporal)   Wt 209 lb (94.8 kg)   LMP 01/26/1996   Breastfeeding No   BMI 37.02 kg/m²      Physical Exam  Constitutional:       Appearance: Normal appearance. She is not ill-appearing. Musculoskeletal:      Comments: Left shoulder, + guarding, limited exam 2/2 pain with ROM in all directions, unable to fully examine   Neurological:      Mental Status: She is alert. Assessment:  Encounter Diagnosis   Name Primary?  Acute pain of left shoulder Yes       Plan:    - H/o injury/surgery to this shoulder in the past. Referral back to ortho. New Prescriptions    No medications on file        Orders Placed This Encounter   Procedures   [address] [facility]        Return if symptoms worsen or fail to improve. 2 total minutes were spent in direct patient care including chart review, face-to-face consultation and documentation.      [address], DO

## 2021-12-02 ENCOUNTER — TELEPHONE (OUTPATIENT)
Dept: PRIMARY CARE CLINIC | Age: 61
End: 2021-12-02
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
## 2021-12-04 RX ORDER — BACITRACIN ZINC AND POLYMYXIN B SULFATE 500; 10000 [USP'U]/G; [USP'U]/G
0.5 OINTMENT OPHTHALMIC 2 TIMES DAILY
Qty: 3.5 G | Refills: 0 | Status: SHIPPED | OUTPATIENT
Start: 2021-12-04 | End: 2021-12-14

## 2021-12-10 ENCOUNTER — OFFICE VISIT (OUTPATIENT)
Dept: ORTHOPEDIC SURGERY | Age: 61
End: 2021-12-10
Payer: MEDICARE

## 2021-12-10 VITALS — HEIGHT: 63 IN | BODY MASS INDEX: 37.03 KG/M2 | WEIGHT: 209 LBS

## 2021-12-10 DIAGNOSIS — M75.102 NONTRAUMATIC TEAR OF LEFT ROTATOR CUFF, UNSPECIFIED TEAR EXTENT: ICD-10-CM

## 2021-12-10 DIAGNOSIS — M25.512 LEFT SHOULDER PAIN, UNSPECIFIED CHRONICITY: Primary | ICD-10-CM

## 2021-12-10 DIAGNOSIS — M75.42 SHOULDER IMPINGEMENT SYNDROME, LEFT: ICD-10-CM

## 2021-12-10 DIAGNOSIS — M54.2 CERVICAL PAIN: ICD-10-CM

## 2021-12-10 PROCEDURE — 20610 DRAIN/INJ JOINT/BURSA W/O US: CPT | Performed by: ORTHOPAEDIC SURGERY

## 2021-12-10 PROCEDURE — 99214 OFFICE O/P EST MOD 30 MIN: CPT | Performed by: ORTHOPAEDIC SURGERY

## 2021-12-10 RX ORDER — TRIAMCINOLONE ACETONIDE 40 MG/ML
80 INJECTION, SUSPENSION INTRA-ARTICULAR; INTRAMUSCULAR ONCE
Status: COMPLETED | OUTPATIENT
Start: 2021-12-10 | End: 2021-12-23

## 2021-12-10 RX ORDER — LIDOCAINE HYDROCHLORIDE 10 MG/ML
40 INJECTION, SOLUTION INFILTRATION; PERINEURAL ONCE
Status: COMPLETED | OUTPATIENT
Start: 2021-12-10 | End: 2021-12-23

## 2021-12-10 RX ORDER — BUPIVACAINE HYDROCHLORIDE 2.5 MG/ML
10 INJECTION, SOLUTION INFILTRATION; PERINEURAL ONCE
Status: COMPLETED | OUTPATIENT
Start: 2021-12-10 | End: 2021-12-23

## 2021-12-10 NOTE — PROGRESS NOTES
 NECK SURGERY  2011    c 4,5,6,7 (ACDF)    OVARY REMOVAL      ROTATOR CUFF REPAIR  2009    Left shoulder    SHOULDER SURGERY      lt & rt shoulder impingment    SINUS ENDOSCOPY  9-15-14    FUNCTIONAL ENDOSCOPIC SINUS SURGERY       TONSILLECTOMY AND ADENOIDECTOMY      TOTAL KNEE ARTHROPLASTY Left 2010    Eros to Total    TUBAL LIGATION      UMBILICAL HERNIA REPAIR  2010    Dr. Evens Hughes     Social History     Tobacco Use    Smoking status: Former Smoker     Packs/day: 0.25     Years: 20.00     Pack years: 5.00     Types: Cigars     Quit date: 2019     Years since quittin.2    Smokeless tobacco: Never Used    Tobacco comment: was smoking 5 black and milds per day, but has not smoked in 4 days   Substance Use Topics    Alcohol use: Yes     Comment: 1 alcoholic beverage every other month      Current Outpatient Medications on File Prior to Visit   Medication Sig Dispense Refill    bacitracin-polymyxin b (POLYSPORIN) 500-73333 UNIT/GM ophthalmic ointment Place 0.5 inches into both eyes 2 times daily for 10 days Every 12 hours.  3.5 g 0    atorvastatin (LIPITOR) 10 MG tablet TAKE ONE TABLET BY MOUTH DAILY 90 tablet 0    amLODIPine (NORVASC) 5 MG tablet TAKE ONE TABLET BY MOUTH DAILY 60 tablet 5    ibuprofen (ADVIL;MOTRIN) 800 MG tablet TAKE ONE TABLET BY MOUTH TWICE A DAY AS NEEDED FOR PAIN 60 tablet 1    metFORMIN (GLUCOPHAGE) 1000 MG tablet TAKE 1 TABLET TWICE A DAY WITH MEALS 180 tablet 3    losartan (COZAAR) 100 MG tablet TAKE ONE TABLET BY MOUTH DAILY 60 tablet 5    montelukast (SINGULAIR) 10 MG tablet TAKE ONE TABLET BY MOUTH ONCE NIGHTLY 60 tablet 5    Insulin Pen Needle (PEN NEEDLES) 32G X 4 MM MISC USE TO INJECT INSULIN FOUR TIMES DAILY 200 each 5    insulin glargine (LANTUS SOLOSTAR) 100 UNIT/ML injection pen Inject 30 Units into the skin nightly 10 pen 3    insulin lispro, 1 Unit Dial, (HUMALOG KWIKPEN) 100 UNIT/ML SOPN Inject 4 Units into the skin 3 times daily (before meals) Then USE AS CORRECTION SCALE PER PATIENT HOME INSTRUCTIONS (Patient not taking: Reported on 11/24/2021) 5 pen 3    Lancets MISC Test 1-3 times a day as needed for symptoms of irregular glucose. DX: E11.9 please provide lancets accepted by patient's insurance. 100 each 3    blood glucose monitor strips Test 1-3 times a day as needed for symptoms of irregular glucose. DX: E11.9 please provide strips that are accepted by patient's insurance. 200 strip 3    fluticasone (FLONASE) 50 MCG/ACT nasal spray 2 sprays by Nasal route daily 1 Bottle 3    ACCU-CHEK SOFTCLIX LANCETS MISC TEST BLOOD SUGAR TWO TIMES A  each 4    Calcium Carb-Cholecalciferol (CALTRATE 600+D) 600-800 MG-UNIT TABS Take 1 tablet by mouth 2 times daily      cetirizine (ZYRTEC ALLERGY) 10 MG tablet Take 1 tablet by mouth daily      aspirin EC 81 MG EC tablet Take 81 mg by mouth daily. LAST DOSE 8-9-14 30 tablet 11     No current facility-administered medications on file prior to visit. ASCVD 10-YEAR RISK SCORE  The 10-year ASCVD risk score (Cornelia Sylvester, et al., 2013) is: 16%    Values used to calculate the score:      Age: 59 years      Sex: Female      Is Non- : Yes      Diabetic: Yes      Tobacco smoker: No      Systolic Blood Pressure: 850 mmHg      Is BP treated: Yes      HDL Cholesterol: 26 mg/dL      Total Cholesterol: 143 mg/dL     Review of Systems  10-point ROS is negative other than HPI. Physical Exam  Based off 1997 Exam Criteria  Ht 5' 3\" (1.6 m)   Wt 209 lb (94.8 kg)   LMP 01/26/1996   BMI 37.02 kg/m²      Constitutional:       General: He is not in acute distress. Appearance: Normal appearance. Cardiovascular:      Rate and Rhythm: Normal rate and regular rhythm. Pulses: Normal pulses. Pulmonary:      Effort: Pulmonary effort is normal. No respiratory distress. Neurological:      Mental Status: He is alert and oriented to person, place, and time.  Mental status is at baseline. Musculoskeletal:  Gait:  antalgic    Cervical Spine / Shoulder:      RIGHT  LEFT    Cervical Spine Exam  [x] All Neg    [] All Neg     Spurling's  []  []Not tested   [x]  []Not tested    Bourne's  []  []Not tested   [x]  []Not tested    Pain with rotation  []  []Not tested   []  []Not tested    Pain with lateral bending  []  []Not tested   []  []Not tested    Paraspinal muscle tenderness  [] Paraspinal  []Midline   [] Paraspinal  []Not tested    Sensation RIGHT  LEFT    Axillary  [x] Normal []Decreased    [x] Normal []Decreased   Musculocutaneous  [x] Normal  []Decreased   [x] Normal []Decreased   Median  [x] Normal []Decreased   [x] Normal []Decreased   Radial  [x] Normal  []Decreased   [x] Normal []Decreased   Ulnar  [x] Normal  []Decreased   [x] Normal []Decreased   Scapula       Position  [x]Nml  []low  [] lateral  [x]Nml  []low  [] lateral   Dyskinesia  []+ []Abn. Shrug   []+ []Abn. Shrug                     Winging     [x]None   []Med  []Lat   []Worse w/FE  []Med  []Lat  []Worse w/FE   Scapulothoracic Compress.    []Impr Pain  []Impr Motion  []Impr Pain []Impr Motion    Range of Motion Active Passive Active Passive   Forward Elevation 170  150    Abduction 100  70    External Rotation @ side 60  30    External Rotation @ 90 abd 90  10    Internal Rotation @ 90 abd 40  0    Internal Rotation Normal  Sacrum    End range of motion  [] Pain  [] Pain  [] Pain  [] Pain   Strength RIGHT /5 LEFT /5   Abduction 5  3    External Rotation 5  3    Internal Rotation 5  3    Provocative Signs/Tests  [] All Neg   [x] +      [] -  [] All Neg   [x] +      [] -   Rotator Cuff Signs  [] All Neg  [] Not tested   [] All Neg  [] Not tested    Luz  []  []Not tested   [x]  []Not tested    Baldo Ferro  []  []Not tested   [x]  []Not tested    Painful arc  []  []Not tested   [x]  []Not tested    Greater tuberosity tenderness  []  []Not tested   []  []Not tested    Drop arm  []  []Not tested  []  []Not tested   Superior Escape  []  []Not tested   []  []Not tested    ER Lag  []  []Not tested   []  []Not tested    Belly press  []  []Not tested   []  []Not tested    Lift-off  []  []Not tested   []  []Not tested    Bear hug  []  []Not tested   []  []Not tested    Biceps/Labral Signs  [x] All Neg  [] Not tested   [x] All Neg  [] Not tested    De La Paz's  []  []Not tested   []  []Not tested    Speed's  []  []Not tested   []  []Not tested    Dynamic Load Shift/Shear  []  []Not tested   []  []Not tested    Clicking/Popping  []  []Not tested  []  []Not tested   Bicipital groove tenderness  []  []Not tested   []  []Not tested    Wes  []  []Not tested   []  []Not tested    [facility] Joint Signs  [x] All Neg  [] Not tested   [x] All Neg  [] Not tested    [facility] joint tenderness  []  []Not tested   []  []Not tested    Cross-arm adduction pain  []  []Not tested   []  []Not tested    Instability Signs  [] All Neg  [] Not tested  [] All Neg  [] Not tested   General laxity (thumb/elbow)  []  []Not tested   []  []Not tested    Hyperabduction  []  []Not tested   []  []Not tested    Sulcus []Side   []ER    []Side   []ER       Anterior apprehension  []  []Not tested   []  []Not tested    Relocation  []   []Not tested  []  []Not tested     Imaging  Left Shoulder: [address] [facility]  Radiographs: X-rays ordered today reviewed of left shoulder. 3 views. AP, scapular Y, and axillary views. They demonstrate no evidence of fractures or dislocations. Evidence of cystic changes greater tuberosity and multiple spurs on the acromion.     Procedure:  Orders Placed This Encounter   Procedures    XR SHOULDER LEFT (MIN 2 VIEWS)     Standing Status:   Future     Number of Occurrences:   1     Standing Expiration Date:   12/10/2022     Order Specific Question:   Reason for exam:     Answer:   [address] Physical Therapy [facility]     Referral Priority:   Routine     Referral Type:   Eval and Treat     Referral Reason:   Specialty Services Required     Requested Specialty:   Physical Therapy     Number of Visits Requested:   1    ID ARTHROCENTESIS ASPIR&/INJ MAJOR JT/BURSA W/O US       Left Shoulder Cortisone Injection: Glenohumeral CPT 35628  Consent was obtained after discussion of the risks, benefits, alternatives, including, but not limited to bleeding, pain, infection, skin disruption or discoloration. Laterality was confirmed (timeout). The shoulder was prepped with alcohol. A formulation of 2cc of 40mg/ml Kenalog, 4cc of 1% lidocaine, 4cc of 0.25% marcaine was injected into the glenohumeral joint space with a 25 gauge needle without difficulty. The site was cleaned and dressed with a band aid. She tolerated this well and there were no complications. Assessment and Plan  Vani Amezquita was seen today for shoulder pain. Diagnoses and all orders for this visit:    Left shoulder pain, unspecified chronicity  -     XR SHOULDER LEFT (MIN 2 VIEWS); Future  -     ID ARTHROCENTESIS ASPIR&/INJ MAJOR JT/BURSA W/O   -     [facility] - [place]    Cervical pain    Shoulder impingement syndrome, left    Nontraumatic tear of left rotator cuff, unspecified tear extent    Other orders  -     bupivacaine (MARCAINE) 0.25 % injection 10 mg  -     lidocaine 1 % injection 40 mg  -     triamcinolone acetonide (KENALOG-40) injection 80 mg        Patient has 2 problems. She is suffering from cervical radiculopathy and I have referred her to [facility] sportsman. In addition she most likely has a large nonrepairable rotator cuff tear. We will perform an intra-articular cortisone injection today left shoulder and placed into physical therapy. We will see her back in 3 months or sooner problems arise. I discussed with Shelbie Fischer that her history, symptoms, signs and imaging are most consistent with rotator cuff partial tears.     We reviewed the natural history of these conditions and treatment options ranging from conservative measures (rest, icing, activity modification, physical therapy, pain meds, cortisone injection) to surgical options. In terms of treatment, I recommended continuing with rest, icing, avoidance of painful activities, NSAIDs or pain meds as tolerated, and physical therapy. If these are not effective, cortisone injection can be considered. We discussed surgical options as well, should conservative measures fail. Electronically signed by Jesus Bose MD on 12/10/2021 at 11:09 AM  This dictation was generated by voice recognition computer software. Although all attempts are made to edit the dictation for accuracy, there may be errors in the transcription that are not intended.

## 2021-12-16 ENCOUNTER — OFFICE VISIT (OUTPATIENT)
Dept: ORTHOPEDIC SURGERY | Age: 61
End: 2021-12-16
Payer: MEDICARE

## 2021-12-16 VITALS — WEIGHT: 209 LBS | BODY MASS INDEX: 37.03 KG/M2 | HEIGHT: 63 IN

## 2021-12-16 DIAGNOSIS — Z98.1 STATUS POST CERVICAL SPINAL FUSION: ICD-10-CM

## 2021-12-16 DIAGNOSIS — M50.00 HNP (HERNIATED NUCLEUS PULPOSUS) WITH MYELOPATHY, CERVICAL: ICD-10-CM

## 2021-12-16 DIAGNOSIS — M54.12 CERVICAL RADICULOPATHY: Primary | ICD-10-CM

## 2021-12-16 PROCEDURE — 99204 OFFICE O/P NEW MOD 45 MIN: CPT | Performed by: STUDENT IN AN ORGANIZED HEALTH CARE EDUCATION/TRAINING PROGRAM

## 2021-12-16 RX ORDER — GABAPENTIN 300 MG/1
300 CAPSULE ORAL NIGHTLY
Qty: 30 CAPSULE | Refills: 0 | Status: SHIPPED | OUTPATIENT
Start: 2021-12-16 | End: 2022-03-01

## 2021-12-16 RX ORDER — TIZANIDINE HYDROCHLORIDE 4 MG/1
4 CAPSULE, GELATIN COATED ORAL EVERY EVENING
Qty: 30 CAPSULE | Refills: 0 | Status: SHIPPED | OUTPATIENT
Start: 2021-12-16 | End: 2022-01-15

## 2021-12-16 NOTE — PROGRESS NOTES
New Patient: CERVICAL SPINE    Referring Provider:  No ref. provider found    CHIEF COMPLAINT:    Chief Complaint   Patient presents with    Neck Pain     NP CSP, pt notes plates and screws in C4, C5, C6 2008-09. Pt notes she was invovled in an accident and had it raised up. pt notes impingement left side of her neck, c/o sleep deprivation with spasms from her neck to her left arm. HISTORY OF PRESENT ILLNESS:    Ms. Isaura Tabor is a pleasant 59 y.o. old female here for consultation regarding her neck and left arm pain. She states the pain began after a fusion from C4-C6 in 2008 and 2009. Her pain has steadily worsened over the last 2 years. She rates her neck pain 9/10 and shoulder and arm pain 8/10. She describes the pain as pinching spasms. Pain is worse with sitting for prolonged periods, cervical rotation, rising from sitting, leaning forward, and lying down and slightly better with walking. The arm pain radiates to her head, left shoulder and triceps. She reports numbness and tingling not following a distribution, mostly at nighttime. She reports weakness of her left arm and hand. She has been having trouble opening jars and has dropped some objects. Patient reports some difficulty with fine motor skills such as buttoning her shirt. She denies lower extremity symptoms, gait abnormality and bowel or bladder dysfunction. The pain interferes with her sleep.     Current/Past Treatment:   · Physical Therapy: yes  · Chiropractic:  yes   · Injection:  Yes but cannot remember timeframe   · Medications:    · NSAIDS: Ibuprofen 800 mg   · Muscle relaxer:  NONE  · Steriods:  NONE  · Neuropathic medications:  NONE  · Opioids: NONE  · Other: NONE  ·  Surgery/Consult NONE    Past Medical History:   Past Medical History:   Diagnosis Date    Allergic rhinitis     HTN (hypertension)     Hyperlipidemia     Obstructive sleep apnea (adult) (pediatric)     Non-compliant with CPAP    Osteoarthritis     Type II or TO INJECT INSULIN FOUR TIMES DAILY, Disp: 200 each, Rfl: 5    insulin glargine (LANTUS SOLOSTAR) 100 UNIT/ML injection pen, Inject 30 Units into the skin nightly, Disp: 10 pen, Rfl: 3    insulin lispro, 1 Unit Dial, (HUMALOG KWIKPEN) 100 UNIT/ML SOPN, Inject 4 Units into the skin 3 times daily (before meals) Then USE AS CORRECTION SCALE PER PATIENT HOME INSTRUCTIONS (Patient not taking: Reported on 11/24/2021), Disp: 5 pen, Rfl: 3    Lancets MISC, Test 1-3 times a day as needed for symptoms of irregular glucose. DX: E11.9 please provide lancets accepted by patient's insurance., Disp: 100 each, Rfl: 3    blood glucose monitor strips, Test 1-3 times a day as needed for symptoms of irregular glucose. DX: E11.9 please provide strips that are accepted by patient's insurance., Disp: 200 strip, Rfl: 3    fluticasone (FLONASE) 50 MCG/ACT nasal spray, 2 sprays by Nasal route daily, Disp: 1 Bottle, Rfl: 3    ACCU-CHEK SOFTCLIX LANCETS MISC, TEST BLOOD SUGAR TWO TIMES A DAY, Disp: 100 each, Rfl: 4    Calcium Carb-Cholecalciferol (CALTRATE 600+D) 600-800 MG-UNIT TABS, Take 1 tablet by mouth 2 times daily, Disp: , Rfl:     cetirizine (ZYRTEC ALLERGY) 10 MG tablet, Take 1 tablet by mouth daily, Disp: , Rfl:     aspirin EC 81 MG EC tablet, Take 81 mg by mouth daily. LAST DOSE 8-9-14, Disp: 30 tablet, Rfl: 11  Allergies:  Demerol, Levaquin [levofloxacin in d5w], Morphine, and Tramadol  Social History:    reports that she quit smoking about 2 years ago. Her smoking use included cigars. She has a 5.00 pack-year smoking history. She has never used smokeless tobacco. She reports current alcohol use. She reports previous drug use.   Family History:   Family History   Problem Relation Age of Onset    Diabetes Father     Heart Disease Father         CAD    High Blood Pressure Father     High Cholesterol Father     Kidney Disease Brother     Diabetes Son    Maggie Se Other Son         DEANA    Heart Disease Sister         1/2 sister  Rheum Arthritis Neg Hx     Osteoarthritis Neg Hx     Asthma Neg Hx     Breast Cancer Neg Hx     Cancer Neg Hx     Heart Failure Neg Hx     Hypertension Neg Hx     Migraines Neg Hx     Ovarian Cancer Neg Hx     Rashes/Skin Problems Neg Hx     Seizures Neg Hx     Stroke Neg Hx     Thyroid Disease Neg Hx        REVIEW OF SYSTEMS: Full ROS noted & scanned   CONSTITUTIONAL: Denies unexplained weight loss, fevers, chills or fatigue  NEUROLOGICAL: Denies unsteady gait or progressive weakness  MUSCULOSKELETAL: Denies joint swelling or redness  PSYCHOLOGICAL: Denies anxiety, depression   SKIN: Denies skin changes, delayed healing, rash, itching   HEMATOLOGIC: Denies easy bleeding or bruising  ENDOCRINE: Denies excessive thirst, urination, heat/cold  RESPIRATORY: Denies current dyspnea, cough  GI: Denies nausea, vomiting, diarrhea   : Denies bowel or bladder issues      PHYSICAL EXAM:    Vitals: Height 5' 3\" (1.6 m), weight 209 lb (94.8 kg), last menstrual period 01/26/1996, not currently breastfeeding. GENERAL EXAM:  · General Apparence: Patient is adequately groomed with no evidence of malnutrition. · Orientation: The patient is oriented to time, place and person. · Mood & Affect:The patient's mood and affect are appropriate. · Vascular: Examination reveals no swelling tenderness in upper or lower extremities. Good capillary refill. · Lymphatic: The lymphatic examination bilaterally reveals all areas to be without enlargement or induration  · Sensation: Sensation is intact without deficit  · Coordination/Balance: Good coordination     CERVICAL EXAMINATION:  · Inspection: Local inspection shows no step-off or bruising. Cervical alignment is normal.     · Palpation: No evidence of tenderness at the midline, and trapezius. Paraspinal tenderness is present. There is no step-off or paraspinal spasm.    · Range of Motion: Range of Motion:  limited by 25% in all planes due to pain   · Strength: 5/5 bilateral upper extremities   · Special Tests:    ·   Spurling's positive left, L'Hermitte's & Bourne's negative bilaterally. ·   Garner and Impingement tests are negative bilaterally. ·   Cubital and Carpal tunnel Tinel's negative bilaterally. · Skin:There are no rashes, ulcerations or lesions in right & left upper extremities. · Reflexes: Bilaterally triceps, biceps and brachioradialis are 2+. Clonus absent bilaterally at the feet. · Additional Examinations:       · RIGHT UPPER EXTREMITY:  Inspection/examination of the right upper extremity does not show any tenderness, deformity or injury. Range of motion is full. There is no gross instability. There are no rashes, ulcerations or lesions. Strength and tone are normal.  · LEFT UPPER EXTREMITY: Inspection/examination of the left upper extremity does not show any tenderness, deformity or injury. Range of motion is full. There is no gross instability. There are no rashes, ulcerations or lesions. Strength and tone are normal.    LUMBAR/SACRAL EXAMINATION:  · Inspection: Local inspection shows no step-off or bruising. Lumbar alignment is normal.  Sagittal and Coronal balance is neutral.      · Palpation:   No evidence of tenderness at the midline. No tenderness bilaterally at the paraspinal or trochanters. There is no step-off or paraspinal spasm. · Range of Motion: limited by 25% in all planes due to pain   · Hip ER and IR are pain free and within normal limits. · Strength:   Strength testing is 5/5 in all muscle groups tested. · Special Tests:   Straight leg raise and crossed SLR negative. Slump test negative, Leg length and pelvis level. · Skin: There are no rashes, ulcerations or lesions. · Reflexes: Reflexes are symmetrically 2+ at the patellar and ankle tendons. Clonus absent bilaterally at the feet.   · Gait & station: normal, patient ambulates without assistance  · Additional Examinations:   · RIGHT LOWER EXTREMITY: Inspection/examination of the right lower extremity does not show any tenderness, deformity or injury. Range of motion is unremarkable. There is no gross instability. There are no rashes, ulcerations or lesions. Strength and tone are normal.  · LEFT LOWER EXTREMITY:  Inspection/examination of the left lower extremity does not show any tenderness, deformity or injury. Range of motion is unremarkable. There is no gross instability. There are no rashes, ulcerations or lesions. Strength and tone are normal.    Diagnostic Testing:    Reviewed MRI CSP 2018:  FINDINGS:   BONES/ALIGNMENT: Status post anterior spinal fusion from C4 through C6.  No   evidence of significant spondylolisthesis.  The marrow signal is within   normal limits.  The vertebral body heights are within normal limits.       SPINAL CORD: No abnormal cord signal is seen.       SOFT TISSUES: There is an incompletely evaluated right thyroid lesion   measuring 17 mm.  No prevertebral soft tissue swelling.  There is a   retropharyngeal course of the distal internal carotid arteries.       C2-C3: Posterior disc osteophyte complex, slightly asymmetric to the left.    No significant neural foraminal stenosis or spinal canal stenosis.       C3-C4: Minimal posterior disc osteophyte complex.  No significant neural   foraminal stenosis or spinal canal stenosis.       C4-C5: This is a level of fusion.  No significant neural foraminal stenosis   or spinal canal stenosis.       C5-C6: This is the level of fusion.  Posterior disc osteophyte complex with   tiny left central osteophyte or disc protrusion.  Mild bilateral neural   foraminal stenosis.  Mild mass effect on  left lateral aspect of the spinal   cord without abnormal T2 signal.  Mild spinal canal stenosis.  Unchanged.       C6-C7: Posterior disc osteophyte complex, with moderate bilateral neural   foraminal stenosis secondary to uncovertebral hypertrophy.  Partial   effacement of the ventral CSF space, with minimal mass effect on the ventral   spinal cord.  No abnormal T2 signal is identified.  Mild spinal canal   stenosis.  Unchanged.       C7-T1: Unchanged left foraminal disc protrusion with moderate left neural   foraminal stenosis.  No significant right neural foraminal stenosis.  No   significant spinal canal stenosis.  Unchanged. Impression:    Diagnosis Orders   1. Cervical radiculopathy  VT NJX DX/THER SBST INTRLMNR CRV/THRC W/IMG GDN    gabapentin (NEURONTIN) 300 MG capsule    tiZANidine (ZANAFLEX) 4 MG capsule   2. Status post cervical spinal fusion  VT NJX DX/THER SBST INTRLMNR CRV/THRC W/IMG GDN    gabapentin (NEURONTIN) 300 MG capsule    tiZANidine (ZANAFLEX) 4 MG capsule   3. HNP (herniated nucleus pulposus) with myelopathy, cervical  VT NJX DX/THER SBST INTRLMNR CRV/THRC W/IMG GDN    gabapentin (NEURONTIN) 300 MG capsule    tiZANidine (ZANAFLEX) 4 MG capsule         Plan:    Above diagnoses are Worsening    1. Medications: I will add a gabapentin and zanaflex to the current regimen. Counseled on risks, benefits and alternatives and recommended not to take the medicine and drive or operate heavy machinery. Patient denied oral steroid. 2. PT:  Encouraged to continue with HEP. 3. Further studies:  Reviewed MRI from 2018. If patient does not improve with a cervical epidural steroid injection we will repeat the MRI. 4. Interventional:  We discussed pursuing a C7/T1 epidural steroid injection to address the pain. Radiologic imaging and symptoms confirm the pain etiology. Risks, benefits and alternatives of interventional options were discussed. These include and are not limited to bleeding, infection, spinal headache, nerve injury, increased pain and lack of pain relief. The patient verbalized understanding and would like to proceed. The patient will be scheduled accordingly.       Josias Walker PA-C  Board Certified by the [employer] on Certification of Physician [address] [facility]  Partner of [facility] ([facility])

## 2021-12-17 ENCOUNTER — OFFICE VISIT (OUTPATIENT)
Dept: ORTHOPEDIC SURGERY | Age: 61
End: 2021-12-17
Payer: MEDICARE

## 2021-12-17 VITALS — WEIGHT: 209 LBS | HEIGHT: 63 IN | BODY MASS INDEX: 37.03 KG/M2

## 2021-12-17 DIAGNOSIS — M17.11 PRIMARY OSTEOARTHRITIS OF RIGHT KNEE: ICD-10-CM

## 2021-12-17 DIAGNOSIS — E66.09 CLASS 2 OBESITY DUE TO EXCESS CALORIES WITHOUT SERIOUS COMORBIDITY WITH BODY MASS INDEX (BMI) OF 37.0 TO 37.9 IN ADULT: ICD-10-CM

## 2021-12-17 DIAGNOSIS — M25.561 RIGHT KNEE PAIN, UNSPECIFIED CHRONICITY: Primary | ICD-10-CM

## 2021-12-17 PROCEDURE — 99214 OFFICE O/P EST MOD 30 MIN: CPT | Performed by: ORTHOPAEDIC SURGERY

## 2021-12-17 NOTE — PROGRESS NOTES
Dr Arik Lui      Date /Time 12/17/2021       10:44 AM EST  Name Ashley Gambino             1960   Location  [facility]  MRN [id number]                Chief Complaint   Patient presents with    Knee Pain     RIGHT KNEE         History of Present Illness  Ashley Gambino is a 59 y.o. female who presents with  right knee pain,. Patient presents to the office today with a new problem. Patient here with a chief complaint of right knee pain. Patient's right knee has been painful for an extended period of time. No injury or trauma. Pain is mostly concentrated over the medial and patellofemoral joint. Patient is a type II diabetic.     Past History  Past Medical History:   Diagnosis Date    Allergic rhinitis     HTN (hypertension)     Hyperlipidemia     Obstructive sleep apnea (adult) (pediatric)     Non-compliant with CPAP    Osteoarthritis     Type II or unspecified type diabetes mellitus without mention of complication, not stated as uncontrolled      Past Surgical History:   Procedure Laterality Date    COLON SURGERY  2008    10\" removed - chronic constipation    DUPUYTRENS CONTRACTURE SURGERY Left 1/29/2019    LEFT 1ST DORSAL COMPARMENT DE' [address] performed by Louis Presley MD at [address] [place] [address]       4x     HYSTERECTOMY, TOTAL ABDOMINAL      JOINT REPLACEMENT  2007    Left knee partial    KNEE SURGERY  1990's    rt knee reconstructed    NECK SURGERY  05/12/2011    c 4,5,6,7 (ACDF)    OVARY REMOVAL      ROTATOR CUFF REPAIR  2009    Left shoulder    SHOULDER SURGERY  1990's    lt & rt shoulder impingment    SINUS ENDOSCOPY  9-15-14    FUNCTIONAL ENDOSCOPIC SINUS SURGERY       TONSILLECTOMY AND ADENOIDECTOMY      TOTAL KNEE ARTHROPLASTY Left 03/09/2010    Eros to Total    TUBAL LIGATION      UMBILICAL HERNIA REPAIR  8/2010    Dr. No Cantu     Social History     Tobacco Use    Smoking status: Former Smoker     Packs/day: 0.25     Years: 20.00     Pack years: 5.00     Types: Cigars     Quit date: 2019     Years since quittin.2    Smokeless tobacco: Never Used    Tobacco comment: was smoking 5 black and milds per day, but has not smoked in 4 days   Substance Use Topics    Alcohol use: Yes     Comment: 1 alcoholic beverage every other month      Current Outpatient Medications on File Prior to Visit   Medication Sig Dispense Refill    gabapentin (NEURONTIN) 300 MG capsule Take 1 capsule by mouth nightly for 30 days. Intended supply: 30 days 30 capsule 0    tiZANidine (ZANAFLEX) 4 MG capsule Take 1 capsule by mouth every evening 30 capsule 0    atorvastatin (LIPITOR) 10 MG tablet TAKE ONE TABLET BY MOUTH DAILY 90 tablet 0    amLODIPine (NORVASC) 5 MG tablet TAKE ONE TABLET BY MOUTH DAILY 60 tablet 5    ibuprofen (ADVIL;MOTRIN) 800 MG tablet TAKE ONE TABLET BY MOUTH TWICE A DAY AS NEEDED FOR PAIN 60 tablet 1    metFORMIN (GLUCOPHAGE) 1000 MG tablet TAKE 1 TABLET TWICE A DAY WITH MEALS 180 tablet 3    losartan (COZAAR) 100 MG tablet TAKE ONE TABLET BY MOUTH DAILY 60 tablet 5    montelukast (SINGULAIR) 10 MG tablet TAKE ONE TABLET BY MOUTH ONCE NIGHTLY 60 tablet 5    Insulin Pen Needle (PEN NEEDLES) 32G X 4 MM MISC USE TO INJECT INSULIN FOUR TIMES DAILY 200 each 5    insulin glargine (LANTUS SOLOSTAR) 100 UNIT/ML injection pen Inject 30 Units into the skin nightly 10 pen 3    insulin lispro, 1 Unit Dial, (HUMALOG KWIKPEN) 100 UNIT/ML SOPN Inject 4 Units into the skin 3 times daily (before meals) Then USE AS CORRECTION SCALE PER PATIENT HOME INSTRUCTIONS (Patient not taking: Reported on 2021) 5 pen 3    Lancets MISC Test 1-3 times a day as needed for symptoms of irregular glucose. DX: E11.9 please provide lancets accepted by patient's insurance. 100 each 3    blood glucose monitor strips Test 1-3 times a day as needed for symptoms of irregular glucose. DX: E11.9 please provide strips that are accepted by patient's insurance.  200 strip 3    fluticasone (FLONASE) 50 MCG/ACT nasal spray 2 sprays by Nasal route daily 1 Bottle 3    ACCU-CHEK SOFTCLIX LANCETS MISC TEST BLOOD SUGAR TWO TIMES A  each 4    Calcium Carb-Cholecalciferol (CALTRATE 600+D) 600-800 MG-UNIT TABS Take 1 tablet by mouth 2 times daily      cetirizine (ZYRTEC ALLERGY) 10 MG tablet Take 1 tablet by mouth daily      aspirin EC 81 MG EC tablet Take 81 mg by mouth daily. LAST DOSE 8-9-14 30 tablet 11     Current Facility-Administered Medications on File Prior to Visit   Medication Dose Route Frequency Provider Last Rate Last Admin    bupivacaine (MARCAINE) 0.25 % injection 10 mg  10 mg Intra-artICUlar Once Arik Lui MD        lidocaine 1 % injection 40 mg  40 mg Intra-artICUlar Once Arik Lui MD        triamcinolone acetonide (KENALOG-40) injection 80 mg  80 mg Intra-artICUlar Once Arik Lui MD          ASCVD 10-YEAR RISK SCORE  The 10-year ASCVD risk score (Enoch Villagomez et al., 2013) is: 16%    Values used to calculate the score:      Age: 59 years      Sex: Female      Is Non- : Yes      Diabetic: Yes      Tobacco smoker: No      Systolic Blood Pressure: 769 mmHg      Is BP treated: Yes      HDL Cholesterol: 26 mg/dL      Total Cholesterol: 143 mg/dL     Review of Systems  10-point ROS is negative other than HPI. Physical Exam  Based off 1997 Exam Criteria  Ht 5' 3\" (1.6 m)   Wt 209 lb (94.8 kg)   LMP 01/26/1996   BMI 37.02 kg/m²      Constitutional:       General: He is not in acute distress. Appearance: Normal appearance. Cardiovascular:      Rate and Rhythm: Normal rate and regular rhythm. Pulses: Normal pulses. Pulmonary:      Effort: Pulmonary effort is normal. No respiratory distress. Neurological:      Mental Status: He is alert and oriented to person, place, and time. Mental status is at baseline. Musculoskeletal:  Gait:  antalgic  Lumbar spine: There is no swelling, warmth, or erythema.  Range of motion is within normal limits. There is no paraspinal or spinous process tenderness. . The distal neurovascular exam is grossly intact and symmetric. Ritesh Hip: Examination of the right and left hip reveals intact skin. The patient demonstrates full painless range of motion with regards to flexion, abduction, internal and external rotation. There is no tenderness about the greater trochanter. R Knee: Examination of the right knee reveals intact skin. Tenderness over the medial joint line. Positive patellofemoral grind test.  No gross instability to either varus or valgus stress test.    Imaging  Right Knee: [address] [facility]  Radiographs: X-rays were ordered today reviewed of the right knee. 4 views. Standing AP, standing AP flexed, lateral, and skyline views. They demonstrate advanced medial joint space thinning and osteophyte formation. Moderate osteophyte formation patellofemoral joint. Procedure:  Orders Placed This Encounter   Procedures    XR KNEE RIGHT (MIN 4 VIEWS)     Standing Status:   Future     Number of Occurrences:   1     Standing Expiration Date:   12/17/2022     Order Specific Question:   Reason for exam:     Answer:   PAIN    DE HYALGAN [address] SUBMIT FOR VISCO-3 FOR THE RIGHT KNEE     Standing Status:   Future     Standing Expiration Date:   12/17/2022       Assessment and Plan  Anya Chamberlain was seen today for knee pain. Diagnoses and all orders for this visit:    Right knee pain, unspecified chronicity  -     XR KNEE RIGHT (MIN 4 VIEWS); Future  -     DE HYALGAN SUPARTZ DOSE; Future    Primary osteoarthritis of right knee  -     DE HYALGAN SUPARTZ DOSE; Future        Patient is suffering from right knee osteoarthritis. Patient will proceed with a series of viscosupplementation injections once approved by her insurance company. She has had these in the past about 7 years ago with good results. Ultimately total knee arthroplasty at some point in the future.     I discussed with Debo Bone that her history, symptoms, signs and imaging are most consistent with knee arthritis. We reviewed the natural history of these conditions and treatment options ranging from conservative measures (rest, icing, activity modification, physical therapy, pain meds, cortisone injection) to surgical options. In terms of treatment, I recommended continuing with rest, icing, avoidance of painful activities, NSAIDs or pain meds as tolerated, and physical therapy. If these are not effective, cortisone injection can be considered. We discussed surgical options as well, should conservative measures fail. Electronically signed by Radha Bal MD on 12/17/2021 at 10:44 AM  This dictation was generated by voice recognition computer software. Although all attempts are made to edit the dictation for accuracy, there may be errors in the transcription that are not intended.

## 2021-12-23 ENCOUNTER — TELEPHONE (OUTPATIENT)
Dept: ORTHOPEDIC SURGERY | Age: 61
End: 2021-12-23

## 2021-12-23 RX ADMIN — BUPIVACAINE HYDROCHLORIDE 10 MG: 2.5 INJECTION, SOLUTION INFILTRATION; PERINEURAL at 11:16

## 2021-12-23 RX ADMIN — TRIAMCINOLONE ACETONIDE 80 MG: 40 INJECTION, SUSPENSION INTRA-ARTICULAR; INTRAMUSCULAR at 11:16

## 2021-12-23 RX ADMIN — LIDOCAINE HYDROCHLORIDE 40 MG: 10 INJECTION, SOLUTION INFILTRATION; PERINEURAL at 11:16

## 2021-12-23 NOTE — TELEPHONE ENCOUNTER
Called & spoke with the patient about her neck pain. Also complaining of left shoulder/arm pain unable to turn head. Patient has been miserable since Friday 12/17/2021. Patient was recommended to go to the ER due to increase pain and unable to get it controlled. Patient was recommended to go the ER. Patient was also recommended to call back to the office on Tuesday 12/28/2021 to follow up with Idalia Klinefelter, PA-C's group.

## 2021-12-23 NOTE — TELEPHONE ENCOUNTER
General Question     Subject: Patient is concerned about the amount of pain she is in. states that she is not getting any relief. Requesting a call to discuss.   Patient Nir Florentino  Contact Number: [phone]

## 2021-12-24 ENCOUNTER — HOSPITAL ENCOUNTER (EMERGENCY)
Age: 61
Discharge: HOME | End: 2021-12-24
Attending: EMERGENCY MEDICINE
Payer: MEDICARE

## 2021-12-24 VITALS
OXYGEN SATURATION: 98 % | HEART RATE: 102 BPM | RESPIRATION RATE: 18 BRPM | DIASTOLIC BLOOD PRESSURE: 112 MMHG | TEMPERATURE: 98.6 F | HEIGHT: 63 IN | BODY MASS INDEX: 35.97 KG/M2 | SYSTOLIC BLOOD PRESSURE: 149 MMHG | WEIGHT: 203 LBS

## 2021-12-24 DIAGNOSIS — G89.29 CHRONIC NECK PAIN: Primary | ICD-10-CM

## 2021-12-24 DIAGNOSIS — M54.2 CHRONIC NECK PAIN: Primary | ICD-10-CM

## 2021-12-24 PROCEDURE — 6370000000 HC RX 637 (ALT 250 FOR IP): Performed by: EMERGENCY MEDICINE

## 2021-12-24 PROCEDURE — 99283 EMERGENCY DEPT VISIT LOW MDM: CPT

## 2021-12-24 RX ORDER — LIDOCAINE 4 G/G
1 PATCH TOPICAL DAILY
Status: DISCONTINUED | OUTPATIENT
Start: 2021-12-24 | End: 2021-12-24 | Stop reason: HOSPADM

## 2021-12-24 RX ORDER — GABAPENTIN 300 MG/1
300 CAPSULE ORAL ONCE
Status: COMPLETED | OUTPATIENT
Start: 2021-12-24 | End: 2021-12-24

## 2021-12-24 RX ORDER — TIZANIDINE 4 MG/1
4 TABLET ORAL ONCE
Status: COMPLETED | OUTPATIENT
Start: 2021-12-24 | End: 2021-12-24

## 2021-12-24 RX ORDER — IBUPROFEN 400 MG/1
800 TABLET ORAL ONCE
Status: COMPLETED | OUTPATIENT
Start: 2021-12-24 | End: 2021-12-24

## 2021-12-24 RX ORDER — ACETAMINOPHEN 500 MG
1000 TABLET ORAL ONCE
Status: COMPLETED | OUTPATIENT
Start: 2021-12-24 | End: 2021-12-24

## 2021-12-24 RX ADMIN — IBUPROFEN 800 MG: 400 TABLET, FILM COATED ORAL at 08:08

## 2021-12-24 RX ADMIN — TIZANIDINE 4 MG: 4 TABLET ORAL at 08:09

## 2021-12-24 RX ADMIN — GABAPENTIN 300 MG: 300 CAPSULE ORAL at 08:09

## 2021-12-24 RX ADMIN — ACETAMINOPHEN 1000 MG: 500 TABLET ORAL at 08:08

## 2021-12-24 ASSESSMENT — PAIN SCALES - GENERAL: PAINLEVEL_OUTOF10: 10

## 2021-12-24 NOTE — ED PROVIDER NOTES
[address] [facility]          ATTENDING PHYSICIAN NOTE       Date of evaluation: 12/24/2021    Chief Complaint     Neck Pain (left side of head, down neck, down left arm pain \"for a long time\" reports ortho sent pt to ed for pain management)      History of Present Illness     Jenni Sahu is a 59 y.o. female with history of osteoarthritis, obesity, rotator cuff injury, cervical radiculopathy, diabetes, hypertension presenting to the emergency department today for neck and arm pain. Patient states she has had chronic left neck, shoulder, and arm pain for many years. She has recently been seen multiple orthopedic providers regarding both her shoulder and neck. She has been diagnosed with nonrepairable left rotator cuff injury as well as cervical radiculopathy. She was recently prescribed tizanidine and gabapentin for her chronic pain in addition to ibuprofen which she has been taking. However, she was unable to  these prescriptions and presents with worsening pain today. Denies new injury or fall. No new weakness or sensory changes in the arm. No fevers. Has not regularly been taking Tylenol in addition to ibuprofen. Review of Systems     Pertinent positive and negative findings as documented in the HPI. Otherwise all other systems were reviewed and were negative. Physical Exam     INITIAL VITALS: BP: (!) 149/112, Temp: 98.6 °F (37 °C), Pulse: 102, Resp: 18, SpO2: 98 %     Nursing note and vitals reviewed. General:  Adult female, alert and appropriately interactive. In no distress. HENT: Normocephalic and atraumatic. External ears normal. Nose appears normal externally. Eyes: Conjunctivae normal. No scleral icterus. Neck: Tenderness diffusely from the midline neck laterally to the left trapezius and shoulder. No localized bony tenderness or step-offs. No tracheal deviation present. CV: Normal rate. Regular rhythm. 2+ radial pulses.   Pulm: Effort normal on room air. Musculoskeletal: Tenderness to palpation and manipulation of the entirety of the left shoulder and scapular region. Mild left wrist tenderness to palpation. Neurological: Alert and appropriately interactive. Face symmetric, speech without dysarthria or obvious aphasia. Moving all extremities spontaneously. Sensation intact in axillary, radial, median, and ulnar nerve distributions on the left. Skin: Warm, dry. No rash. No diaphoresis or erythema. Psychiatric: Calm and cooperative with appropriate mood and affect. Procedures   Procedures    MEDICAL DECISION MAKING     MDM: Umang Urban is a 59 y.o. female with history as above presenting for worsening of her chronic left neck and shoulder pain without any trauma. On arrival, she is in no distress and vital signs are reassuring. She has findings consistent with ongoing chronic pain including radiculopathy and arthritic changes. She has unfortunately not started the medications that were prescribed by her outpatient orthopedist.  She does not have any trauma or concerning new findings that would require new imaging or other work-up today. She has not taken any medications this morning for the pain. We have given her Tylenol, ibuprofen, Lidoderm patch, Zanaflex, and gabapentin in the emergency department. She has a ride home and to the pharmacy to  her new medications. Encouraged her to continue these and take regularly scheduled Tylenol and ibuprofen. Encouraged follow-up with her outpatient team to discuss epidural injection as has been mentioned previously in their notes. Discharged in stable condition with written and verbal instructions for which to return to the ED. Clinical Impression     1.  Chronic neck pain        Disposition     DISPOSITION Discharge - Pending Orders Complete 12/24/2021 08:02:06 AM        Sepideh Langston MD  8:04 AM                     Past Medical, Surgical, Family, and Social History     She has a past medical history of Allergic rhinitis, HTN (hypertension), Hyperlipidemia, Obstructive sleep apnea (adult) (pediatric), Osteoarthritis, and Type II or unspecified type diabetes mellitus without mention of complication, not stated as uncontrolled. She has a past surgical history that includes joint replacement (2007); Rotator cuff repair (2009); Colon surgery (2008); shoulder surgery (1990's); knee surgery (1990's); Neck surgery (05/12/2011); Umbilical hernia repair (8/2010); Ovary removal; Sinus endoscopy (9-15-14); Tonsillectomy and adenoidectomy; Tubal ligation; Total knee arthroplasty (Left, 03/09/2010); dupuytrens contracture surgery (Left, 1/29/2019); hernia repair; and Hysterectomy, total abdominal.  Her family history includes Diabetes in her father and son; Heart Disease in her father and sister; High Blood Pressure in her father; High Cholesterol in her father; Kidney Disease in her brother; Other in her son. She reports that she quit smoking about 2 years ago. Her smoking use included cigars. She has a 5.00 pack-year smoking history. She has never used smokeless tobacco. She reports current alcohol use. She reports previous drug use. Medications     Previous Medications    ACCU-CHEK SOFTCLIX LANCETS MISC    TEST BLOOD SUGAR TWO TIMES A DAY    AMLODIPINE (NORVASC) 5 MG TABLET    TAKE ONE TABLET BY MOUTH DAILY    ASPIRIN EC 81 MG EC TABLET    Take 81 mg by mouth daily. LAST DOSE 8-9-14    ATORVASTATIN (LIPITOR) 10 MG TABLET    TAKE ONE TABLET BY MOUTH DAILY    BLOOD GLUCOSE MONITOR STRIPS    Test 1-3 times a day as needed for symptoms of irregular glucose. DX: E11.9 please provide strips that are accepted by patient's insurance.     CALCIUM CARB-CHOLECALCIFEROL (CALTRATE 600+D) 600-800 MG-UNIT TABS    Take 1 tablet by mouth 2 times daily    CETIRIZINE (ZYRTEC ALLERGY) 10 MG TABLET    Take 1 tablet by mouth daily    FLUTICASONE (FLONASE) 50 MCG/ACT NASAL SPRAY    2 sprays by Nasal route daily GABAPENTIN (NEURONTIN) 300 MG CAPSULE    Take 1 capsule by mouth nightly for 30 days. Intended supply: 30 days    IBUPROFEN (ADVIL;MOTRIN) 800 MG TABLET    TAKE ONE TABLET BY MOUTH TWICE A DAY AS NEEDED FOR PAIN    INSULIN GLARGINE (LANTUS SOLOSTAR) 100 UNIT/ML INJECTION PEN    Inject 30 Units into the skin nightly    INSULIN LISPRO, 1 UNIT DIAL, (HUMALOG KWIKPEN) 100 UNIT/ML SOPN    Inject 4 Units into the skin 3 times daily (before meals) Then USE AS CORRECTION SCALE PER PATIENT HOME INSTRUCTIONS    INSULIN PEN NEEDLE (PEN NEEDLES) 32G X 4 MM MISC    USE TO INJECT INSULIN FOUR TIMES DAILY    LANCETS MISC    Test 1-3 times a day as needed for symptoms of irregular glucose. DX: E11.9 please provide lancets accepted by patient's insurance. LOSARTAN (COZAAR) 100 MG TABLET    TAKE ONE TABLET BY MOUTH DAILY    METFORMIN (GLUCOPHAGE) 1000 MG TABLET    TAKE 1 TABLET TWICE A DAY WITH MEALS    MONTELUKAST (SINGULAIR) 10 MG TABLET    TAKE ONE TABLET BY MOUTH ONCE NIGHTLY    TIZANIDINE (ZANAFLEX) 4 MG CAPSULE    Take 1 capsule by mouth every evening       Allergies     She is allergic to demerol, levaquin [levofloxacin in d5w], morphine, and tramadol. ED Course     Nursing Notes, Past Medical Hx, Past Surgical Hx, Social Hx,Allergies, and Family Hx were reviewed. Patient was given the following medications:  Orders Placed This Encounter   Medications    ibuprofen (ADVIL;MOTRIN) tablet 800 mg    acetaminophen (TYLENOL) tablet 1,000 mg    lidocaine 4 % external patch 1 patch    tiZANidine (ZANAFLEX) tablet 4 mg    gabapentin (NEURONTIN) capsule 300 mg       Diagnostic Results       RECENT VITALS:  BP: (!) 149/112,Temp: 98.6 °F (37 °C), Pulse: 102, Resp: 18, SpO2: 98 %     RADIOLOGY:  No orders to display       LABS:   No results found for this visit on 12/24/21.     CONSULTS:  None    PATIENT REFERRED TO:  [facility] Emergency Department  [address] [place] [address]  [phone]  Schedule an appointment as soon as possible for a visit       [address]  [facility] [address]  [phone]    Schedule an appointment as soon as possible for a visit in 5 days  For reexamination      DISCHARGE MEDICATIONS:  New Prescriptions    No medications on file          Shad Zaidi MD  12/24/21 [id number]

## 2021-12-24 NOTE — ED NOTES
Pt presents to the ed from home with c/o left sided neck pain down left arm \"for a long time\" pt reports her ortho doctor sent her to ed for pain management. Pt was seen in ortho office approx 2 weeks ago and prescribed gabapentin and Zanaflex. Pt reports that she has been unable to  scripts. Pt has been taking ibuprofen at home for pain. Pt denies any new injury/trauma.      Mary Rincon RN  12/24/21 [id number]

## 2021-12-28 ENCOUNTER — TELEPHONE (OUTPATIENT)
Dept: ORTHOPEDIC SURGERY | Age: 61
End: 2021-12-28

## 2021-12-28 NOTE — TELEPHONE ENCOUNTER
General Question     Subject: Patient wants to know if her injection has been approved yet, states she is in a lot of pain.    Patient and /or Facility Request: [facility] Number: [phone]

## 2021-12-28 NOTE — TELEPHONE ENCOUNTER
We are sorry to hear you have not received a call on your referral for your procedure/injection. Please know we have faxed your order, demographics, insurance card, office note, and imaging to the physicians facility. Their office will submit the order for prior authorization. As a reminder, insurance companies can take up to 14 days to approve/deny your procedure/injection. For updates regarding the status of your procedure/injection, please contact the provider listed below:    [facility], [address]: [address]  Phone: [phone]    We appreciate your patience during this process.        Lis Lopez   Staff member of DINORA Sanz PA-C

## 2022-01-04 ENCOUNTER — HOSPITAL ENCOUNTER (OUTPATIENT)
Dept: INTERVENTIONAL RADIOLOGY/VASCULAR | Age: 62
Discharge: HOME | End: 2022-01-04
Payer: MEDICARE

## 2022-01-04 DIAGNOSIS — M54.12 BRACHIAL RADICULITIS: ICD-10-CM

## 2022-01-04 PROCEDURE — 2500000003 HC RX 250 WO HCPCS

## 2022-01-04 PROCEDURE — 6360000002 HC RX W HCPCS

## 2022-01-04 PROCEDURE — 62321 NJX INTERLAMINAR CRV/THRC: CPT

## 2022-01-05 RX ORDER — TRIAMCINOLONE ACETONIDE 40 MG/ML
80 INJECTION, SUSPENSION INTRA-ARTICULAR; INTRAMUSCULAR ONCE
Status: COMPLETED | OUTPATIENT
Start: 2022-01-05 | End: 2022-01-05

## 2022-01-05 RX ADMIN — TRIAMCINOLONE ACETONIDE 80 MG: 40 INJECTION, SUSPENSION INTRA-ARTICULAR; INTRAMUSCULAR at 15:13

## 2022-01-10 ENCOUNTER — TELEPHONE (OUTPATIENT)
Dept: PRIMARY CARE CLINIC | Age: 62
End: 2022-01-10

## 2022-01-10 NOTE — TELEPHONE ENCOUNTER
Pt calling saying she has a runny nose since the weekend with sneezing. Saying it feels like she has a feather in her nose making her sneeze. She says she feels good, no fever or other symptoms.     With her meds, she is looking for an OTC med to use to help stop the sneezing and runny nose

## 2022-01-28 ENCOUNTER — HOSPITAL ENCOUNTER (OUTPATIENT)
Dept: MAMMOGRAPHY | Age: 62
Discharge: HOME | End: 2022-01-28
Payer: MEDICARE

## 2022-01-28 VITALS — WEIGHT: 198 LBS | BODY MASS INDEX: 35.08 KG/M2 | HEIGHT: 63 IN

## 2022-01-28 DIAGNOSIS — Z12.31 VISIT FOR SCREENING MAMMOGRAM: ICD-10-CM

## 2022-01-28 PROCEDURE — 77063 BREAST TOMOSYNTHESIS BI: CPT

## 2022-02-07 ENCOUNTER — TELEPHONE (OUTPATIENT)
Dept: ORTHOPEDIC SURGERY | Age: 62
End: 2022-02-07

## 2022-02-07 NOTE — TELEPHONE ENCOUNTER
RC to patient    No answer  Mailbox is full    Note to self - I don't have the answer but working on it      [phone]

## 2022-02-08 ENCOUNTER — OFFICE VISIT (OUTPATIENT)
Dept: ORTHOPEDIC SURGERY | Age: 62
End: 2022-02-08
Payer: MEDICARE

## 2022-02-08 ENCOUNTER — TELEPHONE (OUTPATIENT)
Dept: ORTHOPEDIC SURGERY | Age: 62
End: 2022-02-08

## 2022-02-08 VITALS — BODY MASS INDEX: 35.08 KG/M2 | HEIGHT: 63 IN | WEIGHT: 198 LBS

## 2022-02-08 DIAGNOSIS — Z98.1 STATUS POST CERVICAL SPINAL FUSION: ICD-10-CM

## 2022-02-08 DIAGNOSIS — R20.2 PARESTHESIA OF LEFT UPPER EXTREMITY: Primary | ICD-10-CM

## 2022-02-08 DIAGNOSIS — M54.12 CERVICAL RADICULOPATHY: ICD-10-CM

## 2022-02-08 PROCEDURE — 99214 OFFICE O/P EST MOD 30 MIN: CPT | Performed by: STUDENT IN AN ORGANIZED HEALTH CARE EDUCATION/TRAINING PROGRAM

## 2022-02-08 RX ORDER — GABAPENTIN 300 MG/1
300 CAPSULE ORAL 2 TIMES DAILY
Qty: 60 CAPSULE | Refills: 0 | Status: SHIPPED | OUTPATIENT
Start: 2022-02-08 | End: 2022-03-01

## 2022-02-08 RX ORDER — TIZANIDINE HYDROCHLORIDE 4 MG/1
4 CAPSULE, GELATIN COATED ORAL EVERY EVENING
Qty: 30 CAPSULE | Refills: 0 | Status: SHIPPED | OUTPATIENT
Start: 2022-02-08 | End: 2022-03-01

## 2022-02-08 NOTE — PROGRESS NOTES
Follow up: Herber Seymour  1960  [id number]      CHIEF COMPLAINT:    Chief Complaint   Patient presents with    Follow-up     FU CSP, pt notes numbness/aching into her left arm. HISTORY OF PRESENT ILLNESS:  Ms. Sabrina Askew is a 59 y.o. female returns for a follow up visit for multiple medical problems. Her current presenting problems are   1. Paresthesia of left upper extremity    2. Cervical radiculopathy    3. Status post cervical spinal fusion    . As per information/history obtained from the PADT(patient assessment and documentation tool) - She complains of pain in the neck with radiation to the shoulders Left, arms Left, elbows Left and hands Left She rates the pain 8/10 and describes it as throbbing, numbness, tingling. Pain is made worse by: movement. She denies side effects from the current pain regimen. Patient reports that since the last follow up visit the physical functioning is worse, family/social relationships are unchanged, mood is unchanged and sleep patterns are worse, and that the overall functioning is worse. Patient denies neurological bowel or bladder. The patient presents for follow-up of ongoing neck and left arm weakness and pain. She recently underwent a C7-T1 interlaminar epidural steroid injection on 1/4/2022. She reports her neck pain and spasms are significantly improved following the injection. She continues to complain of numbness and tingling radiating from the shoulder to all 5 fingers of the left hand. The patient is left-hand dominant and has been experiencing weakness. She has dropped objects such as a Coke can. Numbness and tingling does not follow a specific distribution. She has a history of de Quervain's tenosynovitis and extensor tenosynovitis for which she was seeing Dr. Alisa Payne previously. The numbness and tingling is waking her up in the middle of the night. She has not been able to get much sleep.   She is not currently taking gabapentin or muscle relaxants. Associated signs and symptoms:   Neurogenic bowel or bladder symptoms:  no   Perceived weakness:  no   Difficulty walking:  no              Past Medical History:   Past Medical History:   Diagnosis Date    Allergic rhinitis     HTN (hypertension)     Hyperlipidemia     Obstructive sleep apnea (adult) (pediatric)     Non-compliant with CPAP    Osteoarthritis     Type II or unspecified type diabetes mellitus without mention of complication, not stated as uncontrolled       Past Surgical History:     Past Surgical History:   Procedure Laterality Date    COLON SURGERY  2008    10\" removed - chronic constipation    DUPUYTRENS CONTRACTURE SURGERY Left 1/29/2019    LEFT 1ST DORSAL COMPARMENT DE' [address] performed by Kristel Rea MD at [address] [place]       4x     HYSTERECTOMY, TOTAL ABDOMINAL      JOINT REPLACEMENT  2007    Left knee partial    KNEE SURGERY  1990's    rt knee reconstructed    NECK SURGERY  05/12/2011    c 4,5,6,7 (ACDF)    OVARY REMOVAL      ROTATOR CUFF REPAIR  2009    Left shoulder    SHOULDER SURGERY  1990's    lt & rt shoulder impingment    SINUS ENDOSCOPY  9-15-14    FUNCTIONAL ENDOSCOPIC SINUS SURGERY       TONSILLECTOMY AND ADENOIDECTOMY      TOTAL KNEE ARTHROPLASTY Left 03/09/2010    Eros to Total    TUBAL LIGATION      UMBILICAL HERNIA REPAIR  8/2010    Dr. Clara Alves     Current Medications:     Current Outpatient Medications:     gabapentin (NEURONTIN) 300 MG capsule, Take 1 capsule by mouth 2 times daily for 30 days. , Disp: 60 capsule, Rfl: 0    tiZANidine (ZANAFLEX) 4 MG capsule, Take 1 capsule by mouth every evening, Disp: 30 capsule, Rfl: 0    gabapentin (NEURONTIN) 300 MG capsule, Take 1 capsule by mouth nightly for 30 days.  Intended supply: 30 days, Disp: 30 capsule, Rfl: 0    atorvastatin (LIPITOR) 10 MG tablet, TAKE ONE TABLET BY MOUTH DAILY, Disp: 90 tablet, Rfl: 0    amLODIPine (NORVASC) 5 MG tablet, TAKE ONE TABLET BY MOUTH DAILY, Disp: 60 tablet, Rfl: 5    ibuprofen (ADVIL;MOTRIN) 800 MG tablet, TAKE ONE TABLET BY MOUTH TWICE A DAY AS NEEDED FOR PAIN, Disp: 60 tablet, Rfl: 1    metFORMIN (GLUCOPHAGE) 1000 MG tablet, TAKE 1 TABLET TWICE A DAY WITH MEALS, Disp: 180 tablet, Rfl: 3    losartan (COZAAR) 100 MG tablet, TAKE ONE TABLET BY MOUTH DAILY, Disp: 60 tablet, Rfl: 5    montelukast (SINGULAIR) 10 MG tablet, TAKE ONE TABLET BY MOUTH ONCE NIGHTLY, Disp: 60 tablet, Rfl: 5    Insulin Pen Needle (PEN NEEDLES) 32G X 4 MM MISC, USE TO INJECT INSULIN FOUR TIMES DAILY, Disp: 200 each, Rfl: 5    insulin glargine (LANTUS SOLOSTAR) 100 UNIT/ML injection pen, Inject 30 Units into the skin nightly, Disp: 10 pen, Rfl: 3    insulin lispro, 1 Unit Dial, (HUMALOG KWIKPEN) 100 UNIT/ML SOPN, Inject 4 Units into the skin 3 times daily (before meals) Then USE AS CORRECTION SCALE PER PATIENT HOME INSTRUCTIONS (Patient not taking: Reported on 11/24/2021), Disp: 5 pen, Rfl: 3    Lancets MISC, Test 1-3 times a day as needed for symptoms of irregular glucose. DX: E11.9 please provide lancets accepted by patient's insurance., Disp: 100 each, Rfl: 3    blood glucose monitor strips, Test 1-3 times a day as needed for symptoms of irregular glucose. DX: E11.9 please provide strips that are accepted by patient's insurance., Disp: 200 strip, Rfl: 3    fluticasone (FLONASE) 50 MCG/ACT nasal spray, 2 sprays by Nasal route daily, Disp: 1 Bottle, Rfl: 3    ACCU-CHEK SOFTCLIX LANCETS MISC, TEST BLOOD SUGAR TWO TIMES A DAY, Disp: 100 each, Rfl: 4    Calcium Carb-Cholecalciferol (CALTRATE 600+D) 600-800 MG-UNIT TABS, Take 1 tablet by mouth 2 times daily, Disp: , Rfl:     cetirizine (ZYRTEC ALLERGY) 10 MG tablet, Take 1 tablet by mouth daily, Disp: , Rfl:     aspirin EC 81 MG EC tablet, Take 81 mg by mouth daily.  LAST DOSE 8-9-14, Disp: 30 tablet, Rfl: 11  Allergies:  Demerol, Levaquin [levofloxacin in d5w], Morphine, and Tramadol  Social normal.    CERVICAL EXAMINATION:  · Inspection: Local inspection shows no step-off or bruising. Cervical alignment is normal. No instability is noted. · Palpation and Percussion: No evidence of tenderness at the midline. Paraspinal tenderness is not present. There is no paraspinal spasm. Skin:There are no rashes, ulcerations or lesions  · Range of Motion:  pain-free ROM   · Strength: 5/5 bilateral upper extremities except 4/5 left finger abduction and weak thumb opposition   · Special Tests:   Spurling's and Bourne's are negative bilaterally. Garner and Impingement tests are negative bilaterally. · Skin:There are no rashes, ulcerations or lesions in right & left upper extremities. · Reflexes: Bilaterally triceps, biceps and brachioradialis are 2+. Clonus absent bilaterally at the feet. No pathological reflexes are noted. · Gait & station: normal, patient ambulates without assistance  · Additional Examinations:  · RIGHT UPPER EXTREMITY:  Inspection/examination of the right upper extremity does not show any tenderness, deformity or injury. Range of motion is unremarkable and pain-free. There is no gross instability. There are no rashes, ulcerations or lesions. Strength and tone are normal. No atrophy or abnormal movements are noted. · LEFT UPPER EXTREMITY: Inspection/examination of the left upper extremity does not show any tenderness, deformity or injury. Range of motion is unremarkable and pain-free. There is no gross instability. There are no rashes, ulcerations or lesions. Strength and tone are normal. No atrophy or abnormal movements are noted. · RIGHT LOWER EXTREMITY: Inspection/examination of the right lower extremity does not show any tenderness, deformity or injury. Range of motion is normal and pain-free. There is no gross instability. There are no rashes, ulcerations or lesions. Strength and tone are normal. No atrophy or abnormal movements are noted.   · LEFT LOWER EXTREMITY: Inspection/examination of the left lower extremity does not show any tenderness, deformity or injury. Range of motion is normal and pain-free. There is no gross instability. There are no rashes, ulcerations or lesions. Strength and tone are normal. No atrophy or abnormal movements are noted. Diagnostic Testing:    No new diagnostics  Results for orders placed or performed in visit on 21   MICROALBUMIN / CREATININE URINE RATIO   Result Value Ref Range    Microalbumin, Random Urine 3.50 (H) <2.0 mg/dL    Creatinine, Ur 147.0 28.0 - 259.0 mg/dL    Microalbumin Creatinine Ratio 23.8 0.0 - 30.0 mg/g   BASIC METABOLIC PANEL   Result Value Ref Range    Sodium 138 136 - 145 mmol/L    Potassium 4.3 3.5 - 5.1 mmol/L    Chloride 99 99 - 110 mmol/L    CO2 26 21 - 32 mmol/L    Anion Gap 13 3 - 16    Glucose 80 70 - 99 mg/dL    BUN 11 7 - 20 mg/dL    CREATININE 0.5 (L) 0.6 - 1.2 mg/dL    GFR Non-African American >60 >60    GFR African American >60 >60    Calcium 9.6 8.3 - 10.6 mg/dL   HEMOGLOBIN A1C   Result Value Ref Range    Hemoglobin A1C 6.5 See comment %    eAG 139.9 mg/dL     Impression:       1. Paresthesia of left upper extremity    2. Cervical radiculopathy    3. Status post cervical spinal fusion        Plan:  Clinical Course: Above diagnoses are worsening    I discussed the diagnosis and the treatment options with Tanya Gonzales today. In Summary:  The various treatment options were outlined and discussed with Tanya Gonzales including:  Conservative care options: physical therapy, ice, medications, bracing, and activity modification. The indications for therapeutic injections. The indications for additional imaging/laboratory studies. The indications for (possible future) interventions. After considering the various options discussed, Tanya Gonzales elected to pursue a course of treatment that includes the followin.  Medications:  I will add a gabapentin 300 mg BID and zanaflex 4mg nightly to the current regimen. Counseled on risks, benefits and alternatives and recommended not to take the medicine and drive or operate heavy machinery. 2. PT:  Encouraged to continue with HEP. 3. Further studies:  Obtain EMG to evaluate for paresthesias to rule out a nerve entrapment. I will also obtain a Cervical MR without contrast to evaluate for soft tissue pathology or stenosis contributing to the neck pain and paresthesias. The patient has not had a new MRI since 2018 and is experiencing worsening left upper extremity radiculopathy. 4. Interventional:  Improvement in neck pain and spasms following cervical RAYMOND, however patient continues to complain of left upper extremity paresthsia and pain. 5. Follow up:  2-3 weeks      Marya Hale was instructed to call the office if her symptoms worsen or if new symptoms appear prior to the next scheduled visit. She is specifically instructed to contact the office between now & her scheduled appointment if she has concerns related to her condition or if she needs assistance in scheduling the above tests. She is welcome to call for an appointment sooner if she has any additional concerns or questions. Drucie Harada, PA-C   Board Certified by the [employer] on Certification of [address] [facility]                This dictation was performed with a verbal recognition program [facility]) and it was checked for errors. It is possible that there are still dictated errors within this office note. If so, please bring any errors to my attention for an addendum. All efforts were made to ensure that this office note is accurate.

## 2022-02-08 NOTE — TELEPHONE ENCOUNTER
MRI CERVICAL SPINE approved Auth# [id number]     Patient contacted regarding Cervical MRI W/O CONTRAST approval and authorization is valid. Patient was notified and instructed to call to schedule their Cervical MRI W/O CONTRAST at [address] [place] [address] [address] P: [phone]    Once completed, patient was instructed on scheduling a follow up appoint to review results.  TEST RESULTS WILL NOT BE GIVEN OVER THE PHONE. AN IN-OFFICE APPOINTMENT IS REQUIRED

## 2022-02-22 ENCOUNTER — TELEPHONE (OUTPATIENT)
Dept: PRIMARY CARE CLINIC | Age: 62
End: 2022-02-22

## 2022-02-22 ENCOUNTER — TELEPHONE (OUTPATIENT)
Dept: ORTHOPEDIC SURGERY | Age: 62
End: 2022-02-22

## 2022-02-22 NOTE — TELEPHONE ENCOUNTER
2/22/2022Doyne Dies () series of 3. APPROVED.  12/23/2021-3/23/2022.   RIGHT KNEE.  VALID & BILLABLE ON CLAIM YES. BUY AND BILL.  Per PHONE with Lita Rothman.

## 2022-02-22 NOTE — TELEPHONE ENCOUNTER
Pt calling to report symptoms for the past week of her arm going cold, fingers tingling, foot swelling    She has an MRI of her neck tomorrow.       Asked her if she reported this to her ortho and she said not yet but would call them now    She just wanted a note made of her symptoms

## 2022-02-22 NOTE — TELEPHONE ENCOUNTER
Patient stopped in the office at [place] stating that the gabapentin is making her drowsy at work and also her ankles are swelling, which she said that didn't happen until she started taking that medication. She is requesting a call back and possibly a different medication.     Niranjan Barros:  [phone]

## 2022-02-23 ENCOUNTER — HOSPITAL ENCOUNTER (OUTPATIENT)
Dept: MRI IMAGING | Age: 62
Discharge: HOME | End: 2022-02-23
Payer: MEDICARE

## 2022-02-23 DIAGNOSIS — Z98.1 STATUS POST CERVICAL SPINAL FUSION: ICD-10-CM

## 2022-02-23 DIAGNOSIS — M54.12 CERVICAL RADICULOPATHY: ICD-10-CM

## 2022-02-23 PROCEDURE — 72141 MRI NECK SPINE W/O DYE: CPT

## 2022-02-24 ENCOUNTER — TELEPHONE (OUTPATIENT)
Dept: ORTHOPEDIC SURGERY | Age: 62
End: 2022-02-24

## 2022-02-25 ENCOUNTER — TELEPHONE (OUTPATIENT)
Dept: ORTHOPEDIC SURGERY | Age: 62
End: 2022-02-25

## 2022-02-25 DIAGNOSIS — M54.12 CERVICAL RADICULOPATHY: Primary | ICD-10-CM

## 2022-02-25 RX ORDER — IBUPROFEN 800 MG/1
800 TABLET ORAL 2 TIMES DAILY PRN
Qty: 60 TABLET | Refills: 0 | Status: SHIPPED | OUTPATIENT
Start: 2022-02-25 | End: 2022-06-28 | Stop reason: SDUPTHER

## 2022-02-25 RX ORDER — PREGABALIN 25 MG/1
25 CAPSULE ORAL 2 TIMES DAILY
Qty: 60 CAPSULE | Refills: 0 | Status: SHIPPED | OUTPATIENT
Start: 2022-02-25 | End: 2022-03-09

## 2022-02-25 NOTE — TELEPHONE ENCOUNTER
Pt is requesting Ibuprofen 800 mg. Pt would like to move forward with trying Lyrica. Pt was informed she was contacted twice via telephone and messaged on [employer] to stop taking the gabpentin. Pt scheduled for mri tr cervical march 1st at 8:45 am at the [facility] office. She will need a note from missing work 2/24/2022. Pt states the MRI cause her an headache and she was unable to sleep through the night and was not able to go to work the following day.

## 2022-02-25 NOTE — TELEPHONE ENCOUNTER
Please call her. She has had an allergic reaction to the medicine you have given her. She stopped taking two days ago, but still feels awful.  Thank you

## 2022-03-01 ENCOUNTER — OFFICE VISIT (OUTPATIENT)
Dept: ORTHOPEDIC SURGERY | Age: 62
End: 2022-03-01
Payer: MEDICARE

## 2022-03-01 VITALS — BODY MASS INDEX: 35.08 KG/M2 | WEIGHT: 198 LBS | HEIGHT: 63 IN

## 2022-03-01 DIAGNOSIS — M50.00 HNP (HERNIATED NUCLEUS PULPOSUS) WITH MYELOPATHY, CERVICAL: ICD-10-CM

## 2022-03-01 DIAGNOSIS — M54.12 CERVICAL RADICULOPATHY: Primary | ICD-10-CM

## 2022-03-01 PROCEDURE — 99214 OFFICE O/P EST MOD 30 MIN: CPT | Performed by: STUDENT IN AN ORGANIZED HEALTH CARE EDUCATION/TRAINING PROGRAM

## 2022-03-01 RX ORDER — TIZANIDINE HYDROCHLORIDE 2 MG/1
2 CAPSULE, GELATIN COATED ORAL EVERY EVENING
Qty: 30 CAPSULE | Refills: 0 | Status: SHIPPED | OUTPATIENT
Start: 2022-03-01 | End: 2022-03-09

## 2022-03-01 NOTE — PROGRESS NOTES
Follow up: Tae Hughes  1960  [id number]      CHIEF COMPLAINT:    Chief Complaint   Patient presents with    Follow-up     FU MRI CERVICAL. PT HAD ALLERGIC REACTION TO INCREASE IN DOASGE OF THE GABAPENTIN. PT NOTES SHE WAS OUT OF WORK FROM [employer] [facility]. HISTORY OF PRESENT ILLNESS:  Ms. Brice Garza is a 59 y.o. female returns for a follow up visit for multiple medical problems. Her current presenting problems are   1. Cervical radiculopathy    2. HNP (herniated nucleus pulposus) with myelopathy, cervical    .    As per information/history obtained from the PADT(patient assessment and documentation tool) - She complains of pain in the neck with radiation to the shoulders Left, arms Left and elbows Left She rates the pain 8/10 and describes it as aching, burning. Pain is made worse by: movement. She denies side effects from the current pain regimen. Patient reports that since the last follow up visit the physical functioning is worse, family/social relationships are unchanged, mood is unchanged and sleep patterns are unchanged, and that the overall functioning is worse. Patient denies neurological bowel or bladder. The patient presents for follow-up of ongoing neck and left arm weakness and pain. She recently underwent a C7-T1 interlaminar epidural steroid injection on 1/4/2022. She reports her neck pain and spasms were significantly improved following the injection however relief was only temporary. She continues to complain of neck pain with radiation left arm pain. She has an EMG of the left upper extremity scheduled for Thursday. She had a significant reaction to the gabapentin and had to miss work from Wednesday to Saturday. Gabapentin was making her drowsy at work and her ankles were also swollen. She stopped the gabapentin and the amlodipine following this reaction and her symptoms improved.     Associated signs and symptoms:   Neurogenic bowel or bladder symptoms: no   Perceived weakness:  no   Difficulty walking:  no              Past Medical History:   Past Medical History:   Diagnosis Date    Allergic rhinitis     HTN (hypertension)     Hyperlipidemia     Obstructive sleep apnea (adult) (pediatric)     Non-compliant with CPAP    Osteoarthritis     Type II or unspecified type diabetes mellitus without mention of complication, not stated as uncontrolled       Past Surgical History:     Past Surgical History:   Procedure Laterality Date    COLON SURGERY  2008    10\" removed - chronic constipation    DUPUYTRENS CONTRACTURE SURGERY Left 1/29/2019    LEFT 1ST DORSAL COMPARMENT DE' [facility] performed by Nikos Luis MD at [address] [place] [address]      4x     HYSTERECTOMY, TOTAL ABDOMINAL      JOINT REPLACEMENT  2007    Left knee partial    KNEE SURGERY  1990's    rt knee reconstructed    NECK SURGERY  05/12/2011    c 4,5,6,7 (ACDF)    OVARY REMOVAL      ROTATOR CUFF REPAIR  2009    Left shoulder    SHOULDER SURGERY  1990's    lt & rt shoulder impingment    SINUS ENDOSCOPY  9-15-14    FUNCTIONAL ENDOSCOPIC SINUS SURGERY       TONSILLECTOMY AND ADENOIDECTOMY      TOTAL KNEE ARTHROPLASTY Left 03/09/2010    Eros to Total    TUBAL LIGATION      UMBILICAL HERNIA REPAIR  8/2010    Dr. Holley Stone     Current Medications:     Current Outpatient Medications:     tiZANidine (ZANAFLEX) 2 MG capsule, Take 1 capsule by mouth every evening, Disp: 30 capsule, Rfl: 0    ibuprofen (ADVIL;MOTRIN) 800 MG tablet, Take 1 tablet by mouth 2 times daily as needed for Pain, Disp: 60 tablet, Rfl: 0    pregabalin (LYRICA) 25 MG capsule, Take 1 capsule by mouth 2 times daily for 30 days.  (Patient not taking: Reported on 3/1/2022), Disp: 60 capsule, Rfl: 0    atorvastatin (LIPITOR) 10 MG tablet, TAKE ONE TABLET BY MOUTH DAILY, Disp: 90 tablet, Rfl: 0    amLODIPine (NORVASC) 5 MG tablet, TAKE ONE TABLET BY MOUTH DAILY, Disp: 60 tablet, Rfl: 5    metFORMIN (GLUCOPHAGE) 1000 MG tablet, TAKE 1 TABLET TWICE A DAY WITH MEALS, Disp: 180 tablet, Rfl: 3    losartan (COZAAR) 100 MG tablet, TAKE ONE TABLET BY MOUTH DAILY, Disp: 60 tablet, Rfl: 5    montelukast (SINGULAIR) 10 MG tablet, TAKE ONE TABLET BY MOUTH ONCE NIGHTLY, Disp: 60 tablet, Rfl: 5    Insulin Pen Needle (PEN NEEDLES) 32G X 4 MM MISC, USE TO INJECT INSULIN FOUR TIMES DAILY, Disp: 200 each, Rfl: 5    insulin glargine (LANTUS SOLOSTAR) 100 UNIT/ML injection pen, Inject 30 Units into the skin nightly, Disp: 10 pen, Rfl: 3    insulin lispro, 1 Unit Dial, (HUMALOG KWIKPEN) 100 UNIT/ML SOPN, Inject 4 Units into the skin 3 times daily (before meals) Then USE AS CORRECTION SCALE PER PATIENT HOME INSTRUCTIONS (Patient not taking: Reported on 11/24/2021), Disp: 5 pen, Rfl: 3    Lancets MISC, Test 1-3 times a day as needed for symptoms of irregular glucose. DX: E11.9 please provide lancets accepted by patient's insurance., Disp: 100 each, Rfl: 3    blood glucose monitor strips, Test 1-3 times a day as needed for symptoms of irregular glucose. DX: E11.9 please provide strips that are accepted by patient's insurance., Disp: 200 strip, Rfl: 3    fluticasone (FLONASE) 50 MCG/ACT nasal spray, 2 sprays by Nasal route daily, Disp: 1 Bottle, Rfl: 3    ACCU-CHEK SOFTCLIX LANCETS MISC, TEST BLOOD SUGAR TWO TIMES A DAY, Disp: 100 each, Rfl: 4    Calcium Carb-Cholecalciferol (CALTRATE 600+D) 600-800 MG-UNIT TABS, Take 1 tablet by mouth 2 times daily, Disp: , Rfl:     cetirizine (ZYRTEC ALLERGY) 10 MG tablet, Take 1 tablet by mouth daily, Disp: , Rfl:     aspirin EC 81 MG EC tablet, Take 81 mg by mouth daily. LAST DOSE 8-9-14, Disp: 30 tablet, Rfl: 11  Allergies:  Gabapentin, Demerol, Levaquin [levofloxacin in d5w], Morphine, and Tramadol  Social History:    reports that she quit smoking about 2 years ago. Her smoking use included cigars. She has a 5.00 pack-year smoking history.  She has never used smokeless tobacco. She reports current alcohol use. She reports previous drug use. Family History:   Family History   Problem Relation Age of Onset    Diabetes Father     Heart Disease Father         CAD    High Blood Pressure Father     High Cholesterol Father     Kidney Disease Brother     Diabetes Son     Other Son         DEANA    Heart Disease Sister         1/2 sister    Breast Cancer Paternal Cousin     Rheum Arthritis Neg Hx     Osteoarthritis Neg Hx     Asthma Neg Hx     Cancer Neg Hx     Heart Failure Neg Hx     Hypertension Neg Hx     Migraines Neg Hx     Ovarian Cancer Neg Hx     Rashes/Skin Problems Neg Hx     Seizures Neg Hx     Stroke Neg Hx     Thyroid Disease Neg Hx        REVIEW OF SYSTEMS:   CONSTITUTIONAL: Denies unexplained weight loss, fevers, chills or fatigue  NEUROLOGICAL: Denies unsteady gait or progressive weakness  MUSCULOSKELETAL: Denies joint swelling or redness  GI: Denies nausea, vomiting, diarrhea   : Denies bowel or bladder issues       PHYSICAL EXAM:    Vitals: Height 5' 3\" (1.6 m), weight 198 lb (89.8 kg), last menstrual period 01/26/1996, not currently breastfeeding. GENERAL EXAM:  · General Apparence: Patient is adequately groomed with no evidence of malnutrition. · Psychiatric: Orientation: The patient is oriented to time, place and person. The patient's mood and affect are appropriate   · Vascular: Examination reveals no swelling and palpation reveals no tenderness in upper or lower extremities. Good capillary refill. · The lymphatic examination of the neck, axillae and groin reveals all areas to be without enlargement or induration  · Sensation is intact without deficit in the upper and lower extremities to light touch and pinprick  · Coordination of the upper and lower extremities are normal.    CERVICAL EXAMINATION:  · Inspection: Local inspection shows no step-off or bruising. Cervical alignment is normal. No instability is noted.   · Palpation and Percussion: No evidence of tenderness at the midline. Paraspinal tenderness is not present. There is no paraspinal spasm. Skin:There are no rashes, ulcerations or lesions  · Range of Motion:  limited by 25% in all planes due to pain   · Strength: 5/5 bilateral upper extremities  · Special Tests:   Spurling's + left and Bourne's are negative bilaterally. Garner and Impingement tests are negative bilaterally. · Skin:There are no rashes, ulcerations or lesions in right & left upper extremities. · Reflexes: Bilaterally triceps, biceps and brachioradialis are 2+. Clonus absent bilaterally at the feet. No pathological reflexes are noted. · Gait & station: normal, patient ambulates without assistance  · Additional Examinations:  · RIGHT UPPER EXTREMITY:  Inspection/examination of the right upper extremity does not show any tenderness, deformity or injury. Range of motion is unremarkable and pain-free. There is no gross instability. There are no rashes, ulcerations or lesions. Strength and tone are normal. No atrophy or abnormal movements are noted. · LEFT UPPER EXTREMITY: Inspection/examination of the left upper extremity does not show any tenderness, deformity or injury. Range of motion is unremarkable and pain-free. There is no gross instability. There are no rashes, ulcerations or lesions. Strength and tone are normal. No atrophy or abnormal movements are noted. · RIGHT LOWER EXTREMITY: Inspection/examination of the right lower extremity does not show any tenderness, deformity or injury. Range of motion is normal and pain-free. There is no gross instability. There are no rashes, ulcerations or lesions. Strength and tone are normal. No atrophy or abnormal movements are noted. · LEFT LOWER EXTREMITY:  Inspection/examination of the left lower extremity does not show any tenderness, deformity or injury. Range of motion is normal and pain-free. There is no gross instability. There are no rashes, ulcerations or lesions.   Strength and tone are normal. No atrophy or abnormal movements are noted. Diagnostic Testing:    MR cervical spine shows   MRI without contrast is obtained. There are bilateral thyroid nodules, noting prior thyroid ultrasound and ultrasound-guided biopsy.       There is anterior metallic fusion from C9-U9. There is mild scoliosis. Alignment is otherwise anatomic. Vertebral body height and signal are well-maintained. Intervertebral discs are unremarkable. Cervical spinal cord is normal in contour and signal.       C2-3: No spinal stenosis.       C3-4: Facet and uncovertebral hypertrophy resulting in mild left foraminal stenosis.       C4-5: Tiny left paracentral disc herniation. No significant spinal stenosis.       C5-6: Diffuse disc bulge, left paracentral/juxta foraminal disc herniation with mass effect on the left anterior aspect of the cord, and facet/uncovertebral hypertrophy resulting in mild-moderate central canal stenosis and moderate-severe right, moderate    left foraminal stenosis.       C6-7: Disc osteophyte complex and facet/uncovertebral hypertrophy resulting in mild-moderate central canal stenosis and moderate-severe right foraminal stenosis.       C7-T1: Disc osteophyte complex and facet/uncovertebral hypertrophy resulting in moderate left foraminal stenosis.        Results for orders placed or performed in visit on 07/16/21   MICROALBUMIN / CREATININE URINE RATIO   Result Value Ref Range    Microalbumin, Random Urine 3.50 (H) <2.0 mg/dL    Creatinine, Ur 147.0 28.0 - 259.0 mg/dL    Microalbumin Creatinine Ratio 23.8 0.0 - 30.0 mg/g   BASIC METABOLIC PANEL   Result Value Ref Range    Sodium 138 136 - 145 mmol/L    Potassium 4.3 3.5 - 5.1 mmol/L    Chloride 99 99 - 110 mmol/L    CO2 26 21 - 32 mmol/L    Anion Gap 13 3 - 16    Glucose 80 70 - 99 mg/dL    BUN 11 7 - 20 mg/dL    CREATININE 0.5 (L) 0.6 - 1.2 mg/dL    GFR Non-African American >60 >60    GFR African American >60 >60    Calcium 9.6 8.3 - 10.6 mg/dL HEMOGLOBIN A1C   Result Value Ref Range    Hemoglobin A1C 6.5 See comment %    eAG 139.9 mg/dL     Impression:       1. Cervical radiculopathy    2. HNP (herniated nucleus pulposus) with myelopathy, cervical        Plan:  Clinical Course: Above diagnoses are worsening    I discussed the diagnosis and the treatment options with Tanya Gonzales today. In Summary:  The various treatment options were outlined and discussed with Tanya Gonzales including:  Conservative care options: physical therapy, ice, medications, bracing, and activity modification. The indications for therapeutic injections. The indications for additional imaging/laboratory studies. The indications for (possible future) interventions. After considering the various options discussed, Tanya Gonzales elected to pursue a course of treatment that includes the followin. Medications:  I will add a tizanidine 2 mg nightly to the current regimen. Counseled on risks, benefits and alternatives and recommended not to take the medicine and drive or operate heavy machinery. 2. PT:  Encouraged to continue with HEP. 3. Further studies:  Reviewed MRI CSP With her in the office. 4. Interventional:  We discussed pursuing a C6/C7 interlaminar epidural steroid injection to address the pain. Radiologic imaging and symptoms confirm the pain etiology. Risks, benefits and alternatives of interventional options were discussed. These include and are not limited to bleeding, infection, spinal headache, nerve injury and lack of pain relief. The patient verbalized understanding and would like to proceed. The patient will be scheduled accordingly. We did discuss possible need for surgical referral or pain management if no relief with second RAYMOND. 5. Follow up:  4-6 weeks      Tanya Gonzales was instructed to call the office if her symptoms worsen or if new symptoms appear prior to the next scheduled visit.  She is specifically instructed to contact the office between now & her scheduled appointment if she has concerns related to her condition or if she needs assistance in scheduling the above tests. She is welcome to call for an appointment sooner if she has any additional concerns or questions. Rj Shah PA-C  Board Certified by the [employer] on Certification of [address] and [address] [address]               This dictation was performed with a verbal recognition program [facility]) and it was checked for errors. It is possible that there are still dictated errors within this office note. If so, please bring any errors to my attention for an addendum. All efforts were made to ensure that this office note is accurate.

## 2022-03-01 NOTE — LETTER
HALEY Cooperbearjanel Voss  [address] [facility] [address]  Phone: [phone]  Fax: [phone]    Gloria Nunn        March 1, 2022     Patient: Jana Fisher   YOB: 1960   Date of Visit: 3/1/2022       To Whom It May Concern: It is my medical opinion that Christina Muñoz may return to work on 3/1/2022. Please excuse patient from 2/26/2022-2/26/2022. If you have any questions or concerns, please don't hesitate to call.     Sincerely,        OLIVIA Nunn

## 2022-03-03 ENCOUNTER — HOSPITAL ENCOUNTER (OUTPATIENT)
Dept: NEUROLOGY | Age: 62
Discharge: HOME | End: 2022-03-03
Payer: MEDICARE

## 2022-03-03 DIAGNOSIS — Z98.1 STATUS POST CERVICAL SPINAL FUSION: ICD-10-CM

## 2022-03-03 DIAGNOSIS — M54.12 CERVICAL RADICULOPATHY: ICD-10-CM

## 2022-03-03 DIAGNOSIS — R20.2 PARESTHESIA OF LEFT UPPER EXTREMITY: ICD-10-CM

## 2022-03-03 PROCEDURE — 95886 MUSC TEST DONE W/N TEST COMP: CPT

## 2022-03-03 PROCEDURE — 95908 NRV CNDJ TST 3-4 STUDIES: CPT

## 2022-03-03 NOTE — PROCEDURES
Comment   Left Deltoid Axillary C5-C6 Nml Nml Nml Nml Nml 0 Nml Nml    Left Biceps Musculocut C5-C6 Nml Nml Nml Nml Nml 0 Nml Nml    Left Triceps Radial C6-C8 Nml Nml Nml Nml Nml 0 Nml Nml    Left Brachiorad Radial C5-C6 Nml Nml Nml Nml Nml 0 Nml Nml    Left Pronator Teres Median C6-C7 Nml Nml Nml Nml Nml 0 Nml Nml    Left EIP Post Interosseous,  R... C7-C8 Nml Nml Nml Nml Nml 0 Nml Nml    Left APB Median C8-T1 Nml Nml Nml Nml Nml 0 Nml Nml    Left FDI Ulnar C8-T1 Nml Nml Nml Nml Nml 0 Nml Nml    Left Cervical Paraspinal (Uppe. .. Rami C1-C3 Nml Nml Nml         Left Cervical Paraspinal (Mid) Rami C4-C6 Nml Nml Nml         Left Cervical Paraspinal (Marnell Shake. ..  Rami C7-C8 Nml Nml Nml               Electronically signed by Avinash Connolly DO on 3/3/2022 at 9:31 AM

## 2022-03-06 DIAGNOSIS — E11.65 TYPE 2 DIABETES MELLITUS WITH HYPERGLYCEMIA, WITH LONG-TERM CURRENT USE OF INSULIN (HCC): ICD-10-CM

## 2022-03-06 DIAGNOSIS — Z79.4 TYPE 2 DIABETES MELLITUS WITH HYPERGLYCEMIA, WITH LONG-TERM CURRENT USE OF INSULIN (HCC): ICD-10-CM

## 2022-03-07 RX ORDER — INSULIN GLARGINE 100 [IU]/ML
30 INJECTION, SOLUTION SUBCUTANEOUS NIGHTLY
Qty: 10 PEN | Refills: 3 | Status: SHIPPED | OUTPATIENT
Start: 2022-03-07 | End: 2022-07-06 | Stop reason: SDUPTHER

## 2022-03-08 ENCOUNTER — TELEPHONE (OUTPATIENT)
Dept: PRIMARY CARE CLINIC | Age: 62
End: 2022-03-08

## 2022-03-08 ENCOUNTER — HOSPITAL ENCOUNTER (OUTPATIENT)
Dept: INTERVENTIONAL RADIOLOGY/VASCULAR | Age: 62
Discharge: HOME | End: 2022-03-08

## 2022-03-09 ENCOUNTER — OFFICE VISIT (OUTPATIENT)
Dept: PRIMARY CARE CLINIC | Age: 62
End: 2022-03-09
Payer: MEDICARE

## 2022-03-09 VITALS
TEMPERATURE: 97.3 F | WEIGHT: 202.2 LBS | HEART RATE: 109 BPM | OXYGEN SATURATION: 95 % | SYSTOLIC BLOOD PRESSURE: 126 MMHG | HEIGHT: 62 IN | DIASTOLIC BLOOD PRESSURE: 76 MMHG | BODY MASS INDEX: 37.21 KG/M2

## 2022-03-09 DIAGNOSIS — Z79.4 TYPE 2 DIABETES MELLITUS WITH HYPERGLYCEMIA, WITH LONG-TERM CURRENT USE OF INSULIN (HCC): ICD-10-CM

## 2022-03-09 DIAGNOSIS — E11.65 TYPE 2 DIABETES MELLITUS WITH HYPERGLYCEMIA, WITH LONG-TERM CURRENT USE OF INSULIN (HCC): ICD-10-CM

## 2022-03-09 DIAGNOSIS — E66.01 SEVERE OBESITY (BMI 35.0-39.9) WITH COMORBIDITY (HCC): ICD-10-CM

## 2022-03-09 DIAGNOSIS — M50.30 DEGENERATIVE DISC DISEASE, CERVICAL: ICD-10-CM

## 2022-03-09 DIAGNOSIS — I83.91 ASYMPTOMATIC VARICOSE VEINS OF RIGHT LOWER EXTREMITY: Primary | ICD-10-CM

## 2022-03-09 LAB
A/G RATIO: 1.7 (ref 1.1–2.2)
ALBUMIN SERPL-MCNC: 4.3 G/DL (ref 3.4–5)
ALP BLD-CCNC: 105 U/L (ref 40–129)
ALT SERPL-CCNC: 22 U/L (ref 10–40)
ANION GAP SERPL CALCULATED.3IONS-SCNC: 18 MMOL/L (ref 3–16)
AST SERPL-CCNC: 18 U/L (ref 15–37)
BASOPHILS ABSOLUTE: 0.1 K/UL (ref 0–0.2)
BASOPHILS RELATIVE PERCENT: 0.7 %
BILIRUB SERPL-MCNC: 0.6 MG/DL (ref 0–1)
BUN BLDV-MCNC: 10 MG/DL (ref 7–20)
CALCIUM SERPL-MCNC: 10.2 MG/DL (ref 8.3–10.6)
CHLORIDE BLD-SCNC: 96 MMOL/L (ref 99–110)
CHOLESTEROL, FASTING: 133 MG/DL (ref 0–199)
CO2: 27 MMOL/L (ref 21–32)
CREAT SERPL-MCNC: <0.5 MG/DL (ref 0.6–1.2)
EOSINOPHILS ABSOLUTE: 0.1 K/UL (ref 0–0.6)
EOSINOPHILS RELATIVE PERCENT: 0.9 %
GFR AFRICAN AMERICAN: >60
GFR NON-AFRICAN AMERICAN: >60
GLUCOSE BLD-MCNC: 165 MG/DL (ref 70–99)
HCT VFR BLD CALC: 41.7 % (ref 36–48)
HDLC SERPL-MCNC: 28 MG/DL (ref 40–60)
HEMOGLOBIN: 13.3 G/DL (ref 12–16)
LDL CHOLESTEROL CALCULATED: 78 MG/DL
LYMPHOCYTES ABSOLUTE: 3.1 K/UL (ref 1–5.1)
LYMPHOCYTES RELATIVE PERCENT: 19.3 %
MCH RBC QN AUTO: 26.9 PG (ref 26–34)
MCHC RBC AUTO-ENTMCNC: 32 G/DL (ref 31–36)
MCV RBC AUTO: 84 FL (ref 80–100)
MONOCYTES ABSOLUTE: 0.7 K/UL (ref 0–1.3)
MONOCYTES RELATIVE PERCENT: 4.4 %
NEUTROPHILS ABSOLUTE: 12 K/UL (ref 1.7–7.7)
NEUTROPHILS RELATIVE PERCENT: 74.7 %
PDW BLD-RTO: 13.5 % (ref 12.4–15.4)
PLATELET # BLD: 351 K/UL (ref 135–450)
PMV BLD AUTO: 10.2 FL (ref 5–10.5)
POTASSIUM SERPL-SCNC: 3.8 MMOL/L (ref 3.5–5.1)
RBC # BLD: 4.97 M/UL (ref 4–5.2)
SODIUM BLD-SCNC: 141 MMOL/L (ref 136–145)
TOTAL PROTEIN: 6.9 G/DL (ref 6.4–8.2)
TRIGLYCERIDE, FASTING: 137 MG/DL (ref 0–150)
VLDLC SERPL CALC-MCNC: 27 MG/DL
WBC # BLD: 16 K/UL (ref 4–11)

## 2022-03-09 PROCEDURE — 3051F HG A1C>EQUAL 7.0%<8.0%: CPT | Performed by: FAMILY MEDICINE

## 2022-03-09 PROCEDURE — 99213 OFFICE O/P EST LOW 20 MIN: CPT | Performed by: FAMILY MEDICINE

## 2022-03-09 SDOH — ECONOMIC STABILITY: FOOD INSECURITY: WITHIN THE PAST 12 MONTHS, THE FOOD YOU BOUGHT JUST DIDN'T LAST AND YOU DIDN'T HAVE MONEY TO GET MORE.: NEVER TRUE

## 2022-03-09 SDOH — ECONOMIC STABILITY: FOOD INSECURITY: WITHIN THE PAST 12 MONTHS, YOU WORRIED THAT YOUR FOOD WOULD RUN OUT BEFORE YOU GOT MONEY TO BUY MORE.: NEVER TRUE

## 2022-03-09 ASSESSMENT — SOCIAL DETERMINANTS OF HEALTH (SDOH): HOW HARD IS IT FOR YOU TO PAY FOR THE VERY BASICS LIKE FOOD, HOUSING, MEDICAL CARE, AND HEATING?: NOT HARD AT ALL

## 2022-03-09 NOTE — PROGRESS NOTES
[address] [facility] [address] PRIMARY CARE  [address]   [address] [facility] [address]  Dept: [phone]  Dept Fax: [phone]     3/9/2022      Raffaele Shaquille   1960     Chief Complaint   Patient presents with    Mass     knot on left leg above knee and lower leg on right- hurt       HPI  Pt comes in today for c/o knot on left upper knee. Just noticed a week or two ago. Has h/o TKA on that side in 2010. Also c/o lump on right shin area. Worse with standing improves with sitting. No pain associated. No swelling. Patient currently following with ortho for RAYMOND injections to cervical spine and for injections to right knee. Prior to Visit Medications    Medication Sig Taking? Authorizing Provider   ibuprofen (ADVIL;MOTRIN) 800 MG tablet Take 1 tablet by mouth 2 times daily as needed for Pain Yes Amy Sportsman, PA   atorvastatin (LIPITOR) 10 MG tablet TAKE ONE TABLET BY MOUTH DAILY Yes Isabel Rico DO   amLODIPine (NORVASC) 5 MG tablet TAKE ONE TABLET BY MOUTH DAILY Yes Isabel Rico DO   metFORMIN (GLUCOPHAGE) 1000 MG tablet TAKE 1 TABLET TWICE A DAY WITH MEALS Yes Isabel Rico DO   losartan (COZAAR) 100 MG tablet TAKE ONE TABLET BY MOUTH DAILY Yes Isabel Rico DO   montelukast (SINGULAIR) 10 MG tablet TAKE ONE TABLET BY MOUTH ONCE NIGHTLY Yes Isabel Rico DO   Calcium Carb-Cholecalciferol (CALTRATE 600+D) 600-800 MG-UNIT TABS Take 1 tablet by mouth 2 times daily Yes Historical Provider, MD   cetirizine (ZYRTEC ALLERGY) 10 MG tablet Take 1 tablet by mouth daily Yes John Green DO   aspirin EC 81 MG EC tablet Take 81 mg by mouth daily.  LAST DOSE 8-9-14 Yes John Green DO   insulin glargine (LANTUS SOLOSTAR) 100 UNIT/ML injection pen Inject 30 Units into the skin nightly  Isabel Rico DO   tiZANidine (ZANAFLEX) 2 MG capsule Take 1 capsule by mouth every evening  OLIVIA Rodriguez   pregabalin (LYRICA) 25 MG capsule Take 1 capsule by mouth 2 times daily for 30 days. Patient not taking: Reported on 3/1/2022  OLIVIA Thakur   Insulin Pen Needle (PEN NEEDLES) 32G X 4 MM MISC USE TO INJECT INSULIN FOUR TIMES DAILY  Isabel Rico,    insulin lispro, 1 Unit Dial, (HUMALOG KWIKPEN) 100 UNIT/ML SOPN Inject 4 Units into the skin 3 times daily (before meals) Then USE AS CORRECTION SCALE PER PATIENT HOME INSTRUCTIONS  Patient not taking: Reported on 2021  Bhavesh Nolan DO   Lancets MISC Test 1-3 times a day as needed for symptoms of irregular glucose. DX: E11.9 please provide lancets accepted by patient's insurance. Bhavesh Nolan, DO   blood glucose monitor strips Test 1-3 times a day as needed for symptoms of irregular glucose. DX: E11.9 please provide strips that are accepted by patient's insurance.   Brice Rico, DO   fluticasone (FLONASE) 50 MCG/ACT nasal spray 2 sprays by Nasal route daily  John Green DO   ACCU-CHEK SOFTCLIX LANCETS MISC TEST BLOOD SUGAR TWO TIMES A DAY  John Campaelor, DO        Past Medical History:   Diagnosis Date    Allergic rhinitis     HTN (hypertension)     Hyperlipidemia     Obstructive sleep apnea (adult) (pediatric)     Non-compliant with CPAP    Osteoarthritis     Type II or unspecified type diabetes mellitus without mention of complication, not stated as uncontrolled         Social History     Tobacco Use    Smoking status: Former Smoker     Packs/day: 0.25     Years: 20.00     Pack years: 5.00     Types: Cigars     Quit date: 2019     Years since quittin.4    Smokeless tobacco: Never Used    Tobacco comment: was smoking 5 black and milds per day, but has not smoked in 4 days   Vaping Use    Vaping Use: Never used   Substance Use Topics    Alcohol use: Yes     Comment: 1 alcoholic beverage every other month    Drug use: Not Currently     Comment: H/o cocaine use        Past Surgical History: Procedure Laterality Date    COLON SURGERY  2008    10\" removed - chronic constipation    DUPUYTRENS CONTRACTURE SURGERY Left 1/29/2019    LEFT 1ST DORSAL COMPARMENT DE' [address] performed by Sera Kamara MD at [address]      4x     HYSTERECTOMY, TOTAL ABDOMINAL      JOINT REPLACEMENT  2007    Left knee partial    KNEE SURGERY  1990's    rt knee reconstructed    NECK SURGERY  05/12/2011    c 4,5,6,7 (ACDF)    OVARY REMOVAL      ROTATOR CUFF REPAIR  2009    Left shoulder    SHOULDER SURGERY  1990's    lt & rt shoulder impingment    SINUS ENDOSCOPY  9-15-14    FUNCTIONAL ENDOSCOPIC SINUS SURGERY       TONSILLECTOMY AND ADENOIDECTOMY      TOTAL KNEE ARTHROPLASTY Left 03/09/2010    Eros to Total   Yelena Ing REPAIR  8/2010    Dr. Joceline Arana        Allergies   Allergen Reactions    Gabapentin Other (See Comments), Dizziness or Vertigo and Hallucinations     BLURRED VISION      Demerol Dermatitis     ITCHING    Levaquin [Levofloxacin In D5w] Nausea Only    Morphine Itching    Tramadol Other (See Comments)     \"NO CLUE\"        Family History   Problem Relation Age of Onset    Diabetes Father     Heart Disease Father         CAD    High Blood Pressure Father     High Cholesterol Father     Kidney Disease Brother     Diabetes Son     Other Son         DEANA    Heart Disease Sister         1/2 sister    Breast Cancer Paternal Cousin     Rheum Arthritis Neg Hx     Osteoarthritis Neg Hx     Asthma Neg Hx     Cancer Neg Hx     Heart Failure Neg Hx     Hypertension Neg Hx     Migraines Neg Hx     Ovarian Cancer Neg Hx     Rashes/Skin Problems Neg Hx     Seizures Neg Hx     Stroke Neg Hx     Thyroid Disease Neg Hx         Patient's past medical history, surgical history, family history, medications, and allergies  were all reviewed and updated as appropriate today. Review of Systems   Constitutional: Negative for fever.    Respiratory: Negative for cough. Musculoskeletal: Positive for arthralgias and neck pain. Negative for joint swelling. /76 (Cuff Size: Medium Adult)   Pulse 109   Temp 97.3 °F (36.3 °C) (Temporal)   Ht 5' 2\" (1.575 m)   Wt 202 lb 3.2 oz (91.7 kg)   LMP 01/26/1996   SpO2 95% Comment: room air  Breastfeeding No   BMI 36.98 kg/m²      Physical Exam  Constitutional:       Appearance: Normal appearance. Cardiovascular:      Rate and Rhythm: Normal rate. Pulmonary:      Effort: Pulmonary effort is normal.      Breath sounds: Normal breath sounds. Musculoskeletal:      Comments: Left anterior knee, + tiny palpable nodule to superior aspect of patellar tendon, just above TKA scar    + varicose vein, RLE, mid anterolateral aspect   Neurological:      Mental Status: She is alert. Assessment:  Encounter Diagnoses   Name Primary?  Asymptomatic varicose veins of right lower extremity Yes    Type 2 diabetes mellitus with hyperglycemia, with long-term current use of insulin (HCC)     Degenerative disc disease, cervical     Severe obesity (BMI 35.0-39. 9) with comorbidity ([facility] [address]        Plan:    - Reassured patient RLE concern is small varicose vein. - Left knee nodule. Very small. Asymptomatic. Likely scar tissue. Will mention to ortho at next visit. - check routine labs in anticipation of DM visit. New Prescriptions    No medications on file        Orders Placed This Encounter   Procedures    Comprehensive Metabolic Panel    Lipid, Fasting    CBC with Auto Differential    Hemoglobin A1C    Microalbumin / Creatinine Urine Ratio        Return in about 4 weeks (around 4/6/2022) for MAWV - Non-fasting (labs done ahead). 20 total minutes were spent in direct patient care including chart review, face-to-face consultation and documentation.      Lam Kidd,

## 2022-03-10 LAB
ESTIMATED AVERAGE GLUCOSE: 162.8 MG/DL
HBA1C MFR BLD: 7.3 %

## 2022-03-11 ENCOUNTER — OFFICE VISIT (OUTPATIENT)
Dept: ORTHOPEDIC SURGERY | Age: 62
End: 2022-03-11
Payer: MEDICARE

## 2022-03-11 VITALS — BODY MASS INDEX: 37.17 KG/M2 | HEIGHT: 62 IN | WEIGHT: 202 LBS

## 2022-03-11 DIAGNOSIS — M17.11 PRIMARY OSTEOARTHRITIS OF RIGHT KNEE: ICD-10-CM

## 2022-03-11 DIAGNOSIS — R52 PAIN: Primary | ICD-10-CM

## 2022-03-11 PROCEDURE — 20611 DRAIN/INJ JOINT/BURSA W/US: CPT | Performed by: ORTHOPAEDIC SURGERY

## 2022-03-11 ASSESSMENT — ENCOUNTER SYMPTOMS: COUGH: 0

## 2022-03-11 NOTE — PROGRESS NOTES
Dr Odette Baldwin      Date /Time 3/11/2022       10:44 AM EST  Name Ricarda Wolf             1960   Location  [facility]  MRN [id number]                Chief Complaint   Patient presents with    Shoulder Pain     leftshoulder    Knee Pain     right        History of Present Illness  Ricarda Wolf is a 59 y.o. female who presents with  right knee pain,. Patient presents the office today for follow-up visit concerning her right knee. She has been approved for viscosupplementation injections and we will start the series today. She did fall yesterday in both of her knees with increased pain symptoms. She does have a history of a left total knee arthroplasty. Previous history: Patient presents to the office today with a new problem. Patient here with a chief complaint of right knee pain. Patient's right knee has been painful for an extended period of time. No injury or trauma. Pain is mostly concentrated over the medial and patellofemoral joint. Patient is a type II diabetic.     Past History  Past Medical History:   Diagnosis Date    Allergic rhinitis     HTN (hypertension)     Hyperlipidemia     Obstructive sleep apnea (adult) (pediatric)     Non-compliant with CPAP    Osteoarthritis     Type II or unspecified type diabetes mellitus without mention of complication, not stated as uncontrolled      Past Surgical History:   Procedure Laterality Date    COLON SURGERY  2008    10\" removed - chronic constipation    DUPUYTRENS CONTRACTURE SURGERY Left 1/29/2019    LEFT 1ST DORSAL COMPARMENT DE' [facility] performed by Jayla Aldrich MD at [facility]      4x     HYSTERECTOMY, TOTAL ABDOMINAL      JOINT REPLACEMENT  2007    Left knee partial    KNEE SURGERY  1990's    rt knee reconstructed    NECK SURGERY  05/12/2011    c 4,5,6,7 (ACDF)    OVARY REMOVAL      ROTATOR CUFF REPAIR  2009    Left shoulder    SHOULDER SURGERY  1990's    lt & rt shoulder impingment    SINUS ENDOSCOPY  9-15-14    FUNCTIONAL ENDOSCOPIC SINUS SURGERY       TONSILLECTOMY AND ADENOIDECTOMY      TOTAL KNEE ARTHROPLASTY Left 2010    Eros to Total    TUBAL LIGATION      UMBILICAL HERNIA REPAIR  2010    Dr. Willian Mcnair     Social History     Tobacco Use    Smoking status: Former Smoker     Packs/day: 0.25     Years: 20.00     Pack years: 5.00     Types: Cigars     Quit date: 2019     Years since quittin.4    Smokeless tobacco: Never Used    Tobacco comment: was smoking 5 black and milds per day, but has not smoked in 4 days   Substance Use Topics    Alcohol use: Yes     Comment: 1 alcoholic beverage every other month      Current Outpatient Medications on File Prior to Visit   Medication Sig Dispense Refill    insulin glargine (LANTUS SOLOSTAR) 100 UNIT/ML injection pen Inject 30 Units into the skin nightly 10 pen 3    ibuprofen (ADVIL;MOTRIN) 800 MG tablet Take 1 tablet by mouth 2 times daily as needed for Pain 60 tablet 0    atorvastatin (LIPITOR) 10 MG tablet TAKE ONE TABLET BY MOUTH DAILY 90 tablet 0    amLODIPine (NORVASC) 5 MG tablet TAKE ONE TABLET BY MOUTH DAILY 60 tablet 5    metFORMIN (GLUCOPHAGE) 1000 MG tablet TAKE 1 TABLET TWICE A DAY WITH MEALS 180 tablet 3    losartan (COZAAR) 100 MG tablet TAKE ONE TABLET BY MOUTH DAILY 60 tablet 5    montelukast (SINGULAIR) 10 MG tablet TAKE ONE TABLET BY MOUTH ONCE NIGHTLY 60 tablet 5    Insulin Pen Needle (PEN NEEDLES) 32G X 4 MM MISC USE TO INJECT INSULIN FOUR TIMES DAILY 200 each 5    Lancets MISC Test 1-3 times a day as needed for symptoms of irregular glucose. DX: E11.9 please provide lancets accepted by patient's insurance. 100 each 3    blood glucose monitor strips Test 1-3 times a day as needed for symptoms of irregular glucose. DX: E11.9 please provide strips that are accepted by patient's insurance.  200 strip 3    ACCU-CHEK SOFTCLIX LANCETS MISC TEST BLOOD SUGAR TWO TIMES A  each 4    Calcium Carb-Cholecalciferol (CALTRATE 600+D) 600-800 MG-UNIT TABS Take 1 tablet by mouth 2 times daily      cetirizine (ZYRTEC ALLERGY) 10 MG tablet Take 1 tablet by mouth daily      aspirin EC 81 MG EC tablet Take 81 mg by mouth daily. LAST DOSE 8-9-14 30 tablet 11     No current facility-administered medications on file prior to visit. ASCVD 10-YEAR RISK SCORE  The 10-year ASCVD risk score (Betty Ureña, et al., 2013) is: 15.8%    Values used to calculate the score:      Age: 59 years      Sex: Female      Is Non- : Yes      Diabetic: Yes      Tobacco smoker: No      Systolic Blood Pressure: 975 mmHg      Is BP treated: Yes      HDL Cholesterol: 28 mg/dL      Total Cholesterol: 133 mg/dL     Review of Systems  10-point ROS is negative other than HPI. Physical Exam  Based off 1997 Exam Criteria  Ht 5' 2.01\" (1.575 m)   Wt 202 lb (91.6 kg)   LMP 01/26/1996   BMI 36.94 kg/m²      Constitutional:       General: He is not in acute distress. Appearance: Normal appearance. Cardiovascular:      Rate and Rhythm: Normal rate and regular rhythm. Pulses: Normal pulses. Pulmonary:      Effort: Pulmonary effort is normal. No respiratory distress. Neurological:      Mental Status: He is alert and oriented to person, place, and time. Mental status is at baseline. Musculoskeletal:  Gait:  antalgic  Lumbar spine: There is no swelling, warmth, or erythema. Range of motion is within normal limits. There is no paraspinal or spinous process tenderness. . The distal neurovascular exam is grossly intact and symmetric. Ritesh Hip: Examination of the right and left hip reveals intact skin. The patient demonstrates full painless range of motion with regards to flexion, abduction, internal and external rotation. There is no tenderness about the greater trochanter. R Knee: Examination of the right knee reveals intact skin. Tenderness over the medial joint line.   Positive patellofemoral grind test.  No gross instability to either varus or valgus stress test.    Physical exam of the left knee: Examination of the left knee reveals intact skin. There is no focal tenderness. The patient demonstrates full painless range of motion with regard to flexion and extension. Imaging  Right Knee: [address] [facility]  Radiographs: X-rays were ordered today reviewed of the right knee. 4 views. Standing AP, standing AP flexed, lateral, and skyline views. They demonstrate advanced medial joint space thinning and osteophyte formation. Moderate osteophyte formation patellofemoral joint. Same x-rays were taken of the left knee today. They do demonstrate a left total knee arthroplasty grossly unchanged in position when compared to previous films. There are radiolucencies between the bone cement interface    The patient is symptomatic from osteoarthritis of the right knee joint with documented radiological signs of arthritis. The patient has also failed 3 months of conservative treatment including home exercise, education, Tylenol and/or NSAIDs use. The patient was offered a Visco supplementation today. Risks, benefits, and alternatives to the injections were discussed in detail with the patient. The risks discussed included but are not limited to infection, skin reactions, hot swollen joints, and anaphylaxis. The patient gave verbal informed consent for the injection. The patient's skin was prepped with  3 sterile gauze  pads soaked with alcohol solution and the knee joint was injected with 2.5 mL of Supartz intra-articularly under sterile conditions. Technique: Under sterile conditions a Son[employer]avis microcaps ultrasound unit with a variable frequency (6.0-15.0 MHz) linear transducer was used to localize the placement of a 22-gauge needle into the knee joint. Findings: Successful needle placement for intra-articular Visco supplementation injection. Final images were taken and saved for permanent record.       The patient tolerated the injection reasonably well. The patient was given instructions to ice the kne and avoid strenuous activities for 24-48 hours. The patient was instructed to call the office immediately if there is increased pain, redness, warmth, fever, or chills. We will see the patient back in one week for their second injection. Procedure:  Orders Placed This Encounter   Procedures    XR KNEE RIGHT (MIN 4 VIEWS)     Standing Status:   Future     Number of Occurrences:   1     Standing Expiration Date:   3/11/2023    XR KNEE LEFT (MIN 4 VIEWS)     Standing Status:   Future     Number of Occurrences:   1     Standing Expiration Date:   3/11/2023    US ARTHR/ASP/INJ MAJOR JNT/BURSA RIGHT     Standing Status:   Future     Number of Occurrences:   1     Standing Expiration Date:   3/11/2023       Assessment and Plan  Cristiano Griffin was seen today for shoulder pain and knee pain. Diagnoses and all orders for this visit:    Pain  -     XR KNEE RIGHT (MIN 4 VIEWS); Future  -     XR KNEE LEFT (MIN 4 VIEWS); Future    Primary osteoarthritis of right knee  -     US ARTHR/ASP/INJ MAJOR JNT/BURSA RIGHT; Future    Other orders  -     sodium hyaluronate (SUPARTZ) injection 25 mg        With the patient's left knee grossly unchanged on x-ray we will simply monitor at this time. We will been again her series of right knee Visco septation injections today. She will follow-up next week for her second injection. I discussed with Garo Ridley that her history, symptoms, signs and imaging are most consistent with knee arthritis and previous TKA replacement. We reviewed the natural history of these conditions and treatment options ranging from conservative measures (rest, icing, activity modification, physical therapy, pain meds, cortisone injection) to surgical options. In terms of treatment, I recommended continuing with rest, icing, avoidance of painful activities, NSAIDs or pain meds as tolerated, and physical therapy. If these are not effective, cortisone injection can be considered. We discussed surgical options as well, should conservative measures fail. Electronically signed by Edmond Chin MD on 3/11/2022 at 1:03 PM  This dictation was generated by voice recognition computer software. Although all attempts are made to edit the dictation for accuracy, there may be errors in the transcription that are not intended.

## 2022-03-14 ENCOUNTER — HOSPITAL ENCOUNTER (OUTPATIENT)
Dept: INTERVENTIONAL RADIOLOGY/VASCULAR | Age: 62
Discharge: HOME | End: 2022-03-14
Payer: MEDICARE

## 2022-03-14 DIAGNOSIS — M54.12 BRACHIAL NEURITIS: ICD-10-CM

## 2022-03-14 PROCEDURE — 62321 NJX INTERLAMINAR CRV/THRC: CPT

## 2022-03-14 PROCEDURE — 2500000003 HC RX 250 WO HCPCS

## 2022-03-14 PROCEDURE — 6360000004 HC RX CONTRAST MEDICATION: Performed by: RADIOLOGY

## 2022-03-14 PROCEDURE — 6360000002 HC RX W HCPCS

## 2022-03-14 RX ADMIN — IOHEXOL 10 ML: 180 INJECTION INTRAVENOUS at 10:27

## 2022-03-16 ENCOUNTER — OFFICE VISIT (OUTPATIENT)
Dept: ORTHOPEDIC SURGERY | Age: 62
End: 2022-03-16
Payer: MEDICARE

## 2022-03-16 ENCOUNTER — TELEPHONE (OUTPATIENT)
Dept: ORTHOPEDIC SURGERY | Age: 62
End: 2022-03-16

## 2022-03-16 DIAGNOSIS — M17.11 PRIMARY OSTEOARTHRITIS OF RIGHT KNEE: Primary | ICD-10-CM

## 2022-03-16 PROCEDURE — 20611 DRAIN/INJ JOINT/BURSA W/US: CPT | Performed by: PHYSICIAN ASSISTANT

## 2022-03-16 NOTE — PROGRESS NOTES
Diagnosis: Right knee osteoarthritis    Procedure: Right knee viscosupplementation #2    The patient returns today for their third and final Supartz injection in the right knee. The risks, benefits, and complications of the injections were again discussed in detail with the patient. The risks discussed include but are not limited to infection, skin reactions, hot swollen joints, and anaphylaxis. The patient gave verbal informed consent for the injection. The patient's skin was prepped with 3 sterile gauze pads soaked with alcohol solution and the knee joint was injected with 2.5 mL of Supartz intra-articularly under sterile conditions. Technique: Under sterile conditions a SonMetacafe ultrasound unit with a variable frequency (6.0-15.0 MHz) linear transducer was used to localize the placement of a 22-gauge needle into the knee joint. Findings: Successful needle placement for intra-articular Visco supplementation injection. Final images were taken and saved for permanent record. The patient tolerated the injection reasonably well. The patient was given instructions to ice of the knee and avoid strenuous activity for 24-48 hours. The patient was instructed to call the office immediately if there is increased pain, redness, warmth, fever, or chills. We will see the patient back on an as-needed basis  from this point.

## 2022-03-18 ENCOUNTER — TELEPHONE (OUTPATIENT)
Dept: ORTHOPEDIC SURGERY | Age: 62
End: 2022-03-18

## 2022-03-18 ENCOUNTER — TELEPHONE (OUTPATIENT)
Dept: PRIMARY CARE CLINIC | Age: 62
End: 2022-03-18

## 2022-03-18 NOTE — TELEPHONE ENCOUNTER
Denied. I will not refill the gabapentin. The patient had an allergic reaction and was told to discontinue the medication.

## 2022-03-18 NOTE — TELEPHONE ENCOUNTER
Patient last seen 3/1/2022 and medication last filled 2022:     **PLEASE NOTE: Previous notes indicate patient experienced lower extremity swelling and drowsiness with Gabapentin 300mg BID. However, multiple fax requests have been received from pharmacy dated 3/14/2022 and 3/16/2022 requesting a refill. If medication is being denied, please notify clinic staff as form can be faxed back to the pharmacy indicating a refill denial. Forms have been scanned into MEDIA tab. Impression:         1. Cervical radiculopathy    2. HNP (herniated nucleus pulposus) with myelopathy, cervical          Plan:  Clinical Course: Above diagnoses are worsening     I discussed the diagnosis and the treatment options with Tamela Severin today.      In Summary:  The various treatment options were outlined and discussed with Tamela Severin including:  Conservative care options: physical therapy, ice, medications, bracing, and activity modification. The indications for therapeutic injections. The indications for additional imaging/laboratory studies. The indications for (possible future) interventions.      After considering the various options discussed, Tamela Severin elected to pursue a course of treatment that includes the followin. Medications:  I will add a tizanidine 2 mg nightly to the current regimen. Counseled on risks, benefits and alternatives and recommended not to take the medicine and drive or operate heavy machinery.     2. PT:  Encouraged to continue with HEP.     3. Further studies:  Reviewed MRI CSP With her in the office.      4. Interventional:  We discussed pursuing a C6/C7 interlaminar epidural steroid injection to address the pain. Radiologic imaging and symptoms confirm the pain etiology. Risks, benefits and alternatives of interventional options were discussed. These include and are not limited to bleeding, infection, spinal headache, nerve injury and lack of pain relief.   The patient verbalized understanding and would like to proceed. The patient will be scheduled accordingly. We did discuss possible need for surgical referral or pain management if no relief with second RAYMOND.     5.  Follow up:  4-6 weeks

## 2022-03-18 NOTE — TELEPHONE ENCOUNTER
Pt calling saying she had a steroid injection earlier this week. At the time of the injection her blood sugar was 405 without med and without food. She says they almost called 911. She says her vision was blurry    Today her blood sugar is 345 with no meds and no food.   She says she is \"urinating out of control\" but is getting better    She says she was without insulin for 2 days and intends to pick it up today    She wants to know if there is anything she can do to bring down her blood sugar

## 2022-03-18 NOTE — TELEPHONE ENCOUNTER
Really just needs to start taking her insulin and metformin. That is the only way to lower the blood sugars.

## 2022-03-21 NOTE — TELEPHONE ENCOUNTER
Pharmacy did not have her prescription  So she had to wait a week to get it    -she is feeling better today-  Her bladder was going so much  She has her insulin now. She couldn't work and so may need a work. She is not running to the bathroom as much.     She will find out If she needs a note and will let us know OR she will ask spine doctor , she will see & call us back

## 2022-03-23 ENCOUNTER — TELEPHONE (OUTPATIENT)
Dept: ORTHOPEDIC SURGERY | Age: 62
End: 2022-03-23

## 2022-03-23 ENCOUNTER — OFFICE VISIT (OUTPATIENT)
Dept: ORTHOPEDIC SURGERY | Age: 62
End: 2022-03-23
Payer: MEDICARE

## 2022-03-23 DIAGNOSIS — M17.11 PRIMARY OSTEOARTHRITIS OF RIGHT KNEE: Primary | ICD-10-CM

## 2022-03-23 PROCEDURE — 20610 DRAIN/INJ JOINT/BURSA W/O US: CPT | Performed by: PHYSICIAN ASSISTANT

## 2022-03-23 PROCEDURE — 99999 PR OFFICE/OUTPT VISIT,PROCEDURE ONLY: CPT | Performed by: PHYSICIAN ASSISTANT

## 2022-03-23 NOTE — PROGRESS NOTES
Diagnosis: Right knee osteoarthritis    Procedure: Right knee viscosupplementation #3    The patient returns today for their third and final Supartz injection in the right knee. The risks, benefits, and complications of the injections were again discussed in detail with the patient. The risks discussed include but are not limited to infection, skin reactions, hot swollen joints, and anaphylaxis. The patient gave verbal informed consent for the injection. The patient's skin was prepped with 3 sterile gauze pads soaked with alcohol solution and the knee joint was injected with 2.5 mL of Supartz intra-articularly under sterile conditions. Technique: Under sterile conditions a Son[employer]Me ultrasound unit with a variable frequency (6.0-15.0 MHz) linear transducer was used to localize the placement of a 22-gauge needle into the knee joint. Findings: Successful needle placement for intra-articular Visco supplementation injection. Final images were taken and saved for permanent record. The patient tolerated the injection reasonably well. The patient was given instructions to ice of the knee and avoid strenuous activity for 24-48 hours. The patient was instructed to call the office immediately if there is increased pain, redness, warmth, fever, or chills. We will see the patient back on an as-needed basis  from this point.

## 2022-03-29 ENCOUNTER — OFFICE VISIT (OUTPATIENT)
Dept: ORTHOPEDIC SURGERY | Age: 62
End: 2022-03-29
Payer: MEDICARE

## 2022-03-29 VITALS — BODY MASS INDEX: 37.17 KG/M2 | HEIGHT: 62 IN | WEIGHT: 202 LBS

## 2022-03-29 DIAGNOSIS — M54.12 CERVICAL RADICULOPATHY: Primary | ICD-10-CM

## 2022-03-29 DIAGNOSIS — M50.00 HNP (HERNIATED NUCLEUS PULPOSUS) WITH MYELOPATHY, CERVICAL: ICD-10-CM

## 2022-03-29 PROCEDURE — 99213 OFFICE O/P EST LOW 20 MIN: CPT | Performed by: STUDENT IN AN ORGANIZED HEALTH CARE EDUCATION/TRAINING PROGRAM

## 2022-03-29 NOTE — PROGRESS NOTES
Follow up: Miryam Bright  1960  [id number]      CHIEF COMPLAINT:    Chief Complaint   Patient presents with    Follow-up     FU CSP LEFT C4-C5 IL RAYMOND DSOUZA 3.14.2022. Pt notes following injection her blood sugar levels were increased and notes having missed work. See notes in symtom on 3.18.22 from PCP. HISTORY OF PRESENT ILLNESS:  Ms. Rahel Hsu is a 59 y.o. female returns for a follow up visit for multiple medical problems. Her current presenting problems are No diagnosis found. .    As per information/history obtained from the PADT(patient assessment and documentation tool) - She complains of pain in the neck with radiation to the neck She rates the pain 5/10 and describes it as aching, stiffness. Pain is made worse by: nothing. She denies side effects from the current pain regimen. Patient reports that since the last follow up visit the physical functioning is better, family/social relationships are unchanged, mood is unchanged and sleep patterns are unchanged, and that the overall functioning is better. Patient denies neurological bowel or bladder. The patient presents for follow-up of ongoing neck pain. She underwent a C4-C5 interlaminar epidural steroid injection on 3/14/2022. She reports 80 to 90% relief of the left-sided radicular symptoms. Following the procedure her blood sugar was significantly elevated. She was complaining of blurry vision and frequent urination. This is now more under control with the use of insulin after talking to her primary care doctor. She did miss work due to the hyperglycemia. Today she is having some stiffness in the right side of the neck. She would like to consider physical therapy.     Associated signs and symptoms:   Neurogenic bowel or bladder symptoms:  no   Perceived weakness:  no   Difficulty walking:  no              Past Medical History:   Past Medical History:   Diagnosis Date    Allergic rhinitis     HTN (hypertension)     Hyperlipidemia     Obstructive sleep apnea (adult) (pediatric)     Non-compliant with CPAP    Osteoarthritis     Type II or unspecified type diabetes mellitus without mention of complication, not stated as uncontrolled       Past Surgical History:     Past Surgical History:   Procedure Laterality Date    COLON SURGERY  2008    10\" removed - chronic constipation    DUPUYTRENS CONTRACTURE SURGERY Left 1/29/2019    LEFT 1ST DORSAL COMPARMENT DE' QUERVAIN'S RELEASE performed by Vijay Nelson MD at [address]      4x     HYSTERECTOMY, TOTAL ABDOMINAL      JOINT REPLACEMENT  2007    Left knee partial    KNEE SURGERY  1990's    rt knee reconstructed    NECK SURGERY  05/12/2011    c 4,5,6,7 (ACDF)    OVARY REMOVAL      ROTATOR CUFF REPAIR  2009    Left shoulder    SHOULDER SURGERY  1990's    lt & rt shoulder impingment    SINUS ENDOSCOPY  9-15-14    FUNCTIONAL ENDOSCOPIC SINUS SURGERY       TONSILLECTOMY AND ADENOIDECTOMY      TOTAL KNEE ARTHROPLASTY Left 03/09/2010    Eros to Total    TUBAL LIGATION      UMBILICAL HERNIA REPAIR  8/2010    Dr. Darlene Sanders     Current Medications:     Current Outpatient Medications:     insulin glargine (LANTUS SOLOSTAR) 100 UNIT/ML injection pen, Inject 30 Units into the skin nightly, Disp: 10 pen, Rfl: 3    ibuprofen (ADVIL;MOTRIN) 800 MG tablet, Take 1 tablet by mouth 2 times daily as needed for Pain, Disp: 60 tablet, Rfl: 0    atorvastatin (LIPITOR) 10 MG tablet, TAKE ONE TABLET BY MOUTH DAILY, Disp: 90 tablet, Rfl: 0    amLODIPine (NORVASC) 5 MG tablet, TAKE ONE TABLET BY MOUTH DAILY, Disp: 60 tablet, Rfl: 5    metFORMIN (GLUCOPHAGE) 1000 MG tablet, TAKE 1 TABLET TWICE A DAY WITH MEALS, Disp: 180 tablet, Rfl: 3    losartan (COZAAR) 100 MG tablet, TAKE ONE TABLET BY MOUTH DAILY, Disp: 60 tablet, Rfl: 5    montelukast (SINGULAIR) 10 MG tablet, TAKE ONE TABLET BY MOUTH ONCE NIGHTLY, Disp: 60 tablet, Rfl: 5    Insulin Pen Needle (PEN NEEDLES) 32G X 4 MM MISC, USE TO INJECT INSULIN FOUR TIMES DAILY, Disp: 200 each, Rfl: 5    Lancets MISC, Test 1-3 times a day as needed for symptoms of irregular glucose. DX: E11.9 please provide lancets accepted by patient's insurance., Disp: 100 each, Rfl: 3    blood glucose monitor strips, Test 1-3 times a day as needed for symptoms of irregular glucose. DX: E11.9 please provide strips that are accepted by patient's insurance., Disp: 200 strip, Rfl: 3    ACCU-CHEK SOFTCLIX LANCETS MISC, TEST BLOOD SUGAR TWO TIMES A DAY, Disp: 100 each, Rfl: 4    Calcium Carb-Cholecalciferol (CALTRATE 600+D) 600-800 MG-UNIT TABS, Take 1 tablet by mouth 2 times daily, Disp: , Rfl:     cetirizine (ZYRTEC ALLERGY) 10 MG tablet, Take 1 tablet by mouth daily, Disp: , Rfl:     aspirin EC 81 MG EC tablet, Take 81 mg by mouth daily. LAST DOSE 8-9-14, Disp: 30 tablet, Rfl: 11  Allergies:  Gabapentin, Demerol, Levaquin [levofloxacin in d5w], Morphine, and Tramadol  Social History:    reports that she quit smoking about 2 years ago. Her smoking use included cigars. She has a 5.00 pack-year smoking history. She has never used smokeless tobacco. She reports current alcohol use. She reports previous drug use.   Family History:   Family History   Problem Relation Age of Onset    Diabetes Father     Heart Disease Father         CAD    High Blood Pressure Father     High Cholesterol Father     Kidney Disease Brother     Diabetes Son     Other Son         DEANA    Heart Disease Sister         1/2 sister    Breast Cancer Paternal Cousin     Rheum Arthritis Neg Hx     Osteoarthritis Neg Hx     Asthma Neg Hx     Cancer Neg Hx     Heart Failure Neg Hx     Hypertension Neg Hx     Migraines Neg Hx     Ovarian Cancer Neg Hx     Rashes/Skin Problems Neg Hx     Seizures Neg Hx     Stroke Neg Hx     Thyroid Disease Neg Hx        REVIEW OF SYSTEMS:   CONSTITUTIONAL: Denies unexplained weight loss, fevers, chills or fatigue  NEUROLOGICAL: Denies unsteady gait or progressive weakness  MUSCULOSKELETAL: Denies joint swelling or redness  GI: Denies nausea, vomiting, diarrhea   : Denies bowel or bladder issues       PHYSICAL EXAM:    Vitals: Height 5' 2\" (1.575 m), weight 202 lb (91.6 kg), last menstrual period 01/26/1996, not currently breastfeeding. GENERAL EXAM:  · General Apparence: Patient is adequately groomed with no evidence of malnutrition. · Psychiatric: Orientation: The patient is oriented to time, place and person. The patient's mood and affect are appropriate   · Vascular: Examination reveals no swelling and palpation reveals no tenderness in upper or lower extremities. Good capillary refill. · The lymphatic examination of the neck, axillae and groin reveals all areas to be without enlargement or induration  · Sensation is intact without deficit in the upper and lower extremities to light touch and pinprick  · Coordination of the upper and lower extremities are normal.    CERVICAL EXAMINATION:  · Inspection: Local inspection shows no step-off or bruising. Cervical alignment is normal. No instability is noted. · Palpation and Percussion: No evidence of tenderness at the midline. Paraspinal tenderness is not present. There is no paraspinal spasm. Skin:There are no rashes, ulcerations or lesions  · Range of Motion:  limited by 25% in all planes due to pain - worse with extension on the right   · Strength: 5/5 bilateral upper extremities  · Special Tests:   Spurling's and Bourne's are negative bilaterally. Garner and Impingement tests are negative bilaterally. · Skin:There are no rashes, ulcerations or lesions in right & left upper extremities. · Reflexes: Bilaterally triceps, biceps and brachioradialis are 2+. Clonus absent bilaterally at the feet. No pathological reflexes are noted.   · Gait & station: normal, patient ambulates without assistance  · Additional Examinations:  · RIGHT UPPER EXTREMITY:  Inspection/examination of the right upper extremity does not show any tenderness, deformity or injury. Range of motion is unremarkable and pain-free. There is no gross instability. There are no rashes, ulcerations or lesions. Strength and tone are normal. No atrophy or abnormal movements are noted. · LEFT UPPER EXTREMITY: Inspection/examination of the left upper extremity does not show any tenderness, deformity or injury. Range of motion is unremarkable and pain-free. There is no gross instability. There are no rashes, ulcerations or lesions. Strength and tone are normal. No atrophy or abnormal movements are noted. · RIGHT LOWER EXTREMITY: Inspection/examination of the right lower extremity does not show any tenderness, deformity or injury. Range of motion is normal and pain-free. There is no gross instability. There are no rashes, ulcerations or lesions. Strength and tone are normal. No atrophy or abnormal movements are noted. · LEFT LOWER EXTREMITY:  Inspection/examination of the left lower extremity does not show any tenderness, deformity or injury. Range of motion is normal and pain-free. There is no gross instability. There are no rashes, ulcerations or lesions. Strength and tone are normal. No atrophy or abnormal movements are noted.       Diagnostic Testing:    No new diagnostics  Results for orders placed or performed in visit on 03/09/22   Lipid, Fasting   Result Value Ref Range    Cholesterol, Fasting 133 0 - 199 mg/dL    Triglyceride, Fasting 137 0 - 150 mg/dL    HDL 28 (L) 40 - 60 mg/dL    LDL Calculated 78 <100 mg/dL    VLDL Cholesterol Calculated 27 Not Established mg/dL   Comprehensive Metabolic Panel   Result Value Ref Range    Sodium 141 136 - 145 mmol/L    Potassium 3.8 3.5 - 5.1 mmol/L    Chloride 96 (L) 99 - 110 mmol/L    CO2 27 21 - 32 mmol/L    Anion Gap 18 (H) 3 - 16    Glucose 165 (H) 70 - 99 mg/dL    BUN 10 7 - 20 mg/dL    CREATININE <0.5 (L) 0.6 - 1.2 mg/dL    GFR Non-African American >60 >60    GFR  >60 >60    Calcium 10.2 8.3 - 10.6 mg/dL    Total Protein 6.9 6.4 - 8.2 g/dL    Albumin 4.3 3.4 - 5.0 g/dL    Albumin/Globulin Ratio 1.7 1.1 - 2.2    Total Bilirubin 0.6 0.0 - 1.0 mg/dL    Alkaline Phosphatase 105 40 - 129 U/L    ALT 22 10 - 40 U/L    AST 18 15 - 37 U/L   CBC with Auto Differential   Result Value Ref Range    WBC 16.0 (H) 4.0 - 11.0 K/uL    RBC 4.97 4.00 - 5.20 M/uL    Hemoglobin 13.3 12.0 - 16.0 g/dL    Hematocrit 41.7 36.0 - 48.0 %    MCV 84.0 80.0 - 100.0 fL    MCH 26.9 26.0 - 34.0 pg    MCHC 32.0 31.0 - 36.0 g/dL    RDW 13.5 12.4 - 15.4 %    Platelets 113 241 - 472 K/uL    MPV 10.2 5.0 - 10.5 fL    Neutrophils % 74.7 %    Lymphocytes % 19.3 %    Monocytes % 4.4 %    Eosinophils % 0.9 %    Basophils % 0.7 %    Neutrophils Absolute 12.0 (H) 1.7 - 7.7 K/uL    Lymphocytes Absolute 3.1 1.0 - 5.1 K/uL    Monocytes Absolute 0.7 0.0 - 1.3 K/uL    Eosinophils Absolute 0.1 0.0 - 0.6 K/uL    Basophils Absolute 0.1 0.0 - 0.2 K/uL   Hemoglobin A1C   Result Value Ref Range    Hemoglobin A1C 7.3 See comment %    eAG 162.8 mg/dL     *Note: Due to a large number of results and/or encounters for the requested time period, some results have not been displayed. A complete set of results can be found in Results Review. Impression:       No diagnosis found. Plan:  Clinical Course: Above diagnoses are improving     I discussed the diagnosis and the treatment options with Tamela Severin today. In Summary:  The various treatment options were outlined and discussed with Tamela Severin including:  Conservative care options: physical therapy, ice, medications, bracing, and activity modification. The indications for therapeutic injections. The indications for additional imaging/laboratory studies. The indications for (possible future) interventions. After considering the various options discussed, Tamela Severin elected to pursue a course of treatment that includes the followin.  Medications:  No further recommendations for new medications. 2. PT:  I will start the patient on a trial of PT to work on a cervical stabilization program to focus on stretching, strengthening, traction and modalities as indicated. 3. Further studies:  No further studies. 4. Interventional:  80-90% relief following LAURA. 5. Follow up:  2-3 months. Work note provided for time missed due to hyperglycemia. Tamela Severin was instructed to call the office if her symptoms worsen or if new symptoms appear prior to the next scheduled visit. She is specifically instructed to contact the office between now & her scheduled appointment if she has concerns related to her condition or if she needs assistance in scheduling the above tests. She is welcome to call for an appointment sooner if she has any additional concerns or questions. Janie Salvador PA-C  Board Certified by the [employer] on Certification of [address] and [address] [address]               This dictation was performed with a verbal recognition program [facility]) and it was checked for errors. It is possible that there are still dictated errors within this office note. If so, please bring any errors to my attention for an addendum. All efforts were made to ensure that this office note is accurate.

## 2022-03-29 NOTE — LETTER
HALEY JONES  [address] [facility] [address]  Phone: [phone]  Fax: [phone]    Ginny Arenas, [address]        March 29, 2022     Patient: Tamela Severin   YOB: 1960   Date of Visit: 3/29/2022       To Whom It May Concern: It is my medical opinion that Lauren Carbajal may return to work on 3/30/2022. Please any absence between 3/14/2022-3/29/2022 due to unforseen complications from her procedure on 3/14/2022. If you have any questions or concerns, please don't hesitate to call.     Sincerely,        OLIVIA Gongora

## 2022-04-04 ENCOUNTER — HOSPITAL ENCOUNTER (OUTPATIENT)
Dept: PHYSICAL THERAPY | Age: 62
Setting detail: THERAPIES SERIES
Discharge: HOME | End: 2022-04-04
Payer: MEDICARE

## 2022-04-04 ENCOUNTER — TELEMEDICINE (OUTPATIENT)
Dept: PRIMARY CARE CLINIC | Age: 62
End: 2022-04-04
Payer: MEDICARE

## 2022-04-04 DIAGNOSIS — B37.0 ORAL THRUSH: Primary | ICD-10-CM

## 2022-04-04 PROCEDURE — 97161 PT EVAL LOW COMPLEX 20 MIN: CPT

## 2022-04-04 PROCEDURE — 99213 OFFICE O/P EST LOW 20 MIN: CPT | Performed by: FAMILY MEDICINE

## 2022-04-04 PROCEDURE — 97110 THERAPEUTIC EXERCISES: CPT

## 2022-04-04 NOTE — FLOWSHEET NOTE
[facility] and [address],  Sports Performance and Rehabilitation, [facility] [address]  [address] [facility]   Arlene Fierro  Phone: [phone]  Fax: [phone]        Physical Therapy Daily Treatment Note  Date:  2022    Patient Name:  Hina Singh    :  1960  MRN: [id number]  Restrictions/Precautions:    Medical/Treatment Diagnosis Information:  · Diagnosis: M54.12 (ICD-10-CM) - Cervical radiculopathy, M50.00 (ICD-10-CM) - HNP (herniated nucleus pulposus) with myelopathy, cervical  · Treatment Diagnosis: M54.20 Cervical Pain.   Insurance/Certification information:  PT Insurance Information: [employer]  Physician Information:  Referring Practitioner: OLIVIA Hernandez  Has the plan of care been signed (Y/N):        []  Yes  [x]  No     Date of Patient follow up with Physician: Not scheduled       Is this a Progress Report:     []  Yes  [x]  No        If Yes:  Date Range for reporting period:  Beginning 22  Ending22    Progress report will be due (10 Rx or 30 days whichever is less): 53       Recertification will be due (POC Duration  / 90 days whichever is less): 22       Visit # Insurance Allowable Auth Required   1 BMN []  Yes [x]  No        Functional Scale: NDI:     Date assessed:  22     Latex Allergy:  [x]NO      []YES  Preferred Language for Healthcare:   [x]English       []other:    Pain level:  7/10     SUBJECTIVE:  See eval    OBJECTIVE: See eval   Observation:    Test measurements:      RESTRICTIONS/PRECAUTIONS: None     Exercises/Interventions:   Exercise/Equipment Resistance/Repetitions Other comments   Stretching/PROM     CROM x10 rotation     Chin tuck 5\"x10    UT side bend stretch     Levater scap stretch     Scalene stretch     Pectoral stretch               Isometrics     Retraction 5\"x10    shrugs     Cervical Flexion      Cervical Extension     Cervical sidebending               PRE's External Rotation     Internal Rotation     Serratus     Biceps     Triceps     Shrugs     Horizontal Abd with ER     Reverse Flys     EXT     Flexion     Abduction          Cable Column/Theraband     Scapular Retraction     External Rotation     Internal Rotation     Ext     TRIC     Lats     Shrugs     Flex     BIC     PNF                Manual Intervention      Gentle cervical distraction 2     Sub-occipital release 2     STM cervical paraspinals and UT 2     Cerv mobs/manip      Thoracic mobs/manip      CT manip      Rib mobilizations        HEP instruction: [id number]    Therapeutic Exercise and NMR EXR  [x] (66948) Provided verbal/tactile cueing for activities related to strengthening, flexibility, endurance, ROM  for improvements in cervical, postural, scapular, scapulothoracic and UE control with self care, reaching, carrying, lifting, house/yardwork, driving/computer work.    [] (75226) Provided verbal/tactile cueing for activities related to improving balance, coordination, kinesthetic sense, posture, motor skill, proprioception  to assist with cervical, scapular, scapulothoracic and UE control with self care, reaching, carrying, lifting, house/yardwork, driving/computer work. Therapeutic Activities:    [] (55111 or 61863) Provided verbal/tactile cueing for activities related to improving balance, coordination, kinesthetic sense, posture, motor skill, proprioception and motor activation to allow for proper function of cervical, scapular, scapulothoracic and UE control with self care, carrying, lifting, driving/computer work.      Home Exercise Program:    [x] (60228) Reviewed/Progressed HEP activities related to strengthening, flexibility, endurance, ROM of cervical, scapular, scapulothoracic and UE control with self care, reaching, carrying, lifting, house/yardwork, driving/computer work  [] (68818) Reviewed/Progressed HEP activities related to improving balance, coordination, kinesthetic sense, AROM to WNL of cervical/thoracic spine to allow for proper joint functioning as indicated by patients Functional Deficits. [] Progressing: [] Met: [] Not Met: [] Adjusted  Patient will demonstrate an increase in postural awareness and control and activation of  Deep cervical stabilizers to allow for proper functional mobility as indicated by patients Functional Deficits. [] Progressing: [] Met: [] Not Met: [] Adjusted  4. Patient will return to all daily functional activities without increased symptoms or restriction. [] Progressing: [] Met: [] Not Met: [] Adjusted  5. Pt will report at least a 25% improvement in frequency and intensity of pain   [] Progressing: [] Met: [] Not Met: [] Adjusted     Overall Progression Towards Functional goals/ Treatment Progress Update:  [] Patient is progressing as expected towards functional goals listed. [] Progression is slowed due to complexities/Impairments listed. [] Progression has been slowed due to co-morbidities. [x] Plan just implemented, too soon to assess goals progression <30days   [] Goals require adjustment due to lack of progress  [] Patient is not progressing as expected and requires additional follow up with physician  [] Other    Prognosis for POC: [x] Good [] Fair  [] Poor      Patient requires continued skilled intervention: [x] Yes  [] No    Treatment/Activity Tolerance:  [x] Patient able to complete treatment  [] Patient limited by fatigue  [] Patient limited by pain     [] Patient limited by other medical complications  [] Other:               PLAN: See eval  [] Continue per plan of care [] Alter current plan (see comments above)  [x] Plan of care initiated [] Hold pending MD visit [] Discharge      Electronically signed by:  Eriberto Geronimo PT    Note: If patient does not return for scheduled/ recommended follow up visits, this note will serve as a discharge from care along with most recent update on progress.
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
## 2022-04-04 NOTE — PROGRESS NOTES
[address] [facility] [address] PRIMARY CARE  [address]   [address] [facility] [address]  Dept: [phone]  Dept Fax: [phone]     4/4/2022      Jakob Figueroa   1960     Chief Complaint   Patient presents with    Mouth Lesions       HPI    Pt encounter today completed virtual visit using virtual platform. Services were provided through a video synchronous discussion virtually to substitute for in-person clinic visit. Patient and provider were located at their individual locations. C/o thrush to mouth. Started a few days ago. Getting worse. No recent steroid use. States she has had this before. Prior to Visit Medications    Medication Sig Taking? Authorizing Provider   nystatin (MYCOSTATIN) 860346 UNIT/ML suspension 5 mL by mouth 4 times a day x 7-10 days, retain in mouth as long as possible before swallowing Yes Isabel Rico DO   insulin glargine (LANTUS SOLOSTAR) 100 UNIT/ML injection pen Inject 30 Units into the skin nightly  Isabel Rico DO   ibuprofen (ADVIL;MOTRIN) 800 MG tablet Take 1 tablet by mouth 2 times daily as needed for Pain  [facility]   atorvastatin (LIPITOR) 10 MG tablet TAKE ONE TABLET BY MOUTH DAILY  Isabel Rico DO   amLODIPine (NORVASC) 5 MG tablet TAKE ONE TABLET BY MOUTH DAILY  Isabel Rico DO   metFORMIN (GLUCOPHAGE) 1000 MG tablet TAKE 1 TABLET TWICE A DAY WITH MEALS  Isabel Rico DO   losartan (COZAAR) 100 MG tablet TAKE ONE TABLET BY MOUTH DAILY  Isabel Rico DO   montelukast (SINGULAIR) 10 MG tablet TAKE ONE TABLET BY MOUTH ONCE NIGHTLY  Isabel Rico DO   Insulin Pen Needle (PEN NEEDLES) 32G X 4 MM MISC USE TO INJECT INSULIN FOUR TIMES DAILY  Isabel Rico DO   Lancets MISC Test 1-3 times a day as needed for symptoms of irregular glucose. DX: E11.9 please provide lancets accepted by patient's insurance.   [address], DO   blood glucose monitor strips Test 1-3 times a day as needed for symptoms of irregular glucose. DX: E11.9 please provide strips that are accepted by patient's insurance. Isabel Rico, DO   ACCU-CHEK SOFTCLIX LANCETS MISC TEST BLOOD SUGAR TWO TIMES A DAY  John Green DO   Calcium Carb-Cholecalciferol (CALTRATE 600+D) 600-800 MG-UNIT TABS Take 1 tablet by mouth 2 times daily  Historical Provider, MD   cetirizine (ZYRTEC ALLERGY) 10 MG tablet Take 1 tablet by mouth daily  John Green DO   aspirin EC 81 MG EC tablet Take 81 mg by mouth daily.  LAST DOSE 14  Topher Pickett DO       Past Medical History:   Diagnosis Date    Allergic rhinitis     HTN (hypertension)     Hyperlipidemia     Obstructive sleep apnea (adult) (pediatric)     Non-compliant with CPAP    Osteoarthritis     Type II or unspecified type diabetes mellitus without mention of complication, not stated as uncontrolled         Social History     Tobacco Use    Smoking status: Former Smoker     Packs/day: 0.25     Years: 20.00     Pack years: 5.00     Types: Cigars     Quit date: 2019     Years since quittin.5    Smokeless tobacco: Never Used    Tobacco comment: was smoking 5 black and milds per day, but has not smoked in 4 days   Vaping Use    Vaping Use: Never used   Substance Use Topics    Alcohol use: Yes     Comment: 1 alcoholic beverage every other month    Drug use: Not Currently     Comment: H/o cocaine use        Past Surgical History:   Procedure Laterality Date    COLON SURGERY      10\" removed - chronic constipation    DUPUYTRENS CONTRACTURE SURGERY Left 2019    LEFT 1ST DORSAL COMPARMENT DE' QUERVAIN'S RELEASE performed by Emilia Borja MD at [address] [place] [address]       4x     HYSTERECTOMY, TOTAL ABDOMINAL      JOINT REPLACEMENT      Left knee partial    KNEE SURGERY  's    rt knee reconstructed    NECK SURGERY  2011    c 4,5,6,7 (ACDF)    OVARY REMOVAL      ROTATOR CUFF REPAIR  2009    Left shoulder    SHOULDER SURGERY  1990's    lt & rt shoulder impingment    SINUS ENDOSCOPY  9-15-14    FUNCTIONAL ENDOSCOPIC SINUS SURGERY       TONSILLECTOMY AND ADENOIDECTOMY      TOTAL KNEE ARTHROPLASTY Left 03/09/2010    Eros to Total   Leia Mata REPAIR  8/2010    Dr. Duane Rust        Allergies   Allergen Reactions    Gabapentin Other (See Comments), Dizziness or Vertigo and Hallucinations     BLURRED VISION      Demerol Dermatitis     ITCHING    Levaquin [Levofloxacin In D5w] Nausea Only    Morphine Itching    Tramadol Other (See Comments)     \"NO CLUE\"        Family History   Problem Relation Age of Onset    Diabetes Father     Heart Disease Father         CAD    High Blood Pressure Father     High Cholesterol Father     Kidney Disease Brother     Diabetes Son     Other Son         DEANA    Heart Disease Sister         1/2 sister    Breast Cancer Paternal Cousin     Rheum Arthritis Neg Hx     Osteoarthritis Neg Hx     Asthma Neg Hx     Cancer Neg Hx     Heart Failure Neg Hx     Hypertension Neg Hx     Migraines Neg Hx     Ovarian Cancer Neg Hx     Rashes/Skin Problems Neg Hx     Seizures Neg Hx     Stroke Neg Hx     Thyroid Disease Neg Hx         Patient's past medical history, surgical history, family history, medications, and allergies  were all reviewed and updated as appropriate today. Review of Systems   Constitutional: Negative for fever. HENT: Negative for sore throat and trouble swallowing. Vitals unable to obtain and physical exam is limited 2/2 virtual visit nature of this encounter. Physical Exam  Constitutional:       General: She is not in acute distress. Appearance: Normal appearance. She is not ill-appearing. HENT:      Mouth/Throat:      Comments: + whitish coating to tongue  Pulmonary:      Effort: Pulmonary effort is normal.   Neurological:      General: No focal deficit present.       Mental Status: She is alert. Psychiatric:         Mood and Affect: Mood normal.         Assessment:  Encounter Diagnosis   Name Primary?  Oral thrush Yes       Plan:        New Prescriptions    NYSTATIN (MYCOSTATIN) 860619 UNIT/ML SUSPENSION    5 mL by mouth 4 times a day x 7-10 days, retain in mouth as long as possible before swallowing        No orders of the defined types were placed in this encounter. Return if symptoms worsen or fail to improve. DO Alistair Rivera is a 59 y.o. female being evaluated by a Virtual Visit (video visit) encounter to address concerns as mentioned above. A caregiver was present when appropriate. Due to this being a TeleHealth encounter (During QTR-74 public health emergency), evaluation of the following organ systems was limited: Vitals/Constitutional/EENT/Resp/CV/GI//MS/Neuro/Skin/Heme-Lymph-Imm. Pursuant to the emergency declaration under the [address] [place] [address], [address] [place] [address] authority and the [employer] Act, this Virtual Visit was conducted with patient's (and/or legal guardian's) consent, to reduce the patient's risk of exposure to COVID-19 and provide necessary medical care. The patient (and/or legal guardian) has also been advised to contact this office for worsening conditions or problems, and seek emergency medical treatment and/or call 911 if deemed necessary.

## 2022-04-07 ENCOUNTER — TELEPHONE (OUTPATIENT)
Dept: PRIMARY CARE CLINIC | Age: 62
End: 2022-04-07
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
## 2022-04-08 ASSESSMENT — ENCOUNTER SYMPTOMS
TROUBLE SWALLOWING: 0
SORE THROAT: 0

## 2022-04-11 ENCOUNTER — HOSPITAL ENCOUNTER (EMERGENCY)
Age: 62
Discharge: HOME | End: 2022-04-11
Attending: EMERGENCY MEDICINE
Payer: MEDICARE

## 2022-04-11 ENCOUNTER — APPOINTMENT (OUTPATIENT)
Dept: CT IMAGING | Age: 62
End: 2022-04-11
Payer: MEDICARE

## 2022-04-11 ENCOUNTER — APPOINTMENT (OUTPATIENT)
Dept: PHYSICAL THERAPY | Age: 62
End: 2022-04-11
Payer: MEDICARE

## 2022-04-11 DIAGNOSIS — K59.00 CONSTIPATION, UNSPECIFIED CONSTIPATION TYPE: ICD-10-CM

## 2022-04-11 DIAGNOSIS — R10.30 LOWER ABDOMINAL PAIN: Primary | ICD-10-CM

## 2022-04-11 LAB
ALBUMIN SERPL-MCNC: 4.1 G/DL (ref 3.4–5)
ALP BLD-CCNC: 122 U/L (ref 40–129)
ALT SERPL-CCNC: 21 U/L (ref 10–40)
AMYLASE: 20 U/L (ref 25–115)
AST SERPL-CCNC: 20 U/L (ref 15–37)
BACTERIA: ABNORMAL /HPF
BASE EXCESS VENOUS: 1.2 MMOL/L (ref -2–3)
BASOPHILS ABSOLUTE: 0.1 K/UL (ref 0–0.2)
BASOPHILS RELATIVE PERCENT: 0.5 %
BILIRUB SERPL-MCNC: 0.4 MG/DL (ref 0–1)
BILIRUBIN DIRECT: <0.2 MG/DL (ref 0–0.3)
BILIRUBIN URINE: NEGATIVE
BILIRUBIN, INDIRECT: NORMAL MG/DL (ref 0–1)
BLOOD, URINE: NEGATIVE
CARBOXYHEMOGLOBIN: 4.5 % (ref 0–1.5)
CLARITY: CLEAR
COLOR: YELLOW
EOSINOPHILS ABSOLUTE: 0.1 K/UL (ref 0–0.6)
EOSINOPHILS RELATIVE PERCENT: 0.7 %
EPITHELIAL CELLS, UA: ABNORMAL /HPF (ref 0–5)
GLUCOSE BLD-MCNC: 316 MG/DL
GLUCOSE BLD-MCNC: 316 MG/DL (ref 70–99)
GLUCOSE URINE: >=1000 MG/DL
HCO3 VENOUS: 27.4 MMOL/L (ref 24–28)
HCT VFR BLD CALC: 39.7 % (ref 36–48)
HEMOGLOBIN, VEN, REDUCED: 38.7 %
HEMOGLOBIN: 12.3 G/DL (ref 12–16)
KETONES, URINE: ABNORMAL MG/DL
LACTIC ACID: 4.1 MMOL/L (ref 0.4–2)
LEUKOCYTE ESTERASE, URINE: NEGATIVE
LIPASE: 23 U/L (ref 13–60)
LYMPHOCYTES ABSOLUTE: 2.9 K/UL (ref 1–5.1)
LYMPHOCYTES RELATIVE PERCENT: 19.7 %
MCH RBC QN AUTO: 26 PG (ref 26–34)
MCHC RBC AUTO-ENTMCNC: 30.9 G/DL (ref 31–36)
MCV RBC AUTO: 84 FL (ref 80–100)
METHEMOGLOBIN VENOUS: 0.2 % (ref 0–1.5)
MICROSCOPIC EXAMINATION: ABNORMAL
MONOCYTES ABSOLUTE: 0.8 K/UL (ref 0–1.3)
MONOCYTES RELATIVE PERCENT: 5.3 %
NEUTROPHILS ABSOLUTE: 11 K/UL (ref 1.7–7.7)
NEUTROPHILS RELATIVE PERCENT: 73.8 %
NITRITE, URINE: NEGATIVE
O2 SAT, VEN: 59 %
PCO2, VEN: 49.4 MMHG (ref 41–51)
PDW BLD-RTO: 14.4 % (ref 12.4–15.4)
PERFORMED ON: ABNORMAL
PH UA: 6 (ref 5–8)
PH VENOUS: 7.35 (ref 7.35–7.45)
PLATELET # BLD: 264 K/UL (ref 135–450)
PMV BLD AUTO: 10.6 FL (ref 5–10.5)
PO2, VEN: 32.4 MMHG (ref 25–40)
PROTEIN UA: NEGATIVE MG/DL
RBC # BLD: 4.72 M/UL (ref 4–5.2)
RBC UA: ABNORMAL /HPF (ref 0–4)
SPECIFIC GRAVITY UA: 1.02 (ref 1–1.03)
TCO2 CALC VENOUS: 29 MMOL/L
TOTAL PROTEIN: 6.6 G/DL (ref 6.4–8.2)
URINE TYPE: ABNORMAL
UROBILINOGEN, URINE: 0.2 E.U./DL
WBC # BLD: 14.8 K/UL (ref 4–11)
WBC UA: ABNORMAL /HPF (ref 0–5)
YEAST: PRESENT /HPF

## 2022-04-11 PROCEDURE — 85025 COMPLETE CBC W/AUTO DIFF WBC: CPT

## 2022-04-11 PROCEDURE — 96374 THER/PROPH/DIAG INJ IV PUSH: CPT

## 2022-04-11 PROCEDURE — 81001 URINALYSIS AUTO W/SCOPE: CPT

## 2022-04-11 PROCEDURE — 80076 HEPATIC FUNCTION PANEL: CPT

## 2022-04-11 PROCEDURE — 80048 BASIC METABOLIC PNL TOTAL CA: CPT

## 2022-04-11 PROCEDURE — 6360000004 HC RX CONTRAST MEDICATION: Performed by: EMERGENCY MEDICINE

## 2022-04-11 PROCEDURE — 6370000000 HC RX 637 (ALT 250 FOR IP): Performed by: EMERGENCY MEDICINE

## 2022-04-11 PROCEDURE — 82803 BLOOD GASES ANY COMBINATION: CPT

## 2022-04-11 PROCEDURE — 74177 CT ABD & PELVIS W/CONTRAST: CPT

## 2022-04-11 PROCEDURE — 99284 EMERGENCY DEPT VISIT MOD MDM: CPT

## 2022-04-11 PROCEDURE — 83605 ASSAY OF LACTIC ACID: CPT

## 2022-04-11 PROCEDURE — 2580000003 HC RX 258: Performed by: EMERGENCY MEDICINE

## 2022-04-11 PROCEDURE — 82150 ASSAY OF AMYLASE: CPT

## 2022-04-11 PROCEDURE — 6360000002 HC RX W HCPCS: Performed by: EMERGENCY MEDICINE

## 2022-04-11 PROCEDURE — 83690 ASSAY OF LIPASE: CPT

## 2022-04-11 RX ORDER — SODIUM CHLORIDE, SODIUM LACTATE, POTASSIUM CHLORIDE, AND CALCIUM CHLORIDE .6; .31; .03; .02 G/100ML; G/100ML; G/100ML; G/100ML
1000 INJECTION, SOLUTION INTRAVENOUS ONCE
Status: COMPLETED | OUTPATIENT
Start: 2022-04-11 | End: 2022-04-11

## 2022-04-11 RX ORDER — POLYETHYLENE GLYCOL 3350 17 G/17G
17 POWDER, FOR SOLUTION ORAL 2 TIMES DAILY
Qty: 238 G | Refills: 0 | Status: SHIPPED | OUTPATIENT
Start: 2022-04-11 | End: 2022-04-18

## 2022-04-11 RX ORDER — ONDANSETRON 2 MG/ML
4 INJECTION INTRAMUSCULAR; INTRAVENOUS ONCE
Status: COMPLETED | OUTPATIENT
Start: 2022-04-11 | End: 2022-04-11

## 2022-04-11 RX ORDER — DICYCLOMINE HYDROCHLORIDE 10 MG/1
20 CAPSULE ORAL ONCE
Status: COMPLETED | OUTPATIENT
Start: 2022-04-11 | End: 2022-04-11

## 2022-04-11 RX ORDER — DICYCLOMINE HYDROCHLORIDE 10 MG/1
10 CAPSULE ORAL EVERY 6 HOURS PRN
Qty: 20 CAPSULE | Refills: 0 | Status: SHIPPED
Start: 2022-04-11 | End: 2022-10-05 | Stop reason: CLARIF

## 2022-04-11 RX ADMIN — SODIUM CHLORIDE, POTASSIUM CHLORIDE, SODIUM LACTATE AND CALCIUM CHLORIDE 1000 ML: 600; 310; 30; 20 INJECTION, SOLUTION INTRAVENOUS at 15:49

## 2022-04-11 RX ADMIN — SODIUM CHLORIDE, POTASSIUM CHLORIDE, SODIUM LACTATE AND CALCIUM CHLORIDE 1000 ML: 600; 310; 30; 20 INJECTION, SOLUTION INTRAVENOUS at 17:34

## 2022-04-11 RX ADMIN — DICYCLOMINE HYDROCHLORIDE 20 MG: 10 CAPSULE ORAL at 15:51

## 2022-04-11 RX ADMIN — ONDANSETRON 4 MG: 2 INJECTION INTRAMUSCULAR; INTRAVENOUS at 15:53

## 2022-04-11 RX ADMIN — IOPAMIDOL 80 ML: 755 INJECTION, SOLUTION INTRAVENOUS at 16:43

## 2022-04-11 RX ADMIN — IOHEXOL 50 ML: 240 INJECTION, SOLUTION INTRATHECAL; INTRAVASCULAR; INTRAVENOUS; ORAL at 16:44

## 2022-04-11 ASSESSMENT — ENCOUNTER SYMPTOMS
EYES NEGATIVE: 1
NAUSEA: 1
BACK PAIN: 1
RHINORRHEA: 0
VOMITING: 0
BLOOD IN STOOL: 0
COUGH: 0
DIARRHEA: 1
SORE THROAT: 0
RESPIRATORY NEGATIVE: 1
ABDOMINAL PAIN: 1
CONSTIPATION: 1
SHORTNESS OF BREATH: 0

## 2022-04-11 NOTE — ED PROVIDER NOTES
122 40 - 129 U/L    ALT 21 10 - 40 U/L    AST 20 15 - 37 U/L    Total Bilirubin 0.4 0.0 - 1.0 mg/dL    Bilirubin, Direct <0.2 0.0 - 0.3 mg/dL    Bilirubin, Indirect see below 0.0 - 1.0 mg/dL   Lipase   Result Value Ref Range    Lipase 23.0 13.0 - 60.0 U/L   Amylase   Result Value Ref Range    Amylase 20 (L) 25 - 115 U/L   Lactic Acid   Result Value Ref Range    Lactic Acid 4.1 (HH) 0.4 - 2.0 mmol/L   Urinalysis with Microscopic   Result Value Ref Range    Color, UA Yellow Straw/Yellow    Clarity, UA Clear Clear    Glucose, Ur >=1000 (A) Negative mg/dL    Bilirubin Urine Negative Negative    Ketones, Urine TRACE (A) Negative mg/dL    Specific Gravity, UA 1.020 1.005 - 1.030    Blood, Urine Negative Negative    pH, UA 6.0 5.0 - 8.0    Protein, UA Negative Negative mg/dL    Urobilinogen, Urine 0.2 <2.0 E.U./dL    Nitrite, Urine Negative Negative    Leukocyte Esterase, Urine Negative Negative    Microscopic Examination Not Indicated     Urine Type NotGiven     WBC, UA 0-2 0 - 5 /HPF    RBC, UA 0-2 0 - 4 /HPF    Epithelial Cells, UA 6-10 (A) 0 - 5 /HPF    Bacteria, UA Rare (A) None Seen /HPF    Yeast, UA Present (A) None Seen /HPF   Blood gas, venous (Lab)   Result Value Ref Range    pH, John 7.353 7.350 - 7.450    pCO2, John 49.4 41.0 - 51.0 mmHg    pO2, John 32.4 25.0 - 40.0 mmHg    HCO3, Venous 27.4 24.0 - 28.0 mmol/L    Base Excess, John 1.2 -2.0 - 3.0 mmol/L    O2 Sat, John 59 Not established %    Carboxyhemoglobin 4.5 (H) 0.0 - 1.5 %    MetHgb, John 0.2 0.0 - 1.5 %    TC02 (Calc), John 29 mmol/L    Hemoglobin, John, Reduced 38.70 %   POCT Glucose   Result Value Ref Range    Glucose 316 mg/dL   POCT Glucose   Result Value Ref Range    POC Glucose 316 (H) 70 - 99 mg/dl    Performed on ACCU-CHEK        RECENT VITALS:   ,  ,  ,  ,       Procedures         ED Course     The patient was given the following medications:  Orders Placed This Encounter   Medications    ondansetron (ZOFRAN) injection 4 mg    lactated ringers bolus  dicyclomine (BENTYL) capsule 20 mg    iohexol (OMNIPAQUE 240) injection 50 mL    iopamidol (ISOVUE-370) 76 % injection 80 mL    lactated ringers bolus    DISCONTD: insulin regular (HUMULIN R;NOVOLIN R) injection 5 Units    dicyclomine (BENTYL) 10 MG capsule     Sig: Take 1 capsule by mouth every 6 hours as needed (cramps)     Dispense:  20 capsule     Refill:  0    polyethylene glycol (MIRALAX) 17 GM/SCOOP powder     Sig: Take 17 g by mouth 2 times daily for 7 days PRN constipation     Dispense:  238 g     Refill:  0       CONSULTS:  None    MEDICAL DECISION MAKING / ASSESSMENT / Cristy Verito is a 59 y.o. female who presents with lower abdominal pain, now improved, and some what appears to be intermittent constipation and sometimes diarrhea brought on by use of magnesium citrate which she takes for the constipation. Her CT scan is unrevealing for an obvious cause of pain. She does not appear clinically obstructed. Her labs are nonactionable other than an elevated glucose which is improving with only fluid and will continue to monitor, and modest elevation in lactate. Given improving symptoms, no surgical findings on CT, and overall reassuring picture otherwise with improvement in symptoms, she is in a relatively reassuring picture overall for safe discharge. She was given a second liter of IV fluids. She is able to take p.o. Will prescribe some Bentyl for use at home as well as MiraLAX and soluble fiber for stool softener regimen as treatment for her constipation. Clinical Impression     1. Lower abdominal pain    2. Constipation, unspecified constipation type        Disposition     PATIENT REFERRED TO:  No follow-up provider specified.     DISCHARGE MEDICATIONS:  New Prescriptions    DICYCLOMINE (BENTYL) 10 MG CAPSULE    Take 1 capsule by mouth every 6 hours as needed (cramps)    POLYETHYLENE GLYCOL (MIRALAX) 17 GM/SCOOP POWDER    Take 17 g by mouth 2 times daily for 7 days PRN constipation       DISPOSITION Discharge - Pending Orders Complete 04/11/2022 05:57:20 PM          Bhumika Sellers MD  04/11/22 [id number]

## 2022-04-11 NOTE — ED PROVIDER NOTES
[address] [facility]          ATTENDING PHYSICIAN NOTE       Date of evaluation: 4/11/2022    Chief Complaint     Abdominal Pain and Diarrhea      History of Present Illness     Ioana Henry is a 59 y.o. female who presents to the emergency department with complaints of alternating constipation and diarrhea, with intermittent abdominal pain for the last couple of months. Patient states that since January of this year, she has had increasing difficulties with constipation. She has been taking intermittent doses of magnesium citrate to alleviate this, which causes watery stools when she does so. She describes a stabbing intermittent lower abdominal pain which has been increasing in frequency and severity over the proximately the last month, more severe over this weekend, and this weekend more prominent in the left lower quadrant. She also complains of low back pain that is worsened when she strains to pass hard stools, which she states she has been doing frequently over the past several months. The patient states that she has had diverticulitis in the past, and this feels similar. She called her GI doctors office today, and was referred to the emergency department for evaluation. She denies any fevers or chills. She sometimes feels nauseous, but denies vomiting. She has been tolerating p.o. without difficulty. Review of Systems     Review of Systems   Constitutional: Negative for activity change, chills and fever. HENT: Negative. Negative for congestion, rhinorrhea and sore throat. Eyes: Negative. Negative for visual disturbance. Respiratory: Negative. Negative for cough and shortness of breath. Cardiovascular: Negative. Negative for chest pain. Gastrointestinal: Positive for abdominal pain, constipation, diarrhea and nausea. Negative for blood in stool and vomiting. Genitourinary: Negative. Negative for difficulty urinating and dysuria. Musculoskeletal: Positive for back pain. Skin: Negative. Neurological: Negative. Negative for dizziness, syncope and light-headedness. Psychiatric/Behavioral: Negative. Past Medical, Surgical, Family, and Social History     She has a past medical history of Allergic rhinitis, HTN (hypertension), Hyperlipidemia, Obstructive sleep apnea (adult) (pediatric), Osteoarthritis, and Type II or unspecified type diabetes mellitus without mention of complication, not stated as uncontrolled. She has a past surgical history that includes joint replacement (2007); Rotator cuff repair (2009); Colon surgery (2008); shoulder surgery (1990's); knee surgery (1990's); Neck surgery (05/12/2011); Umbilical hernia repair (8/2010); Ovary removal; Sinus endoscopy (9-15-14); Tonsillectomy and adenoidectomy; Tubal ligation; Total knee arthroplasty (Left, 03/09/2010); dupuytrens contracture surgery (Left, 1/29/2019); hernia repair; and Hysterectomy, total abdominal.  Her family history includes Breast Cancer in her paternal cousin; Diabetes in her father and son; Heart Disease in her father and sister; High Blood Pressure in her father; High Cholesterol in her father; Kidney Disease in her brother; Other in her son. She reports that she quit smoking about 2 years ago. Her smoking use included cigars. She has a 5.00 pack-year smoking history. She has never used smokeless tobacco. She reports current alcohol use. She reports previous drug use. Medications     Previous Medications    ACCU-CHEK SOFTCLIX LANCETS MISC    TEST BLOOD SUGAR TWO TIMES A DAY    AMLODIPINE (NORVASC) 5 MG TABLET    TAKE ONE TABLET BY MOUTH DAILY    ASPIRIN EC 81 MG EC TABLET    Take 81 mg by mouth daily. LAST DOSE 8-9-14    ATORVASTATIN (LIPITOR) 10 MG TABLET    TAKE ONE TABLET BY MOUTH DAILY    BLOOD GLUCOSE MONITOR STRIPS    Test 1-3 times a day as needed for symptoms of irregular glucose.  DX: E11.9 please provide strips that are accepted by hepatosplenomegaly. Skin: Warm and dry, without rashes or ecchymoses, lacerations or abrasions. Neuro: Alert and oriented x3. No focal neurologic deficits are noted. Extremities: Warm and well-perfused, without clubbing, cyanosis, or edema. The patient moves all extremities equally. Psych: The patient's mood and affect are generally within normal limits for their presentation.       Diagnostic Results       RADIOLOGY:  No orders to display       LABS:   Results for orders placed or performed during the hospital encounter of 04/11/22   CBC with Auto Differential   Result Value Ref Range    WBC 14.8 (H) 4.0 - 11.0 K/uL    RBC 4.72 4.00 - 5.20 M/uL    Hemoglobin 12.3 12.0 - 16.0 g/dL    Hematocrit 39.7 36.0 - 48.0 %    MCV 84.0 80.0 - 100.0 fL    MCH 26.0 26.0 - 34.0 pg    MCHC 30.9 (L) 31.0 - 36.0 g/dL    RDW 14.4 12.4 - 15.4 %    Platelets 454 257 - 482 K/uL    MPV 10.6 (H) 5.0 - 10.5 fL    Neutrophils % 73.8 %    Lymphocytes % 19.7 %    Monocytes % 5.3 %    Eosinophils % 0.7 %    Basophils % 0.5 %    Neutrophils Absolute 11.0 (H) 1.7 - 7.7 K/uL    Lymphocytes Absolute 2.9 1.0 - 5.1 K/uL    Monocytes Absolute 0.8 0.0 - 1.3 K/uL    Eosinophils Absolute 0.1 0.0 - 0.6 K/uL    Basophils Absolute 0.1 0.0 - 0.2 K/uL   Urinalysis with Microscopic   Result Value Ref Range    Color, UA Yellow Straw/Yellow    Clarity, UA Clear Clear    Glucose, Ur >=1000 (A) Negative mg/dL    Bilirubin Urine Negative Negative    Ketones, Urine TRACE (A) Negative mg/dL    Specific Gravity, UA 1.020 1.005 - 1.030    Blood, Urine Negative Negative    pH, UA 6.0 5.0 - 8.0    Protein, UA Negative Negative mg/dL    Urobilinogen, Urine 0.2 <2.0 E.U./dL    Nitrite, Urine Negative Negative    Leukocyte Esterase, Urine Negative Negative    Microscopic Examination Not Indicated     Urine Type NotGiven     WBC, UA 0-2 0 - 5 /HPF    RBC, UA 0-2 0 - 4 /HPF    Epithelial Cells, UA 6-10 (A) 0 - 5 /HPF    Bacteria, UA Rare (A) None Seen /HPF Yeast, UA Present (A) None Seen /HPF         RECENT VITALS:   ,  ,  ,  ,       Procedures       ED Course     Nursing Notes, Past Medical Hx, Past Surgical Hx, Social Hx, Allergies, and Family Hx were reviewed. The patient was given the following medications:  No orders of the defined types were placed in this encounter. CONSULTS:  None    MEDICAL DECISION MAKING / ASSESSMENT / Carlos A Rani is a 59 y.o. female with a reported history of diverticulosis and diverticulitis, who presents to the emergency department with a somewhat chronic difficulty with constipation that she has been managing with occasional doses of magnesium citrate, which then lead to diarrhea. This has been accompanied more recently with increasing episodic lower abdominal pain, which over the weekend has become more focalized in the left lower quadrant, and was concerning to her and her gastroenterologist for recurrence of diverticulitis. Her abdomen is soft and benign, although with discomfort to palpation that is most prominent in the left lower quadrant. Some of her abdominal discomfort may be related to intestinal spasming, so she was given a dose of Bentyl as well as Zofran in the emergency department. Laboratory evaluation and CT of the abdomen and pelvis are pending. If she does have diverticulitis, she may need antibiotic treatment for this, and potentially admission if there are complications. If her CT does not show evidence of diverticulitis, she would likely benefit from a more consistent bowel regimen, and close follow-up with her gastroenterologist.    At this time, the patient's care is being given over the oncoming physician, who will follow up on these studies and determine the patient's final disposition accordingly. (Please note that portions of this note were completed with voice recognition software.   Efforts were made to edit the dictations but occasionally words are mis-transcribed.)     Jose Hull Padilla Pringle MD  04/11/22 [id number]

## 2022-04-12 ENCOUNTER — CARE COORDINATION (OUTPATIENT)
Dept: CARE COORDINATION | Age: 62
End: 2022-04-12

## 2022-04-12 ASSESSMENT — PATIENT HEALTH QUESTIONNAIRE - PHQ9
SUM OF ALL RESPONSES TO PHQ QUESTIONS 1-9: 0

## 2022-04-12 NOTE — CARE COORDINATION
Ambulatory Care Coordination Note  4/12/2022  CM Risk Score: 5  Charlson 10 Year Mortality Risk Score: 23%     ACC: Jeyson Frances, KHALIF    Summary Note: care transition outreach s/p ED visit 4.11.22 for abdominal cramping, diarrhea, which is unchanged. Abd pain rated 8 on 0-10 scale. She was unable to obtain newly Rx bentyl d/t pharmacy advised 'insurance does not cover'. Pt currently in review of this w/Lead Pharmacist, noting the last time she was advised of this, it was result of pharmacy technician attempting to run Rx through her former [employer] plan. Denies need for ACM assistance in review w/insurance or pharmacy. Reviewed DC instructions & need to follow w/PCP. Pt verbalized understanding of above and agreement with the following  Plan: pt will schedule ED follow up appt w/PCP  Initiate ACC support for health coaching, care collaboration, support w/community resources, and help with any newly presenting concerns as needed. Pt agrees to outreach direct to ACM, as needed, between routine follow up outreach initiated by [facility]    Ambulatory Care Coordination Assessment    Care Coordination Protocol  Program Enrollment: Complex Care  Referral from Primary Care Provider: No  Week 1 - Initial Assessment     Do you have all of your prescriptions and are they filled?: Yes (Comment: reviewed 4.12.22)  Barriers to medication adherence: Other  Other barriers to medication adherence: insurance denial reported by pharmacy; pt actively working w/pharmacist, believes Rx being ran through her former insurance plan  Are you able to afford your medications?: Yes  How often do you have trouble taking your medications the way you have been told to take them?: I always take them as prescribed. Do you have Home O2 Therapy?: No      Ability to seek help/take action for Emergent Urgent situations i.e. fire, crime, inclement weather or health crisis. : Independent  Ability to ambulate to restroom: Independent  Ability handle personal hygeine needs (bathing/dressing/grooming): Independent  Ability to manage Medications: Independent  Ability to prepare Food Preparation: Independent  Ability to maintain home (clean home, laundry): Independent  Ability to drive and/or has transportation: Independent  Ability to do shopping: Independent  Ability to manage finances: Independent  Is patient able to live independently?: Yes     Current Housing: Apartment        Per the Fall Risk Screening, did the patient have 2 or more falls or 1 fall with injury in the past year?: Yes  How often do you think you are about to fall and you do NOT fall?  For example, you grab something to stabilize yourself or hold onto a wall/furniture?: Never  Use of a Mobility Aid: Yes (Comment: straight cane - uses when out in community)  Difficulty walking/impaired gait: No  Issues with feet or shoes like numbness, edema, shoes not fitting: No  Changes in vision, poor vision or poor lighting in environment: No  Dizziness: No  Other Fall Risk: Yes  What other fall risks does the patient have?: deconditioned bilateral knees - hx left knee replacement; fall March 2022 Rt knee injury (following w/ortho)     Frequent urination at night?: No  Do you use rails/bars?: Yes  Do you have a non-slip tub mat?: Yes     Are you experiencing loss of meaning?: No  Are you experiencing loss of hope and peace?: No     Thinking about your patient's physical health needs, are there any symptoms or problems (risk indicators) you are unsure about that require further investigation?: No identified areas of uncertainly or problems already being investigated   Are the patients physical health problems impacting on their mental well-being?: No identified areas of concern   Are there any problems with your patients lifestyle behaviors (alcohol, drugs, diet, exercise) that are impacting on physical or mental well-being?: No identified areas of concern   Do you have any other concerns about your patients mental well-being? How would you rate their severity and impact on the patient?: No identified areas of concern   How would you rate their home environment in terms of safety and stability (including domestic violence, insecure housing, neighbor harassment)?: Consistently safe, supportive, stable, no identified problems   How do daily activities impact on the patient's well-being? (include current or anticipated unemployment, work, caregiving, access to transportation or other): No identified problems or perceived positive benefits   How would you rate their social network (family, work, friends)?: Good participation with social networks   How would you rate their financial resources (including ability to afford all required medical care)?: Financially secure, resources adequate, no identified problems   How wells does the patient now understand their health and well-being (symptoms, signs or risk factors) and what they need to do to manage their health?: Reasonable to good understanding and already engages in managing health or is willing to undertake better management   How well do you think your patient can engage in healthcare discussions? (Barriers include language, deafness, aphasia, alcohol or drug problems, learning difficulties, concentration): Clear and open communication, no identified barriers   Do other services need to be involved to help this patient?: Other care/services not required at this time   Are current services involved with this patient well-coordinated? (Include coordination with other services you are now recommendation):  All required care/services in place and well-coordinated   Suggested Interventions and Community Resources  Diabetes Education: Completed   Fall Risk Prevention: Completed Other Services or Interventions: review of pt centered goals, self mgmt concepts; health coaching; review of additional support available   Medication Assistance Program: Declined Pharmacist: Declined   Physical Therapy: Completed   Specialty Service Referral: Completed   Zone Management Tools: Completed         Schedule an appointment with the patient's PCP, Set up/Review an Education Plan, Set up/Review Goals          Prior to Admission medications    Medication Sig Start Date End Date Taking? Authorizing Provider   dicyclomine (BENTYL) 10 MG capsule Take 1 capsule by mouth every 6 hours as needed (cramps) 4/11/22   Myrna Peguero MD   polyethylene glycol [facility]) 17 GM/SCOOP powder Take 17 g by mouth 2 times daily for 7 days PRN constipation 4/11/22 4/18/22  Myrna Peguero MD   nystatin (MYCOSTATIN) 465301 UNIT/ML suspension 5 mL by mouth 4 times a day x 7-10 days, retain in mouth as long as possible before swallowing 4/4/22   Buster Klein,    insulin glargine (LANTUS SOLOSTAR) 100 UNIT/ML injection pen Inject 30 Units into the skin nightly 3/7/22   Isabel Rico DO   ibuprofen (ADVIL;MOTRIN) 800 MG tablet Take 1 tablet by mouth 2 times daily as needed for Pain 2/25/22   OLIVIA Duarte   atorvastatin (LIPITOR) 10 MG tablet TAKE ONE TABLET BY MOUTH DAILY 11/24/21   Isabel Rico DO   amLODIPine (NORVASC) 5 MG tablet TAKE ONE TABLET BY MOUTH DAILY 11/24/21   Isabel Rico DO   metFORMIN (GLUCOPHAGE) 1000 MG tablet TAKE 1 TABLET TWICE A DAY WITH MEALS 8/21/21   Isabel Rico DO   losartan (COZAAR) 100 MG tablet TAKE ONE TABLET BY MOUTH DAILY 7/8/21   Isabel Rico DO   montelukast (SINGULAIR) 10 MG tablet TAKE ONE TABLET BY MOUTH ONCE NIGHTLY 6/21/21   Isabel Rico DO   Insulin Pen Needle (PEN NEEDLES) 32G X 4 MM MISC USE TO INJECT INSULIN FOUR TIMES DAILY 4/22/21   Isabel Rico DO   Lancets MISC Test 1-3 times a day as needed for symptoms of irregular glucose. DX: E11.9 please provide lancets accepted by patient's insurance.  6/1/20   Buster Klein, DO blood glucose monitor strips Test 1-3 times a day as needed for symptoms of irregular glucose. DX: E11.9 please provide strips that are accepted by patient's insurance. 6/1/20   Isabel Rico, DO   ACCU-CHEK SOFTCLIX LANCETS MISC TEST BLOOD SUGAR TWO TIMES A DAY 8/5/19   John Green DO   Calcium Carb-Cholecalciferol (CALTRATE 600+D) 600-800 MG-UNIT TABS Take 1 tablet by mouth 2 times daily    Historical Provider, MD   cetirizine (ZYRTEC ALLERGY) 10 MG tablet Take 1 tablet by mouth daily 12/3/15   John Green DO   aspirin EC 81 MG EC tablet Take 81 mg by mouth daily.  LAST DOSE 8-9-14 9/28/11   Cris Del Rosario DO       Future Appointments   Date Time Provider Gabriel Case   4/20/2022  9:30 AM MILLIE Conti PT [facility]   4/25/2022  9:30 AM MILLIE Conti PT [facility]

## 2022-04-13 ENCOUNTER — CARE COORDINATION (OUTPATIENT)
Dept: CARE COORDINATION | Age: 62
End: 2022-04-13

## 2022-04-13 LAB
ANION GAP SERPL CALCULATED.3IONS-SCNC: 14 MMOL/L (ref 3–16)
BUN BLDV-MCNC: 11 MG/DL (ref 7–20)
CALCIUM SERPL-MCNC: 10 MG/DL (ref 8.3–10.6)
CHLORIDE BLD-SCNC: 96 MMOL/L (ref 99–110)
CO2: 23 MMOL/L (ref 21–32)
CREAT SERPL-MCNC: 0.6 MG/DL (ref 0.6–1.2)
GFR AFRICAN AMERICAN: >60
GFR NON-AFRICAN AMERICAN: >60
GLUCOSE BLD-MCNC: 507 MG/DL (ref 70–99)
POTASSIUM REFLEX MAGNESIUM: 4.2 MMOL/L (ref 3.5–5.1)
SODIUM BLD-SCNC: 133 MMOL/L (ref 136–145)

## 2022-04-13 NOTE — CARE COORDINATION
Ambulatory Care Coordination Note  4/13/2022  CM Risk Score: 5  Charlson 10 Year Mortality Risk Score: 23%     ACC: Woodrow Heck RN    Summary Note: Noted improvement of symptoms. Passing flatulence, which seems to be affording the noted relief. General Assessment    Do you have any symptoms that are causing concern?: No     pt found the medication not covered by insurance was mirilax, rather than bentyl. Pt was able to get generic OTC of mirilax. Taking all medications as prescribed. Reviewed availability of ACM. Pt verbalized appreciation of above and agreement with the following  Plan: continue [facility] support for health coaching, care collaboration, support w/community resources, and help with any newly presenting concerns as needed. Pt agrees to outreach direct to [facility] [employer], as needed, between routine follow up outreach initiated by [facility]    Care Coordination Interventions    Program Enrollment: Complex Care  Referral from Primary Care Provider: No  Suggested Interventions and Community Resources  Diabetes Education: Completed  Fall Risk Prevention: Completed (Comment: March 2022 injury to Rt knee; uses cane when out in community)  Medication Assistance Program: Declined (Comment: denies concerns as r/t insurance beyond occasional episodes when pharmacy technicians have 'ran through wrong plan'.  Reports insurance usually always covers Rx medications sufficiently)  Pharmacist: Declined (Comment: reports she is following directly w/local pharmacy re appropriating Rx through correct/current insurance plan)  Physical Therapy: Completed (Comment: per OP - for Rt knee )  Registered Dietician:  (Comment: 4.12.22 reviewed availability; pt will inform if interested to engage for nutritional support)  Specialty Services Referral: Completed (Comment: follows w/[facility])  Zone Management Tools: Completed (Comment: DM)  Other Services or Interventions: review of pt centered goals, self mgmt concepts; health coaching; review of additional support available         Goals Addressed                    This Visit's Progress      Conditions and Symptoms (pt-stated)   On track      I will schedule office visits, as directed by my provider. I will keep my appointment or reschedule if I have to cancel. I will notify my provider of any barriers to my plan of care. I will follow my Zone Management tool to seek urgent or emergent care. I will notify my provider of any symptoms that indicate a worsening of my condition. Barriers: impairment:  physical: pain (relates to current/recent acute GI concern)  Plan for overcoming my barriers: engage in Care Coordination for added care team support, as I continue in review w/providers involved in my care  Confidence: 10/10  Anticipated Goal Completion Date: 10.12.22          Reduce Falls  (pt-stated)   On track      I will reduce my risk of falls by the following: Use walking aids like cane or walker  Follow through on orders for PT  Continue to follow instructions per PT and Ortho providers    Barriers: impairment:  physical: deconditioned status of bilateral knees - notes Rt knee s/p fall March 2022; hx left knee replacement  Plan for overcoming my barriers: continuing to follow treatment plans of providers involved in my care; engage in Care Coordination for added care team support as needed  Confidence: 10/10  Anticipated Goal Completion Date: 10.12.22            Prior to Admission medications    Medication Sig Start Date End Date Taking?  Authorizing Provider   dicyclomine (BENTYL) 10 MG capsule Take 1 capsule by mouth every 6 hours as needed (cramps) 4/11/22  Yes Louis Arce MD   polyethylene glycol [facility]) 17 GM/SCOOP powder Take 17 g by mouth 2 times daily for 7 days PRN constipation 4/11/22 4/18/22 Yes Louis Arce MD   nystatin (MYCOSTATIN) 146373 UNIT/ML suspension 5 mL by mouth 4 times a day x 7-10 days, retain in mouth as long as possible before swallowing 4/4/22  Yes Raúl Stevens, DO   insulin glargine (LANTUS SOLOSTAR) 100 UNIT/ML injection pen Inject 30 Units into the skin nightly 3/7/22  Yes Raúl Stevens, DO   ibuprofen (ADVIL;MOTRIN) 800 MG tablet Take 1 tablet by mouth 2 times daily as needed for Pain 2/25/22  Yes OLIVIA Cox   atorvastatin (LIPITOR) 10 MG tablet TAKE ONE TABLET BY MOUTH DAILY 11/24/21  Yes Raúl Stevens, DO   amLODIPine (NORVASC) 5 MG tablet TAKE ONE TABLET BY MOUTH DAILY 11/24/21  Yes Raúl Stevens, DO   metFORMIN (GLUCOPHAGE) 1000 MG tablet TAKE 1 TABLET TWICE A DAY WITH MEALS 8/21/21  Yes Raúl Stevens, DO   losartan (COZAAR) 100 MG tablet TAKE ONE TABLET BY MOUTH DAILY 7/8/21  Yes Isabel Rico DO   montelukast (SINGULAIR) 10 MG tablet TAKE ONE TABLET BY MOUTH ONCE NIGHTLY 6/21/21  Yes Isabel Rico DO   Insulin Pen Needle (PEN NEEDLES) 32G X 4 MM MISC USE TO INJECT INSULIN FOUR TIMES DAILY 4/22/21  Yes Isabel Rico DO   Lancets MISC Test 1-3 times a day as needed for symptoms of irregular glucose. DX: E11.9 please provide lancets accepted by patient's insurance. 6/1/20  Yes Raúl Stevens,    blood glucose monitor strips Test 1-3 times a day as needed for symptoms of irregular glucose. DX: E11.9 please provide strips that are accepted by patient's insurance. 6/1/20  Yes Isabel Rico, DO   ACCU-CHEK SOFTCLIX LANCETS MISC TEST BLOOD SUGAR TWO TIMES A DAY 8/5/19  Yes John Green, DO   Calcium Carb-Cholecalciferol (CALTRATE 600+D) 600-800 MG-UNIT TABS Take 1 tablet by mouth 2 times daily   Yes Historical Provider, MD   cetirizine (ZYRTEC ALLERGY) 10 MG tablet Take 1 tablet by mouth daily 12/3/15  Yes John Green, DO   aspirin EC 81 MG EC tablet Take 81 mg by mouth daily.  LAST DOSE 8-9-14 9/28/11  Yes Nel Mccracken, DO       Future Appointments   Date Time Provider Gabriel Case   4/20/2022  9:30 AM Marissa Escobar, PT LINDA SERRANO PT [facility]   4/25/2022  9:30 AM MILLIE oJhnson PT [facility]

## 2022-04-18 ENCOUNTER — CARE COORDINATION (OUTPATIENT)
Dept: CARE COORDINATION | Age: 62
End: 2022-04-18

## 2022-04-18 NOTE — CARE COORDINATION
Ambulatory Care Coordination Note  4/18/2022  CM Risk Score: 5  Charlson 10 Year Mortality Risk Score: 23%     ACC: Philly Gonzalez, KHALIF    Summary Note:   General Assessment    Do you have any symptoms that are causing concern?: No       pt at work. Denies any newly presenting concerns. 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 Pt invites ACM to call again tomorrow, if pt/ACM schedules mutually allow. No specified time, 'just tomorrow some time'. Plan: per pt preference- will attempt further review tomorrow. Care Coordination Interventions    Program Enrollment: Complex Care  Referral from Primary Care Provider: No  Suggested Interventions and Community Resources  Diabetes Education: Completed  Fall Risk Prevention: Completed (Comment: March 2022 injury to Rt knee; uses cane when out in community)  Medication Assistance Program: Declined (Comment: denies concerns as r/t insurance beyond occasional episodes when pharmacy technicians have 'ran through wrong plan'.  Reports insurance usually always covers Rx medications sufficiently)  Pharmacist: Declined (Comment: reports she is following directly w/local pharmacy re appropriating Rx through correct/current insurance plan)  Physical Therapy: Completed (Comment: per OP - for Rt knee )  Registered Dietician:  (Comment: 4.12.22 reviewed availability; pt will inform if interested to engage for nutritional support)  Specialty Services Referral: Completed (Comment: follows w/[facility])  Zone Management Tools: Completed (Comment: DM)  Other Services or Interventions: review of pt centered goals, self mgmt concepts; health coaching; review of additional support available         Goals Addressed                    This Visit's Progress     Conditions and Symptoms (pt-stated)   On track      I will schedule office visits, as directed by my provider. I will keep my appointment or reschedule if I have to cancel. I will notify my provider of any barriers to my plan of care. I will follow my Zone Management tool to seek urgent or emergent care. I will notify my provider of any symptoms that indicate a worsening of my condition. Barriers: impairment:  physical: pain (relates to current/recent acute GI concern)  Plan for overcoming my barriers: engage in Care Coordination for added care team support, as I continue in review w/providers involved in my care  Confidence: 10/10  Anticipated Goal Completion Date: 10.12.22          Reduce Falls  (pt-stated)   On track      I will reduce my risk of falls by the following: Use walking aids like cane or walker  Follow through on orders for PT  Continue to follow instructions per PT and Ortho providers    Barriers: impairment:  physical: deconditioned status of bilateral knees - notes Rt knee s/p fall March 2022; hx left knee replacement  Plan for overcoming my barriers: continuing to follow treatment plans of providers involved in my care; engage in Care Coordination for added care team support as needed  Confidence: 10/10  Anticipated Goal Completion Date: 10.12.22            Prior to Admission medications    Medication Sig Start Date End Date Taking?  Authorizing Provider   dicyclomine (BENTYL) 10 MG capsule Take 1 capsule by mouth every 6 hours as needed (cramps) 4/11/22   Jessica Andrews MD   polyethylene glycol [facility]) 17 GM/SCOOP powder Take 17 g by mouth 2 times daily for 7 days PRN constipation 4/11/22 4/18/22  Jessica Andrews MD   nystatin (MYCOSTATIN) 961512 UNIT/ML suspension 5 mL by mouth 4 times a day x 7-10 days, retain in mouth as long as possible before swallowing 4/4/22   [facility], DO   insulin glargine (LANTUS SOLOSTAR) 100 UNIT/ML injection pen Inject 30 Units into the skin nightly 3/7/22   Isabel Rico DO   ibuprofen (ADVIL;MOTRIN) 800 MG tablet Take 1 tablet by mouth 2 times daily as needed for Pain 2/25/22   OLIVIA Zhang Cha   atorvastatin (LIPITOR) 10 MG tablet TAKE ONE TABLET BY MOUTH DAILY 11/24/21   Isabel Rico DO   amLODIPine (NORVASC) 5 MG tablet TAKE ONE TABLET BY MOUTH DAILY 11/24/21   Isabel Canchola DO   metFORMIN (GLUCOPHAGE) 1000 MG tablet TAKE 1 TABLET TWICE A DAY WITH MEALS 8/21/21   Isabel Rico DO   losartan (COZAAR) 100 MG tablet TAKE ONE TABLET BY MOUTH DAILY 7/8/21   Isabel Rico DO   montelukast (SINGULAIR) 10 MG tablet TAKE ONE TABLET BY MOUTH ONCE NIGHTLY 6/21/21   Isabel Rico DO   Insulin Pen Needle (PEN NEEDLES) 32G X 4 MM MISC USE TO INJECT INSULIN FOUR TIMES DAILY 4/22/21   Isabel Rico DO   Lancets MISC Test 1-3 times a day as needed for symptoms of irregular glucose. DX: E11.9 please provide lancets accepted by patient's insurance. 6/1/20   Raúl Stevens,    blood glucose monitor strips Test 1-3 times a day as needed for symptoms of irregular glucose. DX: E11.9 please provide strips that are accepted by patient's insurance. 6/1/20   Isabel Rico DO   ACCU-CHEK SOFTCLIX LANCETS MISC TEST BLOOD SUGAR TWO TIMES A DAY 8/5/19   John Green DO   Calcium Carb-Cholecalciferol (CALTRATE 600+D) 600-800 MG-UNIT TABS Take 1 tablet by mouth 2 times daily    Historical Provider, MD   cetirizine (ZYRTEC ALLERGY) 10 MG tablet Take 1 tablet by mouth daily 12/3/15   John Green DO   aspirin EC 81 MG EC tablet Take 81 mg by mouth daily.  LAST DOSE 8-9-14 9/28/11   Nel Mccracken DO       Future Appointments   Date Time Provider Gabriel Case   4/25/2022  9:30 AM Tian Kurtz, PT [facility]

## 2022-04-19 ENCOUNTER — CARE COORDINATION (OUTPATIENT)
Dept: CARE COORDINATION | Age: 62
End: 2022-04-19

## 2022-04-20 ENCOUNTER — APPOINTMENT (OUTPATIENT)
Dept: PHYSICAL THERAPY | Age: 62
End: 2022-04-20
Payer: MEDICARE

## 2022-04-23 DIAGNOSIS — E78.2 HYPERLIPIDEMIA, MIXED: ICD-10-CM

## 2022-04-25 ENCOUNTER — HOSPITAL ENCOUNTER (OUTPATIENT)
Dept: PHYSICAL THERAPY | Age: 62
Setting detail: THERAPIES SERIES
Discharge: HOME | End: 2022-04-25
Payer: MEDICARE

## 2022-04-25 RX ORDER — ATORVASTATIN CALCIUM 10 MG/1
TABLET, FILM COATED ORAL
Qty: 90 TABLET | Refills: 1 | Status: SHIPPED | OUTPATIENT
Start: 2022-04-25

## 2022-04-25 NOTE — TELEPHONE ENCOUNTER
Pt calling for refill on Atorvastatin to be called in to [facility]    She says she is going out of town tomorrow morning and will be gone for about 15 days.   She says she needs to pick this med up today    Last ov 4-4-22 HC  No future ov

## 2022-04-25 NOTE — PROGRESS NOTES
[facility] and [address],  Sports [facility] and Rehabilitation, [facility] [address]  [address] [facility],[address]   Arlene Fierro  Phone: [phone]  Fax: [phone]      Physical Therapy  Cancellation/No-show Note  Patient Name:  Rufina Long  :  1960   Date:  2022  Cancelled visits to date: 0  No-shows to date: 1    For today's appointment patient:  []  Cancelled  []  Rescheduled appointment  [x]  No-show     Reason given by patient:  []  Patient ill  []  Conflicting appointment   []  No transportation    []  Conflict with work  []  No reason given  []  Other:     Comments:      Electronically signed by:  Brandi Penaloza PT, PT

## 2022-05-03 ENCOUNTER — CARE COORDINATION (OUTPATIENT)
Dept: CARE COORDINATION | Age: 62
End: 2022-05-03

## 2022-05-03 NOTE — CARE COORDINATION
Routine follow up outreach for review pt status/progress towards goals. Mailbox FULL, unable to leave vm. Outreach via [address]

## 2022-05-19 ENCOUNTER — CARE COORDINATION (OUTPATIENT)
Dept: CARE COORDINATION | Age: 62
End: 2022-05-19

## 2022-05-27 ENCOUNTER — OFFICE VISIT (OUTPATIENT)
Dept: PRIMARY CARE CLINIC | Age: 62
End: 2022-05-27
Payer: MEDICARE

## 2022-05-27 VITALS
SYSTOLIC BLOOD PRESSURE: 112 MMHG | DIASTOLIC BLOOD PRESSURE: 77 MMHG | WEIGHT: 199 LBS | HEART RATE: 108 BPM | TEMPERATURE: 96.8 F | OXYGEN SATURATION: 96 % | BODY MASS INDEX: 36.4 KG/M2

## 2022-05-27 DIAGNOSIS — I83.93 VARICOSE VEINS OF BOTH LOWER EXTREMITIES, UNSPECIFIED WHETHER COMPLICATED: Primary | ICD-10-CM

## 2022-05-27 PROCEDURE — 99213 OFFICE O/P EST LOW 20 MIN: CPT | Performed by: NURSE PRACTITIONER

## 2022-05-27 ASSESSMENT — ENCOUNTER SYMPTOMS
ROS SKIN COMMENTS: VARICOSE VEINS
ABDOMINAL PAIN: 0
SORE THROAT: 0

## 2022-05-27 NOTE — PROGRESS NOTES
[address] PRIMARY CARE  [address] [place] [id number]  Dept: [phone]  Dept Fax: [phone]     5/27/2022      Sheila Levy   1960     Chief Complaint   Patient presents with    Other     skin problem       HPI     Patient presents with complaint of varicose veins. She had previously seen Dr. Maria Connolly for this issue but has noticed some new veins popping up. They are not painful but she does not like the cosmetic appearance of them. No other acute complaints today. PHQ Scores 4/12/2022 11/24/2021 9/28/2020 11/22/2019 10/11/2019 9/30/2019 9/26/2019   PHQ2 Score 0 0 0 0 0 2 6   PHQ9 Score 0 0 0 0 0 8 24     Interpretation of Total Score Depression Severity: 1-4 = Minimal depression, 5-9 = Mild depression, 10-14 = Moderate depression, 15-19 = Moderately severe depression, 20-27 = Severe depression     Prior to Visit Medications    Medication Sig Taking?  Authorizing Provider   atorvastatin (LIPITOR) 10 MG tablet TAKE ONE TABLET BY MOUTH DAILY Yes Sushil Estrada APRN - CNP   dicyclomine (BENTYL) 10 MG capsule Take 1 capsule by mouth every 6 hours as needed (cramps) Yes Saba Fernandes MD   nystatin (MYCOSTATIN) 986806 UNIT/ML suspension 5 mL by mouth 4 times a day x 7-10 days, retain in mouth as long as possible before swallowing Yes Odilia Kendall DO   insulin glargine (LANTUS SOLOSTAR) 100 UNIT/ML injection pen Inject 30 Units into the skin nightly Yes Isabel Rico DO   ibuprofen (ADVIL;MOTRIN) 800 MG tablet Take 1 tablet by mouth 2 times daily as needed for Pain Yes OLIVIA Cox   amLODIPine (NORVASC) 5 MG tablet TAKE ONE TABLET BY MOUTH DAILY Yes Isabel Rico DO   metFORMIN (GLUCOPHAGE) 1000 MG tablet TAKE 1 TABLET TWICE A DAY WITH MEALS Yes Isabel Rico DO   losartan (COZAAR) 100 MG tablet TAKE ONE TABLET BY MOUTH DAILY Yes Isabel Rico DO   montelukast (SINGULAIR) 10 MG tablet TAKE ONE TABLET BY MOUTH ONCE NIGHTLY Yes Isabel Rico, DO   Insulin Pen Needle (PEN NEEDLES) 32G X 4 MM MISC USE TO INJECT INSULIN FOUR TIMES DAILY Yes Isabel Rico, DO   Lancets MISC Test 1-3 times a day as needed for symptoms of irregular glucose. DX: E11.9 please provide lancets accepted by patient's insurance. Yes [address], DO   blood glucose monitor strips Test 1-3 times a day as needed for symptoms of irregular glucose. DX: E11.9 please provide strips that are accepted by patient's insurance. Yes Isabel Rico, DO   ACCU-CHEK SOFTCLIX LANCETS MISC TEST BLOOD SUGAR TWO TIMES A DAY Yes John Green, DO   Calcium Carb-Cholecalciferol (CALTRATE 600+D) 600-800 MG-UNIT TABS Take 1 tablet by mouth 2 times daily Yes Historical Provider, MD   cetirizine (ZYRTEC ALLERGY) 10 MG tablet Take 1 tablet by mouth daily Yes John Green DO   aspirin EC 81 MG EC tablet Take 81 mg by mouth daily.  LAST DOSE 14 Yes John Green DO       Past Medical History:   Diagnosis Date    Allergic rhinitis     HTN (hypertension)     Hyperlipidemia     Obstructive sleep apnea (adult) (pediatric)     Non-compliant with CPAP    Osteoarthritis     Type II or unspecified type diabetes mellitus without mention of complication, not stated as uncontrolled         Social History     Tobacco Use    Smoking status: Former Smoker     Packs/day: 0.25     Years: 20.00     Pack years: 5.00     Types: Cigars     Quit date: 2019     Years since quittin.6    Smokeless tobacco: Never Used    Tobacco comment: was smoking 5 black and milds per day, but has not smoked in 4 days   Vaping Use    Vaping Use: Never used   Substance Use Topics    Alcohol use: Yes     Comment: 1 alcoholic beverage every other month    Drug use: Not Currently     Comment: H/o cocaine use        Past Surgical History:   Procedure Laterality Date    COLON SURGERY      10\" removed - chronic constipation    DUPUYTRENS CONTRACTURE SURGERY Left 1/29/2019    LEFT 1ST DORSAL COMPARMENT DE' [address] [facility] performed by Levon Hewitt MD at [address]       4x     HYSTERECTOMY, TOTAL ABDOMINAL      JOINT REPLACEMENT  2007    Left knee partial    KNEE SURGERY  1990's    rt knee reconstructed    NECK SURGERY  05/12/2011    c 4,5,6,7 (ACDF)    OVARY REMOVAL      ROTATOR CUFF REPAIR  2009    Left shoulder    SHOULDER SURGERY  1990's    lt & rt shoulder impingment    SINUS ENDOSCOPY  9-15-14    FUNCTIONAL ENDOSCOPIC SINUS SURGERY       TONSILLECTOMY AND ADENOIDECTOMY      TOTAL KNEE ARTHROPLASTY Left 03/09/2010    [place] to Total   Valerie Lew REPAIR  8/2010    Dr. Escalera Milks        Allergies   Allergen Reactions    Gabapentin Other (See Comments), Dizziness or Vertigo and Hallucinations     BLURRED VISION      Demerol Dermatitis     ITCHING    Levaquin [Levofloxacin In D5w] Nausea Only    Morphine Itching    Tramadol Other (See Comments)     \"NO CLUE\"        Family History   Problem Relation Age of Onset    Diabetes Father     Heart Disease Father         CAD    High Blood Pressure Father     High Cholesterol Father     Kidney Disease Brother     Diabetes Son     Other Son         DEANA    Heart Disease Sister         1/2 sister    Breast Cancer Paternal Cousin     Rheum Arthritis Neg Hx     Osteoarthritis Neg Hx     Asthma Neg Hx     Cancer Neg Hx     Heart Failure Neg Hx     Hypertension Neg Hx     Migraines Neg Hx     Ovarian Cancer Neg Hx     Rashes/Skin Problems Neg Hx     Seizures Neg Hx     Stroke Neg Hx     Thyroid Disease Neg Hx         Patient's past medical history, surgical history, family history, medications, and allergies  were all reviewed and updated as appropriate today. Review of Systems   Constitutional: Negative for fatigue and fever. HENT: Negative for congestion and sore throat.     Gastrointestinal: Negative for abdominal pain. Genitourinary: Negative for difficulty urinating. Musculoskeletal: Negative for arthralgias and myalgias. Skin:        Varicose veins   Neurological: Negative for dizziness and weakness. Hematological: Negative for adenopathy. Psychiatric/Behavioral: Negative. /77   Pulse (!) 108   Temp 96.8 °F (36 °C)   Wt 199 lb (90.3 kg)   LMP 01/26/1996   SpO2 96%   BMI 36.40 kg/m²      Physical Exam  Constitutional:       Appearance: Normal appearance. HENT:      Head: Normocephalic. Cardiovascular:      Rate and Rhythm: Normal rate and regular rhythm. Heart sounds: Normal heart sounds. No murmur heard. Pulmonary:      Breath sounds: Normal breath sounds. Abdominal:      Palpations: Abdomen is soft. Tenderness: There is no abdominal tenderness. Musculoskeletal:         General: Normal range of motion. Skin:     General: Skin is warm and dry. Comments: Varicose veins on BLE   Neurological:      Mental Status: She is alert and oriented to person, place, and time. Psychiatric:         Mood and Affect: Mood normal.         Behavior: Behavior normal.         Assessment:  Encounter Diagnosis   Name Primary?  Varicose veins of both lower extremities, unspecified whether complicated Yes       Plan:  1. Varicose veins of both lower extremities, unspecified whether complicated  -Varicose veins without complications; patient does not like cosmetic appearance. Discussed referral to vascular to discuss treatment. She is agreeable and would like referral placed today. Follow up in office as needed. - Jude Coyle MD, Vascular Surgery, [facility]      Return if symptoms worsen or fail to improve.              NOHELIA Monet - CNP

## 2022-06-10 ENCOUNTER — OFFICE VISIT (OUTPATIENT)
Dept: ORTHOPEDIC SURGERY | Age: 62
End: 2022-06-10
Payer: MEDICARE

## 2022-06-10 VITALS — HEIGHT: 62 IN | WEIGHT: 199 LBS | BODY MASS INDEX: 36.62 KG/M2

## 2022-06-10 DIAGNOSIS — Z96.652 HISTORY OF TOTAL LEFT KNEE REPLACEMENT: Primary | ICD-10-CM

## 2022-06-10 DIAGNOSIS — Z01.818 PRE-OP TESTING: ICD-10-CM

## 2022-06-10 DIAGNOSIS — T84.84XA PAIN DUE TO TOTAL LEFT KNEE REPLACEMENT, INITIAL ENCOUNTER (HCC): ICD-10-CM

## 2022-06-10 DIAGNOSIS — Z96.652 PAIN DUE TO TOTAL LEFT KNEE REPLACEMENT, INITIAL ENCOUNTER (HCC): ICD-10-CM

## 2022-06-10 PROCEDURE — 99214 OFFICE O/P EST MOD 30 MIN: CPT | Performed by: ORTHOPAEDIC SURGERY

## 2022-06-10 NOTE — PROGRESS NOTES
Dr Michelle Gaytan      Date /Time 6/10/2022       2:50 PM EDT  Name Zi Davis             1960   Location  [facility] SURG  MRN [id number]                Chief Complaint   Patient presents with    Knee Pain     CK LEFT KNEE         History of Present Illness  Zi Davis is a 59 y.o. female who presents with  left knee pain. Sent in consultation by Vincenzo Perez DO,. 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 Athletic/exercise activity: no sports. Injury Mechanism:  none. Worker's Comp. & legal issues:   none. Previous Treatments: Ice, Heat and NSAIDs    Patient presents to the office today for a new problem. Patient here with a chief complaint of left knee pain. Patient's left knee has been painful for several months. She originally had a partial knee replacement done by Dr. Sabrina Mireles in 2007 which was converted to a total knee arthroplasty in 2010. Her pain is global in nature. She complains of significant swelling. No recent injury or trauma. Patient states later that the unicompartmental arthroplasty became loose and they indented either infection before they put in the total knee arthroplasty.     Past History  Past Medical History:   Diagnosis Date    Allergic rhinitis     HTN (hypertension)     Hyperlipidemia     Obstructive sleep apnea (adult) (pediatric)     Non-compliant with CPAP    Osteoarthritis     Type II or unspecified type diabetes mellitus without mention of complication, not stated as uncontrolled      Past Surgical History:   Procedure Laterality Date    COLON SURGERY  2008    10\" removed - chronic constipation    DUPUYTRENS CONTRACTURE SURGERY Left 1/29/2019    LEFT 1ST DORSAL COMPARMENT DE' QUERVAIN'S RELEASE performed by Josué Balderrama MD at [address]       4x     HYSTERECTOMY, TOTAL ABDOMINAL (CERVIX REMOVED)      JOINT REPLACEMENT  2007    Left knee partial    KNEE SURGERY  1990's    rt knee reconstructed    NECK SURGERY  2011    c 4,5,6,7 (ACDF)    OVARY REMOVAL      ROTATOR CUFF REPAIR  2009    Left shoulder    SHOULDER SURGERY      lt & rt shoulder impingment    SINUS ENDOSCOPY  9-15-14    FUNCTIONAL ENDOSCOPIC SINUS SURGERY       TONSILLECTOMY AND ADENOIDECTOMY      TOTAL KNEE ARTHROPLASTY Left 2010    Eros to Total    TUBAL LIGATION      UMBILICAL HERNIA REPAIR  2010    Dr. Bella Ribera     Social History     Tobacco Use    Smoking status: Former Smoker     Packs/day: 0.25     Years: 20.00     Pack years: 5.00     Types: Cigars     Quit date: 2019     Years since quittin.7    Smokeless tobacco: Never Used    Tobacco comment: was smoking 5 black and milds per day, but has not smoked in 4 days   Substance Use Topics    Alcohol use: Yes     Comment: 1 alcoholic beverage every other month      Current Outpatient Medications on File Prior to Visit   Medication Sig Dispense Refill    atorvastatin (LIPITOR) 10 MG tablet TAKE ONE TABLET BY MOUTH DAILY 90 tablet 1    dicyclomine (BENTYL) 10 MG capsule Take 1 capsule by mouth every 6 hours as needed (cramps) 20 capsule 0    nystatin (MYCOSTATIN) 512729 UNIT/ML suspension 5 mL by mouth 4 times a day x 7-10 days, retain in mouth as long as possible before swallowing 250 mL 0    insulin glargine (LANTUS SOLOSTAR) 100 UNIT/ML injection pen Inject 30 Units into the skin nightly 10 pen 3    ibuprofen (ADVIL;MOTRIN) 800 MG tablet Take 1 tablet by mouth 2 times daily as needed for Pain 60 tablet 0    amLODIPine (NORVASC) 5 MG tablet TAKE ONE TABLET BY MOUTH DAILY 60 tablet 5    metFORMIN (GLUCOPHAGE) 1000 MG tablet TAKE 1 TABLET TWICE A DAY WITH MEALS 180 tablet 3    losartan (COZAAR) 100 MG tablet TAKE ONE TABLET BY MOUTH DAILY 60 tablet 5    montelukast (SINGULAIR) 10 MG tablet TAKE ONE TABLET BY MOUTH ONCE NIGHTLY 60 tablet 5    Insulin Pen Needle (PEN NEEDLES) 32G X 4 MM MISC USE TO INJECT INSULIN FOUR TIMES DAILY 200 each 5    Lancets MISC Test 1-3 times a day as needed for symptoms of irregular glucose. DX: E11.9 please provide lancets accepted by patient's insurance. 100 each 3    blood glucose monitor strips Test 1-3 times a day as needed for symptoms of irregular glucose. DX: E11.9 please provide strips that are accepted by patient's insurance. 200 strip 3    ACCU-CHEK SOFTCLIX LANCETS MISC TEST BLOOD SUGAR TWO TIMES A  each 4    Calcium Carb-Cholecalciferol (CALTRATE 600+D) 600-800 MG-UNIT TABS Take 1 tablet by mouth 2 times daily      cetirizine (ZYRTEC ALLERGY) 10 MG tablet Take 1 tablet by mouth daily      aspirin EC 81 MG EC tablet Take 81 mg by mouth daily. LAST DOSE 8-9-14 30 tablet 11     No current facility-administered medications on file prior to visit. ASCVD 10-YEAR RISK SCORE  The 10-year ASCVD risk score (Rivas Hutchinson, et al., 2013) is: 11.9%    Values used to calculate the score:      Age: 59 years      Sex: Female      Is Non- : Yes      Diabetic: Yes      Tobacco smoker: No      Systolic Blood Pressure: 111 mmHg      Is BP treated: Yes      HDL Cholesterol: 28 mg/dL      Total Cholesterol: 133 mg/dL     Review of Systems  10-point ROS is negative other than HPI. Physical Exam  Based off 1997 Exam Criteria  Ht 5' 2\" (1.575 m)   Wt 199 lb (90.3 kg)   LMP 01/26/1996   BMI 36.40 kg/m²      Constitutional:       General: He is not in acute distress. Appearance: Normal appearance. Cardiovascular:      Rate and Rhythm: Normal rate and regular rhythm. Pulses: Normal pulses. Pulmonary:      Effort: Pulmonary effort is normal. No respiratory distress. Neurological:      Mental Status: He is alert and oriented to person, place, and time. Mental status is at baseline. Musculoskeletal:  Gait:  antalgic  Lumbar spine: There is no swelling, warmth, or erythema. Range of motion is within normal limits.  There is no paraspinal or spinous process tenderness. . The distal neurovascular exam is grossly intact and symmetric. Ritesh Hip: Examination of the right and left hip reveals intact skin. The patient demonstrates full painless range of motion with regards to flexion, abduction, internal and external rotation. There is no tenderness about the greater trochanter. L Knee: Physical exam of the knee demonstrates painful range of motion 5-110. Tenderness over the medial joint line. No gross instability to either varus or valgus stress test.  Positive knee joint effusion      Imaging  Left Knee: [address] [facility]  Radiographs: X-rays were ordered and reviewed of the left knee. 4 views. Standing AP, standing AP flexed, lateral, and skyline views. They demonstrate a total knee arthroplasty present. No evidence of periprosthetic fracture. There is a radiolucent line surrounding the majority of the tibial component including the cemented stem. Procedure:  No orders of the defined types were placed in this encounter. Assessment and Plan  David Zheng was seen today for knee pain. Diagnoses and all orders for this visit:    History of total left knee replacement    [facility] due [facility]        I do believe the patient has a loose total knee arthroplasty. At least the tibial component is involved. We need to evaluate for septic versus aseptic loosening. As for now I am favoring septic loosening. We will order preoperative blood work with the addition of acute phase reactants. She will need aspiration and Synovasure before proceeding with surgery. We will see her back in the office after blood work to review results. I discussed with Gerardo Martin that her history, symptoms, signs and imaging are most consistent with previous TKA replacement.     We reviewed the natural history of these conditions and treatment options ranging from conservative measures (rest, icing, activity modification, physical therapy, pain meds) to surgical options. In terms of treatment, I recommended continuing with rest, icing, avoidance of painful activities, NSAIDs or pain meds as tolerated, and physical therapy. We discussed surgical options as well, should conservative measures fail. Electronically signed by Cortez Bernstein MD on 6/10/2022 at 2:50 PM  This dictation was generated by voice recognition computer software. Although all attempts are made to edit the dictation for accuracy, there may be errors in the transcription that are not intended.

## 2022-06-13 ENCOUNTER — TELEPHONE (OUTPATIENT)
Dept: ORTHOPEDIC SURGERY | Age: 62
End: 2022-06-13

## 2022-06-13 NOTE — TELEPHONE ENCOUNTER
General Question     Subject: Patient called to let you know that her sugar and BP has been high. Patient needs to know if she needs to do anything before her appt.    Patient and /or Facility Request: Ester Oppenheim Number: [phone]

## 2022-06-15 ENCOUNTER — OFFICE VISIT (OUTPATIENT)
Dept: ORTHOPEDIC SURGERY | Age: 62
End: 2022-06-15
Payer: MEDICARE

## 2022-06-15 VITALS — HEIGHT: 62 IN | WEIGHT: 199 LBS | BODY MASS INDEX: 36.62 KG/M2

## 2022-06-15 DIAGNOSIS — Z96.652 HISTORY OF TOTAL LEFT KNEE REPLACEMENT: ICD-10-CM

## 2022-06-15 DIAGNOSIS — Z96.652 PAIN DUE TO TOTAL LEFT KNEE REPLACEMENT, INITIAL ENCOUNTER (HCC): Primary | ICD-10-CM

## 2022-06-15 DIAGNOSIS — Z79.4 TYPE 2 DIABETES MELLITUS WITH HYPERGLYCEMIA, WITH LONG-TERM CURRENT USE OF INSULIN (HCC): ICD-10-CM

## 2022-06-15 DIAGNOSIS — E11.65 TYPE 2 DIABETES MELLITUS WITH HYPERGLYCEMIA, WITH LONG-TERM CURRENT USE OF INSULIN (HCC): ICD-10-CM

## 2022-06-15 DIAGNOSIS — T84.84XA PAIN DUE TO TOTAL LEFT KNEE REPLACEMENT, INITIAL ENCOUNTER (HCC): Primary | ICD-10-CM

## 2022-06-15 DIAGNOSIS — T84.84XA PAIN DUE TO TOTAL LEFT KNEE REPLACEMENT, INITIAL ENCOUNTER (HCC): ICD-10-CM

## 2022-06-15 DIAGNOSIS — Z01.818 PRE-OP TESTING: ICD-10-CM

## 2022-06-15 DIAGNOSIS — Z96.652 PAIN DUE TO TOTAL LEFT KNEE REPLACEMENT, INITIAL ENCOUNTER (HCC): ICD-10-CM

## 2022-06-15 LAB
ABO/RH: NORMAL
ALBUMIN SERPL-MCNC: 4.7 G/DL (ref 3.4–5)
ANION GAP SERPL CALCULATED.3IONS-SCNC: 21 MMOL/L (ref 3–16)
ANTIBODY SCREEN: NORMAL
APTT: 31.7 SEC (ref 23–34.3)
BACTERIA: ABNORMAL /HPF
BASOPHILS ABSOLUTE: 0.1 K/UL (ref 0–0.2)
BASOPHILS RELATIVE PERCENT: 0.4 %
BILIRUBIN URINE: NEGATIVE
BLOOD, URINE: NEGATIVE
BUDDING YEAST: PRESENT
BUN BLDV-MCNC: 11 MG/DL (ref 7–20)
C-REACTIVE PROTEIN: 7 MG/L (ref 0–5.1)
CALCIUM SERPL-MCNC: 11.1 MG/DL (ref 8.3–10.6)
CHLORIDE BLD-SCNC: 97 MMOL/L (ref 99–110)
CLARITY: ABNORMAL
CO2: 22 MMOL/L (ref 21–32)
COLOR: ABNORMAL
CREAT SERPL-MCNC: 0.6 MG/DL (ref 0.6–1.2)
CREATININE URINE: 251.9 MG/DL (ref 28–259)
EOSINOPHILS ABSOLUTE: 0.1 K/UL (ref 0–0.6)
EOSINOPHILS RELATIVE PERCENT: 1 %
EPITHELIAL CELLS, UA: 6 /HPF (ref 0–5)
GFR AFRICAN AMERICAN: >60
GFR NON-AFRICAN AMERICAN: >60
GLUCOSE BLD-MCNC: 211 MG/DL (ref 70–99)
GLUCOSE URINE: 100 MG/DL
HCT VFR BLD CALC: 43.6 % (ref 36–48)
HEMOGLOBIN: 13.9 G/DL (ref 12–16)
HYALINE CASTS: 2 /LPF (ref 0–8)
INR BLD: 1.02 (ref 0.87–1.14)
KETONES, URINE: ABNORMAL MG/DL
LEUKOCYTE ESTERASE, URINE: ABNORMAL
LYMPHOCYTES ABSOLUTE: 3.1 K/UL (ref 1–5.1)
LYMPHOCYTES RELATIVE PERCENT: 23.3 %
MCH RBC QN AUTO: 26.4 PG (ref 26–34)
MCHC RBC AUTO-ENTMCNC: 31.8 G/DL (ref 31–36)
MCV RBC AUTO: 83 FL (ref 80–100)
MICROALBUMIN UR-MCNC: 6.1 MG/DL
MICROALBUMIN/CREAT UR-RTO: 24.2 MG/G (ref 0–30)
MICROSCOPIC EXAMINATION: YES
MONOCYTES ABSOLUTE: 0.7 K/UL (ref 0–1.3)
MONOCYTES RELATIVE PERCENT: 5.4 %
MUCUS: PRESENT
NEUTROPHILS ABSOLUTE: 9.2 K/UL (ref 1.7–7.7)
NEUTROPHILS RELATIVE PERCENT: 69.9 %
NITRITE, URINE: NEGATIVE
PDW BLD-RTO: 13.7 % (ref 12.4–15.4)
PH UA: 5.5 (ref 5–8)
PLATELET # BLD: 200 K/UL (ref 135–450)
PLATELET SLIDE REVIEW: ADEQUATE
PMV BLD AUTO: 10.7 FL (ref 5–10.5)
POTASSIUM SERPL-SCNC: 3.9 MMOL/L (ref 3.5–5.1)
PROTEIN UA: 30 MG/DL
PROTHROMBIN TIME: 13.3 SEC (ref 11.7–14.5)
RBC # BLD: 5.26 M/UL (ref 4–5.2)
RBC UA: 4 /HPF (ref 0–4)
SEDIMENTATION RATE, ERYTHROCYTE: 60 MM/HR (ref 0–30)
SLIDE REVIEW: ABNORMAL
SODIUM BLD-SCNC: 140 MMOL/L (ref 136–145)
SPECIFIC GRAVITY UA: 1.02 (ref 1–1.03)
TRANSFERRIN: 304 MG/DL (ref 200–360)
URINE TYPE: ABNORMAL
UROBILINOGEN, URINE: 1 E.U./DL
WBC # BLD: 13.2 K/UL (ref 4–11)
WBC UA: 2 /HPF (ref 0–5)

## 2022-06-15 PROCEDURE — 20610 DRAIN/INJ JOINT/BURSA W/O US: CPT | Performed by: PHYSICIAN ASSISTANT

## 2022-06-15 PROCEDURE — 99213 OFFICE O/P EST LOW 20 MIN: CPT | Performed by: PHYSICIAN ASSISTANT

## 2022-06-15 NOTE — PROGRESS NOTES
Dr Biakna Marino      Date /Time 6/15/2022       2:50 PM EDT  Name Renato Walsh             1960   Location  [facility]  MRN [id number]                Chief Complaint   Patient presents with    Knee Pain     CK LEFT KNEE, LAB RESULTS         History of Present Illness  Renato Walsh is a 59 y.o. female who presents with  left knee pain. 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 Athletic/exercise activity: no sports. Injury Mechanism:  none. Worker's Comp. & legal issues:   none. Previous Treatments: Ice, Heat and NSAIDs    Patient here for a follow-up visit concerning her left knee. Patient is currently undergoing work-up for septic versus aseptic loosening of her left total knee arthroplasty. She continues to deny fever or chills. She does continue to complain of left knee pain and significant swelling. Previous history: Patient presents to the office today for a new problem. Patient here with a chief complaint of left knee pain. Patient's left knee has been painful for several months. She originally had a partial knee replacement done by Dr. Deven Vieira in 2007 which was converted to a total knee arthroplasty in 2010. Her pain is global in nature. She complains of significant swelling. No recent injury or trauma. Patient states later that the unicompartmental arthroplasty became loose and they indented either infection before they put in the total knee arthroplasty.     Past History  Past Medical History:   Diagnosis Date    Allergic rhinitis     HTN (hypertension)     Hyperlipidemia     Obstructive sleep apnea (adult) (pediatric)     Non-compliant with CPAP    Osteoarthritis     Type II or unspecified type diabetes mellitus without mention of complication, not stated as uncontrolled      Past Surgical History:   Procedure Laterality Date    COLON SURGERY  2008    10\" removed - chronic constipation    DUPUYTRENS CONTRACTURE SURGERY Left 2019    LEFT 1ST DORSAL COMPARMENT DE' [address] performed by Stu Ballard MD at [address]       4x     HYSTERECTOMY, TOTAL ABDOMINAL (CERVIX REMOVED)      JOINT REPLACEMENT      Left knee partial    KNEE SURGERY  's    rt knee reconstructed    NECK SURGERY  2011    c 4,5,6,7 (ACDF)    OVARY REMOVAL      ROTATOR CUFF REPAIR      Left shoulder    SHOULDER SURGERY      lt & rt shoulder impingment    SINUS ENDOSCOPY  9-15-14    FUNCTIONAL ENDOSCOPIC SINUS SURGERY       TONSILLECTOMY AND ADENOIDECTOMY      TOTAL KNEE ARTHROPLASTY Left 2010    Eros to Total    TUBAL LIGATION      UMBILICAL HERNIA REPAIR  2010    Dr. Lissa Rogers     Social History     Tobacco Use    Smoking status: Former Smoker     Packs/day: 0.25     Years: 20.00     Pack years: 5.00     Types: Cigars     Quit date: 2019     Years since quittin.7    Smokeless tobacco: Never Used    Tobacco comment: was smoking 5 black and milds per day, but has not smoked in 4 days   Substance Use Topics    Alcohol use: Yes     Comment: 1 alcoholic beverage every other month      Current Outpatient Medications on File Prior to Visit   Medication Sig Dispense Refill    atorvastatin (LIPITOR) 10 MG tablet TAKE ONE TABLET BY MOUTH DAILY 90 tablet 1    dicyclomine (BENTYL) 10 MG capsule Take 1 capsule by mouth every 6 hours as needed (cramps) 20 capsule 0    nystatin (MYCOSTATIN) 314807 UNIT/ML suspension 5 mL by mouth 4 times a day x 7-10 days, retain in mouth as long as possible before swallowing 250 mL 0    insulin glargine (LANTUS SOLOSTAR) 100 UNIT/ML injection pen Inject 30 Units into the skin nightly 10 pen 3    ibuprofen (ADVIL;MOTRIN) 800 MG tablet Take 1 tablet by mouth 2 times daily as needed for Pain 60 tablet 0    amLODIPine (NORVASC) 5 MG tablet TAKE ONE TABLET BY MOUTH DAILY 60 tablet 5    metFORMIN (GLUCOPHAGE) 1000 MG tablet TAKE 1 TABLET TWICE A DAY WITH MEALS 180 tablet 3    losartan (COZAAR) 100 MG tablet TAKE ONE TABLET BY MOUTH DAILY 60 tablet 5    montelukast (SINGULAIR) 10 MG tablet TAKE ONE TABLET BY MOUTH ONCE NIGHTLY 60 tablet 5    Insulin Pen Needle (PEN NEEDLES) 32G X 4 MM MISC USE TO INJECT INSULIN FOUR TIMES DAILY 200 each 5    Lancets MISC Test 1-3 times a day as needed for symptoms of irregular glucose. DX: E11.9 please provide lancets accepted by patient's insurance. 100 each 3    blood glucose monitor strips Test 1-3 times a day as needed for symptoms of irregular glucose. DX: E11.9 please provide strips that are accepted by patient's insurance. 200 strip 3    ACCU-CHEK SOFTCLIX LANCETS MISC TEST BLOOD SUGAR TWO TIMES A  each 4    Calcium Carb-Cholecalciferol (CALTRATE 600+D) 600-800 MG-UNIT TABS Take 1 tablet by mouth 2 times daily      cetirizine (ZYRTEC ALLERGY) 10 MG tablet Take 1 tablet by mouth daily      aspirin EC 81 MG EC tablet Take 81 mg by mouth daily. LAST DOSE 8-9-14 30 tablet 11     No current facility-administered medications on file prior to visit. ASCVD 10-YEAR RISK SCORE  The 10-year ASCVD risk score (Suzie Clemens, et al., 2013) is: 11.9%    Values used to calculate the score:      Age: 59 years      Sex: Female      Is Non- : Yes      Diabetic: Yes      Tobacco smoker: No      Systolic Blood Pressure: 027 mmHg      Is BP treated: Yes      HDL Cholesterol: 28 mg/dL      Total Cholesterol: 133 mg/dL     Review of Systems  10-point ROS is negative other than HPI. Physical Exam  Based off 1997 Exam Criteria  Ht 5' 2\" (1.575 m)   Wt 199 lb (90.3 kg)   LMP 01/26/1996   BMI 36.40 kg/m²      Constitutional:       General: He is not in acute distress. Appearance: Normal appearance. Cardiovascular:      Rate and Rhythm: Normal rate and regular rhythm. Pulses: Normal pulses. Pulmonary:      Effort: Pulmonary effort is normal. No respiratory distress.    Neurological:      Mental Status: He is alert and oriented to person, place, and time. Mental status is at baseline. Musculoskeletal:  Gait:  antalgic  Lumbar spine: There is no swelling, warmth, or erythema. Range of motion is within normal limits. There is no paraspinal or spinous process tenderness. . The distal neurovascular exam is grossly intact and symmetric. Ritesh Hip: Examination of the right and left hip reveals intact skin. The patient demonstrates full painless range of motion with regards to flexion, abduction, internal and external rotation. There is no tenderness about the greater trochanter. L Knee: Physical exam of the knee demonstrates painful range of motion 5-110. Tenderness over the medial joint line. No gross instability to either varus or valgus stress test.  Positive knee joint effusion      Imaging  Left Knee: [facility]  Radiographs: X-rays were ordered and reviewed of the left knee. 4 views. Standing AP, standing AP flexed, lateral, and skyline views. They demonstrate a total knee arthroplasty present. No evidence of periprosthetic fracture. There is a radiolucent line surrounding the majority of the tibial component including the cemented stem. Procedure:  Orders Placed This Encounter   Procedures    ND ARTHROCENTESIS ASPIR&/INJ MAJOR JT/BURSA W/O US     I discussed in detail the risks, benefits, and complications of an aspiration/injection which included but is not limited to infection, skin reactions, hot swollen joints, and anaphylaxis with the patient. The patient verbalized good understanding and gave informed consent for the left knee procedure. The patient was placed in the supine position on the exam table and the skin was prepped using sterile alcohol solution. The superiolateral aspect of the left knee was prepped and draped with sterile technique. The skin and subcutaneous tissues were anesthestized with 5 mL of 1% lidocaine, injected with a 22-gauge needle.  After approximately 5 minutes, an 18-gauge needle attached to a 60-ml syringe was inserted into the knee. Then, 60 ml of cloudy yellowish fluid was aspirated. The needle was withdrawn and the puncture site sealed with a Band-Aid. The patient tolerated the aspiration well. The patient was instructed to call the office immediately if there is any pain, redness, warmth, fever, or chills. Assessment and Plan  Janice Cox was seen today for knee pain. Diagnoses and all orders for this visit:    Pain due to total left [facility]  -     OR ARTHROCENTESIS ASPIR&/INJ MAJOR JT/BURSA W/O US        I do believe the patient has a loose total knee arthroplasty. At least the tibial component is involved. We need to evaluate for septic versus aseptic loosening. As for now I am favoring septic loosening. We will order preoperative blood work with the addition of acute phase reactants. We will aspirate her knee today and send for Synovasure. She will follow-up next week for results or sooner if problems arise. I discussed with Gomez Chaves that her history, symptoms, signs and imaging are most consistent with previous TKA replacement. We reviewed the natural history of these conditions and treatment options ranging from conservative measures (rest, icing, activity modification, physical therapy, pain meds) to surgical options. In terms of treatment, I recommended continuing with rest, icing, avoidance of painful activities, NSAIDs or pain meds as tolerated, and physical therapy. We discussed surgical options as well, should conservative measures fail. Electronically signed by Brett Albarado PA-C on 6/15/2022 at 11:10 AM  This dictation was generated by voice recognition computer software. Although all attempts are made to edit the dictation for accuracy, there may be errors in the transcription that are not intended.

## 2022-06-16 ENCOUNTER — CARE COORDINATION (OUTPATIENT)
Dept: CARE COORDINATION | Age: 62
End: 2022-06-16

## 2022-06-16 LAB
ESTIMATED AVERAGE GLUCOSE: 203 MG/DL
HBA1C MFR BLD: 8.7 %
URINE CULTURE, ROUTINE: NORMAL

## 2022-06-16 NOTE — CARE COORDINATION
For documentation purposes only -    Proceed with plan for conclusion of ACC episode d/t pt nonengaged. Patient Excluded from Care Coordination?  Yes     The patient will be excluded from Care Coordination for the following reason: Patient not engaged

## 2022-06-24 ENCOUNTER — OFFICE VISIT (OUTPATIENT)
Dept: ORTHOPEDIC SURGERY | Age: 62
End: 2022-06-24
Payer: MEDICARE

## 2022-06-24 VITALS — HEIGHT: 62 IN | WEIGHT: 199 LBS | BODY MASS INDEX: 36.62 KG/M2

## 2022-06-24 DIAGNOSIS — T84.59XA INFECTION OF TOTAL KNEE REPLACEMENT, INITIAL ENCOUNTER (HCC): ICD-10-CM

## 2022-06-24 DIAGNOSIS — T84.84XA PAIN DUE TO TOTAL LEFT KNEE REPLACEMENT, INITIAL ENCOUNTER (HCC): Primary | ICD-10-CM

## 2022-06-24 DIAGNOSIS — Z96.652 PAIN DUE TO TOTAL LEFT KNEE REPLACEMENT, INITIAL ENCOUNTER (HCC): Primary | ICD-10-CM

## 2022-06-24 DIAGNOSIS — Z96.659 INFECTION OF TOTAL KNEE REPLACEMENT, INITIAL ENCOUNTER (HCC): ICD-10-CM

## 2022-06-24 PROCEDURE — 99215 OFFICE O/P EST HI 40 MIN: CPT | Performed by: ORTHOPAEDIC SURGERY

## 2022-06-24 NOTE — LETTER
[facility]  Surgery Scheduling Form:    22     DEMOGRAPHICS    Patient Name:  Michael Snell  Patient :  1960   Patient SS#:  [id number]    Patient Phone:  [phone] (home)  Alt. Patient Phone:    Patient Address:  [address] [facility] [address]    PCP:  [address],   Insurance:  Payor: Jd Del Angel / Plan: Enoch Santiago PPO / Product Type: Medicare /   Insurance ID Number:  Payor/Plan Subscr  Sex Relation Sub. Ins. ID Effective Group Num   1.  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    Operation: LEFT  79448 Revision Total Knee Arthroplasty for infection    Provider:  Jeimy Middleton MD    Location:   [facility] INFORMATION    Requested Date:     Requested Time:        Patient Arrival Time:    OR Time Required:  150 Minutes  Admission:  []Outpatient   []23 hour   [x]Inpatient    Anesthesia:  []General  [x]Spinal  [x]MAC/Sedation  Regional Anesthesia:  []None  []Lumbar Plexus Block  []Fascia Iliaca  []Femoral  []Adductor canal  []Interscalene Block  []Insert Catheter  []OrthoMix []Exparel       EQUIPMENT    Position:  [x]Supine  []Lateral  []Beach-chair  []Prone    OR Bed:  [x]Regular  [x]DeMayo knee positioner  []Alverado    Radiology:  []Large C-arm  []Small C-arm  []Portable X-ray    Implants:  Medacta Knee:  []Primary Set  []Revision Set  Max Biomet Knee:  [x] PS implant, all poly tibia   Knee Carrillo: [x]complete set    Pre-op Orders   [x] Ortho mix     Ropivacaine 0.2% 30 mL    Bupivacaine-epinephrine 0.25% 30 mL    Dexamethasone 4 mg    Toradol 30 mg    Clonidine 100 mcg    Base-sodium chloride 0.9% to 30 mL  [x]On Q ball   600 mL ropivacaine 0.2%       SUTURE: []#5 Ethibond  []#2 Ethibond  [x]#2 Quill  [x]#1 PDS  []#1 Vicryl []2-0 Vicryl  []3-0 Monocryl  [x]2-0 Nylon  []3-0 Nylon  [x]2-0 PDS                    []Dermabond  []Steri-strips (in half)  DRESSING:  []Prineo dermabond  []4x4 gauze  []ABDs  []Tegaderm                         [x]Staples  []Pravena incisional vac  BRACE: []Pelvic Binder  []Hip X-ACT  []Knee TROM  [x]Knee immobilizer                 []Shoulder Immob. (w/abd. pillow)  []Sling  []Ice Unit  [x]Ace-Wrap      [x]Max Biomet:  Armida Chew [phone], Eugene Deal. [email or web address]  []Medacta: Edgardo Crews [phone], [email or web address]  []Fx Shoulder: Maria D Benz [phone], Lucy Mario. [email or web address]  []Monse: Guille Prom [phone], Mickey Sanchez. [email or web address]  [x]Carrillo: Laurie Bravo [phone], [email or web address]    Comments:        Crys Mccord MD  [place] [address] [facility]  6/24/2022       11:19 AM EDT

## 2022-06-24 NOTE — LETTER
[facility]  [address] [facility] [address]  Phone: [phone]  Fax: [phone]    Elizabet Collins MD        June 24, 2022     Patient: Diana Benitez   YOB: 1960   Date of Visit: 6/24/2022       To Whom It May Concern: It is my medical opinion that Amy Bernard has an infected total knee replacement that needs to be urgently taken care of. This is pending a clean dental bill of health. If you could please have her scheduled as soon as possible, that would be great. If you have any questions or concerns, please don't hesitate to call.     Sincerely,    MD Shannan Cartwright MD

## 2022-06-24 NOTE — LETTER
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 She needs surgery to remove this and undergo two-stage revision implant. She will need prolonged IV antibiotics for 6 weeks following the first stage. Unfortunately, her diabetes is getting less controlled at A1c greater than 8 at this point. I would love your assistance and input in optimizing this to hopefully below 7.5 to give her best chance of healing success. In addition, we addressed periodontal disease for which we will hopefully see the dentist sooner rather than the current scheduled visit in August to get this addressed. I have ordered some blood work today. We will plan for likely revision replacement within the next 2 months, hopefully giving her time to optimize medically.     Thank you so much  Srini

## 2022-06-24 NOTE — PROGRESS NOTES
Dr Raul Flores      Date /Time 6/24/2022       2:50 PM EDT  Name Dom Crowley             1960   Location  [facility]  MRN [id number]                Chief Complaint   Patient presents with    Knee Pain     CK LEFT KNEE         History of Present Illness  Dom Crowley is a 59 y.o. female who presents with  left knee pain. 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 Athletic/exercise activity: no sports. Injury Mechanism:  none. Worker's Comp. & legal issues:   none. Previous Treatments: Ice, Heat and NSAIDs    Patient presents to the office today for a follow-up visit. Patient is here to review Synovasure results. Patient continues to complain of significant left knee pain. She denies any fever chills or drainage. Previous History: Patient here for a follow-up visit concerning her left knee. Patient is currently undergoing work-up for septic versus aseptic loosening of her left total knee arthroplasty. She continues to deny fever or chills. She does continue to complain of left knee pain and significant swelling. Previous history: Patient presents to the office today for a new problem. Patient here with a chief complaint of left knee pain. Patient's left knee has been painful for several months. She originally had a partial knee replacement done by Dr. Ivette Gómez in 2007 which was converted to a total knee arthroplasty in 2010. Her pain is global in nature. She complains of significant swelling. No recent injury or trauma. Patient states later that the unicompartmental arthroplasty became loose and they indented either infection before they put in the total knee arthroplasty.     Past History  Past Medical History:   Diagnosis Date    Allergic rhinitis     HTN (hypertension)     Hyperlipidemia     Obstructive sleep apnea (adult) (pediatric)     Non-compliant with CPAP    Osteoarthritis     Type II or unspecified type diabetes mellitus without mention of complication, not stated as uncontrolled      Past Surgical History:   Procedure Laterality Date    COLON SURGERY      10\" removed - chronic constipation    DUPUYTRENS CONTRACTURE SURGERY Left 2019    LEFT 1ST DORSAL COMPARMENT DE' [address] [facility] performed by Enoc Perez MD at [facility] [place]       4x     HYSTERECTOMY, TOTAL ABDOMINAL (CERVIX REMOVED)      JOINT REPLACEMENT      Left knee partial    KNEE SURGERY  's    rt knee reconstructed    NECK SURGERY  2011    c 4,5,6,7 (ACDF)    OVARY REMOVAL      ROTATOR CUFF REPAIR  2009    Left shoulder    SHOULDER SURGERY  's    lt & rt shoulder impingment    SINUS ENDOSCOPY  9-15-14    FUNCTIONAL ENDOSCOPIC SINUS SURGERY       TONSILLECTOMY AND ADENOIDECTOMY      TOTAL KNEE ARTHROPLASTY Left 2010    Eros to Total    TUBAL LIGATION      UMBILICAL HERNIA REPAIR  2010    Dr. Anabell Ayon     Social History     Tobacco Use    Smoking status: Former Smoker     Packs/day: 0.25     Years: 20.00     Pack years: 5.00     Types: Cigars     Quit date: 2019     Years since quittin.7    Smokeless tobacco: Never Used    Tobacco comment: was smoking 5 black and milds per day, but has not smoked in 4 days   Substance Use Topics    Alcohol use: Yes     Comment: 1 alcoholic beverage every other month      Current Outpatient Medications on File Prior to Visit   Medication Sig Dispense Refill    atorvastatin (LIPITOR) 10 MG tablet TAKE ONE TABLET BY MOUTH DAILY 90 tablet 1    dicyclomine (BENTYL) 10 MG capsule Take 1 capsule by mouth every 6 hours as needed (cramps) 20 capsule 0    nystatin (MYCOSTATIN) 031212 UNIT/ML suspension 5 mL by mouth 4 times a day x 7-10 days, retain in mouth as long as possible before swallowing 250 mL 0    insulin glargine (LANTUS SOLOSTAR) 100 UNIT/ML injection pen Inject 30 Units into the skin nightly 10 pen 3    ibuprofen (ADVIL;MOTRIN) 800 MG tablet Take 1 tablet by mouth 2 times daily as needed for Pain 60 tablet 0    amLODIPine (NORVASC) 5 MG tablet TAKE ONE TABLET BY MOUTH DAILY 60 tablet 5    metFORMIN (GLUCOPHAGE) 1000 MG tablet TAKE 1 TABLET TWICE A DAY WITH MEALS 180 tablet 3    losartan (COZAAR) 100 MG tablet TAKE ONE TABLET BY MOUTH DAILY 60 tablet 5    montelukast (SINGULAIR) 10 MG tablet TAKE ONE TABLET BY MOUTH ONCE NIGHTLY 60 tablet 5    Insulin Pen Needle (PEN NEEDLES) 32G X 4 MM MISC USE TO INJECT INSULIN FOUR TIMES DAILY 200 each 5    Lancets MISC Test 1-3 times a day as needed for symptoms of irregular glucose. DX: E11.9 please provide lancets accepted by patient's insurance. 100 each 3    blood glucose monitor strips Test 1-3 times a day as needed for symptoms of irregular glucose. DX: E11.9 please provide strips that are accepted by patient's insurance. 200 strip 3    ACCU-CHEK SOFTCLIX LANCETS MISC TEST BLOOD SUGAR TWO TIMES A  each 4    Calcium Carb-Cholecalciferol (CALTRATE 600+D) 600-800 MG-UNIT TABS Take 1 tablet by mouth 2 times daily      cetirizine (ZYRTEC ALLERGY) 10 MG tablet Take 1 tablet by mouth daily      aspirin EC 81 MG EC tablet Take 81 mg by mouth daily. LAST DOSE 8-9-14 30 tablet 11     No current facility-administered medications on file prior to visit. ASCVD 10-YEAR RISK SCORE  The 10-year ASCVD risk score (Mariaelena Sahu et al., 2013) is: 11.9%    Values used to calculate the score:      Age: 59 years      Sex: Female      Is Non- : Yes      Diabetic: Yes      Tobacco smoker: No      Systolic Blood Pressure: 849 mmHg      Is BP treated: Yes      HDL Cholesterol: 28 mg/dL      Total Cholesterol: 133 mg/dL     Review of Systems  10-point ROS is negative other than HPI. Physical Exam  Based off 1997 Exam Criteria  Ht 5' 2\" (1.575 m)   Wt 199 lb (90.3 kg)   LMP 01/26/1996   BMI 36.40 kg/m²      Constitutional:       General: He is not in acute distress. Appearance: Normal appearance. imaging are most consistent with Infected total knee arthroplasty    We reviewed the natural history of these conditions and treatment options ranging from conservative measures (rest, icing, activity modification, physical therapy, pain meds, cortisone injection)  to surgical options. We had a long discussion with the patient about their knee. We discussed surgical and non surgical options for knee arthritis. The most important thing is to work to maintain their range of motion. Next they can try medications including tylenol and NSAIDs. They can try glucosamine or chondroitin. They should also ice frequently and avoid activities that make their knee hurt. Cortisone injections and Synvisc injections are also options when medicine has failed. We finally discussed surgical options including arthroscopic debridement versus knee replacement. Often the arthritis is too far gone for an arthroscopic debridement and pain relief will be short term. Their ultimate solution will be a knee replacement when they are ready for it. They should put it off until they can no longer stand the pain and when nothing else has worked. Conservative measures have failed. She is not interested in cortisone injections. I think she is an appropriate candidate for surgery due to her ongoing symptoms and dysfunction despite conservative measures. The procedure would be left  55137 Revision Total Knee Arthroplasty for infection      Perioperative considerations include: Clearance from her periodontitis concerning gum infection and Pre-operative clearance from medical subspecialty. We reviewed the risks, benefits, alternatives of this approach. We discussed risks including, but not limited to, bleeding, pain, infection, scarring, damage to the neurovascular structures, blood clots, pulmonary embolus, stiffness, implant instability or loosening, implant failure, incomplete relief of pain, and incomplete return of function.   She is at significant perioperative morbidity and mortality as a result of left knee prosthetic joint infection. She has an indolent onset chronic infection to her knee. She is at high risk for future infection and future mechanical deficiency as a result. In addition, she has uncontrolled diabetes and an active periodontal infection. These will be attempted to optimize prior to surgery. Nonetheless, this puts her at high risk for complications. We also reviewed the surgical details, expected recovery, and rehabilitation (6-9 months). She expressed understanding and will undergo preoperative medical evaluation and optimization. I reviewed previous records surrounding knee replacement. I have also sent communication directly to Dr. Fabio Rodriges regarding her case, the best coordinate her medical optimization prior to surgery. Electronically signed by Asiya Milner PA-C on 6/24/2022 at 8:07 AM  This dictation was generated by voice recognition computer software. Although all attempts are made to edit the dictation for accuracy, there may be errors in the transcription that are not intended.

## 2022-06-27 DIAGNOSIS — M54.12 CERVICAL RADICULOPATHY: ICD-10-CM

## 2022-06-27 NOTE — TELEPHONE ENCOUNTER
Pt calling for refills on Ibuporphen 800mg and Test Strips for her Accu Chek glucose meter.     Last ov 5-27-22 LJ  Next ov 7-6-22 [facility].

## 2022-06-28 RX ORDER — GLUCOSAMINE HCL/CHONDROITIN SU 500-400 MG
CAPSULE ORAL
Qty: 200 STRIP | Refills: 3 | Status: SHIPPED | OUTPATIENT
Start: 2022-06-28

## 2022-06-28 RX ORDER — IBUPROFEN 800 MG/1
800 TABLET ORAL 2 TIMES DAILY PRN
Qty: 60 TABLET | Refills: 0 | Status: SHIPPED | OUTPATIENT
Start: 2022-06-28

## 2022-07-01 ENCOUNTER — OFFICE VISIT (OUTPATIENT)
Dept: VASCULAR SURGERY | Age: 62
End: 2022-07-01
Payer: MEDICARE

## 2022-07-01 VITALS — WEIGHT: 200 LBS | BODY MASS INDEX: 35.44 KG/M2 | HEIGHT: 63 IN

## 2022-07-01 DIAGNOSIS — M62.9 FASCIAL DEFECT: Primary | ICD-10-CM

## 2022-07-01 PROCEDURE — 99204 OFFICE O/P NEW MOD 45 MIN: CPT | Performed by: SURGERY

## 2022-07-01 ASSESSMENT — ENCOUNTER SYMPTOMS
EYES NEGATIVE: 1
RESPIRATORY NEGATIVE: 1
ABDOMINAL PAIN: 1

## 2022-07-01 NOTE — PROGRESS NOTES
Subjective:      Patient ID: Fuentes Martin is a 59 y.o. female. HPI Referral from Charisma Mann CNP for evaluation of multiple \"bulges & lumps\" on her bilateral legs. Pt has several LE issues including a chronically infected L TKR that will require removal in the near future. No h/o SVT, bleeding, ulceration or dermatitis. No previous venous interventions. Reports mild discomfort in the areas of the abnormalities which are tender to palpation.     Past Medical History:   Diagnosis Date    Allergic rhinitis     HTN (hypertension)     Hyperlipidemia     Obstructive sleep apnea (adult) (pediatric)     Non-compliant with CPAP    Osteoarthritis     Type II or unspecified type diabetes mellitus without mention of complication, not stated as uncontrolled      Past Surgical History:   Procedure Laterality Date    COLON SURGERY  2008    10\" removed - chronic constipation    DUPUYTRENS CONTRACTURE SURGERY Left 1/29/2019    LEFT 1ST DORSAL COMPARMENT DE' [address] [facility] performed by Jennifer Her MD at [address]      4x     HYSTERECTOMY, TOTAL ABDOMINAL (CERVIX REMOVED)      JOINT REPLACEMENT  2007    Left knee partial    KNEE SURGERY  1990's    rt knee reconstructed    NECK SURGERY  05/12/2011    c 4,5,6,7 (ACDF)    OVARY REMOVAL      ROTATOR CUFF REPAIR  2009    Left shoulder    SHOULDER SURGERY  1990's    lt & rt shoulder impingment    SINUS ENDOSCOPY  9-15-14    FUNCTIONAL ENDOSCOPIC SINUS SURGERY       TONSILLECTOMY AND ADENOIDECTOMY      TOTAL KNEE ARTHROPLASTY Left 03/09/2010    Eros to Total    TUBAL LIGATION      UMBILICAL HERNIA REPAIR  8/2010    Dr. Elizabeth Nuñez     Allergies   Allergen Reactions    Gabapentin Other (See Comments), Dizziness or Vertigo and Hallucinations     BLURRED VISION      Demerol Dermatitis     ITCHING    Levaquin [Levofloxacin In D5w] Nausea Only    Morphine Itching    Tramadol Other (See Comments)     \"NO CLUE\"     Current Outpatient Medications Medication Sig Dispense Refill    blood glucose monitor strips Test 1-3 times a day as needed for symptoms of irregular glucose. DX: E11.9 please provide strips that are accepted by patient's insurance. 200 strip 3    ibuprofen (ADVIL;MOTRIN) 800 MG tablet Take 1 tablet by mouth 2 times daily as needed for Pain 60 tablet 0    atorvastatin (LIPITOR) 10 MG tablet TAKE ONE TABLET BY MOUTH DAILY 90 tablet 1    dicyclomine (BENTYL) 10 MG capsule Take 1 capsule by mouth every 6 hours as needed (cramps) 20 capsule 0    nystatin (MYCOSTATIN) 657078 UNIT/ML suspension 5 mL by mouth 4 times a day x 7-10 days, retain in mouth as long as possible before swallowing 250 mL 0    insulin glargine (LANTUS SOLOSTAR) 100 UNIT/ML injection pen Inject 30 Units into the skin nightly 10 pen 3    amLODIPine (NORVASC) 5 MG tablet TAKE ONE TABLET BY MOUTH DAILY 60 tablet 5    metFORMIN (GLUCOPHAGE) 1000 MG tablet TAKE 1 TABLET TWICE A DAY WITH MEALS 180 tablet 3    losartan (COZAAR) 100 MG tablet TAKE ONE TABLET BY MOUTH DAILY 60 tablet 5    montelukast (SINGULAIR) 10 MG tablet TAKE ONE TABLET BY MOUTH ONCE NIGHTLY 60 tablet 5    Insulin Pen Needle (PEN NEEDLES) 32G X 4 MM MISC USE TO INJECT INSULIN FOUR TIMES DAILY 200 each 5    Lancets MISC Test 1-3 times a day as needed for symptoms of irregular glucose. DX: E11.9 please provide lancets accepted by patient's insurance. 100 each 3    ACCU-CHEK SOFTCLIX LANCETS MISC TEST BLOOD SUGAR TWO TIMES A  each 4    Calcium Carb-Cholecalciferol (CALTRATE 600+D) 600-800 MG-UNIT TABS Take 1 tablet by mouth 2 times daily      cetirizine (ZYRTEC ALLERGY) 10 MG tablet Take 1 tablet by mouth daily      aspirin EC 81 MG EC tablet Take 81 mg by mouth daily. LAST DOSE 8-9-14 30 tablet 11     No current facility-administered medications for this visit.      Social History     Socioeconomic History    Marital status:      Spouse name: Not on file    Number of children: 1    Years of education: Not on file    Highest education level: Not on file   Occupational History    Occupation: retired   Tobacco Use    Smoking status: Former Smoker     Packs/day: 0.25     Years: 20.00     Pack years: 5.00     Types: Cigars     Quit date: 2019     Years since quittin.7    Smokeless tobacco: Never Used    Tobacco comment: was smoking 5 black and milds per day, but has not smoked in 4 days   Vaping Use    Vaping Use: Never used   Substance and Sexual Activity    Alcohol use: Yes     Comment: 1 alcoholic beverage every other month    Drug use: Not Currently     Comment: H/o cocaine use    Sexual activity: Never   Other Topics Concern    Not on file   Social History Narrative    Not on file     Social Determinants of Health     Financial Resource Strain: Low Risk     Difficulty of Paying Living Expenses: Not hard at all   Food Insecurity: No Food Insecurity    Worried About Running Out of Food in the Last Year: Never true    Burt of Food in the Last Year: Never true   Transportation Needs:     Lack of Transportation (Medical): Not on file    Lack of Transportation (Non-Medical):  Not on file   Physical Activity:     Days of Exercise per Week: Not on file    Minutes of Exercise per Session: Not on file   Stress:     Feeling of Stress : Not on file   Social Connections:     Frequency of Communication with Friends and Family: Not on file    Frequency of Social Gatherings with Friends and Family: Not on file    Attends Orthodox Services: Not on file    Active Member of Clubs or Organizations: Not on file    Attends Club or Organization Meetings: Not on file    Marital Status: Not on file   Intimate Partner Violence:     Fear of Current or Ex-Partner: Not on file    Emotionally Abused: Not on file    Physically Abused: Not on file    Sexually Abused: Not on file   Housing Stability:     Unable to Pay for Housing in the Last Year: Not on file    Number of Places Lived in the Last Year: Not on file    Unstable Housing in the Last Year: Not on file     Family History   Problem Relation Age of Onset    Diabetes Father     Heart Disease Father         CAD    High Blood Pressure Father     High Cholesterol Father     Kidney Disease Brother     Diabetes Son    Nuha Mario Other Son         DEANA    Heart Disease Sister         1/2 sister    Breast Cancer Paternal Cousin     Rheum Arthritis Neg Hx     Osteoarthritis Neg Hx     Asthma Neg Hx     Cancer Neg Hx     Heart Failure Neg Hx     Hypertension Neg Hx     Migraines Neg Hx     Ovarian Cancer Neg Hx     Rashes/Skin Problems Neg Hx     Seizures Neg Hx     Stroke Neg Hx     Thyroid Disease Neg Hx          Review of Systems   Constitutional: Negative. HENT: Positive for dental problem. Eyes: Negative. Respiratory: Negative. Cardiovascular: Negative. Gastrointestinal: Positive for abdominal pain. Endocrine: Negative. Genitourinary: Negative. Musculoskeletal: Positive for arthralgias. Skin: Negative. Allergic/Immunologic: Positive for environmental allergies. Neurological: Negative. Hematological: Bruises/bleeds easily. Psychiatric/Behavioral: Positive for sleep disturbance. 15 pt ROS confirmed personally by this MD    Objective:   Physical Exam  Vitals and nursing note reviewed. Constitutional:       Appearance: She is obese. HENT:      Head: Normocephalic and atraumatic. Right Ear: External ear normal.      Left Ear: External ear normal.      Nose: Nose normal.      Mouth/Throat:      Pharynx: Oropharynx is clear. Eyes:      Extraocular Movements: Extraocular movements intact. Conjunctiva/sclera: Conjunctivae normal.   Cardiovascular:      Rate and Rhythm: Normal rate and regular rhythm. Pulses: Normal pulses. Pulmonary:      Effort: Pulmonary effort is normal.      Breath sounds: Normal breath sounds. Abdominal:      Palpations: Abdomen is soft. Musculoskeletal:         General: Swelling (L knee) and deformity (L knee) present. Cervical back: Normal range of motion. Right lower leg: No edema. Left lower leg: No edema. Skin:     General: Skin is warm and dry. Capillary Refill: Capillary refill takes less than 2 seconds. Findings: No erythema, lesion or rash. Comments: Multiple B fascial defects along the anterior compartments with soft mildly tender reducible defects. No varicose veins or significant spider veins   Neurological:      General: No focal deficit present. Mental Status: She is alert and oriented to person, place, and time. Cranial Nerves: No cranial nerve deficit. Sensory: No sensory deficit. Motor: No weakness. Coordination: Coordination normal.   Psychiatric:         Mood and Affect: Mood normal.         Behavior: Behavior normal.         Thought Content: Thought content normal.         Judgment: Judgment normal.           Assessment:      Multiple B myofascial defects and posterior thigh lipomas. No venous abnormalities (varicose veins or spider veins)      Plan:      Observation. Will wear KH 15/20 mmhg stockings as possible benefit. No vascular intervention. Would recommend orthopaedic evaluation for defect repair as well as general surgery evaluation for lipomas. Explained in detail. All questions answered. Pt understands.

## 2022-07-01 NOTE — Clinical Note
Dr. Montse Leonardo & Ms. Norma Kyle,    I saw Mica Sandhu in the office today for her leg complaints. Please see my attached note. In summary I could not identify any venous issues. Her \"bulges\" represent myofascial defects. I suggested stocking for support and ortho evaluation. She will follow up with me as needed. Thanks for asking me to see her and please let me know if you have any questions.     Kirstin Lei

## 2022-07-06 ENCOUNTER — OFFICE VISIT (OUTPATIENT)
Dept: PRIMARY CARE CLINIC | Age: 62
End: 2022-07-06
Payer: MEDICARE

## 2022-07-06 VITALS
HEART RATE: 112 BPM | DIASTOLIC BLOOD PRESSURE: 84 MMHG | OXYGEN SATURATION: 95 % | SYSTOLIC BLOOD PRESSURE: 124 MMHG | BODY MASS INDEX: 35.5 KG/M2 | TEMPERATURE: 97.2 F | WEIGHT: 200.4 LBS

## 2022-07-06 DIAGNOSIS — E11.65 TYPE 2 DIABETES MELLITUS WITH HYPERGLYCEMIA, WITH LONG-TERM CURRENT USE OF INSULIN (HCC): Primary | ICD-10-CM

## 2022-07-06 DIAGNOSIS — E78.2 HYPERLIPIDEMIA, MIXED: ICD-10-CM

## 2022-07-06 DIAGNOSIS — Z79.4 TYPE 2 DIABETES MELLITUS WITH HYPERGLYCEMIA, WITH LONG-TERM CURRENT USE OF INSULIN (HCC): Primary | ICD-10-CM

## 2022-07-06 DIAGNOSIS — I10 BENIGN ESSENTIAL HTN: Chronic | ICD-10-CM

## 2022-07-06 PROBLEM — M65.30 TRIGGER FINGER, ACQUIRED: Status: RESOLVED | Noted: 2021-06-08 | Resolved: 2022-07-06

## 2022-07-06 PROCEDURE — 3052F HG A1C>EQUAL 8.0%<EQUAL 9.0%: CPT | Performed by: FAMILY MEDICINE

## 2022-07-06 PROCEDURE — 99213 OFFICE O/P EST LOW 20 MIN: CPT | Performed by: FAMILY MEDICINE

## 2022-07-06 RX ORDER — INSULIN GLARGINE 100 [IU]/ML
35 INJECTION, SOLUTION SUBCUTANEOUS NIGHTLY
Qty: 10 PEN | Refills: 3
Start: 2022-07-06

## 2022-07-06 NOTE — PROGRESS NOTES
[address] [facility] [address] PRIMARY CARE  [address] [place] [id number]  Dept: [phone]  Dept Fax: [phone]     7/6/2022      Reford Peak   1960     Chief Complaint   Patient presents with    Hypertension     discuss BP and blood sugar numbers    Diabetes       HPI    Pt comes in today for follow up HTN/DMII. A1c = 8.7% on 6/15/22. Has an infected prosthesis. Was told she needs to get her blood sugar down prior to surgery. Fasting blood sugars were running 110-120, but in the last few weeks has been running close to 200. Possibly related to some changes in her diet. Is starting to switch to sugar free foods when possible. Has been compliant with her insulin. Taking Lantus 30 units nightly and Metformin 1000mg BID. No missed doses. Prior to Visit Medications    Medication Sig Taking?  Authorizing Provider   ibuprofen (ADVIL;MOTRIN) 800 MG tablet Take 1 tablet by mouth 2 times daily as needed for Pain Yes NOHELIA Angelo - CNP   atorvastatin (LIPITOR) 10 MG tablet TAKE ONE TABLET BY MOUTH DAILY Yes NOHELIA Angelo - CNP   dicyclomine (BENTYL) 10 MG capsule Take 1 capsule by mouth every 6 hours as needed (cramps) Yes Abby Chapman MD   nystatin (MYCOSTATIN) 636351 UNIT/ML suspension 5 mL by mouth 4 times a day x 7-10 days, retain in mouth as long as possible before swallowing Yes Charles Valderrama,    insulin glargine (LANTUS SOLOSTAR) 100 UNIT/ML injection pen Inject 30 Units into the skin nightly Yes Isabel Rico DO   amLODIPine (NORVASC) 5 MG tablet TAKE ONE TABLET BY MOUTH DAILY Yes Isbael Rico DO   metFORMIN (GLUCOPHAGE) 1000 MG tablet TAKE 1 TABLET TWICE A DAY WITH MEALS Yes Isabel Rico DO   losartan (COZAAR) 100 MG tablet TAKE ONE TABLET BY MOUTH DAILY Yes Isabel Rico DO   montelukast (SINGULAIR) 10 MG tablet TAKE ONE TABLET BY MOUTH ONCE NIGHTLY Yes Isabel Sumeet Felix, DO   Calcium Carb-Cholecalciferol (CALTRATE 600+D) 600-800 MG-UNIT TABS Take 1 tablet by mouth 2 times daily Yes Historical Provider, MD   cetirizine (ZYRTEC ALLERGY) 10 MG tablet Take 1 tablet by mouth daily Yes John Green, DO   aspirin EC 81 MG EC tablet Take 81 mg by mouth daily. LAST DOSE 14 Yes John Green, DO   blood glucose monitor strips Test 1-3 times a day as needed for symptoms of irregular glucose. DX: E11.9 please provide strips that are accepted by patient's insurance. Toni Rae, APRN - CNP   Insulin Pen Needle (PEN NEEDLES) 32G X 4 MM MISC USE TO INJECT INSULIN FOUR TIMES DAILY  Isabel Rico, DO   Lancets MISC Test 1-3 times a day as needed for symptoms of irregular glucose. DX: E11.9 please provide lancets accepted by patient's insurance.   Waldo Chavira, DO   ACCU-CHEK SOFTCLIX LANCETS MISC TEST BLOOD SUGAR TWO TIMES A DAY  Scott A Mela Opitz, DO        Past Medical History:   Diagnosis Date    Allergic rhinitis     HTN (hypertension)     Hyperlipidemia     Obstructive sleep apnea (adult) (pediatric)     Non-compliant with CPAP    Osteoarthritis     Type II or unspecified type diabetes mellitus without mention of complication, not stated as uncontrolled         Social History     Tobacco Use    Smoking status: Former Smoker     Packs/day: 0.25     Years: 20.00     Pack years: 5.00     Types: Cigars     Quit date: 2019     Years since quittin.8    Smokeless tobacco: Never Used    Tobacco comment: was smoking 5 black and milds per day, but has not smoked in 4 days   Vaping Use    Vaping Use: Never used   Substance Use Topics    Alcohol use: Yes     Comment: 1 alcoholic beverage every other month    Drug use: Not Currently     Comment: H/o cocaine use        Past Surgical History:   Procedure Laterality Date    COLON SURGERY  2008    10\" removed - chronic constipation    DUPUYTRENS CONTRACTURE SURGERY Left 2019    LEFT 1ST DORSAL COMPARMENT DE' [address] [facility] performed by Jem Mayer MD at [address]       4x     HYSTERECTOMY, TOTAL ABDOMINAL (CERVIX REMOVED)      JOINT REPLACEMENT  2007    Left knee partial    KNEE SURGERY  1990's    rt knee reconstructed    NECK SURGERY  05/12/2011    c 4,5,6,7 (ACDF)    OVARY REMOVAL      ROTATOR CUFF REPAIR  2009    Left shoulder    SHOULDER SURGERY  1990's    lt & rt shoulder impingment    SINUS ENDOSCOPY  9-15-14    FUNCTIONAL ENDOSCOPIC SINUS SURGERY       TONSILLECTOMY AND ADENOIDECTOMY      TOTAL KNEE ARTHROPLASTY Left 03/09/2010    Eros to Total   Driscilla Nest REPAIR  8/2010    Dr. Chidi Huerta        Allergies   Allergen Reactions    Gabapentin Other (See Comments), Dizziness or Vertigo and Hallucinations     BLURRED VISION      Demerol Dermatitis     ITCHING    Levaquin [Levofloxacin In D5w] Nausea Only    Morphine Itching    Tramadol Other (See Comments)     \"NO CLUE\"        Family History   Problem Relation Age of Onset    Diabetes Father     Heart Disease Father         CAD    High Blood Pressure Father     High Cholesterol Father     Kidney Disease Brother     Diabetes Son     Other Son         DEANA    Heart Disease Sister         1/2 sister    Breast Cancer Paternal Cousin     Rheum Arthritis Neg Hx     Osteoarthritis Neg Hx     Asthma Neg Hx     Cancer Neg Hx     Heart Failure Neg Hx     Hypertension Neg Hx     Migraines Neg Hx     Ovarian Cancer Neg Hx     Rashes/Skin Problems Neg Hx     Seizures Neg Hx     Stroke Neg Hx     Thyroid Disease Neg Hx         Patient's past medical history, surgical history, family history, medications, and allergies  were all reviewed and updated as appropriate today. Review of Systems   Constitutional: Negative for fever. Respiratory: Negative for cough.         /84 (Cuff Size: Medium Adult)   Pulse (!) 112   Temp 97.2 °F (36.2 °C) (Temporal)   Wt 200 lb 6.4 oz (90.9 kg)   LMP 01/26/1996   SpO2 95% Comment: room air  Breastfeeding No   BMI 35.50 kg/m²      Physical Exam  Vitals reviewed. Constitutional:       General: She is not in acute distress. Appearance: Normal appearance. She is well-developed. She is not ill-appearing or toxic-appearing. Eyes:      General: No scleral icterus. Conjunctiva/sclera: Conjunctivae normal.   Neck:      Thyroid: No thyromegaly. Cardiovascular:      Rate and Rhythm: Normal rate and regular rhythm. Heart sounds: No murmur heard. Pulmonary:      Effort: Pulmonary effort is normal. No respiratory distress. Breath sounds: Normal breath sounds. Musculoskeletal:      Cervical back: Neck supple. Right lower leg: No edema. Left lower leg: No edema. Lymphadenopathy:      Cervical: No cervical adenopathy. Skin:     General: Skin is warm and dry. Capillary Refill: Capillary refill takes less than 2 seconds. Neurological:      General: No focal deficit present. Mental Status: She is alert. Mental status is at baseline. Psychiatric:         Mood and Affect: Mood normal.         Assessment:  Encounter Diagnoses   Name Primary?  Type 2 diabetes mellitus with hyperglycemia, with long-term current use of insulin (HCC) Yes    Benign essential HTN     Hyperlipidemia, mixed        Plan:    - Lantus increased to 35 units daily. Close follow up in 2 weeks. Will review BS log at that time. Goal fasting BS <120.   - Follow up note sent to surgeon.   - Continue working on diet and exercise. New Prescriptions    No medications on file        No orders of the defined types were placed in this encounter. Return in about 2 weeks (around 7/20/2022) for VV - Mychart - Follow up DMII (blood sugar readings).          [address], DO

## 2022-07-08 ASSESSMENT — ENCOUNTER SYMPTOMS: COUGH: 0

## 2022-07-14 DIAGNOSIS — J30.9 ALLERGIC SINUSITIS: ICD-10-CM

## 2022-07-14 RX ORDER — MONTELUKAST SODIUM 10 MG/1
TABLET ORAL
Qty: 60 TABLET | Refills: 5 | Status: SHIPPED | OUTPATIENT
Start: 2022-07-14

## 2022-07-29 ENCOUNTER — OFFICE VISIT (OUTPATIENT)
Dept: ORTHOPEDIC SURGERY | Age: 62
End: 2022-07-29
Payer: MEDICARE

## 2022-07-29 VITALS — HEIGHT: 63 IN | BODY MASS INDEX: 35.44 KG/M2 | WEIGHT: 200 LBS

## 2022-07-29 DIAGNOSIS — T84.84XA PAIN DUE TO TOTAL LEFT KNEE REPLACEMENT, INITIAL ENCOUNTER (HCC): Primary | ICD-10-CM

## 2022-07-29 DIAGNOSIS — Z96.652 PAIN DUE TO TOTAL LEFT KNEE REPLACEMENT, INITIAL ENCOUNTER (HCC): ICD-10-CM

## 2022-07-29 DIAGNOSIS — T84.84XA PAIN DUE TO TOTAL LEFT KNEE REPLACEMENT, INITIAL ENCOUNTER (HCC): ICD-10-CM

## 2022-07-29 DIAGNOSIS — Z96.659 INFECTION OF TOTAL KNEE REPLACEMENT, INITIAL ENCOUNTER (HCC): ICD-10-CM

## 2022-07-29 DIAGNOSIS — I10 BENIGN ESSENTIAL HTN: Chronic | ICD-10-CM

## 2022-07-29 DIAGNOSIS — T84.59XA INFECTION OF TOTAL KNEE REPLACEMENT, INITIAL ENCOUNTER (HCC): ICD-10-CM

## 2022-07-29 DIAGNOSIS — Z96.652 HISTORY OF TOTAL LEFT KNEE REPLACEMENT: ICD-10-CM

## 2022-07-29 DIAGNOSIS — Z96.652 PAIN DUE TO TOTAL LEFT KNEE REPLACEMENT, INITIAL ENCOUNTER (HCC): Primary | ICD-10-CM

## 2022-07-29 PROCEDURE — 99213 OFFICE O/P EST LOW 20 MIN: CPT | Performed by: ORTHOPAEDIC SURGERY

## 2022-07-29 NOTE — PROGRESS NOTES
Dr Maribel Cortes      Date /Time 7/29/2022       2:50 PM EDT  Name Hector Dhillon             1960   Location  [facility]  MRN [id number]                Chief Complaint   Patient presents with    Follow-up     Left Knee        History of Present Illness  Hector Dhillon is a 59 y.o. female who presents with  left knee pain. Occupation:  Only during tax season  Occupational activities: clerical work. Athletic/exercise activity: no sports. Injury Mechanism:  none. Worker's Comp. & legal issues:   none. Previous Treatments: Ice, Heat and NSAIDs    Patient presents to the office today for follow-up visit. 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 This is confirmed on aspiration. At her last visit she was instructed to talk to her primary care physician and get tighter blood glucose control. She also was asked to solve all of her dental problems. She states her blood sugar is doing better but has not yet addressed her dental problems patient denies any fever or chills. Temperature in office today 97.8. Previous history: Patient presents to the office today for a follow-up visit. Patient is here to review Synovasure results. Patient continues to complain of significant left knee pain. She denies any fever chills or drainage. Previous History: Patient here for a follow-up visit concerning her left knee. Patient is currently undergoing work-up for septic versus aseptic loosening of her left total knee arthroplasty. She continues to deny fever or chills. She does continue to complain of left knee pain and significant swelling. Previous history: Patient presents to the office today for a new problem. Patient here with a chief complaint of left knee pain. Patient's left knee has been painful for several months. She originally had a partial knee replacement done by Dr. Willy Lane in 2007 which was converted to a total knee arthroplasty in 2010.   Her pain is global in nature. She complains of significant swelling. No recent injury or trauma. Patient states later that the unicompartmental arthroplasty became loose and they indented either infection before they put in the total knee arthroplasty.     Past History  Past Medical History:   Diagnosis Date    Allergic rhinitis     HTN (hypertension)     Hyperlipidemia     Obstructive sleep apnea (adult) (pediatric)     Non-compliant with CPAP    Osteoarthritis     Type II or unspecified type diabetes mellitus without mention of complication, not stated as uncontrolled      Past Surgical History:   Procedure Laterality Date    COLON SURGERY      10\" removed - chronic constipation    DUPUYTRENS CONTRACTURE SURGERY Left 2019    LEFT 1ST DORSAL COMPARMENT DE' [address] [facility]      [facility]     HYSTERECTOMY, TOTAL ABDOMINAL (CERVIX REMOVED)      JOINT REPLACEMENT      Left knee partial    KNEE SURGERY  's    rt knee reconstructed    NECK SURGERY  2011    c 4,5,6,7 (ACDF)    OVARY REMOVAL      ROTATOR CUFF REPAIR  2009    Left shoulder    SHOULDER SURGERY  's    lt & rt shoulder impingment    SINUS ENDOSCOPY  9-15-14    FUNCTIONAL ENDOSCOPIC SINUS SURGERY       TONSILLECTOMY AND ADENOIDECTOMY      TOTAL KNEE ARTHROPLASTY Left 2010    Eros to Total    TUBAL LIGATION      UMBILICAL HERNIA REPAIR  2010    Dr. Lopez Back     Social History     Tobacco Use    Smoking status: Former     Packs/day: 0.25     Years: 20.00     Pack years: 5.00     Types: Cigars, Cigarettes     Quit date: 2019     Years since quittin.8    Smokeless tobacco: Never    Tobacco comments:     was smoking 5 black and milds per day, but has not smoked in 4 days   Substance Use Topics    Alcohol use: Yes     Comment: 1 alcoholic beverage every other month      Current Outpatient Medications on File Prior to Visit   Medication Sig Dispense Refill    metFORMIN (GLUCOPHAGE) 1000 MG tablet TAKE ONE TABLET BY MOUTH TWICE A DAY WITH MEALS 180 tablet 3    montelukast (SINGULAIR) 10 MG tablet TAKE ONE TABLET BY MOUTH ONCE NIGHTLY 60 tablet 5    insulin glargine (LANTUS SOLOSTAR) 100 UNIT/ML injection pen Inject 35 Units into the skin nightly 10 pen 3    blood glucose monitor strips Test 1-3 times a day as needed for symptoms of irregular glucose. DX: E11.9 please provide strips that are accepted by patient's insurance. 200 strip 3    ibuprofen (ADVIL;MOTRIN) 800 MG tablet Take 1 tablet by mouth 2 times daily as needed for Pain 60 tablet 0    atorvastatin (LIPITOR) 10 MG tablet TAKE ONE TABLET BY MOUTH DAILY 90 tablet 1    dicyclomine (BENTYL) 10 MG capsule Take 1 capsule by mouth every 6 hours as needed (cramps) 20 capsule 0    nystatin (MYCOSTATIN) 253191 UNIT/ML suspension 5 mL by mouth 4 times a day x 7-10 days, retain in mouth as long as possible before swallowing 250 mL 0    amLODIPine (NORVASC) 5 MG tablet TAKE ONE TABLET BY MOUTH DAILY 60 tablet 5    losartan (COZAAR) 100 MG tablet TAKE ONE TABLET BY MOUTH DAILY 60 tablet 5    Insulin Pen Needle (PEN NEEDLES) 32G X 4 MM MISC USE TO INJECT INSULIN FOUR TIMES DAILY 200 each 5    Lancets MISC Test 1-3 times a day as needed for symptoms of irregular glucose. DX: E11.9 please provide lancets accepted by patient's insurance. 100 each 3    ACCU-CHEK SOFTCLIX LANCETS MISC TEST BLOOD SUGAR TWO TIMES A  each 4    Calcium Carb-Cholecalciferol (CALTRATE 600+D) 600-800 MG-UNIT TABS Take 1 tablet by mouth 2 times daily      cetirizine (ZYRTEC ALLERGY) 10 MG tablet Take 1 tablet by mouth daily      aspirin EC 81 MG EC tablet Take 81 mg by mouth daily. LAST DOSE 8-9-14 30 tablet 11     No current facility-administered medications on file prior to visit.       ASCVD 10-YEAR RISK SCORE  The 10-year ASCVD risk score (Juan Santos, et al., 2013) is: 15.2%    Values used to calculate the score:      Age: 59 years      Sex: Female      Is Non- : Yes      Diabetic: Yes      Tobacco smoker: No      Systolic Blood Pressure: 127 mmHg      Is BP treated: Yes      HDL Cholesterol: 28 mg/dL      Total Cholesterol: 133 mg/dL     Review of Systems  10-point ROS is negative other than HPI. Physical Exam  Based off 1997 Exam Criteria  Ht 5' 3\" (1.6 m)   Wt 200 lb (90.7 kg)   LMP 01/26/1996   BMI 35.43 kg/m²      Constitutional:       General: He is not in acute distress. Appearance: Normal appearance. Cardiovascular:      Rate and Rhythm: Normal rate and regular rhythm. Pulses: Normal pulses. Pulmonary:      Effort: Pulmonary effort is normal. No respiratory distress. Neurological:      Mental Status: He is alert and oriented to person, place, and time. Mental status is at baseline. Musculoskeletal:  Gait:  antalgic    L Knee: Physical exam of the knee demonstrates painful range of motion 5-110. Tenderness over the medial joint line. Her overall knee alignment is in varus, she does have some laxity in the coronal plane. Positive knee joint effusion. Imaging  Left Knee: [address] [facility]  Previous Radiographs: X-rays were ordered and reviewed of the left knee. 4 views. Standing AP, standing AP flexed, lateral, and skyline views. They demonstrate a total knee arthroplasty present. No evidence of periprosthetic fracture. There is a radiolucent line surrounding the majority of the tibial component including the cemented stem. CRP-7  Sed rate-60    A1c-8.7    Procedure:  No orders of the defined types were placed in this encounter. Assessment and Plan  Ernesto Esteban was seen today for follow-up. Diagnoses and all orders for this visit:    Pain due to total left knee replacement, initial encounter ([facility] [address]    Infection of total knee replacement, initial encounter ([facility] [address]    History of total left knee replacement        I do believe the patient has a loose prosthesis due to infection.   She also is currently seeking a care of for a gum infection she has in her mouth. We will plan on revising the left knee. This will likely need a two-stage revision. Plan for surgery hopefully within the next month. Patient is educated on signs of becoming septic. If she develops any of these signs she needs to report to the emergency too. Patient is ordered a new A1c to evaluate the state of her diabetes. Once she has her A1c drawn and has her dental infection solved she will follow-up in the office    I discussed with Antonio Wilkes that her history, symptoms, signs and imaging are most consistent with  Infected total knee arthroplasty    We reviewed the natural history of these conditions and treatment options ranging from conservative measures (rest, icing, activity modification, physical therapy, pain meds, cortisone injection)  to surgical options. We had a long discussion with the patient about their knee. We discussed surgical and non surgical options for knee arthritis. The most important thing is to work to maintain their range of motion. Next they can try medications including tylenol and NSAIDs. They can try glucosamine or chondroitin. They should also ice frequently and avoid activities that make their knee hurt. Cortisone injections and Synvisc injections are also options when medicine has failed. We finally discussed surgical options including arthroscopic debridement versus knee replacement. Often the arthritis is too far gone for an arthroscopic debridement and pain relief will be short term. Their ultimate solution will be a knee replacement when they are ready for it. They should put it off until they can no longer stand the pain and when nothing else has worked. Conservative measures have failed. She is not interested in cortisone injections. I think she is an appropriate candidate for surgery due to her ongoing symptoms and dysfunction despite conservative measures.      The procedure would be left  85457 Revision Total Knee Arthroplasty for infection      Perioperative considerations include:  Clearance from her periodontitis concerning gum infection and Pre-operative clearance from medical subspecialty. We reviewed the risks, benefits, alternatives of this approach. We discussed risks including, but not limited to, bleeding, pain, infection, scarring, damage to the neurovascular structures, blood clots, pulmonary embolus, stiffness, implant instability or loosening, implant failure, incomplete relief of pain, and incomplete return of function. She is at significant perioperative morbidity and mortality as a result of left knee prosthetic joint infection. She has an indolent onset chronic infection to her knee. She is at high risk for future infection and future mechanical deficiency as a result. In addition, she has uncontrolled diabetes and an active periodontal infection. These will be attempted to optimize prior to surgery. Nonetheless, this puts her at high risk for complications. We also reviewed the surgical details, expected recovery, and rehabilitation (6-9 months). She expressed understanding and will undergo preoperative medical evaluation and optimization. I reviewed previous records surrounding knee replacement. I have also sent communication directly to Dr. Aakash Mtz regarding her case, the best coordinate her medical optimization prior to surgery. Electronically signed by Anabel Corbett MD on 7/29/2022 at 8:05 AM  This dictation was generated by voice recognition computer software. Although all attempts are made to edit the dictation for accuracy, there may be errors in the transcription that are not intended.

## 2022-07-29 NOTE — LETTER
Dr. Cornelio Bales,    I have penciled in the date in October for Fernanda's surgery. I had confirm her understanding of what we were about to do. I had a sense that maybe she did not quite understand the severity of her prosthetic knee infection, and the severity of what it implies. I counseled her further regarding perioperative optimization. It seems that her diabetes is significantly better than before. I ordered another A1c, which I expect to be significantly better than before, especially from your discussion last time. The only other thing to consider would be her periodontal work that is pending. I have given her another month to address a couple of teeth that appear to have an acute infection within. I would love to optimize her before we start treating her infection, thereby increasing her risk of success. Please let me know your thoughts. Srini        .

## 2022-07-30 LAB
ESTIMATED AVERAGE GLUCOSE: 180 MG/DL
HBA1C MFR BLD: 7.9 %

## 2022-08-01 RX ORDER — LOSARTAN POTASSIUM 100 MG/1
TABLET ORAL
Qty: 60 TABLET | Refills: 5 | Status: SHIPPED | OUTPATIENT
Start: 2022-08-01

## 2022-08-05 ENCOUNTER — TELEPHONE (OUTPATIENT)
Dept: ORTHOPEDIC SURGERY | Age: 62
End: 2022-08-05

## 2022-08-05 NOTE — TELEPHONE ENCOUNTER
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

## 2022-08-26 ENCOUNTER — OFFICE VISIT (OUTPATIENT)
Dept: ORTHOPEDIC SURGERY | Age: 62
End: 2022-08-26
Payer: MEDICARE

## 2022-08-26 VITALS — HEIGHT: 63 IN | WEIGHT: 200 LBS | BODY MASS INDEX: 35.44 KG/M2

## 2022-08-26 DIAGNOSIS — Z01.818 PREOP TESTING: ICD-10-CM

## 2022-08-26 DIAGNOSIS — Z96.652 PAIN DUE TO TOTAL LEFT KNEE REPLACEMENT, INITIAL ENCOUNTER (HCC): Primary | ICD-10-CM

## 2022-08-26 DIAGNOSIS — T84.59XA INFECTION OF TOTAL KNEE REPLACEMENT, INITIAL ENCOUNTER (HCC): Primary | ICD-10-CM

## 2022-08-26 DIAGNOSIS — T84.84XA PAIN DUE TO TOTAL LEFT KNEE REPLACEMENT, INITIAL ENCOUNTER (HCC): Primary | ICD-10-CM

## 2022-08-26 DIAGNOSIS — T84.84XA PAIN DUE TO TOTAL LEFT KNEE REPLACEMENT, INITIAL ENCOUNTER (HCC): ICD-10-CM

## 2022-08-26 DIAGNOSIS — Z96.652 PAIN DUE TO TOTAL LEFT KNEE REPLACEMENT, INITIAL ENCOUNTER (HCC): ICD-10-CM

## 2022-08-26 DIAGNOSIS — Z96.659 INFECTION OF TOTAL KNEE REPLACEMENT, INITIAL ENCOUNTER (HCC): Primary | ICD-10-CM

## 2022-08-26 LAB
ABO/RH: NORMAL
ALBUMIN SERPL-MCNC: 4.1 G/DL (ref 3.4–5)
ANION GAP SERPL CALCULATED.3IONS-SCNC: 17 MMOL/L (ref 3–16)
ANTIBODY SCREEN: NORMAL
APTT: 32.2 SEC (ref 23–34.3)
BACTERIA: NORMAL /HPF
BASOPHILS ABSOLUTE: 0.1 K/UL (ref 0–0.2)
BASOPHILS RELATIVE PERCENT: 0.7 %
BILIRUBIN URINE: NEGATIVE
BLOOD, URINE: NEGATIVE
BUN BLDV-MCNC: 11 MG/DL (ref 7–20)
CALCIUM SERPL-MCNC: 9.9 MG/DL (ref 8.3–10.6)
CHLORIDE BLD-SCNC: 101 MMOL/L (ref 99–110)
CLARITY: CLEAR
CO2: 24 MMOL/L (ref 21–32)
COLOR: YELLOW
CREAT SERPL-MCNC: <0.5 MG/DL (ref 0.6–1.2)
EOSINOPHILS ABSOLUTE: 0.1 K/UL (ref 0–0.6)
EOSINOPHILS RELATIVE PERCENT: 0.9 %
EPITHELIAL CELLS, UA: 1 /HPF (ref 0–5)
GFR AFRICAN AMERICAN: >60
GFR NON-AFRICAN AMERICAN: >60
GLUCOSE BLD-MCNC: 129 MG/DL (ref 70–99)
GLUCOSE URINE: 250 MG/DL
HCT VFR BLD CALC: 37.1 % (ref 36–48)
HEMOGLOBIN: 12 G/DL (ref 12–16)
HYALINE CASTS: 0 /LPF (ref 0–8)
INR BLD: 0.93 (ref 0.87–1.14)
KETONES, URINE: ABNORMAL MG/DL
LEUKOCYTE ESTERASE, URINE: ABNORMAL
LYMPHOCYTES ABSOLUTE: 3.1 K/UL (ref 1–5.1)
LYMPHOCYTES RELATIVE PERCENT: 22 %
MCH RBC QN AUTO: 26.5 PG (ref 26–34)
MCHC RBC AUTO-ENTMCNC: 32.3 G/DL (ref 31–36)
MCV RBC AUTO: 82.1 FL (ref 80–100)
MICROSCOPIC EXAMINATION: YES
MONOCYTES ABSOLUTE: 0.6 K/UL (ref 0–1.3)
MONOCYTES RELATIVE PERCENT: 4.6 %
NEUTROPHILS ABSOLUTE: 10 K/UL (ref 1.7–7.7)
NEUTROPHILS RELATIVE PERCENT: 71.8 %
NITRITE, URINE: NEGATIVE
PDW BLD-RTO: 14.1 % (ref 12.4–15.4)
PH UA: 5 (ref 5–8)
PLATELET # BLD: 321 K/UL (ref 135–450)
PMV BLD AUTO: 9.9 FL (ref 5–10.5)
POTASSIUM SERPL-SCNC: 4.2 MMOL/L (ref 3.5–5.1)
PROTEIN UA: ABNORMAL MG/DL
PROTHROMBIN TIME: 12.4 SEC (ref 11.7–14.5)
RBC # BLD: 4.52 M/UL (ref 4–5.2)
RBC UA: 1 /HPF (ref 0–4)
SODIUM BLD-SCNC: 142 MMOL/L (ref 136–145)
SPECIFIC GRAVITY UA: 1.02 (ref 1–1.03)
TRANSFERRIN: 268 MG/DL (ref 200–360)
URINE TYPE: ABNORMAL
UROBILINOGEN, URINE: 1 E.U./DL
WBC # BLD: 13.9 K/UL (ref 4–11)
WBC UA: 1 /HPF (ref 0–5)

## 2022-08-26 PROCEDURE — 99215 OFFICE O/P EST HI 40 MIN: CPT | Performed by: ORTHOPAEDIC SURGERY

## 2022-08-26 NOTE — LETTER
[facility]  Surgery Scheduling Form:    22     DEMOGRAPHICS    Patient Name:  Hector Dhillon  Patient :  1960   Patient SS#:  [id number]    Patient Phone:  [phone] (home)  Alt. Patient Phone:    Patient Address:  [address] [address]    PCP:  Татьяна Neely DO  Insurance:  Payor: [employer] / Plan: Savanah Mehta PPO / Product Type: Medicare /   Insurance ID Number:  Payer/Plan Subscr  Sex Relation Sub. Ins. ID Effective Group Num   1.  Loren Olmos 1960 Female Self [id number]                                   [id number]       DIAGNOSIS & PROCEDURE    Diagnosis: Left Knee   T84.59XA Infection Total knee replacement    Operation: LEFT Knee  83538 Revision Total Knee Arthroplasty for infection    Provider:  Maribel Cortes MD    Location:  [facility] INFORMATION    Requested Date:  10/10/2022   Requested Time:  1:00 pm        Patient Arrival Time:  11:00 am   OR Time Required:  150 Minutes  Admission:  []Outpatient   []23 hour   [x]Inpatient    Anesthesia:  []General  [x]Spinal  [x]MAC/Sedation  Regional Anesthesia:  []None  []Lumbar Plexus Block  []Fascia Iliaca  []Femoral  [x]Adductor canal  []Interscalene Block  []Insert Catheter  []OrthoMix []Exparel       EQUIPMENT    Position:  [x]Supine  []Lateral  []Beach-chair  []Prone    OR Bed:  [x]Regular  [x]DeMayo knee positioner  []Alverado    Radiology:  []Large C-arm  []Small C-arm  []Portable X-ray    Implants:  Medacta Knee:  []Primary Set  []Revision Set  Max Biomet Knee:  [x]Revision Set  Knee Carrillo: [x]complete set    Pre-op Orders   [] Ortho mix     Ropivacaine 0.2% 30 mL    Bupivacaine-epinephrine 0.25% 30 mL    Dexamethasone 4 mg    Toradol 30 mg    Clonidine 100 mcg    Base-sodium chloride 0.9% to 30 mL  []On Q ball   600 mL ropivacaine 0.2%       SUTURE: []#5 Ethibond  []#2 Ethibond  [x]#2 Quill  [x]#1 PDS  []#1 Vicryl []2-0 Vicryl  []3-0 Monocryl  [x]2-0 Nylon  []3-0 Nylon  [x]2-0 PDS                    []Dermabond  []Steri-strips (in half)  DRESSING:  []Prineo dermabond  []4x4 gauze  []ABDs  []Tegaderm                         [x]Staples  []Pravena incisional vac  BRACE: []Pelvic Binder  []Hip X-ACT  []Knee TROM  [x]Knee immobilizer                 []Shoulder Immob. (w/abd. pillow)  []Sling  []Ice Unit  [x]Ace-Wrap      [x]Max Biomet:  Hector Power [phone], [email or web address]  []Medacta: Zachary Bones [phone], [email or web address]  []Fx Shoulder: Alvin Vera [phone], [email or web address]  [][place]: Yovana Cole [phone], [email or web address]  [x]Carrillo: Janeen Short [phone], [email or web address]    Comments:        Lucille Hatfield MD  [address] [place] [address]  8/26/2022       12:46 PM EDT

## 2022-08-26 NOTE — LETTER
Dr. Aakash Mtz,    We have signed up came for surgery in October. I have described two-stage revision for infection. Thank so much for your collaboration. I will keep you updated.     Srini

## 2022-08-26 NOTE — PROGRESS NOTES
Dr Jericho Ramirez      Date /Time 8/26/2022       2:50 PM EDT  Name Peña Pino             1960   Location  [facility] SURG  MRN [id number]                Chief Complaint   Patient presents with    Knee Pain     Left Knee        History of Present Illness  Peña Pino is a 59 y.o. female who presents with  left knee pain. Occupation:  Only during tax season  Occupational activities: clerical work. Athletic/exercise activity: no sports. Injury Mechanism:  none. Worker's Comp. & legal issues:   none. Previous Treatments: Ice, Heat and NSAIDs    Patient presents to the office today for follow-up visit. 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 This is confirmed on aspiration. At her last visit she was instructed to talk to her primary care physician and get tighter blood glucose control. She also was asked to solve all of her dental problems. She states her blood sugar is doing better but has not yet addressed her dental problems patient denies any fever or chills. Temperature in office today 97.8. Previous history: Patient presents to the office today for a follow-up visit. Patient is here to review Synovasure results. Patient continues to complain of significant left knee pain. She denies any fever chills or drainage. Previous History: Patient here for a follow-up visit concerning her left knee. Patient is currently undergoing work-up for septic versus aseptic loosening of her left total knee arthroplasty. She continues to deny fever or chills. She does continue to complain of left knee pain and significant swelling. Previous history: Patient presents to the office today for a new problem. Patient here with a chief complaint of left knee pain. Patient's left knee has been painful for several months. She originally had a partial knee replacement done by Dr. Valente Murry in 2007 which was converted to a total knee arthroplasty in 2010.   Her pain is global in nature. She complains of significant swelling. No recent injury or trauma. Patient states later that the unicompartmental arthroplasty became loose and they indented either infection before they put in the total knee arthroplasty.     Past History  Past Medical History:   Diagnosis Date    Allergic rhinitis     HTN (hypertension)     Hyperlipidemia     Obstructive sleep apnea (adult) (pediatric)     Non-compliant with CPAP    Osteoarthritis     Type II or unspecified type diabetes mellitus without mention of complication, not stated as uncontrolled      Past Surgical History:   Procedure Laterality Date    COLON SURGERY      10\" removed - chronic constipation    DUPUYTRENS CONTRACTURE SURGERY Left 2019    LEFT 1ST DORSAL COMPARMENT DE' [facility]      4x     HYSTERECTOMY, TOTAL ABDOMINAL (CERVIX REMOVED)      JOINT REPLACEMENT      Left knee partial    KNEE SURGERY  's    rt knee reconstructed    NECK SURGERY  2011    c 4,5,6,7 (ACDF)    OVARY REMOVAL      ROTATOR CUFF REPAIR  2009    Left shoulder    SHOULDER SURGERY  's    lt & rt shoulder impingment    SINUS ENDOSCOPY  9-15-14    FUNCTIONAL ENDOSCOPIC SINUS SURGERY       TONSILLECTOMY AND ADENOIDECTOMY      TOTAL KNEE ARTHROPLASTY Left 2010    Eros to Total    TUBAL LIGATION      UMBILICAL HERNIA REPAIR  2010    Dr. Juan Carlos Nolasco     Social History     Tobacco Use    Smoking status: Former     Packs/day: 0.25     Years: 20.00     Pack years: 5.00     Types: Cigars, Cigarettes     Quit date: 2019     Years since quittin.9    Smokeless tobacco: Never    Tobacco comments:     was smoking 5 black and milds per day, but has not smoked in 4 days   Substance Use Topics    Alcohol use: Yes     Comment: 1 alcoholic beverage every other month      Current Outpatient Medications on File Prior to Visit   Medication Sig Dispense Refill    losartan (COZAAR) 100 MG tablet TAKE ONE TABLET BY MOUTH DAILY 60 tablet 5    metFORMIN (GLUCOPHAGE) 1000 MG tablet TAKE ONE TABLET BY MOUTH TWICE A DAY WITH MEALS 180 tablet 3    montelukast (SINGULAIR) 10 MG tablet TAKE ONE TABLET BY MOUTH ONCE NIGHTLY 60 tablet 5    insulin glargine (LANTUS SOLOSTAR) 100 UNIT/ML injection pen Inject 35 Units into the skin nightly 10 pen 3    blood glucose monitor strips Test 1-3 times a day as needed for symptoms of irregular glucose. DX: E11.9 please provide strips that are accepted by patient's insurance. 200 strip 3    ibuprofen (ADVIL;MOTRIN) 800 MG tablet Take 1 tablet by mouth 2 times daily as needed for Pain 60 tablet 0    atorvastatin (LIPITOR) 10 MG tablet TAKE ONE TABLET BY MOUTH DAILY 90 tablet 1    dicyclomine (BENTYL) 10 MG capsule Take 1 capsule by mouth every 6 hours as needed (cramps) 20 capsule 0    nystatin (MYCOSTATIN) 713191 UNIT/ML suspension 5 mL by mouth 4 times a day x 7-10 days, retain in mouth as long as possible before swallowing 250 mL 0    amLODIPine (NORVASC) 5 MG tablet TAKE ONE TABLET BY MOUTH DAILY 60 tablet 5    Insulin Pen Needle (PEN NEEDLES) 32G X 4 MM MISC USE TO INJECT INSULIN FOUR TIMES DAILY 200 each 5    Lancets MISC Test 1-3 times a day as needed for symptoms of irregular glucose. DX: E11.9 please provide lancets accepted by patient's insurance. 100 each 3    ACCU-CHEK SOFTCLIX LANCETS MISC TEST BLOOD SUGAR TWO TIMES A  each 4    Calcium Carb-Cholecalciferol (CALTRATE 600+D) 600-800 MG-UNIT TABS Take 1 tablet by mouth 2 times daily      cetirizine (ZYRTEC ALLERGY) 10 MG tablet Take 1 tablet by mouth daily      aspirin EC 81 MG EC tablet Take 81 mg by mouth daily. LAST DOSE 8-9-14 30 tablet 11     No current facility-administered medications on file prior to visit.       ASCVD 10-YEAR RISK SCORE  The 10-year ASCVD risk score (Hans Reed., et al., 2013) is: 15.2%    Values used to calculate the score:      Age: 59 years      Sex: Female      Is Non- : Yes      Diabetic: Yes      Tobacco smoker: No      Systolic Blood Pressure: 014 mmHg      Is BP treated: Yes      HDL Cholesterol: 28 mg/dL      Total Cholesterol: 133 mg/dL     Review of Systems  10-point ROS is negative other than HPI. Physical Exam  Based off 1997 Exam Criteria  Ht 5' 3\" (1.6 m)   Wt 200 lb (90.7 kg)   LMP 01/26/1996   BMI 35.43 kg/m²      Constitutional:       General: He is not in acute distress. Appearance: Normal appearance. Cardiovascular:      Rate and Rhythm: Normal rate and regular rhythm. Pulses: Normal pulses. Pulmonary:      Effort: Pulmonary effort is normal. No respiratory distress. Neurological:      Mental Status: He is alert and oriented to person, place, and time. Mental status is at baseline. Musculoskeletal:  Gait:  antalgic    L Knee: Physical exam of the knee demonstrates painful range of motion 5-110. Tenderness over the medial joint line. Her overall knee alignment is in varus, she does have some laxity in the coronal plane. Positive large knee joint effusion. Imaging  Left Knee: [facility]  Previous Radiographs: X-rays were ordered and reviewed of the left knee. 4 views. Standing AP, standing AP flexed, lateral, and skyline views. They demonstrate a total knee arthroplasty present. No evidence of periprosthetic fracture. There is a radiolucent line surrounding the majority of the tibial component including the cemented stem. CRP-7  Sed rate-60    A1c-7.9    Procedure:  Orders Placed This Encounter   Procedures    Culture, Urine     Standing Status:   Future     Number of Occurrences:   1     Standing Expiration Date:   8/26/2023     Order Specific Question:   Specify (ex-cath, midstream, cysto, etc)?      Answer:   midstream    Culture, MRSA, Screening     Standing Status:   Future     Number of Occurrences:   1     Standing Expiration Date:   8/26/2023    XR KNEE LEFT (3 VIEWS) Standing Status:   Future     Number of Occurrences:   1     Standing Expiration Date:   8/26/2023    Urinalysis     Standing Status:   Future     Number of Occurrences:   1     Standing Expiration Date:   8/26/2023    CBC with Auto Differential     Standing Status:   Future     Number of Occurrences:   1     Standing Expiration Date:   1/21/3543    Basic Metabolic Panel     Standing Status:   Future     Number of Occurrences:   1     Standing Expiration Date:   8/26/2023    Hemoglobin A1C     Standing Status:   Future     Number of Occurrences:   1     Standing Expiration Date:   8/26/2023    Protime-INR     Standing Status:   Future     Number of Occurrences:   1     Standing Expiration Date:   8/26/2023     Order Specific Question:   Daily Coumadin Dose? Answer:   unknown    APTT     Standing Status:   Future     Number of Occurrences:   1     Standing Expiration Date:   8/26/2023     Order Specific Question:   Daily Heparin Dose? Answer:   unknown    Albumin     Standing Status:   Future     Number of Occurrences:   1     Standing Expiration Date:   8/26/2023    Transferrin     Standing Status:   Future     Number of Occurrences:   1     Standing Expiration Date:   8/26/2023    EKG 12 Lead     Standing Status:   Future     Standing Expiration Date:   8/26/2023     Order Specific Question:   Reason for Exam?     Answer:   Pre-op    Type and Screen     Standing Status:   Future     Number of Occurrences:   1     Standing Expiration Date:   8/26/2023           Assessment and Plan  Paul Chaudhry was seen today for knee pain. Diagnoses and all orders for this visit:    Pain due to total left [facility]  -     XR KNEE LEFT (3 VIEWS); Future  -     Urinalysis; Future  -     Culture, Urine; Future  -     CBC with Auto Differential; Future  -     Basic Metabolic Panel; Future  -     Hemoglobin A1C; Future  -     Protime-INR; Future  -     APTT; Future  -     Albumin;  Future  - Transferrin; Future  -     Culture, MRSA, Screening; Future  -     EKG 12 Lead; Future  -     Type and Screen; Future    Preop testing  -     Urinalysis; Future  -     Culture, Urine; Future  -     CBC with Auto Differential; Future  -     Basic Metabolic Panel; Future  -     Hemoglobin A1C; Future  -     Protime-INR; Future  -     APTT; Future  -     Albumin; Future  -     Transferrin; Future  -     Culture, MRSA, Screening; Future  -     EKG 12 Lead; Future  -     Type and Screen; Future      I do believe the patient has a loose prosthesis due to infection. We will plan on revising the left knee. This will likely need a two-stage revision. Plan for surgery hopefully within the next month. Patient is educated on signs of becoming septic, which remains low likelihood. If she develops any of these signs she needs to report to the emergency too. She has completed her dental work. She has improved her glucose control. I believe she is ready for surgery at this point. I discussed with Fuentes Martin that her history, symptoms, signs and imaging are most consistent with  Infected total knee arthroplasty    We reviewed the natural history of these conditions and treatment options ranging from conservative measures (rest, icing, activity modification, physical therapy, pain meds)  to surgical options. Conservative measures have failed. She currently has an infected left knee arthroplasty which needs to undergo two-stage revision. I also counseled her regarding a 30 to 50% chance that she never undergoes the second stage for variety of reasons. However, with her level of activity at high, I believe two-stage might give her the best outcome. Regardless, we will perform the first stage in the coming 2 months. I think she is an appropriate candidate for surgery due to her ongoing symptoms and dysfunction despite conservative measures.      The procedure would be left  [address]05 Revision Total Knee Arthroplasty for infection      Perioperative considerations include:  Clearance from her periodontitis concerning gum infection and Pre-operative clearance from medical subspecialty. We reviewed the risks, benefits, alternatives of this approach. We discussed risks including, but not limited to, bleeding, pain, infection, scarring, damage to the neurovascular structures, blood clots, pulmonary embolus, stiffness, implant instability or loosening, implant failure, incomplete relief of pain, and incomplete return of function. She is at significant perioperative morbidity and mortality as a result of left knee prosthetic joint infection. She has an indolent onset chronic infection to her knee. She is at high risk for future infection and future mechanical deficiency as a result. In addition, she has insulin-dependent diabetes. We also reviewed the surgical details, expected recovery, and rehabilitation (6-9 months). She expressed understanding and will undergo preoperative medical evaluation and optimization. I reviewed previous records surrounding knee replacement. I have also sent communication directly to Dr. Mahin Murry regarding her case, the best coordinate her medical optimization prior to surgery. Electronically signed by Cameron Westbrook MD on 8/26/2022 at 12:48 PM  This dictation was generated by voice recognition computer software. Although all attempts are made to edit the dictation for accuracy, there may be errors in the transcription that are not intended.

## 2022-08-27 LAB
ESTIMATED AVERAGE GLUCOSE: 154.2 MG/DL
HBA1C MFR BLD: 7 %

## 2022-08-28 LAB
MRSA CULTURE ONLY: NORMAL
URINE CULTURE, ROUTINE: NORMAL

## 2022-08-29 NOTE — PROGRESS NOTES
Altamease Bene    Age 59 y.o.    female    1960    MRN [id number]    10/10/2022  Arrival Time_____________  OR Time____________[address]     Procedure(s):  REVISION LEFT TOTALKNEE ARTHROPLASTY FOR INFECTION WITH ADDUCTOR CANAL  FOR PAIN CONTROL              [facility]; [address]                      Spinal   Surgeon(s): Ruma Rinaldi, MD      DAY ADMIT ___  SDS/OP ___  OUTPT IN BED ___        Phone [phone] (home)     PCP _____________________ Phone_________________ Epic ( ) Epic CE ( ) Appt ________    ADDITIONAL INFO __________________________________ Cardio/Consult _____________    NOTES _____________________________________________________________________    ____________________________________________________________________________    PAT APPT DATE:________ TIME: ________  FAXED QAD: _______  (__) H&P w/ Hospitalist  __________________________________________________________________________  Preop Nurse phone screen complete: _____________  (__) CBC     (__) W/ DIFF ___________     (__) Hgb A1C    ___________  (__) CHEST X RAY   __________  (__) LIPID PROFILE  ___________  (__) EKG   __________  (__) PT/PTT   ___________  (__) PFT's   __________  (__) BMP   ___________  (__) CAROTIDS  __________  (__) CMP   ___________  (__) VEIN MAPPING  __________  (__) U/A   ___________  (__) HISTORY & PHYSICAL __________  (__) URINE C & S  ___________  (__) CARDIAC CLEARANCE __________  (__) U/A W/ FLEX  ___________  (__) PULM.  CLEARANCE __________  (__) SERUM PREGNANCY ___________  (__) Check Epic DOS orders __________  (__) TYPE & SCREEN __________repeat ( ) (__)  __________________ __________  (__) ALBUMIN Monico Tim ___________  (__)  __________________ __________  (__) TRANSFERRIN  ___________  (__)  __________________ __________  (__) LIVER PROFILE  ___________  (__)  __________________ __________  (__) MRSA NASAL SWAB ___________  (__) URINE PREG DOS __________  (__) SED RATE  ___________  (__) BLOOD SUGAR DOS __________  (__) C-REACTIVE PROTEIN ___________    (__) VITAMIN D HYDROXY ___________  (__) BLOOD THINNERS __________    (__) ACE/ ARBS: _____________________     (__) BETABLOCKERS __________________

## 2022-09-09 ENCOUNTER — TELEPHONE (OUTPATIENT)
Dept: PRIMARY CARE CLINIC | Age: 62
End: 2022-09-09

## 2022-09-09 NOTE — TELEPHONE ENCOUNTER
Pt calling saying she has been having abdominal pain x 11 days. She says she has had constipation, been using fiber every day in her hot tea, had hard stools and was able to flush out with a suppository, has been taking Miralax x a week. She says she is left with upper abdominal pain. Asked her if she has a gastro dr and she does, Dr Kassi Winkler at [place] who did her colonoscopy last year.  Gave her the phone number and she will call there now

## 2022-09-15 ENCOUNTER — TELEPHONE (OUTPATIENT)
Dept: ORTHOPEDIC SURGERY | Age: 62
End: 2022-09-15

## 2022-09-16 ENCOUNTER — HOSPITAL ENCOUNTER (EMERGENCY)
Age: 62
Discharge: HOME | End: 2022-09-17
Attending: STUDENT IN AN ORGANIZED HEALTH CARE EDUCATION/TRAINING PROGRAM
Payer: MEDICARE

## 2022-09-16 VITALS
SYSTOLIC BLOOD PRESSURE: 135 MMHG | HEART RATE: 90 BPM | BODY MASS INDEX: 35.97 KG/M2 | RESPIRATION RATE: 18 BRPM | WEIGHT: 203 LBS | HEIGHT: 63 IN | TEMPERATURE: 98.1 F | OXYGEN SATURATION: 94 % | DIASTOLIC BLOOD PRESSURE: 96 MMHG

## 2022-09-16 DIAGNOSIS — K59.00 CONSTIPATION, UNSPECIFIED CONSTIPATION TYPE: Primary | ICD-10-CM

## 2022-09-16 LAB
A/G RATIO: 1.4 (ref 1.1–2.2)
ALBUMIN SERPL-MCNC: 4.3 G/DL (ref 3.4–5)
ALP BLD-CCNC: 129 U/L (ref 40–129)
ALT SERPL-CCNC: 24 U/L (ref 10–40)
ANION GAP SERPL CALCULATED.3IONS-SCNC: 12 MMOL/L (ref 3–16)
AST SERPL-CCNC: 22 U/L (ref 15–37)
BACTERIA: ABNORMAL /HPF
BASOPHILS ABSOLUTE: 0 K/UL (ref 0–0.2)
BASOPHILS RELATIVE PERCENT: 0.3 %
BILIRUB SERPL-MCNC: 0.3 MG/DL (ref 0–1)
BILIRUBIN URINE: NEGATIVE
BLOOD, URINE: NEGATIVE
BUN BLDV-MCNC: 10 MG/DL (ref 7–20)
CALCIUM SERPL-MCNC: 10.1 MG/DL (ref 8.3–10.6)
CHLORIDE BLD-SCNC: 102 MMOL/L (ref 99–110)
CLARITY: CLEAR
CO2: 25 MMOL/L (ref 21–32)
COLOR: YELLOW
CREAT SERPL-MCNC: 0.5 MG/DL (ref 0.6–1.2)
CRYSTALS, UA: ABNORMAL /HPF
EOSINOPHILS ABSOLUTE: 0.2 K/UL (ref 0–0.6)
EOSINOPHILS RELATIVE PERCENT: 1.7 %
EPITHELIAL CELLS, UA: ABNORMAL /HPF (ref 0–5)
GFR AFRICAN AMERICAN: >60
GFR NON-AFRICAN AMERICAN: >60
GLUCOSE BLD-MCNC: 128 MG/DL (ref 70–99)
GLUCOSE URINE: NEGATIVE MG/DL
HCT VFR BLD CALC: 39.7 % (ref 36–48)
HEMOGLOBIN: 12.6 G/DL (ref 12–16)
KETONES, URINE: NEGATIVE MG/DL
LEUKOCYTE ESTERASE, URINE: NEGATIVE
LIPASE: 19 U/L (ref 13–60)
LYMPHOCYTES ABSOLUTE: 3.5 K/UL (ref 1–5.1)
LYMPHOCYTES RELATIVE PERCENT: 24 %
MCH RBC QN AUTO: 26.1 PG (ref 26–34)
MCHC RBC AUTO-ENTMCNC: 31.8 G/DL (ref 31–36)
MCV RBC AUTO: 82 FL (ref 80–100)
MICROSCOPIC EXAMINATION: YES
MONOCYTES ABSOLUTE: 0.8 K/UL (ref 0–1.3)
MONOCYTES RELATIVE PERCENT: 5.4 %
MUCUS: ABNORMAL /LPF
NEUTROPHILS ABSOLUTE: 9.9 K/UL (ref 1.7–7.7)
NEUTROPHILS RELATIVE PERCENT: 68.6 %
NITRITE, URINE: NEGATIVE
PDW BLD-RTO: 14.3 % (ref 12.4–15.4)
PH UA: 6 (ref 5–8)
PLATELET # BLD: 333 K/UL (ref 135–450)
PMV BLD AUTO: 10.2 FL (ref 5–10.5)
POTASSIUM REFLEX MAGNESIUM: 4 MMOL/L (ref 3.5–5.1)
PROTEIN UA: ABNORMAL MG/DL
RBC # BLD: 4.84 M/UL (ref 4–5.2)
RBC UA: ABNORMAL /HPF (ref 0–4)
SODIUM BLD-SCNC: 139 MMOL/L (ref 136–145)
SPECIFIC GRAVITY UA: >=1.03 (ref 1–1.03)
TOTAL PROTEIN: 7.4 G/DL (ref 6.4–8.2)
URINE TYPE: ABNORMAL
UROBILINOGEN, URINE: 0.2 E.U./DL
WBC # BLD: 14.4 K/UL (ref 4–11)
WBC UA: ABNORMAL /HPF (ref 0–5)

## 2022-09-16 PROCEDURE — 81001 URINALYSIS AUTO W/SCOPE: CPT

## 2022-09-16 PROCEDURE — 36415 COLL VENOUS BLD VENIPUNCTURE: CPT

## 2022-09-16 PROCEDURE — 83690 ASSAY OF LIPASE: CPT

## 2022-09-16 PROCEDURE — 96375 TX/PRO/DX INJ NEW DRUG ADDON: CPT

## 2022-09-16 PROCEDURE — 96374 THER/PROPH/DIAG INJ IV PUSH: CPT

## 2022-09-16 PROCEDURE — 6360000002 HC RX W HCPCS: Performed by: PHYSICIAN ASSISTANT

## 2022-09-16 PROCEDURE — 85025 COMPLETE CBC W/AUTO DIFF WBC: CPT

## 2022-09-16 PROCEDURE — 99284 EMERGENCY DEPT VISIT MOD MDM: CPT

## 2022-09-16 PROCEDURE — 80053 COMPREHEN METABOLIC PANEL: CPT

## 2022-09-16 RX ORDER — KETOROLAC TROMETHAMINE 30 MG/ML
15 INJECTION, SOLUTION INTRAMUSCULAR; INTRAVENOUS ONCE
Status: COMPLETED | OUTPATIENT
Start: 2022-09-16 | End: 2022-09-16

## 2022-09-16 RX ORDER — ONDANSETRON 2 MG/ML
4 INJECTION INTRAMUSCULAR; INTRAVENOUS ONCE
Status: COMPLETED | OUTPATIENT
Start: 2022-09-16 | End: 2022-09-16

## 2022-09-16 RX ADMIN — ONDANSETRON 4 MG: 2 INJECTION INTRAMUSCULAR; INTRAVENOUS at 20:35

## 2022-09-16 RX ADMIN — KETOROLAC TROMETHAMINE 15 MG: 30 INJECTION, SOLUTION INTRAMUSCULAR at 20:36

## 2022-09-16 ASSESSMENT — PAIN DESCRIPTION - DESCRIPTORS
DESCRIPTORS: PRESSURE
DESCRIPTORS: SHARP

## 2022-09-16 ASSESSMENT — PAIN DESCRIPTION - ONSET: ONSET: GRADUAL

## 2022-09-16 ASSESSMENT — PAIN DESCRIPTION - PAIN TYPE: TYPE: ACUTE PAIN

## 2022-09-16 ASSESSMENT — ENCOUNTER SYMPTOMS
COUGH: 0
BACK PAIN: 0
NAUSEA: 1
DIARRHEA: 0
PHOTOPHOBIA: 0
CONSTIPATION: 1
SHORTNESS OF BREATH: 0
ABDOMINAL PAIN: 1
VOMITING: 0
EYE PAIN: 0

## 2022-09-16 ASSESSMENT — PAIN DESCRIPTION - LOCATION
LOCATION: ABDOMEN

## 2022-09-16 ASSESSMENT — PAIN - FUNCTIONAL ASSESSMENT
PAIN_FUNCTIONAL_ASSESSMENT: ACTIVITIES ARE NOT PREVENTED
PAIN_FUNCTIONAL_ASSESSMENT: 0-10

## 2022-09-16 ASSESSMENT — PAIN DESCRIPTION - FREQUENCY: FREQUENCY: CONTINUOUS

## 2022-09-16 ASSESSMENT — PAIN SCALES - GENERAL
PAINLEVEL_OUTOF10: 2
PAINLEVEL_OUTOF10: 9
PAINLEVEL_OUTOF10: 10

## 2022-09-16 ASSESSMENT — PAIN DESCRIPTION - ORIENTATION
ORIENTATION: MID
ORIENTATION: MID

## 2022-09-16 NOTE — ED PROVIDER NOTES
[address] [facility] [address] ENCOUNTER          PHYSICIAN ASSISTANT NOTE       Date of evaluation: 9/16/2022    Chief Complaint     Abdominal Pain (g) and Constipation (Pt states gastro Dr told her to come last week)      History of Present Illness     Gail Query is a 59 y.o. female with a PMH of HTN, HLD, DM who presents to the emergency room with complaints of abdominal pain and constipation. Patient states that over the last month she has been dealing with significant constipation. She continues to have bowel movements, however they are very small and pebble-like. She states she continues to pass gas, most recently today. She did use a suppository approximately 1 month ago, however does not feel it improved her symptoms. She states she has been taking 1 capful of MiraLAX daily along with 30 mL of milk of magnesia. She has had some mild nausea, however denies any vomiting. She states she contacted her GI doctor who referred her to the emergency department for evaluation. She denies chest pain, shortness of breath, fever, headache, dizziness, or other concerning symptoms. Review of Systems     Review of Systems   Constitutional:  Negative for chills and fever. HENT:  Negative for congestion. Eyes:  Negative for photophobia and pain. Respiratory:  Negative for cough and shortness of breath. Cardiovascular:  Negative for chest pain and palpitations. Gastrointestinal:  Positive for abdominal pain, constipation and nausea. Negative for diarrhea and vomiting. Genitourinary:  Negative for difficulty urinating and hematuria. Musculoskeletal:  Negative for back pain and neck pain. Neurological:  Negative for dizziness and headaches. Psychiatric/Behavioral:  Negative for suicidal ideas.       Past Medical, Surgical, Family, and Social History     She has a past medical history of Allergic rhinitis, HTN (hypertension), Hyperlipidemia, Obstructive sleep apnea (adult) (pediatric), Osteoarthritis, and Type II or unspecified type diabetes mellitus without mention of complication, not stated as uncontrolled. She has a past surgical history that includes joint replacement (2007); Rotator cuff repair (2009); Colon surgery (2008); shoulder surgery (1990's); knee surgery (1990's); Neck surgery (05/12/2011); Umbilical hernia repair (8/2010); Ovary removal; Sinus endoscopy (9-15-14); Tonsillectomy and adenoidectomy; Tubal ligation; Total knee arthroplasty (Left, 03/09/2010); dupuytrens contracture surgery (Left, 1/29/2019); hernia repair; and Hysterectomy, total abdominal.  Her family history includes Breast Cancer in her paternal cousin; Diabetes in her father and son; Heart Disease in her father and sister; High Blood Pressure in her father; High Cholesterol in her father; Kidney Disease in her brother; Other in her son. She reports that she has been smoking cigars. She has never used smokeless tobacco. She reports current alcohol use. She reports that she does not currently use drugs. Medications     Previous Medications    ACCU-CHEK SOFTCLIX LANCETS MISC    TEST BLOOD SUGAR TWO TIMES A DAY    AMLODIPINE (NORVASC) 5 MG TABLET    TAKE ONE TABLET BY MOUTH DAILY    ASPIRIN EC 81 MG EC TABLET    Take 81 mg by mouth daily. LAST DOSE 8-9-14    ATORVASTATIN (LIPITOR) 10 MG TABLET    TAKE ONE TABLET BY MOUTH DAILY    BLOOD GLUCOSE MONITOR STRIPS    Test 1-3 times a day as needed for symptoms of irregular glucose. DX: E11.9 please provide strips that are accepted by patient's insurance.     CALCIUM CARB-CHOLECALCIFEROL (CALTRATE 600+D) 600-800 MG-UNIT TABS    Take 1 tablet by mouth 2 times daily    CETIRIZINE (ZYRTEC ALLERGY) 10 MG TABLET    Take 1 tablet by mouth daily    DICYCLOMINE (BENTYL) 10 MG CAPSULE    Take 1 capsule by mouth every 6 hours as needed (cramps)    IBUPROFEN (ADVIL;MOTRIN) 800 MG TABLET    Take 1 tablet by mouth 2 times daily as needed for Pain    INSULIN GLARGINE (LANTUS SOLOSTAR) 100 UNIT/ML INJECTION PEN    Inject 35 Units into the skin nightly    INSULIN PEN NEEDLE (PEN NEEDLES) 32G X 4 MM MISC    USE TO INJECT INSULIN FOUR TIMES DAILY    LANCETS MISC    Test 1-3 times a day as needed for symptoms of irregular glucose. DX: E11.9 please provide lancets accepted by patient's insurance. LOSARTAN (COZAAR) 100 MG TABLET    TAKE ONE TABLET BY MOUTH DAILY    METFORMIN (GLUCOPHAGE) 1000 MG TABLET    TAKE ONE TABLET BY MOUTH TWICE A DAY WITH MEALS    MONTELUKAST (SINGULAIR) 10 MG TABLET    TAKE ONE TABLET BY MOUTH ONCE NIGHTLY    MUPIROCIN (BACTROBAN) 2 % OINTMENT    Apply twice daily to each nare for the 5 days prior to surgical procedure    NYSTATIN (MYCOSTATIN) 792483 UNIT/ML SUSPENSION    5 mL by mouth 4 times a day x 7-10 days, retain in mouth as long as possible before swallowing       Allergies     She is allergic to gabapentin, demerol, levaquin [levofloxacin in d5w], morphine, and tramadol. Physical Exam     INITIAL VITALS: BP: (!) 149/94, Temp: 98.1 °F (36.7 °C), Heart Rate: (!) 101, Resp: 18, SpO2: 96 %  Physical Exam  Constitutional:       General: She is not in acute distress. Appearance: She is well-developed. HENT:      Head: Normocephalic and atraumatic. Eyes:      Pupils: Pupils are equal, round, and reactive to light. Cardiovascular:      Rate and Rhythm: Normal rate and regular rhythm. Heart sounds: No murmur heard. No friction rub. No gallop. Pulmonary:      Effort: Pulmonary effort is normal. No respiratory distress. Breath sounds: Normal breath sounds. Abdominal:      Palpations: Abdomen is soft. Tenderness: generalized abdominal tenderness   Musculoskeletal:         General: Normal range of motion. Cervical back: Normal range of motion and neck supple. Skin:     General: Skin is warm and dry. Neurological:      Mental Status: She is alert and oriented to person, place, and time.        Diagnostic Results     RADIOLOGY:  No orders to display       LABS:   Results for orders placed or performed during the hospital encounter of 09/16/22   CBC with Auto Differential   Result Value Ref Range    WBC 14.4 (H) 4.0 - 11.0 K/uL    RBC 4.84 4.00 - 5.20 M/uL    Hemoglobin 12.6 12.0 - 16.0 g/dL    Hematocrit 39.7 36.0 - 48.0 %    MCV 82.0 80.0 - 100.0 fL    MCH 26.1 26.0 - 34.0 pg    MCHC 31.8 31.0 - 36.0 g/dL    RDW 14.3 12.4 - 15.4 %    Platelets 229 423 - 458 K/uL    MPV 10.2 5.0 - 10.5 fL    Neutrophils % 68.6 %    Lymphocytes % 24.0 %    Monocytes % 5.4 %    Eosinophils % 1.7 %    Basophils % 0.3 %    Neutrophils Absolute 9.9 (H) 1.7 - 7.7 K/uL    Lymphocytes Absolute 3.5 1.0 - 5.1 K/uL    Monocytes Absolute 0.8 0.0 - 1.3 K/uL    Eosinophils Absolute 0.2 0.0 - 0.6 K/uL    Basophils Absolute 0.0 0.0 - 0.2 K/uL   Comprehensive Metabolic Panel w/ Reflex to MG   Result Value Ref Range    Sodium 139 136 - 145 mmol/L    Potassium reflex Magnesium 4.0 3.5 - 5.1 mmol/L    Chloride 102 99 - 110 mmol/L    CO2 25 21 - 32 mmol/L    Anion Gap 12 3 - 16    Glucose 128 (H) 70 - 99 mg/dL    BUN 10 7 - 20 mg/dL    Creatinine 0.5 (L) 0.6 - 1.2 mg/dL    GFR Non-African American >60 >60    GFR African American >60 >60    Calcium 10.1 8.3 - 10.6 mg/dL    Total Protein 7.4 6.4 - 8.2 g/dL    Albumin 4.3 3.4 - 5.0 g/dL    Albumin/Globulin Ratio 1.4 1.1 - 2.2    Total Bilirubin 0.3 0.0 - 1.0 mg/dL    Alkaline Phosphatase 129 40 - 129 U/L    ALT 24 10 - 40 U/L    AST 22 15 - 37 U/L   Lipase   Result Value Ref Range    Lipase 19.0 13.0 - 60.0 U/L   Urinalysis with Microscopic   Result Value Ref Range    Color, UA Yellow Straw/Yellow    Clarity, UA Clear Clear    Glucose, Ur Negative Negative mg/dL    Bilirubin Urine Negative Negative    Ketones, Urine Negative Negative mg/dL    Specific Gravity, UA >=1.030 1.005 - 1.030    Blood, Urine Negative Negative    pH, UA 6.0 5.0 - 8.0    Protein, UA TRACE (A) Negative mg/dL    Urobilinogen, Urine 0.2 <2.0 E.U./dL    Nitrite, Urine Negative Negative    Leukocyte Esterase, Urine Negative Negative    Microscopic Examination YES     Urine Type NotGiven     Mucus, UA 1+ (A) None Seen /LPF    WBC, UA 0-2 0 - 5 /HPF    RBC, UA 0-2 0 - 4 /HPF    Epithelial Cells, UA 2-5 0 - 5 /HPF    Bacteria, UA 1+ (A) None Seen /HPF    Crystals, UA 1+ Ca. Oxalate (A) None Seen /HPF       RECENT VITALS:  BP: (!) 135/96, Temp: 98.1 °F (36.7 °C), Heart Rate: 90, Resp: 18, SpO2: 94 %       ED Course     Nursing Notes, Past Medical Hx,Past Surgical Hx, Social Hx, Allergies, and Family Hx were reviewed. The patient was given the following medications:  Orders Placed This Encounter   Medications    ketorolac (TORADOL) injection 15 mg    ondansetron (ZOFRAN) injection 4 mg    bisacodyl (DULCOLAX) 10 MG suppository     Sig: Place 1 suppository rectally daily     Dispense:  30 suppository     Refill:  0       CONSULTS:  None    MEDICAL DECISION MAKING / ASSESSMENT / Kareenkhushbu Evans is a 59 y.o. female who presents the emergency room complaint of abdominal pain constipation. Vital signs stable on presentation and remained stable throughout her stay. Thorough history and physical exam performed and detailed above. Patient presents the emergency department with complaints of abdominal pain and constipation. She states is been ongoing for approximately 1 month. She continues to have bowel movements, however they are very small. She continues to pass gas frequently, most recently today. She denies any rectal pain when attempting to have a bowel movement. She has had nausea, however no vomiting. Denies chest pain, shortness of breath, cough, fever, headache, dizziness, or other concerning symptoms. On physical exam heart rate and rhythm regular. Lungs clear to auscultation bilaterally. Abdomen soft with generalized tenderness. No rebound or guarding. She is initially given 15 mg of Toradol and 4 mg of Zofran for her symptoms.   CBC with a white blood cell

## 2022-09-17 RX ORDER — BISACODYL 10 MG
10 SUPPOSITORY, RECTAL RECTAL DAILY
Qty: 30 SUPPOSITORY | Refills: 0 | Status: SHIPPED | OUTPATIENT
Start: 2022-09-17 | End: 2022-10-05 | Stop reason: CLARIF

## 2022-09-17 NOTE — ED NOTES
Pt discharged from ED in stable condition. Discharge instruction explain, all question answered. Prescription given. Pt walked to lobby independently.        Gagan Campa, RN  09/17/22 [id number]

## 2022-09-19 ENCOUNTER — TELEPHONE (OUTPATIENT)
Dept: ORTHOPEDIC SURGERY | Age: 62
End: 2022-09-19

## 2022-09-19 ENCOUNTER — CARE COORDINATION (OUTPATIENT)
Dept: CARE COORDINATION | Age: 62
End: 2022-09-19

## 2022-09-19 ASSESSMENT — PATIENT HEALTH QUESTIONNAIRE - PHQ9
SUM OF ALL RESPONSES TO PHQ QUESTIONS 1-9: 0

## 2022-09-19 NOTE — TELEPHONE ENCOUNTER
Patient is requesting home health services after her surgery. Patient is requesting order for handicap plaque     Please call patient to discuss.

## 2022-09-19 NOTE — CARE COORDINATION
Ambulatory Care Coordination Note  9/19/2022    ACC: Tiffany Webster, RN    Summary Note: care transition outreach s/p ED visit over weekend for c/o constipation. Has had BM since ED visit, taking medications as prescribed. Pt is in outreach to GI. She adds that after initial successful weight loss, evidenced by reduction of dress/pant sizes, she has since reached a plateau and is uncertain what more she could do to continue losing weight and reducing abdominal girth, which is chief primary concern at this time. Additionally, she reviews she is scheduled for ortho (knee) surgery 10.10.22. She is willing to engage w/RD for her efforts to reduce abdominal girth and endorses plan to further dialogue w/GI about her concern. Reports blood sugars are staying WNL. Denies experiencing any hyper/hypoglycemic episodes and verbalizes strong knowledge base regarding self mgmt DM. Denies any questions or concerns r/t ED DC instructions and states intent to self outreach for scheduling future/return visit w/PCP once she has been able to schedule follow up w/GI - whom she plans to reach out to this week. Pt further shares that she is in review of changing health plans following information recently obtained through her employer union. She may be transitioning to [facility] PPO from current [address] She plans to review w/GI and PCP about potential surgery to help in reducing abd girth since she has applied diet, exercise, and has reached plateau. Reviewed ACM availability. Pt accurately recited ACM contact information provided. Pt verbalized understanding of above and agreement with the following  Plan: pt will direct outreach to PCP and GI to schedule ED/follow up visits, as appropriate. RD referral for support w/nutritional counseling  Initiate ACC support for health coaching, care collaboration, support w/community resources, and help with any newly presenting concerns as needed.   Pt agrees to outreach direct to [employer], as needed, between routine follow up outreach initiated by [facility]     Other: courtesy message left to Parviz Tinsley, Orthopedic Nurse Navigator noted on pt chart. Advised ACM involvement, and that no overlap of support is anticipated but offered ACM contact information if/should Jamia Hendrix wish to collaborate. Provided & repeated ACM callback number. Ambulatory Care Coordination Assessment    Care Coordination Protocol  Referral from Primary Care Provider: No  Week 1 - Initial Assessment     Do you have all of your prescriptions and are they filled?: Yes (Comment: reviewed 9.19.22)  Barriers to medication adherence: Other  Other barriers to medication adherence: insurance denial reported by pharmacy; pt actively working w/pharmacist, believes Rx being ran through her former insurance plan  Are you able to afford your medications?: Yes  How often do you have trouble taking your medications the way you have been told to take them?: I always take them as prescribed. Do you have Home O2 Therapy?: No      Ability to seek help/take action for Emergent Urgent situations i.e. fire, crime, inclement weather or health crisis. : Independent  Ability to ambulate to restroom: Independent  Ability handle personal hygeine needs (bathing/dressing/grooming): Independent  Ability to manage Medications: Independent  Ability to prepare Food Preparation: Independent  Ability to maintain home (clean home, laundry): Independent  Ability to drive and/or has transportation: Independent  Ability to do shopping: Independent  Ability to manage finances:  Independent  Is patient able to live independently?: Yes     Current Housing: Apartment        Per the Fall Risk Screening, did the patient have 2 or more falls or 1 fall with injury in the past year?: No     Frequent urination at night?: No  Do you use rails/bars?: Yes  Do you have a non-slip tub mat?: Yes     Are you experiencing loss of meaning?: No  Are you experiencing loss of hope and peace?: No     Thinking about your patient's physical health needs, are there any symptoms or problems (risk indicators) you are unsure about that require further investigation?: No identified areas of uncertainly or problems already being investigated   Are the patients physical health problems impacting on their mental well-being?: No identified areas of concern   Are there any problems with your patients lifestyle behaviors (alcohol, drugs, diet, exercise) that are impacting on physical or mental well-being?: No identified areas of concern   Do you have any other concerns about your patients mental well-being? How would you rate their severity and impact on the patient?: No identified areas of concern   How would you rate their home environment in terms of safety and stability (including domestic violence, insecure housing, neighbor harassment)?: Consistently safe, supportive, stable, no identified problems   How do daily activities impact on the patient's well-being? (include current or anticipated unemployment, work, caregiving, access to transportation or other): No identified problems or perceived positive benefits   How would you rate their social network (family, work, friends)?: Good participation with social networks   How would you rate their financial resources (including ability to afford all required medical care)?: Financially secure, resources adequate, no identified problems   How wells does the patient now understand their health and well-being (symptoms, signs or risk factors) and what they need to do to manage their health?: Reasonable to good understanding and already engages in managing health or is willing to undertake better management   How well do you think your patient can engage in healthcare discussions?  (Barriers include language, deafness, aphasia, alcohol or drug problems, learning difficulties, concentration): Clear and open communication, no identified barriers   Do other services need to be involved to help this patient?: Other care/services not required at this time   Are current services involved with this patient well-coordinated? (Include coordination with other services you are now recommendation): All required care/services in place and well-coordinated   Suggested Interventions and Community Resources  Diabetes Education: Completed   Fall Risk Prevention: Completed Other Services or Interventions: reviewed self mgmt DM, health maintenance   Registered Dietician: In Process   Specialty Service Referral: Completed   Zone Management Tools: Completed         Set up/Review an Education Plan, Set up/Review Goals                Prior to Admission medications    Medication Sig Start Date End Date Taking? Authorizing Provider   bisacodyl (DULCOLAX) 10 MG suppository Place 1 suppository rectally daily 9/17/22 10/17/22  Alvie Dandy, PA-C   pirocin [facility]) 2 % ointment Apply twice daily to each nare for the 5 days prior to surgical procedure 8/26/22   Autumn Funez PA-C   losartan (COZAAR) 100 MG tablet TAKE ONE TABLET BY MOUTH DAILY 8/1/22   Isabel Rico, DO   metFORMIN (GLUCOPHAGE) 1000 MG tablet TAKE ONE TABLET BY MOUTH TWICE A DAY WITH MEALS 7/20/22   Isabel Rico, DO   montelukast (SINGULAIR) 10 MG tablet TAKE ONE TABLET BY MOUTH ONCE NIGHTLY 7/14/22   Isabel Rico, DO   insulin glargine (LANTUS SOLOSTAR) 100 UNIT/ML injection pen Inject 35 Units into the skin nightly 7/6/22   Isabel Rico, DO   blood glucose monitor strips Test 1-3 times a day as needed for symptoms of irregular glucose. DX: E11.9 please provide strips that are accepted by patient's insurance.  6/28/22   Alvarez Coyle, APRN - CNP   ibuprofen (ADVIL;MOTRIN) 800 MG tablet Take 1 tablet by mouth 2 times daily as needed for Pain 6/28/22   Alvarez Coyle, APRN - CNP   atorvastatin (LIPITOR) 10 MG tablet TAKE ONE TABLET BY MOUTH DAILY 4/25/22   Goldy Burnette Elysa Koyanagi, APRN - CNP   dicyclomine (BENTYL) 10 MG capsule Take 1 capsule by mouth every 6 hours as needed (cramps) 4/11/22   Smitha Lund MD   nystatin (MYCOSTATIN) 513360 UNIT/ML suspension 5 mL by mouth 4 times a day x 7-10 days, retain in mouth as long as possible before swallowing 4/4/22   [facility], DO   amLODIPine (NORVASC) 5 MG tablet TAKE ONE TABLET BY MOUTH DAILY 11/24/21   Isabel Rico, DO   Insulin Pen Needle (PEN NEEDLES) 32G X 4 MM MISC USE TO INJECT INSULIN FOUR TIMES DAILY 4/22/21   Isabel Rico, DO   Lancets MISC Test 1-3 times a day as needed for symptoms of irregular glucose. DX: E11.9 please provide lancets accepted by patient's insurance. 6/1/20   Isabel Rico, DO   ACCU-CHEK SOFTCLIX LANCETS MISC TEST BLOOD SUGAR TWO TIMES A DAY 8/5/19   John Green, DO   Calcium Carb-Cholecalciferol (CALTRATE 600+D) 600-800 MG-UNIT TABS Take 1 tablet by mouth 2 times daily    Historical Provider, MD   cetirizine (ZYRTEC ALLERGY) 10 MG tablet Take 1 tablet by mouth daily 12/3/15   John Green DO   aspirin EC 81 MG EC tablet Take 81 mg by mouth daily.  LAST DOSE 8-9-14 9/28/11   Mir Joyce, DO       Future Appointments   Date Time Provider Gabriel Case   10/28/2022 10:15 AM Loli Bassett MD [facility]

## 2022-09-22 ENCOUNTER — CARE COORDINATION (OUTPATIENT)
Dept: CARE COORDINATION | Age: 62
End: 2022-09-22

## 2022-09-22 DIAGNOSIS — E11.65 TYPE 2 DIABETES MELLITUS WITH HYPERGLYCEMIA, WITHOUT LONG-TERM CURRENT USE OF INSULIN (HCC): ICD-10-CM

## 2022-09-22 RX ORDER — PEN NEEDLE, DIABETIC 32GX 5/32"
NEEDLE, DISPOSABLE MISCELLANEOUS
Qty: 100 EACH | Refills: 1 | Status: SHIPPED | OUTPATIENT
Start: 2022-09-22

## 2022-09-22 NOTE — CARE COORDINATION
@ [phone]: 16 Laura Milton regarding Dietitian referral. Patient answered, RD explained reason for call and role in care. Patient requested RD call back a little while later today as she was driving. Will follow up as appropriate. @ [phone]: Attempted to contact Sharmaine Ormond regarding Dietitian referral. Nicolas Beltran to leave voicemail with call back number as patient's voicemail box is full. Will follow up as appropriate.        Joellen Sharma, [address] [place] [address],   [phone]

## 2022-09-22 NOTE — TELEPHONE ENCOUNTER
Medication:   Requested Prescriptions     Pending Prescriptions Disp Refills    Insulin Pen Needle ([employer] PEN NEEDLES) 32G X 4 MM MISC [Pharmacy Med Name: Rakel Marc PEN NEEDLE 4MM X 32G]       Sig: USE TO INJECT INSULIN FOUR TIMES A DAY     Last Filled:  2/22/2022    Last appt: 7/6/2022   Next appt: Visit date not found

## 2022-09-27 NOTE — TELEPHONE ENCOUNTER
Paul Shahana [id number]    Date: 10/10/22  Type of SX:  inpatient  Location: [facility]  CPT: 33702   DX Code: T84.59XA  SX area: l knee  Insurance: [employer]  Call from [facility] if sx date changes.

## 2022-09-28 ENCOUNTER — TELEPHONE (OUTPATIENT)
Dept: ORTHOPEDIC SURGERY | Age: 62
End: 2022-09-28

## 2022-09-28 ENCOUNTER — TELEPHONE (OUTPATIENT)
Dept: PRIMARY CARE CLINIC | Age: 62
End: 2022-09-28

## 2022-09-28 NOTE — TELEPHONE ENCOUNTER
[facility]- CANNOT GIVE OUT A HANDICAP PLACARD PAST 6 MONTHS, SHE WILL REACH OUT TO HER PCP FOR ONE LONGER THAN 6 MONTHS.

## 2022-09-28 NOTE — TELEPHONE ENCOUNTER
General Question     Subject: PATIENT IS WISHING FOR A HANDICAPPED STICKER FOR MORE THAN SIX MONTHS.  PLEASE ADVISE   Patient: Eleanor Cornelius  Contact Number: [phone]

## 2022-09-28 NOTE — TELEPHONE ENCOUNTER
Pt calling to request a handicap parking placard for 5 yrs    She says the P.A. for Dr Dana Baldwin will only give her one for 6 months    She says she has always had the 5 year one and only uses it when she needs to     Please let her know

## 2022-09-29 ENCOUNTER — CARE COORDINATION (OUTPATIENT)
Dept: CARE COORDINATION | Age: 62
End: 2022-09-29

## 2022-09-30 ENCOUNTER — TELEPHONE (OUTPATIENT)
Dept: ORTHOPEDIC SURGERY | Age: 62
End: 2022-09-30

## 2022-09-30 NOTE — TELEPHONE ENCOUNTER
ORTHOPAEDIC NURSE NAVIGATOR SUMMARY NOTE  RN did not speak with pt prior to surgery. Anticipated Date of Surgery: 10/10/22    Recieved Pre-Op Education: yes   In person class:No  Pt used educational link:No   Pt completed pre and post test to measure learning:No    If pt did not complete either, why not? Pt Declined      PCP: Kaur Alexander DO   Phone #: [phone]    Date of PCP Visit for H&P: 10/5/22    Any Noted Concerns from PCP prior to surgery:  No   If Yes, what concerns?:    IS THE PATIENT IN A PAIN MANAGEMENT PROGRAM?:   Not Applicable     Review of Past Medical History Reveals History of:      Critical Lab Values:   Hgb/Hct:   Hemoglobin (g/dL)   Date Value   09/16/2022 12.6   /  Hematocrit (%)   Date Value   09/16/2022 39.7      HgbA1C:    Lab Results   Component Value Date    LABA1C 7.0 08/26/2022    LABA1C 7.9 07/29/2022    LABA1C 8.7 06/15/2022      Albumin:    Lab Results   Component Value Date    LABALBU 4.3 09/16/2022      BUN/Cr:   BUN (mg/dL)   Date Value   09/16/2022 10   /  Creatinine (mg/dL)   Date Value   09/16/2022 0.5 (L)      BMI:    BMI Readings from Last 1 Encounters:   09/16/22 35.96 kg/m²        Coronary Artery Disease/HTN/CHF History: Yes- HTN      Cardiologist:     Cardiac Clearance Necessary: No    Date of Cardiac Clearance Appt: On Plavix? No,  If YES, when will they stop taking?     Final Cardiac Recommendations:N/A   -On any anticoagulation-       Diabetes History: Yes    Most Recent HgbA1C: 7.0    PCP or Endocrine Recommendations: N/A    Nutritionist/Dietician Consult Scheduled: N/A    Final Plan For Diabetic Control: N/A   Pulmonary: COPD/Emphysema/ Use of home oxygen:      Alcohol use:        DVT Risk Stratification:  Unknown      Vascular Consult Ordered:  NA    Date of Vascular Appt:     Hematology/Oncology Consult Ordered:  NA    Date of Hematology/Oncology Appt:     Final Recommendation For DVT Prophylaxis:   -Smoking history or use of estrogen-         BMI Greater than 40 at time of scheduling?: Yes- pt is continuing to loose weight up until surgery. Dr. Lathan Paget aware    Has Surgeon been notified of BMI concern? Yes    Weight Loss Clinic Consult Ordered: No    Date of Wt Loss Clinic Appt:     BMI at time of surgery (if went through [facility]):       Additional Medical Concerns:         Discharge Disposition Information:     Attended Pre-Hab Program: NA     Anticipated Discharge Disposition:   Aleksey Gee RN  9/30/2022

## 2022-10-03 ENCOUNTER — TELEPHONE (OUTPATIENT)
Dept: ORTHOPEDIC SURGERY | Age: 62
End: 2022-10-03

## 2022-10-03 NOTE — TELEPHONE ENCOUNTER
Surgery and/or Procedure Scheduling     Contact Name: Remington Salmon  Surgical/Procedure Request: 10/10/2022  Patient Contact Number: [phone]      Patient calling in regards to needing H & P forms faxed to PCP     Fax # [phone]

## 2022-10-05 ENCOUNTER — OFFICE VISIT (OUTPATIENT)
Dept: PRIMARY CARE CLINIC | Age: 62
End: 2022-10-05
Payer: MEDICARE

## 2022-10-05 VITALS
WEIGHT: 201 LBS | DIASTOLIC BLOOD PRESSURE: 73 MMHG | TEMPERATURE: 97.1 F | HEIGHT: 63 IN | SYSTOLIC BLOOD PRESSURE: 121 MMHG | OXYGEN SATURATION: 96 % | HEART RATE: 122 BPM | BODY MASS INDEX: 35.61 KG/M2

## 2022-10-05 DIAGNOSIS — Z96.659 CHRONIC INFECTION OF KNEE JOINT PROSTHESIS, SEQUELA: ICD-10-CM

## 2022-10-05 DIAGNOSIS — T84.59XS CHRONIC INFECTION OF KNEE JOINT PROSTHESIS, SEQUELA: ICD-10-CM

## 2022-10-05 DIAGNOSIS — Z01.818 PRE-OP EXAM: Primary | ICD-10-CM

## 2022-10-05 DIAGNOSIS — E11.65 TYPE 2 DIABETES MELLITUS WITH HYPERGLYCEMIA, WITH LONG-TERM CURRENT USE OF INSULIN (HCC): ICD-10-CM

## 2022-10-05 DIAGNOSIS — Z79.4 TYPE 2 DIABETES MELLITUS WITH HYPERGLYCEMIA, WITH LONG-TERM CURRENT USE OF INSULIN (HCC): ICD-10-CM

## 2022-10-05 PROBLEM — S46.912A: Status: RESOLVED | Noted: 2021-02-02 | Resolved: 2022-10-05

## 2022-10-05 PROBLEM — M65.832 EXTENSOR TENOSYNOVITIS OF WRIST, LEFT: Status: RESOLVED | Noted: 2021-06-08 | Resolved: 2022-10-05

## 2022-10-05 PROBLEM — M65.932 EXTENSOR TENOSYNOVITIS OF WRIST, LEFT: Status: RESOLVED | Noted: 2021-06-08 | Resolved: 2022-10-05

## 2022-10-05 PROCEDURE — 99213 OFFICE O/P EST LOW 20 MIN: CPT | Performed by: FAMILY MEDICINE

## 2022-10-05 PROCEDURE — 3051F HG A1C>EQUAL 7.0%<8.0%: CPT | Performed by: FAMILY MEDICINE

## 2022-10-05 ASSESSMENT — ENCOUNTER SYMPTOMS
SHORTNESS OF BREATH: 0
ABDOMINAL PAIN: 0
NAUSEA: 0
VOMITING: 0
DIARRHEA: 0

## 2022-10-05 NOTE — PROGRESS NOTES
Pt instructed to drink up to 40 oz of sugar free Gatorade type drink the evening prior to surgery.    Pt informed they can have up to 40 oz of water and that it must be completed 2 hours prior to scheduled surgery

## 2022-10-05 NOTE — PROGRESS NOTES
Test 1-3 times a day as needed for symptoms of irregular glucose. DX: E11.9 please provide strips that are accepted by patient's insurance. NOHELIA aNrayanan CNP   ibuprofen (ADVIL;MOTRIN) 800 MG tablet Take 1 tablet by mouth 2 times daily as needed for Pain  NOHELIA Narayanan CNP   atorvastatin (LIPITOR) 10 MG tablet TAKE ONE TABLET BY MOUTH DAILY  NOHELIA Narayanan CNP   dicyclomine (BENTYL) 10 MG capsule Take 1 capsule by mouth every 6 hours as needed (cramps)  Dyane Lennox, MD   nystatin (MYCOSTATIN) 202159 UNIT/ML suspension 5 mL by mouth 4 times a day x 7-10 days, retain in mouth as long as possible before swallowing  [address], DO   amLODIPine (NORVASC) 5 MG tablet TAKE ONE TABLET BY MOUTH DAILY  Isabel Rico DO   Lancets MISC Test 1-3 times a day as needed for symptoms of irregular glucose. DX: E11.9 please provide lancets accepted by patient's insurance. Isabel Rico, DO   ACCU-CHEK SOFTCLIX LANCETS MISC TEST BLOOD SUGAR TWO TIMES A DAY  John Green,    Calcium Carb-Cholecalciferol (CALTRATE 600+D) 600-800 MG-UNIT TABS Take 1 tablet by mouth 2 times daily  Historical Provider, MD   cetirizine (ZYRTEC ALLERGY) 10 MG tablet Take 1 tablet by mouth daily  John Green DO   aspirin EC 81 MG EC tablet Take 81 mg by mouth daily.  LAST DOSE 8-9-14  Paula Zhou DO        Past Medical History:   Diagnosis Date    Allergic rhinitis     HTN (hypertension)     Hyperlipidemia     Obstructive sleep apnea (adult) (pediatric)     Non-compliant with CPAP    Osteoarthritis     Type II or unspecified type diabetes mellitus without mention of complication, not stated as uncontrolled         Social History     Tobacco Use    Smoking status: Some Days     Types: Cigars    Smokeless tobacco: Never    Tobacco comments:     was smoking 5 black and milds per day, but has not smoked in 4 days   Vaping Use    Vaping Use: Never used   Substance Use Topics    Alcohol use: Yes     Comment: 1 alcoholic beverage every other month    Drug use: Not Currently     Comment: H/o cocaine use        Past Surgical History:   Procedure Laterality Date    COLON SURGERY  2008    10\" removed - chronic constipation    DUPUYTRENS CONTRACTURE SURGERY Left 1/29/2019    LEFT 1ST DORSAL COMPARMENT DE' [facility] performed by Lisset Berry MD at [address]      4x     HYSTERECTOMY, TOTAL ABDOMINAL (CERVIX REMOVED)      JOINT REPLACEMENT  2007    Left knee partial    KNEE SURGERY  1990's    rt knee reconstructed    NECK SURGERY  05/12/2011    c 4,5,6,7 (ACDF)    OVARY REMOVAL      ROTATOR CUFF REPAIR  2009    Left shoulder    SHOULDER SURGERY  1990's    lt & rt shoulder impingment    SINUS ENDOSCOPY  9-15-14    FUNCTIONAL ENDOSCOPIC SINUS SURGERY       TONSILLECTOMY AND ADENOIDECTOMY      TOTAL KNEE ARTHROPLASTY Left 03/09/2010    Eros to Total    TUBAL LIGATION      UMBILICAL HERNIA REPAIR  8/2010    Dr. Eber Huber        Allergies   Allergen Reactions    Gabapentin Other (See Comments), Dizziness or Vertigo and Hallucinations     BLURRED VISION      Demerol Itching and Dermatitis     Unknown reaction; patient has tolerated hydrocodone, oxycodone, hydromorphone and fentanyl per chart review as of 09/16/22. Levaquin [Levofloxacin In D5w] Nausea Only    Morphine Itching     Unknown reaction; patient has tolerated hydrocodone, oxycodone, hydromorphone and fentanyl per chart review as of 09/16/22. Tramadol Other (See Comments)     Unknown reaction; patient has tolerated hydrocodone, oxycodone, hydromorphone and fentanyl per chart review as of 09/16/22.         Family History   Problem Relation Age of Onset    Diabetes Father     Heart Disease Father         CAD    High Blood Pressure Father     High Cholesterol Father     Kidney Disease Brother     Diabetes Son     Other Son         DEANA    Heart Disease Sister         1/2 sister    Breast Cancer Paternal Cousin Rheum Arthritis Neg Hx     Osteoarthritis Neg Hx     Asthma Neg Hx     Cancer Neg Hx     Heart Failure Neg Hx     Hypertension Neg Hx     Migraines Neg Hx     Ovarian Cancer Neg Hx     Rashes/Skin Problems Neg Hx     Seizures Neg Hx     Stroke Neg Hx     Thyroid Disease Neg Hx         Patient's past medical history, surgical history, family history, medications, and allergies  were all reviewed and updated as appropriate today. Review of Systems   Constitutional:  Negative for chills, fever and unexpected weight change. Respiratory:  Negative for shortness of breath. Cardiovascular:  Negative for chest pain. Gastrointestinal:  Negative for abdominal pain, diarrhea, nausea and vomiting. /73   Pulse (!) 122   Temp 97.1 °F (36.2 °C)   Ht 5' 2.5\" (1.588 m)   Wt 201 lb (91.2 kg)   LMP 01/26/1996   SpO2 96%   BMI 36.18 kg/m²      Physical Exam  Vitals reviewed. Constitutional:       General: She is not in acute distress. Appearance: Normal appearance. HENT:      Head: Normocephalic. Eyes:      General: No scleral icterus. Conjunctiva/sclera: Conjunctivae normal.      Pupils: Pupils are equal, round, and reactive to light. Neck:      Vascular: No carotid bruit. Cardiovascular:      Rate and Rhythm: Normal rate. Heart sounds: No murmur heard. Pulmonary:      Effort: Pulmonary effort is normal. No respiratory distress. Breath sounds: Normal breath sounds. Abdominal:      General: Abdomen is flat. There is no distension. Palpations: Abdomen is soft. Tenderness: There is no abdominal tenderness. Musculoskeletal:      Cervical back: Neck supple. Right lower leg: No edema. Left lower leg: No edema. Lymphadenopathy:      Cervical: No cervical adenopathy. Skin:     General: Skin is warm and dry. Capillary Refill: Capillary refill takes less than 2 seconds. Neurological:      General: No focal deficit present.       Mental Status: She is alert. Mental status is at baseline. Psychiatric:         Mood and Affect: Mood normal.       Assessment:  Encounter Diagnoses   Name Primary? Pre-op exam Yes    Type 2 diabetes mellitus with hyperglycemia, with long-term current use of insulin (HCC)     Chronic infection of knee joint prosthesis, sequela        Plan:    - Low risk to proceed with planned surgery. Reviewed pre-op labs. A1c improved to 7.0%. Chronic conditions stable. New Prescriptions    No medications on file        No orders of the defined types were placed in this encounter. Return in about 2 months (around 12/5/2022) for [employer] [facility] - non-fasting.     Total time spent with patient including direct consultation, evaluation, review of medical records and documentation = R Jaime Ballard 8, DO

## 2022-10-06 ENCOUNTER — CARE COORDINATION (OUTPATIENT)
Dept: CARE COORDINATION | Age: 62
End: 2022-10-06

## 2022-10-06 ENCOUNTER — TELEPHONE (OUTPATIENT)
Dept: ORTHOPEDIC SURGERY | Age: 62
End: 2022-10-06

## 2022-10-06 NOTE — CARE COORDINATION
Ambulatory Care Coordination Note  10/6/2022    ACC: Adore Ross RN  General Assessment    Do you have any symptoms that are causing concern?: No          No newly identified concerns  Offered patient enrollment in the Remote Patient Monitoring (RPM) program for in-home monitoring: Patient declined. Changing to [facility] plan Jan 2023  Planning to follow w/GI in Jan  Will enter into rehab for 6 weeks post TKR of L knee scheduled 10.10.22, she has her post surgery appt scheduled w.ortho 10.28.22  Reviewed ACM availability, fall precautions/home safety concepts  Pt states plan is that she will transition home sooner post this scheduled otho (TKR) surgery on Monday, in comparison to her previous ortho surgery for her other knee. Pt prefers to plan for next [facility] outreach s/p her post op visit w/Ortho scheduled 10.28.22  Reviewed RD support. Pt respectfully requests to advise RD she is trying to keep her vm cleared now, but that she would prefer to engage w/RD later - such as, early November s/p ortho surgery  Reviewed self mgmt concepts and ACM availability as needed. Pt verbalized understanding of above and agreement with the following  Plan: delay RD outreach until month of November (s/p TKR surgery on 10.10.22 & f/u appt w/Ortho scheduled 10.28.22  Continue [facility] support for health coaching, care collaboration, support w/community resources, and help with any newly presenting concerns as needed. Pt agrees to outreach direct to [facility], as needed, between routine follow up outreach initiated by [facility]     Care Coordination Interventions    Referral from Primary Care Provider: No  Suggested Interventions and Community Resources  Diabetes Education: Completed  Fall Risk Prevention: Completed  Pharmacist: Declined  Registered Dietician:  In Process (Comment: 9.19.22 referral)  Specialty Services Referral: Completed (Comment: Dr Xavier Posada (GI); Dr Ryan Grey ([facility]))  Other Services: Declined (Comment: RPM)  Zone Management Tools: Completed (Comment: DM)  Other Services or Interventions: reviewed self mgmt DM, health maintenance          Goals Addressed                      This Visit's Progress      Conditions and Symptoms (pt-stated)   On track      reduce abdominal girth (pt-stated)   On track      Reduction of lower abdominal girth at waistline  Barriers: plateua after initial successful weight loss & reduction in pant/dress size  Plan for overcoming my barriers: engage w/RD through Care Coordination and following w/GI  Confidence: 10/10  Anticipated Goal Completion Date: 3.31.23              Prior to Admission medications    Medication Sig Start Date End Date Taking? Authorizing Provider   Insulin Pen Needle ([employer] PEN NEEDLES) 32G X 4 MM MISC USE TO INJECT INSULIN FOUR TIMES A DAY 9/22/22   NOHELIA Dooley CNP   mupirocin (BACTROBAN) 2 % ointment Apply twice daily to each nare for the 5 days prior to surgical procedure 8/26/22   Verna Zimmerman PA-C   losartan (COZAAR) 100 MG tablet TAKE ONE TABLET BY MOUTH DAILY 8/1/22   Isabel Rico, DO   metFORMIN (GLUCOPHAGE) 1000 MG tablet TAKE ONE TABLET BY MOUTH TWICE A DAY WITH MEALS 7/20/22   Isabel Rico, DO   montelukast (SINGULAIR) 10 MG tablet TAKE ONE TABLET BY MOUTH ONCE NIGHTLY 7/14/22   Isabel Rico, DO   insulin glargine (LANTUS SOLOSTAR) 100 UNIT/ML injection pen Inject 35 Units into the skin nightly 7/6/22   Isabel Rico, DO   blood glucose monitor strips Test 1-3 times a day as needed for symptoms of irregular glucose. DX: E11.9 please provide strips that are accepted by patient's insurance.  6/28/22   NOHELIA Dooley CNP   ibuprofen (ADVIL;MOTRIN) 800 MG tablet Take 1 tablet by mouth 2 times daily as needed for Pain  Patient not taking: Reported on 10/5/2022 6/28/22   NOHELIA Dooley CNP   atorvastatin (LIPITOR) 10 MG tablet TAKE ONE TABLET BY MOUTH DAILY 4/25/22   NOHELIA Dooley - CNP   amLODIPine (NORVASC) 5 MG tablet TAKE ONE TABLET BY MOUTH DAILY 11/24/21   Isabel Rico, DO   ACCU-CHEK SOFTCLIX LANCETS MISC TEST BLOOD SUGAR TWO TIMES A DAY 8/5/19   John Green, DO   Calcium Carb-Cholecalciferol 600-800 MG-UNIT TABS Take 1 tablet by mouth 2 times daily    Historical Provider, MD   cetirizine (ZYRTEC) 10 MG tablet Take 1 tablet by mouth daily 12/3/15   John Green DO   aspirin EC 81 MG EC tablet Take 81 mg by mouth daily.  LAST DOSE 8-9-14  Patient not taking: Reported on 10/5/2022 9/28/11   Melanie Solano DO       Future Appointments   Date Time Provider Gabriel Case   10/28/2022 10:15 AM Matthew Murray PA-C [facility]

## 2022-10-07 ENCOUNTER — ANESTHESIA EVENT (OUTPATIENT)
Dept: OPERATING ROOM | Age: 62
DRG: 466 | End: 2022-10-07
Payer: MEDICARE

## 2022-10-07 ENCOUNTER — CARE COORDINATION (OUTPATIENT)
Dept: CARE COORDINATION | Age: 62
End: 2022-10-07

## 2022-10-07 RX ORDER — MAGNESIUM SULFATE IN WATER 40 MG/ML
2000 INJECTION, SOLUTION INTRAVENOUS ONCE
Status: CANCELLED | OUTPATIENT
Start: 2022-10-07 | End: 2022-10-07

## 2022-10-07 NOTE — CARE COORDINATION
CELY received message from MARY ANN BOLTON, who states, \". .. patient would prefer to engage w/RD later - such as, early November s/p ortho surgery. \" RD will continue to monitor, and reach out to patient next month, per her request.     Electronically signed by Dottie Ponce RD on 10/7/2022 at 8:56 AM

## 2022-10-08 NOTE — ANESTHESIA PRE PROCEDURE
Department of Anesthesiology  Preprocedure Note       Name:  Sandi Silver   Age:  59 y.o.  :  1960                                          MRN:  [id number]         Date:  10/8/2022      Surgeon: Tfif Page): Nicki Dao MD    Procedure: Procedure(s):  REVISION LEFT TOTAL KNEE ARTHROPLASTY FOR INFECTION WITH ADDUCTOR CANAL  FOR PAIN CONTROL              MONTSERRAT BIOMET; ZANE    Medications prior to admission:   Prior to Admission medications    Medication Sig Start Date End Date Taking? Authorizing Provider   Insulin Pen Needle ([employer] PEN NEEDLES) 32G X 4 MM MISC USE TO INJECT INSULIN FOUR TIMES A DAY 22   NOHELIA Conrad CNP   mupirocin (BACTROBAN) 2 % ointment Apply twice daily to each nare for the 5 days prior to surgical procedure 22   Quiana Mcallister PA-C   losartan (COZAAR) 100 MG tablet TAKE ONE TABLET BY MOUTH DAILY 22   Isabel Rico,    metFORMIN (GLUCOPHAGE) 1000 MG tablet TAKE ONE TABLET BY MOUTH TWICE A DAY WITH MEALS 22   Isabel Rico, DO   montelukast (SINGULAIR) 10 MG tablet TAKE ONE TABLET BY MOUTH ONCE NIGHTLY 22   Isabel Rico, DO   insulin glargine (LANTUS SOLOSTAR) 100 UNIT/ML injection pen Inject 35 Units into the skin nightly 22   Isabel Rico, DO   blood glucose monitor strips Test 1-3 times a day as needed for symptoms of irregular glucose. DX: E11.9 please provide strips that are accepted by patient's insurance.  22   NOHELIA Conrad CNP   ibuprofen (ADVIL;MOTRIN) 800 MG tablet Take 1 tablet by mouth 2 times daily as needed for Pain  Patient not taking: Reported on 10/5/2022 6/28/22   NOHELIA Conrad CNP   atorvastatin (LIPITOR) 10 MG tablet TAKE ONE TABLET BY MOUTH DAILY 22   NOHELIA Conrad CNP   amLODIPine (NORVASC) 5 MG tablet TAKE ONE TABLET BY MOUTH DAILY 21   Isabel Rico,    ACCU-CHEK SOFTCLIX LANCETS MISC TEST BLOOD SUGAR TWO TIMES A DAY 8/5/19   John Green,    Calcium Carb-Cholecalciferol 600-800 MG-UNIT TABS Take 1 tablet by mouth 2 times daily    Historical Provider, MD   cetirizine (ZYRTEC) 10 MG tablet Take 1 tablet by mouth daily 12/3/15   John Green DO   aspirin EC 81 MG EC tablet Take 81 mg by mouth daily. LAST DOSE 8-9-14  Patient not taking: Reported on 10/5/2022 9/28/11   John Cunha DO       Current medications:    No current facility-administered medications for this encounter. Current Outpatient Medications   Medication Sig Dispense Refill    Insulin Pen Needle ([employer] PEN NEEDLES) 32G X 4 MM MISC USE TO INJECT INSULIN FOUR TIMES A  each 1    mupirocin (BACTROBAN) 2 % ointment Apply twice daily to each nare for the 5 days prior to surgical procedure 1 g 0    losartan (COZAAR) 100 MG tablet TAKE ONE TABLET BY MOUTH DAILY 60 tablet 5    metFORMIN (GLUCOPHAGE) 1000 MG tablet TAKE ONE TABLET BY MOUTH TWICE A DAY WITH MEALS 180 tablet 3    montelukast (SINGULAIR) 10 MG tablet TAKE ONE TABLET BY MOUTH ONCE NIGHTLY 60 tablet 5    insulin glargine (LANTUS SOLOSTAR) 100 UNIT/ML injection pen Inject 35 Units into the skin nightly 10 pen 3    blood glucose monitor strips Test 1-3 times a day as needed for symptoms of irregular glucose. DX: E11.9 please provide strips that are accepted by patient's insurance.  200 strip 3    ibuprofen (ADVIL;MOTRIN) 800 MG tablet Take 1 tablet by mouth 2 times daily as needed for Pain (Patient not taking: Reported on 10/5/2022) 60 tablet 0    atorvastatin (LIPITOR) 10 MG tablet TAKE ONE TABLET BY MOUTH DAILY 90 tablet 1    amLODIPine (NORVASC) 5 MG tablet TAKE ONE TABLET BY MOUTH DAILY 60 tablet 5    ACCU-CHEK SOFTCLIX LANCETS MISC TEST BLOOD SUGAR TWO TIMES A  each 4    Calcium Carb-Cholecalciferol 600-800 MG-UNIT TABS Take 1 tablet by mouth 2 times daily      cetirizine (ZYRTEC) 10 MG tablet Take 1 tablet by mouth daily      aspirin EC 81 MG EC tablet Take 81 mg by mouth daily. LAST DOSE 8-9-14 (Patient not taking: Reported on 10/5/2022) 30 tablet 11       Allergies: Allergies   Allergen Reactions    Gabapentin Other (See Comments), Dizziness or Vertigo and Hallucinations     BLURRED VISION      Demerol Itching and Dermatitis     Unknown reaction; patient has tolerated hydrocodone, oxycodone, hydromorphone and fentanyl per chart review as of 09/16/22.  Levaquin [Levofloxacin In D5w] Nausea Only    Morphine Itching     Unknown reaction; patient has tolerated hydrocodone, oxycodone, hydromorphone and fentanyl per chart review as of 09/16/22.  Tramadol Other (See Comments)     Unknown reaction; patient has tolerated hydrocodone, oxycodone, hydromorphone and fentanyl per chart review as of 09/16/22. Problem List:    Patient Active Problem List   Diagnosis Code    Benign essential HTN I10    Osteoarthritis of right knee M17.11    Seasonal allergies J30.2    Obesity (BMI 30-39. 9) E66.9    Obstructive sleep apnea G47.33    Hyperlipidemia, mixed E78.2    Degenerative disc disease, cervical M50.30    Type 2 diabetes mellitus with hyperglycemia, with long-term current use of insulin (HCC) E11.65, Z79.4    Morbidly obese (HCC) E66.01       Past Medical History:        Diagnosis Date    Allergic rhinitis     HTN (hypertension)     Hyperlipidemia     Obstructive sleep apnea (adult) (pediatric)     Non-compliant with CPAP    Osteoarthritis     Type II or unspecified type diabetes mellitus without mention of complication, not stated as uncontrolled        Past Surgical History:        Procedure Laterality Date    COLON SURGERY  2008    10\" removed - chronic constipation    DUPUYTRENS CONTRACTURE SURGERY Left 1/29/2019    LEFT 1ST DORSAL COMPARMENT DE' [facility] performed by Yajaira Tellez MD at [address] [place] [address]       4x     HYSTERECTOMY, TOTAL ABDOMINAL (CERVIX REMOVED)      JOINT REPLACEMENT  2007    Left knee partial    KNEE SURGERY  1990's    rt knee reconstructed    NECK SURGERY  05/12/2011    c 4,5,6,7 (ACDF)    OVARY REMOVAL      ROTATOR CUFF REPAIR  2009    Left shoulder    SHOULDER SURGERY  1990's    lt & rt shoulder impingment    SINUS ENDOSCOPY  9-15-14    FUNCTIONAL ENDOSCOPIC SINUS SURGERY       TONSILLECTOMY AND ADENOIDECTOMY      TOTAL KNEE ARTHROPLASTY Left 03/09/2010    Eros to Total    TUBAL LIGATION      UMBILICAL HERNIA REPAIR  8/2010    Dr. Enamorado Said       Social History:    Social History     Tobacco Use    Smoking status: Some Days     Types: Cigars    Smokeless tobacco: Never    Tobacco comments:     was smoking 5 black and milds per day, but has not smoked in 4 days   Substance Use Topics    Alcohol use: Yes     Comment: 1 alcoholic beverage every other month                                Ready to quit: Not Answered  Counseling given: Not Answered  Tobacco comments: was smoking 5 black and milds per day, but has not smoked in 4 days      Vital Signs (Current):   Vitals:    10/05/22 1210   Weight: 201 lb (91.2 kg)   Height: 5' 2.5\" (1.588 m)                                              BP Readings from Last 3 Encounters:   10/05/22 121/73   09/16/22 (!) 135/96   07/06/22 124/84       NPO Status:                                                                                 BMI:   Wt Readings from Last 3 Encounters:   10/05/22 201 lb (91.2 kg)   09/16/22 203 lb (92.1 kg)   08/26/22 200 lb (90.7 kg)     Body mass index is 36.18 kg/m².     CBC:   Lab Results   Component Value Date/Time    WBC 14.4 09/16/2022 05:55 PM    RBC 4.84 09/16/2022 05:55 PM    HGB 12.6 09/16/2022 05:55 PM    HCT 39.7 09/16/2022 05:55 PM    MCV 82.0 09/16/2022 05:55 PM    RDW 14.3 09/16/2022 05:55 PM     09/16/2022 05:55 PM       CMP:   Lab Results   Component Value Date/Time     09/16/2022 05:55 PM    K 4.0 09/16/2022 05:55 PM     09/16/2022 05:55 PM    CO2 25 09/16/2022 05:55 PM    BUN 10 09/16/2022 05:55 PM CREATININE 0.5 09/16/2022 05:55 PM    GFRAA >60 09/16/2022 05:55 PM    GFRAA >60 08/06/2012 05:35 PM    AGRATIO 1.4 09/16/2022 05:55 PM    LABGLOM >60 09/16/2022 05:55 PM    GLUCOSE 128 09/16/2022 05:55 PM    PROT 7.4 09/16/2022 05:55 PM    PROT 6.8 08/06/2012 05:35 PM    CALCIUM 10.1 09/16/2022 05:55 PM    BILITOT 0.3 09/16/2022 05:55 PM    ALKPHOS 129 09/16/2022 05:55 PM    AST 22 09/16/2022 05:55 PM    ALT 24 09/16/2022 05:55 PM       POC Tests: No results for input(s): POCGLU, POCNA, POCK, POCCL, POCBUN, POCHEMO, POCHCT in the last 72 hours. Coags:   Lab Results   Component Value Date/Time    PROTIME 12.4 08/26/2022 09:39 AM    INR 0.93 08/26/2022 09:39 AM    APTT 32.2 08/26/2022 09:39 AM       HCG (If Applicable): No results found for: PREGTESTUR, PREGSERUM, HCG, HCGQUANT     ABGs: No results found for: PHART, PO2ART, BGQ0KEP, WTI8PYT, BEART, C2WAWUGG     Type & Screen (If Applicable):  Lab Results   Component Value Date    LABABO B 03/09/2010    [address] Negative 03/09/2010       Drug/Infectious Status (If Applicable):  No results found for: HIV, HEPCAB    COVID-19 Screening (If Applicable):   Lab Results   Component Value Date/Time    COVID19 Not Detected 01/30/2021 03:55 PM           Anesthesia Evaluation  Patient summary reviewed and Nursing notes reviewed no history of anesthetic complications:   Airway: Mallampati: III     Neck ROM: full     Dental:          Pulmonary:Negative Pulmonary ROS and normal exam    (+) sleep apnea:                             Cardiovascular:Negative CV ROS    (+) hypertension:, hyperlipidemia                  Neuro/Psych:   Negative Neuro/Psych ROS              GI/Hepatic/Renal: Neg GI/Hepatic/Renal ROS       (-) hiatal hernia and GERD       Endo/Other: Negative Endo/Other ROS   (+) Diabetes, : arthritis:., .                 Abdominal:             Vascular:           Other Findings:           Anesthesia Plan      general     ASA 3     (I discussed with the patient the risks and benefits of regional anesthesia (inlcuding infection, bleeding, damage to nerves and surrounding structures)  PIV, ACNB, General anesthesia, IV Narcotics, PACU. All questions were answered the patient agrees with the plan and wishes to proceed.)  Induction: intravenous. Pre-Operative Diagnosis: Infection of prosthetic knee joint, initial encounter ([facility] [address], [address]; Infection of total left knee replacement, initial encounter ([facility] [T84.54XA]    59 y.o.   BMI:  Body mass index is 37.31 kg/m². Vitals:    10/05/22 1210 10/10/22 1013   BP:  125/80   Pulse:  92   Resp:  16   Temp:  97.5 °F (36.4 °C)   TempSrc:  Temporal   SpO2:  95%   Weight: 201 lb (91.2 kg) 204 lb (92.5 kg)   Height: 5' 2.5\" (1.588 m) 5' 2\" (1.575 m)       Allergies   Allergen Reactions    Gabapentin Other (See Comments), Dizziness or Vertigo and Hallucinations     BLURRED VISION      Demerol Itching and Dermatitis     Unknown reaction; patient has tolerated hydrocodone, oxycodone, hydromorphone and fentanyl per chart review as of 09/16/22.  Levaquin [Levofloxacin In D5w] Nausea Only    Morphine Itching     Unknown reaction; patient has tolerated hydrocodone, oxycodone, hydromorphone and fentanyl per chart review as of 09/16/22.  Tramadol Other (See Comments)     Unknown reaction; patient has tolerated hydrocodone, oxycodone, hydromorphone and fentanyl per chart review as of 09/16/22.        Social History     Tobacco Use    Smoking status: Some Days     Types: Cigars    Smokeless tobacco: Never    Tobacco comments:     was smoking 5 black and milds per day, but has not smoked in 4 days   Substance Use Topics    Alcohol use: Yes     Comment: 1 alcoholic beverage every other month       LABS:    CBC  Lab Results   Component Value Date/Time    WBC 14.4 (H) 09/16/2022 05:55 PM    HGB 12.6 09/16/2022 05:55 PM    HCT 39.7 09/16/2022 05:55 PM     09/16/2022 05:55 PM     RENAL  Lab Results   Component Value Date/Time     09/16/2022 05:55 PM    K 4.0 09/16/2022 05:55 PM     09/16/2022 05:55 PM    CO2 25 09/16/2022 05:55 PM    BUN 10 09/16/2022 05:55 PM    CREATININE 0.5 (L) 09/16/2022 05:55 PM    GLUCOSE 128 (H) 09/16/2022 05:55 PM     COAGS  Lab Results   Component Value Date/Time    PROTIME 12.4 08/26/2022 09:39 AM    INR 0.93 08/26/2022 09:39 AM    APTT 32.2 08/26/2022 09:39 AM         Sudha Street MD   10/8/2022

## 2022-10-10 ENCOUNTER — HOSPITAL ENCOUNTER (INPATIENT)
Age: 62
LOS: 8 days | Discharge: NURSING, REHAB OR LONG-TERM CARE | DRG: 466 | End: 2022-10-18
Attending: ORTHOPAEDIC SURGERY | Admitting: ORTHOPAEDIC SURGERY
Payer: MEDICARE

## 2022-10-10 ENCOUNTER — ANESTHESIA (OUTPATIENT)
Dept: OPERATING ROOM | Age: 62
DRG: 466 | End: 2022-10-10
Payer: MEDICARE

## 2022-10-10 ENCOUNTER — APPOINTMENT (OUTPATIENT)
Dept: GENERAL RADIOLOGY | Age: 62
DRG: 466 | End: 2022-10-10
Attending: ORTHOPAEDIC SURGERY
Payer: MEDICARE

## 2022-10-10 DIAGNOSIS — Z96.659 INFECTION OF PROSTHETIC KNEE JOINT, INITIAL ENCOUNTER (HCC): ICD-10-CM

## 2022-10-10 DIAGNOSIS — T84.59XA INFECTION OF PROSTHETIC KNEE JOINT, INITIAL ENCOUNTER (HCC): ICD-10-CM

## 2022-10-10 DIAGNOSIS — Z96.652 HISTORY OF TOTAL LEFT KNEE REPLACEMENT: ICD-10-CM

## 2022-10-10 DIAGNOSIS — T84.84XA PAIN DUE TO TOTAL LEFT KNEE REPLACEMENT, INITIAL ENCOUNTER (HCC): Primary | ICD-10-CM

## 2022-10-10 DIAGNOSIS — T84.54XA INFECTION OF TOTAL LEFT KNEE REPLACEMENT, INITIAL ENCOUNTER (HCC): Primary | ICD-10-CM

## 2022-10-10 DIAGNOSIS — Z96.652 PAIN DUE TO TOTAL LEFT KNEE REPLACEMENT, INITIAL ENCOUNTER (HCC): Primary | ICD-10-CM

## 2022-10-10 LAB
ABO/RH: NORMAL
ANTIBODY SCREEN: NORMAL
EKG ATRIAL RATE: 89 BPM
EKG DIAGNOSIS: NORMAL
EKG P AXIS: 63 DEGREES
EKG P-R INTERVAL: 180 MS
EKG Q-T INTERVAL: 390 MS
EKG QRS DURATION: 84 MS
EKG QTC CALCULATION (BAZETT): 474 MS
EKG R AXIS: 22 DEGREES
EKG T AXIS: 40 DEGREES
EKG VENTRICULAR RATE: 89 BPM
GLUCOSE BLD-MCNC: 130 MG/DL (ref 70–99)
GLUCOSE BLD-MCNC: 169 MG/DL (ref 70–99)
GLUCOSE BLD-MCNC: 193 MG/DL (ref 70–99)
PERFORMED ON: ABNORMAL

## 2022-10-10 PROCEDURE — 2700000000 HC OXYGEN THERAPY PER DAY

## 2022-10-10 PROCEDURE — 27487 REVISE/REPLACE KNEE JOINT: CPT | Performed by: ORTHOPAEDIC SURGERY

## 2022-10-10 PROCEDURE — 87206 SMEAR FLUORESCENT/ACID STAI: CPT

## 2022-10-10 PROCEDURE — 94761 N-INVAS EAR/PLS OXIMETRY MLT: CPT

## 2022-10-10 PROCEDURE — C1713 ANCHOR/SCREW BN/BN,TIS/BN: HCPCS | Performed by: ORTHOPAEDIC SURGERY

## 2022-10-10 PROCEDURE — 87205 SMEAR GRAM STAIN: CPT

## 2022-10-10 PROCEDURE — A4217 STERILE WATER/SALINE, 500 ML: HCPCS | Performed by: ORTHOPAEDIC SURGERY

## 2022-10-10 PROCEDURE — 3E0T3BZ INTRODUCTION OF ANESTHETIC AGENT INTO PERIPHERAL NERVES AND PLEXI, PERCUTANEOUS APPROACH: ICD-10-PCS | Performed by: ANESTHESIOLOGY

## 2022-10-10 PROCEDURE — 29876 ARTHRS KNEE SURG SYNVCT MAJ: CPT | Performed by: ORTHOPAEDIC SURGERY

## 2022-10-10 PROCEDURE — 73560 X-RAY EXAM OF KNEE 1 OR 2: CPT

## 2022-10-10 PROCEDURE — 3E0U029 INTRODUCTION OF OTHER ANTI-INFECTIVE INTO JOINTS, OPEN APPROACH: ICD-10-PCS | Performed by: ORTHOPAEDIC SURGERY

## 2022-10-10 PROCEDURE — 87015 SPECIMEN INFECT AGNT CONCNTJ: CPT

## 2022-10-10 PROCEDURE — 6370000000 HC RX 637 (ALT 250 FOR IP): Performed by: PHYSICIAN ASSISTANT

## 2022-10-10 PROCEDURE — 87102 FUNGUS ISOLATION CULTURE: CPT

## 2022-10-10 PROCEDURE — 64447 NJX AA&/STRD FEMORAL NRV IMG: CPT | Performed by: ANESTHESIOLOGY

## 2022-10-10 PROCEDURE — 1200000000 HC SEMI PRIVATE

## 2022-10-10 PROCEDURE — 3700000001 HC ADD 15 MINUTES (ANESTHESIA): Performed by: ORTHOPAEDIC SURGERY

## 2022-10-10 PROCEDURE — 0SRD0EZ REPLACEMENT OF LEFT KNEE JOINT WITH ARTICULATING SPACER, OPEN APPROACH: ICD-10-PCS | Performed by: ORTHOPAEDIC SURGERY

## 2022-10-10 PROCEDURE — 0SBD0ZZ EXCISION OF LEFT KNEE JOINT, OPEN APPROACH: ICD-10-PCS | Performed by: ORTHOPAEDIC SURGERY

## 2022-10-10 PROCEDURE — 3700000000 HC ANESTHESIA ATTENDED CARE: Performed by: ORTHOPAEDIC SURGERY

## 2022-10-10 PROCEDURE — 86901 BLOOD TYPING SEROLOGIC RH(D): CPT

## 2022-10-10 PROCEDURE — 7100000000 HC PACU RECOVERY - FIRST 15 MIN: Performed by: ORTHOPAEDIC SURGERY

## 2022-10-10 PROCEDURE — 6360000002 HC RX W HCPCS: Performed by: PHYSICIAN ASSISTANT

## 2022-10-10 PROCEDURE — 6360000002 HC RX W HCPCS: Performed by: NURSE ANESTHETIST, CERTIFIED REGISTERED

## 2022-10-10 PROCEDURE — 0SPD0JZ REMOVAL OF SYNTHETIC SUBSTITUTE FROM LEFT KNEE JOINT, OPEN APPROACH: ICD-10-PCS | Performed by: ORTHOPAEDIC SURGERY

## 2022-10-10 PROCEDURE — 2709999900 HC NON-CHARGEABLE SUPPLY: Performed by: ORTHOPAEDIC SURGERY

## 2022-10-10 PROCEDURE — 2500000003 HC RX 250 WO HCPCS: Performed by: NURSE ANESTHETIST, CERTIFIED REGISTERED

## 2022-10-10 PROCEDURE — 3600000014 HC SURGERY LEVEL 4 ADDTL 15MIN: Performed by: ORTHOPAEDIC SURGERY

## 2022-10-10 PROCEDURE — 6360000002 HC RX W HCPCS: Performed by: ORTHOPAEDIC SURGERY

## 2022-10-10 PROCEDURE — 7100000001 HC PACU RECOVERY - ADDTL 15 MIN: Performed by: ORTHOPAEDIC SURGERY

## 2022-10-10 PROCEDURE — C1776 JOINT DEVICE (IMPLANTABLE): HCPCS | Performed by: ORTHOPAEDIC SURGERY

## 2022-10-10 PROCEDURE — 2580000003 HC RX 258: Performed by: ORTHOPAEDIC SURGERY

## 2022-10-10 PROCEDURE — 87116 MYCOBACTERIA CULTURE: CPT

## 2022-10-10 PROCEDURE — 3600000004 HC SURGERY LEVEL 4 BASE: Performed by: ORTHOPAEDIC SURGERY

## 2022-10-10 PROCEDURE — 2580000003 HC RX 258: Performed by: PHYSICIAN ASSISTANT

## 2022-10-10 PROCEDURE — 86900 BLOOD TYPING SEROLOGIC ABO: CPT

## 2022-10-10 PROCEDURE — 87070 CULTURE OTHR SPECIMN AEROBIC: CPT

## 2022-10-10 PROCEDURE — 86850 RBC ANTIBODY SCREEN: CPT

## 2022-10-10 PROCEDURE — 2720000010 HC SURG SUPPLY STERILE: Performed by: ORTHOPAEDIC SURGERY

## 2022-10-10 PROCEDURE — 93005 ELECTROCARDIOGRAM TRACING: CPT | Performed by: ORTHOPAEDIC SURGERY

## 2022-10-10 PROCEDURE — 2500000003 HC RX 250 WO HCPCS: Performed by: PHYSICIAN ASSISTANT

## 2022-10-10 PROCEDURE — 6370000000 HC RX 637 (ALT 250 FOR IP): Performed by: ANESTHESIOLOGY

## 2022-10-10 PROCEDURE — 2500000003 HC RX 250 WO HCPCS: Performed by: ORTHOPAEDIC SURGERY

## 2022-10-10 PROCEDURE — 2780000010 HC IMPLANT OTHER: Performed by: ORTHOPAEDIC SURGERY

## 2022-10-10 PROCEDURE — 2580000003 HC RX 258: Performed by: NURSE ANESTHETIST, CERTIFIED REGISTERED

## 2022-10-10 PROCEDURE — 27640 PARTIAL REMOVAL OF TIBIA: CPT | Performed by: ORTHOPAEDIC SURGERY

## 2022-10-10 PROCEDURE — 97605 NEG PRS WND THER DME<=50SQCM: CPT | Performed by: ORTHOPAEDIC SURGERY

## 2022-10-10 DEVICE — CEM-OSTETIC IS A BIO-ENGINEERED MIXTURE OF CALCIUM-BASED INORGANIC COMPOUNDS. AFTER IT IS IMPLANTED, CEM-OSTETIC RESORBS AND IS LATER REPLACED BY NATURAL BONE. CEM-OSTETIC IS A NATURAL CHOICE FOR SPARING PATIENTS THE TRAUMA OF AUTOGRAFT HARVESTING. IT ALSO PROVIDES A SAFE ALTERNATIVE TO HUMAN OR ANIMAL CADAVER BONE THAT COMPLETELY ELIMINATES THE POTENTIAL FOR DISEASE TRANSMISSION.CEM-OSTETIC IS AN OSTEOCONDUCTIVE PUTTY THAT IS INTENDED TO BE USED TO FILL VOIDS AND GAPS THAT ARE NOT INTRINSIC TO THE STABILITY OF THE BONE STRUCTURE. THESE GAPS OR VOIDS MAY BE LOCATED IN THE EXTREMITIES, SPINE, PELVIS, OR CRANIUM. THE PUTTY MAY BE SHAPED AND PRESSED INTO THE VOID BY HAND OR INSERTED INTO A SYRINGE AND INJECTED INTO THE SURGICAL SITE. THE PASTE SET IN SITU OR EX SITU PROVIDES A VOID FILLER THAT CAN AUGMENT HARDWARE TO SUPPORT BONE FRAGMENTS DURING THE SURGICAL PROCEDURE. THE SET PUTTY ACTS AS A TEMPORARY SUPPORT MEDIUM AND IS NOT INTENDED TO PROVIDE STRUCTURAL SUPPORT DURING THE HEALING PROCESS. THE IMPLANT IS RADIO-OPAQUE. IT IS BIOCOMPATIBLE AND RESORBS IN THE BODY AS BONE IN-GROWTH OCCURS.
Type: IMPLANTABLE DEVICE | Site: KNEE | Status: FUNCTIONAL
Brand: CEM-OSTETIC PUTTY

## 2022-10-10 DEVICE — THE PUTTY CONVENIENCE KIT (PCK) IS A FLEXIBLE POLYMER MAT WITH CAVITIES THAT ALLOWS THE PHYSICIAN TO MOLD BONE PUTTY INTO BEADS.
Type: IMPLANTABLE DEVICE | Site: KNEE | Status: FUNCTIONAL
Brand: PUTTY CONVENIENCE KIT, 10CC

## 2022-10-10 DEVICE — IMPLANTABLE DEVICE: Type: IMPLANTABLE DEVICE | Site: KNEE | Status: FUNCTIONAL

## 2022-10-10 DEVICE — COMPONENT FEM SZ 7 NAR L KNEE CO CHROM CEM POST STBL COR: Type: IMPLANTABLE DEVICE | Status: FUNCTIONAL

## 2022-10-10 RX ORDER — SODIUM CHLORIDE, SODIUM LACTATE, POTASSIUM CHLORIDE, CALCIUM CHLORIDE 600; 310; 30; 20 MG/100ML; MG/100ML; MG/100ML; MG/100ML
INJECTION, SOLUTION INTRAVENOUS CONTINUOUS
Status: DISCONTINUED | OUTPATIENT
Start: 2022-10-10 | End: 2022-10-10 | Stop reason: HOSPADM

## 2022-10-10 RX ORDER — GENTAMICIN SULFATE 40 MG/ML
INJECTION, SOLUTION INTRAMUSCULAR; INTRAVENOUS PRN
Status: DISCONTINUED | OUTPATIENT
Start: 2022-10-10 | End: 2022-10-10 | Stop reason: ALTCHOICE

## 2022-10-10 RX ORDER — OXYCODONE HYDROCHLORIDE 5 MG/1
10 TABLET ORAL PRN
Status: DISCONTINUED | OUTPATIENT
Start: 2022-10-10 | End: 2022-10-10 | Stop reason: HOSPADM

## 2022-10-10 RX ORDER — FENTANYL CITRATE 50 UG/ML
INJECTION, SOLUTION INTRAMUSCULAR; INTRAVENOUS PRN
Status: DISCONTINUED | OUTPATIENT
Start: 2022-10-10 | End: 2022-10-10 | Stop reason: SDUPTHER

## 2022-10-10 RX ORDER — INSULIN GLARGINE 100 [IU]/ML
35 INJECTION, SOLUTION SUBCUTANEOUS NIGHTLY
Status: DISCONTINUED | OUTPATIENT
Start: 2022-10-10 | End: 2022-10-18 | Stop reason: HOSPADM

## 2022-10-10 RX ORDER — LIDOCAINE HYDROCHLORIDE 10 MG/ML
1 INJECTION, SOLUTION EPIDURAL; INFILTRATION; INTRACAUDAL; PERINEURAL
Status: DISCONTINUED | OUTPATIENT
Start: 2022-10-10 | End: 2022-10-10 | Stop reason: HOSPADM

## 2022-10-10 RX ORDER — SODIUM CHLORIDE 0.9 % (FLUSH) 0.9 %
5-40 SYRINGE (ML) INJECTION EVERY 12 HOURS SCHEDULED
Status: DISCONTINUED | OUTPATIENT
Start: 2022-10-10 | End: 2022-10-10 | Stop reason: HOSPADM

## 2022-10-10 RX ORDER — SODIUM CHLORIDE 9 MG/ML
INJECTION, SOLUTION INTRAVENOUS PRN
Status: DISCONTINUED | OUTPATIENT
Start: 2022-10-10 | End: 2022-10-18 | Stop reason: HOSPADM

## 2022-10-10 RX ORDER — CETIRIZINE HYDROCHLORIDE 10 MG/1
10 TABLET ORAL DAILY
Status: DISCONTINUED | OUTPATIENT
Start: 2022-10-11 | End: 2022-10-13

## 2022-10-10 RX ORDER — SODIUM CHLORIDE, SODIUM LACTATE, POTASSIUM CHLORIDE, CALCIUM CHLORIDE 600; 310; 30; 20 MG/100ML; MG/100ML; MG/100ML; MG/100ML
INJECTION, SOLUTION INTRAVENOUS CONTINUOUS
Status: DISCONTINUED | OUTPATIENT
Start: 2022-10-10 | End: 2022-10-18

## 2022-10-10 RX ORDER — ONDANSETRON 2 MG/ML
4 INJECTION INTRAMUSCULAR; INTRAVENOUS EVERY 6 HOURS PRN
Status: DISCONTINUED | OUTPATIENT
Start: 2022-10-10 | End: 2022-10-18 | Stop reason: HOSPADM

## 2022-10-10 RX ORDER — VANCOMYCIN HYDROCHLORIDE 1 G/20ML
INJECTION, POWDER, LYOPHILIZED, FOR SOLUTION INTRAVENOUS PRN
Status: DISCONTINUED | OUTPATIENT
Start: 2022-10-10 | End: 2022-10-10 | Stop reason: ALTCHOICE

## 2022-10-10 RX ORDER — ONDANSETRON 2 MG/ML
INJECTION INTRAMUSCULAR; INTRAVENOUS PRN
Status: DISCONTINUED | OUTPATIENT
Start: 2022-10-10 | End: 2022-10-10 | Stop reason: SDUPTHER

## 2022-10-10 RX ORDER — INSULIN LISPRO 100 [IU]/ML
0-8 INJECTION, SOLUTION INTRAVENOUS; SUBCUTANEOUS
Status: DISCONTINUED | OUTPATIENT
Start: 2022-10-11 | End: 2022-10-18 | Stop reason: HOSPADM

## 2022-10-10 RX ORDER — CELECOXIB 100 MG/1
400 CAPSULE ORAL ONCE
Status: COMPLETED | OUTPATIENT
Start: 2022-10-10 | End: 2022-10-10

## 2022-10-10 RX ORDER — MONTELUKAST SODIUM 10 MG/1
10 TABLET ORAL NIGHTLY
Status: DISCONTINUED | OUTPATIENT
Start: 2022-10-10 | End: 2022-10-18 | Stop reason: HOSPADM

## 2022-10-10 RX ORDER — ROPIVACAINE HYDROCHLORIDE 5 MG/ML
12 INJECTION, SOLUTION EPIDURAL; INFILTRATION; PERINEURAL ONCE
Status: DISCONTINUED | OUTPATIENT
Start: 2022-10-10 | End: 2022-10-10

## 2022-10-10 RX ORDER — DIPHENHYDRAMINE HYDROCHLORIDE 50 MG/ML
12.5 INJECTION INTRAMUSCULAR; INTRAVENOUS
Status: DISCONTINUED | OUTPATIENT
Start: 2022-10-10 | End: 2022-10-10 | Stop reason: HOSPADM

## 2022-10-10 RX ORDER — SODIUM CHLORIDE 0.9 % (FLUSH) 0.9 %
5-40 SYRINGE (ML) INJECTION EVERY 12 HOURS SCHEDULED
Status: DISCONTINUED | OUTPATIENT
Start: 2022-10-10 | End: 2022-10-18 | Stop reason: HOSPADM

## 2022-10-10 RX ORDER — POLYETHYLENE GLYCOL 3350 17 G/17G
17 POWDER, FOR SOLUTION ORAL DAILY
Status: DISCONTINUED | OUTPATIENT
Start: 2022-10-11 | End: 2022-10-18 | Stop reason: HOSPADM

## 2022-10-10 RX ORDER — INSULIN LISPRO 100 [IU]/ML
0-4 INJECTION, SOLUTION INTRAVENOUS; SUBCUTANEOUS NIGHTLY
Status: DISCONTINUED | OUTPATIENT
Start: 2022-10-10 | End: 2022-10-18 | Stop reason: HOSPADM

## 2022-10-10 RX ORDER — SODIUM CHLORIDE 0.9 % (FLUSH) 0.9 %
5-40 SYRINGE (ML) INJECTION PRN
Status: DISCONTINUED | OUTPATIENT
Start: 2022-10-10 | End: 2022-10-10 | Stop reason: HOSPADM

## 2022-10-10 RX ORDER — HYDROMORPHONE HCL 110MG/55ML
PATIENT CONTROLLED ANALGESIA SYRINGE INTRAVENOUS PRN
Status: DISCONTINUED | OUTPATIENT
Start: 2022-10-10 | End: 2022-10-10 | Stop reason: SDUPTHER

## 2022-10-10 RX ORDER — PROPOFOL 10 MG/ML
INJECTION, EMULSION INTRAVENOUS CONTINUOUS PRN
Status: DISCONTINUED | OUTPATIENT
Start: 2022-10-10 | End: 2022-10-10 | Stop reason: SDUPTHER

## 2022-10-10 RX ORDER — MELOXICAM 7.5 MG/1
7.5 TABLET ORAL DAILY
Status: COMPLETED | OUTPATIENT
Start: 2022-10-11 | End: 2022-10-13

## 2022-10-10 RX ORDER — ACETAMINOPHEN 500 MG
1000 TABLET ORAL ONCE
Status: COMPLETED | OUTPATIENT
Start: 2022-10-10 | End: 2022-10-10

## 2022-10-10 RX ORDER — ACETAMINOPHEN 325 MG/1
650 TABLET ORAL EVERY 6 HOURS
Status: DISCONTINUED | OUTPATIENT
Start: 2022-10-10 | End: 2022-10-18 | Stop reason: HOSPADM

## 2022-10-10 RX ORDER — VANCOMYCIN HYDROCHLORIDE 1 G/20ML
INJECTION, POWDER, LYOPHILIZED, FOR SOLUTION INTRAVENOUS
Status: DISPENSED
Start: 2022-10-10 | End: 2022-10-11

## 2022-10-10 RX ORDER — LOSARTAN POTASSIUM 100 MG/1
100 TABLET ORAL DAILY
Status: DISCONTINUED | OUTPATIENT
Start: 2022-10-11 | End: 2022-10-18 | Stop reason: HOSPADM

## 2022-10-10 RX ORDER — LIDOCAINE HYDROCHLORIDE 20 MG/ML
INJECTION, SOLUTION INFILTRATION; PERINEURAL PRN
Status: DISCONTINUED | OUTPATIENT
Start: 2022-10-10 | End: 2022-10-10 | Stop reason: SDUPTHER

## 2022-10-10 RX ORDER — SODIUM CHLORIDE, SODIUM LACTATE, POTASSIUM CHLORIDE, CALCIUM CHLORIDE 600; 310; 30; 20 MG/100ML; MG/100ML; MG/100ML; MG/100ML
INJECTION, SOLUTION INTRAVENOUS CONTINUOUS PRN
Status: DISCONTINUED | OUTPATIENT
Start: 2022-10-10 | End: 2022-10-10 | Stop reason: SDUPTHER

## 2022-10-10 RX ORDER — AMLODIPINE BESYLATE 5 MG/1
5 TABLET ORAL DAILY
Status: DISCONTINUED | OUTPATIENT
Start: 2022-10-11 | End: 2022-10-18 | Stop reason: HOSPADM

## 2022-10-10 RX ORDER — OXYCODONE HYDROCHLORIDE 5 MG/1
5 TABLET ORAL PRN
Status: DISCONTINUED | OUTPATIENT
Start: 2022-10-10 | End: 2022-10-10 | Stop reason: HOSPADM

## 2022-10-10 RX ORDER — ESMOLOL HYDROCHLORIDE 10 MG/ML
INJECTION INTRAVENOUS PRN
Status: DISCONTINUED | OUTPATIENT
Start: 2022-10-10 | End: 2022-10-10 | Stop reason: SDUPTHER

## 2022-10-10 RX ORDER — ROCURONIUM BROMIDE 10 MG/ML
INJECTION, SOLUTION INTRAVENOUS PRN
Status: DISCONTINUED | OUTPATIENT
Start: 2022-10-10 | End: 2022-10-10 | Stop reason: SDUPTHER

## 2022-10-10 RX ORDER — SODIUM CHLORIDE 0.9 % (FLUSH) 0.9 %
5-40 SYRINGE (ML) INJECTION PRN
Status: DISCONTINUED | OUTPATIENT
Start: 2022-10-10 | End: 2022-10-18 | Stop reason: HOSPADM

## 2022-10-10 RX ORDER — ONDANSETRON 4 MG/1
4 TABLET, ORALLY DISINTEGRATING ORAL EVERY 8 HOURS PRN
Status: DISCONTINUED | OUTPATIENT
Start: 2022-10-10 | End: 2022-10-18 | Stop reason: HOSPADM

## 2022-10-10 RX ORDER — ONDANSETRON 2 MG/ML
4 INJECTION INTRAMUSCULAR; INTRAVENOUS
Status: DISCONTINUED | OUTPATIENT
Start: 2022-10-10 | End: 2022-10-10 | Stop reason: HOSPADM

## 2022-10-10 RX ORDER — OXYCODONE HYDROCHLORIDE 5 MG/1
10 TABLET ORAL EVERY 4 HOURS PRN
Status: DISCONTINUED | OUTPATIENT
Start: 2022-10-10 | End: 2022-10-18 | Stop reason: HOSPADM

## 2022-10-10 RX ORDER — KETAMINE HYDROCHLORIDE 100 MG/ML
INJECTION, SOLUTION INTRAMUSCULAR; INTRAVENOUS PRN
Status: DISCONTINUED | OUTPATIENT
Start: 2022-10-10 | End: 2022-10-10 | Stop reason: SDUPTHER

## 2022-10-10 RX ORDER — SODIUM CHLORIDE 9 MG/ML
INJECTION, SOLUTION INTRAVENOUS PRN
Status: DISCONTINUED | OUTPATIENT
Start: 2022-10-10 | End: 2022-10-10 | Stop reason: HOSPADM

## 2022-10-10 RX ORDER — PROPOFOL 10 MG/ML
INJECTION, EMULSION INTRAVENOUS PRN
Status: DISCONTINUED | OUTPATIENT
Start: 2022-10-10 | End: 2022-10-10 | Stop reason: SDUPTHER

## 2022-10-10 RX ORDER — DEXAMETHASONE SODIUM PHOSPHATE 4 MG/ML
INJECTION, SOLUTION INTRA-ARTICULAR; INTRALESIONAL; INTRAMUSCULAR; INTRAVENOUS; SOFT TISSUE PRN
Status: DISCONTINUED | OUTPATIENT
Start: 2022-10-10 | End: 2022-10-10 | Stop reason: SDUPTHER

## 2022-10-10 RX ORDER — ATORVASTATIN CALCIUM 10 MG/1
10 TABLET, FILM COATED ORAL DAILY
Status: DISCONTINUED | OUTPATIENT
Start: 2022-10-11 | End: 2022-10-18 | Stop reason: HOSPADM

## 2022-10-10 RX ORDER — INSULIN LISPRO 100 [IU]/ML
0.08 INJECTION, SOLUTION INTRAVENOUS; SUBCUTANEOUS
Status: DISCONTINUED | OUTPATIENT
Start: 2022-10-11 | End: 2022-10-18 | Stop reason: HOSPADM

## 2022-10-10 RX ORDER — LABETALOL HYDROCHLORIDE 5 MG/ML
5 INJECTION, SOLUTION INTRAVENOUS EVERY 10 MIN PRN
Status: DISCONTINUED | OUTPATIENT
Start: 2022-10-10 | End: 2022-10-10 | Stop reason: HOSPADM

## 2022-10-10 RX ORDER — DEXTROSE MONOHYDRATE 100 MG/ML
INJECTION, SOLUTION INTRAVENOUS CONTINUOUS PRN
Status: DISCONTINUED | OUTPATIENT
Start: 2022-10-10 | End: 2022-10-18 | Stop reason: HOSPADM

## 2022-10-10 RX ORDER — SENNA PLUS 8.6 MG/1
1 TABLET ORAL DAILY PRN
Status: DISCONTINUED | OUTPATIENT
Start: 2022-10-10 | End: 2022-10-18 | Stop reason: HOSPADM

## 2022-10-10 RX ORDER — OXYCODONE HYDROCHLORIDE 5 MG/1
5 TABLET ORAL EVERY 4 HOURS PRN
Status: DISCONTINUED | OUTPATIENT
Start: 2022-10-10 | End: 2022-10-18 | Stop reason: HOSPADM

## 2022-10-10 RX ORDER — LIDOCAINE HYDROCHLORIDE 10 MG/ML
2 INJECTION, SOLUTION INFILTRATION; PERINEURAL
Status: DISCONTINUED | OUTPATIENT
Start: 2022-10-10 | End: 2022-10-10 | Stop reason: HOSPADM

## 2022-10-10 RX ADMIN — HYDROMORPHONE HYDROCHLORIDE 1 MG: 2 INJECTION INTRAMUSCULAR; INTRAVENOUS; SUBCUTANEOUS at 14:35

## 2022-10-10 RX ADMIN — ACETAMINOPHEN 1000 MG: 500 TABLET ORAL at 11:07

## 2022-10-10 RX ADMIN — INSULIN GLARGINE 35 UNITS: 100 INJECTION, SOLUTION SUBCUTANEOUS at 23:55

## 2022-10-10 RX ADMIN — SODIUM CHLORIDE, SODIUM LACTATE, POTASSIUM CHLORIDE, AND CALCIUM CHLORIDE: .6; .31; .03; .02 INJECTION, SOLUTION INTRAVENOUS at 12:54

## 2022-10-10 RX ADMIN — ROCURONIUM BROMIDE 50 MG: 10 SOLUTION INTRAVENOUS at 13:02

## 2022-10-10 RX ADMIN — TRANEXAMIC ACID 1000 MG: 100 INJECTION, SOLUTION INTRAVENOUS at 13:15

## 2022-10-10 RX ADMIN — KETAMINE HYDROCHLORIDE 25 MG: 100 INJECTION INTRAMUSCULAR; INTRAVENOUS at 13:24

## 2022-10-10 RX ADMIN — ROCURONIUM BROMIDE 20 MG: 10 SOLUTION INTRAVENOUS at 14:27

## 2022-10-10 RX ADMIN — TRANEXAMIC ACID 1000 MG: 100 INJECTION, SOLUTION INTRAVENOUS at 16:30

## 2022-10-10 RX ADMIN — LIDOCAINE HYDROCHLORIDE 80 MG: 20 INJECTION, SOLUTION INFILTRATION; PERINEURAL at 13:02

## 2022-10-10 RX ADMIN — KETAMINE HYDROCHLORIDE 25 MG: 100 INJECTION INTRAMUSCULAR; INTRAVENOUS at 13:02

## 2022-10-10 RX ADMIN — ROCURONIUM BROMIDE 30 MG: 10 SOLUTION INTRAVENOUS at 13:20

## 2022-10-10 RX ADMIN — PROPOFOL 160 MG: 10 INJECTION, EMULSION INTRAVENOUS at 13:02

## 2022-10-10 RX ADMIN — ESMOLOL HYDROCHLORIDE 30 MG: 10 INJECTION, SOLUTION INTRAVENOUS at 15:22

## 2022-10-10 RX ADMIN — OXYCODONE 10 MG: 5 TABLET ORAL at 23:50

## 2022-10-10 RX ADMIN — HYDROMORPHONE HYDROCHLORIDE 1 MG: 2 INJECTION INTRAMUSCULAR; INTRAVENOUS; SUBCUTANEOUS at 15:32

## 2022-10-10 RX ADMIN — SODIUM CHLORIDE, SODIUM LACTATE, POTASSIUM CHLORIDE, AND CALCIUM CHLORIDE: .6; .31; .03; .02 INJECTION, SOLUTION INTRAVENOUS at 15:00

## 2022-10-10 RX ADMIN — SUGAMMADEX 200 MG: 100 INJECTION, SOLUTION INTRAVENOUS at 16:30

## 2022-10-10 RX ADMIN — DEXAMETHASONE SODIUM PHOSPHATE 4 MG: 4 INJECTION, SOLUTION INTRAMUSCULAR; INTRAVENOUS at 13:27

## 2022-10-10 RX ADMIN — CEFAZOLIN 2000 MG: 10 INJECTION, POWDER, FOR SOLUTION INTRAVENOUS at 13:11

## 2022-10-10 RX ADMIN — Medication 1 TABLET: at 23:50

## 2022-10-10 RX ADMIN — ONDANSETRON 4 MG: 4 TABLET, ORALLY DISINTEGRATING ORAL at 23:48

## 2022-10-10 RX ADMIN — ACETAMINOPHEN 650 MG: 325 TABLET ORAL at 23:48

## 2022-10-10 RX ADMIN — FENTANYL CITRATE 25 MCG: 50 INJECTION INTRAMUSCULAR; INTRAVENOUS at 13:13

## 2022-10-10 RX ADMIN — ESMOLOL HYDROCHLORIDE 30 MG: 10 INJECTION, SOLUTION INTRAVENOUS at 13:41

## 2022-10-10 RX ADMIN — MONTELUKAST SODIUM 10 MG: 10 TABLET, COATED ORAL at 23:48

## 2022-10-10 RX ADMIN — PROPOFOL 100 MCG/KG/MIN: 10 INJECTION, EMULSION INTRAVENOUS at 15:31

## 2022-10-10 RX ADMIN — SODIUM CHLORIDE, SODIUM LACTATE, POTASSIUM CHLORIDE, AND CALCIUM CHLORIDE: .6; .31; .03; .02 INJECTION, SOLUTION INTRAVENOUS at 16:30

## 2022-10-10 RX ADMIN — PROPOFOL 75 MCG/KG/MIN: 10 INJECTION, EMULSION INTRAVENOUS at 13:11

## 2022-10-10 RX ADMIN — FENTANYL CITRATE 50 MCG: 50 INJECTION INTRAMUSCULAR; INTRAVENOUS at 13:00

## 2022-10-10 RX ADMIN — CELECOXIB 400 MG: 100 CAPSULE ORAL at 11:07

## 2022-10-10 RX ADMIN — Medication 1500 MG: at 13:21

## 2022-10-10 RX ADMIN — Medication 10 ML: at 23:54

## 2022-10-10 RX ADMIN — ONDANSETRON 4 MG: 2 INJECTION INTRAMUSCULAR; INTRAVENOUS at 16:17

## 2022-10-10 RX ADMIN — FENTANYL CITRATE 25 MCG: 50 INJECTION INTRAMUSCULAR; INTRAVENOUS at 13:40

## 2022-10-10 ASSESSMENT — PAIN - FUNCTIONAL ASSESSMENT
PAIN_FUNCTIONAL_ASSESSMENT: ACTIVITIES ARE NOT PREVENTED
PAIN_FUNCTIONAL_ASSESSMENT: NONE - DENIES PAIN

## 2022-10-10 ASSESSMENT — PAIN SCALES - GENERAL
PAINLEVEL_OUTOF10: 7
PAINLEVEL_OUTOF10: 9

## 2022-10-10 ASSESSMENT — PAIN DESCRIPTION - ORIENTATION
ORIENTATION: LEFT
ORIENTATION: LOWER

## 2022-10-10 ASSESSMENT — PAIN DESCRIPTION - DESCRIPTORS
DESCRIPTORS: ACHING;BURNING
DESCRIPTORS: ACHING;DISCOMFORT

## 2022-10-10 ASSESSMENT — PAIN DESCRIPTION - LOCATION
LOCATION: BACK
LOCATION: LEG;KNEE

## 2022-10-10 NOTE — OP NOTE
Orthopaedic Surgery  Operative Report      Patient Name:  Leonarda Peralta  Patient :  1960  MRN: [id number]    Date: 10/10/22     Pre-operative Diagnosis:   T84.59XA Infection Total knee replacement  Osteomyelitis surrounding the left knee space    Post-operative Diagnosis:    Same    Procedure: LEFT   Revision Total Knee Arthroplasty for infection  Total synovectomy left knee  83404 Saucerization Proximal Tibia and distal femur  51859 Placement of incisional wound vac  Implantation of antibiotic beads    Surgeon:  Surgeon(s) and Role:     * Zach Pope MD - Primary    Assistant: Circulator: Kay Francois RN; Jeremi Moser RN  Surgical Assistant: Stanford Elaine  Scrub Person First: Saritha Hopkins    Anesthesia: General endotracheal anesthesia and Intraoperative local infiltration - Ortho-cocktail mixture    Estimated blood loss: 300    Specimens:   ID Type Source Tests Collected by Time Destination   1 : LEFT KNEE TISSUE  #1 Tissue Tissue CULTURE, FUNGUS, CULTURE, TISSUE, CULTURE WITH SMEAR, ACID FAST Chrystal Fonseca MD 10/10/2022 1349    2 : LEFT KNEE TISSUE #2 Tissue Tissue CULTURE, FUNGUS, CULTURE, TISSUE, CULTURE WITH SMEAR, ACID FAST Chrystal Fonseca MD 10/10/2022 1407    3 : LEFT KNEE TISSUE #3 Tissue Tissue CULTURE, FUNGUS, CULTURE, TISSUE, CULTURE WITH SMEAR, ACID FAST Chrystal Fonseca MD 10/10/2022 1415    4 : LEFT KNEE TISSUE #4 Tissue Tissue CULTURE, FUNGUS, CULTURE, TISSUE, CULTURE WITH SMEAR, ACID FAST Chrystal Fonseca MD 10/10/2022 1423    5 : LEFT KNEE TISSUE #5 Tissue Tissue CULTURE, FUNGUS, CULTURE, TISSUE, CULTURE WITH SMEAR, ACID FAST Chrystal Fonseca MD 10/10/2022 7245        Complications: None    Drains: None    Condition: Stable    Implants:   Implant Name Type Inv.  Item Serial No.  Lot No. LRB No. Used Action   CEMENT BONE 40GM HI VISC PALACOS R - [id number]  CEMENT BONE 40GM HI VISC [facility] [id number] Left 1 Implanted   ACTIVATED COLLAGEN   CELLERATE  SURGICAL   [id number]  [id number] Left 1 Implanted   [id number] - [id number]  PUTTY CONVENIENCE KIT   [id number]  [id number] Left 1 Implanted   [id number] - [id number]  PUTTY   [id number]  [id number] Left 1 Implanted   [id number] - [id number]  PUTTY 10CC   [id number]  [id number] Left 1 Implanted   [id number] - [id number]   PUTTY CONVENIENCE KIT   [id number]  [id number] Left 1 Implanted   CEMENT BONE 40GM HI VISC PALACOS R - [id number]  CEMENT BONE 40GM HI VISC PALACOS R  [facility] [id number] Left 2 Implanted     Findings:   1. Significant amount of serous fluid present with mild evidence of gross purulence  2. Large synovitic response present throughout the knee  3. Loosening tibial component, well fixed femoral component with some surrounding osteolysis  4. Intact cement mantle distal to the knee replacement    Indications: The patient has been battling left knee pain for months to years. She has had a complex course. She underwent a partial knee replacement years ago which was then ultimately converted to a total knee replacement. She had presented to me with persistent pain. Aspiration yielded positive infection. We discussed two-stage operation, this being likely 1 stage, but my objective was to give her far more functional knee than just a static spacer. Therefore, true preparation was planned for revision knee replacement in the setting of infection. Patient understood the risk benefits and alternatives in detail and wanted to proceed with the above operation. Procedure Details:   I marked the surgical site of the left knee for surgery. He was taken back to the operating theater laid supine the table the bony prominences well-padded. General anesthesia was induced. We transferred the patient to the operating table supine. The leg was prepped and draped in standard sterile fashion. An appropriate leg positioner,Harrison , was used to stabilize the extremity. Antibiotics 2 g of Ancef were given. Tranexamic acid 1 g was given at start. Tourniquet was used but not insufflated. Exposure, complete synovectomy:  I expose the previous incision of the knee. I excised the previous scar. I performed subcutaneous dissection and then performed deep dissection. Arthrotomy was performed. Underlying, significant amount of serous and purulent appearing fluid was evacuated. I performed a thorough and complete synovectomy. I used a rongeur, a series of grabbers and electro Bovie cautery to remove the entirety of the synovium. This was performed in the future for even the gutters of the knee as well as posterior parts of the knee once the implants were removed. Revision left knee replacement for infection:  After identifying the gutters of the knee, I was able to dislodge the polyethylene liner. I used a series of proximal osteotomes surrounding the femur. I was then able to disimpact the femoral component away. I then turned my attention to the tibia. An oscillating saw blade was then used over the medial aspect. The tibia resection pre-existing appeared to be low on the preoperative x-rays which was confirmed intraoperatively. Care was taken to avoid iatrogenic injury to the patella tendon. I was able to atraumatically remove the tibial component. I then used a series of osteotomes to remove bits of cement away from the tibia. The distal part of the component was still stuck within. A series of curved osteotomes were then used to work around this component as well as the cement mantle. This was ultimately removed. Care was taken to then remove part of the cement mantle which required interdigitated throughout the metaphysis of the proximal tibia. Saucerization of the distal femur and proximal tibia:  I used a series of reamers to remove what appeared to be osteomyelitis behind the implant.   Rongeur was used to remove a bit of cement as well as parts of devitalized bone. I then exposed the patella. I was able to also use an oscillating saw blade to remove the patella. Carefully attempted to remove the pegs however was not able to fully remove them but parts of that were also debrided. I turned my attention back to the knee itself. A proximal tibial trial was then fashioned to the bone. I placed the distal femur component provisionally and locked them with the pin. I used a series of trial polyethylene's and was happy with a size 14. Then I performed thorough irrigation with a multitude of solutions. I used diluted acetic acid, Hibiclens, Betadine, and gentian violet solution. Each irrigation solution had 5-minute dwell time, totaling 20 minutes. All members of the team then changed gown and gloves. We placed new drapes over top and wash the, and instruments. A separate set of instruments was used for the reimplantation portion of the surgery. I then mixed 2 batches of cement with appropriate antibiotics: 4 g of vancomycin and 4 packs gentamicin. I also fashioned a threaded K wire and drill guide over the inferior aspect of the tibial component to give it some inherent stability. Cement was wrapped around the wire as well as the exposed proximal tibial surface. This was implanted in position. I then performed the same for the femoral component. I believe the ultimate result was a reasonably balanced knee with severe loss of bone over the medial aspect, thereby resulting in varus component position of the femoral component. This still gives her good range of motion in the setting of a temporary knee replacement. Hemostasis was then achieved again. Excess cement was chipped away and removed to avoid impingement and free bodies. The stability and unrestricted motion of the knee was then confirmed once more. Irrigation and closure, placement of incisional wound VAC:  We performed sequential closure.   I irrigated the wound thoroughly with 3 L normal saline. And then implanted 2 packs of antibiotic beads into the wound which included another 4 g vancomycin and 4 packs gentamicin. I also injected a mixture of Ortho cocktail into the perioperative field. Adequate hemostasis was achieved. #1 PDS suture approximated the capsular tissue. #2 Quill suture approximated the fascial layer and medial parapatellar arthrotomy. 2-0 Monocryl interrupted sutures closed the subcutaneous tissue layer. Staples were applied. Prevena dressing negative wound VAC pressure device was applied using standard technique. Patient then was reversed in anesthesia, transferred back the postoperative care unit without any complications. All instrument counts were correct x2. I was present throughout the entire to the case with the exception of skin closure. PLAN:  - NWB LLE with assist device  - aspirin 81 mg BID  - Infectious disease consult, PICC line, will need 6 weeks IV abx  - Follow-up intraoperative cultures  - ambulate postop with PT  - resume home meds, diet  - f/u scheduled with me in 2-3 weeks  - dispo: Plan for admit today, discharge pending PICC line placement    REHAB: Knee immobilizer only for 2 weeks until quadriceps muscle restores full strength and proprioception, only as prophylactic measure. Patient is encouraged to fully flex and extend his knee when sitting upright and when in bed.       Electronically signed by Elizabeth Driscoll MD on 10/10/2022 at 5:01 PM

## 2022-10-10 NOTE — H&P
Update History & Physical     The patient's History and Physical of 10/5/2022 was reviewed with the patient and I examined the patient. There was no change. The surgical site was confirmed by the patient and me. Plan: The risks, benefits, expected outcome, and alternative to the recommended procedure have been discussed with the patient / family. Patient understands and wants to proceed with the procedure.       Electronically signed by Nicki Dao MD on 10/10/2022 at 11:03 AM

## 2022-10-10 NOTE — ANESTHESIA PROCEDURE NOTES
Peripheral Block    Patient location during procedure: pre-op  Reason for block: post-op pain management and at surgeon's request  Start time: 10/10/2022 11:51 AM  End time: 10/10/2022 11:52 AM  Staffing  Performed: anesthesiologist   Anesthesiologist: Cheo Hensley MD  Preanesthetic Checklist  Completed: patient identified, IV checked, site marked, risks and benefits discussed, surgical/procedural consents, equipment checked, pre-op evaluation, timeout performed, anesthesia consent given, oxygen available and monitors applied/VS acknowledged  Peripheral Block   Patient position: supine  Prep: ChloraPrep  Provider prep: mask and sterile gloves  Patient monitoring: cardiac monitor, continuous pulse ox, frequent blood pressure checks and IV access  Block type: Femoral  Adductor canal (Low Femoral)  Laterality: left  Injection technique: single-shot  Guidance: ultrasound guided  Local infiltration: lidocaine  Infiltration strength: 1 %  Local infiltration: lidocaine  Dose: 3 mL    Needle   Needle gauge: 21 G  Needle localization: ultrasound guidance  Needle length: 10 cm  Assessment   Injection assessment: negative aspiration for heme, no paresthesia on injection and local visualized surrounding nerve on ultrasound  Paresthesia pain: none  Slow fractionated injection: yes  Hemodynamics: stable  Real-time US image taken/store: yes  Outcomes: uncomplicated and patient tolerated procedure well    Additional Notes  Immediately prior to procedure a \"time out\" was called to verify the correct patient, allergies, laterality, procedure and equipment. Time out performed with  RN    Local Anesthetic: 0.5 %  Bupivacaine   Amount: 20 ml  in 5 ml increments after negative aspiration each time. Femoral artery (Deep artery to the thigh take off), Femoral Vein and Femoral Nerve are identified; the tip of the need and the spread of the local anesthetic around the Femoral nerve are visualized.  The Femoral nerve appeared to be anatomically normal and there were no abnormal pathologically findings seen.

## 2022-10-10 NOTE — DISCHARGE INSTRUCTIONS
Total Knee Replacement  Discharge Instructions    To prevent Clot formation, you have been placed on the following medication:  Take aspirin 81 mg twice a day starting day after surgery   Surgical Site Care:  Keep incision clean and dry. Remove Prevena on post-operative day # 7.  7 days after your surgery, peel off the dressing like you would a large band-aid and the entire dressing and battery pack may be thrown in the garbage. Apply ice over your dressing for 20 minutes prior to removing it. This cools down the adhesive and allows it to peel off of the skin easier. No showering with Prevena dressing. It is important to keep your incision covered for 2 weeks after surgery. After the Purple Prevena dressing is removed on post-op day 7, cover your incision with sterile gauze and tegaderm for another 7 days. You may change this dressing as needed when moist/wet. You may shower with a waterproof dressing on after you remove the Prevena. Do not let the water hit the dressing directly, then pat it dry after showering. Change the dressing after showering if dressing becomes moist or wet. You should be given a pamphlet regarding your prevena care. Physical Therapy:  Weight Bearing Status:  Weight bearing as tolerated  Outpatient therapy-should start within 2-3 days  Precautions  Per Physical Therapy Handout  Pain Medications  You were given oxycodone (Oxycontin, Oxyir)  Wean off pain medications as you deem appropriate as long as pain is under control  Be sure to drink plenty of fluids (recommend water) while taking narcotic pain medications to prevent constipation  You may take an over the counter laxative or stool softener as needed to prevent/treat constipation as well, we recommend Senokot S OTC. We recommend that you consider taking these medications the entire time you are taking pain medication.   Cold packs/Ice packs/Machine  May be used as much as necessary to control swelling/inflammation/soreness  Be sure to have a barrier (cloth, clothing, towel) between the site and the ice pack to prevent frostbite  Contact [facility]'s office if  Increased redness, swelling, drainage of any kind, and/or pain to surgery site. As well as new onset fevers and or chills. These could signify an infection. Calf or thigh tenderness to touch as well as increased swelling or redness. This could signify a clot formation. Numbness or tingling to an area around the incision site or below the incision site (toes). Any rash appears, increased  or new onset nausea/vomiting occur. This may indicate a reaction to a medication. Phone # [phone]  Follow up with Dr. Sidra Lemus or Jose J Dawkins PA-C at scheduled appointment time. Please continue to use your Incentive Spirometer every hour while awake. *** Please contact [address] with any questions or concerns after your discharge. *** Mon- Fri 9am- 5pm [phone]. If you have any issues or concerns after 5pm or on the weekend please call your surgeon's office. I will be contacting you in a few days to follow up. If you need a pain medication refill please contact your surgeon's office.

## 2022-10-10 NOTE — PROGRESS NOTES
Patient admitted to [address]. Consents verified. Patient NPO since [id number] last night. All patient belongings to remain in PACU. Patient has own walker and large bag of belongings. Warming blanket and warmed fluids used.

## 2022-10-11 LAB
ANION GAP SERPL CALCULATED.3IONS-SCNC: 10 MMOL/L (ref 3–16)
BASOPHILS ABSOLUTE: 0.1 K/UL (ref 0–0.2)
BASOPHILS RELATIVE PERCENT: 0.3 %
BUN BLDV-MCNC: 12 MG/DL (ref 7–20)
CALCIUM SERPL-MCNC: 9.3 MG/DL (ref 8.3–10.6)
CHLORIDE BLD-SCNC: 99 MMOL/L (ref 99–110)
CO2: 25 MMOL/L (ref 21–32)
CREAT SERPL-MCNC: <0.5 MG/DL (ref 0.6–1.2)
EOSINOPHILS ABSOLUTE: 0 K/UL (ref 0–0.6)
EOSINOPHILS RELATIVE PERCENT: 0 %
GFR AFRICAN AMERICAN: >60
GFR NON-AFRICAN AMERICAN: >60
GLUCOSE BLD-MCNC: 139 MG/DL (ref 70–99)
GLUCOSE BLD-MCNC: 141 MG/DL (ref 70–99)
GLUCOSE BLD-MCNC: 164 MG/DL (ref 70–99)
GLUCOSE BLD-MCNC: 180 MG/DL (ref 70–99)
GLUCOSE BLD-MCNC: 209 MG/DL (ref 70–99)
HCT VFR BLD CALC: 31.2 % (ref 36–48)
HEMOGLOBIN: 9.7 G/DL (ref 12–16)
LYMPHOCYTES ABSOLUTE: 1.7 K/UL (ref 1–5.1)
LYMPHOCYTES RELATIVE PERCENT: 7.6 %
MCH RBC QN AUTO: 25.9 PG (ref 26–34)
MCHC RBC AUTO-ENTMCNC: 31.2 G/DL (ref 31–36)
MCV RBC AUTO: 83.2 FL (ref 80–100)
MONOCYTES ABSOLUTE: 1.5 K/UL (ref 0–1.3)
MONOCYTES RELATIVE PERCENT: 6.6 %
NEUTROPHILS ABSOLUTE: 19.4 K/UL (ref 1.7–7.7)
NEUTROPHILS RELATIVE PERCENT: 85.5 %
PDW BLD-RTO: 14.1 % (ref 12.4–15.4)
PERFORMED ON: ABNORMAL
PLATELET # BLD: 258 K/UL (ref 135–450)
PMV BLD AUTO: 9.7 FL (ref 5–10.5)
POTASSIUM REFLEX MAGNESIUM: 4.9 MMOL/L (ref 3.5–5.1)
RBC # BLD: 3.75 M/UL (ref 4–5.2)
SODIUM BLD-SCNC: 134 MMOL/L (ref 136–145)
WBC # BLD: 22.6 K/UL (ref 4–11)

## 2022-10-11 PROCEDURE — 97166 OT EVAL MOD COMPLEX 45 MIN: CPT

## 2022-10-11 PROCEDURE — 97530 THERAPEUTIC ACTIVITIES: CPT

## 2022-10-11 PROCEDURE — 02HV33Z INSERTION OF INFUSION DEVICE INTO SUPERIOR VENA CAVA, PERCUTANEOUS APPROACH: ICD-10-PCS | Performed by: INTERNAL MEDICINE

## 2022-10-11 PROCEDURE — 99024 POSTOP FOLLOW-UP VISIT: CPT | Performed by: SPECIALIST/TECHNOLOGIST

## 2022-10-11 PROCEDURE — 97535 SELF CARE MNGMENT TRAINING: CPT

## 2022-10-11 PROCEDURE — 94761 N-INVAS EAR/PLS OXIMETRY MLT: CPT

## 2022-10-11 PROCEDURE — APPNB60 APP NON BILLABLE TIME 46-60 MINS: Performed by: SPECIALIST/TECHNOLOGIST

## 2022-10-11 PROCEDURE — 80048 BASIC METABOLIC PNL TOTAL CA: CPT

## 2022-10-11 PROCEDURE — 6370000000 HC RX 637 (ALT 250 FOR IP): Performed by: PHYSICIAN ASSISTANT

## 2022-10-11 PROCEDURE — 36415 COLL VENOUS BLD VENIPUNCTURE: CPT

## 2022-10-11 PROCEDURE — 85025 COMPLETE CBC W/AUTO DIFF WBC: CPT

## 2022-10-11 PROCEDURE — 97116 GAIT TRAINING THERAPY: CPT

## 2022-10-11 PROCEDURE — 2700000000 HC OXYGEN THERAPY PER DAY

## 2022-10-11 PROCEDURE — 2580000003 HC RX 258: Performed by: PHYSICIAN ASSISTANT

## 2022-10-11 PROCEDURE — 2580000003 HC RX 258: Performed by: INTERNAL MEDICINE

## 2022-10-11 PROCEDURE — 6360000002 HC RX W HCPCS: Performed by: PHYSICIAN ASSISTANT

## 2022-10-11 PROCEDURE — 1200000000 HC SEMI PRIVATE

## 2022-10-11 PROCEDURE — 6360000002 HC RX W HCPCS: Performed by: NURSE PRACTITIONER

## 2022-10-11 PROCEDURE — 99221 1ST HOSP IP/OBS SF/LOW 40: CPT | Performed by: INTERNAL MEDICINE

## 2022-10-11 PROCEDURE — 97162 PT EVAL MOD COMPLEX 30 MIN: CPT

## 2022-10-11 PROCEDURE — 97110 THERAPEUTIC EXERCISES: CPT

## 2022-10-11 PROCEDURE — 6360000002 HC RX W HCPCS: Performed by: INTERNAL MEDICINE

## 2022-10-11 RX ORDER — PROCHLORPERAZINE EDISYLATE 5 MG/ML
10 INJECTION INTRAMUSCULAR; INTRAVENOUS EVERY 6 HOURS PRN
Status: DISCONTINUED | OUTPATIENT
Start: 2022-10-11 | End: 2022-10-13

## 2022-10-11 RX ADMIN — ACETAMINOPHEN 650 MG: 325 TABLET ORAL at 20:24

## 2022-10-11 RX ADMIN — POLYETHYLENE GLYCOL 3350 17 G: 17 POWDER, FOR SOLUTION ORAL at 08:49

## 2022-10-11 RX ADMIN — ONDANSETRON 4 MG: 4 TABLET, ORALLY DISINTEGRATING ORAL at 13:04

## 2022-10-11 RX ADMIN — PROCHLORPERAZINE EDISYLATE 10 MG: 5 INJECTION INTRAMUSCULAR; INTRAVENOUS at 03:59

## 2022-10-11 RX ADMIN — INSULIN HUMAN 12 UNITS: 100 INJECTION, SUSPENSION SUBCUTANEOUS at 09:05

## 2022-10-11 RX ADMIN — ACETAMINOPHEN 650 MG: 325 TABLET ORAL at 08:48

## 2022-10-11 RX ADMIN — SODIUM CHLORIDE, SODIUM LACTATE, POTASSIUM CHLORIDE, AND CALCIUM CHLORIDE: .6; .31; .03; .02 INJECTION, SOLUTION INTRAVENOUS at 02:59

## 2022-10-11 RX ADMIN — INSULIN GLARGINE 35 UNITS: 100 INJECTION, SOLUTION SUBCUTANEOUS at 20:27

## 2022-10-11 RX ADMIN — Medication 1 TABLET: at 17:38

## 2022-10-11 RX ADMIN — METFORMIN HYDROCHLORIDE 1000 MG: 500 TABLET ORAL at 08:49

## 2022-10-11 RX ADMIN — Medication 1 TABLET: at 08:49

## 2022-10-11 RX ADMIN — OXYCODONE 10 MG: 5 TABLET ORAL at 20:23

## 2022-10-11 RX ADMIN — OXYCODONE 10 MG: 5 TABLET ORAL at 12:58

## 2022-10-11 RX ADMIN — ACETAMINOPHEN 650 MG: 325 TABLET ORAL at 03:10

## 2022-10-11 RX ADMIN — PIPERACILLIN AND TAZOBACTAM 3375 MG: 3; .375 INJECTION, POWDER, LYOPHILIZED, FOR SOLUTION INTRAVENOUS at 11:25

## 2022-10-11 RX ADMIN — PIPERACILLIN AND TAZOBACTAM 3375 MG: 3; .375 INJECTION, POWDER, LYOPHILIZED, FOR SOLUTION INTRAVENOUS at 05:28

## 2022-10-11 RX ADMIN — METFORMIN HYDROCHLORIDE 1000 MG: 500 TABLET ORAL at 17:38

## 2022-10-11 RX ADMIN — MONTELUKAST SODIUM 10 MG: 10 TABLET, COATED ORAL at 20:24

## 2022-10-11 RX ADMIN — MELOXICAM 7.5 MG: 7.5 TABLET ORAL at 08:49

## 2022-10-11 RX ADMIN — HYDROMORPHONE HYDROCHLORIDE 0.5 MG: 1 INJECTION, SOLUTION INTRAMUSCULAR; INTRAVENOUS; SUBCUTANEOUS at 03:04

## 2022-10-11 RX ADMIN — AMLODIPINE BESYLATE 5 MG: 5 TABLET ORAL at 08:49

## 2022-10-11 RX ADMIN — Medication 10 ML: at 20:35

## 2022-10-11 RX ADMIN — CETIRIZINE HYDROCHLORIDE 10 MG: 10 TABLET, FILM COATED ORAL at 08:49

## 2022-10-11 RX ADMIN — Medication 1500 MG: at 03:13

## 2022-10-11 RX ADMIN — LOSARTAN POTASSIUM 100 MG: 100 TABLET, FILM COATED ORAL at 08:49

## 2022-10-11 RX ADMIN — VANCOMYCIN HYDROCHLORIDE 1500 MG: 10 INJECTION, POWDER, LYOPHILIZED, FOR SOLUTION INTRAVENOUS at 20:26

## 2022-10-11 RX ADMIN — ACETAMINOPHEN 650 MG: 325 TABLET ORAL at 15:53

## 2022-10-11 RX ADMIN — ATORVASTATIN CALCIUM 10 MG: 10 TABLET, FILM COATED ORAL at 08:49

## 2022-10-11 ASSESSMENT — PAIN SCALES - GENERAL
PAINLEVEL_OUTOF10: 5
PAINLEVEL_OUTOF10: 7
PAINLEVEL_OUTOF10: 5
PAINLEVEL_OUTOF10: 5
PAINLEVEL_OUTOF10: 7
PAINLEVEL_OUTOF10: 10
PAINLEVEL_OUTOF10: 5
PAINLEVEL_OUTOF10: 7

## 2022-10-11 ASSESSMENT — PAIN DESCRIPTION - ONSET
ONSET: ON-GOING
ONSET: ON-GOING

## 2022-10-11 ASSESSMENT — PAIN DESCRIPTION - DESCRIPTORS
DESCRIPTORS: ACHING;SORE
DESCRIPTORS: ACHING
DESCRIPTORS: NUMBNESS

## 2022-10-11 ASSESSMENT — PAIN DESCRIPTION - ORIENTATION
ORIENTATION: LEFT

## 2022-10-11 ASSESSMENT — PAIN DESCRIPTION - FREQUENCY
FREQUENCY: CONTINUOUS
FREQUENCY: CONTINUOUS

## 2022-10-11 ASSESSMENT — PAIN DESCRIPTION - LOCATION
LOCATION: KNEE;LEG
LOCATION: LEG;KNEE;ARM
LOCATION: KNEE
LOCATION: ARM;KNEE;LEG
LOCATION: KNEE;LEG;ARM

## 2022-10-11 NOTE — CONSULTS
Infectious Diseases   Consult Note      Reason for Consult:  PJI    Requesting Physician:    OLIVIA Velázquez     Date of Admission: 10/10/2022  Subjective:   CHIEF COMPLAINT:   none given       HPI:    Harish Maloney is a 62yoF with history of HTN, HLD, DM, DEANA                OA L knee  S/p hemiarthroplasty 2007 and revision TKA 2010 without immediate complication     Saw Dr. Janneth Cruz 6/2022 regarding insidious and worsening pain L knee. Joint was aspirated 6/15/22 SF synovasure  results -   WBC 3040 with 31% PMNs  Neg Synovasure  +Staph antigen   Negative culture     She was advised need of revision following tighter glycemic control and resolution of gingival infection     She was admitted 10/10/22 for planned explant with temporary replacement. Operative cultures x5 collected, all negative to date. No organisms seen on any of the stains.        Current abx:  Zosyn 3.375 q8       Past Surgical History:       Diagnosis Date    Allergic rhinitis     HTN (hypertension)     Hyperlipidemia     Obstructive sleep apnea (adult) (pediatric)     Non-compliant with CPAP    Osteoarthritis     Type II or unspecified type diabetes mellitus without mention of complication, not stated as uncontrolled          Procedure Laterality Date    COLON SURGERY  2008    10\" removed - chronic constipation    DUPUYTRENS CONTRACTURE SURGERY Left 1/29/2019    LEFT 1ST DORSAL COMPARMENT DE' [address] [facility] performed by Hardik Li MD at [facility] 89      4x     HYSTERECTOMY, TOTAL ABDOMINAL (CERVIX REMOVED)      JOINT REPLACEMENT  2007    Left knee partial    KNEE SURGERY  1990's    rt knee reconstructed    NECK SURGERY  05/12/2011    c 4,5,6,7 (ACDF)    OVARY REMOVAL      ROTATOR CUFF REPAIR  2009    Left shoulder    SHOULDER SURGERY  1990's    lt & rt shoulder impingment    SINUS ENDOSCOPY  9-15-14    FUNCTIONAL ENDOSCOPIC SINUS SURGERY       TONSILLECTOMY AND ADENOIDECTOMY      TOTAL KNEE ARTHROPLASTY Left 03/09/2010    Eros to Total    TUBAL LIGATION      UMBILICAL HERNIA REPAIR  8/2010    Dr. Marlen Weber       Social History:    TOBACCO:   reports that she has been smoking cigars. She has never used smokeless tobacco.  ETOH:   reports current alcohol use. There is no history of illicit drug use or other significant epidemiologic exposures.       Family History:       Problem Relation Age of Onset    Diabetes Father     Heart Disease Father         CAD    High Blood Pressure Father     High Cholesterol Father     Kidney Disease Brother     Diabetes Son     Other Son         DEANA    Heart Disease Sister         1/2 sister    Breast Cancer Paternal Cousin     Rheum Arthritis Neg Hx     Osteoarthritis Neg Hx     Asthma Neg Hx     Cancer Neg Hx     Heart Failure Neg Hx     Hypertension Neg Hx     Migraines Neg Hx     Ovarian Cancer Neg Hx     Rashes/Skin Problems Neg Hx     Seizures Neg Hx     Stroke Neg Hx     Thyroid Disease Neg Hx        Current Medications:    Current Facility-Administered Medications: prochlorperazine (COMPAZINE) injection 10 mg, 10 mg, IntraVENous, Q6H PRN  amLODIPine (NORVASC) tablet 5 mg, 5 mg, Oral, Daily  atorvastatin (LIPITOR) tablet 10 mg, 10 mg, Oral, Daily  calcium carb-cholecalciferol 250-125 MG-UNIT per tab 1 tablet, 1 tablet, Oral, BID  cetirizine (ZYRTEC) tablet 10 mg, 10 mg, Oral, Daily  insulin glargine (LANTUS) injection vial 35 Units, 35 Units, SubCUTAneous, Nightly  losartan (COZAAR) tablet 100 mg, 100 mg, Oral, Daily  metFORMIN (GLUCOPHAGE) tablet 1,000 mg, 1,000 mg, Oral, BID WC  montelukast (SINGULAIR) tablet 10 mg, 10 mg, Oral, Nightly  insulin NPH (HUMULIN N;NOVOLIN N) injection vial 12 Units, 0.125 Units/kg, SubCUTAneous, BID AC  insulin lispro (HUMALOG) injection vial 7 Units, 0.08 Units/kg, SubCUTAneous, TID WC  insulin lispro (HUMALOG) injection vial 0-8 Units, 0-8 Units, SubCUTAneous, TID WC  insulin lispro (HUMALOG) injection vial 0-4 Units, 0-4 Units, SubCUTAneous, Nightly  glucose chewable tablet 16 g, 4 tablet, Oral, PRN  dextrose bolus 10% 125 mL, 125 mL, IntraVENous, PRN **OR** dextrose bolus 10% 250 mL, 250 mL, IntraVENous, PRN  glucagon (rDNA) injection 1 mg, 1 mg, SubCUTAneous, PRN  dextrose 10 % infusion, , IntraVENous, Continuous PRN  lactated ringers infusion, , IntraVENous, Continuous  sodium chloride flush 0.9 % injection 5-40 mL, 5-40 mL, IntraVENous, 2 times per day  sodium chloride flush 0.9 % injection 5-40 mL, 5-40 mL, IntraVENous, PRN  0.9 % sodium chloride infusion, , IntraVENous, PRN  acetaminophen (TYLENOL) tablet 650 mg, 650 mg, Oral, Q6H  meloxicam (MOBIC) tablet 7.5 mg, 7.5 mg, Oral, Daily  oxyCODONE (ROXICODONE) immediate release tablet 5 mg, 5 mg, Oral, Q4H PRN **OR** oxyCODONE (ROXICODONE) immediate release tablet 10 mg, 10 mg, Oral, Q4H PRN  polyethylene glycol (GLYCOLAX) packet 17 g, 17 g, Oral, Daily  senna (SENOKOT) tablet 8.6 mg, 1 tablet, Oral, Daily PRN  HYDROmorphone (DILAUDID) injection 0.25 mg, 0.25 mg, IntraVENous, Q3H PRN **OR** HYDROmorphone (DILAUDID) injection 0.5 mg, 0.5 mg, IntraVENous, Q3H PRN  ondansetron (ZOFRAN-ODT) disintegrating tablet 4 mg, 4 mg, Oral, Q8H PRN **OR** ondansetron (ZOFRAN) injection 4 mg, 4 mg, IntraVENous, Q6H PRN  ropivacaine 0.2% (NAROPIN) elastomeric infusion 600 mL, 600 mL, Infiltration, Continuous  piperacillin-tazobactam (ZOSYN) 3,375 mg in dextrose 5 % 50 mL IVPB extended infusion (mini-bag), 3,375 mg, IntraVENous, Q8H      Allergies   Allergen Reactions    Gabapentin Other (See Comments), Dizziness or Vertigo and Hallucinations     BLURRED VISION      Demerol Itching and Dermatitis     Unknown reaction; patient has tolerated hydrocodone, oxycodone, hydromorphone and fentanyl per chart review as of 09/16/22. Levaquin [Levofloxacin In D5w] Nausea Only    Morphine Itching     Unknown reaction; patient has tolerated hydrocodone, oxycodone, hydromorphone and fentanyl per chart review as of 09/16/22.     Tramadol Other (See Comments)     Unknown reaction; patient has tolerated hydrocodone, oxycodone, hydromorphone and fentanyl per chart review as of 09/16/22. REVIEW OF SYSTEMS:    CONSTITUTIONAL:  negative for fevers, chills   EYES:  negative for blurred vision, eye discharge, visual disturbance and icterus  HEENT:  negative for acute hearing loss, tinnitus, ear drainage, sinus pressure, nasal congestion, epistaxis and snoring  RESPIRATORY:  No cough, shortness of breath, hemoptysis  CARDIOVASCULAR:  negative for chest pain, palpitations, exertional chest pressure/discomfort, edema, syncope  GASTROINTESTINAL:  negative for nausea, vomiting, diarrhea, constipation, blood in stool and abdominal pain  GENITOURINARY:  negative for frequency, dysuria, urinary incontinence, decreased urine volume, and hematuria  HEMATOLOGIC/LYMPHATIC:  negative for easy bruising, bleeding and lymphadenopathy  ALLERGIC/IMMUNOLOGIC:  negative for recurrent infections, angioedema, anaphylaxis and drug reactions  ENDOCRINE:  negative for weight changes and diabetic symptoms including polyuria, polydipsia and polyphagia  MUSCULOSKELETAL:    per HPI   NEUROLOGICAL:  negative for headaches, slurred speech, unilateral weakness  PSYCHIATRIC/BEHAVIORAL: negative for hallucinations, behavioral problems, confusion and agitation. Objective:   PHYSICAL EXAM:      VITALS:  /76   Pulse 80   Temp 97.6 °F (36.4 °C) (Oral)   Resp 18   Ht 5' 2\" (1.575 m)   Wt 204 lb (92.5 kg)   LMP 01/26/1996   SpO2 95%   BMI 37.31 kg/m²      24HR INTAKE/OUTPUT:    Intake/Output Summary (Last 24 hours) at 10/11/2022 1429  Last data filed at 10/10/2022 1849  Gross per 24 hour   Intake 3000 ml   Output 450 ml   Net 2550 ml     CONSTITUTIONAL:  Awake, alert, cooperative, no apparent distress, and appears stated age  Morbidly obese   HEENT: NCAT, PERRL, EOMI. Sclera white, conjunctiva full.   OP with moist mucosal membranes, no thrush, tongue protrudes midline  NECK: Supple, symmetrical, trachea midline, no adenopathy  LUNGS:  no increased work of breathing   ABDOMEN:  normal bowel sounds, soft, flat, NT   PSYCHIATRIC: Oriented to person place and time. No obvious depression or anxiety. MUSCULOSKELETAL:   L knee with VAC dressing   SKIN:  normal skin color, texture, turgor and no redness, warmth, or swelling. No palpable nodules or stigmata of embolic phenomenon  NEUROLOGIC: nonfocal exam  ACCESS:   PIV in place       DATA:    Old records have been reviewed    CBC:  Recent Labs     10/11/22  1331   WBC 22.6*   RBC 3.75*   HGB 9.7*   HCT 31.2*      MCV 83.2   MCH 25.9*   MCHC 31.2   RDW 14.1      BMP:  Recent Labs     10/11/22  0714   *   K 4.9   CL 99   CO2 25   BUN 12   CREATININE <0.5*   CALCIUM 9.3   GLUCOSE 180*        Cultures:   6/2022 SF aspiration neg Synovasure, +stpah ag,     10/10/22 Operative cultures x5 in process; all stains neg for organisms, all cx neg to date       Radiology Review:  All pertinent images / reports were reviewed as a part of this visit. Assessment:     Patient Active Problem List   Diagnosis    Benign essential HTN    Osteoarthritis of right knee    Seasonal allergies    Obesity (BMI 30-39. 9)    Obstructive sleep apnea    Hyperlipidemia, mixed    Degenerative disc disease, cervical    Type 2 diabetes mellitus with hyperglycemia, with long-term current use of insulin ([address]    Morbidly obese ([address]    Infection of total left knee replacement, initial encounter ([address]       OA s/p L knee hemiarthroplasty 2007 and revision TKA 2010     Insidious pain and loosening of prosthesis   Increased SF WBCs with +staph ag consistent with chronic periprosthetic joint infection (neg synovasure)  S/p removal and revision with temp implant on 10/10/22  Operative cultures negative to date     No abx allergies   Intolerant of levaquin       -will need 6 weeks IV abx   -based on prelim data, will DC Zosyn and start [facility], pharmacy to dose  -PICC ordered  -discussed OPAT with patient, she prefers SNF initially     Provisional GERONIMO     Recommended Follow-up, Labs or Other Treatments After Discharge:       ID INFUSION ORDERS   Vancomycin 1500mg IV q12 through 11/21/22  Diagnosis PJI   Routine CVC care  Weekly CBC diff, BUN, creatinine, LFTs, CRP, vanc trough faxed att Garyob[facility] Larsen at [phone]  See Dr. April Veloz prior to end date. Call to schedule and with concerns [phone]   Dg Bland MD           Will follow        Amando Hogue M.D. Thank you for the opportunity to participate in the care of your patient.     Please do not hesitate to contact me:   [phone] office

## 2022-10-11 NOTE — DISCHARGE INSTR - COC
Continuity of Care Form    Patient Name: Gaviota Ny   :  1960  MRN:  [id number]    Admit date:  10/10/2022  Discharge date:  10/18/22    Code Status Order: Full Code   Advance Directives:   [facility] Documentation       Date/Time Healthcare Directive Type of Healthcare Directive Copy in [address] Agent's Name Healthcare Agent's Phone Number    10/10/22 7415 Yes, patient has an advance directive for healthcare treatment Durable power of  for health care;Living will Yes, copy in chart -- -- --            Admitting Physician:  Kellie Ramirez MD  PCP: Windy Price DO    Discharging Nurse: Sandra 72 Unit/Room#: 3416/9177-64  Discharging Unit Phone Number: [phone]    Emergency Contact:   Extended Emergency Contact Information  Primary Emergency Contact: Maria Isabel [address] Phone: [phone]  Mobile Phone: [phone]  Relation: Child    Past Surgical History:  Past Surgical History:   Procedure Laterality Date    COLON SURGERY      10\" removed - chronic constipation    DUPUYTRENS CONTRACTURE SURGERY Left 2019    LEFT 1ST DORSAL COMPARMENT DE' [address] [facility] performed by James Nicole MD at [facility] 89      4x     HYSTERECTOMY, TOTAL ABDOMINAL (CERVIX REMOVED)      JOINT REPLACEMENT      Left knee partial    KNEE SURGERY  's    rt knee reconstructed    NECK SURGERY  2011    c 4,5,6,7 (ACDF)    OVARY REMOVAL      ROTATOR CUFF REPAIR      Left shoulder    SHOULDER SURGERY  's    lt & rt shoulder impingment    SINUS ENDOSCOPY  9-15-14    FUNCTIONAL ENDOSCOPIC SINUS SURGERY       TONSILLECTOMY AND ADENOIDECTOMY      TOTAL KNEE ARTHROPLASTY Left 2010    Eros to Total    [place]  2010    Dr. Lakeisha Mckenna       Immunization History:   Immunization History   Administered Date(s) Administered    [facility] [address] [place] [address] border, Primary or Immunocompromised, (age 12y+), IM, 100 mcg/0.5mL 03/19/2021, 04/14/2021, 11/05/2021    COVID-19, [place] Vaccine, Vaccine Unspecified 08/18/2022    Influenza 09/28/2011, 09/16/2013    Influenza Vaccine, unspecified formulation 10/09/2016    Influenza Virus Vaccine 10/20/2018    Influenza, FLUARIX, FLULAVAL, FLUZONE (age 10 mo+) AND AFLURIA, (age 1 y+), PF, 0.5mL 09/27/2017, 09/28/2020    Influenza, FLUBLOK, (age 25 y+), PF, 0.5mL 10/09/2019, 10/02/2021, 09/22/2022    Pneumococcal Polysaccharide ([id number]) 11/02/2015    Pneumococcal conjugate PCV20, PF (Prevnar 20) 09/22/2022    Tdap (Boostrix, Adacel) 04/15/2015    Zoster Recombinant (Shingrix) 04/12/2019, 08/31/2019       Active Problems:  Patient Active Problem List   Diagnosis Code    Benign essential HTN I10    Osteoarthritis of right knee M17.11    Seasonal allergies J30.2    Obesity (BMI 30-39. 9) E66.9    Obstructive sleep apnea G47.33    Hyperlipidemia, mixed E78.2    Degenerative disc disease, cervical M50.30    Type 2 diabetes mellitus with hyperglycemia, with long-term current use of insulin (HCC) E11.65, Z79.4    Morbidly obese ([facility]) E66.01    Infection of total left knee replacement, [facility] encounter [facility] T84.54XA       Isolation/Infection:   Isolation            No Isolation          Patient Infection Status       Infection Onset Added Last Indicated Last Indicated By Review Planned Expiration Resolved Resolved By    None active    Resolved    COVID-19 (Rule Out) 01/30/21 01/30/21 01/30/21 COVID-19 (Ordered)   01/31/21 Rule-Out Test Resulted            Nurse Assessment:  Last Vital Signs: /76   Pulse 80   Temp 97.6 °F (36.4 °C) (Oral)   Resp 18   Ht 5' 2\" (1.575 m)   Wt 204 lb (92.5 kg)   LMP 01/26/1996   SpO2 95%   BMI 37.31 kg/m²     Last documented pain score (0-10 scale): Pain Level: 7  Last Weight:   Wt Readings from Last 1 Encounters:   10/10/22 204 lb (92.5 kg)     Mental Status:  oriented    IV Access:  - PICC - site  R Cee, insertion date: 10/11/22    Nursing Mobility/ADLs:  Walking   Assisted  Transfer  Assisted  Bathing  Assisted  Dressing  Assisted  Toileting  Assisted  Feeding  Independent  Med Admin  Independent  Med Delivery   whole    Wound Care Documentation and Therapy:  Incision 10/10/22 Knee Anterior; Left (Active)   Dressing Status Clean;Dry; Intact 10/11/22 0826   Dressing/Treatment Ace wrap 10/11/22 0826   Drainage Amount None 10/11/22 0826   Odor None 10/11/22 0826   Number of days: 1       Incision 01/29/19 Hand Left (Active)   Number of days: 7304        Elimination:  Continence: Bowel: Yes  Bladder: Yes  Urinary Catheter: None   Colostomy/Ileostomy/Ileal Conduit: No       Date of Last BM: 10/17/22    Intake/Output Summary (Last 24 hours) at 10/11/2022 1509  Last data filed at 10/10/2022 1849  Gross per 24 hour   Intake 2000 ml   Output 200 ml   Net 1800 ml     I/O last 3 completed shifts: In: 3000 [I.V.:3000]  Out: 450 [Blood:450]    Safety Concerns:     None    Impairments/Disabilities:      None    Nutrition Therapy:  Current Nutrition Therapy:   - Oral Diet:  General    Routes of Feeding: Oral  Liquids: Thin Liquids  Daily Fluid Restriction: no  Last Modified Barium Swallow with Video (Video Swallowing Test): not done    Treatments at the Time of Hospital Discharge:   Respiratory Treatments: na  Oxygen Therapy:  is not on home oxygen therapy. Ventilator:    - No ventilator support    Rehab Therapies: Physical Therapy and Occupational Therapy  Weight Bearing Status/Restrictions: Non-weight bearing on left leg  Other Medical Equipment (for information only, NOT a DME order):  walker  Other Treatments: na    Patient's personal belongings (please select all that are sent with patient):   All belongings with patient    RN SIGNATURE:  Electronically signed by Balwinder Langford RN on 10/18/22 at 9:30 AM EDT    CASE MANAGEMENT/SOCIAL WORK SECTION    Inpatient Status Date: 10/10/22    Readmission Risk Assessment Score:  Readmission Risk              Risk of Unplanned Readmission:  11           Discharging to Facility/ [place]   [address] [facility]   [phone]     / signature: Electronically signed by Danica Jorge RN on 10/18/22 at 9:38 AM EDT    PHYSICIAN SECTION    Prognosis: Good    Condition at Discharge: Stable    Rehab Potential (if transferring to Rehab): Good    Recommended Follow-up, Labs or Other Treatments After Discharge:       ID INFUSION ORDERS   Vancomycin 1250mg IV q12 through 11/21/22  Diagnosis PJI   Routine CVC care  Weekly CBC diff, BUN, creatinine, LFTs, CRP, vanc trough faxed att Eulalia Clancy at [phone]  See Dr. To Leo prior to end date. Call to schedule and with concerns [phone]   Sha Templeton MD    PT/OT-nonweightbearing to the left lower extremity with assistive device  Aspirin 81 mg twice daily by mouth for 30 days postop as DVT prophylaxis  Follow-up with Neva Sandifer, PA-C on 10/28/2022 at 10:15 AM, [facility] office. Call [facility] orthopedics at [phone] with any questions  Remove Prevena on post-operative day # 7. 7 days after your surgery, peel off the dressing like you would a large band-aid and the entire dressing and battery pack may be thrown in the garbage. Apply ice over your dressing for 20 minutes prior to removing it. This cools down the adhesive and allows it to peel off of the skin easier. No showering with Prevena dressing. It is important to keep your incision covered for 2 weeks after surgery. After the Purple Prevena dressing is removed on post-op day 7, cover your incision with sterile gauze and tegaderm for another 7 days. You may change this dressing as needed when moist/wet. You may shower with a waterproof dressing on after you remove the Prevena. Do not let the water hit the dressing directly, then pat it dry after showering.  Change the dressing after showering if dressing becomes moist or wet. You should be given a pamphlet regarding your prevena care. Physician Certification: I certify the above information and transfer of Dennie Pesa  is necessary for the continuing treatment of the diagnosis listed and that she requires [place] for less 30 days.      Update Admission H&P: No change in H&P    PHYSICIAN SIGNATURE:  Electronically signed by OLIVIA Edgar on 10/14/22 at 10:31 AM EDT

## 2022-10-11 NOTE — PROGRESS NOTES
Order for PICC per DR. Vance Agosto. Pre procedure and timeout done with pt's RN. Successful insertion of a single lumen PICC line into pt's right basilic vein. No issues gaining access or advancing guidewire/introducer/PICC line. PICC tip terminates in the SVC according to [employer]lock 3CG tip confirmation system. PICC was seen dropping into SVC with tip tracking technology and discernable peaked p waves were noted without negative deflection. A printout will be in pt's chart. PICC is cut at 42 cm and out externally 0cm. Single lumen flushes without resistance and draw back brisk blood return. PICC site CDI with hemostasis maintained and a biopatch applied to site. Pt instructed to stay in bed and keep arm flat and still for 30 minutes  to promote hemostasis.      Tomasa Lester RN given handoff report

## 2022-10-11 NOTE — CARE COORDINATION
CASE MANAGEMENT INITIAL ASSESSMENT      Reviewed chart and completed assessment with patient:bedside  Family present: yes-son-You  Explained Case Management role/services. yes    Primary contact information:You    Health Care Decision Maker :   Primary Decision Maker: Venancio Ramirez - [phone]          Can this person be reached and be able to respond quickly, such as within a few minutes or hours? Yes      Admit date/status:10/10/22/ IP  Diagnosis:Infection of prosthetic knee   Is this a Readmission?:  No      Insurance:[employer] Medicare   Precert required for SNF: Yes       3 night stay required: No    Living arrangements, Adls, care needs, prior to admission:Lives alone , IPTA with ADL's    Durable Medical Equipment at home:  Walker__Cane__RTS__ BSC__Shower Chair__  02__ HHN__ CPAP__  BiPap__  Hospital Bed__ W/C___ Other__X Rollater___    Services in the home and/or outpatient, prior to admission:none    Current PCP:Isabel Turpin        Medications: Prescription coverage? Yes     Transportation needs: squad to SNF         PT/OT recs:SNF? Ortho? Hospital Exemption Notification (HEN):needed for SNF     Barriers to discharge:lives alone/ IV ABX    Plan/comments:patient would like to go to a SNF, lives home alone, has an elevator, no steps, will need IV ABX at DC, waiting PICC placement. Will continue to follow.      Cameron Davila RN

## 2022-10-11 NOTE — CONSULTS
Pharmacy Note  Vancomycin Consult    Jacob Hernandez is a 59 y.o. female started on Vancomycin to treat Bone and Joint Infection; consult received from Dr. Sully Santamaria to manage therapy. Allergies:  Gabapentin, Demerol, Levaquin [levofloxacin in d5w], Morphine, and Tramadol     Recent Labs     10/11/22  0714   CREATININE <0.5*     Recent Labs     10/11/22  1331   WBC 22.6*     Estimated Creatinine Clearance: 122 mL/min (based on SCr of 0.5 mg/dL). Intake/Output Summary (Last 24 hours) at 10/11/2022 1457  Last data filed at 10/10/2022 1849  Gross per 24 hour   Intake 3000 ml   Output 450 ml   Net 2550 ml     Wt Readings from Last 1 Encounters:   10/10/22 204 lb (92.5 kg)       Body mass index is 37.31 kg/m². Culture Date      Source                       Results      Loading dose (critically ill or in ICU, require dialysis or renal replacement therapy): Vancomycin 25 mg/kg IVPB x 1 (maximum 3000 mg). Maintenance dose: ~15 mg/kg starting at the next dosing interval determined by renal function  Pulse dose: fluctuating renal function, MARLIN, ESRD   Goal Vancomycin trough: 10-20 mcg/mL   Goal Vancomycin AUC: 400-600 mg/L.hr    Assessment/Plan:  Will continue with Vancomycin 1500 mg IV every 12 hours. Calculated  mg/L.hr. Vancomycin random ordered for 10/12 at 1200. Timing of the next level will be determined based on culture results, renal function, and clinical response. Thank you for the consult. Gutierrez Blum PharmD    10/11/2022 2:57 PM     -----------------------------------------------------------------------    Vancomycin Day 2  Current Dosing: Vancomycin 1500 mg q12h  Recent Labs     10/11/22  0714   CREATININE <0.5*     Estimated Creatinine Clearance: 122 mL/min (based on SCr of 0.5 mg/dL).   Recent Labs     10/11/22  1331 10/12/22  0620   WBC 22.6* 15.9*     Culture Date      Source                       Results  10/10                  tissue                         NGTD    Calculated AUC 556  Vancomycin level due today at [facility], PharmD 10/12/2022  10:36 AM    10/12/2022  Vancomycin   Recent Labs     10/12/22  1900   VANCOTROUGH 19.3   Estimated Creatinine Clearance: 122 mL/min (based on SCr of 0.5 mg/dL). Recent Labs     10/11/22  0714 10/11/22  1331 10/12/22  0620   WBC  --  22.6* 15.9*   CREATININE <0.5*  --   --    - Vancomycin dose adjusted to 1,250 mg every 12 hours due to projected  mg/L.hr  - Updated AUC: 527 mg/L.hr  - Vancomycin random level ordered for 10/14 at 0600. Trevon Buchanan PharmD    10/12/2022 7:47 PM  ------------------------------------------------------------    Vancomycin Day 3  Current Dosing: Vancomycin 1250 mg q12h  Recent Labs     10/12/22  1900   VANCOTROUGH 19.3     Recent Labs     10/11/22  0714   CREATININE <0.5*     Estimated Creatinine Clearance: 122 mL/min (based on SCr of 0.5 mg/dL). Recent Labs     10/11/22  1331 10/12/22  0620 10/13/22  0530   WBC 22.6* 15.9* 13.9*     Calculated , Vancomycin random ordered for tomorrow morning. Sharifa Villar 10/13/2022 9:46 AM     Vancomycin Level, Random [[id number]]    Collected: 10/14/22 0625    Updated: 10/14/22 0655    Specimen Type: Blood     Vancomycin Rm 17.6 ug/mL     Regimen: 1250 mg IV every 12 hours. Start time: 09:00 on 10/14/2022  Exposure target: AUC24 (range)400-600 mg/L.hr   AUC24,ss: 511 mg/L.hr  Probability of AUC24 > 400: 97 %  Ctrough,ss: 15.7 mg/L  Probability of Ctrough,ss > 20: 8 %  Probability of nephrotoxicity (Lodise ULICES 2009): 11 %  Reinaldo Fisher.10/14/2022 7:28 AM  Vanc Day 5  Dose: 1250 mg q12h     No results for input(s): CREATININE in the last 72 hours. Estimated Creatinine Clearance: 122 mL/min (based on SCr of 0.5 mg/dL). Vanc Day 5  Dose: 1250 mg q12h     No results for input(s): CREATININE in the last 72 hours. Estimated Creatinine Clearance: 122 mL/min (based on SCr of 0.5 mg/dL).      Freeman Rogers [facility] 10/15/2022 8:24 AM    Vanc Day 6  Dose: 1250 mg q12h      Recent Labs     10/15/22  1118   CREATININE <0.5*      Estimated Creatinine Clearance: 122 mL/min (based on SCr of 0.5 mg/dL).      Karen Helms, [facility] 10/16/2022 7:52 AM    Vancomycin Day 7  Dose 1250 mg q12h  NNL  Likely discharging today (discharge order is in)  Kirby Caldwell, PharmD  10/17/2022 at 8:38 AM

## 2022-10-11 NOTE — PROGRESS NOTES
Occupational Therapy  Facility/Department: [place] [address] - MED SURG/ORTHO  Occupational Therapy Initial Assessment/Treatment    Name: Marita Calabrese  : 1960  MRN: [id number]  Date of Service: 10/11/2022    Discharge Recommendations:  Subacute/Skilled Nursing Facility, vs 24 hour supervision or assist  OT Equipment Recommendations  Equipment Needed: Yes  Mobility Devices: ADL Assistive Devices  ADL Assistive Devices: Transfer Tub Bench;Sock-Aid Hard;Reacher; Toileting - 3-in-1 Commode   Patient would benefit from [facility] due to being room bound and inability to ambulate further than 25 ft., and ambulate up/down stairs. This would allow for increased independence and safety with toileting. Patient Diagnosis(es): The primary encounter diagnosis was Infection of total left knee replacement, initial encounter ([facility] [address] A diagnosis of Infection of prosthetic knee joint, initial encounter [facility]) was also pertinent to this visit. Past Medical History:  has a past medical history of Allergic rhinitis, HTN (hypertension), Hyperlipidemia, Obstructive sleep apnea (adult) (pediatric), Osteoarthritis, and Type II or unspecified type diabetes mellitus without mention of complication, not stated as uncontrolled. Past Surgical History:  has a past surgical history that includes joint replacement (); Rotator cuff repair (); Colon surgery (); shoulder surgery ('s); knee surgery ('s); Neck surgery (2011); Umbilical hernia repair (2010); Ovary removal; Sinus endoscopy (9-15-14); Tonsillectomy and adenoidectomy; Tubal ligation; Total knee arthroplasty (Left, 2010); dupuytrens contracture surgery (Left, 2019); hernia repair; and Hysterectomy, total abdominal.           Assessment   Performance deficits / Impairments: Decreased functional mobility ; Decreased strength;Decreased endurance;Decreased ADL status; Decreased safe awareness;Decreased balance;Decreased high-level IADLs  After evaluation POD #1 s/p  L TKR revision with wound vac and antibiotic beds, pt found to be presenting with the above mentioned occupational performance deficits which are affecting participation in daily living skills. Pt modA to stand from toilet with modA to hygiene and LE clothing mgmt. Pt able to ambulate 15 ft with SW maintaining NWB to LLE. Pt does not have 24hr care as of now. Pt would benefit from continued skilled occupational therapy to address ADLs, functional mobility, and safety while in acute care. Prognosis: Good  Decision Making: Medium Complexity  REQUIRES OT FOLLOW-UP: Yes        Plan   Occupational Therapy Plan  Times Per Week: 4-6x's a week while in acute care     Restrictions  Restrictions/Precautions  Restrictions/Precautions: Weight Bearing, Fall Risk, General Precautions  Required Braces or Orthoses?: Yes (KI to LLE when OOB until adequate quad control)  Lower Extremity Weight Bearing Restrictions  Left Lower Extremity Weight Bearing: Non Weight Bearing  Position Activity Restriction  Other position/activity restrictions: Okay for L knee ROM per ortho team Miguelcarmen [place] on 10/11/22)    Subjective   General  Chart Reviewed: Yes, Orders, Progress Notes, History and Physical, Operative Notes  Patient assessed for rehabilitation services?: Yes  Family / Caregiver Present: No  Referring Practitioner: Mary Skelton PA-C/Dr. Sidra Lemus 10/10/22  Diagnosis: R knee infection; s/p Revision Total Knee Arthroplasty for infection, Total synovectomy left knee, Saucerization Proximal Tibia and distal femur and wound vac placement with antibacteral beads.   Subjective  Subjective: Pt pleasant and agreeable to OT evaluation, states doesn't have 24hr assist.     Social/Functional History  Social/Functional History  Lives With: Alone  Type of Home: Condo  Home Layout: One level  Home Access: Elevator  Bathroom Shower/Tub: Tub/Shower unit  Bathroom Toilet: Standard  Bathroom Equipment: Toilet raiser  Home Equipment: Sha Marquez, 4 wheeled  Has the patient had two or more falls in the past year or any fall with injury in the past year?: No  ADL Assistance: [address]: Independent  Homemaking Responsibilities: Yes  Ambulation Assistance: Independent (w/o device)  Transfer Assistance: Independent  Active : Yes  Occupation: Retired  Type of Occupation: [employer]  [address]: travel, concerts       Objective   Heart Rate: 80  Heart Rate Source: Monitor  BP: 117/76  BP Location: Left upper arm  BP Method: Automatic  Patient Position: High fowlers  MAP (Calculated): 89.67  Resp: 18  SpO2: 95 %  O2 Device: None (Room air)             Safety Devices  Type of Devices: Left in chair;Call light within reach;Nurse notified; Chair alarm in place;Gait belt    Bed Mobility Training  Bed Mobility Training: Yes  Supine to Sit: Stand-by assistance  Sit to Supine: Other (comment) (up to chair at end of session)    Balance  Sitting: Intact  Standing: Impaired  Standing - Static: Good  Standing - Dynamic: Fair;Constant support    Transfer Training  Transfer Training: Yes  Sit to Stand: Minimum assistance (cues for safe hand placement up to SW)  Stand to Sit: Minimum assistance  Toilet Transfer: Moderate assistance (with R grab bar)    Gait Training  Gait Training: Yes  Gait  Overall Level of Assistance: Contact-guard assistance  Assistive Device: SW   15 ft to from bathroom. ADL  LE Dressing: Minimal assistance (donning brief)  Toileting: Moderate assistance         Vision  Vision: Impaired  Hearing  Hearing: Within functional limits          Education Given To: Patient  Education Provided: Role of Therapy; ADL Adaptive Strategies; Fall Prevention Strategies; Plan of Care;Transfer Training;Equipment;Precautions;IADL Safety  Education Method: Demonstration;Verbal;Teach Back  Barriers to Learning: None  Education Outcome: Verbalized understanding;Demonstrated understanding;Continued education needed      In robby of hip precautions/wb'ing restrictions, educated patient on compensatory methods for LE ADLs. Pt verbalized understanding. AM-PAC Score        AM-PAC Inpatient Daily Activity Raw Score: 16 (10/11/22 1258)  AM-PAC Inpatient ADL T-Scale Score : 35.96 (10/11/22 1258)  ADL Inpatient CMS 0-100% Score: 53.32 (10/11/22 1258)  ADL Inpatient CMS G-Code Modifier : CK (10/11/22 1258)         Goals  Short Term Goals  Time Frame for Short Term Goals: 1 week 10/18  Short Term Goal 1: Pt will complete LE dressing wtih SBA  Short Term Goal 2: Pt will complete toilet transfers with SBA by 10/15  Short Term Goal 3: Pt will complete standing level ADLs with SBA for balance during LE clothing mgmt. Patient Goals   Patient goals : \"to be able to wipe myself independently\"       Therapy Time   Individual Concurrent Group Co-treatment   Time In 0905         Time Out 1007         Minutes 62         Timed Code Treatment Minutes: 52 Minutes       Luis Enrique Salgado OTR/L  If pt is unable to be seen after this session, please let this note serve as discharge summary. Please see case management note for discharge disposition. Thank you.

## 2022-10-11 NOTE — PROGRESS NOTES
Physical Therapy  Facility/Department: [facility] - MED SURG/ORTHO  Physical Therapy Initial Assessment    Name: Linda Thomas  : 1960  MRN: [id number]  Date of Service: 10/11/2022    Discharge Recommendations:  24 hour supervision or assist, Home with Home health PT (if 24-hr sup/assist not available, recommend SNF at D/C)   PT Equipment Recommendations  Equipment Needed: Yes  Mobility Devices: [place] Jansky; Wheelchair  Walker: Standard  Wheelchair: Standard  Other: Recommend std. walker for amb x short distances around house while NWB LLE and manual w/c for longer distances, given pt's current inability to amb distances >15 feet while NWB. Patient Diagnosis(es): The primary encounter diagnosis was Infection of total left knee replacement, initial encounter ([facility] [address] A diagnosis of Infection of prosthetic knee joint, initial encounter [facility]) was also pertinent to this visit. Past Medical History:  has a past medical history of Allergic rhinitis, HTN (hypertension), Hyperlipidemia, Obstructive sleep apnea (adult) (pediatric), Osteoarthritis, and Type II or unspecified type diabetes mellitus without mention of complication, not stated as uncontrolled. Past Surgical History:  has a past surgical history that includes joint replacement (); Rotator cuff repair (); Colon surgery (); shoulder surgery (); knee surgery (s); Neck surgery (2011); Umbilical hernia repair (2010); Ovary removal; Sinus endoscopy (9-15-14); Tonsillectomy and adenoidectomy; Tubal ligation; Total knee arthroplasty (Left, 2010); dupuytrens contracture surgery (Left, 2019); hernia repair; and Hysterectomy, total abdominal.    Assessment   Body Structures, Functions, Activity Limitations Requiring Skilled Therapeutic Intervention: Decreased functional mobility ; Decreased ROM; Decreased strength;Decreased endurance;Decreased balance; Increased pain  Assessment: Pt referred for PT evaluation during current hospital stay with dx of L knee infection, s/p revision L TKR on 10/10/22 with implantation of antibiotic beads. Pt participated quite well with PT today despite moderate levels of pain, ambulating short distances in hospital room (~15 feet) with support of std. walker at min/CGA x 1 level. Pt able to maintain NWB LLE with min cues during transfers/amb. Recommend pt return home with 24-hr sup/assist and home PT services. If 24-hr sup/assist is not available, consider SNF at D/C.   Treatment Diagnosis: Decreased ROM/strength LLE and (I) with mobility s/p revision L TKR on 10/10/22  Specific Instructions for Next Treatment: Progress ther ex and mobility as tolerated  Therapy Prognosis: Good  Decision Making: Medium Complexity  Requires PT Follow-Up: Yes  Activity Tolerance: Patient tolerated evaluation without incident;Patient tolerated treatment well;Patient limited by fatigue;Patient limited by pain     Plan   General Plan: 1 time a day 7 days a week  Specific Instructions for Next Treatment: Progress ther ex and mobility as tolerated  Current Treatment Recommendations: Strengthening, ROM, Balance training, Functional mobility training, Transfer training, Gait training, Home exercise program, Safety education & training, Patient/Caregiver education & training, Equipment evaluation, education, & procurement, Therapeutic activities, Wheelchair mobility training  Safety Devices: Left in chair, Call light within reach, Nurse notified, Chair alarm in place, Gait belt, Patient at risk for falls, All fall risk precautions in place     Restrictions  Restrictions/Precautions  Restrictions/Precautions: Weight Bearing, Fall Risk, General Precautions  Required Braces or Orthoses?: Yes (KI to LLE when OOB until adequate quad control)  Lower Extremity Weight Bearing Restrictions  Left Lower Extremity Weight Bearing: Non Weight Bearing  Position Activity Restriction  Other position/activity restrictions: Okay for L knee ROM per ortho team (Mera Ibanez [place] on 10/11/22)     Subjective   Pain: Pt c/o 7.5/10 pain L knee, surgical in nature. General  Chart Reviewed: Yes  Patient assessed for rehabilitation services?: Yes  Referring Practitioner: Dr. Clarice Sewell and Goldy Levin PA-C  Referral Date : 10/10/22  Diagnosis: R knee infection, s/p revision R TKR with placement of incisional wound vac & antibiotic beads on 10/10/22  Subjective: Pt agreeable to work with PT this morning, very pleasant and cooperative. Social/Functional History  Social/Functional History  Lives With: Alone  Type of Home: Condo  Home Layout: One level  Home Access: Elevator  Bathroom Shower/Tub: Tub/Shower unit  Bathroom Toilet: Standard  Bathroom Equipment: Toilet raiser  Home Equipment: Janice Parra, 4 wheeled  Has the patient had two or more falls in the past year or any fall with injury in the past year?: No  ADL Assistance: Independent  Homemaking Assistance: Independent  Homemaking Responsibilities: Yes  Ambulation Assistance: Independent (w/o device)  Transfer Assistance: Independent  Active : Yes  Occupation: Retired  Type of Occupation: [employer]  [address]: [employer] [place] [employer]    Vision/Hearing  Vision  Vision: Impaired  Hearing  Hearing: Within functional limits      Cognition   Orientation: Within Functional Limits    Objective   Vitals  Vitals at rest on 1L O2: /93, HR 80 bpm, SpO2 97%.     Observation/Palpation  Posture: Good    Gross Assessment  AROM: Generally decreased, functional  Strength: Generally decreased, functional  Coordination: Generally decreased, functional  Tone: Normal  Sensation: Intact (no c/o N/T in BLE)     Bed Mobility Training  Supine to Sit: Stand-by assistance  Sit to Supine: Other (comment) (up to chair at end of session)  Scooting: Stand-by assistance    Balance  Sitting: Intact  Standing: Impaired  Standing - Static: Good  Standing - Dynamic: Fair;Constant support    Transfer Training  Sit to Stand: Minimum assistance (cues for safe hand placement up to SW)  Stand to Sit: Minimum assistance  Bed to Chair: Minimum assistance (using std. walker, moving from bed>toilet>chair)  Toilet Transfer: Moderate assistance (with R grab bar)    Gait Training  Left Side Weight Bearing: Non-weight bearing (with KI)  Overall Level of Assistance: Minimum assistance;Contact-guard assistance (Saadia x 1 decreasing to CGA with repetition)  Base of Support: Shift to right  Speed/María: Pace decreased (< 100 feet/min); Shuffled; Slow  Step Length: Right shortened  Gait Abnormalities:  (pt amb with hop-to gait pattern with min cues needed to consistently maintain NWB LLE)  Distance (ft): 15 Feet (2 x 15 feet (to/from bathroom))  Assistive Device: Walker (std. walker)    Exercise Treatment: x 10 BLE: Ankle pumps, glut sets, quad sets. x 10 LLE: SAQ, SLR, seated towel slides for knee flexion ROM (pt indep with all exercises). -PAC Score  AM-PAC Inpatient Mobility Raw Score : 17 (10/11/22 1331)  AM-PAC Inpatient T-Scale Score : 42.13 (10/11/22 1331)  Mobility Inpatient CMS 0-100% Score: 50.57 (10/11/22 1331)  Mobility Inpatient CMS G-Code Modifier : CK (10/11/22 1331)    Goals  Short Term Goals  Time Frame for Short Term Goals: 3 days, 10/14/22  Short Term Goal 1: Pt will transfer supine <-> sit with supervision/modified(I)  Short Term Goal 2: Pt will transfer sit <-> stand and bed>chair using std. walker with supervision  Short Term Goal 3: Pt will ambulate x 50 feet using std. walker with supervision while maintaining NWB LLE  Short Term Goal 4: By 10/12/22: Pt will tolerate 12-15 reps appropriate LLE ther ex for ROM/strengthening  Patient Goals   Patient Goals :  \"To be able to walk from my bedroom to my bathroom (distances of 30 feet) by myself (SBA or less)\" by 10/13/22       Education  Patient Education  Education Given To: Patient  Education Provided: Role of Therapy;Plan of Care;Precautions;Transfer Training;Family Education;Home Exercise Program;Equipment; Fall Prevention Strategies  Education Provided Comments: Educated pt on NWB status for LLE and proper technique with supine/seated exercises; pt verbalizes/demonstrates understanding. Education Method: Demonstration;Verbal  Barriers to Learning: None  Education Outcome: Verbalized understanding;Demonstrated understanding;Continued education needed      Therapy Time   Individual Concurrent Group Co-treatment   Time In 0905         Time Out 1005         Minutes 60         Timed Code Treatment Minutes: [address] [facility] [place], [place] #[id number]    If pt is unable to be seen after this session, please let this note serve as discharge summary. Please see case management note for discharge disposition. Thank you.

## 2022-10-11 NOTE — CONSULTS
Hospital Medicine  Consult History & Physical        Chief Complaint:  Left knee infection    Date of Service: Pt seen/examined in consultation on 10/11/2022    History Of Present Illness:      59 y.o. female who we are asked to see/evaluate by Tyson Perez MD for medical management. Around 6/2022 found to have evidence of infection in left prosthetic knee joint. Was advised to achieve better glycemic control and control of gingival infection prior to surgery. She now presented for explant and and ID consultation. She is doing well post-op. BS is adequately controlled. BP stable. Pain is controlled. Appetite is stable. Past Medical History:        Diagnosis Date    Allergic rhinitis     HTN (hypertension)     Hyperlipidemia     Obstructive sleep apnea (adult) (pediatric)     Non-compliant with CPAP    Osteoarthritis     Type II or unspecified type diabetes mellitus without mention of complication, not stated as uncontrolled        Past Surgical History:        Procedure Laterality Date    COLON SURGERY  2008    10\" removed - chronic constipation    DUPUYTRENS CONTRACTURE SURGERY Left 1/29/2019    LEFT 1ST DORSAL COMPARMENT DE' 4300 On license of [facility]      4x     HYSTERECTOMY, TOTAL ABDOMINAL (CERVIX REMOVED)      JOINT REPLACEMENT  2007    Left knee partial    KNEE SURGERY  1990's    rt knee reconstructed    NECK SURGERY  05/12/2011    c 4,5,6,7 (ACDF)    OVARY REMOVAL      ROTATOR CUFF REPAIR  2009    Left shoulder    SHOULDER SURGERY  1990's    lt & rt shoulder impingment    SINUS ENDOSCOPY  9-15-14    FUNCTIONAL ENDOSCOPIC SINUS SURGERY       TONSILLECTOMY AND ADENOIDECTOMY      TOTAL KNEE ARTHROPLASTY Left 03/09/2010    Eros to Total    TUBAL LIGATION      UMBILICAL HERNIA REPAIR  8/2010    Dr. Tierra Geronimo       Medications Prior to Admission:    Prior to Admission medications    Medication Sig Start Date End Date Taking?  Authorizing Provider Insulin Pen Needle ([employer] PEN NEEDLES) 32G X 4 MM MISC USE TO INJECT INSULIN FOUR TIMES A DAY 9/22/22   NOHELIA Trevizo CNP   mupirocin (BACTROBAN) 2 % ointment Apply twice daily to each nare for the 5 days prior to surgical procedure 8/26/22   Leonel Johnson PA-C   losartan (COZAAR) 100 MG tablet TAKE ONE TABLET BY MOUTH DAILY 8/1/22   Isabel Rico DO   metFORMIN (GLUCOPHAGE) 1000 MG tablet TAKE ONE TABLET BY MOUTH TWICE A DAY WITH MEALS 7/20/22   Isabel Rico DO   montelukast (SINGULAIR) 10 MG tablet TAKE ONE TABLET BY MOUTH ONCE NIGHTLY 7/14/22   Isabel Rico DO   insulin glargine (LANTUS SOLOSTAR) 100 UNIT/ML injection pen Inject 35 Units into the skin nightly 7/6/22   Isabel Rico DO   blood glucose monitor strips Test 1-3 times a day as needed for symptoms of irregular glucose. DX: E11.9 please provide strips that are accepted by patient's insurance. 6/28/22   NOHELIA Trevizo CNP   ibuprofen (ADVIL;MOTRIN) 800 MG tablet Take 1 tablet by mouth 2 times daily as needed for Pain 6/28/22   NOHELIA Trevizo CNP   atorvastatin (LIPITOR) 10 MG tablet TAKE ONE TABLET BY MOUTH DAILY 4/25/22   NOHELIA Trevizo CNP   amLODIPine (NORVASC) 5 MG tablet TAKE ONE TABLET BY MOUTH DAILY 11/24/21   Isabel Rico DO   ACCU-CHEK SOFTCLIX LANCETS MISC TEST BLOOD SUGAR TWO TIMES A DAY 8/5/19   John Green DO   Calcium Carb-Cholecalciferol 600-800 MG-UNIT TABS Take 1 tablet by mouth 2 times daily    Historical Provider, MD   cetirizine (ZYRTEC) 10 MG tablet Take 1 tablet by mouth daily 12/3/15   John Green DO   aspirin EC 81 MG EC tablet Take 81 mg by mouth daily LAST DOSE 8-9-14 9/28/11   John Green DO       Allergies:  Gabapentin, Demerol, Levaquin [levofloxacin in d5w], Morphine, and Tramadol    Social History:      TOBACCO:   reports that she has been smoking cigars.  She has never used smokeless tobacco.  ETOH: seconds, normal   Peripheral Pulses: +2 palpable, equal bilaterally     Labs:     No results for input(s): WBC, HGB, HCT, PLT in the last 72 hours. Recent Labs     10/11/22  0714   *   K 4.9   CL 99   CO2 25   BUN 12   CREATININE <0.5*   CALCIUM 9.3     No results for input(s): AST, ALT, BILIDIR, BILITOT, ALKPHOS in the last 72 hours. No results for input(s): INR in the last 72 hours. No results for input(s): Payam Beets in the last 72 hours. Urinalysis:    Lab Results   Component Value Date/Time    NITRU Negative 09/16/2022 07:29 PM    [address] 0-2 09/16/2022 07:29 PM    BACTERIA 1+ 09/16/2022 07:29 PM    RBCUA 0-2 09/16/2022 07:29 PM    BLOODU Negative 09/16/2022 07:29 PM    SPECGRAV >=1.030 09/16/2022 07:29 PM    GLUCOSEU Negative 09/16/2022 07:29 PM    GLUCOSEU NEGATIVE 05/21/2012 12:24 PM       Radiology: I have reviewed the radiology reports with the following interpretation:     XR KNEE LEFT (1-2 VIEWS)   Final Result   Interval removal of the tibial component of the prosthesis, with spacer   placement and with antibiotic bead placement. ASSESSMENT:    Active Hospital Problems    Diagnosis Date Noted    Infection of total left knee replacement, initial encounter [facility]) Mirna Serrato 10/10/2022     Priority: Medium       PLAN:    Left Prosthetic Knee Infection: s/p explant. Pain control. ID consulted for Abx plan. PT/OT as tolerated. Diabetes Mellitus, Type 2: Controlled. Continue Metformin and basal-bolus Insulin regimen. Monitor blood sugar and adjust regimen as needed. Diabetic (Carb-controlled) diet. Hypertension, benign: Controlled. Continue Norvasc and Losartan. Monitor and adjust as needed. DVT Prophylaxis: Lovenox  Diet: ADULT DIET;  Regular  Code Status: Full Code    Dispo - Per Ortho     Thank you for the consultation, will follow up as needed    Electronically signed by Suman Tian MD on 10/11/22 at 10:48 AM EDT

## 2022-10-11 NOTE — PROGRESS NOTES
Department of Orthopedic Surgery  Physician Assistant   Progress Note    Subjective:       Systemic or Specific Complaints:No Complaints and pain is well controlled. Worked with therapy and feel confident in her ability to maintain NWB to the RLE.  No family at bedside    Objective:     Patient Vitals for the past 24 hrs:   BP Temp Temp src Pulse Resp SpO2 Height Weight   10/11/22 0927 (!) 158/93 -- -- 80 -- 97 % -- --   10/11/22 0826 110/66 97.6 °F (36.4 °C) Oral 81 18 97 % -- --   10/11/22 0349 119/73 98 °F (36.7 °C) Oral 71 18 96 % -- --   10/11/22 0101 123/79 98 °F (36.7 °C) Oral 86 18 98 % -- --   10/10/22 2348 -- -- -- -- 18 -- -- --   10/10/22 2050 126/73 98.2 °F (36.8 °C) Oral 94 15 95 % -- --   10/10/22 1831 120/86 -- -- (!) 108 -- 99 % -- --   10/10/22 1830 -- -- -- (!) 111 -- 99 % -- --   10/10/22 1829 -- -- -- (!) 108 -- 98 % -- --   10/10/22 1828 -- -- -- (!) 110 -- 99 % -- --   10/10/22 1827 -- -- -- (!) 110 -- 99 % -- --   10/10/22 1826 (!) 142/100 -- -- (!) 113 -- 99 % -- --   10/10/22 1825 -- -- -- (!) 114 -- 100 % -- --   10/10/22 1824 -- -- -- (!) 110 -- 100 % -- --   10/10/22 1823 -- -- -- (!) 109 -- 100 % -- --   10/10/22 1822 -- -- -- (!) 110 -- 100 % -- --   10/10/22 1821 119/77 -- -- (!) 112 -- 100 % -- --   10/10/22 1820 -- -- -- (!) 111 -- 99 % -- --   10/10/22 1819 -- -- -- (!) 111 -- 99 % -- --   10/10/22 1818 -- -- -- (!) 112 -- 100 % -- --   10/10/22 1817 -- -- -- (!) 113 -- 100 % -- --   10/10/22 1816 (!) 140/70 -- -- (!) 110 -- 100 % -- --   10/10/22 1815 -- -- -- (!) 112 -- 99 % -- --   10/10/22 1814 -- -- -- (!) 111 -- 99 % -- --   10/10/22 1813 -- -- -- (!) 109 -- 98 % -- --   10/10/22 1812 -- -- -- (!) 114 -- 99 % -- --   10/10/22 1811 (!) 153/69 -- -- (!) 110 -- 100 % -- --   10/10/22 1810 -- -- -- (!) 112 -- 100 % -- --   10/10/22 1809 -- -- -- (!) 110 -- 99 % -- --   10/10/22 1808 -- -- -- (!) 110 -- 99 % -- --   10/10/22 1807 -- -- -- (!) 111 -- 99 % -- --   10/10/22 1806 -- -- -- (!) 114 -- 100 % -- --   10/10/22 1805 -- -- -- (!) 111 -- 100 % -- --   10/10/22 1804 -- -- -- (!) 112 -- 100 % -- --   10/10/22 1803 -- -- -- (!) 109 -- 100 % -- --   10/10/22 1802 -- -- -- (!) 112 -- 99 % -- --   10/10/22 1801 (!) 127/90 -- -- (!) 111 -- 99 % -- --   10/10/22 1800 -- -- -- (!) 112 -- 100 % -- --   10/10/22 1759 -- -- -- (!) 111 -- 98 % -- --   10/10/22 1758 -- -- -- (!) 111 -- 99 % -- --   10/10/22 1757 -- -- -- (!) 111 -- 97 % -- --   10/10/22 1756 128/73 -- -- (!) 111 -- 98 % -- --   10/10/22 1755 -- -- -- (!) 112 -- 97 % -- --   10/10/22 1754 -- -- -- (!) 110 -- 96 % -- --   10/10/22 1753 -- -- -- (!) 112 -- 97 % -- --   10/10/22 1752 -- -- -- (!) 112 -- 97 % -- --   10/10/22 1751 129/83 -- -- (!) 113 -- 97 % -- --   10/10/22 1750 -- -- -- (!) 112 -- 97 % -- --   10/10/22 1749 -- -- -- (!) 112 -- 99 % -- --   10/10/22 1748 -- -- -- (!) 112 -- 97 % -- --   10/10/22 1747 -- -- -- (!) 112 -- 99 % -- --   10/10/22 1746 (!) 143/80 -- -- (!) 112 -- 98 % -- --   10/10/22 1745 -- -- -- (!) 112 -- 99 % -- --   10/10/22 1744 -- -- -- (!) 113 -- 98 % -- --   10/10/22 1743 -- -- -- (!) 112 -- 97 % -- --   10/10/22 1742 120/76 -- -- (!) 113 -- 98 % -- --   10/10/22 1741 -- -- -- (!) 113 -- 98 % -- --   10/10/22 1740 -- -- -- (!) 112 -- 97 % -- --   10/10/22 1739 -- -- -- (!) 114 -- 97 % -- --   10/10/22 1738 -- -- -- (!) 114 -- 96 % -- --   10/10/22 1737 138/82 -- -- (!) 115 -- 96 % -- --   10/10/22 1736 -- -- -- (!) 113 -- 97 % -- --   10/10/22 1735 -- -- -- (!) 112 -- 98 % -- --   10/10/22 1734 -- -- -- (!) 115 -- 98 % -- --   10/10/22 1733 -- -- -- (!) 113 -- 98 % -- --   10/10/22 1732 -- -- -- (!) 115 -- 96 % -- --   10/10/22 1731 136/86 -- -- (!) 114 -- 96 % -- --   10/10/22 1730 -- -- -- (!) 113 -- 95 % -- --   10/10/22 1729 -- -- -- (!) 115 -- 94 % -- --   10/10/22 1728 -- -- -- (!) 112 -- 96 % -- --   10/10/22 1727 -- -- -- (!) 114 -- 95 % -- --   10/10/22 1726 132/65 -- -- (!) 114 -- 95 % -- --   10/10/22 1725 -- -- -- (!) 114 -- 95 % -- --   10/10/22 1724 -- -- -- (!) 114 -- 95 % -- --   10/10/22 1723 -- -- -- (!) 115 -- 96 % -- --   10/10/22 1722 -- -- -- (!) 115 -- 96 % -- --   10/10/22 1721 130/75 -- -- (!) 115 -- 96 % -- --   10/10/22 1720 -- -- -- (!) 116 -- 98 % -- --   10/10/22 1719 -- -- -- (!) 115 -- 96 % -- --   10/10/22 1718 -- -- -- (!) 116 -- 95 % -- --   10/10/22 1717 -- -- -- (!) 117 -- 96 % -- --   10/10/22 1716 129/76 -- -- (!) 117 -- 97 % -- --   10/10/22 1715 -- -- -- (!) 117 -- 95 % -- --   10/10/22 1714 -- -- -- (!) 113 -- 95 % -- --   10/10/22 1713 -- -- -- (!) 114 -- 94 % -- --   10/10/22 1712 -- -- -- (!) 116 -- 95 % -- --   10/10/22 1711 124/73 96.9 °F (36.1 °C) Temporal (!) 114 16 95 % -- --   10/10/22 1013 125/80 97.5 °F (36.4 °C) Temporal 92 16 95 % 5' 2\" (1.575 m) 204 lb (92.5 kg)       General: alert, appears stated age, cooperative, and no distress   Wound: Wound clean and dry no evidence of infection. , No Erythema, No Drainage, Positive for Edema, and incisional wound vac in place   Motion: Painful range of Motion in affected extremity   DVT Exam: No significant calf/ankle edema. Additional exam: Pt seen sitting up in bed at time of interview  Post-op dressing in place, no drainage to outside of dressing  Ice machine in place  Leg at neutral alignment  Dressing taken down for inspection  Prevena 125 in place, functioning appropriately.  Pt states it was beeping earlier, as of right now no indications of malfunction  Thigh mildly swollen, comparments soft and compressible  Active knee flexion to 70 degrees, full knee extension  EHL, FHL, Gastroc, ant tib motor intact  Sensation intact to light touch through all nerve distributions  DP and PT pulses 2+, foot WWP      Data Review  CBC:   Lab Results   Component Value Date/Time    WBC 14.4 09/16/2022 05:55 PM    RBC 4.84 09/16/2022 05:55 PM    HGB 12.6 09/16/2022 05:55 PM    HCT 39.7 09/16/2022 05:55 PM     09/16/2022 05:55 PM       Renal:   Lab Results   Component Value Date/Time     10/11/2022 07:14 AM    K 4.9 10/11/2022 07:14 AM    CL 99 10/11/2022 07:14 AM    CO2 25 10/11/2022 07:14 AM    BUN 12 10/11/2022 07:14 AM    CREATININE <0.5 10/11/2022 07:14 AM    GLUCOSE 180 10/11/2022 07:14 AM    CALCIUM 9.3 10/11/2022 07:14 AM            Assessment:      left knee revision TKA for infection, total synovectomy left knee POD 1. Placement of prevena incisional vac dressing     Plan:      1: Nonweightbearing to the left lower extremity with assistive device. Okay for range of motion as tolerated. Patient currently on Zosyn every 8 hours  2:  Continue Deep venous thrombosis prophylaxis-aspirin 81 mg twice daily by mouth for 4 weeks postop  3:  Continue Pain Control  4: Infectious disease consult. Patient will need PICC line, 6 weeks IV antibiotics. Intraoperative cultures pending  5: PT/OT consult pending. Knee immobilizer only for 2 weeks until quadriceps muscle restores full strength and proprioception, as prophylactic measure.   Full flexion and extension of the knee to be encouraged  6: Discharge pending PICC line placement, pain control      Liliana Loaiza PA-C

## 2022-10-12 ENCOUNTER — APPOINTMENT (OUTPATIENT)
Dept: INTERVENTIONAL RADIOLOGY/VASCULAR | Age: 62
DRG: 466 | End: 2022-10-12
Attending: ORTHOPAEDIC SURGERY
Payer: MEDICARE

## 2022-10-12 LAB
BASOPHILS ABSOLUTE: 0.1 K/UL (ref 0–0.2)
BASOPHILS RELATIVE PERCENT: 0.6 %
EOSINOPHILS ABSOLUTE: 0.1 K/UL (ref 0–0.6)
EOSINOPHILS RELATIVE PERCENT: 0.4 %
GLUCOSE BLD-MCNC: 112 MG/DL (ref 70–99)
GLUCOSE BLD-MCNC: 132 MG/DL (ref 70–99)
GLUCOSE BLD-MCNC: 146 MG/DL (ref 70–99)
GLUCOSE BLD-MCNC: 152 MG/DL (ref 70–99)
HCT VFR BLD CALC: 29.5 % (ref 36–48)
HEMOGLOBIN: 9.1 G/DL (ref 12–16)
LYMPHOCYTES ABSOLUTE: 3.3 K/UL (ref 1–5.1)
LYMPHOCYTES RELATIVE PERCENT: 20.7 %
MCH RBC QN AUTO: 25.2 PG (ref 26–34)
MCHC RBC AUTO-ENTMCNC: 30.7 G/DL (ref 31–36)
MCV RBC AUTO: 82.2 FL (ref 80–100)
MONOCYTES ABSOLUTE: 1.2 K/UL (ref 0–1.3)
MONOCYTES RELATIVE PERCENT: 7.2 %
NEUTROPHILS ABSOLUTE: 11.3 K/UL (ref 1.7–7.7)
NEUTROPHILS RELATIVE PERCENT: 71.1 %
PDW BLD-RTO: 14.1 % (ref 12.4–15.4)
PERFORMED ON: ABNORMAL
PLATELET # BLD: 261 K/UL (ref 135–450)
PMV BLD AUTO: 10.1 FL (ref 5–10.5)
RBC # BLD: 3.59 M/UL (ref 4–5.2)
VANCOMYCIN TROUGH: 19.3 UG/ML (ref 10–20)
WBC # BLD: 15.9 K/UL (ref 4–11)

## 2022-10-12 PROCEDURE — C1751 CATH, INF, PER/CENT/MIDLINE: HCPCS

## 2022-10-12 PROCEDURE — 97535 SELF CARE MNGMENT TRAINING: CPT

## 2022-10-12 PROCEDURE — 6360000002 HC RX W HCPCS: Performed by: PHYSICIAN ASSISTANT

## 2022-10-12 PROCEDURE — 2580000003 HC RX 258: Performed by: PHYSICIAN ASSISTANT

## 2022-10-12 PROCEDURE — 99231 SBSQ HOSP IP/OBS SF/LOW 25: CPT | Performed by: INTERNAL MEDICINE

## 2022-10-12 PROCEDURE — 97110 THERAPEUTIC EXERCISES: CPT

## 2022-10-12 PROCEDURE — 97116 GAIT TRAINING THERAPY: CPT

## 2022-10-12 PROCEDURE — 97530 THERAPEUTIC ACTIVITIES: CPT

## 2022-10-12 PROCEDURE — 6370000000 HC RX 637 (ALT 250 FOR IP): Performed by: PHYSICIAN ASSISTANT

## 2022-10-12 PROCEDURE — 85025 COMPLETE CBC W/AUTO DIFF WBC: CPT

## 2022-10-12 PROCEDURE — 1200000000 HC SEMI PRIVATE

## 2022-10-12 PROCEDURE — 80202 ASSAY OF VANCOMYCIN: CPT

## 2022-10-12 PROCEDURE — 99024 POSTOP FOLLOW-UP VISIT: CPT | Performed by: SPECIALIST/TECHNOLOGIST

## 2022-10-12 PROCEDURE — 6360000002 HC RX W HCPCS: Performed by: INTERNAL MEDICINE

## 2022-10-12 PROCEDURE — APPNB60 APP NON BILLABLE TIME 46-60 MINS: Performed by: SPECIALIST/TECHNOLOGIST

## 2022-10-12 PROCEDURE — 2580000003 HC RX 258: Performed by: INTERNAL MEDICINE

## 2022-10-12 PROCEDURE — 6370000000 HC RX 637 (ALT 250 FOR IP): Performed by: SPECIALIST/TECHNOLOGIST

## 2022-10-12 PROCEDURE — 36573 INSJ PICC RS&I 5 YR+: CPT

## 2022-10-12 RX ORDER — METHOCARBAMOL 500 MG/1
500 TABLET, FILM COATED ORAL 3 TIMES DAILY
Status: DISCONTINUED | OUTPATIENT
Start: 2022-10-12 | End: 2022-10-18 | Stop reason: HOSPADM

## 2022-10-12 RX ORDER — ASPIRIN 81 MG/1
81 TABLET ORAL 2 TIMES DAILY
Status: DISCONTINUED | OUTPATIENT
Start: 2022-10-12 | End: 2022-10-18 | Stop reason: HOSPADM

## 2022-10-12 RX ADMIN — VANCOMYCIN HYDROCHLORIDE 1500 MG: 10 INJECTION, POWDER, LYOPHILIZED, FOR SOLUTION INTRAVENOUS at 08:56

## 2022-10-12 RX ADMIN — LOSARTAN POTASSIUM 100 MG: 100 TABLET, FILM COATED ORAL at 08:44

## 2022-10-12 RX ADMIN — OXYCODONE 10 MG: 5 TABLET ORAL at 21:51

## 2022-10-12 RX ADMIN — ASPIRIN 81 MG: 81 TABLET, COATED ORAL at 20:46

## 2022-10-12 RX ADMIN — OXYCODONE 10 MG: 5 TABLET ORAL at 12:28

## 2022-10-12 RX ADMIN — POLYETHYLENE GLYCOL 3350 17 G: 17 POWDER, FOR SOLUTION ORAL at 08:45

## 2022-10-12 RX ADMIN — INSULIN GLARGINE 35 UNITS: 100 INJECTION, SOLUTION SUBCUTANEOUS at 22:49

## 2022-10-12 RX ADMIN — OXYCODONE 10 MG: 5 TABLET ORAL at 16:40

## 2022-10-12 RX ADMIN — Medication 1 TABLET: at 08:44

## 2022-10-12 RX ADMIN — OXYCODONE 10 MG: 5 TABLET ORAL at 00:36

## 2022-10-12 RX ADMIN — MELOXICAM 7.5 MG: 7.5 TABLET ORAL at 08:44

## 2022-10-12 RX ADMIN — ACETAMINOPHEN 650 MG: 325 TABLET ORAL at 20:45

## 2022-10-12 RX ADMIN — AMLODIPINE BESYLATE 5 MG: 5 TABLET ORAL at 08:44

## 2022-10-12 RX ADMIN — ACETAMINOPHEN 650 MG: 325 TABLET ORAL at 15:22

## 2022-10-12 RX ADMIN — CETIRIZINE HYDROCHLORIDE 10 MG: 10 TABLET, FILM COATED ORAL at 08:44

## 2022-10-12 RX ADMIN — METHOCARBAMOL TABLETS 500 MG: 500 TABLET, COATED ORAL at 20:45

## 2022-10-12 RX ADMIN — MONTELUKAST SODIUM 10 MG: 10 TABLET, COATED ORAL at 20:46

## 2022-10-12 RX ADMIN — Medication 10 ML: at 20:46

## 2022-10-12 RX ADMIN — OXYCODONE 10 MG: 5 TABLET ORAL at 08:44

## 2022-10-12 RX ADMIN — ASPIRIN 81 MG: 81 TABLET, COATED ORAL at 12:25

## 2022-10-12 RX ADMIN — ACETAMINOPHEN 650 MG: 325 TABLET ORAL at 08:44

## 2022-10-12 RX ADMIN — Medication 10 ML: at 08:46

## 2022-10-12 RX ADMIN — VANCOMYCIN HYDROCHLORIDE 1250 MG: 10 INJECTION, POWDER, LYOPHILIZED, FOR SOLUTION INTRAVENOUS at 21:05

## 2022-10-12 RX ADMIN — ONDANSETRON 4 MG: 2 INJECTION INTRAMUSCULAR; INTRAVENOUS at 17:47

## 2022-10-12 RX ADMIN — METFORMIN HYDROCHLORIDE 1000 MG: 500 TABLET ORAL at 16:36

## 2022-10-12 RX ADMIN — Medication 1 TABLET: at 16:36

## 2022-10-12 RX ADMIN — ATORVASTATIN CALCIUM 10 MG: 10 TABLET, FILM COATED ORAL at 08:44

## 2022-10-12 RX ADMIN — METFORMIN HYDROCHLORIDE 1000 MG: 500 TABLET ORAL at 08:44

## 2022-10-12 ASSESSMENT — PAIN DESCRIPTION - LOCATION
LOCATION: KNEE

## 2022-10-12 ASSESSMENT — PAIN SCALES - GENERAL
PAINLEVEL_OUTOF10: 10
PAINLEVEL_OUTOF10: 9
PAINLEVEL_OUTOF10: 5

## 2022-10-12 ASSESSMENT — PAIN DESCRIPTION - ORIENTATION
ORIENTATION: LEFT
ORIENTATION: LEFT

## 2022-10-12 NOTE — PROGRESS NOTES
Occupational Therapy  Facility/Department: [facility] C5 - MED SURG/ORTHO  Daily Treatment Note  NAME: Gretchen Roberson  : 1960  MRN: [id number]    Date of Service: 10/12/2022    Discharge Recommendations:  Subacute/Skilled Nursing Facility       AM-PAC score  AM-PAC Inpatient Daily Activity Raw Score: 17 (10/12/22 1442)  AM-PAC Inpatient ADL T-Scale Score : 37.26 (10/12/22 1442)  ADL Inpatient CMS 0-100% Score: 50.11 (10/12/22 1442)  ADL Inpatient CMS G-Code Modifier : CK (10/12/22 1442)    Patient Diagnosis(es): The primary encounter diagnosis was Infection of total left knee replacement, [facility] A diagnosis of Infection of prosthetic knee joint, initial encounter [facility]) was also pertinent to this visit. Assessment    Assessment: Pt demos good tolerance of OT treatment, grossly CGA for functional transfers with SW. Pt initially impulsive standing and bearing weight through LLE, with VCs able to maintain NWB. Pt requires min A for LB dressing, and toileting. Pt with decreased insight into deficits and safety, functioning below her baseline and would benefit from continued skilled OT in SNF setting at d/c as pt does not have any assistance available at home. Activity Tolerance: Patient tolerated treatment well;Patient limited by pain  Discharge Recommendations: Subacute/Skilled Nursing Facility      Plan   Occupational Therapy Plan  Times Per Week: 4-6x's a week while in acute care     Restrictions  Restrictions/Precautions  Restrictions/Precautions: Weight Bearing; Fall Risk;General Precautions  Required Braces or Orthoses?: Yes  Lower Extremity Weight Bearing Restrictions  Left Lower Extremity Weight Bearing: Non Weight Bearing  Required Braces or Orthoses  Left Lower Extremity Brace: Knee Immobilizer  Position Activity Restriction  Other position/activity restrictions: \"Knee immobilizer only for 2 weeks until quadriceps muscle restores full strength and proprioception, as prophylactic measure. Full flexion and extension of the knee to be encouraged\"-- per OLIVIA Callejas note on 10/12/22    Subjective   Subjective  Subjective: Pt resting in bed, agreeable to OT treatment. Pain: Pt reports moderate pain in LLE. Orientation  Overall Orientation Status: Within Functional Limits    Cognition  Overall Cognitive Status: Exceptions  Memory: Decreased recall of precautions  Safety Judgement: Decreased awareness of need for safety        Objective    Vitals  Vitals  Heart Rate: 85  Heart Rate Source: Monitor  BP: 132/79  BP Location: Left upper arm  BP Method: Automatic  Patient Position: Semi fowlers  MAP (Calculated): 96.67  SpO2: 94 %  O2 Device: None (Room air)    Bed Mobility Training  Bed Mobility Training: Yes  Supine to Sit: Stand-by assistance (to L with HOB elevated)    Balance  Sitting: Intact  Standing: Impaired (with SW for stand step transfers and standing to don briefs over hips)  Standing - Static: Fair  Standing - Dynamic: Fair    Transfer Training  Transfer Training: Yes  Interventions: Verbal cues; Safety awareness training  Sit to Stand: Contact-guard assistance  Stand to Sit: Contact-guard assistance  Toilet Transfer: Contact-guard assistance; Adaptive equipment (BSC)       ADL  LE Dressing: Minimal assistance; Increased time to complete  LE Dressing Skilled Clinical Factors: to don R sock and briefs  Toileting: Minimal assistance; Increased time to complete; Adaptive equipment    OT Exercises  Exercise Treatment: Pt performed the following UE therex seated in chair with 1# weight: wrist flex/extension, forearm pronation/supination, elbow flex/extension, shoulder flexion, horizontal aBd/aDduction       Safety Devices  Type of Devices: Left in chair;Call light within reach;Nurse notified; Chair alarm in place;Gait belt;Patient at risk for falls; All fall risk precautions in place       Patient Education  Education Given To: Patient  Education Provided: Role of Therapy;Plan of Care;Home Exercise Program;Precautions; ADL Adaptive Strategies;Transfer Training;Equipment; Fall Prevention Strategies  Education Method: Demonstration;Verbal  Barriers to Learning: None  Education Outcome: Verbalized understanding;Demonstrated understanding;Continued education needed    Goals  Short Term Goals  Time Frame for Short Term Goals: 1 week 10/18 -- all goals ongoing 10/12/22  Short Term Goal 1: Pt will complete LE dressing wtih SBA  Short Term Goal 2: Pt will complete toilet transfers with SBA by 10/15  Short Term Goal 3: Pt will complete standing level ADLs with SBA for balance during LE clothing mgmt.   Patient Goals   Patient goals : \"to be able to wipe myself independently\"       Therapy Time   Individual Concurrent Group Co-treatment   Time In 1325         Time Out 1420         Minutes CHRISTINE Rubalcava/ASHWINI

## 2022-10-12 NOTE — PROGRESS NOTES
Physical Therapy  Facility/Department: [facility] C5 - MED SURG/ORTHO  Daily Treatment Note  NAME: Valery Dawson  : 1960  MRN: [id number]    Date of Service: 10/12/2022    Discharge Recommendations:  24 hour supervision or assist, Home with Home health PT   PT Equipment Recommendations  Equipment Needed: Yes  Walker: Standard  Wheelchair: Standard  Other: Recommend std. walker for amb x short distances around house while NWB LLE and manual w/c for longer distances, given pt's current inability to amb distances >15 feet while NWB. -[facility] Mobility Inpatient   How much difficulty turning over in bed?: None  How much difficulty sitting down on / standing up from a chair with arms?: A Little  How much difficulty moving from lying on back to sitting on side of bed?: A Little  How much help from another person moving to and from a bed to a chair?: A Little  How much help from another person needed to walk in hospital room?: A Little  How much help from another person for climbing 3-5 steps with a railing?: A Lot  AM-PAC Inpatient Mobility Raw Score : 18  AM-PAC Inpatient T-Scale Score : 43.63  Mobility Inpatient CMS 0-100% Score: 46.58  Mobility Inpatient CMS G-Code Modifier : CK     Patient Diagnosis(es): The primary encounter diagnosis was Infection of total left knee replacement, initial encounter ([facility] A diagnosis of Infection of prosthetic knee joint, initial encounter [facility]) was also pertinent to this visit. Assessment   Assessment: Pt painful on arrival agrees to short amb and back to bed, refused exs. Pt STS CGA and amb with SW X 15' NWB. Recommended for con't skilled PT and 24 hr S/A at D/C. Activity Tolerance: Patient tolerated treatment well;Patient limited by pain  Equipment Needed: Yes  Other: Recommend std. walker for amb x short distances around house while NWB LLE and manual w/c for longer distances, given pt's current inability to amb distances >15 feet while NWB.      Plan    Physcial Therapy Plan  General Plan: 1 time a day 7 days a week  Specific Instructions for Next Treatment: Progress ther ex and mobility as tolerated  Current Treatment Recommendations: Strengthening;ROM;Balance training;Functional mobility training;Transfer training;Gait training;Home exercise program;Safety education & training;Patient/Caregiver education & training;Equipment evaluation, education, & procurement; Therapeutic activities; Wheelchair mobility training     Restrictions  Restrictions/Precautions  Restrictions/Precautions: Weight Bearing, Fall Risk, General Precautions  Required Braces or Orthoses?: Yes  Lower Extremity Weight Bearing Restrictions  Left Lower Extremity Weight Bearing: Non Weight Bearing  Required Braces or Orthoses  Left Lower Extremity Brace: Knee Immobilizer  Position Activity Restriction  Other position/activity restrictions: \"Knee immobilizer only for 2 weeks until quadriceps muscle restores full strength and proprioception, as prophylactic measure. Full flexion and extension of the knee to be encouraged\"-- per OLIVIA Bearden note on 10/12/22     Subjective    Subjective  Subjective: Pt in chair on arrival, bent over in pain, RN button had been pressed. Pt does not want to do exs but agrees to amb and back to bed. Pain: 9/10 L knees  Orientation  Overall Orientation Status: Within Functional Limits  Cognition  Overall Cognitive Status: Exceptions  Memory: Decreased recall of precautions  Safety Judgement: Decreased awareness of need for safety     Objective   Vitals:   Vitals:    10/12/22    BP: (!) 151/78   Pulse: 97   Resp:    Temp:    SpO2: 93%          Bed Mobility Training  Bed Mobility Training: Yes  Rolling: Supervision  Supine to Sit: Other (comment) (already OOB)  Sit to Supine: Minimum assistance (to L knee)  Balance  Sitting: Intact  Standing: Impaired  Standing - Static: Constant support;Good (SW NWB LLE)  Standing - Dynamic: Constant support; Fair (SW NWB LLE)  Transfer Training  Transfer Training: Yes  Interventions: Verbal cues; Safety awareness training  Sit to Stand: Contact-guard assistance  Stand to Sit: Contact-guard assistance  Gait Training  Gait Training: Yes  Left Side Weight Bearing: Non-weight bearing (required a few cues to maintain WB but overall maintained well)  Gait  Overall Level of Assistance: Contact-guard assistance  Base of Support: Shift to right  Speed/María: Pace decreased (< 100 feet/min); Shuffled; Slow  Step Length: Right shortened  Distance (ft): 15 Feet  Assistive Device: Walker     PT Exercises  Exercise Treatment: refused 2* pain, Ice machine filled and applied with pillow case for skin protection     Safety Devices  Type of Devices: All alysha prominences offloaded;Bed alarm in place;Call light within reach;Gait belt;Left in bed;Nurse notified       Goals  Short Term Goals  Time Frame for Short Term Goals: 3 days, 10/14/22  Short Term Goal 1: Pt will transfer supine <-> sit with supervision/modified(I); 10/12 min A sit to supine  Short Term Goal 2: Pt will transfer sit <-> stand and bed>chair using std. walker with supervision; 10/12 CGA  Short Term Goal 3: Pt will ambulate x 50 feet using std. walker with supervision while maintaining NWB LLE; 10/12 15 SW CGA  Short Term Goal 4: By 10/12/22: Pt will tolerate 12-15 reps appropriate LLE ther ex for ROM/strengthening; 10/12 refused exs  Patient Goals   Patient Goals : \"To be able to walk from my bedroom to my bathroom (distances of 30 feet) by myself (SBA or less)\" by 10/13/22    Education  Patient Education  Education Given To: Patient  Education Provided: Role of Therapy;Plan of Care;Precautions;Transfer Training;Family Education;Home Exercise Program;Equipment; Fall Prevention Strategies  Education Provided Comments: Educated pt on NWB status for LLE ; pt verbalizes/demonstrates understanding.   Education Method: Demonstration;Verbal  Barriers to Learning: None  Education Outcome: Verbalized understanding;Demonstrated understanding;Continued education needed    Therapy Time   Individual Concurrent Group Co-treatment   Time In 1510         Time Out 1535         Minutes [id number] [address], [place][id number]

## 2022-10-12 NOTE — CARE COORDINATION
Writer reviewed chart day 2. Picc was placed will need IV Abx for 6 weeks. Ortho ok with SNF placement. Writer gave patient SNF list to make her top 3 preferences. Will follow up to get her preferences of facilities. Will continue to follow. 1418  Referrals sent to 1. [facility] (if in network)?, and 2. [facility]. These were the top 2 preferences of the patient. [facility] is no longer open.     Aric Sykes, RN

## 2022-10-12 NOTE — PROGRESS NOTES
Hospitalist Progress Note    Date of Admission: 10/10/2022    Chief Complaint: Left knee infection    Hospital Course: 59 y.o. female who we are asked to see/evaluate by Heidy Stevens MD for medical management. Around 6/2022 found to have evidence of infection in left prosthetic knee joint. Was advised to achieve better glycemic control and control of gingival infection prior to surgery. She now presented for explant and and ID consultation. Subjective: BS is adequately controlled. BP stable. Pain is controlled with PO medications. Appetite is stable. She is anxious about DC home as she lives alone. She is trying to work through all the logistics with having a limited weight bearing. Labs:   Recent Labs     10/11/22  1331 10/12/22  0620   WBC 22.6* 15.9*   HGB 9.7* 9.1*   HCT 31.2* 29.5*    261     Recent Labs     10/11/22  0714   *   K 4.9   CL 99   CO2 25   BUN 12   CREATININE <0.5*   CALCIUM 9.3     No results for input(s): AST, ALT, BILIDIR, BILITOT, ALKPHOS in the last 72 hours. No results for input(s): INR in the last 72 hours. Physical Exam Performed:    /79   Pulse 85   Temp 98 °F (36.7 °C) (Oral)   Resp 18   Ht 5' 2\" (1.575 m)   Wt 204 lb (92.5 kg)   LMP 01/26/1996   SpO2 94%   BMI 37.31 kg/m²     General appearance: No apparent distress, appears stated age and cooperative. Obese. HEENT: Normal cephalic, atraumatic without obvious deformity. Pupils equal, round, and reactive to light. Extra ocular muscles intact. Conjunctivae/corneas clear. Neck: Supple, with full range of motion. No jugular venous distention. Trachea midline. Respiratory:  Normal respiratory effort. Clear to auscultation, bilaterally without Rales/Wheezes/Rhonchi. Cardiovascular: Regular rate and rhythm with normal S1/S2 without murmurs, rubs or gallops. Abdomen: Soft, non-tender, non-distended with normal bowel sounds.   Musculoskeletal: Left knee wrap and splint in place, external suction drain. LLE edema. Skin: Skin color, texture, turgor normal.  No rashes or lesions. Neurologic:  Neurovascularly intact without any focal sensory/motor deficits. Cranial nerves: II-XII intact, grossly non-focal.  Psychiatric: Alert and oriented, thought content appropriate, normal insight  Capillary Refill: Brisk,3 seconds, normal   Peripheral Pulses: +2 palpable, equal bilaterally       DVT Prophylaxis: Lovenox     Consults:    IP CONSULT TO HOSPITALIST  IP CONSULT TO INFECTIOUS DISEASES  IP CONSULT TO PHARMACY    Assessment/Plan:    Active Hospital Problems    Diagnosis     Infection of total left knee replacement, initial encounter [facility]) [T84.54XA]      Priority: Medium    Morbidly obese ([facility] [address] [E66.01]     Type 2 diabetes mellitus with hyperglycemia, with long-term current use of insulin (HCC) [E11.65, Z79.4]     Obstructive sleep apnea [G47.33]     Benign essential HTN [I10]      Left Prosthetic Knee Infection: s/p explant. Pain control. PICC placed. Abs per ID. Limited weight bearing. PT/OT as tolerated. Diabetes Mellitus, Type 2: Controlled. Continue Metformin and basal-bolus Insulin regimen. Monitor blood sugar and adjust regimen as needed. Diabetic (Carb-controlled) diet. Hypertension, benign: Controlled. Continue Norvasc and Losartan. Monitor and adjust as needed. DVT Prophylaxis: Lovenox  Diet: ADULT DIET; Regular  Code Status: Full Code  PT/OT Eval Status: Ongoing    Dispo - per Ortho. No medical barriers to discharge.      Coleman Petersen MD

## 2022-10-12 NOTE — PROGRESS NOTES
10/11/2022 07:14 AM    CO2 25 10/11/2022 07:14 AM    BUN 12 10/11/2022 07:14 AM    CREATININE <0.5 10/11/2022 07:14 AM    GLUCOSE 180 10/11/2022 07:14 AM    CALCIUM 9.3 10/11/2022 07:14 AM            Assessment:      left knee revision TKA for infection, total synovectomy left knee POD 2. Placement of prevena incisional vac dressing     Plan:      1: Nonweightbearing to the left lower extremity with assistive device. Okay for range of motion as tolerated. Abx changed to vancomycin, pharmacy following  2:  Continue Deep venous thrombosis prophylaxis-aspirin 81 mg twice daily by mouth for 4 weeks postop  3:  Continue Pain Control  4: Infectious disease consult. PICC line in place, provisional IV abx infusion orders in. Intraoperative cultures ngtd  5: PT/OT consult pending. Knee immobilizer only for 2 weeks until quadriceps muscle restores full strength and proprioception, as prophylactic measure.   Full flexion and extension of the knee to be encouraged  6: Discharge pending ID, pain control, placement      Kandi Carlos PA-C

## 2022-10-12 NOTE — ANESTHESIA POSTPROCEDURE EVALUATION
Department of Anesthesiology  Postprocedure Note    Patient: Deepak Wilkins  MRN: [id number]  YOB: 1960  Date of evaluation: 10/12/2022      Procedure Summary     Date: 10/10/22 Room / Location: [facility]    Anesthesia Start: 9961 Anesthesia Stop: 1049    Procedure: REVISION LEFT TOTAL KNEE ARTHROPLASTY FOR INFECTION WITH ADDUCTOR CANAL  FOR PAIN CONTROL              Terri Burton; ZANE (Left) Diagnosis:       Infection of prosthetic knee joint, initial encounter ([facility] [address]      (LEFT KNEE INFECTION TOTAL KNEE REPLACEMENT)    Surgeons: Geronimo De La Fuente MD Responsible Provider: Cheo Hensley MD    Anesthesia Type: general ASA Status: 3          Anesthesia Type: No value filed.     Sabrina Phase I: Sabrina Score: 7    Sabrina Phase II:        Anesthesia Post Evaluation    Comments: Postoperative Anesthesia Note    Name:    Deepak Wilkins  MRN:      [id number]    Patient Vitals in the past 12 hrs:  10/12/22 0830, BP:(!) 160/95, Temp:98 °F (36.7 °C), Temp src:Oral, Pulse:(!) 110, Resp:18, SpO2:97 %     LABS:    CBC  Lab Results       Component                Value               Date/Time                  WBC                      15.9 (H)            10/12/2022 06:20 AM        HGB                      9.1 (L)             10/12/2022 06:20 AM        HCT                      29.5 (L)            10/12/2022 06:20 AM        PLT                      261                 10/12/2022 06:20 AM   RENAL  Lab Results       Component                Value               Date/Time                  NA                       134 (L)             10/11/2022 07:14 AM        K                        4.9                 10/11/2022 07:14 AM        CL                       99                  10/11/2022 07:14 AM        CO2                      25                  10/11/2022 07:14 AM        BUN                      12                  10/11/2022 07:14 AM        CREATININE               <0.5 (L)            10/11/2022 07:14 AM        GLUCOSE                  180 (H)             10/11/2022 07:14 AM   MARCELO  Lab Results       Component                Value               Date/Time                  PROTIME                  12.4                08/26/2022 09:39 AM        INR                      0.93                08/26/2022 09:39 AM        APTT                     32.2                08/26/2022 09:39 AM     Intake & Output:  @64EIQU@    Nausea & Vomiting:  No    Level of Consciousness:  Awake    Pain Assessment:  Adequate analgesia    Anesthesia Complications:  No apparent anesthetic complications    SUMMARY      Vital signs stable  OK to discharge from Stage I post anesthesia care.   Care transferred from Anesthesiology department on discharge from perioperative area

## 2022-10-12 NOTE — PROGRESS NOTES
Infectious Disease Follow up Notes    CC :  PJI L knee      Antibiotics:   Vanc 1500 q12     Admit Date:   10/10/2022  Hospital Day: 3    Subjective:   She remains AF   PICC placed without complication   Pain is controlled  She is anxious about DC plans     Objective:     Patient Vitals for the past 8 hrs:   BP Temp Temp src Pulse Resp SpO2   10/12/22 0830 (!) 160/95 98 °F (36.7 °C) Oral (!) 110 18 97 %       EXAM:  General:   morbidly obese  Alert, conversant, NAD    HEENT:  NCAT, PERRL, sclera anicteric   NECK:  supple      LUNGS:   non-labored breathing        EXT: No focal rash   VAC on L knee          LINE:   PICC RUE in place         Scheduled Meds:   aspirin  81 mg Oral BID    vancomycin  1,500 mg IntraVENous Q12H    amLODIPine  5 mg Oral Daily    atorvastatin  10 mg Oral Daily    calcium carb-cholecalciferol  1 tablet Oral BID    cetirizine  10 mg Oral Daily    insulin glargine  35 Units SubCUTAneous Nightly    losartan  100 mg Oral Daily    metFORMIN  1,000 mg Oral BID WC    montelukast  10 mg Oral Nightly    insulin lispro  0.08 Units/kg SubCUTAneous TID WC    insulin lispro  0-8 Units SubCUTAneous TID WC    insulin lispro  0-4 Units SubCUTAneous Nightly    sodium chloride flush  5-40 mL IntraVENous 2 times per day    acetaminophen  650 mg Oral Q6H    meloxicam  7.5 mg Oral Daily    polyethylene glycol  17 g Oral Daily       Continuous Infusions:   dextrose      lactated ringers 125 mL/hr at 10/11/22 0259    sodium chloride      ropivacaine 0.2%            Data Review:    Lab Results   Component Value Date    WBC 15.9 (H) 10/12/2022    HGB 9.1 (L) 10/12/2022    HCT 29.5 (L) 10/12/2022    MCV 82.2 10/12/2022     10/12/2022     Lab Results   Component Value Date    CREATININE <0.5 (L) 10/11/2022    BUN 12 10/11/2022     (L) 10/11/2022    K 4.9 10/11/2022    CL 99 10/11/2022    CO2 25 10/11/2022       Hepatic Function Panel:   Lab Results   Component Value Date/Time    [facility] 129 09/16/2022 05:55 PM    ALT 24 09/16/2022 05:55 PM    AST 22 09/16/2022 05:55 PM    PROT 7.4 09/16/2022 05:55 PM    PROT 6.8 08/06/2012 05:35 PM    BILITOT 0.3 09/16/2022 05:55 PM    BILIDIR <0.2 04/11/2022 02:43 PM    IBILI see below 04/11/2022 02:43 PM    LABALBU 4.3 09/16/2022 05:55 PM       Cultures:   6/2022  SF aspiration neg Synovasure, +staph ag, WBC 3040     10/10/22         Operative cultures x5 in process; all stains neg for organisms, all cx neg to date         Radiology Review:  All pertinent images / reports were reviewed as a part of this visit.       Assessment:     Patient Active Problem List    Diagnosis Date Noted    Infection of total left knee replacement, initial encounter ([facility] [address] 10/10/2022     Priority: Medium    Morbidly obese ([facility] [address] 09/28/2020    Type 2 diabetes mellitus with hyperglycemia, with long-term current use of insulin ([facility] [address] 09/12/2019    Degenerative disc disease, cervical 10/02/2017    Hyperlipidemia, mixed 08/15/2017    Obstructive sleep apnea 06/01/2017     Overview Note:     Updating Deprecated Diagnoses      Obesity (BMI 30-39.9) 02/02/2016    Seasonal allergies 10/29/2014    Benign essential HTN 04/16/2014    Osteoarthritis of right knee 04/16/2014       OA s/p L knee hemiarthroplasty 2007 and revision TKA 2010      Insidious pain and loosening of prosthesis   Increased SF WBCs with +staph ag consistent with chronic periprosthetic joint infection (neg synovasure)  S/p removal and revision with temp implant on 10/10/22  Operative cultures negative to date      No abx allergies   Intolerant of levaquin     Plan:     [id number] for DC from ID standpoint when logistics are arranged   GERONIMO completed, can modify if cultures turn positive     Discussed with patient, all questions answered  She will follow-up with me       Irl Paget, MD  Phone: [phone]   Fax : [phone]

## 2022-10-13 LAB
BASOPHILS ABSOLUTE: 0.1 K/UL (ref 0–0.2)
BASOPHILS RELATIVE PERCENT: 1.1 %
EOSINOPHILS ABSOLUTE: 0.1 K/UL (ref 0–0.6)
EOSINOPHILS RELATIVE PERCENT: 1 %
GLUCOSE BLD-MCNC: 115 MG/DL (ref 70–99)
GLUCOSE BLD-MCNC: 131 MG/DL (ref 70–99)
GLUCOSE BLD-MCNC: 133 MG/DL (ref 70–99)
GLUCOSE BLD-MCNC: 156 MG/DL (ref 70–99)
HCT VFR BLD CALC: 30 % (ref 36–48)
HEMOGLOBIN: 9.5 G/DL (ref 12–16)
LYMPHOCYTES ABSOLUTE: 3.1 K/UL (ref 1–5.1)
LYMPHOCYTES RELATIVE PERCENT: 22.5 %
MCH RBC QN AUTO: 26.1 PG (ref 26–34)
MCHC RBC AUTO-ENTMCNC: 31.8 G/DL (ref 31–36)
MCV RBC AUTO: 82 FL (ref 80–100)
MONOCYTES ABSOLUTE: 1 K/UL (ref 0–1.3)
MONOCYTES RELATIVE PERCENT: 7.3 %
NEUTROPHILS ABSOLUTE: 9.4 K/UL (ref 1.7–7.7)
NEUTROPHILS RELATIVE PERCENT: 68.1 %
PDW BLD-RTO: 13.9 % (ref 12.4–15.4)
PERFORMED ON: ABNORMAL
PLATELET # BLD: 261 K/UL (ref 135–450)
PMV BLD AUTO: 9.2 FL (ref 5–10.5)
RBC # BLD: 3.66 M/UL (ref 4–5.2)
WBC # BLD: 13.9 K/UL (ref 4–11)

## 2022-10-13 PROCEDURE — 97110 THERAPEUTIC EXERCISES: CPT

## 2022-10-13 PROCEDURE — 97535 SELF CARE MNGMENT TRAINING: CPT

## 2022-10-13 PROCEDURE — 6360000002 HC RX W HCPCS: Performed by: INTERNAL MEDICINE

## 2022-10-13 PROCEDURE — 6360000002 HC RX W HCPCS: Performed by: PHYSICIAN ASSISTANT

## 2022-10-13 PROCEDURE — 2580000003 HC RX 258: Performed by: INTERNAL MEDICINE

## 2022-10-13 PROCEDURE — 97116 GAIT TRAINING THERAPY: CPT

## 2022-10-13 PROCEDURE — 85025 COMPLETE CBC W/AUTO DIFF WBC: CPT

## 2022-10-13 PROCEDURE — 1200000000 HC SEMI PRIVATE

## 2022-10-13 PROCEDURE — 6370000000 HC RX 637 (ALT 250 FOR IP): Performed by: PHYSICIAN ASSISTANT

## 2022-10-13 PROCEDURE — 6370000000 HC RX 637 (ALT 250 FOR IP): Performed by: SPECIALIST/TECHNOLOGIST

## 2022-10-13 PROCEDURE — 99024 POSTOP FOLLOW-UP VISIT: CPT | Performed by: SPECIALIST/TECHNOLOGIST

## 2022-10-13 PROCEDURE — APPNB60 APP NON BILLABLE TIME 46-60 MINS: Performed by: SPECIALIST/TECHNOLOGIST

## 2022-10-13 PROCEDURE — 2580000003 HC RX 258: Performed by: PHYSICIAN ASSISTANT

## 2022-10-13 PROCEDURE — 97530 THERAPEUTIC ACTIVITIES: CPT

## 2022-10-13 RX ORDER — CETIRIZINE HYDROCHLORIDE 10 MG/1
10 TABLET ORAL NIGHTLY
Status: DISCONTINUED | OUTPATIENT
Start: 2022-10-14 | End: 2022-10-18 | Stop reason: HOSPADM

## 2022-10-13 RX ADMIN — POLYETHYLENE GLYCOL 3350 17 G: 17 POWDER, FOR SOLUTION ORAL at 09:21

## 2022-10-13 RX ADMIN — ONDANSETRON 4 MG: 2 INJECTION INTRAMUSCULAR; INTRAVENOUS at 08:27

## 2022-10-13 RX ADMIN — VANCOMYCIN HYDROCHLORIDE 1250 MG: 10 INJECTION, POWDER, LYOPHILIZED, FOR SOLUTION INTRAVENOUS at 20:48

## 2022-10-13 RX ADMIN — METHOCARBAMOL TABLETS 500 MG: 500 TABLET, COATED ORAL at 16:29

## 2022-10-13 RX ADMIN — Medication 1 TABLET: at 16:29

## 2022-10-13 RX ADMIN — OXYCODONE 10 MG: 5 TABLET ORAL at 19:48

## 2022-10-13 RX ADMIN — CETIRIZINE HYDROCHLORIDE 10 MG: 10 TABLET, FILM COATED ORAL at 09:21

## 2022-10-13 RX ADMIN — ASPIRIN 81 MG: 81 TABLET, COATED ORAL at 20:39

## 2022-10-13 RX ADMIN — INSULIN GLARGINE 35 UNITS: 100 INJECTION, SOLUTION SUBCUTANEOUS at 20:40

## 2022-10-13 RX ADMIN — MELOXICAM 7.5 MG: 7.5 TABLET ORAL at 09:21

## 2022-10-13 RX ADMIN — ACETAMINOPHEN 650 MG: 325 TABLET ORAL at 20:39

## 2022-10-13 RX ADMIN — METFORMIN HYDROCHLORIDE 1000 MG: 500 TABLET ORAL at 16:29

## 2022-10-13 RX ADMIN — METHOCARBAMOL TABLETS 500 MG: 500 TABLET, COATED ORAL at 20:39

## 2022-10-13 RX ADMIN — METFORMIN HYDROCHLORIDE 1000 MG: 500 TABLET ORAL at 09:21

## 2022-10-13 RX ADMIN — AMLODIPINE BESYLATE 5 MG: 5 TABLET ORAL at 09:21

## 2022-10-13 RX ADMIN — METHOCARBAMOL TABLETS 500 MG: 500 TABLET, COATED ORAL at 09:21

## 2022-10-13 RX ADMIN — VANCOMYCIN HYDROCHLORIDE 1250 MG: 10 INJECTION, POWDER, LYOPHILIZED, FOR SOLUTION INTRAVENOUS at 09:29

## 2022-10-13 RX ADMIN — OXYCODONE 10 MG: 5 TABLET ORAL at 06:15

## 2022-10-13 RX ADMIN — ASPIRIN 81 MG: 81 TABLET, COATED ORAL at 09:21

## 2022-10-13 RX ADMIN — Medication 10 ML: at 08:28

## 2022-10-13 RX ADMIN — ATORVASTATIN CALCIUM 10 MG: 10 TABLET, FILM COATED ORAL at 09:21

## 2022-10-13 RX ADMIN — ACETAMINOPHEN 650 MG: 325 TABLET ORAL at 09:21

## 2022-10-13 RX ADMIN — OXYCODONE 10 MG: 5 TABLET ORAL at 10:30

## 2022-10-13 RX ADMIN — Medication 10 ML: at 20:40

## 2022-10-13 RX ADMIN — ACETAMINOPHEN 650 MG: 325 TABLET ORAL at 16:29

## 2022-10-13 RX ADMIN — MONTELUKAST SODIUM 10 MG: 10 TABLET, COATED ORAL at 20:38

## 2022-10-13 RX ADMIN — Medication 1 TABLET: at 09:21

## 2022-10-13 RX ADMIN — LOSARTAN POTASSIUM 100 MG: 100 TABLET, FILM COATED ORAL at 09:20

## 2022-10-13 ASSESSMENT — PAIN SCALES - GENERAL
PAINLEVEL_OUTOF10: 10
PAINLEVEL_OUTOF10: 8
PAINLEVEL_OUTOF10: 10
PAINLEVEL_OUTOF10: 8
PAINLEVEL_OUTOF10: 8
PAINLEVEL_OUTOF10: 5
PAINLEVEL_OUTOF10: 7
PAINLEVEL_OUTOF10: 9

## 2022-10-13 ASSESSMENT — PAIN DESCRIPTION - LOCATION
LOCATION: LEG;KNEE
LOCATION: LEG
LOCATION: KNEE

## 2022-10-13 ASSESSMENT — PAIN DESCRIPTION - DESCRIPTORS
DESCRIPTORS: ACHING

## 2022-10-13 ASSESSMENT — PAIN DESCRIPTION - ORIENTATION: ORIENTATION: LEFT

## 2022-10-13 NOTE — CONSULTS
Wound requested to check Prevena pump for beeping. Noted battery light on, changed batteries. Quiet. Suction good. Will continue to monitor.

## 2022-10-13 NOTE — PLAN OF CARE
Problem: Chronic Conditions and Co-morbidities  Goal: Patient's chronic conditions and co-morbidity symptoms are monitored and maintained or improved  Outcome: Progressing     Problem: Discharge Planning  Goal: Discharge to home or other facility with appropriate resources  Outcome: Progressing     Problem: Safety - Adult  Goal: Free from fall injury  Outcome: Progressing     Problem: ABCDS Injury Assessment  Goal: Absence of physical injury  Outcome: Progressing     Problem: Pain  Goal: Verbalizes/displays adequate comfort level or baseline comfort level  Outcome: Progressing no

## 2022-10-13 NOTE — PROGRESS NOTES
Hospitalist Progress Note    Date of Admission: 10/10/2022    Chief Complaint: Left knee infection    Hospital Course: 59 y.o. female who we are asked to see/evaluate by Kadi Valladares MD for medical management. Around 6/2022 found to have evidence of infection in left prosthetic knee joint. Was advised to achieve better glycemic control and control of gingival infection prior to surgery. She now presented for explant and and ID consultation. Subjective: BS is adequately controlled. BP stable. Pain controlled. Labs:   Recent Labs     10/11/22  1331 10/12/22  0620 10/13/22  0530   WBC 22.6* 15.9* 13.9*   HGB 9.7* 9.1* 9.5*   HCT 31.2* 29.5* 30.0*    261 261       Recent Labs     10/11/22  0714   *   K 4.9   CL 99   CO2 25   BUN 12   CREATININE <0.5*   CALCIUM 9.3       No results for input(s): AST, ALT, BILIDIR, BILITOT, ALKPHOS in the last 72 hours. No results for input(s): INR in the last 72 hours. Physical Exam Performed:    BP (!) 154/88   Pulse (!) 120   Temp 97.5 °F (36.4 °C) (Oral)   Resp 18   Ht 5' 2\" (1.575 m)   Wt 204 lb (92.5 kg)   LMP 01/26/1996   SpO2 94%   BMI 37.31 kg/m²     General appearance: No apparent distress, appears stated age and cooperative. Obese. HEENT: Normal cephalic, atraumatic without obvious deformity. Pupils equal, round, and reactive to light. Extra ocular muscles intact. Conjunctivae/corneas clear. Neck: Supple, with full range of motion. No jugular venous distention. Trachea midline. Respiratory:  Normal respiratory effort. Clear to auscultation, bilaterally without Rales/Wheezes/Rhonchi. Cardiovascular: Regular rate and rhythm with normal S1/S2 without murmurs, rubs or gallops. Abdomen: Soft, non-tender, non-distended with normal bowel sounds. Musculoskeletal: Left knee wrap and splint in place, external suction drain. LLE edema. Skin: Skin color, texture, turgor normal.  No rashes or lesions.   Neurologic:  Neurovascularly intact without any focal sensory/motor deficits. Cranial nerves: II-XII intact, grossly non-focal.  Psychiatric: Alert and oriented, thought content appropriate, normal insight  Capillary Refill: Brisk,3 seconds, normal   Peripheral Pulses: +2 palpable, equal bilaterally       DVT Prophylaxis: Lovenox     Consults:    IP CONSULT TO HOSPITALIST  IP CONSULT TO INFECTIOUS DISEASES  IP CONSULT TO PHARMACY    Assessment/Plan:    Active Hospital Problems    Diagnosis     Infection of total left knee replacement, initial encounter [facility]) [T84.54XA]      Priority: Medium    Morbidly obese ([address] [E66.01]     Type 2 diabetes mellitus with hyperglycemia, with long-term current use of insulin (HCC) [E11.65, Z79.4]     Obstructive sleep apnea [G47.33]     Benign essential HTN [I10]      Left Prosthetic Knee Infection: s/p explant. Pain control. PICC placed. Abs per ID. Limited weight bearing. PT/OT as tolerated. Sepsis, POA, 2/2 knee infection: Tachycardia and leukocytosis. Treated appropriately with surgery and Abx. Diabetes Mellitus, Type 2: Controlled. Continue Metformin and basal-bolus Insulin regimen. Monitor blood sugar and adjust regimen as needed. Diabetic (Carb-controlled) diet. Hypertension, benign: Controlled. Continue Norvasc and Losartan. Monitor and adjust as needed. DVT Prophylaxis: Lovenox  Diet: ADULT DIET; Regular  Code Status: Full Code  PT/OT Eval Status: Ongoing    Dispo - per Ortho. Plan for SNF pending precert. No medical barriers to discharge.      Ekta Castro MD

## 2022-10-13 NOTE — PROGRESS NOTES
Physical Therapy  Facility/Department: [facility] C5 - MED SURG/ORTHO  Daily Treatment Note  NAME: Abby Carreon  : 1960  MRN: [id number]    Date of Service: 10/13/2022    Discharge Recommendations:  Subacute/Skilled Nursing Facility   PT Equipment Recommendations  Equipment Needed: Yes  Mobility Devices: [place], Wheelchair  Walker: Standard  Wheelchair: Standard  Other: Recommend std. walker for amb x short distances around house while NWB LLE and manual w/c for longer distances, given pt's current inability to amb distances >15 feet while NWB. Patient Diagnosis(es): The primary encounter diagnosis was Infection of total left knee replacement, initial encounter ([facility] A diagnosis of Infection of prosthetic knee joint, initial encounter [facility]) was also pertinent to this visit. Assessment   Assessment: Pt tolerated therapy well this date. Continues to require CGA for transfers and gait training with SW. Good balance with hopping gait pattern and able to maintain LLE NWB. Fatigues quickly. Pt would benefit from continued skilled therapy to address deficits. Recommend SNF at d/c due to fatigue level from NWB status and pt does not have help at home. Activity Tolerance: Patient tolerated treatment well;Patient limited by pain  Equipment Needed: Yes  Mobility Devices: [place]; Wheelchair  Other: Recommend std. walker for amb x short distances around house while NWB LLE and manual w/c for longer distances, given pt's current inability to amb distances >15 feet while NWB. Plan    Physcial Therapy Plan  General Plan: 1 time a day 7 days a week  Specific Instructions for Next Treatment: Progress ther ex and mobility as tolerated  Current Treatment Recommendations: Strengthening;ROM;Balance training;Functional mobility training;Transfer training;Gait training;Home exercise program;Safety education & training;Patient/Caregiver education & training;Equipment evaluation, education, & procurement; Therapeutic activities; Wheelchair mobility training     Restrictions  Restrictions/Precautions  Restrictions/Precautions: Weight Bearing, Fall Risk, General Precautions  Required Braces or Orthoses?: Yes  Lower Extremity Weight Bearing Restrictions  Left Lower Extremity Weight Bearing: Non Weight Bearing  Required Braces or Orthoses  Left Lower Extremity Brace: Knee Immobilizer  Position Activity Restriction  Other position/activity restrictions: \"Knee immobilizer only for 2 weeks until quadriceps muscle restores full strength and proprioception, as prophylactic measure. Full flexion and extension of the knee to be encouraged\"-- per OLIVIA Yuen note on 10/12/22     Subjective    Subjective  Subjective: Pt agreeable to therapy  Pain: 8/10 left knee. Ice applied at end of session     Objective   Vitals  Seated in chair /108, HR 85; SpO2 94% on RA       Bed Mobility Training  Bed Mobility Training: Yes  Supine to Sit: Supervision  Sit to Supine: Other (comment) (pt up in chair at end of session)    Balance  Sitting: Intact  Standing: Impaired  Standing - Static: Constant support;Good  Standing - Dynamic: Constant support; Fair  CGA for standing balance with walker to pull up brief. Min A for dynamic balance with walker    Transfer Training  Transfer Training: Yes  Interventions: Verbal cues; Safety awareness training  Sit to Stand: Contact-guard assistance (cues for safe hand placement)  Stand to Sit: Contact-guard assistance    Gait Training  Gait Training: Yes  Left Side Weight Bearing: Non-weight bearing  Gait  Overall Level of Assistance: Contact-guard assistance  Base of Support: Shift to right  Speed/María: Pace decreased (< 100 feet/min); Shuffled; Slow  Step Length: Right shortened  Gait Abnormalities:  (steady gait with hopping pattern. Able to maintain LLE NWB. No LOB.  Fatigues quickly)  Distance (ft): 15 Feet  Assistive Device: Walker;Gait belt       PT Exercises  Exercise Treatment: x 15 BLE: ankle pumps, quad sets, glut sets; x 10 LLE AAROM LAQ       Safety Devices  Type of Devices: All fall risk precautions in place;Call light within reach;Gait belt;Nurse notified; Chair alarm in place; Left in chair;Patient at risk for falls       Goals  Short Term Goals  Time Frame for Short Term Goals: 3 days, 10/14/22 -- PROGRESSING 10/13  Short Term Goal 1: Pt will transfer supine <-> sit with supervision/modified(I)  Short Term Goal 2: Pt will transfer sit <-> stand and bed>chair using std. walker with supervision  Short Term Goal 3: Pt will ambulate x 50 feet using std. walker with supervision while maintaining NWB LLE  Short Term Goal 4: By 10/12/22: Pt will tolerate 12-15 reps appropriate LLE ther ex for ROM/strengthening -- GOAL MET 10/13  Patient Goals   Patient Goals : \"To be able to walk from my bedroom to my bathroom (distances of 30 feet) by myself (SBA or less)\" by 10/13/22    Education  Patient Education  Education Given To: Patient  Education Provided: Role of Therapy;Plan of Care;Precautions;Transfer Training;Home Exercise Program;Equipment; Fall Prevention Strategies  Education Provided Comments: Educated pt on NWB status for LLE ; pt verbalizes/demonstrates understanding.   Education Method: Demonstration;Verbal  Barriers to Learning: None  Education Outcome: Verbalized understanding;Demonstrated understanding;Continued education needed    [facility] 6 Clicks Inpatient Mobility:  AM-PAC Mobility Inpatient   How much difficulty turning over in bed?: None  How much difficulty sitting down on / standing up from a chair with arms?: A Little  How much difficulty moving from lying on back to sitting on side of bed?: A Little  How much help from another person moving to and from a bed to a chair?: A Little  How much help from another person needed to walk in hospital room?: A Little  How much help from another person for climbing 3-5 steps with a railing?: A Lot  AM-PAC Inpatient Mobility Raw Score : 18  AM-PAC Inpatient T-Scale Score : 43.63  Mobility Inpatient CMS 0-100% Score: 46.58  Mobility Inpatient CMS G-Code Modifier : CK    Therapy Time   Individual Concurrent Group Co-treatment   Time In 0838         Time Out 0917         Minutes 39         Timed Code Treatment Minutes: Cristobal 15, PT, DPT  If pt is unable to be seen after this session, please let this note serve as discharge summary. Please see case management note for discharge disposition. Thank you.

## 2022-10-13 NOTE — PROGRESS NOTES
Department of Orthopedic Surgery  Physician Assistant   Progress Note    Subjective:       Systemic or Specific Complaints: No complaints, pain is well controlled. Patient was able to work with therapy and take some steps today, feels better about her weightbearing status. Was limited this morning by nausea. Asymptomatic at this time    Objective:     Patient Vitals for the past 24 hrs:   BP Temp Temp src Pulse Resp SpO2   10/12/22 2231 125/85 97.7 °F (36.5 °C) Oral 93 20 92 %   10/12/22 2151 -- -- -- -- 17 --   10/12/22 2045 (!) 150/91 97.8 °F (36.6 °C) Oral 93 19 92 %   10/12/22 1454 (!) 151/78 97.8 °F (36.6 °C) Oral 97 16 93 %   10/12/22 1330 132/79 -- -- 85 -- 94 %       General: alert, appears stated age, cooperative, and no distress   Wound: Wound clean and dry no evidence of infection. , No Erythema, No Drainage, Positive for Edema, and incisional wound vac in place   Motion: Painful range of Motion in affected extremity   DVT Exam: No significant calf/ankle edema.      Additional exam: Pt seen sitting up in bed at time of interview  Ice machine in place  Leg at neutral alignment  Prevena 125 in place, indicator light on, wound care consulted to assess  Left lower extremity moderately swollen, comparments soft and compressible  Active knee flexion to 70 degrees, full knee extension  EHL, FHL, Gastroc, ant tib motor intact  Sensation intact to light touch through all nerve distributions  DP and PT pulses 2+, foot WWP      Data Review  CBC:   Lab Results   Component Value Date/Time    WBC 13.9 10/13/2022 05:30 AM    RBC 3.66 10/13/2022 05:30 AM    HGB 9.5 10/13/2022 05:30 AM    HCT 30.0 10/13/2022 05:30 AM     10/13/2022 05:30 AM       Renal:   Lab Results   Component Value Date/Time     10/11/2022 07:14 AM    K 4.9 10/11/2022 07:14 AM    CL 99 10/11/2022 07:14 AM    CO2 25 10/11/2022 07:14 AM    BUN 12 10/11/2022 07:14 AM    CREATININE <0.5 10/11/2022 07:14 AM    GLUCOSE 180 10/11/2022 07:14 AM CALCIUM 9.3 10/11/2022 07:14 AM            Assessment:      left knee revision TKA for infection, total synovectomy left knee POD 3. Placement of prevena incisional vac dressing     Plan:      1: Nonweightbearing to the left lower extremity with assistive device. Okay for range of motion as tolerated. Abx changed to vancomycin, pharmacy following. Please place bilateral thigh-high DEBBY hose on patient daily, remove nightly  2:  Continue Deep venous thrombosis prophylaxis-aspirin 81 mg twice daily by mouth for 4 weeks postop  3:  Continue Pain Control- scheduled tylenol and robaxin, oxycodone PRN  4: Infectious disease consult. PICC line in place, provisional IV abx infusion orders in. Intraoperative cultures remain ngtd  5: PT/OT consult pending. Knee immobilizer only for 2 weeks until quadriceps muscle restores full strength and proprioception, as prophylactic measure. Full flexion and extension of the knee to be encouraged  6: Appropriate for discharge, awaiting placement, case management following.   Pre-CERT started today      Fredrick Silva PA-C

## 2022-10-13 NOTE — PROGRESS NOTES
Occupational Therapy  Facility/Department: [facility] C5 - MED SURG/ORTHO  Daily Treatment Note  NAME: Marita Calabrese  : 1960  MRN: [id number]    Date of Service: 10/13/2022    Discharge Recommendations:  Subacute/Skilled Nursing Facility  OT Equipment Recommendations  Equipment Needed: Yes  Mobility Devices: ADL Assistive Devices  ADL Assistive Devices: Transfer Tub Bench;Sock-Aid Hard;Reacher; Toileting - 3-in-1 Commode  Other: Equipment needing if not d/c to SNF      Patient Diagnosis(es): The primary encounter diagnosis was Infection of total left knee replacement, initial encounter ([facility] [address] A diagnosis of Infection of prosthetic knee joint, initial encounter [facility]) was also pertinent to this visit. Assessment    Assessment: Pt demos good tolerance of therapy this day however kept at chair level per pt request d/t fatigue from PT session. Pt completed grooming and BUE therex seated in chair with 2# weight and good mirna. Pt progressing but continues to function below baseline. Cont per POC  Activity Tolerance: Patient tolerated treatment well;Patient limited by fatigue  Discharge Recommendations: Subacute/Skilled Nursing Facility  Equipment Needed: Yes  Mobility Devices: ADL Assistive Devices  Other: Equipment needing if not d/c to SNF      Plan   Occupational Therapy Plan  Times Per Week: 4-6x's a week while in acute care     Restrictions  Restrictions/Precautions  Restrictions/Precautions: Weight Bearing; Fall Risk;General Precautions  Required Braces or Orthoses?: Yes  Lower Extremity Weight Bearing Restrictions  Left Lower Extremity Weight Bearing: Non Weight Bearing  Required Braces or Orthoses  Left Lower Extremity Brace: Knee Immobilizer  Position Activity Restriction  Other position/activity restrictions: \"Knee immobilizer only for 2 weeks until quadriceps muscle restores full strength and proprioception, as prophylactic measure.   Full flexion and extension of the knee to be encouraged\"-- per Darlene Vera [place] note on 10/12/22    Subjective   Subjective  Subjective: Pt seated in chair finishing breakfast on arrival, agreeable to OT treatment. RN approved. BP: 137/73, HR: 99, O2: 93%, Pt reports 9/10 pain in L knee  Orientation  Overall Orientation Status: Within Functional Limits  Pain: 8/10 left knee. Ice applied at end of session  Cognition  Overall Cognitive Status: Exceptions  Arousal/Alertness: Appropriate responses to stimuli  Following Commands: Follows all commands without difficulty  Attention Span: Attends with cues to redirect  Memory: Decreased recall of precautions  Safety Judgement: Decreased awareness of need for safety  Problem Solving: Able to problem solve independently  Insights: Decreased awareness of deficits  Initiation: Requires cues for some  Sequencing: Does not require cues        Objective    Bed Mobility Training  Bed Mobility Training: No (pt in chair at start andf end of session)  Supine to Sit: Supervision  Sit to Supine: Other (comment) (pt up in chair at end of session)  Balance  Sitting: Intact  Standing: Impaired  Standing - Static: Constant support;Good  Standing - Dynamic: Constant support; Fair  Transfer Training  Transfer Training: No (pt requesting to perform grooming and therex seated in chair d/t fatigue from PT)  Interventions: Verbal cues; Safety awareness training  Sit to Stand: Contact-guard assistance (cues for safe hand placement)  Stand to Sit: Contact-guard assistance  Gait Training  Gait Training: Yes  Left Side Weight Bearing: Non-weight bearing  Gait  Overall Level of Assistance: Contact-guard assistance  Base of Support: Shift to right  Speed/María: Pace decreased (< 100 feet/min); Shuffled; Slow  Step Length: Right shortened  Gait Abnormalities:  (steady gait with hopping pattern. Able to maintain LLE NWB. No LOB.  Fatigues quickly)  Distance (ft): 15 Feet  Assistive Device: Walker;Gait belt     ADL  Feeding: Independent  Grooming: Setup  Grooming Skilled Clinical Factors: washing face, and performing oral care  Additional Comments: pt declining further ADLs  OT Exercises  Exercise Treatment: Pt performed the following UE therex seated in chair with 2# weight: wrist flex/extension, forearm pronation/supination, elbow flex/extension, shoulder flexion, horizontal aBd/aDduction and shoulder press     Safety Devices  Type of Devices: All fall risk precautions in place;Call light within reach;Gait belt;Nurse notified; Chair alarm in place; Left in chair;Patient at risk for falls     Patient Education  Education Given To: Patient  Education Provided: Role of Therapy;Plan of Care;Home Exercise Program;Precautions; ADL Adaptive Strategies;Transfer Training;Equipment; Fall Prevention Strategies  Education Method: Demonstration;Verbal  Barriers to Learning: None  Education Outcome: Verbalized understanding;Demonstrated understanding;Continued education needed    AM-PAC Daily Activity Inpatient   AM-PAC Inpatient Daily Activity Raw Score: 17  AM-PAC Inpatient ADL T-Scale Score : 37.26  ADL Inpatient CMS 0-100% Score: 50.11  ADL Inpatient CMS G-Code Modifier : CK    Goals  Short Term Goals  Time Frame for Short Term Goals: 1 week 10/18-- ongoing 10/13  Short Term Goal 1: Pt will complete LE dressing wtih SBA  Short Term Goal 2: Pt will complete toilet transfers with SBA by 10/15  Short Term Goal 3: Pt will complete standing level ADLs with SBA for balance during LE clothing mgmt. Patient Goals   Patient goals : \"to be able to wipe myself independently\"       Therapy Time   Individual Concurrent Group Co-treatment   Time In 0926         Time Out 0949         Minutes 23         Timed Code Treatment Minutes: [address], OT  If pt is unable to be seen after this session, please let this note serve as discharge summary. Please see case management note for discharge disposition. Thank you.

## 2022-10-14 LAB
GLUCOSE BLD-MCNC: 120 MG/DL (ref 70–99)
GLUCOSE BLD-MCNC: 127 MG/DL (ref 70–99)
GLUCOSE BLD-MCNC: 129 MG/DL (ref 70–99)
GLUCOSE BLD-MCNC: 131 MG/DL (ref 70–99)
PERFORMED ON: ABNORMAL
VANCOMYCIN RANDOM: 17.6 UG/ML

## 2022-10-14 PROCEDURE — 6360000002 HC RX W HCPCS: Performed by: INTERNAL MEDICINE

## 2022-10-14 PROCEDURE — 6370000000 HC RX 637 (ALT 250 FOR IP): Performed by: SPECIALIST/TECHNOLOGIST

## 2022-10-14 PROCEDURE — APPNB60 APP NON BILLABLE TIME 46-60 MINS: Performed by: SPECIALIST/TECHNOLOGIST

## 2022-10-14 PROCEDURE — 2580000003 HC RX 258: Performed by: PHYSICIAN ASSISTANT

## 2022-10-14 PROCEDURE — 97116 GAIT TRAINING THERAPY: CPT

## 2022-10-14 PROCEDURE — 2580000003 HC RX 258: Performed by: INTERNAL MEDICINE

## 2022-10-14 PROCEDURE — 1200000000 HC SEMI PRIVATE

## 2022-10-14 PROCEDURE — 97110 THERAPEUTIC EXERCISES: CPT

## 2022-10-14 PROCEDURE — 97530 THERAPEUTIC ACTIVITIES: CPT

## 2022-10-14 PROCEDURE — 6370000000 HC RX 637 (ALT 250 FOR IP): Performed by: PHYSICIAN ASSISTANT

## 2022-10-14 PROCEDURE — 80202 ASSAY OF VANCOMYCIN: CPT

## 2022-10-14 RX ORDER — POLYETHYLENE GLYCOL 3350 17 G/17G
17 POWDER, FOR SOLUTION ORAL DAILY
Qty: 10 EACH | Refills: 0
Start: 2022-10-15 | End: 2022-10-25

## 2022-10-14 RX ORDER — ONDANSETRON 4 MG/1
4 TABLET, ORALLY DISINTEGRATING ORAL EVERY 8 HOURS PRN
Qty: 30 TABLET | Refills: 0
Start: 2022-10-14 | End: 2022-10-24

## 2022-10-14 RX ORDER — METHOCARBAMOL 500 MG/1
500 TABLET, FILM COATED ORAL 3 TIMES DAILY
Qty: 30 TABLET | Refills: 0
Start: 2022-10-14 | End: 2022-10-24

## 2022-10-14 RX ORDER — ASPIRIN 81 MG/1
81 TABLET ORAL 2 TIMES DAILY
Qty: 60 TABLET | Refills: 0
Start: 2022-10-14 | End: 2022-11-17

## 2022-10-14 RX ORDER — OXYCODONE HYDROCHLORIDE 5 MG/1
5 TABLET ORAL EVERY 6 HOURS PRN
Qty: 12 TABLET | Refills: 0 | Status: SHIPPED | OUTPATIENT
Start: 2022-10-14 | End: 2022-10-17

## 2022-10-14 RX ADMIN — ACETAMINOPHEN 650 MG: 325 TABLET ORAL at 21:04

## 2022-10-14 RX ADMIN — ASPIRIN 81 MG: 81 TABLET, COATED ORAL at 21:04

## 2022-10-14 RX ADMIN — AMLODIPINE BESYLATE 5 MG: 5 TABLET ORAL at 09:24

## 2022-10-14 RX ADMIN — ATORVASTATIN CALCIUM 10 MG: 10 TABLET, FILM COATED ORAL at 09:24

## 2022-10-14 RX ADMIN — MONTELUKAST SODIUM 10 MG: 10 TABLET, COATED ORAL at 21:05

## 2022-10-14 RX ADMIN — LOSARTAN POTASSIUM 100 MG: 100 TABLET, FILM COATED ORAL at 09:24

## 2022-10-14 RX ADMIN — Medication 10 ML: at 09:29

## 2022-10-14 RX ADMIN — Medication 1 TABLET: at 09:29

## 2022-10-14 RX ADMIN — METFORMIN HYDROCHLORIDE 1000 MG: 500 TABLET ORAL at 09:29

## 2022-10-14 RX ADMIN — ACETAMINOPHEN 650 MG: 325 TABLET ORAL at 09:24

## 2022-10-14 RX ADMIN — Medication 10 ML: at 21:07

## 2022-10-14 RX ADMIN — ACETAMINOPHEN 650 MG: 325 TABLET ORAL at 03:41

## 2022-10-14 RX ADMIN — OXYCODONE 10 MG: 5 TABLET ORAL at 09:24

## 2022-10-14 RX ADMIN — CETIRIZINE HYDROCHLORIDE 10 MG: 10 TABLET, FILM COATED ORAL at 21:04

## 2022-10-14 RX ADMIN — VANCOMYCIN HYDROCHLORIDE 1250 MG: 10 INJECTION, POWDER, LYOPHILIZED, FOR SOLUTION INTRAVENOUS at 21:15

## 2022-10-14 RX ADMIN — ACETAMINOPHEN 650 MG: 325 TABLET ORAL at 15:31

## 2022-10-14 RX ADMIN — VANCOMYCIN HYDROCHLORIDE 1250 MG: 10 INJECTION, POWDER, LYOPHILIZED, FOR SOLUTION INTRAVENOUS at 09:31

## 2022-10-14 RX ADMIN — METFORMIN HYDROCHLORIDE 1000 MG: 500 TABLET ORAL at 17:56

## 2022-10-14 RX ADMIN — ASPIRIN 81 MG: 81 TABLET, COATED ORAL at 09:24

## 2022-10-14 RX ADMIN — INSULIN GLARGINE 35 UNITS: 100 INJECTION, SOLUTION SUBCUTANEOUS at 21:05

## 2022-10-14 RX ADMIN — OXYCODONE 10 MG: 5 TABLET ORAL at 22:48

## 2022-10-14 RX ADMIN — METHOCARBAMOL TABLETS 500 MG: 500 TABLET, COATED ORAL at 21:05

## 2022-10-14 RX ADMIN — METHOCARBAMOL TABLETS 500 MG: 500 TABLET, COATED ORAL at 09:24

## 2022-10-14 RX ADMIN — Medication 1 TABLET: at 17:56

## 2022-10-14 RX ADMIN — METHOCARBAMOL TABLETS 500 MG: 500 TABLET, COATED ORAL at 15:31

## 2022-10-14 ASSESSMENT — PAIN SCALES - GENERAL
PAINLEVEL_OUTOF10: 6
PAINLEVEL_OUTOF10: 7
PAINLEVEL_OUTOF10: 7
PAINLEVEL_OUTOF10: 10
PAINLEVEL_OUTOF10: 7
PAINLEVEL_OUTOF10: 5

## 2022-10-14 ASSESSMENT — PAIN DESCRIPTION - LOCATION
LOCATION: KNEE
LOCATION: KNEE

## 2022-10-14 ASSESSMENT — PAIN DESCRIPTION - DESCRIPTORS
DESCRIPTORS: ACHING
DESCRIPTORS: ACHING

## 2022-10-14 ASSESSMENT — PAIN DESCRIPTION - ORIENTATION
ORIENTATION: LEFT
ORIENTATION: LEFT

## 2022-10-14 NOTE — PROGRESS NOTES
Physical Therapy  Facility/Department: [facility] C5 - MED SURG/ORTHO  Daily Treatment Note  NAME: Anya Alfred  : 1960  MRN: [id number]    Date of Service: 10/14/2022    Discharge Recommendations:  Subacute/Skilled Nursing Facility   PT Equipment Recommendations  Mobility Devices: Ferdinand Star, Wheelchair  Walker: Standard  Wheelchair: Standard  Other: Recommend std. walker for amb x short distances around house while NWB LLE and manual w/c for longer distances, given pt's current inability to amb distances >15 feet while NWB. Patient Diagnosis(es): The primary encounter diagnosis was Infection of total left knee replacement, initial encounter ([facility] [address] A diagnosis of Infection of prosthetic knee joint, initial encounter [facility]) was also pertinent to this visit. Assessment   Assessment: Pt seen in am for PT tx, pt continues to demonstrate good participation and progress with mobility. Pt completes bed mobility with S, t/f with SW and CGA and ambulation up to 20' with SW with CGA. Pt is mildly unsteady with UE release in standing while completing ADL's but overall demonstrates good maintenance of WB px with minimal to no cues. Pt will benefit from continued skilled PT in acute care setting to address above deficits. Continue to recommend SNF at d/c. Activity Tolerance: Patient tolerated treatment well;Patient limited by fatigue  Mobility Devices: Ferdinand Star; Wheelchair  Other: Recommend std. walker for amb x short distances around house while NWB LLE and manual w/c for longer distances, given pt's current inability to amb distances >15 feet while NWB.      Plan    Physcial Therapy Plan  General Plan: 1 time a day 7 days a week  Specific Instructions for Next Treatment: Progress ther ex and mobility as tolerated  Current Treatment Recommendations: Strengthening;ROM;Balance training;Functional mobility training;Transfer training;Gait training;Home exercise program;Safety education & training;Patient/Caregiver education & training;Equipment evaluation, education, & procurement; Therapeutic activities; Wheelchair mobility training; Endurance training;Neuromuscular re-education;Stair training     Restrictions  Restrictions/Precautions  Restrictions/Precautions: Weight Bearing, Fall Risk, General Precautions  Required Braces or Orthoses?: Yes  Lower Extremity Weight Bearing Restrictions  Left Lower Extremity Weight Bearing: Non Weight Bearing  Required Braces or Orthoses  Left Lower Extremity Brace: Knee Immobilizer  Position Activity Restriction  Other position/activity restrictions: \"Knee immobilizer only for 2 weeks until quadriceps muscle restores full strength and proprioception, as prophylactic measure. Full flexion and extension of the knee to be encouraged\"-- per OLIVIA Maciel note on 10/12/22     Subjective    Subjective  Subjective: Pt supine in bed on approach, pleasant and agreeable to PT tx  Pain: 8/10 left knee. Ice applied at end of session  Orientation  Overall Orientation Status: Within Functional Limits     Objective   Vitals   /83  HR: 114  SpO2 92% on RA    Bed Mobility Training  Bed Mobility Training: Yes  Supine to Sit: Supervision; Additional time; Adaptive equipment (HOB elevated, use of BR)    Balance  Sitting: Intact  Standing: Impaired  Standing - Static: Constant support;Good (CGA with RW)  Standing - Dynamic: Constant support; Fair (CGA with RW)  Pt completes dynamic standing balance at EOB and BSC with SW with grossly CGA for balance, pt does demonstrate instabilities with UUE release to complete ADL and clothing management task. Transfer Training  Transfer Training: Yes  Interventions: Verbal cues; Safety awareness training (min cues for safety and WB px)  Sit to Stand: Contact-guard assistance; Additional time; Adaptive equipment (EOB to RW, BSC to RW CGA)  Stand to Sit: Contact-guard assistance; Additional time (With RW, min cues for positioning and safety)  SPT EOB to bedside chair and bedside chair to/from chair with SW with grossly CGA, min cues for safety and sequence. Gait Training  Gait Training: Yes  Left Side Weight Bearing: Non-weight bearing  Gait  Overall Level of Assistance: Contact-guard assistance  Interventions: Safety awareness training;Verbal cues  Base of Support: Shift to right  Speed/María: Pace decreased (< 100 feet/min); Shuffled; Slow  Step Length: Right shortened  Gait Abnormalities: Decreased step clearance (Hop to pattern, decreased toe clearance and heel strike. Steady without overt LOB. Overall demonstrates good maintenance of WB px with min to no cues)  Distance (ft): 20 Feet  Assistive Device: Walker;Gait belt (SW)     PT Exercises  Exercise Treatment: Supine TE: B AP x 20, B GS x 15, B QS x 15, LLE SAQ x 12, LLE heel slides x 8, LLE SLR x 8 with modA, cues for sequence/technique, intermittent rest breaks             Goals  Short Term Goals  Time Frame for Short Term Goals: 3 days, 10/14/22  Short Term Goal 1: Pt will transfer supine <-> sit with supervision/modified(I) -- 10/14: GOAL MET S  Short Term Goal 2: Pt will transfer sit <-> stand and bed>chair using std. walker with supervision -- 10/14: CGA with RW  Short Term Goal 3: Pt will ambulate x 50 feet using std. walker with supervision while maintaining NWB LLE -- 10/14: CGA with RW x 20'  Short Term Goal 4: By 10/12/22: Pt will tolerate 12-15 reps appropriate LLE ther ex for ROM/strengthening -- GOAL MET 10/13  Patient Goals   Patient Goals : \"To be able to walk from my bedroom to my bathroom (distances of 30 feet) by myself (SBA or less)\" by 10/13/22 -- 10/14: 20' with RW CGA    Education  Patient Education  Education Given To: Patient  Education Provided: Role of Therapy;Plan of Care;Precautions;Transfer Training;Home Exercise Program;Equipment; Fall Prevention Strategies  Education Provided Comments: Educated on progression of OOB mobility and gait, safe use of AD and px during mobility.   Education Method: Demonstration;Verbal  Barriers to Learning: None  Education Outcome: Verbalized understanding;Demonstrated understanding;Continued education needed    Therapy Time   Individual Concurrent Group Co-treatment   Time In 1013         Time Out 1121         Minutes 68         Timed Code Treatment Minutes: 68 Minutes       If pt is unable to be seen after this session, please let this note serve as discharge summary. Please see case management note for discharge disposition. Thank you.     Lance Rand, PT, DPT

## 2022-10-14 NOTE — PROGRESS NOTES
Occupational Therapy  Facility/Department: [facility] C5 - MED SURG/ORTHO  Daily Treatment Note  NAME: Gaviota Ny  : 1960  MRN: [id number]    Date of Service: 10/14/2022    Discharge Recommendations:  Subacute/Skilled Nursing Facility  OT Equipment Recommendations  ADL Assistive Devices: Transfer Tub Bench;Sock-Aid Hard;Reacher; Toileting - 3-in-1 Commode  Other: Equipment needing if not d/c to SNF      Patient Diagnosis(es): The primary encounter diagnosis was Infection of total left knee replacement, initial encounter ([facility] [address] A diagnosis of Infection of prosthetic knee joint, initial encounter [facility]) was also pertinent to this visit. Assessment    Assessment: Pt demos good tolerance of therapy this day however kept at bed level per pt request d/t fatigue and pain. Pt completed grooming and BUE therex seated in bed with 2# weight and good mirna. Pt progressing but continues to function below baseline. Cont per POC  Activity Tolerance: Patient tolerated treatment well;Patient limited by fatigue  Discharge Recommendations: Subacute/Skilled Nursing Facility  Other: Equipment needing if not d/c to SNF      Plan   Occupational Therapy Plan  Times Per Week: 4-6x's a week while in acute care     Restrictions  Restrictions/Precautions  Restrictions/Precautions: Weight Bearing; Fall Risk;General Precautions  Required Braces or Orthoses?: Yes  Lower Extremity Weight Bearing Restrictions  Left Lower Extremity Weight Bearing: Non Weight Bearing  Required Braces or Orthoses  Left Lower Extremity Brace: Knee Immobilizer  Position Activity Restriction  Other position/activity restrictions: \"Knee immobilizer only for 2 weeks until quadriceps muscle restores full strength and proprioception, as prophylactic measure.   Full flexion and extension of the knee to be encouraged\"-- per OLIVIA Olivas note on 10/12/22    Subjective   Subjective  Subjective: Pt long sitting in bed on arrival. pt requesting to perform ADLs and therex in bed d/t pain and fatigue: BP: 149/83, HR: 114, O2: 92%  Orientation  Overall Orientation Status: Within Functional Limits  Pain: 8/10 left knee. Ice applied at end of session  Cognition  Overall Cognitive Status: Exceptions  Arousal/Alertness: Appropriate responses to stimuli  Following Commands: Follows all commands without difficulty  Attention Span: Attends with cues to redirect  Memory: Decreased recall of precautions  Safety Judgement: Decreased awareness of need for safety  Problem Solving: Able to problem solve independently  Insights: Decreased awareness of deficits  Initiation: Requires cues for some  Sequencing: Does not require cues        Objective    Bed Mobility Training  Bed Mobility Training: No  Supine to Sit: Supervision; Additional time; Adaptive equipment (HOB elevated, use of BR)  Balance  Sitting: Intact  Standing: Impaired  Standing - Static: Constant support;Good (CGA with RW)  Standing - Dynamic: Constant support; Fair (CGA with RW)  Transfer Training  Transfer Training: No  Interventions: Verbal cues; Safety awareness training (min cues for safety and WB px)  Sit to Stand: Contact-guard assistance; Additional time; Adaptive equipment (EOB to RW, BSC to RW CGA)  Stand to Sit: Contact-guard assistance; Additional time (With RW, min cues for positioning and safety)  Gait Training  Gait Training: Yes  Left Side Weight Bearing: Non-weight bearing  Gait  Overall Level of Assistance: Contact-guard assistance  Interventions: Safety awareness training;Verbal cues  Base of Support: Shift to right  Speed/María: Pace decreased (< 100 feet/min); Shuffled; Slow  Step Length: Right shortened  Gait Abnormalities: Decreased step clearance (Hop to pattern, decreased toe clearance and heel strike. Steady without overt LOB.  Overall demonstrates good maintenance of WB px with min to no cues)  Distance (ft): 20 Feet  Assistive Device: Walker;Gait belt (SW)     ADL  Feeding: Independent  Grooming: Setup  Additional Comments: pt declining further ADLs  OT Exercises  Exercise Treatment: Pt performed the following UE therex seated in chair with 2# weight: wrist flex/extension, forearm pronation/supination, elbow flex/extension, shoulder flexion, horizontal aBd/aDduction and shoulder press     Safety Devices  Type of Devices: All fall risk precautions in place;Call light within reach;Gait belt;Nurse notified; Patient at risk for falls; Left in bed;Bed alarm in place     Patient Education  Education Given To: Patient  Education Provided: Role of Therapy;Plan of Care;Home Exercise Program;Precautions; ADL Adaptive Strategies;Transfer Training;Equipment; Fall Prevention Strategies  Education Method: Demonstration;Verbal  Barriers to Learning: None  Education Outcome: Verbalized understanding;Demonstrated understanding;Continued education needed    AM-PAC Daily Activity Inpatient   AM-PAC Inpatient Daily Activity Raw Score: 17  AM-PAC Inpatient ADL T-Scale Score : 37.26  ADL Inpatient CMS 0-100% Score: 50.11  ADL Inpatient CMS G-Code Modifier : CK    Goals  Short Term Goals  Time Frame for Short Term Goals: 1 week 10/18-- ongoing 10/14  Short Term Goal 1: Pt will complete LE dressing wtih SBA  Short Term Goal 2: Pt will complete toilet transfers with SBA by 10/15  Short Term Goal 3: Pt will complete standing level ADLs with SBA for balance during LE clothing mgmt. Patient Goals   Patient goals : \"to be able to wipe myself independently\"       Therapy Time   Individual Concurrent Group Co-treatment   Time In 1310         Time Out 1338         Minutes 28         Timed Code Treatment Minutes: [address], OT  If pt is unable to be seen after this session, please let this note serve as discharge summary. Please see case management note for discharge disposition. Thank you.

## 2022-10-14 NOTE — DISCHARGE SUMMARY
Department of Orthopedic Surgery  Physician Assistant   Discharge Summary    The Buren Frankel is a 59 y.o. female underwent revision total knee replacement for infection, total synovectomy left knee, saucerization proximal tibia and distal femur, with implantaiton of antibiotic beads procedure without complication. Buren Frankel was admitted to the floor following Her recovery in the PACU.      Discharge Diagnosis  left revision Knee Replacement    Current Inpatient Medications    Current Facility-Administered Medications: cetirizine (ZYRTEC) tablet 10 mg, 10 mg, Oral, Nightly  aspirin EC tablet 81 mg, 81 mg, Oral, BID  methocarbamol (ROBAXIN) tablet 500 mg, 500 mg, Oral, TID  vancomycin (VANCOCIN) 1,250 mg in dextrose 5 % 250 mL IVPB, 1,250 mg, IntraVENous, Q12H  amLODIPine (NORVASC) tablet 5 mg, 5 mg, Oral, Daily  atorvastatin (LIPITOR) tablet 10 mg, 10 mg, Oral, Daily  calcium carb-cholecalciferol 250-125 MG-UNIT per tab 1 tablet, 1 tablet, Oral, BID  insulin glargine (LANTUS) injection vial 35 Units, 35 Units, SubCUTAneous, Nightly  losartan (COZAAR) tablet 100 mg, 100 mg, Oral, Daily  metFORMIN (GLUCOPHAGE) tablet 1,000 mg, 1,000 mg, Oral, BID WC  montelukast (SINGULAIR) tablet 10 mg, 10 mg, Oral, Nightly  insulin lispro (HUMALOG) injection vial 7 Units, 0.08 Units/kg, SubCUTAneous, TID WC  insulin lispro (HUMALOG) injection vial 0-8 Units, 0-8 Units, SubCUTAneous, TID WC  insulin lispro (HUMALOG) injection vial 0-4 Units, 0-4 Units, SubCUTAneous, Nightly  glucose chewable tablet 16 g, 4 tablet, Oral, PRN  dextrose bolus 10% 125 mL, 125 mL, IntraVENous, PRN **OR** dextrose bolus 10% 250 mL, 250 mL, IntraVENous, PRN  glucagon (rDNA) injection 1 mg, 1 mg, SubCUTAneous, PRN  dextrose 10 % infusion, , IntraVENous, Continuous PRN  lactated ringers infusion, , IntraVENous, Continuous  sodium chloride flush 0.9 % injection 5-40 mL, 5-40 mL, IntraVENous, 2 times per day  sodium chloride flush 0.9 % injection 5-40 mL, 5-40 mL, IntraVENous, PRN  0.9 % sodium chloride infusion, , IntraVENous, PRN  acetaminophen (TYLENOL) tablet 650 mg, 650 mg, Oral, Q6H  oxyCODONE (ROXICODONE) immediate release tablet 5 mg, 5 mg, Oral, Q4H PRN **OR** oxyCODONE (ROXICODONE) immediate release tablet 10 mg, 10 mg, Oral, Q4H PRN  polyethylene glycol (GLYCOLAX) packet 17 g, 17 g, Oral, Daily  senna (SENOKOT) tablet 8.6 mg, 1 tablet, Oral, Daily PRN  ondansetron (ZOFRAN-ODT) disintegrating tablet 4 mg, 4 mg, Oral, Q8H PRN **OR** ondansetron (ZOFRAN) injection 4 mg, 4 mg, IntraVENous, Q6H PRN  ropivacaine 0.2% (NAROPIN) elastomeric infusion 600 mL, 600 mL, Infiltration, Continuous    Post-operatively the patients diet was advanced as tolerated and their incision was checked on POD #1. The incision is dressing in place, clean, dry, and intact with no signs of infection. The patient remained neurovascularly intact in the lower extremity and had intact pulses distally. Patients calf remained soft and showed no evidence of DVT. The patient was able to move their leg and ankle/foot without any problems post-operatively. Physical therapy and occupational therapy were consulted and began working with the patient post-operatively. The patient progressed with PT/OT as would be expected and continued to improve through their stay. The patients pain was initially controlled with IV medications but we were able to transition to oral pain medications soon after arrival to the floor and their pain remained under good control through their hospital stay. From a medical standpoint the patient remained stable and continued to have the medicine team follow throughout their stay. The patients dressing was changed/incison was checked on day of d/c. The patient will be discharged at this time to [address] [place] [address]  with their current diet restrictions and will continue to follow the total knee precautions outlined to them by us and PT/OT.      Condition on Discharge: Stable    Plan  Return visit in 2 weeks. .  Patient was instructed on the use of pain medications, the signs and symptoms of infection, and was given our number to call should they have any questions or concerns following discharge. For opioid prescriptions given at discharge the following statement is provided for compliance with [employer] rules. Patient being given increased dosage/quantity of opoid pain medication in excess of [employer] guidelines which noted a 30 MED daily of opioids due to the fact that he/she has undergone major orthopaedic surgery as outlined in rule 4731-11-13. Dosages and further duration of the pain medication will be re-evaluated at her post op visit in 2 weeks. Patient was educated on dosing expectations and limits of prescribing as a result of the new [facility] rules enacted August 31, 2017. Please also note that this is not the initial opoid prescription issued to this patient but a continuation of medication utilized during the hospital admission as noted in the medical record. OARRS report has also been utilized to screen for any abuse history or suspicious activity as outlined in [place]. All efforts have been taken to prevent abuse potential and misuse of opioid medications including education, screening, and close clinical follow up.

## 2022-10-14 NOTE — PROGRESS NOTES
Physician Progress Note      PATIENT:               Angelica Moreau  CSN #:                  [id number]  :                       1960  ADMIT DATE:       10/10/2022 9:09 AM  [address] [facility] DATE:  RESPONDING  PROVIDER #:        Ashley Tristan MD          QUERY TEXT:    Dear Dr. Jayshree Carvalho and Dr. Laura Espinoza,  Pt valqyoba23/10 with Infection in Left total knee prosthesis and had revision   and synovectomy with implantation of abx beads . Pt noted to have   Leukocytosis, Neutrophilia and tachycardia . If possible, please document in   the progress notes and discharge summary if you are evaluating and /or   treating any of the following: The medical record reflects the following:  Risk Factors: Hx DM, chronic gingivitis,  Hx TKA, Current L knee prosthesis   infection and Osteomyelitis  Clinical Indicators: 10/10 Presents for OR for Infection Total knee   replacement and Osteomyelitis surrounding the left knee space, ( Pre-op  from    WBC=14.4 and Neutrophils=9.9)  Pt has tachycardia MS=[id number], 10/11   WBC=22.6 and Neutrophils=19.4, 10/12 HR up to 110, WBC=15.9, Neutrophils=11.3,   10/13 WBC=13.9, Neutrophils=9.4, HR >90  Treatment: OR for explant, Antibiotics 2 g of Ancef was given at start,   antibiotic beads placed in OR, Zosyn, ID consult, monitor labs, Vancomycin,   cultures, PICC,  Thank you,  Nitin Lambert RN, CDS  [email or web address]  Options provided:  -- Sepsis due to L knee prosthesis infection present on admission  -- Sepsis due to L knee prosthesis infection present on admission, now   resolved  -- Sepsis due to L knee prosthesis infection not present on admission  -- L knee prosthesis infection without Sepsis  -- Other - I will add my own diagnosis  -- Disagree - Not applicable / Not valid  -- Disagree - Clinically unable to determine / Unknown  -- Refer to Clinical Documentation Reviewer    PROVIDER RESPONSE TEXT:    This patient has sepsis due to L knee prosthesis infection which was present   on admission. Query created by: [facility] Delicia Conway on 10/13/2022 12:53 PM      Electronically signed by:   Ekta Castro MD 10/14/2022 8:48 AM

## 2022-10-14 NOTE — DISCHARGE INSTR - DIET

## 2022-10-14 NOTE — PROGRESS NOTES
Department of Orthopedic Surgery  Physician Assistant   Progress Note    Subjective:       Systemic or Specific Complaints: No complaints, pain is well controlled. Patient has been contacting alternative skilled nursing facilities, is anxious to discharge    Objective:     Patient Vitals for the past 24 hrs:   BP Temp Temp src Pulse Resp SpO2   10/14/22 0810 (!) 152/90 97.9 °F (36.6 °C) Oral (!) 103 16 96 %   10/14/22 0338 (!) 157/94 98.1 °F (36.7 °C) Oral (!) 102 16 94 %   10/13/22 2309 (!) 149/78 97.8 °F (36.6 °C) Oral (!) 105 16 94 %   10/13/22 2018 -- -- -- -- 16 --   10/13/22 1942 (!) 149/81 98.5 °F (36.9 °C) Oral (!) 125 16 93 %   10/13/22 1510 (!) 154/88 97.5 °F (36.4 °C) Oral (!) 120 18 94 %       General: alert, appears stated age, cooperative, and no distress   Wound: Wound clean and dry no evidence of infection. , No Erythema, No Drainage, Positive for Edema, and incisional wound vac in place   Motion: Painful range of Motion in affected extremity   DVT Exam: No significant calf/ankle edema. Additional exam:  Pt seen sitting up in bed at time of interview, working with occupational therapy.   Thigh-high DEBBY hose to right lower extremity, knee-high DEBBY hose to left lower extremity  Leg and external rotation, able to correct to neutral alignment  Prevena 125 in place, functioning appropriately at this time  Left lower extremity moderately swollen, comparments soft and compressible  Active knee flexion to 70 degrees, full knee extension  EHL, FHL, Gastroc, ant tib motor intact  Sensation intact to light touch through all nerve distributions  DP and PT pulses 2+, foot WWP      Data Review  CBC:   Lab Results   Component Value Date/Time    WBC 13.9 10/13/2022 05:30 AM    RBC 3.66 10/13/2022 05:30 AM    HGB 9.5 10/13/2022 05:30 AM    HCT 30.0 10/13/2022 05:30 AM     10/13/2022 05:30 AM       Renal:   Lab Results   Component Value Date/Time     10/11/2022 07:14 AM    K 4.9 10/11/2022 07:14 AM CL 99 10/11/2022 07:14 AM    CO2 25 10/11/2022 07:14 AM    BUN 12 10/11/2022 07:14 AM    CREATININE <0.5 10/11/2022 07:14 AM    GLUCOSE 180 10/11/2022 07:14 AM    CALCIUM 9.3 10/11/2022 07:14 AM            Assessment:      left knee revision TKA for infection, total synovectomy left knee POD 4. Placement of prevena incisional vac dressing   Date of surgery 10/10/2022 by Dr. Martina Lennox:      1: Nonweightbearing to the left lower extremity with assistive device. Okay for range of motion as tolerated. Abx changed to vancomycin, pharmacy following. Please place bilateral thigh-high DEBBY hose on patient daily, remove nightly  2:  Continue Deep venous thrombosis prophylaxis-aspirin 81 mg twice daily by mouth for 4 weeks postop  3:  Continue Pain Control- scheduled tylenol and robaxin, oxycodone PRN  4: Infectious disease consult. PICC line in place, provisional IV abx infusion orders in. Intraoperative cultures remain ngtd  5: PT/OT consult pending. Knee immobilizer only for 2 weeks until quadriceps muscle restores full strength and proprioception, as prophylactic measure. Full flexion and extension of the knee to be encouraged  6: Appropriate for discharge, awaiting placement, case management following. Pre-CERT pending.   Pain prescription in soft chart, DCP updated      Austyn Stafford PA-C

## 2022-10-14 NOTE — PROGRESS NOTES
Hospitalist Progress Note    Date of Admission: 10/10/2022    Chief Complaint: Left knee infection    Hospital Course: 59 y.o. female who we are asked to see/evaluate by Malia Rios MD for medical management. Around 6/2022 found to have evidence of infection in left prosthetic knee joint. Was advised to achieve better glycemic control and control of gingival infection prior to surgery. She now presented for explant and and ID consultation. Subjective: BS is controlled. BP stable. Pain controlled. DC planning. Labs:   Recent Labs     10/11/22  1331 10/12/22  0620 10/13/22  0530   WBC 22.6* 15.9* 13.9*   HGB 9.7* 9.1* 9.5*   HCT 31.2* 29.5* 30.0*    261 261       No results for input(s): NA, K, CL, CO2, BUN, CREATININE, CALCIUM, PHOS in the last 72 hours. Invalid input(s): MAGNES    No results for input(s): AST, ALT, BILIDIR, BILITOT, ALKPHOS in the last 72 hours. No results for input(s): INR in the last 72 hours. Physical Exam Performed:    BP (!) 157/94   Pulse (!) 102   Temp 98.1 °F (36.7 °C) (Oral)   Resp 16   Ht 5' 2\" (1.575 m)   Wt 204 lb (92.5 kg)   LMP 01/26/1996   SpO2 94%   BMI 37.31 kg/m²     General appearance: No apparent distress, appears stated age and cooperative. Obese. HEENT: Normal cephalic, atraumatic without obvious deformity. Pupils equal, round, and reactive to light. Extra ocular muscles intact. Conjunctivae/corneas clear. Neck: Supple, with full range of motion. No jugular venous distention. Trachea midline. Respiratory:  Normal respiratory effort. Clear to auscultation, bilaterally without Rales/Wheezes/Rhonchi. Cardiovascular: Regular rate and rhythm with normal S1/S2 without murmurs, rubs or gallops. Abdomen: Soft, non-tender, non-distended with normal bowel sounds. Musculoskeletal: Left knee wrap and splint in place, external suction drain. LLE edema. Skin: Skin color, texture, turgor normal.  No rashes or lesions.   Neurologic: Neurovascularly intact without any focal sensory/motor deficits. Cranial nerves: II-XII intact, grossly non-focal.  Psychiatric: Alert and oriented, thought content appropriate, normal insight  Capillary Refill: Brisk,3 seconds, normal   Peripheral Pulses: +2 palpable, equal bilaterally       DVT Prophylaxis: Lovenox     Consults:    IP CONSULT TO HOSPITALIST  IP CONSULT TO INFECTIOUS DISEASES  IP CONSULT TO PHARMACY    Assessment/Plan:    Active Hospital Problems    Diagnosis     Infection of total left knee replacement, initial encounter [facility]) [T84.54XA]      Priority: Medium    Morbidly obese ([address] [E66.01]     Type 2 diabetes mellitus with hyperglycemia, with long-term current use of insulin (HCC) [E11.65, Z79.4]     Obstructive sleep apnea [G47.33]     Benign essential HTN [I10]      Left Prosthetic Knee Infection: s/p explant. Pain control. PICC placed. Abs per ID. Limited weight bearing. PT/OT as tolerated. Sepsis, POA, 2/2 knee infection: Tachycardia and leukocytosis. Treated appropriately with surgery and Abx. Diabetes Mellitus, Type 2: Controlled. Continue Metformin and basal-bolus Insulin regimen. Monitor blood sugar and adjust regimen as needed. Diabetic (Carb-controlled) diet. Hypertension, benign: Controlled. Continue Norvasc and Losartan. Monitor and adjust as needed. DVT Prophylaxis: Lovenox  Diet: ADULT DIET; Regular  Code Status: Full Code  PT/OT Eval Status: Ongoing    Dispo - per Ortho. Plan for SNF pending precert. No medical barriers to discharge.      Corie Wang MD

## 2022-10-14 NOTE — CARE COORDINATION
Writer called [facility] and asked to pull cert, so that [facility] can start cert if accepted. Spoke with Bryce, and will follow up to verify acceptance and start of cert. [facility] spoke with Bryce @ [facility] they can accept will attempt to start cert. [facility]  Rufus Yumiko started cert today    Dea Cordova RN

## 2022-10-15 LAB
ANION GAP SERPL CALCULATED.3IONS-SCNC: 12 MMOL/L (ref 3–16)
BUN BLDV-MCNC: 13 MG/DL (ref 7–20)
CALCIUM SERPL-MCNC: 10.2 MG/DL (ref 8.3–10.6)
CHLORIDE BLD-SCNC: 98 MMOL/L (ref 99–110)
CO2: 28 MMOL/L (ref 21–32)
CREAT SERPL-MCNC: <0.5 MG/DL (ref 0.6–1.2)
GFR AFRICAN AMERICAN: >60
GFR NON-AFRICAN AMERICAN: >60
GLUCOSE BLD-MCNC: 105 MG/DL (ref 70–99)
GLUCOSE BLD-MCNC: 107 MG/DL (ref 70–99)
GLUCOSE BLD-MCNC: 108 MG/DL (ref 70–99)
GLUCOSE BLD-MCNC: 118 MG/DL (ref 70–99)
GLUCOSE BLD-MCNC: 154 MG/DL (ref 70–99)
PERFORMED ON: ABNORMAL
POTASSIUM SERPL-SCNC: 3.8 MMOL/L (ref 3.5–5.1)
SODIUM BLD-SCNC: 138 MMOL/L (ref 136–145)

## 2022-10-15 PROCEDURE — 2580000003 HC RX 258: Performed by: INTERNAL MEDICINE

## 2022-10-15 PROCEDURE — 2580000003 HC RX 258: Performed by: PHYSICIAN ASSISTANT

## 2022-10-15 PROCEDURE — 6360000002 HC RX W HCPCS: Performed by: INTERNAL MEDICINE

## 2022-10-15 PROCEDURE — 1200000000 HC SEMI PRIVATE

## 2022-10-15 PROCEDURE — 80048 BASIC METABOLIC PNL TOTAL CA: CPT

## 2022-10-15 PROCEDURE — 6370000000 HC RX 637 (ALT 250 FOR IP): Performed by: PHYSICIAN ASSISTANT

## 2022-10-15 PROCEDURE — APPNB60 APP NON BILLABLE TIME 46-60 MINS: Performed by: PHYSICIAN ASSISTANT

## 2022-10-15 PROCEDURE — 6370000000 HC RX 637 (ALT 250 FOR IP): Performed by: SPECIALIST/TECHNOLOGIST

## 2022-10-15 PROCEDURE — 97116 GAIT TRAINING THERAPY: CPT

## 2022-10-15 PROCEDURE — 97110 THERAPEUTIC EXERCISES: CPT

## 2022-10-15 RX ADMIN — ASPIRIN 81 MG: 81 TABLET, COATED ORAL at 08:45

## 2022-10-15 RX ADMIN — CETIRIZINE HYDROCHLORIDE 10 MG: 10 TABLET, FILM COATED ORAL at 20:27

## 2022-10-15 RX ADMIN — MONTELUKAST SODIUM 10 MG: 10 TABLET, COATED ORAL at 20:27

## 2022-10-15 RX ADMIN — OXYCODONE 10 MG: 5 TABLET ORAL at 11:33

## 2022-10-15 RX ADMIN — AMLODIPINE BESYLATE 5 MG: 5 TABLET ORAL at 08:44

## 2022-10-15 RX ADMIN — METFORMIN HYDROCHLORIDE 1000 MG: 500 TABLET ORAL at 16:37

## 2022-10-15 RX ADMIN — VANCOMYCIN HYDROCHLORIDE 1250 MG: 10 INJECTION, POWDER, LYOPHILIZED, FOR SOLUTION INTRAVENOUS at 08:49

## 2022-10-15 RX ADMIN — LOSARTAN POTASSIUM 100 MG: 100 TABLET, FILM COATED ORAL at 08:44

## 2022-10-15 RX ADMIN — Medication 10 ML: at 20:27

## 2022-10-15 RX ADMIN — ACETAMINOPHEN 650 MG: 325 TABLET ORAL at 20:27

## 2022-10-15 RX ADMIN — ACETAMINOPHEN 650 MG: 325 TABLET ORAL at 03:18

## 2022-10-15 RX ADMIN — METFORMIN HYDROCHLORIDE 1000 MG: 500 TABLET ORAL at 08:43

## 2022-10-15 RX ADMIN — OXYCODONE 10 MG: 5 TABLET ORAL at 20:27

## 2022-10-15 RX ADMIN — Medication 1 TABLET: at 16:37

## 2022-10-15 RX ADMIN — METHOCARBAMOL TABLETS 500 MG: 500 TABLET, COATED ORAL at 20:27

## 2022-10-15 RX ADMIN — VANCOMYCIN HYDROCHLORIDE 1250 MG: 10 INJECTION, POWDER, LYOPHILIZED, FOR SOLUTION INTRAVENOUS at 20:29

## 2022-10-15 RX ADMIN — ATORVASTATIN CALCIUM 10 MG: 10 TABLET, FILM COATED ORAL at 08:43

## 2022-10-15 RX ADMIN — ASPIRIN 81 MG: 81 TABLET, COATED ORAL at 20:27

## 2022-10-15 RX ADMIN — POLYETHYLENE GLYCOL 3350 17 G: 17 POWDER, FOR SOLUTION ORAL at 08:44

## 2022-10-15 RX ADMIN — ACETAMINOPHEN 650 MG: 325 TABLET ORAL at 14:32

## 2022-10-15 RX ADMIN — Medication 10 ML: at 08:44

## 2022-10-15 RX ADMIN — INSULIN GLARGINE 35 UNITS: 100 INJECTION, SOLUTION SUBCUTANEOUS at 21:48

## 2022-10-15 RX ADMIN — METHOCARBAMOL TABLETS 500 MG: 500 TABLET, COATED ORAL at 14:32

## 2022-10-15 RX ADMIN — ACETAMINOPHEN 650 MG: 325 TABLET ORAL at 08:44

## 2022-10-15 RX ADMIN — Medication 1 TABLET: at 08:44

## 2022-10-15 RX ADMIN — METHOCARBAMOL TABLETS 500 MG: 500 TABLET, COATED ORAL at 08:43

## 2022-10-15 ASSESSMENT — PAIN SCALES - GENERAL
PAINLEVEL_OUTOF10: 8
PAINLEVEL_OUTOF10: 10
PAINLEVEL_OUTOF10: 10
PAINLEVEL_OUTOF10: 8
PAINLEVEL_OUTOF10: 7
PAINLEVEL_OUTOF10: 5

## 2022-10-15 ASSESSMENT — PAIN DESCRIPTION - LOCATION: LOCATION: KNEE

## 2022-10-15 ASSESSMENT — PAIN DESCRIPTION - ORIENTATION: ORIENTATION: LEFT

## 2022-10-15 NOTE — PLAN OF CARE
Pt resting in bed quietly. Bed in lowest position, wheels locked, side rails up X2, non skid socks on. Bed check alarm engaged. Pt instructed not to get out of bed on own, to use call light for staff assistance when ambulating or other needs. Pt verbalizes understanding. Call light within reach. Will continue to monitor. Problem: Safety - Adult  Goal: Free from fall injury  Outcome: Progressing     Problem: ABCDS Injury Assessment  Goal: Absence of physical injury  Outcome: Progressing       Pt rates pain level using 0-10 pain scale. Pain meds administered as ordered per MAR. Pt instructed to use call light for increasing pain or ineffective pain management. Pt verbalizes understanding. Call light within reach. Will continue to monitor.      Problem: Pain  Goal: Verbalizes/displays adequate comfort level or baseline comfort level  Outcome: Progressing

## 2022-10-15 NOTE — PROGRESS NOTES
Physical Therapy  Facility/Department: [facility] - MED SURG/ORTHO  Daily Treatment Note  NAME: Angelina Johnson  : 1960  MRN: [id number]    Date of Service: 10/15/2022    Discharge Recommendations:  Subacute/Skilled Nursing Facility   PT Equipment Recommendations  Other: defer to patient's facility. If patient goes home then recommend std. walker for amb x short distances around house while NWB LLE and manual w/c for longer distances  Barriers to home discharge:   [x] Reported available assist at home upon discharge limited   [x] Patient or family requests DC to other than home    [x] Patient reports expectation of post acute Discharge disposition to other than home   [x] Other: Patient lives home alone. NWB LLE. If pt is unable to be seen after this session, please let this note serve as discharge summary. Please see case management note for discharge disposition. Thank you. Patient Diagnosis(es): The primary encounter diagnosis was Infection of total left knee replacement, initial encounter ([facility] [address] A diagnosis of Infection of prosthetic knee joint, initial encounter [facility]) was also pertinent to this visit. Assessment   Assessment: Patient is progressing with her therapy goals. Today she completed transfers with CGA, ambulated 20 feet with SW and CGA. She also completed her exercises with supervision. Patient is below her prior level of function and would benefit from skilled PT plan of care. Patient continues to request discharge to SNF due to patient currently lives alone. Continue to recommend SNF at discharge. Activity Tolerance: Patient tolerated treatment well;Patient limited by fatigue  Other: defer to patient's facility. If patient goes home then recommend std.  walker for amb x short distances around house while NWB LLE and manual w/c for longer distances     Plan    Physcial Therapy Plan  General Plan: 5-7 times per week  Specific Instructions for Next Treatment: Progress ther ex and mobility as tolerated  Current Treatment Recommendations: Strengthening;ROM;Balance training;Functional mobility training;Transfer training;Gait training;Home exercise program;Safety education & training;Patient/Caregiver education & training;Equipment evaluation, education, & procurement; Therapeutic activities; Wheelchair mobility training; Endurance training;Neuromuscular re-education;Stair training     Restrictions  Restrictions/Precautions  Restrictions/Precautions: Weight Bearing, Fall Risk, General Precautions  Required Braces or Orthoses?: Yes  Lower Extremity Weight Bearing Restrictions  Left Lower Extremity Weight Bearing: Non Weight Bearing  Required Braces or Orthoses  Left Lower Extremity Brace: Knee Immobilizer  Position Activity Restriction  Other position/activity restrictions: \"Knee immobilizer only for 2 weeks until quadriceps muscle restores full strength and proprioception, as prophylactic measure. Full flexion and extension of the knee to be encouraged\"-- per OLIVIA Tang note on 10/12/22     Subjective    Subjective  Subjective: Pt supine in bed on approach, pleasant and agreeable to PT tx  Pain: 7/10 left knee. patient received pain medicine earlier today. Orientation  Overall Orientation Status: Within Functional Limits  Cognition  Overall Cognitive Status: Exceptions  Arousal/Alertness: Appropriate responses to stimuli  Following Commands:  Follows all commands without difficulty  Attention Span: Attends with cues to redirect  Memory: Decreased recall of precautions  Safety Judgement: Decreased awareness of need for safety  Problem Solving: Able to problem solve independently  Insights: Decreased awareness of deficits  Initiation: Requires cues for some  Sequencing: Does not require cues     Objective   Vitals  Heart Rate: (!) 115  Heart Rate Source: Monitor  BP: 135/89  BP Location: Left upper arm  BP Method: Automatic  Patient Position: Semi fowlers  MAP (Calculated): 104.33  SpO2: 92 %  O2 Device: None (Room air)  Bed Mobility Training  Bed Mobility Training: Yes  Rolling: Supervision  Supine to Sit: Supervision; Additional time; Adaptive equipment  Sit to Supine: Other (comment) (patient in chair at end of therapy session)  Scooting: Stand-by assistance  Balance  Sitting: Intact  Standing: Impaired  Standing - Static: Constant support;Good  Standing - Dynamic: Constant support; Fair  Transfer Training  Transfer Training: Yes  Overall Level of Assistance: Contact-guard assistance  Interventions: Verbal cues; Safety awareness training  Sit to Stand: Contact-guard assistance  Stand to Sit: Contact-guard assistance  Bed to Chair: Contact-guard assistance  Gait Training  Gait Training: Yes  Left Side Weight Bearing: Non-weight bearing  Gait  Overall Level of Assistance: Contact-guard assistance  Interventions: Safety awareness training;Verbal cues  Speed/María: Pace decreased (< 100 feet/min); Shuffled; Slow  Step Length: Right shortened  Gait Abnormalities: Decreased step clearance (Hop to pattern, decreased toe clearance and heel strike. Steady without overt LOB. Overall demonstrates good maintenance of WB px with min to no cues)  Distance (ft): 20 Feet  Assistive Device: Walker;Gait belt     PT Exercises  Exercise Treatment: 1 x 10 AROM LLE: heel slides and hip abduction with towel underneath LLE. self assisted LAQ (RLE assisting LLE) 1 x 10 LLE. 1 x 10 AROM bilateral hip flexion. Safety Devices  Type of Devices: All fall risk precautions in place;Call light within reach;Gait belt;Nurse notified; Patient at risk for falls; Left in chair;Chair alarm in place       Goals  Short Term Goals  Time Frame for Short Term Goals: 3 days, 10/14/22  Short Term Goal 1: Pt will transfer supine <-> sit with supervision/modified(I) -- 10/14: GOAL MET  Short Term Goal 2: Pt will transfer sit <-> stand and bed>chair using std.  walker with supervision -- 10/15: CGA with RW  Short Term Goal 3: Pt will ambulate x 50 feet using std. walker with supervision while maintaining NWB LLE -- 10/15: CGA with RW x 20'  Short Term Goal 4: By 10/12/22: Pt will tolerate 12-15 reps appropriate LLE ther ex for ROM/strengthening -- GOAL MET 10/13  Patient Goals   Patient Goals : \"To be able to walk from my bedroom to my bathroom (distances of 30 feet) by myself (SBA or less)\" by 10/13/22 -- 10/15: 20' with RW CGA    Education  Patient Education  Education Given To: Patient  Education Provided: Role of Therapy;Plan of Care;Precautions;Transfer Training;Home Exercise Program;Equipment; Fall Prevention Strategies  Education Provided Comments: Educated on progression of OOB mobility and gait, safe use of AD and placement during mobility.   Education Method: Demonstration;Verbal  Barriers to Learning: None  Education Outcome: Verbalized understanding;Demonstrated understanding;Continued education needed  [facility] 6 Clicks Inpatient Mobility:  AM-PAC Mobility Inpatient   How much difficulty turning over in bed?: A Little  How much difficulty sitting down on / standing up from a chair with arms?: A Little  How much difficulty moving from lying on back to sitting on side of bed?: A Little  How much help from another person moving to and from a bed to a chair?: A Little  How much help from another person needed to walk in hospital room?: A Little  How much help from another person for climbing 3-5 steps with a railing?: A Little  AM-[facility] Inpatient Mobility Raw Score : 18  AM-PAC Inpatient T-Scale Score : 43.63  Mobility Inpatient CMS 0-100% Score: 46.58  Mobility Inpatient CMS G-Code Modifier : CK     Therapy Time   Individual Concurrent Group Co-treatment   Time In 1630         Time Out 1654         Minutes 24         Timed Code Treatment Minutes: 24 Minutes       Starr Rivera, PT

## 2022-10-15 NOTE — PROGRESS NOTES
Department of Orthopedic Surgery  Physician Assistant   Progress Note    Subjective:       Systemic or Specific Complaints: No complaints, pain is well controlled. Patient has been contacting alternative skilled nursing facilities, is anxious to discharge    Objective:     Patient Vitals for the past 24 hrs:   BP Temp Temp src Pulse Resp SpO2   10/15/22 0815 (!) 167/97 97.9 °F (36.6 °C) Oral 97 18 96 %   10/14/22 2318 -- -- -- -- 16 --   10/14/22 2247 -- -- -- (!) 119 -- --   10/14/22 2243 (!) 156/82 98.2 °F (36.8 °C) Oral (!) 119 14 93 %   10/14/22 1939 (!) 151/91 97.6 °F (36.4 °C) Oral (!) 109 16 90 %   10/14/22 1435 (!) 149/83 98.1 °F (36.7 °C) Oral (!) 114 16 92 %         General: alert, appears stated age, cooperative, and no distress   Wound: Wound clean and dry no evidence of infection. , No Erythema, No Drainage, Positive for Edema, and incisional wound vac in place   Motion: Painful range of Motion in affected extremity   DVT Exam: No significant calf/ankle edema. Additional exam:  Pt seen sitting up in bed at time of interview, working with occupational therapy.   Thigh-high DEBBY hose to right lower extremity, knee-high DEBBY hose to left lower extremity  Leg and external rotation, able to correct to neutral alignment  Prevena 125 in place, functioning appropriately at this time  Left lower extremity moderately swollen, comparments soft and compressible  Active knee flexion to 70 degrees, full knee extension  EHL, FHL, Gastroc, ant tib motor intact  Sensation intact to light touch through all nerve distributions  DP and PT pulses 2+, foot WWP      Data Review  CBC:   Lab Results   Component Value Date/Time    WBC 13.9 10/13/2022 05:30 AM    RBC 3.66 10/13/2022 05:30 AM    HGB 9.5 10/13/2022 05:30 AM    HCT 30.0 10/13/2022 05:30 AM     10/13/2022 05:30 AM       Renal:   Lab Results   Component Value Date/Time     10/11/2022 07:14 AM    K 4.9 10/11/2022 07:14 AM    CL 99 10/11/2022 07:14 AM CO2 25 10/11/2022 07:14 AM    BUN 12 10/11/2022 07:14 AM    CREATININE <0.5 10/11/2022 07:14 AM    GLUCOSE 180 10/11/2022 07:14 AM    CALCIUM 9.3 10/11/2022 07:14 AM            Assessment:      left knee revision TKA for infection, total synovectomy left knee POD 5. Placement of prevena incisional vac dressing   Date of surgery 10/10/2022 by Dr. Lianna Latif:      1: Nonweightbearing to the left lower extremity with assistive device. Okay for range of motion as tolerated. Abx changed to vancomycin, pharmacy following. Please place bilateral thigh-high DEBBY hose on patient daily, remove nightly  2:  Continue Deep venous thrombosis prophylaxis-aspirin 81 mg twice daily by mouth for 4 weeks postop  3:  Continue Pain Control- scheduled tylenol and robaxin, oxycodone PRN  4: Infectious disease consult. PICC line in place, provisional IV abx infusion orders in. Intraoperative cultures remain ngtd  5: PT/OT consult pending. Knee immobilizer only for 2 weeks until quadriceps muscle restores full strength and proprioception, as prophylactic measure. Full flexion and extension of the knee to be encouraged  6: Appropriate for discharge, awaiting placement, case management following. Pre-CERT pending.   Pain prescription in soft chart, DCP updated      Lizeth Reese PA-C

## 2022-10-15 NOTE — PROGRESS NOTES
Hospitalist Progress Note  10/15/2022 1:50 PM  Subjective:   Admit Date: 10/10/2022  PCP: Betsy Vizcarra, DO Status: Inpatient  Interval History: Hospital Day: 6, admitted with infection in left prosthetic knee joint. Was advised to achieve better glycemic control and control of gingival infection prior to surgery. She now presented for explant and and ID consultation. Following for sepsis (POA) knee infection, hypertension, and type 2 diabetes. Diet: Regular  Right basilic single lumen PICC (10/11, day #5)  External urinary catheter (10/11, day #5)  Medications:   Lactate ringers at 125 ml/hr  cetirizine  10 mg Oral Nightly   aspirin  81 mg Oral BID   methocarbamol  500 mg Oral TID   vancomycin  1,250 mg IntraVENous Q12H   amlodipine  5 mg Oral Daily   atorvastatin  10 mg Oral Daily   cholecalciferol  1 tablet Oral BID   insulin glargine  35 Units SubCUTAneous Nightly   losartan  100 mg Oral Daily   metformin  1,000 mg Oral BID WC   montelukast  10 mg Oral Nightly   insulin lispro SSI  0-8 Units SubCUTAneous TID WC   acetaminophen  650 mg Oral Q6H   polyethylene glycol  17 g Oral Daily     Recent Labs     10/13/22  0530   WBC 13.9*   HGB 9.5*      MCV 82.0     Recent Labs     10/15/22  1118      K 3.8   CL 98*   CO2 28   BUN 13   CREATININE <0.5*   GLUCOSE 108*     Hemoglobin A1c (8/26/22) 7.0%    POC Glucose:   Recent Labs     10/14/22  1641 10/14/22  2013 10/15/22  0755 10/15/22  1153   POCGLU 127* 129* 118* 105*     Left Knee X-ray (10/10) Interval removal of the tibial component of the prosthesis, with spacer placement and with antibiotic bead placement.       Objective:   Vitals:  BP (!) 167/97   Pulse 97   Temp 97.9 °F (36.6 °C) (Oral)   Resp 18   Ht 5' 2\" (1.575 m)   Wt 204 lb (92.5 kg)   LMP 01/26/1996   SpO2 96%   BMI 37.31 kg/m²   General appearance: alert and cooperative with exam  Lungs: clear to auscultation bilaterally  Heart: regular rate and rhythm, S1, S2 normal, no murmur, click, rub or gallop  Abdomen: soft, non-tender; bowel sounds normal; no masses,  no organomegaly  Extremities:  left knee wrapped, neurovascular status intact  Neurologic: No obvious focal neurologic deficits. Assessment and Plan:   Left prosthetic knee infection s/p explant. Pain control. PICC placed (10/11) for IV vancomycin. Limited weight bearing. Sepsis (POA) secondary to knee infection with tachycardia and leukocytosis. Treated with explant and IV vancomycin. Type 2 Diabetes:  Continues on metformin 1000 mg BID and glargine insulin 35 units nightly. DEANA unable to tolerate CPAP. Class II obesity (BMI 51.7) complicates medical management. Advance Directive: Full Code  DVT prophylaxis with intermittent compression  Discharge planning: pending pre-cert, she would like to return [facility]. [facility] they can accept will attempt to start pre-cert per case management.        Dale Campos MD  [facility]ist

## 2022-10-16 LAB
GLUCOSE BLD-MCNC: 118 MG/DL (ref 70–99)
GLUCOSE BLD-MCNC: 122 MG/DL (ref 70–99)
GLUCOSE BLD-MCNC: 132 MG/DL (ref 70–99)
GLUCOSE BLD-MCNC: 135 MG/DL (ref 70–99)
PERFORMED ON: ABNORMAL

## 2022-10-16 PROCEDURE — 2580000003 HC RX 258: Performed by: PHYSICIAN ASSISTANT

## 2022-10-16 PROCEDURE — 1200000000 HC SEMI PRIVATE

## 2022-10-16 PROCEDURE — APPNB60 APP NON BILLABLE TIME 46-60 MINS: Performed by: PHYSICIAN ASSISTANT

## 2022-10-16 PROCEDURE — 6360000002 HC RX W HCPCS: Performed by: INTERNAL MEDICINE

## 2022-10-16 PROCEDURE — 6360000002 HC RX W HCPCS: Performed by: PHYSICIAN ASSISTANT

## 2022-10-16 PROCEDURE — 6370000000 HC RX 637 (ALT 250 FOR IP): Performed by: PHYSICIAN ASSISTANT

## 2022-10-16 PROCEDURE — 83036 HEMOGLOBIN GLYCOSYLATED A1C: CPT

## 2022-10-16 PROCEDURE — 2580000003 HC RX 258: Performed by: INTERNAL MEDICINE

## 2022-10-16 PROCEDURE — 6370000000 HC RX 637 (ALT 250 FOR IP): Performed by: SPECIALIST/TECHNOLOGIST

## 2022-10-16 RX ORDER — DIPHENHYDRAMINE HCL 25 MG
25 TABLET ORAL EVERY 6 HOURS PRN
Status: DISCONTINUED | OUTPATIENT
Start: 2022-10-16 | End: 2022-10-18 | Stop reason: HOSPADM

## 2022-10-16 RX ADMIN — OXYCODONE 10 MG: 5 TABLET ORAL at 09:49

## 2022-10-16 RX ADMIN — AMLODIPINE BESYLATE 5 MG: 5 TABLET ORAL at 09:06

## 2022-10-16 RX ADMIN — ACETAMINOPHEN 650 MG: 325 TABLET ORAL at 15:03

## 2022-10-16 RX ADMIN — Medication 10 ML: at 09:08

## 2022-10-16 RX ADMIN — DIPHENHYDRAMINE HCL 25 MG: 25 TABLET ORAL at 16:38

## 2022-10-16 RX ADMIN — Medication 1 TABLET: at 16:27

## 2022-10-16 RX ADMIN — ASPIRIN 81 MG: 81 TABLET, COATED ORAL at 09:06

## 2022-10-16 RX ADMIN — ACETAMINOPHEN 650 MG: 325 TABLET ORAL at 20:34

## 2022-10-16 RX ADMIN — VANCOMYCIN HYDROCHLORIDE 1250 MG: 10 INJECTION, POWDER, LYOPHILIZED, FOR SOLUTION INTRAVENOUS at 09:12

## 2022-10-16 RX ADMIN — VANCOMYCIN HYDROCHLORIDE 1250 MG: 10 INJECTION, POWDER, LYOPHILIZED, FOR SOLUTION INTRAVENOUS at 20:43

## 2022-10-16 RX ADMIN — Medication 1 TABLET: at 09:07

## 2022-10-16 RX ADMIN — METFORMIN HYDROCHLORIDE 1000 MG: 500 TABLET ORAL at 09:07

## 2022-10-16 RX ADMIN — OXYCODONE 10 MG: 5 TABLET ORAL at 15:04

## 2022-10-16 RX ADMIN — ACETAMINOPHEN 650 MG: 325 TABLET ORAL at 09:07

## 2022-10-16 RX ADMIN — MONTELUKAST SODIUM 10 MG: 10 TABLET, COATED ORAL at 20:34

## 2022-10-16 RX ADMIN — ONDANSETRON 4 MG: 2 INJECTION INTRAMUSCULAR; INTRAVENOUS at 18:16

## 2022-10-16 RX ADMIN — OXYCODONE 10 MG: 5 TABLET ORAL at 20:33

## 2022-10-16 RX ADMIN — METHOCARBAMOL TABLETS 500 MG: 500 TABLET, COATED ORAL at 20:33

## 2022-10-16 RX ADMIN — METHOCARBAMOL TABLETS 500 MG: 500 TABLET, COATED ORAL at 09:07

## 2022-10-16 RX ADMIN — INSULIN GLARGINE 35 UNITS: 100 INJECTION, SOLUTION SUBCUTANEOUS at 21:13

## 2022-10-16 RX ADMIN — CETIRIZINE HYDROCHLORIDE 10 MG: 10 TABLET, FILM COATED ORAL at 20:33

## 2022-10-16 RX ADMIN — ATORVASTATIN CALCIUM 10 MG: 10 TABLET, FILM COATED ORAL at 09:08

## 2022-10-16 RX ADMIN — METHOCARBAMOL TABLETS 500 MG: 500 TABLET, COATED ORAL at 15:03

## 2022-10-16 RX ADMIN — METFORMIN HYDROCHLORIDE 1000 MG: 500 TABLET ORAL at 16:27

## 2022-10-16 RX ADMIN — ASPIRIN 81 MG: 81 TABLET, COATED ORAL at 20:34

## 2022-10-16 RX ADMIN — LOSARTAN POTASSIUM 100 MG: 100 TABLET, FILM COATED ORAL at 09:07

## 2022-10-16 ASSESSMENT — PAIN SCALES - GENERAL
PAINLEVEL_OUTOF10: 10
PAINLEVEL_OUTOF10: 8

## 2022-10-16 ASSESSMENT — PAIN DESCRIPTION - LOCATION: LOCATION: KNEE

## 2022-10-16 ASSESSMENT — PAIN DESCRIPTION - ORIENTATION: ORIENTATION: LEFT

## 2022-10-16 NOTE — PROGRESS NOTES
Department of Orthopedic Surgery  Physician Assistant   Progress Note    Subjective:       Systemic or Specific Complaints: No complaints, pain is well controlled. Patient has been contacting alternative skilled nursing facilities, is anxious to discharge    Objective:     Patient Vitals for the past 24 hrs:   BP Temp Temp src Pulse Resp SpO2   10/16/22 0900 (!) 149/83 97.6 °F (36.4 °C) Oral (!) 112 14 93 %   10/16/22 0437 -- -- Oral -- -- --   10/16/22 0430 (!) 155/87 97.5 °F (36.4 °C) Oral 91 16 95 %   10/15/22 2057 -- -- -- -- 18 --   10/15/22 2015 123/77 97.9 °F (36.6 °C) Oral (!) 109 18 95 %   10/15/22 1715 -- 97.8 °F (36.6 °C) Oral -- -- --   10/15/22 1647 135/89 -- -- (!) 115 -- 92 %         General: alert, appears stated age, cooperative, and no distress   Wound: Wound clean and dry no evidence of infection. , No Erythema, No Drainage, Positive for Edema, and incisional wound vac in place   Motion: Painful range of Motion in affected extremity   DVT Exam: No significant calf/ankle edema. Additional exam:  Pt seen sitting up in bed at time of interview, working with occupational therapy.   Thigh-high DEBBY hose to right lower extremity, knee-high DEBBY hose to left lower extremity  Leg and external rotation, able to correct to neutral alignment  Prevena 125 in place, functioning appropriately at this time  Left lower extremity moderately swollen, comparments soft and compressible  Active knee flexion to 70 degrees, full knee extension  EHL, FHL, Gastroc, ant tib motor intact  Sensation intact to light touch through all nerve distributions  DP and PT pulses 2+, foot WWP      Data Review  CBC:   Lab Results   Component Value Date/Time    WBC 13.9 10/13/2022 05:30 AM    RBC 3.66 10/13/2022 05:30 AM    HGB 9.5 10/13/2022 05:30 AM    HCT 30.0 10/13/2022 05:30 AM     10/13/2022 05:30 AM       Renal:   Lab Results   Component Value Date/Time     10/15/2022 11:18 AM    K 3.8 10/15/2022 11:18 AM    K 4.9 10/11/2022 07:14 AM    CL 98 10/15/2022 11:18 AM    CO2 28 10/15/2022 11:18 AM    BUN 13 10/15/2022 11:18 AM    CREATININE <0.5 10/15/2022 11:18 AM    GLUCOSE 108 10/15/2022 11:18 AM    CALCIUM 10.2 10/15/2022 11:18 AM            Assessment:      left knee revision TKA for infection, total synovectomy left knee POD 6. Placement of prevena incisional vac dressing   Date of surgery 10/10/2022 by Dr. Lissa Burrows:      1: Nonweightbearing to the left lower extremity with assistive device. Okay for range of motion as tolerated. Abx changed to vancomycin, pharmacy following. Please place bilateral thigh-high DEBBY hose on patient daily, remove nightly  2:  Continue Deep venous thrombosis prophylaxis-aspirin 81 mg twice daily by mouth for 4 weeks postop  3:  Continue Pain Control- scheduled tylenol and robaxin, oxycodone PRN  4: Infectious disease consult. PICC line in place, provisional IV abx infusion orders in. Intraoperative cultures remain ngtd  5: PT/OT consult pending. Knee immobilizer only for 2 weeks until quadriceps muscle restores full strength and proprioception, as prophylactic measure. Full flexion and extension of the knee to be encouraged  6: Appropriate for discharge, awaiting placement, case management following. Pre-CERT pending.   Pain prescription in soft chart, DCP updated      Maxine Morales PA-C

## 2022-10-16 NOTE — PROGRESS NOTES
Hospitalist Progress Note  10/16/2022 12:53 PM  Subjective:   Admit Date: 10/10/2022  PCP: Zoe Stoner, DO Status: Inpatient  Interval History: Hospital Day: 7, left knee ACE wrap removed. History of present illness:  Admitted with infection in left prosthetic knee joint. Was advised to achieve better glycemic control and control of gingival infection prior to surgery. She now presented for explant and and ID consultation. Following for sepsis (POA) knee infection, hypertension, and type 2 diabetes. Diet: Regular  Right basilic single lumen PICC (10/11, day #6)  External urinary catheter (10/11, day #6)  Medications:   Lactated ringers at 125 ml/hr  cetirizine  10 mg Oral Nightly   aspirin  81 mg Oral BID   methocarbamol  500 mg Oral TID   vancomycin  1,250 mg IntraVENous Q12H   amlodipine  5 mg Oral Daily   atorvastatin  10 mg Oral Daily   insulin glargine  35 Units SubCUTAneous Nightly   losartan  100 mg Oral Daily   metformin  1,000 mg Oral BID WC   montelukast  10 mg Oral Nightly   insulin lispro  7 + 0-8 Units SubCUTAneous TID WC   acetaminophen  650 mg Oral Q6H   polyethylene glycol  17 g Oral Daily       Recent Labs     10/15/22  1118      K 3.8   CL 98*   CO2 28   BUN 13   CREATININE <0.5*   GLUCOSE 108*     Hemoglobin A1c (8/26/22) 7.0%    POC Glucose:   Recent Labs     10/15/22  1636 10/15/22  2057 10/16/22  0843 10/16/22  1144   POCGLU 107* 154* 132* 122*     Left Knee X-ray (10/10) Interval removal of the tibial component of the prosthesis, with spacer placement and with antibiotic bead placement.     Objective:   Vitals:  BP (!) 149/83   Pulse (!) 112   Temp 97.6 °F (36.4 °C) (Oral)   Resp 16   Ht 5' 2\" (1.575 m)   Wt 204 lb (92.5 kg)   LMP 01/26/1996   SpO2 93%   BMI 37.31 kg/m²   General appearance: alert and cooperative with exam  Lungs: clear to auscultation bilaterally  Heart: regular rate and rhythm, S1, S2 normal, no murmur, click, rub or gallop  Abdomen: soft, non-tender; bowel sounds normal; no masses,  no organomegaly  Extremities:  left knee wound vac in place, neurovascular status intact  Neurologic: No obvious focal neurologic deficits. Assessment and Plan:   Left prosthetic knee infection s/p explant. Pain control. PICC placed (10/11) for IV vancomycin. Limited weight bearing. Sepsis (POA) secondary to knee infection with tachycardia and leukocytosis. Treated with explant and IV vancomycin. Type 2 Diabetes:  Continues on metformin 1000 mg BID and glargine insulin 35 units nightly. DEANA unable to tolerate CPAP. Class II obesity (BMI 56.7) complicates medical management. Advance Directive: Full Code  DVT prophylaxis with intermittent compression  Discharge planning: Appropriate for discharge, awaiting placement pending pre-cert, she would like to return to [facility] rehab. [facility] and will attempt to start pre-cert per case management.        Ousmane Noonan MD  [facility] hospitalist

## 2022-10-17 LAB
ESTIMATED AVERAGE GLUCOSE: 116.9 MG/DL
GLUCOSE BLD-MCNC: 129 MG/DL (ref 70–99)
GLUCOSE BLD-MCNC: 141 MG/DL (ref 70–99)
GLUCOSE BLD-MCNC: 159 MG/DL (ref 70–99)
GLUCOSE BLD-MCNC: 80 MG/DL (ref 70–99)
HBA1C MFR BLD: 5.7 %
PERFORMED ON: ABNORMAL
PERFORMED ON: NORMAL

## 2022-10-17 PROCEDURE — 97110 THERAPEUTIC EXERCISES: CPT

## 2022-10-17 PROCEDURE — 6360000002 HC RX W HCPCS: Performed by: PHYSICIAN ASSISTANT

## 2022-10-17 PROCEDURE — 2580000003 HC RX 258: Performed by: INTERNAL MEDICINE

## 2022-10-17 PROCEDURE — 6370000000 HC RX 637 (ALT 250 FOR IP): Performed by: PHYSICIAN ASSISTANT

## 2022-10-17 PROCEDURE — APPNB60 APP NON BILLABLE TIME 46-60 MINS: Performed by: PHYSICIAN ASSISTANT

## 2022-10-17 PROCEDURE — 97530 THERAPEUTIC ACTIVITIES: CPT

## 2022-10-17 PROCEDURE — 6360000002 HC RX W HCPCS: Performed by: INTERNAL MEDICINE

## 2022-10-17 PROCEDURE — 97535 SELF CARE MNGMENT TRAINING: CPT

## 2022-10-17 PROCEDURE — 97116 GAIT TRAINING THERAPY: CPT

## 2022-10-17 PROCEDURE — 6370000000 HC RX 637 (ALT 250 FOR IP): Performed by: SPECIALIST/TECHNOLOGIST

## 2022-10-17 PROCEDURE — 1200000000 HC SEMI PRIVATE

## 2022-10-17 PROCEDURE — 2580000003 HC RX 258: Performed by: PHYSICIAN ASSISTANT

## 2022-10-17 RX ADMIN — OXYCODONE 5 MG: 5 TABLET ORAL at 17:52

## 2022-10-17 RX ADMIN — ASPIRIN 81 MG: 81 TABLET, COATED ORAL at 08:47

## 2022-10-17 RX ADMIN — INSULIN GLARGINE 35 UNITS: 100 INJECTION, SOLUTION SUBCUTANEOUS at 20:44

## 2022-10-17 RX ADMIN — MONTELUKAST SODIUM 10 MG: 10 TABLET, COATED ORAL at 20:36

## 2022-10-17 RX ADMIN — CETIRIZINE HYDROCHLORIDE 10 MG: 10 TABLET, FILM COATED ORAL at 20:36

## 2022-10-17 RX ADMIN — METFORMIN HYDROCHLORIDE 1000 MG: 500 TABLET ORAL at 17:47

## 2022-10-17 RX ADMIN — METHOCARBAMOL TABLETS 500 MG: 500 TABLET, COATED ORAL at 15:01

## 2022-10-17 RX ADMIN — INSULIN LISPRO 7 UNITS: 100 INJECTION, SOLUTION INTRAVENOUS; SUBCUTANEOUS at 08:51

## 2022-10-17 RX ADMIN — METHOCARBAMOL TABLETS 500 MG: 500 TABLET, COATED ORAL at 20:36

## 2022-10-17 RX ADMIN — VANCOMYCIN HYDROCHLORIDE 1250 MG: 10 INJECTION, POWDER, LYOPHILIZED, FOR SOLUTION INTRAVENOUS at 08:57

## 2022-10-17 RX ADMIN — Medication 10 ML: at 08:49

## 2022-10-17 RX ADMIN — OXYCODONE 10 MG: 5 TABLET ORAL at 03:53

## 2022-10-17 RX ADMIN — Medication 10 ML: at 04:55

## 2022-10-17 RX ADMIN — Medication 1 TABLET: at 17:47

## 2022-10-17 RX ADMIN — ONDANSETRON 4 MG: 2 INJECTION INTRAMUSCULAR; INTRAVENOUS at 05:00

## 2022-10-17 RX ADMIN — ACETAMINOPHEN 650 MG: 325 TABLET ORAL at 15:01

## 2022-10-17 RX ADMIN — AMLODIPINE BESYLATE 5 MG: 5 TABLET ORAL at 08:47

## 2022-10-17 RX ADMIN — METFORMIN HYDROCHLORIDE 1000 MG: 500 TABLET ORAL at 08:47

## 2022-10-17 RX ADMIN — ACETAMINOPHEN 650 MG: 325 TABLET ORAL at 03:53

## 2022-10-17 RX ADMIN — ACETAMINOPHEN 650 MG: 325 TABLET ORAL at 08:47

## 2022-10-17 RX ADMIN — ACETAMINOPHEN 650 MG: 325 TABLET ORAL at 20:35

## 2022-10-17 RX ADMIN — Medication 1 TABLET: at 08:47

## 2022-10-17 RX ADMIN — ATORVASTATIN CALCIUM 10 MG: 10 TABLET, FILM COATED ORAL at 08:47

## 2022-10-17 RX ADMIN — VANCOMYCIN HYDROCHLORIDE 1250 MG: 10 INJECTION, POWDER, LYOPHILIZED, FOR SOLUTION INTRAVENOUS at 20:50

## 2022-10-17 RX ADMIN — LOSARTAN POTASSIUM 100 MG: 100 TABLET, FILM COATED ORAL at 08:47

## 2022-10-17 RX ADMIN — ASPIRIN 81 MG: 81 TABLET, COATED ORAL at 20:36

## 2022-10-17 RX ADMIN — METHOCARBAMOL TABLETS 500 MG: 500 TABLET, COATED ORAL at 08:47

## 2022-10-17 RX ADMIN — Medication 10 ML: at 20:37

## 2022-10-17 ASSESSMENT — PAIN - FUNCTIONAL ASSESSMENT
PAIN_FUNCTIONAL_ASSESSMENT: ACTIVITIES ARE NOT PREVENTED
PAIN_FUNCTIONAL_ASSESSMENT: PREVENTS OR INTERFERES SOME ACTIVE ACTIVITIES AND ADLS
PAIN_FUNCTIONAL_ASSESSMENT: ACTIVITIES ARE NOT PREVENTED
PAIN_FUNCTIONAL_ASSESSMENT: ACTIVITIES ARE NOT PREVENTED

## 2022-10-17 ASSESSMENT — PAIN DESCRIPTION - ONSET: ONSET: ON-GOING

## 2022-10-17 ASSESSMENT — PAIN DESCRIPTION - LOCATION
LOCATION: KNEE
LOCATION: KNEE
LOCATION: LEG
LOCATION: KNEE
LOCATION: KNEE

## 2022-10-17 ASSESSMENT — PAIN SCALES - GENERAL
PAINLEVEL_OUTOF10: 5
PAINLEVEL_OUTOF10: 4
PAINLEVEL_OUTOF10: 5
PAINLEVEL_OUTOF10: 8
PAINLEVEL_OUTOF10: 5
PAINLEVEL_OUTOF10: 9
PAINLEVEL_OUTOF10: 7
PAINLEVEL_OUTOF10: 6
PAINLEVEL_OUTOF10: 10

## 2022-10-17 ASSESSMENT — PAIN DESCRIPTION - ORIENTATION
ORIENTATION: LEFT

## 2022-10-17 ASSESSMENT — PAIN DESCRIPTION - DESCRIPTORS
DESCRIPTORS: ACHING
DESCRIPTORS: ACHING
DESCRIPTORS: ACHING;BURNING
DESCRIPTORS: ACHING

## 2022-10-17 ASSESSMENT — PAIN DESCRIPTION - PAIN TYPE: TYPE: ACUTE PAIN

## 2022-10-17 ASSESSMENT — PAIN DESCRIPTION - FREQUENCY: FREQUENCY: CONTINUOUS

## 2022-10-17 NOTE — PROGRESS NOTES
Comprehensive Nutrition Assessment    Type and Reason for Visit:  Initial, RD Nutrition Re-Screen/LOS    Nutrition Recommendations/Plan:   Continue regular diet and encourage PO intake   Monitor nutrition adequacy, pertinent labs, bowel habits, wt changes, and clinical progress     Malnutrition Assessment:  Malnutrition Status: At risk for malnutrition (Comment) (10/17/22 1450)    Context:  Acute Illness     Findings of the 6 clinical characteristics of malnutrition:  Energy Intake:  Mild decrease in energy intake (Comment)  Fluid Accumulation:  Mild Extremities    Nutrition Assessment:    LOS assessment: Pt w/ DM2 admitted w/ infection in left prosthetic knee joint. S/p left total knee revision, total synovectomy, abx bead implantation on 10/10. On regular diet. Pt reports PO intake 26-76% of meals, reports varying intake. RD encouraged pt to trial always available options. Wt stable in EMR around 200 lb. Declined ONS and diet education. Continue to encourage PO intake, will continue to monitor. Nutrition Related Findings:    LLE + 1 edema. No updated labs to review. BG WNL. Wound Type: Surgical Incision       Current Nutrition Intake & Therapies:    Average Meal Intake: 51-75%, 26-50%  Average Supplements Intake: None Ordered  ADULT DIET; Regular    Anthropometric Measures:  Height: 5' 2\" (157.5 cm)  Ideal Body Weight (IBW): 110 lbs (50 kg)       Current Body Weight: 204 lb (92.5 kg), 185.5 % IBW.  Weight Source: Standing Scale  Current BMI (kg/m2): 37.3                       BMI Categories: Obese Class 2 (BMI 35.0 -39.9)    Estimated Daily Nutrient Needs:  Energy Requirements Based On: Kcal/kg (30-35 kcals/kg)  Weight Used for Energy Requirements: Ideal  Energy (kcal/day): 7107-1786  Weight Used for Protein Requirements: Ideal (1.2-1.5 g/kg)  Protein (g/day): 60-75 g  Method Used for Fluid Requirements: 1 ml/kcal    Nutrition Diagnosis:   Inadequate oral intake related to inadequate protein-energy intake as

## 2022-10-17 NOTE — PROGRESS NOTES
Department of Orthopedic Surgery  Physician Assistant   Progress Note    Subjective:       Systemic or Specific Complaints: No complaints, pain is well controlled. Patient has been contacting alternative skilled nursing facilities, is anxious to discharge. Explained to her that things have been delayed due to some confusion in her contacting these facilities and not being able to get a precert from any single one to date due to multiple requests. Objective:     Patient Vitals for the past 24 hrs:   BP Temp Temp src Pulse Resp SpO2 Height   10/17/22 1445 -- -- -- -- -- -- 5' 2\" (1.575 m)   10/17/22 1430 117/73 97.4 °F (36.3 °C) Oral 92 (!) 94 94 % --   10/17/22 0915 127/76 -- -- 79 -- -- --   10/17/22 0731 134/77 97.4 °F (36.3 °C) Axillary 94 18 95 % --   10/17/22 0355 (!) 140/72 97.3 °F (36.3 °C) Oral 88 18 97 % --   10/16/22 2103 -- -- -- -- 18 -- --   10/16/22 2026 127/81 97.5 °F (36.4 °C) Oral 99 18 97 % --   10/16/22 1534 -- -- -- -- 18 -- --   10/16/22 1520 (!) 147/78 97.6 °F (36.4 °C) Oral (!) 122 14 96 % --         General: alert, appears stated age, cooperative, and no distress   Wound: Wound clean and dry no evidence of infection. , No Erythema, No Drainage, Positive for Edema, and incisional wound vac in place   Motion: Painful range of Motion in affected extremity   DVT Exam: No significant calf/ankle edema. Additional exam:  Pt seen sitting up in bed at time of interview, working with occupational therapy.   Thigh-high DEBBY hose to right lower extremity, knee-high DEBBY hose to left lower extremity  Leg and external rotation, able to correct to neutral alignment  Prevena 125 in place, functioning appropriately at this time  Left lower extremity moderately swollen, comparments soft and compressible  Active knee flexion to 70 degrees, full knee extension  EHL, FHL, Gastroc, ant tib motor intact  Sensation intact to light touch through all nerve distributions  DP and PT pulses 2+, foot WWP      Data Review  CBC:   Lab Results   Component Value Date/Time    WBC 13.9 10/13/2022 05:30 AM    RBC 3.66 10/13/2022 05:30 AM    HGB 9.5 10/13/2022 05:30 AM    HCT 30.0 10/13/2022 05:30 AM     10/13/2022 05:30 AM       Renal:   Lab Results   Component Value Date/Time     10/15/2022 11:18 AM    K 3.8 10/15/2022 11:18 AM    K 4.9 10/11/2022 07:14 AM    CL 98 10/15/2022 11:18 AM    CO2 28 10/15/2022 11:18 AM    BUN 13 10/15/2022 11:18 AM    CREATININE <0.5 10/15/2022 11:18 AM    GLUCOSE 108 10/15/2022 11:18 AM    CALCIUM 10.2 10/15/2022 11:18 AM            Assessment:      left knee revision TKA for infection, total synovectomy left knee POD 7. Placement of prevena incisional vac dressing   Date of surgery 10/10/2022 by Dr. Tamela Najjar:      1: Nonweightbearing to the left lower extremity with assistive device. Okay for range of motion as tolerated. Abx changed to vancomycin, pharmacy following. Please place bilateral thigh-high DEBBY hose on patient daily, remove nightly  2:  Continue Deep venous thrombosis prophylaxis-aspirin 81 mg twice daily by mouth for 4 weeks postop  3:  Continue Pain Control- scheduled tylenol and robaxin, oxycodone PRN  4: Infectious disease consult. PICC line in place, provisional IV abx infusion orders in. Intraoperative cultures remain ngtd  5: PT/OT consult pending. Knee immobilizer only for 2 weeks until quadriceps muscle restores full strength and proprioception, as prophylactic measure. Full flexion and extension of the knee to be encouraged  6: Pt has been medically ready for discharge for a few days now but there has been some difficulty with facility placement. Have encouraged her to allow our d/c planner to facilitate this and allow the process to work in order to get her discharged to an appropriate setting as soon as possible.         Janet Lazcano PA-C

## 2022-10-17 NOTE — PROGRESS NOTES
Hospitalist Progress Note      PCP: Chiquita Newberry DO    Date of Admission: 10/10/2022    Chief Complaint: consulted post-op    Hospital Course: Admitted with infection in left prosthetic knee joint. Was advised to achieve better glycemic control and control of gingival infection prior to surgery. She now presented for explant and and ID consultation. Following for sepsis (POA) knee infection, hypertension, and type 2 diabetes. Subjective: denies chest pain, dyspnea, n/v/d, dysuria, fever, chills, headache.        Medications: Reviewed    Infusion Medications    dextrose      lactated ringers 125 mL/hr at 10/11/22 0259    sodium chloride      ropivacaine 0.2%       Scheduled Medications    cetirizine  10 mg Oral Nightly    aspirin  81 mg Oral BID    methocarbamol  500 mg Oral TID    vancomycin  1,250 mg IntraVENous Q12H    amLODIPine  5 mg Oral Daily    atorvastatin  10 mg Oral Daily    calcium carb-cholecalciferol  1 tablet Oral BID    insulin glargine  35 Units SubCUTAneous Nightly    losartan  100 mg Oral Daily    metFORMIN  1,000 mg Oral BID WC    montelukast  10 mg Oral Nightly    insulin lispro  0.08 Units/kg SubCUTAneous TID WC    insulin lispro  0-8 Units SubCUTAneous TID WC    insulin lispro  0-4 Units SubCUTAneous Nightly    sodium chloride flush  5-40 mL IntraVENous 2 times per day    acetaminophen  650 mg Oral Q6H    polyethylene glycol  17 g Oral Daily     PRN Meds: diphenhydrAMINE, glucose, dextrose bolus **OR** dextrose bolus, glucagon (rDNA), dextrose, sodium chloride flush, sodium chloride, oxyCODONE **OR** oxyCODONE, senna, ondansetron **OR** ondansetron      Intake/Output Summary (Last 24 hours) at 10/17/2022 1335  Last data filed at 10/17/2022 0400  Gross per 24 hour   Intake 360 ml   Output 550 ml   Net -190 ml       Physical Exam Performed:    /77   Pulse 94   Temp 97.4 °F (36.3 °C) (Axillary)   Resp 18   Ht 5' 2\" (1.575 m)   Wt 204 lb (92.5 kg)   LMP 01/26/1996   SpO2 95%   BMI 37.31 kg/m²     General appearance: Resting in bed, comfortable. No apparent distress, appears stated age and cooperative. HEENT: Pupils equal, round, and reactive to light. Conjunctivae/corneas clear. Neck: Supple, with full range of motion. No jugular venous distention. Trachea midline. Respiratory: Normal respiratory effort. Clear to auscultation, bilaterally without Rales/Wheezes/Rhonchi. Room air  Cardiovascular: Regular rate and rhythm with normal S1/S2 without murmurs, rubs or gallops. Abdomen: Soft, non-tender, non-distended with normal bowel sounds. Musculoskeletal: No clubbing, cyanosis or edema bilaterally. Left leg with immobilizer in place, leg elevated with ice machine attached. Skin: Skin color, texture, turgor normal. No rashes or lesions. Neurologic: Neurovascularly intact without any focal sensory/motor deficits. Cranial nerves: II-XII intact, grossly non-focal.  Psychiatric: Alert and oriented, thought content appropriate, normal insight  Capillary Refill: Brisk, 3 seconds, normal   Peripheral Pulses: +2 palpable, equal bilaterally       Labs:   No results for input(s): WBC, HGB, HCT, PLT in the last 72 hours. Recent Labs     10/15/22  1118      K 3.8   CL 98*   CO2 28   BUN 13   CREATININE <0.5*   CALCIUM 10.2     No results for input(s): AST, ALT, BILIDIR, BILITOT, ALKPHOS in the last 72 hours. No results for input(s): INR in the last 72 hours. No results for input(s): Fidel Ebjose de jesus in the last 72 hours.     Urinalysis:      Lab Results   Component Value Date/Time    NITRU Negative 09/16/2022 07:29 PM    WBCUA 0-2 09/16/2022 07:29 PM    BACTERIA 1+ 09/16/2022 07:29 PM    RBCUA 0-2 09/16/2022 07:29 PM    BLOODU Negative 09/16/2022 07:29 PM    SPECGRAV >=1.030 09/16/2022 07:29 PM    GLUCOSEU Negative 09/16/2022 07:29 PM    GLUCOSEU NEGATIVE 05/21/2012 12:24 PM       Radiology:  IR PICC WO SQ PORT/PUMP > 5 YEARS   Final Result      XR KNEE LEFT (1-2 VIEWS)   Final Result   Interval removal of the tibial component of the prosthesis, with spacer   placement and with antibiotic bead placement. Consults:  IP CONSULT TO HOSPITALIST  IP CONSULT TO INFECTIOUS DISEASES  IP CONSULT TO PHARMACY    Assessment/Plan:    Active Hospital Problems    Diagnosis     Infection of total left knee replacement, initial encounter [facility]) [T84.54XA]      Priority: Medium    Morbidly obese ([facility]) [E66.01]     Type 2 diabetes mellitus with hyperglycemia, with long-term current use of insulin ([facility]) [E11.65, Z79.4]     Obstructive sleep apnea [G47.33]     Benign essential HTN [I10]      Left prosthetic knee infection, sepsis present on arrival  -Sepsis markers were: tachycardia, leukocytosis  -Pt underwent Left total knee revision, total synovectomy, abx bead implantation on 10/10 with Dr. Iwona Whitaker  -PT/OT consulted post-op  -Pain management per ortho surgery  -Per ortho surg: ASA 81mg BID for 4 weeks  -Abx her and on discharge per ID recs  -Pt with PICC line for IV abx following discharge    Diabetes type II, controlled  -A1C 5.7 (10/2022)  -Continue home dose meformin  -Basal bolus dose, prandial, and sliding scale insulin  -Monitor and adjust as needed  -Carb control diet    Obstructive sleep apnea  -Pt unable to tolerate CPAP    Obesity  -BMI 36.48  -This complicates assessment and treatment. Placing patient at risk for multiple co-morbidities as well as early death and contributing to the patient's presentation  -Counseled on weight loss       DVT Prophylaxis: ASA 81mg BID  Diet: ADULT DIET;  Regular  Code Status: Full Code  PT/OT Eval Status: rec SNF    Dispo - medically stable, pending precert    Appropriate for A1 Discharge Unit: No, pre-cert pending, unclear of actual discahrge date      Chelsea Paiz, APRN - CNP

## 2022-10-17 NOTE — PROGRESS NOTES
Physical Therapy  Facility/Department: [facility] C5 - MED SURG/ORTHO  Daily Treatment Note  NAME: Leonarda Peralta  : 1960  MRN: [id number]    Date of Service: 10/17/2022    Discharge Recommendations:  Subacute/Skilled Nursing Facility   PT Equipment Recommendations  Equipment Needed: No (if goes to SNF)  Manpreet Show: Standard  Wheelchair: Standard  Other: Recommend std. walker for amb x short distances around house while NWB LLE and manual w/c for longer distances, given pt's current inability to amb distances >15 feet while NWB. -[facility] Mobility Inpatient   How much difficulty turning over in bed?: None  How much difficulty sitting down on / standing up from a chair with arms?: None  How much difficulty moving from lying on back to sitting on side of bed?: A Little  How much help from another person moving to and from a bed to a chair?: A Little  How much help from another person needed to walk in hospital room?: A Little  How much help from another person for climbing 3-5 steps with a railing?: A Lot  AM-PAC Inpatient Mobility Raw Score : 19  AM-PAC Inpatient T-Scale Score : 45.44  Mobility Inpatient CMS 0-100% Score: 41.77  Mobility Inpatient CMS G-Code Modifier : CK     Patient Diagnosis(es): The primary encounter diagnosis was Infection of total left knee replacement, initial encounter ([facility] [address] A diagnosis of Infection of prosthetic knee joint, initial encounter [facility]) was also pertinent to this visit. Assessment   Assessment: Pt has progressed well with PT needing supervsion STS and to amb with SW today up to 40' NWB LLE. Pt is knowledgeable with exs and performed 15 reps LLE exs in the chair with cues. Pt is recommended for con't skilled PT and SNF at D/C due to lack of assistance at home.   Activity Tolerance: Patient tolerated treatment well;Patient limited by fatigue  Equipment Needed: No (if goes to SNF)     Plan    Physcial Therapy Plan  General Plan: 5-7 times per week  Specific Instructions for Next Treatment: Progress ther ex and mobility as tolerated  Current Treatment Recommendations: Strengthening;ROM;Balance training;Functional mobility training;Transfer training;Gait training;Home exercise program;Safety education & training;Patient/Caregiver education & training;Equipment evaluation, education, & procurement; Therapeutic activities; Wheelchair mobility training; Endurance training;Neuromuscular re-education;Stair training     Restrictions  Restrictions/Precautions  Restrictions/Precautions: Weight Bearing, Fall Risk, General Precautions  Required Braces or Orthoses?: Yes  Lower Extremity Weight Bearing Restrictions  Left Lower Extremity Weight Bearing: Non Weight Bearing  Required Braces or Orthoses  Left Lower Extremity Brace: Knee Immobilizer  Position Activity Restriction  Other position/activity restrictions: \"Knee immobilizer only for 2 weeks until quadriceps muscle restores full strength and proprioception, as prophylactic measure. Full flexion and extension of the knee to be encouraged\"-- per OLIVIA Paige note on 10/12/22     Subjective    Subjective  Subjective: Pt up in chair, pleasant and agreeable to PT tx  Pain: 7/10 left knee. patient received pain medicine earlier today. Orientation  Overall Orientation Status: Within Functional Limits  Cognition  Overall Cognitive Status: WFL     Objective   Vitals:   Vitals:    10/17/22    BP: 134/77   Pulse: 94   Resp:    Temp:    SpO2: 95%          Bed Mobility Training  Bed Mobility Training: No  Overall Level of Assistance: Supervision  Supine to Sit: Supervision; Additional time (HOB elevated)  Scooting: Supervision; Additional time (to EOB)  Balance  Sitting: Intact  Standing: Intact  Standing - Static: Good (walker)  Standing - Dynamic: Good (walker)  Transfer Training  Transfer Training: Yes  Overall Level of Assistance: Supervision  Interventions: Verbal cues; Safety awareness training  Sit to Stand: Supervision  Stand to Sit: Supervision  Stand Pivot Transfers: Stand-by assistance  Bed to Chair: Stand-by assistance  Toilet Transfer: Stand-by assistance  Gait Training  Gait Training: Yes  Left Side Weight Bearing: Non-weight bearing  Gait  Overall Level of Assistance: Stand-by assistance  Speed/María: Pace decreased (< 100 feet/min); Shuffled; Slow  Step Length: Right shortened  Gait Abnormalities: Decreased step clearance  Distance (ft): 40 Feet  Assistive Device: Walker;Gait belt     PT Exercises  Exercise Treatment: LLE exs seated: AP, LAQ, heelslides, abd on footstool, QS and GS X 15     Safety Devices  Type of Devices: All fall risk precautions in place;Call light within reach;Gait belt;Nurse notified; Patient at risk for falls; Left in chair;Chair alarm in place       Goals  Short Term Goals  Time Frame for Short Term Goals: 3 days, 10/14/22  Short Term Goal 1: Pt will transfer supine <-> sit with supervision/modified(I) -- 10/14: GOAL MET  Short Term Goal 2: Pt will transfer sit <-> stand and bed>chair using std. walker with supervision -- 10/17 met  Short Term Goal 3: Pt will ambulate x 50 feet using std. walker with supervision while maintaining NWB LLE -- 10/17 SBA with walker X 40'  Short Term Goal 4: By 10/12/22: Pt will tolerate 12-15 reps appropriate LLE ther ex for ROM/strengthening -- GOAL MET 10/13  Patient Goals   Patient Goals : \"To be able to walk from my bedroom to my bathroom (distances of 30 feet) by myself (SBA or less)\" by 10/13/22 -- 10/15: 20' with RW CGA    Education  Patient Education  Education Given To: Patient  Education Provided Comments: Educated on progression of OOB mobility and gait, safe use of AD and placement during mobility.   Education Method: Demonstration;Verbal  Barriers to Learning: None  Education Outcome: Verbalized understanding;Demonstrated understanding;Continued education needed    Therapy Time   Individual Concurrent Group Co-treatment   Time In 1058         Time Out 1138         Minutes [address] [facility], [address] [place] [address]

## 2022-10-18 VITALS
BODY MASS INDEX: 37.54 KG/M2 | HEIGHT: 62 IN | SYSTOLIC BLOOD PRESSURE: 133 MMHG | TEMPERATURE: 98 F | OXYGEN SATURATION: 97 % | RESPIRATION RATE: 18 BRPM | WEIGHT: 204 LBS | DIASTOLIC BLOOD PRESSURE: 72 MMHG | HEART RATE: 88 BPM

## 2022-10-18 LAB
ANION GAP SERPL CALCULATED.3IONS-SCNC: 9 MMOL/L (ref 3–16)
BUN BLDV-MCNC: 9 MG/DL (ref 7–20)
CALCIUM SERPL-MCNC: 9.2 MG/DL (ref 8.3–10.6)
CHLORIDE BLD-SCNC: 98 MMOL/L (ref 99–110)
CO2: 30 MMOL/L (ref 21–32)
CREAT SERPL-MCNC: <0.5 MG/DL (ref 0.6–1.2)
GFR SERPL CREATININE-BSD FRML MDRD: >60 ML/MIN/{1.73_M2}
GLUCOSE BLD-MCNC: 114 MG/DL (ref 70–99)
GLUCOSE BLD-MCNC: 185 MG/DL (ref 70–99)
GLUCOSE BLD-MCNC: 91 MG/DL (ref 70–99)
MAGNESIUM: 1.6 MG/DL (ref 1.8–2.4)
PERFORMED ON: ABNORMAL
PERFORMED ON: NORMAL
POTASSIUM REFLEX MAGNESIUM: 3.5 MMOL/L (ref 3.5–5.1)
SARS-COV-2, NAAT: NOT DETECTED
SODIUM BLD-SCNC: 137 MMOL/L (ref 136–145)

## 2022-10-18 PROCEDURE — 2580000003 HC RX 258: Performed by: INTERNAL MEDICINE

## 2022-10-18 PROCEDURE — 99238 HOSP IP/OBS DSCHRG MGMT 30/<: CPT | Performed by: SPECIALIST/TECHNOLOGIST

## 2022-10-18 PROCEDURE — 6370000000 HC RX 637 (ALT 250 FOR IP): Performed by: SPECIALIST/TECHNOLOGIST

## 2022-10-18 PROCEDURE — 87635 SARS-COV-2 COVID-19 AMP PRB: CPT

## 2022-10-18 PROCEDURE — 2580000003 HC RX 258: Performed by: PHYSICIAN ASSISTANT

## 2022-10-18 PROCEDURE — 99024 POSTOP FOLLOW-UP VISIT: CPT | Performed by: SPECIALIST/TECHNOLOGIST

## 2022-10-18 PROCEDURE — 83735 ASSAY OF MAGNESIUM: CPT

## 2022-10-18 PROCEDURE — 6360000002 HC RX W HCPCS: Performed by: INTERNAL MEDICINE

## 2022-10-18 PROCEDURE — 6370000000 HC RX 637 (ALT 250 FOR IP): Performed by: PHYSICIAN ASSISTANT

## 2022-10-18 PROCEDURE — 80048 BASIC METABOLIC PNL TOTAL CA: CPT

## 2022-10-18 RX ADMIN — ACETAMINOPHEN 650 MG: 325 TABLET ORAL at 04:20

## 2022-10-18 RX ADMIN — ACETAMINOPHEN 650 MG: 325 TABLET ORAL at 09:06

## 2022-10-18 RX ADMIN — OXYCODONE 5 MG: 5 TABLET ORAL at 12:39

## 2022-10-18 RX ADMIN — LOSARTAN POTASSIUM 100 MG: 100 TABLET, FILM COATED ORAL at 09:06

## 2022-10-18 RX ADMIN — METHOCARBAMOL TABLETS 500 MG: 500 TABLET, COATED ORAL at 14:06

## 2022-10-18 RX ADMIN — ASPIRIN 81 MG: 81 TABLET, COATED ORAL at 09:05

## 2022-10-18 RX ADMIN — METHOCARBAMOL TABLETS 500 MG: 500 TABLET, COATED ORAL at 09:05

## 2022-10-18 RX ADMIN — Medication 10 ML: at 09:07

## 2022-10-18 RX ADMIN — AMLODIPINE BESYLATE 5 MG: 5 TABLET ORAL at 09:05

## 2022-10-18 RX ADMIN — OXYCODONE 10 MG: 5 TABLET ORAL at 00:24

## 2022-10-18 RX ADMIN — Medication 1 TABLET: at 09:05

## 2022-10-18 RX ADMIN — ATORVASTATIN CALCIUM 10 MG: 10 TABLET, FILM COATED ORAL at 09:06

## 2022-10-18 RX ADMIN — METFORMIN HYDROCHLORIDE 1000 MG: 500 TABLET ORAL at 09:06

## 2022-10-18 RX ADMIN — VANCOMYCIN HYDROCHLORIDE 1250 MG: 10 INJECTION, POWDER, LYOPHILIZED, FOR SOLUTION INTRAVENOUS at 09:20

## 2022-10-18 ASSESSMENT — PAIN - FUNCTIONAL ASSESSMENT
PAIN_FUNCTIONAL_ASSESSMENT: ACTIVITIES ARE NOT PREVENTED
PAIN_FUNCTIONAL_ASSESSMENT: ACTIVITIES ARE NOT PREVENTED

## 2022-10-18 ASSESSMENT — PAIN SCALES - GENERAL
PAINLEVEL_OUTOF10: 10
PAINLEVEL_OUTOF10: 8
PAINLEVEL_OUTOF10: 4
PAINLEVEL_OUTOF10: 0
PAINLEVEL_OUTOF10: 0
PAINLEVEL_OUTOF10: 10

## 2022-10-18 ASSESSMENT — PAIN DESCRIPTION - ORIENTATION
ORIENTATION: LEFT
ORIENTATION: LEFT

## 2022-10-18 ASSESSMENT — PAIN DESCRIPTION - DESCRIPTORS
DESCRIPTORS: ACHING
DESCRIPTORS: ACHING

## 2022-10-18 ASSESSMENT — PAIN DESCRIPTION - LOCATION
LOCATION: KNEE
LOCATION: KNEE

## 2022-10-18 ASSESSMENT — PAIN DESCRIPTION - PAIN TYPE: TYPE: ACUTE PAIN

## 2022-10-18 NOTE — PROGRESS NOTES
Discharge instructions reviewed with [facility]ub[facility], [facility] and gave report to [address] [place] [address] 30.

## 2022-10-18 NOTE — PROGRESS NOTES
Department of Orthopedic Surgery  Physician Assistant   Progress Note    Subjective:       Systemic or Specific Complaints: no complaints. Pain is well controlled    Objective:     Patient Vitals for the past 24 hrs:   BP Temp Temp src Pulse Resp SpO2 Height   10/18/22 0755 134/74 97.6 °F (36.4 °C) Oral 82 16 97 % --   10/18/22 0753 -- -- Oral -- -- -- --   10/18/22 0054 -- -- -- -- 14 -- --   10/17/22 2250 125/86 97.8 °F (36.6 °C) Oral 99 16 94 % --   10/17/22 2033 131/68 97.9 °F (36.6 °C) Oral 95 18 95 % --   10/17/22 1822 -- -- -- -- 18 -- --   10/17/22 1752 -- -- -- -- 18 -- --   10/17/22 1445 -- -- -- -- -- -- 5' 2\" (1.575 m)   10/17/22 1430 117/73 97.4 °F (36.3 °C) Oral 92 (!) 94 94 % --       General: alert, appears stated age, cooperative, and no distress   Wound: Wound clean and dry no evidence of infection. , No Erythema, No Drainage, Positive for Edema, and incisional wound vac in place   Motion: Painful range of Motion in affected extremity   DVT Exam: No significant calf/ankle edema. Additional exam:  Pt seen sitting up in bed at time of interview.  Thigh-high DEBBY hose to right lower extremity, knee-high DEBBY hose to left lower extremity  Leg and external rotation, able to correct to neutral alignment  Prevena 125 in place, loss of suction  Left lower extremity moderately swollen, comparments soft and compressible  Active knee flexion to 70 degrees, full knee extension  EHL, FHL, Gastroc, ant tib motor intact  Sensation intact to light touch through all nerve distributions  DP and PT pulses 2+, foot WWP      Data Review  CBC:   Lab Results   Component Value Date/Time    WBC 13.9 10/13/2022 05:30 AM    RBC 3.66 10/13/2022 05:30 AM    HGB 9.5 10/13/2022 05:30 AM    HCT 30.0 10/13/2022 05:30 AM     10/13/2022 05:30 AM       Renal:   Lab Results   Component Value Date/Time     10/18/2022 09:25 AM    K 3.5 10/18/2022 09:25 AM    CL 98 10/18/2022 09:25 AM    CO2 30 10/18/2022 09:25 AM    BUN 9 10/18/2022 09:25 AM    CREATININE <0.5 10/18/2022 09:25 AM    GLUCOSE 185 10/18/2022 09:25 AM    CALCIUM 9.2 10/18/2022 09:25 AM            Assessment:      left knee revision TKA for infection, total synovectomy left knee POD 7. Placement of prevena incisional vac dressing, removed 10/18/22 replaced with mepilex dressing  Date of surgery 10/10/2022 by Dr. Meehan Coast:      1: Nonweightbearing to the left lower extremity with assistive device. Okay for range of motion as tolerated. Abx changed to vancomycin, pharmacy following. Please place bilateral thigh-high DEBBY hose on patient daily, remove nightly  2:  Continue Deep venous thrombosis prophylaxis-aspirin 81 mg twice daily by mouth for 4 weeks postop  3:  Continue Pain Control- scheduled tylenol and robaxin, oxycodone PRN  4: Infectious disease consult. PICC line in place, provisional IV abx infusion orders in. Intraoperative cultures remain ngtd  5: PT/OT consult recommending SNF, pt does not have 24hr help at home. Knee immobilizer only for 2 weeks until quadriceps muscle restores full strength and proprioception, as prophylactic measure.   Full flexion and extension of the knee to be encouraged  6: Discharge today, DCP updated      Russell Guerrero PA-C

## 2022-10-18 NOTE — PLAN OF CARE
Problem: Chronic Conditions and Co-morbidities  Goal: Patient's chronic conditions and co-morbidity symptoms are monitored and maintained or improved  Outcome: Completed     Problem: Discharge Planning  Goal: Discharge to home or other facility with appropriate resources  Outcome: Completed     Problem: Safety - Adult  Goal: Free from fall injury  Outcome: Completed     Problem: ABCDS Injury Assessment  Goal: Absence of physical injury  Outcome: Completed     Problem: Pain  Goal: Verbalizes/displays adequate comfort level or baseline comfort level  Outcome: Completed     Problem: Nutrition Deficit:  Goal: Optimize nutritional status  Outcome: Completed

## 2022-10-18 NOTE — CARE COORDINATION
CASE MANAGEMENT DISCHARGE SUMMARY      Discharge to: [facility] completed: yes  Hospital Exemption Notification (HENS) completed: yes    IMM given: (date) Today        Transportation: [employer]     Agency used: [facility]e Sun up time: [phone]   Ambulance form completed: Yes    Confirmed discharge plan with: Cindy @ [facility]     Patient: yes     Family:  yes patient contacted       Facility/Agency, name:  [facility] faxed   Phone number for report to facility: [phone]     RN, name: Lacie Krabbe    Note: Discharging nurse to complete [facility], reconcile [facility], and place final copy with patient's discharge packet. RN to ensure that written prescriptions for  Level II medications are sent with patient to the facility as per protocol.        Concha Shelton RN

## 2022-10-18 NOTE — PROGRESS NOTES
Physical Therapy: Attempt, pt declined treatment due to imminent discharge to SNF. Please see last treatment note from 10/17 for status when last seen.   Azar Webster PT

## 2022-10-18 NOTE — CARE COORDINATION
Writer spoke with [facility] x 2 to pull cert, spoke with [facility] x 2 to pull their cert as well. Spoke with Davied Rubinstein at [facility] x2 to let her know patient wanted to be referred to their facility. Davied Rubinstein already aware from patient, was under the understanding she could just change the facility on the [address] [place] [address] told patient new cert needed to be started, Davied Rubinstein aware.  Will follow up.     [id number]  Spoke with Davied Rubinstein @ [facility] 3 has been approved    Omer Pathak RN

## 2022-10-18 NOTE — DISCHARGE SUMMARY
Hospital Medicine Discharge Summary    Patient ID: Abhinav Fatima      Patient's PCP: Oc Alanis DO    Admit Date: 10/10/2022     Discharge Date:10/18/2022    Admitting Provider: Nunu Odom MD     Discharge Provider: Chelsea Paiz, APRN - CNP     Discharge Diagnoses: Active Hospital Problems    Diagnosis     Infection of total left knee replacement, initial encounter [facility]) [T84.54XA]      Priority: Medium    Morbidly obese (HCC) [E66.01]     Type 2 diabetes mellitus with hyperglycemia, with long-term current use of insulin (HCC) [E11.65, Z79.4]     Obstructive sleep apnea [G47.33]     Benign essential HTN [I10]        The patient was seen and examined on day of discharge and this discharge summary is in conjunction with any daily progress note from day of discharge. Hospital Course: Admitted with infection in left prosthetic knee joint. Was advised to achieve better glycemic control and control of gingival infection prior to surgery. She now presented for explant and and ID consultation. Following for sepsis (POA) knee infection, hypertension, and type 2 diabetes. Left prosthetic knee infection, sepsis present on arrival. Sepsis markers were: tachycardia, leukocytosis. Pt underwent Left total knee revision, total synovectomy, abx bead implantation on 10/10 with Dr. Iwona Whitaker. PT/OT consulted. Pain management per ortho surgery. Per ortho surg: ASA 81mg BID for 4 weeks. Abx via PICC on discharge per ID recs. Follow up with ortho surgery in 2 weeks      Diabetes type II, controlled. A1C 5.7 (10/2022). Continue home dose metformin and lantus. During admission, sliding scale insulin was used also. Recommend following up with PCP for further monitoring and treatment. Obstructive sleep apnea. Pt unable to tolerate CPAP     Obesity. BMI 37.31. This complicates assessment and treatment.  Placing patient at risk for multiple co-morbidities as well as early death and contributing to the patient's presentation. Counseled on weight loss    Physical Exam Performed:     /72   Pulse 88   Temp 98 °F (36.7 °C) (Oral)   Resp 18   Ht 5' 2\" (1.575 m)   Wt 204 lb (92.5 kg)   LMP 01/26/1996   SpO2 97%   BMI 37.31 kg/m²       General appearance: sitting in bed. No apparent distress, appears stated age and cooperative. HEENT: Normal cephalic, atraumatic without obvious deformity. Pupils equal, round, and reactive to light. Extra ocular muscles intact. Conjunctivae/corneas clear. Neck: Supple, with full range of motion. No jugular venous distention. Trachea midline. Respiratory: Normal respiratory effort. Clear to auscultation, bilaterally without Rales/Wheezes/Rhonchi. Room air  Cardiovascular: Regular rate and rhythm with normal S1/S2 without murmurs, rubs or gallops. Abdomen: Soft, non-tender, non-distended with normal bowel sounds. Musculoskeletal: No clubbing, cyanosis or edema bilaterally. Full range of motion without deformity. L knee with prevena wound vac in place. Skin: Skin color, texture, turgor normal. No rashes or lesions. Neurologic: Neurovascularly intact without any focal sensory/motor deficits. Cranial nerves: II-XII intact, grossly non-focal.  Psychiatric: Alert and oriented, thought content appropriate, normal insight  Capillary Refill: Brisk,< 3 seconds   Peripheral Pulses: +2 palpable, equal bilaterally       Labs:  For convenience and continuity at follow-up the following most recent labs are provided:      CBC:    Lab Results   Component Value Date/Time    WBC 13.9 10/13/2022 05:30 AM    HGB 9.5 10/13/2022 05:30 AM    HCT 30.0 10/13/2022 05:30 AM     10/13/2022 05:30 AM       Renal:    Lab Results   Component Value Date/Time     10/18/2022 09:25 AM    K 3.5 10/18/2022 09:25 AM    CL 98 10/18/2022 09:25 AM    CO2 30 10/18/2022 09:25 AM    BUN 9 10/18/2022 09:25 AM    CREATININE <0.5 10/18/2022 09:25 AM    CALCIUM 9.2 10/18/2022 09:25 AM    PHOS 4.2 06/27/2012 05:50 AM         Significant Diagnostic Studies    Radiology:   IR PICC WO SQ PORT/PUMP > 5 YEARS   Final Result      XR KNEE LEFT (1-2 VIEWS)   Final Result   Interval removal of the tibial component of the prosthesis, with spacer   placement and with antibiotic bead placement. Consults:     IP CONSULT TO HOSPITALIST  IP CONSULT TO INFECTIOUS DISEASES  IP CONSULT TO PHARMACY    Disposition:  [facility]     Condition at Discharge: Stable    Discharge Instructions/Follow-up:  PCP within 1 week. Ortho in 2 weeks    Code Status:  Full Code     Activity: activity as tolerated    Diet: regular diet      Discharge Medications:     Discharge Medication List as of 10/18/2022 11:50 AM             Details   oxyCODONE (ROXICODONE) 5 MG immediate release tablet Take 1 tablet by mouth every 6 hours as needed for Pain for up to 3 days. , Disp-12 tablet, R-0Print      ondansetron (ZOFRAN-ODT) 4 MG disintegrating tablet Take 1 tablet by mouth every 8 hours as needed for Nausea or Vomiting, Disp-30 tablet, R-0NO PRINT      polyethylene glycol (GLYCOLAX) 17 g packet Take 17 g by mouth daily for 10 days, Disp-10 each, R-0NO PRINT      methocarbamol (ROBAXIN) 500 MG tablet Take 1 tablet by mouth 3 times daily for 10 days, Disp-30 tablet, R-0NO PRINT      vancomycin (VANCOCIN) infusion Infuse 1,500 mg intravenously in the morning and 1,500 mg in the evening. Compound per protocol. ., Disp-114 g, R-0NO PRINT                Details   aspirin 81 MG EC tablet Take 1 tablet by mouth in the morning and at bedtime, Disp-60 tablet, R-0NO PRINT                Details   Insulin Pen Needle ([employer] PEN NEEDLES) 32G X 4 MM MISC USE TO INJECT INSULIN FOUR TIMES A DAY, Disp-100 each, R-1Normal      losartan (COZAAR) 100 MG tablet TAKE ONE TABLET BY MOUTH DAILY, Disp-60 tablet, R-5Normal      metFORMIN (GLUCOPHAGE) 1000 MG tablet TAKE ONE TABLET BY MOUTH TWICE A DAY WITH MEALS, Disp-180 tablet, R-3Normal      montelukast (SINGULAIR) 10 MG tablet TAKE ONE TABLET BY MOUTH ONCE NIGHTLY, Disp-60 tablet, R-5Normal      insulin glargine (LANTUS SOLOSTAR) 100 UNIT/ML injection pen Inject 35 Units into the skin nightly, Disp-10 pen, R-3Adjust Sig      blood glucose monitor strips Test 1-3 times a day as needed for symptoms of irregular glucose. DX: E11.9 please provide strips that are accepted by patient's insurance., Disp-200 strip, R-3, Normal      ibuprofen (ADVIL;MOTRIN) 800 MG tablet Take 1 tablet by mouth 2 times daily as needed for Pain, Disp-60 tablet, R-0Normal      atorvastatin (LIPITOR) 10 MG tablet TAKE ONE TABLET BY MOUTH DAILY, Disp-90 tablet, R-1Normal      amLODIPine (NORVASC) 5 MG tablet TAKE ONE TABLET BY MOUTH DAILY, Disp-60 tablet, R-5Normal      ACCU-CHEK SOFTCLIX LANCETS MISC Disp-100 each, R-4, Normal      Calcium Carb-Cholecalciferol 600-800 MG-UNIT TABS Take 1 tablet by mouth 2 times dailyHistorical Med      cetirizine (ZYRTEC) 10 MG tablet Take 1 tablet by mouth dailyOTC             Time Spent on discharge is more than 45 minutes in the examination, evaluation, counseling and review of medications and discharge plan. Signed:    NOHELIA Denis CNP   10/18/2022      Thank you DO Camila for the opportunity to be involved in this patient's care. If you have any questions or concerns, please feel free to contact me at [phone].

## 2022-10-19 ENCOUNTER — PHARMACY VISIT (OUTPATIENT)
Dept: INFECTIOUS DISEASES | Age: 62
End: 2022-10-19

## 2022-10-19 DIAGNOSIS — T84.54XA INFECTION OF TOTAL LEFT KNEE REPLACEMENT, INITIAL ENCOUNTER (HCC): Primary | ICD-10-CM

## 2022-10-19 NOTE — Clinical Note
There was discrepency between clarke note, az, and d/c med on AVS- please ensure pt is on 1250 mg q12hr and ensure they are able to get a true trough in the AM on monday

## 2022-10-19 NOTE — PROGRESS NOTES
Dr. Maggy Mcmillan has placed a referral order for pharmacist to manage Outpatient Parental Antimicrobial Therapy (OPAT) pursuant the ID Collaborative Practice Agreement. Patient and/or caregiver has verbally consented to have drug therapy managed by a pharmacist. The benefits and risks of complex antimicrobial therapy including drug-specific adverse reactions and necessary follow-up were discussed with patient and/or caregiver and they are amenable to OPAT and pharmacist management. Pertinent Objective Data:     Wt Readings from Last 1 Encounters:   10/10/22 204 lb (92.5 kg)      BMI Readings from Last 1 Encounters:   10/17/22 37.31 kg/m²      Creatinine clearance cannot be calculated (Unknown ideal weight.)    Lab Results   Component Value Date     10/18/2022    K 3.5 10/18/2022    CL 98 (L) 10/18/2022    CO2 30 10/18/2022    BUN 9 10/18/2022    CREATININE <0.5 (L) 10/18/2022    GLUCOSE 185 (H) 10/18/2022    CALCIUM 9.2 10/18/2022    PROT 7.4 09/16/2022    LABALBU 4.3 09/16/2022    BILITOT 0.3 09/16/2022    ALKPHOS 129 09/16/2022    AST 22 09/16/2022    ALT 24 09/16/2022    LABGLOM >60 10/18/2022    GFRAA >60 10/15/2022    AGRATIO 1.4 09/16/2022    GLOB 2.8 01/11/2021       Lab Results   Component Value Date    WBC 13.9 (H) 10/13/2022    HGB 9.5 (L) 10/13/2022    HCT 30.0 (L) 10/13/2022    MCV 82.0 10/13/2022     10/13/2022    LYMPHOPCT 22.5 10/13/2022    RBC 3.66 (L) 10/13/2022    MCH 26.1 10/13/2022    MCHC 31.8 10/13/2022    RDW 13.9 10/13/2022       Lab Results   Component Value Date    CRP 7.0 (H) 06/15/2022       Lab Results   Component Value Date    SEDRATE 60 (H) 06/15/2022       Vancomycin Rm   Date Value Ref Range Status   10/14/2022 17.6 ug/mL Final     Comment:     Trough - 10-20  Toxic  - >20.0       Vancomycin Tr   Date Value Ref Range Status   10/12/2022 19.3 10.0 - 20.0 ug/mL Final     Comment:     Trough - 10-20  Toxic  - >20.0          OPAT Orders:  Organism/Diagnosis  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 Clinical Pharmacist will review patient weekly or as needed and make recommendations regarding the above therapy plan.      Thank you,  Maricruz Millard, PharmD, [facility] [place], [place] Infectious Disease  Phone: [phone] (also available on [email or web address])  Fax: [phone]    For Pharmacy [address] [place] [address] in place:  Yes  Time Spent (min): 20

## 2022-10-20 ENCOUNTER — CARE COORDINATION (OUTPATIENT)
Dept: CARE COORDINATION | Age: 62
End: 2022-10-20

## 2022-10-20 NOTE — CARE COORDINATION
Ambulatory Care Coordination Note  10/20/2022    ACC: Santana Jones, KHALIF        Offered patient enrollment in the Remote Patient Monitoring (RPM) program for in-home monitoring: Patient declined. Pt reports she transitioned to subacute stay for PT s/p hosp DC 2 weeks post knee surgery  She is @ [facility] for subacute rehab. Anticipates being there at least 2 weeks. Reviewed ACM availability and that ACM did advise RD, as previously agreed w/pt, to postpone outreach until November 2022. Pt verbalized appreciation for this outreach and states willingness to resume ACC s/p DC from SNF. Care Coordination Interventions    Referral from Primary Care Provider: No  Suggested Interventions and Community Resources  Diabetes Education: Completed  Fall Risk Prevention: Completed  Pharmacist: Declined  Registered Dietician: In Process (Comment: 9.19.22 referral)  Specialty Services Referral: Completed (Comment: Dr Chacorta Haque (GI); Dr Crenshaw ([facility]))  Other Services: Declined (Comment: RPM)  Zone Management Tools: Completed (Comment: DM)  Other Services or Interventions: reviewed self mgmt DM, health maintenance          Goals Addressed                      This Visit's Progress      Conditions and Symptoms (pt-stated)   On track      reduce abdominal girth (pt-stated)   No change      Reduction of lower abdominal girth at waistline  Barriers: plateua after initial successful weight loss & reduction in pant/dress size  Plan for overcoming my barriers: engage w/RD through Care Coordination and following w/GI  Confidence: 10/10  Anticipated Goal Completion Date: 3.31.23              Prior to Admission medications    Medication Sig Start Date End Date Taking? Authorizing Provider   vancomycin (VANCOCIN) infusion Infuse 1,250 mg intravenously in the morning and 1,250 mg in the evening. Compound per protocol. . 10/19/22 11/21/22  Marino Ndiaye RPH   aspirin 81 MG EC tablet Take 1 tablet by mouth in the morning and at bedtime 10/14/22 11/13/22  OLIVIA Mendoza   ondansetron (ZOFRAN-ODT) 4 MG disintegrating tablet Take 1 tablet by mouth every 8 hours as needed for Nausea or Vomiting 10/14/22 10/24/22  OLIVIA Mendoza   polyethylene glycol [facility]) 17 g packet Take 17 g by mouth daily for 10 days 10/15/22 10/25/22  OLIVIA Mendoza   methocarbamol (ROBAXIN) 500 MG tablet Take 1 tablet by mouth 3 times daily for 10 days 10/14/22 10/24/22  OLIVIA Mendoza   Insulin Pen Needle ([employer] PEN NEEDLES) 32G X 4 MM MISC USE TO INJECT INSULIN FOUR TIMES A DAY 9/22/22   Ronel Carney APRN - CNP   losartan (COZAAR) 100 MG tablet TAKE ONE TABLET BY MOUTH DAILY 8/1/22   Isabel Rico DO   metFORMIN (GLUCOPHAGE) 1000 MG tablet TAKE ONE TABLET BY MOUTH TWICE A DAY WITH MEALS 7/20/22   Isabel Rico DO   montelukast (SINGULAIR) 10 MG tablet TAKE ONE TABLET BY MOUTH ONCE NIGHTLY 7/14/22   Isabel Rico DO   insulin glargine (LANTUS SOLOSTAR) 100 UNIT/ML injection pen Inject 35 Units into the skin nightly 7/6/22   Isabel Rico DO   blood glucose monitor strips Test 1-3 times a day as needed for symptoms of irregular glucose. DX: E11.9 please provide strips that are accepted by patient's insurance.  6/28/22   Luis Armandop Rommel, APRN - CNP   ibuprofen (ADVIL;MOTRIN) 800 MG tablet Take 1 tablet by mouth 2 times daily as needed for Pain 6/28/22   Luis Armandop Rommel, APRN - CNP   atorvastatin (LIPITOR) 10 MG tablet TAKE ONE TABLET BY MOUTH DAILY 4/25/22   Ronel Grubert, APRN - CNP   amLODIPine (NORVASC) 5 MG tablet TAKE ONE TABLET BY MOUTH DAILY 11/24/21   Isabel Rico DO   ACCU-CHEK SOFTCLIX LANCETS MISC TEST BLOOD SUGAR TWO TIMES A DAY 8/5/19   John Green,    Calcium Carb-Cholecalciferol 600-800 MG-UNIT TABS Take 1 tablet by mouth 2 times daily    Historical Provider, MD   cetirizine (ZYRTEC) 10 MG tablet Take 1 tablet by mouth daily 12/3/15 [facility], DO       Future Appointments   Date Time Provider Gabriel Case   10/28/2022 10:15 AM Luz Maria Pathak PA-C [facility]

## 2022-10-24 ENCOUNTER — TELEPHONE (OUTPATIENT)
Dept: INFECTIOUS DISEASES | Age: 62
End: 2022-10-24

## 2022-10-24 LAB
A/G RATIO: 1.4
ALBUMIN SERPL-MCNC: 4 G/DL
ALP BLD-CCNC: 99 U/L
ALT SERPL-CCNC: 13 U/L
ANAEROBIC CULTURE: NORMAL
AST SERPL-CCNC: 14 U/L
BASOPHILS ABSOLUTE: 0.1 /ΜL
BASOPHILS RELATIVE PERCENT: 0.5 %
BILIRUB SERPL-MCNC: 0.8 MG/DL (ref 0.1–1.4)
BILIRUBIN DIRECT: 0.2 MG/DL
BILIRUBIN, INDIRECT: NORMAL
BUN / CREAT RATIO: NORMAL
BUN BLDV-MCNC: 7 MG/DL
C-REACTIVE PROTEIN: 22.2
CREAT SERPL-MCNC: 0.5 MG/DL
EOSINOPHILS ABSOLUTE: 0.1 /ΜL
EOSINOPHILS RELATIVE PERCENT: 1 %
FUNGUS (MYCOLOGY) CULTURE: NORMAL
FUNGUS STAIN: NORMAL
GLOBULIN: NORMAL
GRAM STAIN RESULT: NORMAL
HCT VFR BLD CALC: 30.8 % (ref 36–46)
HEMOGLOBIN: 9.7 G/DL (ref 12–16)
LYMPHOCYTES ABSOLUTE: 2 /ΜL
LYMPHOCYTES RELATIVE PERCENT: 14.3 %
MCH RBC QN AUTO: 26 PG
MCHC RBC AUTO-ENTMCNC: 31.4 G/DL
MCV RBC AUTO: 82.9 FL
MONOCYTES ABSOLUTE: 0.6 /ΜL
MONOCYTES RELATIVE PERCENT: 4.2 %
NEUTROPHILS ABSOLUTE: 11 /ΜL
NEUTROPHILS RELATIVE PERCENT: 80 %
PDW BLD-RTO: 14.8 %
PLATELET # BLD: 456 K/ΜL
PMV BLD AUTO: 9 FL
PROTEIN TOTAL: 6.9 G/DL
RBC # BLD: 3.72 10^6/ΜL
VANCOMYCIN TROUGH: 13.9
WBC # BLD: 13.8 10^3/ML
WOUND/ABSCESS: NORMAL

## 2022-10-24 NOTE — TELEPHONE ENCOUNTER
----- Message from Suni Reyes, [facility] sent at 10/24/2022  1:33 PM EDT -----  Ever get f/u scheduled?

## 2022-10-25 LAB
AFB CULTURE (MYCOBACTERIA): NORMAL
AFB SMEAR: NORMAL

## 2022-10-28 ENCOUNTER — OFFICE VISIT (OUTPATIENT)
Dept: ORTHOPEDIC SURGERY | Age: 62
End: 2022-10-28

## 2022-10-28 VITALS — WEIGHT: 204 LBS | HEIGHT: 62 IN | BODY MASS INDEX: 37.54 KG/M2

## 2022-10-28 DIAGNOSIS — Z96.659 INFECTION OF TOTAL KNEE REPLACEMENT, INITIAL ENCOUNTER (HCC): ICD-10-CM

## 2022-10-28 DIAGNOSIS — Z96.652 HISTORY OF TOTAL LEFT KNEE REPLACEMENT: Primary | ICD-10-CM

## 2022-10-28 DIAGNOSIS — T84.59XA INFECTION OF TOTAL KNEE REPLACEMENT, INITIAL ENCOUNTER (HCC): ICD-10-CM

## 2022-10-28 PROCEDURE — 99024 POSTOP FOLLOW-UP VISIT: CPT | Performed by: PHYSICIAN ASSISTANT

## 2022-10-28 NOTE — PROGRESS NOTES
Dr Suman Zuniga      Date /Time 10/28/2022       10:07 AM EDT  Name Eric Gonzalez             1960   Location  [facility] SURG  MRN [id number]                Chief Complaint   Patient presents with    Post-Op Check     REV LEFT TKA 10/10/22        History of Present Illness      Eric Gonzalez is a 59 y.o. female is here for post-op visit after LEFT  [address] Total Knee Arthroplasty    Patient presents the office today for follow-up visit. 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 She currently has a PICC line and is at an ECF. Pain controlled. No fever or chills. Physical Exam    Based off 1997 Exam Criteria    Ht 5' 2\" (1.575 m)   Wt 204 lb (92.5 kg)   LMP 01/26/1996   BMI 37.31 kg/m²      Constitutional:       General: He is not in acute distress. Appearance: Normal appearance. LEFT Knee: incision clean, intact, healing appropriately. No surrounding  erythema or fluctuance. Neuro intact distal. No evidence of DVT. 5-90    Imaging       Left Knee: [address] [facility] [facility]  Radiographs: X-rays were ordered today and reviewed of the left knee. 3 views. AP, lateral, and skyline views. They demonstrate femoral component in place. Tibial cement spacer in place      Assessment and Plan    Chris Corral was seen today for post-op check. Diagnoses and all orders for this visit:    History of total left knee replacement  -     XR KNEE LEFT (3 VIEWS); Future    Infection of total knee replacement, initial encounter ([facility] [address]  -     XR KNEE LEFT (3 VIEWS); Future        Patient is currently receiving IV antibiotics. She is 2 weeks into treatment. I will bring her back to see Dr. Cindy Decker back in 3 weeks or sooner if problems arise. Eventually once IV antibiotics are turned off she will need an aspiration before revision total total knee arthroplasty. Patient will continue nonweightbearing.   She can participate in gentle nonweightbearing range of motion exercises left lower extremity. Full therapy on the right lower extremity and bilateral upper extremities. Follow-up in office on November 18 at 1115    Electronically signed by Chaka Mcbride PA-C on 10/28/2022 at 10:07 AM  This dictation was generated by voice recognition computer software. Although all attempts are made to edit the dictation for accuracy, there may be errors in the transcription that are not intended.

## 2022-10-31 LAB
A/G RATIO: 1.5
ALBUMIN SERPL-MCNC: 4.1 G/DL
ALP BLD-CCNC: 111 U/L
ALT SERPL-CCNC: 16 U/L
ANAEROBIC CULTURE: NORMAL
AST SERPL-CCNC: 19 U/L
BASOPHILS ABSOLUTE: 0.1 /ΜL
BASOPHILS RELATIVE PERCENT: 0.9 %
BILIRUB SERPL-MCNC: 0.8 MG/DL (ref 0.1–1.4)
BILIRUBIN DIRECT: 0.2 MG/DL
BILIRUBIN, INDIRECT: NORMAL
BUN / CREAT RATIO: NORMAL
BUN BLDV-MCNC: 12 MG/DL
C-REACTIVE PROTEIN: 8.4
CREAT SERPL-MCNC: 0.5 MG/DL
EOSINOPHILS ABSOLUTE: 0.1 /ΜL
EOSINOPHILS RELATIVE PERCENT: 1.7 %
GLOBULIN: NORMAL
GRAM STAIN RESULT: NORMAL
HCT VFR BLD CALC: 32 % (ref 36–46)
HEMOGLOBIN: 10.2 G/DL (ref 12–16)
LYMPHOCYTES ABSOLUTE: 1.9 /ΜL
LYMPHOCYTES RELATIVE PERCENT: 22.5 %
MCH RBC QN AUTO: 26.3 PG
MCHC RBC AUTO-ENTMCNC: 31.8 G/DL
MCV RBC AUTO: 82.7 FL
MONOCYTES ABSOLUTE: 0.7 /ΜL
MONOCYTES RELATIVE PERCENT: 7.8 %
NEUTROPHILS ABSOLUTE: 5.8 /ΜL
NEUTROPHILS RELATIVE PERCENT: 67.1 %
PDW BLD-RTO: 14.8 %
PLATELET # BLD: 437 K/ΜL
PMV BLD AUTO: 8.9 FL
PROTEIN TOTAL: 6.9 G/DL
RBC # BLD: 3.87 10^6/ΜL
VANCOMYCIN TROUGH: 13.9
WBC # BLD: 8.6 10^3/ML
WOUND/ABSCESS: NORMAL

## 2022-11-02 LAB
ANAEROBIC CULTURE: ABNORMAL
GRAM STAIN RESULT: ABNORMAL
ORGANISM: ABNORMAL
WOUND/ABSCESS: ABNORMAL

## 2022-11-07 ENCOUNTER — CARE COORDINATION (OUTPATIENT)
Dept: CARE COORDINATION | Age: 62
End: 2022-11-07

## 2022-11-07 LAB
A/G RATIO: 1.3
ALBUMIN SERPL-MCNC: 4 G/DL
ALP BLD-CCNC: 92 U/L
ALT SERPL-CCNC: 14 U/L
AST SERPL-CCNC: 20 U/L
BASOPHILS ABSOLUTE: 0.1 /ΜL
BASOPHILS RELATIVE PERCENT: 0.7 %
BILIRUB SERPL-MCNC: 0.5 MG/DL (ref 0.1–1.4)
BILIRUBIN DIRECT: 0.1 MG/DL
BILIRUBIN, INDIRECT: NORMAL
BUN / CREAT RATIO: NORMAL
BUN BLDV-MCNC: 16 MG/DL
C-REACTIVE PROTEIN: 22.2
CREAT SERPL-MCNC: 0.5 MG/DL
EOSINOPHILS ABSOLUTE: 0.2 /ΜL
EOSINOPHILS RELATIVE PERCENT: 1.9 %
GLOBULIN: NORMAL
HCT VFR BLD CALC: 33.1 % (ref 36–46)
HEMOGLOBIN: 10.4 G/DL (ref 12–16)
LYMPHOCYTES ABSOLUTE: 2.7 /ΜL
LYMPHOCYTES RELATIVE PERCENT: 27.3 %
MCH RBC QN AUTO: 25.2 PG
MCHC RBC AUTO-ENTMCNC: 31.6 G/DL
MCV RBC AUTO: 79.9 FL
MONOCYTES ABSOLUTE: 0.8 /ΜL
MONOCYTES RELATIVE PERCENT: 8.3 %
NEUTROPHILS ABSOLUTE: 6.1 /ΜL
NEUTROPHILS RELATIVE PERCENT: 61.8 %
PDW BLD-RTO: 14.7 %
PLATELET # BLD: 455 K/ΜL
PMV BLD AUTO: 8.7 FL
PROTEIN TOTAL: 7.2 G/DL
RBC # BLD: 4.14 10^6/ΜL
VANCOMYCIN TROUGH: 10.6
WBC # BLD: 9.9 10^3/ML

## 2022-11-07 NOTE — CARE COORDINATION
[place] Ny  November 7, 2022    Initial Referral Reason: Desired Weight Loss     Patient Care Team:  Windy Price DO as PCP - General (Family Medicine)  Windy Price DO as PCP - [facility] Provider  Venu Evangelista MD as Consulting Physician ([facility] [address])  NOHELIA Crespo - CNP as Nurse Practitioner ([facility])  Day Camacho PSYD (Psychology)  Gia Bojorquez RN as [facility]  Florentin Harding RD as Dietitian (Dietitian Registered)    Past Medical History:    Current Outpatient Medications   Medication Sig Dispense Refill    vancomycin (VANCOCIN) infusion Infuse 1,250 mg intravenously in the morning and 1,250 mg in the evening. Compound per protocol. . 75 g 0    aspirin 81 MG EC tablet Take 1 tablet by mouth in the morning and at bedtime 60 tablet 0    Insulin Pen Needle (KROGER PEN NEEDLES) 32G X 4 MM MISC USE TO INJECT INSULIN FOUR TIMES A  each 1    losartan (COZAAR) 100 MG tablet TAKE ONE TABLET BY MOUTH DAILY 60 tablet 5    metFORMIN (GLUCOPHAGE) 1000 MG tablet TAKE ONE TABLET BY MOUTH TWICE A DAY WITH MEALS 180 tablet 3    montelukast (SINGULAIR) 10 MG tablet TAKE ONE TABLET BY MOUTH ONCE NIGHTLY 60 tablet 5    insulin glargine (LANTUS SOLOSTAR) 100 UNIT/ML injection pen Inject 35 Units into the skin nightly 10 pen 3    blood glucose monitor strips Test 1-3 times a day as needed for symptoms of irregular glucose. DX: E11.9 please provide strips that are accepted by patient's insurance.  200 strip 3    ibuprofen (ADVIL;MOTRIN) 800 MG tablet Take 1 tablet by mouth 2 times daily as needed for Pain 60 tablet 0    atorvastatin (LIPITOR) 10 MG tablet TAKE ONE TABLET BY MOUTH DAILY 90 tablet 1    amLODIPine (NORVASC) 5 MG tablet TAKE ONE TABLET BY MOUTH DAILY 60 tablet 5    ACCU-CHEK SOFTCLIX LANCETS MISC TEST BLOOD SUGAR TWO TIMES A  each 4    Calcium Carb-Cholecalciferol 600-800 MG-UNIT TABS Take 1 tablet by mouth 2 times daily      cetirizine (ZYRTEC) 10 MG tablet Take 1 tablet by mouth daily       No current facility-administered medications for this visit. Biochemical Data, Medical Tests and Procedures:    Lab Results   Component Value Date    LABA1C 5.7 10/16/2022     Lab Results   Component Value Date    .9 10/16/2022       Lab Results   Component Value Date    CHOL 143 01/11/2021    CHOL 130 01/10/2020    CHOL 113 05/20/2019     Lab Results   Component Value Date    TRIG 171 (H) 01/11/2021    TRIG 166 (H) 01/10/2020    TRIG 118 05/20/2019     Lab Results   Component Value Date    HDL 28 (L) 03/09/2022    HDL 26 (L) 01/11/2021    HDL 33 (L) 01/10/2020     Lab Results   Component Value Date    LDLCALC 78 03/09/2022    LDLCALC 83 01/11/2021    LDLCALC 64 01/10/2020     Lab Results   Component Value Date    LABVLDL 27 03/09/2022    LABVLDL 34 01/11/2021    LABVLDL 33 01/10/2020     No results found for: [facility]    Lab Results   Component Value Date    WBC 8.6 10/31/2022    HGB 10.2 (A) 10/31/2022    HCT 32.0 (A) 10/31/2022    MCV 82.7 10/31/2022     10/31/2022       Lab Results   Component Value Date    CREATININE 0.5 10/31/2022    BUN 12 10/31/2022     10/18/2022    K 3.5 10/18/2022    CL 98 (L) 10/18/2022    CO2 30 10/18/2022         Anthropometric Measurements:    Height: 63\"  Weight: 203 lb (92.1 kg) (9/16/22)  BMI: 35.96 kg/m2 (Obese, class II)  IBW: 115 lb +-10%  %IBW: 176.5 %    Physical Exam Findings:  Deferred    Nutrition Interview: RD called pt, explained reason for call and role in care. Pt states her main nutrition concern is knowing what to eat to facilitate weight loss. Patient reports that she is currently residing at a SNF s/p left TKA. Patient reports she is eating three meals per day at SNF, but is unable to choose what she wants to eat. Patient c/o quality of food, and believes she has lost weight due to dislike of food provided.  Patient's physical activity consists of working with Physical Therapy. RD offered to mail educational handouts to pt to reinforce concepts discussed during phone conversation, pt was agreeable. RD verified patient's home address. Nutrition Diagnosis:  #1 Problem Overweight/Obesity       Etiology related to poor food choices, lack of physical activity        Signs/Symptoms as evidenced by patient report and BMI of 37.31 kg/m2     Nutrition Intervention:     Estimated Needs  regular diet providing 1550 kcals to promote 1 lb weight loss/week ([address] based on CBW). Estimated daily CHO Needs: 170-250 g (based on 45-65% total calorie intake)  Estimated daily Protein Needs: 75-90 g (based on 0.8-1.0 g/kg based on CBW)  Estimated daily Fluid Needs: 2300 mL fluid/d (based on 25 mL/kg based on CBW)    #1 Nutrition Information: Provided patient with 1500 Calorie Meal Plan, All Foods Can Fit, Changing My Eating Habits, Eat Frequently to Lose Weight, Goal Setting for Weight Management, Grocery Shopping Tips, Handling Weight Plateaus, Increasing My Physical Activity, Label Reading, MyPlate, Portion Control, Smart Snacking handouts for reinforcement of concepts discussed during nutrition interview. #2 Nutrition Counseling: Used open-ended questions to assess patients perceived susceptibility and severity of disease state. Discussed potential impact of health threat on patient's lifestyle. Used open-ended questions to assess patient's perception regarding benefits of and barriers to implementation of nutrition therapy. #3 Nutrition Education: Clearly defined the benefits of nutrition therapy. Summarized and affirmed positive aspects of current nutrition patterns. Provided education regarding value of adherence to regular diet. Discussed ways to establish applying concepts of alternatives and choices regarding implementation of diet. Explored ideas for small, incremental goals to initiate behavior change.       Monitoring and Evaluation:    Indicator/Goal Criteria   #1 Follow MyPlate guidelines #1 I will build balanced meal using the MyPlate reference: 1/2 plate fruits and/or vegetables, 1/4 plate protein, and 1/4 plate starchy carbohydrates with 8 oz glass of low fat milk if desired   #2 Eat consistently throughout the day  #2 I will eat three meals per day or four to six small meals per day    #3 Incorporate physical activity  #3 I will work to attain 150 minutes of physical activity per week      Follow Up: RD will call pt in 3 weeks to follow up and make sure pt received handouts in mail. RD will answer any nutrition related questions at this time.      Lester Reyes, [address] [place] [address],    [phone]

## 2022-11-09 ENCOUNTER — CARE COORDINATION (OUTPATIENT)
Dept: CASE MANAGEMENT | Age: 62
End: 2022-11-09

## 2022-11-09 DIAGNOSIS — T84.54XA INFECTION OF TOTAL LEFT KNEE REPLACEMENT, INITIAL ENCOUNTER (HCC): Primary | ICD-10-CM

## 2022-11-09 PROCEDURE — 1111F DSCHRG MED/CURRENT MED MERGE: CPT | Performed by: FAMILY MEDICINE

## 2022-11-09 NOTE — CARE COORDINATION
[facility] Discharge Call    2022    Patient: Jacob Hernandez Patient : 1960   MRN: [id number]  Reason for Admission: fevision of L knee replacement  Discharge Date: 10/18/22 RARS: Readmission Risk Score: 8.7         Discharge Facility: [facility] [place] [place]    Acute Care Course:   10/10 to 10/18 [facility] [place] for infection of total L knee replacement with a revision. Pt the to [facility] [place] for 21 days with a d/c home with Kojo      HFU made:   ID   ortho    Sig Hx:   HTN, DEANA, DMII with long term insulin, R knee OA    DME: walker - transport chair and BSC ordered. Conversation:   Spoke with Fabio Khanna after 2 IDs. She is still sore. She is having trouble using her walker. She states she needs a transport chair as she is NWB. Se has accidentally put some weight on legs and this is very painful. She just took a pain pill. Concerned for when transport chair coming. She knows about upcoming appts. She has been having her Temple come by and see her. Also her neighbors are very helpful. Her son lives in [place] and talks to her frequently. She has set up a lock box to her apartment and people can access to come in. [facility] nurse left an hour ago. She showed her how to use the antibiotic balls and her access is PICC. Reviewed use and keeping PICC sterile. NO questions. Discussed need for [facility] to help. PT will come tomorrow. Meds were delivered last night. Pain med is oxycodone 10 mg and she uses only when needed PRN is Q6. States she left [place] d/t CoVID. Reviewed meds. She also is using tizanidine. Today she will have bible study today at 1:30 on zoom. Agreeable to  [address] [facility] and they stated that SW not in but did work on getting transport chair and [facility] for pt from home. Called pt to inform her. Follow up plan:   Will hand off to [facility] Transition      Do you have all of your prescriptions Jaskaran Story, BSN, RN   [facility]'s Transition Nurse  [phone]

## 2022-11-10 LAB
ALBUMIN SERPL-MCNC: 4.6 G/DL (ref 3.4–5)
ALP BLD-CCNC: 123 U/L (ref 40–129)
ALT SERPL-CCNC: 15 U/L (ref 10–40)
AST SERPL-CCNC: 22 U/L (ref 15–37)
BASOPHILS ABSOLUTE: 0.1 K/UL (ref 0–0.2)
BASOPHILS RELATIVE PERCENT: 0.5 %
BILIRUB SERPL-MCNC: 0.5 MG/DL (ref 0–1)
BILIRUBIN DIRECT: <0.2 MG/DL (ref 0–0.3)
BILIRUBIN, INDIRECT: NORMAL MG/DL (ref 0–1)
BUN BLDV-MCNC: 14 MG/DL (ref 7–20)
C-REACTIVE PROTEIN: 15 MG/L (ref 0–5.1)
CREAT SERPL-MCNC: 0.6 MG/DL (ref 0.6–1.2)
EOSINOPHILS ABSOLUTE: 0.2 K/UL (ref 0–0.6)
EOSINOPHILS RELATIVE PERCENT: 1.7 %
GFR SERPL CREATININE-BSD FRML MDRD: >60 ML/MIN/{1.73_M2}
HCT VFR BLD CALC: 35.4 % (ref 36–48)
HEMOGLOBIN: 11 G/DL (ref 12–16)
LYMPHOCYTES ABSOLUTE: 2.9 K/UL (ref 1–5.1)
LYMPHOCYTES RELATIVE PERCENT: 22.2 %
MCH RBC QN AUTO: 25.1 PG (ref 26–34)
MCHC RBC AUTO-ENTMCNC: 31.2 G/DL (ref 31–36)
MCV RBC AUTO: 80.5 FL (ref 80–100)
MONOCYTES ABSOLUTE: 1.2 K/UL (ref 0–1.3)
MONOCYTES RELATIVE PERCENT: 9.1 %
NEUTROPHILS ABSOLUTE: 8.7 K/UL (ref 1.7–7.7)
NEUTROPHILS RELATIVE PERCENT: 66.5 %
PDW BLD-RTO: 14.9 % (ref 12.4–15.4)
PLATELET # BLD: 429 K/UL (ref 135–450)
PMV BLD AUTO: 9.3 FL (ref 5–10.5)
RBC # BLD: 4.4 M/UL (ref 4–5.2)
TOTAL PROTEIN: 7 G/DL (ref 6.4–8.2)
VANCOMYCIN TROUGH: 11.6 UG/ML (ref 10–20)
WBC # BLD: 13.1 K/UL (ref 4–11)

## 2022-11-11 ENCOUNTER — TELEPHONE (OUTPATIENT)
Dept: INFECTIOUS DISEASES | Age: 62
End: 2022-11-11

## 2022-11-11 ENCOUNTER — CARE COORDINATION (OUTPATIENT)
Dept: CARE COORDINATION | Age: 62
End: 2022-11-11

## 2022-11-11 NOTE — TELEPHONE ENCOUNTER
----- Message from Martinez Carpenter MD sent at 11/11/2022 10:07 AM EST -----  WBC is up a smidge  Would you pls call and check in that all is well w her?   Thank you   ----- Message -----  From: Helga Juan Incoming Lab Results From Soft (Jade Shah)  Sent: 11/10/2022   6:44 PM EST  To: Martinez Carpenter MD

## 2022-11-11 NOTE — CARE COORDINATION
Ambulatory Care Coordination Note  11/11/2022    ACC: Dayron Mendoza, RN    pain rated 5 on 0-10 scale- left shin, knee area. Staples removed, steri-strips in place. Pain andi disrupt sleep. [place] against leg worsens pain level at times. She presented with loose stools onset: yesterday; no fever, but endorses somewhat lesser appetite. Blood sugars remain WNL. Pt feels taking pain med w/food helping to address any intermittent sour stomach, which is lesser complaint. Her chief concern is navigating home secondary to post surgery of L knee. Denies falls, but had 1x occasion of having to place slight amount of weight on LLE to remain balanced when requiring urgent trip to bathroom d/t loose bowels. Discussed adequate hydration & reviewed fall precautions/home safety concepts. Potential DME needs identified: BSC, shower chair, rolling walker or WC. Pt reports PT Eval pending from  developed POC at [facility] visit 11.9.22 s/p DC home from [facility] on 11.8. 22. Pt reports PT Eval was also performed @ [facility] AT [facility] & pt was told DME would be ordered for DC home, but pt left early d/t Covid, she is uncertain if this impacts delivery of any DME determined needful per the PT Eval done @ [facility]  Pt was agreeable to early DC from SNF d/t reportedly noted increase of Covid outbreak in SNF. Pt denies any s/s indicative of Covid, notes she is fully vaccinated/received Covid booster, flu & pneumonia vaccination within past 30 days. She is now home w/PICC line. Home health nurse angelia labs from PICC yesterday. Outcome: ACM called [facility] [address]  Spoke w/Juni, then Trinity Mackay, PT will contact pt today for planned home visit for PT Eval; Vanita Rosa will be therapist/OT    Offered patient enrollment in the Remote Patient Monitoring (RPM) program for in-home monitoring: Patient declined. Reports she is maintaining at desired/normal range of BP, blood sugar  Reviewed fall precautions/home safety concepts.  Reviewed planned outreach to pt by home visiting care team (noted above). Reviewed ACM availability & appropriate use of after hours' provider line, as needed - since heading into weekend. Pt verbalized understanding of above and agreement with the following  Plan: pt will keep follow up VV appt w/Dr Jason Mesa scheduled 11.17.22 & office visit w/Dr Vasiliy Frost scheduled 11.18.22  PT Eval by [facility]; pt will follow recommendations/POC developed by home PT. Pt will outreach direct to PCP for any new onset/worsening symptoms, or if present concern of loose stools do not show promise of improving before end of day today. Resume [facility] support for health coaching, care collaboration, support w/community resources, and help with any newly presenting concerns as needed.   Pt agrees to outreach direct to [employer], as needed, between routine follow up outreach initiated by [facility]       Care Coordination Interventions    Referral from Primary Care Provider: No  Suggested Interventions and Community Resources  Diabetes Education: Completed  Fall Risk Prevention: Completed  Home Health Services: Completed (Comment: Tanvir Muñoz [address]23 [phone])  Occupational Therapy: Completed (Comment: Ivan Foster, OT)  Pharmacist: Declined  Physical Therapy: Completed (Comment: Brad Perales, PT via [de-identified] [facility])  Registered Dietician: Completed (Comment: 9.19.22 referral)  Specialty Services Referral: Completed (Comment: Dr Jen Marquez (GI); Dr Brijesh Cannon ([facility]))  Other Services: Declined (Comment: RPM)  Zone Management Tools: Completed (Comment: DM)  Other Services or Interventions: reviewed self mgmt DM, health maintenance          Goals Addressed                      This Visit's Progress      Conditions and Symptoms (pt-stated)   On track      Nutrition Plan   On track      I will follow a nutritional plan as directed  - Regular Diet     Barriers: lack of education  Plan for overcoming my barriers: I will eat consistently throughout the day, follow MyPlate guidelines, and limit portion sizes. Confidence: 9/10  Anticipated Goal Completion Date: 2/7/23        Reduce Falls  (pt-stated)         I will reduce my risk of falls by the following: Follow through on orders for PT    Barriers: fear of failure  Plan for overcoming my barriers: engage in Care Coordination for added support  Confidence: 10/10  Anticipated Goal Completion Date: 5.11.22              Prior to Admission medications    Medication Sig Start Date End Date Taking? Authorizing Provider   vancomycin (VANCOCIN) infusion Infuse 1,250 mg intravenously in the morning and 1,250 mg in the evening. Compound per protocol. . 10/19/22 11/21/22 Yes Cara Ramsey RPH   aspirin 81 MG EC tablet Take 1 tablet by mouth in the morning and at bedtime 10/14/22 11/13/22 Yes Gloria Figueredo   Insulin Pen Needle (KR[employer]ER PEN NEEDLES) 32G X 4 MM MISC USE TO INJECT INSULIN FOUR TIMES A DAY 9/22/22  Yes NOHELIA Sibley CNP   losartan (COZAAR) 100 MG tablet TAKE ONE TABLET BY MOUTH DAILY 8/1/22  Yes Camila,    metFORMIN (GLUCOPHAGE) 1000 MG tablet TAKE ONE TABLET BY MOUTH TWICE A DAY WITH MEALS 7/20/22  Yes Isabel Rico, DO   montelukast (SINGULAIR) 10 MG tablet TAKE ONE TABLET BY MOUTH ONCE NIGHTLY 7/14/22  Yes Isabel Rico, DO   insulin glargine (LANTUS SOLOSTAR) 100 UNIT/ML injection pen Inject 35 Units into the skin nightly 7/6/22  Yes Camila, DO   blood glucose monitor strips Test 1-3 times a day as needed for symptoms of irregular glucose. DX: E11.9 please provide strips that are accepted by patient's insurance.  6/28/22  Yes NOHELIA Sibley CNP   ibuprofen (ADVIL;MOTRIN) 800 MG tablet Take 1 tablet by mouth 2 times daily as needed for Pain 6/28/22  Yes NOHELIA Sibley CNP   atorvastatin (LIPITOR) 10 MG tablet TAKE ONE TABLET BY MOUTH DAILY 4/25/22  Yes NOHELIA Sibley CNP   amLODIPine (NORVASC) 5 MG tablet TAKE ONE TABLET BY MOUTH DAILY 11/24/21  Yes Isabel Elder, DO   ACCU-CHEK SOFTCLIX LANCETS MISC TEST BLOOD SUGAR TWO TIMES A DAY 8/5/19  Yes John Green, DO   Calcium Carb-Cholecalciferol 600-800 MG-UNIT TABS Take 1 tablet by mouth 2 times daily   Yes Historical Provider, MD   cetirizine (ZYRTEC) 10 MG tablet Take 1 tablet by mouth daily 12/3/15  Yes Wild Bagley, DO       Future Appointments   Date Time Provider Gabriel Case   11/17/2022  1:00 PM Radha Cruz MD [facility]   11/18/2022 11:15 AM Maria Eugenia Gusman MD [facility]

## 2022-11-11 NOTE — TELEPHONE ENCOUNTER
Spoke with patient and she stated that she is feeling better. She stated that pain is better in Left knee.  lower part of leg. Stated her swelling has decreased in her knee. She stated that her knee is sore due to her putting some weight on it when she had to walk around leg. Had diarrhea yesterday and none today noted. Her appetite has decreased some as well. Stated nursing has been to home however has not had any PT services come to her home to evaluate. [place] [address] [phone] and left message for a return call to inquire about PT service.

## 2022-11-14 ENCOUNTER — TELEPHONE (OUTPATIENT)
Dept: ORTHOPEDIC SURGERY | Age: 62
End: 2022-11-14

## 2022-11-14 ENCOUNTER — TELEPHONE (OUTPATIENT)
Dept: INFECTIOUS DISEASES | Age: 62
End: 2022-11-14

## 2022-11-14 LAB
FUNGUS (MYCOLOGY) CULTURE: NORMAL
FUNGUS STAIN: NORMAL

## 2022-11-14 NOTE — TELEPHONE ENCOUNTER
General Question     Subject: [facility]  Patient and /or Facility Request: Sherwin Jesus  Contact Number:  [phone]    PATIENT CALLING REGARDING THAT HER PHYSICAL THERAPIST ADVISED HER THAT SHE SHOULD NOT BE WEIGHT BEARING AND SHE NEEDS A WHEELCHAIR. PATIENT CALLING REQUESTING ELISA HAVE A PRESCRIPTION BE SENT TO [address] [place] [address] OR [facility] TO GET A WHEELCHAIR. PATIENT HAS FOLLOW UP APPOINTMENT ON Friday, November 18, 2022. PLEASE CALL BACK PATIENT AT THE ABOVE NUMBER.

## 2022-11-14 NOTE — TELEPHONE ENCOUNTER
Medication can still infuse but labs cannot be drawn. Pt going to infusion center for cath erick when she has transport to ortho appt on 11/18 same day. Pt has VV with Kylie Gaytan on 11/17. Unfortunately will not have labs. Will have to rely on clinical assessment and then assess labs on Friday when collected prior to term date on 11/21.     Aiyana Coronel, PharmD, [address] [place] Infectious Disease  Phone: [phone] (also available on [employer])  Fax: [phone]    For Pharmacy [address] [place] [address] in place:  Yes  Time Spent (min): 5

## 2022-11-14 NOTE — TELEPHONE ENCOUNTER
[facility] Desir nurse called and stated that PICC line has no blood return. Attempted to do a peripheral stick for blood work x 3 and unable to get labs done. This is the 2nd nurse that has not been able to get a peripheral stick for patient. [facility]flo is not covered for [facility] nurse to instill in line and patient will need to go to [facility] for Petey 3. Spoke with patient and she stated that will see if a friend can get her to the infusion center tomorrow however not sure if she can make it. Stated she has an appointment on Friday and could go to infusion center Friday AM.    Spoke with Zenon Larry at [facility] and they can see patient at 0900 to evaluate PICC line.   Faxed PRN Cornelia order to [phone]    Thank you

## 2022-11-15 ENCOUNTER — HOSPITAL ENCOUNTER (OUTPATIENT)
Dept: ONCOLOGY | Age: 62
Setting detail: INFUSION SERIES
Discharge: HOME | End: 2022-11-15
Payer: MEDICARE

## 2022-11-15 LAB
ALBUMIN SERPL-MCNC: 4 G/DL (ref 3.4–5)
ALP BLD-CCNC: 101 U/L (ref 40–129)
ALT SERPL-CCNC: 18 U/L (ref 10–40)
AST SERPL-CCNC: 19 U/L (ref 15–37)
BASOPHILS ABSOLUTE: 0.2 K/UL (ref 0–0.2)
BASOPHILS RELATIVE PERCENT: 1 %
BILIRUB SERPL-MCNC: 0.3 MG/DL (ref 0–1)
BILIRUBIN DIRECT: <0.2 MG/DL (ref 0–0.3)
BILIRUBIN, INDIRECT: NORMAL MG/DL (ref 0–1)
BUN BLDV-MCNC: 11 MG/DL (ref 7–20)
C-REACTIVE PROTEIN: 22 MG/L (ref 0–5.1)
CREAT SERPL-MCNC: 0.7 MG/DL (ref 0.6–1.2)
EOSINOPHILS ABSOLUTE: 0.5 K/UL (ref 0–0.6)
EOSINOPHILS RELATIVE PERCENT: 3.1 %
GFR SERPL CREATININE-BSD FRML MDRD: >60 ML/MIN/{1.73_M2}
HCT VFR BLD CALC: 32.3 % (ref 36–48)
HEMOGLOBIN: 10.2 G/DL (ref 12–16)
LYMPHOCYTES ABSOLUTE: 2.4 K/UL (ref 1–5.1)
LYMPHOCYTES RELATIVE PERCENT: 15.9 %
MCH RBC QN AUTO: 25 PG (ref 26–34)
MCHC RBC AUTO-ENTMCNC: 31.6 G/DL (ref 31–36)
MCV RBC AUTO: 79.2 FL (ref 80–100)
MONOCYTES ABSOLUTE: 1 K/UL (ref 0–1.3)
MONOCYTES RELATIVE PERCENT: 6.8 %
NEUTROPHILS ABSOLUTE: 11.2 K/UL (ref 1.7–7.7)
NEUTROPHILS RELATIVE PERCENT: 73.2 %
PDW BLD-RTO: 14.8 % (ref 12.4–15.4)
PLATELET # BLD: 396 K/UL (ref 135–450)
PMV BLD AUTO: 9 FL (ref 5–10.5)
RBC # BLD: 4.08 M/UL (ref 4–5.2)
TOTAL PROTEIN: 6.8 G/DL (ref 6.4–8.2)
VANCOMYCIN TROUGH: 9.2 UG/ML (ref 10–20)
WBC # BLD: 15.3 K/UL (ref 4–11)

## 2022-11-15 PROCEDURE — 86140 C-REACTIVE PROTEIN: CPT

## 2022-11-15 PROCEDURE — 84520 ASSAY OF UREA NITROGEN: CPT

## 2022-11-15 PROCEDURE — 99211 OFF/OP EST MAY X REQ PHY/QHP: CPT

## 2022-11-15 PROCEDURE — 82565 ASSAY OF CREATININE: CPT

## 2022-11-15 PROCEDURE — 80076 HEPATIC FUNCTION PANEL: CPT

## 2022-11-15 PROCEDURE — 2580000003 HC RX 258

## 2022-11-15 PROCEDURE — 6360000002 HC RX W HCPCS: Performed by: PHARMACIST

## 2022-11-15 PROCEDURE — 85025 COMPLETE CBC W/AUTO DIFF WBC: CPT

## 2022-11-15 PROCEDURE — 80202 ASSAY OF VANCOMYCIN: CPT

## 2022-11-15 PROCEDURE — 36593 DECLOT VASCULAR DEVICE: CPT

## 2022-11-15 RX ORDER — WATER 1000 ML/1000ML
INJECTION, SOLUTION INTRAVENOUS
Status: COMPLETED
Start: 2022-11-15 | End: 2022-11-15

## 2022-11-15 RX ORDER — LINEZOLID 600 MG/1
600 TABLET, FILM COATED ORAL 2 TIMES DAILY
Qty: 14 TABLET | Refills: 0 | Status: SHIPPED | OUTPATIENT
Start: 2022-11-15 | End: 2022-11-22

## 2022-11-15 RX ADMIN — ALTEPLASE 1 MG: 2.2 INJECTION, POWDER, LYOPHILIZED, FOR SOLUTION INTRAVENOUS at 09:44

## 2022-11-15 RX ADMIN — WATER 10 ML: 1 INJECTION INTRAMUSCULAR; INTRAVENOUS; SUBCUTANEOUS at 09:49

## 2022-11-15 NOTE — PROGRESS NOTES
Cath-Real ineffective at this time. Dr. Quan Ortez notified. PICC team called and reported to this nurse a few steps to try to get return. No return continues. Dressing changed, no kinks noted to tubing. Dr. Camilo Julien notified of re-assessment. Awaiting response.  Electronically signed by Claus Tyler RN on 11/15/2022 at 12:24 PM'

## 2022-11-15 NOTE — PROGRESS NOTES
Spoke with [facility] at [facility] and advised of MD note and to end IV abx. Verbalized understanding.

## 2022-11-15 NOTE — PROGRESS NOTES
PICC not functional after multiple attempts to rectify    PJI  Planned end date 11/21/22    Will have line removed  Transition to po linezolid for another 7d, no DDI identified  Rx sent to [facility] to be drawn today (hopefully)    Need for any more labs prior to stopping TBD     FYI   Thanks

## 2022-11-15 NOTE — PROGRESS NOTES
Dr. Valeria Burnham would like PICC line to be removed, sending PO medication to pharmacy outpatient in [facility] hospital. Labs drawn by phlebotomy and sent to [facility] laboratory.  Electronically signed by Rose Mary Ortiz RN on 11/15/2022 at 12:35 PM

## 2022-11-15 NOTE — PROGRESS NOTES
PICC line removed, no complications, pt lay still for 30 minutes, no concern noted. Discharged via w/c with ambassador to meet friend in lobby.  Electronically signed by Conchita Izquierdo RN on 11/15/2022 at 2:39 PM

## 2022-11-15 NOTE — PROGRESS NOTES
Cath-Real instilled 0953 per transcribed order Martin Ambrosio, [facility].  Electronically signed by Christina Patterson RN on 11/15/2022 at 9:56 AM

## 2022-11-17 ENCOUNTER — TELEMEDICINE (OUTPATIENT)
Dept: INFECTIOUS DISEASES | Age: 62
End: 2022-11-17
Payer: MEDICARE

## 2022-11-17 DIAGNOSIS — T84.50XD INFECTION OF PROSTHETIC JOINT, SUBSEQUENT ENCOUNTER: Primary | ICD-10-CM

## 2022-11-17 DIAGNOSIS — D72.829 LEUKOCYTOSIS, UNSPECIFIED TYPE: ICD-10-CM

## 2022-11-17 DIAGNOSIS — Z79.2 LONG TERM (CURRENT) USE OF ANTIBIOTICS: ICD-10-CM

## 2022-11-17 PROCEDURE — 99214 OFFICE O/P EST MOD 30 MIN: CPT | Performed by: INTERNAL MEDICINE

## 2022-11-17 RX ORDER — ONDANSETRON 4 MG/1
4 TABLET, FILM COATED ORAL EVERY 8 HOURS PRN
COMMUNITY

## 2022-11-17 RX ORDER — OXYCODONE HYDROCHLORIDE 5 MG/1
5 TABLET ORAL EVERY 6 HOURS PRN
COMMUNITY

## 2022-11-17 NOTE — PROGRESS NOTES
Department of Internal Medicine  Infectious Diseases      Patient Name: Danna Perkins      Date of visit: 11/17/2022  SUBJECTIVE:  Rudyd Linares  is a 59 y.o. female who requests telemedicine visit today for hospital follow-up and ongoing management of PJI     History of HTN, HLD, DM, DEANA  OA L knee     S/p hemiarthroplasty 2007 and revision TKA 2010 without immediate complication      Saw Dr. Merna Bloch 6/2022 regarding insidious and worsening pain L knee. Joint was aspirated 6/15/22 SF synovasure  results -   WBC 3040 with 31% PMNs  Neg Synovasure  +Staph antigen   Negative culture     Admission 10/10-10/18/22 for explant and temp spacer placement. Cultures were pending at the time of DC. One of 5 specimens later turned positive for anaerobic diptheroid (regarded as contaminant). She was discharged on IV Vanc to SNF  Home on 11/8/22    PICC was malfunctioning and removed on 11/15/22, at that point, transitioned to po linezolid. Today, 11/17/22 -  Feeling better, doing well overall. Persistent pain. PRN oxycodone QHS, acetaminophen and IBU PRN during the day. Serial labs reviewed with patient. WBC is elevated at 15 today. She tells me \"it is always high. \"  CRP was never too high. This week 22, 15 one week prior. No diarrhea. No itching or rash except mild pruritus at the incision. No incisional drainage, it is well healed.           REVIEW OF SYSTEMS:    CONSTITUTIONAL:  negative for fevers, chills   EYES:  negative for blurred vision, eye discharge, visual disturbance and icterus  HEENT:  negative for hearing loss, tinnitus, ear drainage, sinus pressure, nasal congestion, epistaxis and snoring  RESPIRATORY:  No cough or hemoptysis   CARDIOVASCULAR:  negative for chest pain, palpitations, exertional chest pressure/discomfort, edema, syncope  GASTROINTESTINAL:  negative for nausea, vomiting, diarrhea, constipation, blood in stool and abdominal pain  GENITOURINARY:  negative for frequency, dysuria, urinary incontinence, decreased urine volume, and hematuria  HEMATOLOGIC/LYMPHATIC:  negative for easy bruising, bleeding and lymphadenopathy  ALLERGIC/IMMUNOLOGIC:  negative for recurrent infections, angioedema, anaphylaxis and drug reactions  ENDOCRINE:  negative for weight changes and diabetic symptoms including polyuria, polydipsia and polyphagia  MUSCULOSKELETAL:    per HPI   NEUROLOGICAL:  negative for headaches, slurred speech, unilateral weakness  PSYCHIATRIC/BEHAVIORAL: negative for hallucinations, behavioral problems, confusion and agitation. Medications:    Current Outpatient Medications   Medication Sig Dispense Refill    linezolid (ZYVOX) 600 MG tablet Take 1 tablet by mouth 2 times daily for 7 days 14 tablet 0    aspirin 81 MG EC tablet Take 1 tablet by mouth in the morning and at bedtime 60 tablet 0    Insulin Pen Needle (Nich[employer] PEN NEEDLES) 32G X 4 MM MISC USE TO INJECT INSULIN FOUR TIMES A  each 1    losartan (COZAAR) 100 MG tablet TAKE ONE TABLET BY MOUTH DAILY 60 tablet 5    metFORMIN (GLUCOPHAGE) 1000 MG tablet TAKE ONE TABLET BY MOUTH TWICE A DAY WITH MEALS 180 tablet 3    montelukast (SINGULAIR) 10 MG tablet TAKE ONE TABLET BY MOUTH ONCE NIGHTLY 60 tablet 5    insulin glargine (LANTUS SOLOSTAR) 100 UNIT/ML injection pen Inject 35 Units into the skin nightly 10 pen 3    blood glucose monitor strips Test 1-3 times a day as needed for symptoms of irregular glucose. DX: E11.9 please provide strips that are accepted by patient's insurance.  200 strip 3    ibuprofen (ADVIL;MOTRIN) 800 MG tablet Take 1 tablet by mouth 2 times daily as needed for Pain 60 tablet 0    atorvastatin (LIPITOR) 10 MG tablet TAKE ONE TABLET BY MOUTH DAILY 90 tablet 1    amLODIPine (NORVASC) 5 MG tablet TAKE ONE TABLET BY MOUTH DAILY 60 tablet 5    ACCU-CHEK SOFTCLIX LANCETS MISC TEST BLOOD SUGAR TWO TIMES A  each 4    Calcium Carb-Cholecalciferol 600-800 MG-UNIT TABS Take 1 tablet by mouth 2 times daily      cetirizine (ZYRTEC) 10 MG tablet Take 1 tablet by mouth daily       No current facility-administered medications for this visit. Physical:  VITALS:  LMP 01/26/1996     General Appearance:  awake, alert, oriented, in no acute distress  Skin:  exposed skin normal in color, no rashes or lesions. Mouth/Throat:   PERRL, MMM  Neck:   supple   Lungs:   non-labored breathing     Extremities:    L knee with DSD in place   Vascular Access:   none             Cultures:   6/2022  SF aspiration neg Synovasure, +staph ag, WBC 3040     10/10/22         Operative cultures x5 in process; all stains neg for organisms, 1/5 cultures with anaerobic diptheroid           Radiology Review:  All pertinent images / reports were reviewed as a part of this visit. Assessment:            Patient Active Problem List     Diagnosis Date Noted    Infection of total left knee replacement, [facility] encounter ([facility] 10/10/2022       Priority: Medium    Morbidly obese ([facility] 09/28/2020    Type 2 diabetes mellitus with hyperglycemia, with long-term current use of insulin ([facility] 09/12/2019    Degenerative disc disease, cervical 10/02/2017    Hyperlipidemia, mixed 08/15/2017    Obstructive sleep apnea 06/01/2017       Overview Note:       Updating Deprecated Diagnoses       Obesity (BMI 30-39.9) 02/02/2016    Seasonal allergies 10/29/2014    Benign essential HTN 04/16/2014    Osteoarthritis of right knee 04/16/2014         OA s/p L knee hemiarthroplasty 2007 and revision TKA 2010      Chronic periprosthetic joint infection   Insidious pain and loosening of prosthesis   Increased SF WBCs with +staph ag consistent with chronic periprosthetic joint infection (neg synovasure)  S/p removal and revision with temp implant on 10/10/22, POD #38 today   Multiple operative cultures collected.   One of 5 with an anaerobic diptheroid regarded as contaminant     Leukocytosis - this is chronic     No abx allergies   Intolerant of levaquin Plan       Continue linezolid to complete the 6 week course following explant   Has enough on hand through 11/21/22    Expectant management thereafter     Will see Ortho to discuss aspiration of joint prior to revision     She will call with any concerns    She can discuss with PCP if Hematology referral is needed for the persistent leukocytosis. It dates to 2010     All questions addressed          Divine Hernández MD Claire Lola, was evaluated through a synchronous (real-time) audio-video encounter. The patient (or guardian if applicable) is aware that this is a billable service, which includes applicable co-pays. This Virtual Visit was conducted with patient's (and/or legal guardian's) consent. The visit was conducted pursuant to the emergency declaration under the [address] [place] [address] authority and the [employer] and [employer] General Act. Patient identification was verified, and a caregiver was present when appropriate. The patient was located at Home: [place] [address]. Provider was located at [facility] (Appt Dept): [address] [place] [address] [address],  [facility]. Total time spent for this encounter: Not billed by time    --Divine Hernández MD on 11/17/2022 at 12:59 PM    An electronic signature was used to authenticate this note.

## 2022-11-18 ENCOUNTER — OFFICE VISIT (OUTPATIENT)
Dept: ORTHOPEDIC SURGERY | Age: 62
End: 2022-11-18
Payer: MEDICARE

## 2022-11-18 VITALS — BODY MASS INDEX: 37.54 KG/M2 | WEIGHT: 204 LBS | HEIGHT: 62 IN

## 2022-11-18 DIAGNOSIS — T84.59XA INFECTION OF TOTAL KNEE REPLACEMENT, INITIAL ENCOUNTER (HCC): Primary | ICD-10-CM

## 2022-11-18 DIAGNOSIS — Z96.652 HISTORY OF TOTAL LEFT KNEE REPLACEMENT: ICD-10-CM

## 2022-11-18 DIAGNOSIS — Z96.659 INFECTION OF TOTAL KNEE REPLACEMENT, INITIAL ENCOUNTER (HCC): Primary | ICD-10-CM

## 2022-11-18 PROCEDURE — L1812 KO ELASTIC W/JOINTS PRE OTS: HCPCS | Performed by: ORTHOPAEDIC SURGERY

## 2022-11-18 PROCEDURE — 99024 POSTOP FOLLOW-UP VISIT: CPT | Performed by: ORTHOPAEDIC SURGERY

## 2022-11-18 RX ORDER — CYCLOBENZAPRINE HCL 5 MG
5 TABLET ORAL 2 TIMES DAILY PRN
Qty: 20 TABLET | Refills: 0 | Status: SHIPPED | OUTPATIENT
Start: 2022-11-18 | End: 2022-11-28

## 2022-11-18 RX ORDER — OXYCODONE HYDROCHLORIDE 5 MG/1
5 TABLET ORAL EVERY 6 HOURS PRN
Qty: 28 TABLET | Refills: 0 | Status: SHIPPED | OUTPATIENT
Start: 2022-11-18 | End: 2022-11-25

## 2022-11-18 NOTE — PROGRESS NOTES
Dr Shikha Lam      Date /Time 11/18/2022       10:07 AM EDT  Name Vernon Morelos             1960   Location  [address] [facility]  MRN [id number]                Chief Complaint   Patient presents with    Follow-up     Rev Left TKA         History of Present Illness      Vernon Morelos is a 57 y.o. female is here for post-op visit after LEFT  [address] Total Knee Arthroplasty    Patient presents the office today for follow-up visit. Patient is just over 5+ weeks status post revision left total knee arthroplasty for infection. She currently is residing at home. She continues to get IV antibiotics through PICC line. Physical Exam    Based off 1997 Exam Criteria    Ht 5' 2\" (1.575 m)   Wt 204 lb (92.5 kg)   LMP 01/26/1996   BMI 37.31 kg/m²      Constitutional:       General: He is not in acute distress. Appearance: Normal appearance. LEFT Knee: incision clean, intact, healing appropriately. No surrounding  erythema or fluctuance. Neuro intact distal. No evidence of DVT. 5-100. Knee instability present as expected. Imaging       Left Knee: [facility]  Radiographs: X-rays were ordered today and reviewed of the left knee. 3 views. AP, lateral, and skyline views. They demonstrate femoral component in place. Tibial cement spacer in place      Assessment and Plan    Jhonny Danielson was seen today for follow-up. Diagnoses and all orders for this visit:    History of total left knee replacement  -     XR KNEE LEFT (3 VIEWS); Future  -     Breg Economy Hinged Knee WrapAround Brace    Infection of total knee replacement, initial encounter ([facility]  -     XR KNEE LEFT (3 VIEWS); Future  -     Breg Economy Hinged Knee WrapAround Brace      She is 5+ weeks into treatment. She was switched from IV to PO abx since it was clotted, she will finish PO abx this weekend. Patient will continue nonweightbearing. We will see the patient back in 1 month or sooner if problems arise.   Would anticipate aspiration at that time with Synovasure. Patient has also been placed in a economy hinged knee brace with an Ace wrap. She has high WBC count still. Procedures    Breg Economy Hinged Knee WrapAround Brace     Patient was prescribed a Breg Economy Hinged Wrap Around Knee Brace. The left knee will require stabilization / immobilization from this semi-rigid / rigid orthosis to improve their function. The orthosis will assist in protecting the affected area, provide functional support and facilitate healing. The patient was educated and fit by a healthcare professional with expert knowledge and specialization in brace application while under the direct supervision of the treating physician. Verbal and written instructions for the use of and application of this item were provided. They were instructed to contact the office immediately should the brace result in increased pain, decreased sensation, increased swelling or worsening of the condition. Electronically signed by Candice Musa MD on 11/18/2022 at 11:37 AM  This dictation was generated by voice recognition computer software. Although all attempts are made to edit the dictation for accuracy, there may be errors in the transcription that are not intended.

## 2022-11-25 ENCOUNTER — TELEPHONE (OUTPATIENT)
Dept: PRIMARY CARE CLINIC | Age: 62
End: 2022-11-25

## 2022-12-02 ENCOUNTER — CARE COORDINATION (OUTPATIENT)
Dept: CARE COORDINATION | Age: 62
End: 2022-12-02

## 2022-12-02 NOTE — CARE COORDINATION
16 Laura Milton regarding Dietitian follow up. Unable to leave voicemail with call back number as patient's voicemail box is full. RD will follow up as appropriate.        Randi Nguyen, [address] [place] [address],   [phone]

## 2022-12-05 ENCOUNTER — TELEPHONE (OUTPATIENT)
Dept: INTERNAL MEDICINE CLINIC | Age: 62
End: 2022-12-05

## 2022-12-05 RX ORDER — LANCETS
EACH MISCELLANEOUS
Qty: 100 EACH | Refills: 3 | Status: SHIPPED | OUTPATIENT
Start: 2022-12-05

## 2022-12-05 NOTE — TELEPHONE ENCOUNTER
Medication:   Requested Prescriptions     Pending Prescriptions Disp Refills    Accu-Chek Softclix Lancets MISC [Pharmacy Med Name: Sangeetha Millet LANCETS]       Sig: USE TO TEST 1-3 TIMES A DAY AS NEEDED FOR SYMPTOMS OR IRREGULAR GLUCOSE       Last appt: 10/5/2022   Next appt: Visit date not found

## 2022-12-05 NOTE — TELEPHONE ENCOUNTER
Patient had knee surgery and while she was in rehab she was given ondansetron 4 mg for nausea and she is all out of this medication. She would like to get a refill of this medication because the pain medication she has to take makes her nauseated.   Please advise

## 2022-12-06 RX ORDER — ONDANSETRON 4 MG/1
4 TABLET, FILM COATED ORAL EVERY 8 HOURS PRN
Qty: 20 TABLET | Refills: 0 | Status: SHIPPED
Start: 2022-12-06 | End: 2022-12-28 | Stop reason: CLARIF

## 2022-12-08 ENCOUNTER — CARE COORDINATION (OUTPATIENT)
Dept: CARE COORDINATION | Age: 62
End: 2022-12-08

## 2022-12-08 ENCOUNTER — TELEPHONE (OUTPATIENT)
Dept: ORTHOPEDIC SURGERY | Age: 62
End: 2022-12-08

## 2022-12-08 DIAGNOSIS — T84.59XA INFECTION OF TOTAL KNEE REPLACEMENT, INITIAL ENCOUNTER (HCC): Primary | ICD-10-CM

## 2022-12-08 DIAGNOSIS — Z96.659 INFECTION OF TOTAL KNEE REPLACEMENT, INITIAL ENCOUNTER (HCC): Primary | ICD-10-CM

## 2022-12-08 RX ORDER — HYDROCODONE BITARTRATE AND ACETAMINOPHEN 5; 325 MG/1; MG/1
1 TABLET ORAL EVERY 6 HOURS PRN
Qty: 28 TABLET | Refills: 0 | Status: SHIPPED | OUTPATIENT
Start: 2022-12-08 | End: 2022-12-15

## 2022-12-08 NOTE — CARE COORDINATION
Eric Gonzalez  12/8/2022    Registered Dietitian Progress Note for Care Coordination    Assessment: Chris Corral is a 57 y.o. female. RD referred for desired weight loss. RD spoke with patient for initial nutrition assessment on 11/7/22. RD called to follow up with patient today, 12/8/22. RD discussed previous goals with patient. Patient states she received the nutrition handouts RD sent in the mail, did not have any questions. Patient unsure of current weight as she is unable to stand on scale alone. Patient notes that her last known weight was 198 lbs which was ~ one month ago on the day she left the rehab facility. Patient also notes that her pants fit looser than prior. Patient reports that she is eating consistently throughout the day; three meals per day. Reports that she has been eating mostly balanced meals. Patient has been snacking on sugar-free Jell-O and/or mandarin oranges when she wants a snack. Patient has been drinking calorie-free and sugar-free beverages such as water and tea. Patient reports that she is not regularly exercising as Physical Therapy just ended and needs to use a wheelchair for mobility. Nutrition Monitoring and Evaluation  Indicator/Goal Criteria Progress   #1 Follow MyPlate guidelines #1 I will build balanced meal using the MyPlate reference: 1/2 plate fruits and/or vegetables, 1/4 plate protein, and 1/4 plate starchy carbohydrates with 8 oz glass of low fat milk if desired #1 Patient has been building balanced meals some of the time    #2  Eat consistently throughout the day  #2 I will eat three meals per day or four to six small meals per day  #2 Patient has been eating three small meals per day    #3   Incorporate physical activity #3 I will work to attain 150 minutes of physical activity per week   #3 Patient is not regularly exercising      Plan of Care:  RD encouraged patient to keep working toward goals set.  RD will follow up with patient to discuss any questions patient has and check the progress toward goals. Follow Up:    RD will call patient in four weeks to follow up and answer any nutrition related questions at that time.      Fabiola Russell, [address] [place] [address],    [phone]

## 2022-12-12 ENCOUNTER — TELEPHONE (OUTPATIENT)
Dept: PRIMARY CARE CLINIC | Age: 62
End: 2022-12-12

## 2022-12-12 NOTE — TELEPHONE ENCOUNTER
I would not recommend treating this without further evaluation since she does not have any vaginal symptoms and is just complaining of general smell. This is non urgent and can be scheduled as hospital follow up at her convenience.

## 2022-12-12 NOTE — TELEPHONE ENCOUNTER
Pt calling saying she was on antibiotics for about 2 months with a leg infection    She says now she has a yeast smell about her and would like Diflucan called in to [facility]    She says she has no discharge, just the smell about her

## 2022-12-16 ENCOUNTER — OFFICE VISIT (OUTPATIENT)
Dept: PRIMARY CARE CLINIC | Age: 62
End: 2022-12-16

## 2022-12-16 ENCOUNTER — OFFICE VISIT (OUTPATIENT)
Dept: ORTHOPEDIC SURGERY | Age: 62
End: 2022-12-16

## 2022-12-16 VITALS — DIASTOLIC BLOOD PRESSURE: 72 MMHG | TEMPERATURE: 98 F | HEART RATE: 125 BPM | SYSTOLIC BLOOD PRESSURE: 111 MMHG

## 2022-12-16 VITALS — WEIGHT: 204 LBS | BODY MASS INDEX: 37.54 KG/M2 | HEIGHT: 62 IN

## 2022-12-16 DIAGNOSIS — N89.8 VAGINAL ODOR: Primary | ICD-10-CM

## 2022-12-16 DIAGNOSIS — T84.84XA PAIN DUE TO TOTAL LEFT KNEE REPLACEMENT, INITIAL ENCOUNTER (HCC): ICD-10-CM

## 2022-12-16 DIAGNOSIS — Z96.652 PAIN DUE TO TOTAL LEFT KNEE REPLACEMENT, INITIAL ENCOUNTER (HCC): ICD-10-CM

## 2022-12-16 DIAGNOSIS — Z96.659 INFECTION OF TOTAL KNEE REPLACEMENT, INITIAL ENCOUNTER (HCC): ICD-10-CM

## 2022-12-16 DIAGNOSIS — Z96.652 HISTORY OF TOTAL LEFT KNEE REPLACEMENT: Primary | ICD-10-CM

## 2022-12-16 DIAGNOSIS — T84.59XA INFECTION OF TOTAL KNEE REPLACEMENT, INITIAL ENCOUNTER (HCC): ICD-10-CM

## 2022-12-16 ASSESSMENT — ENCOUNTER SYMPTOMS: ABDOMINAL PAIN: 0

## 2022-12-16 NOTE — PROGRESS NOTES
[address] [facility] [address] PRIMARY CARE  [address] [place] [id number]  Dept: [phone]  Dept Fax: [phone]     12/16/2022      Jeffory Hodgkin   1960     Chief Complaint   Patient presents with    Vaginitis     Patient c/o vaginal odor and abdominal discharge. HPI     Patient presents with complaint of vaginal odor. States she has not noticed any discharge and denies itching. She would like tested for yeast and BV. States she is not sexually active and does not need STD testing. PHQ Scores 9/19/2022 4/12/2022 11/24/2021 9/28/2020 11/22/2019 10/11/2019 9/30/2019   PHQ2 Score 0 0 0 0 0 0 2   PHQ9 Score 0 0 0 0 0 0 8     Interpretation of Total Score Depression Severity: 1-4 = Minimal depression, 5-9 = Mild depression, 10-14 = Moderate depression, 15-19 = Moderately severe depression, 20-27 = Severe depression     Prior to Visit Medications    Medication Sig Taking? Authorizing Provider   ondansetron (ZOFRAN) 4 MG tablet Take 1 tablet by mouth every 8 hours as needed for Nausea or Vomiting  Isabel Rico DO   Accu-Chek Softclix Lancets MISC USE TO TEST 1-3 TIMES A DAY AS NEEDED FOR SYMPTOMS OR IRREGULAR GLUCOSE  Isabel Rico DO   oxyCODONE (ROXICODONE) 5 MG immediate release tablet Take 5 mg by mouth every 6 hours as needed for Pain.   Historical Provider, MD   aspirin 81 MG EC tablet Take 1 tablet by mouth in the morning and at bedtime  OLIVIA Mcnulty   Insulin Pen Needle ([employer] PEN NEEDLES) 32G X 4 MM MISC USE TO INJECT INSULIN FOUR TIMES A DAY  NOHELIA Robles CNP   losartan (COZAAR) 100 MG tablet TAKE ONE TABLET BY MOUTH DAILY  Isabel Rico DO   metFORMIN (GLUCOPHAGE) 1000 MG tablet TAKE ONE TABLET BY MOUTH TWICE A DAY WITH MEALS  Isabel Rico DO   montelukast (SINGULAIR) 10 MG tablet TAKE ONE TABLET BY MOUTH ONCE NIGHTLY  Isabel Rico DO   insulin glargine (LANTUS SOLOSTAR) 100 UNIT/ML injection pen Inject 35 Units into the skin nightly  Patient taking differently: Inject 30 Units into the skin nightly  Alfreda Kehr, DO   blood glucose monitor strips Test 1-3 times a day as needed for symptoms of irregular glucose. DX: E11.9 please provide strips that are accepted by patient's insurance.   NOHELIA Lopez - CNP   ibuprofen (ADVIL;MOTRIN) 800 MG tablet Take 1 tablet by mouth 2 times daily as needed for Pain  NOHELIA Lopez - CNP   atorvastatin (LIPITOR) 10 MG tablet TAKE ONE TABLET BY MOUTH DAILY  NOHELIA Lopez - CNP   amLODIPine (NORVASC) 5 MG tablet TAKE ONE TABLET BY MOUTH DAILY  Isabel Rico DO   Calcium Carb-Cholecalciferol 600-800 MG-UNIT TABS Take 1 tablet by mouth 2 times daily  Historical Provider, MD   cetirizine (ZYRTEC) 10 MG tablet Take 1 tablet by mouth daily  Nadya Rich, DO       Past Medical History:   Diagnosis Date    Allergic rhinitis     HTN (hypertension)     Hyperlipidemia     Obstructive sleep apnea (adult) (pediatric)     Non-compliant with CPAP    Osteoarthritis     Type II or unspecified type diabetes mellitus without mention of complication, not stated as uncontrolled         Social History     Tobacco Use    Smoking status: Some Days     Types: Cigars    Smokeless tobacco: Never    Tobacco comments:     was smoking 5 black and milds per day, but has not smoked in 4 days   Vaping Use    Vaping Use: Never used   Substance Use Topics    Alcohol use: Yes     Comment: 1 alcoholic beverage every other month    Drug use: Not Currently     Comment: H/o cocaine use        Past Surgical History:   Procedure Laterality Date    COLON SURGERY  2008    10\" removed - chronic constipation    DUPUYTRENS CONTRACTURE SURGERY Left 1/29/2019    LEFT 1ST DORSAL COMPARMENT DE' QUERVAIN'S RELEASE [facility]      4[facility]     HYSTERECTOMY, TOTAL ABDOMINAL (CERVIX REMOVED)      JOINT REPLACEMENT  2007    Left knee partial    KNEE SURGERY  1990's    rt knee reconstructed    NECK SURGERY  05/12/2011    c 4,5,6,7 (ACDF)    OVARY REMOVAL      REVISION TOTAL KNEE ARTHROPLASTY Left 10/10/2022    REVISION LEFT TOTAL KNEE ARTHROPLASTY FOR INFECTION WITH ADDUCTOR CANAL  FOR PAIN CONTROL              Judson Acharya; ZANE performed by Lavelle Pina MD at [address]  2009    Left shoulder    SHOULDER SURGERY  1990's    lt & rt shoulder impingment    SINUS ENDOSCOPY  9-15-14    FUNCTIONAL ENDOSCOPIC SINUS SURGERY       TONSILLECTOMY AND ADENOIDECTOMY      TOTAL KNEE ARTHROPLASTY Left 03/09/2010    Eros to Total    TUBAL LIGATION      UMBILICAL HERNIA REPAIR  8/2010    Dr. Corbin Sicard        Allergies   Allergen Reactions    Gabapentin Other (See Comments), Dizziness or Vertigo and Hallucinations     BLURRED VISION      Hydrocodone     Oxycodone     Demerol Itching and Dermatitis     Unknown reaction; patient has tolerated hydrocodone, oxycodone, hydromorphone and fentanyl per chart review as of 09/16/22. Levaquin [Levofloxacin In D5w] Nausea Only    Morphine Itching     Unknown reaction; patient has tolerated hydrocodone, oxycodone, hydromorphone and fentanyl per chart review as of 09/16/22. Tramadol Other (See Comments)     Unknown reaction; patient has tolerated hydrocodone, oxycodone, hydromorphone and fentanyl per chart review as of 09/16/22.         Family History   Problem Relation Age of Onset    Diabetes Father     Heart Disease Father         CAD    High Blood Pressure Father     High Cholesterol Father     Kidney Disease Brother     Diabetes Son     Other Son         DEANA    Heart Disease Sister         1/2 sister    Breast Cancer Paternal Cousin     Rheum Arthritis Neg Hx     Osteoarthritis Neg Hx     Asthma Neg Hx     Cancer Neg Hx     Heart Failure Neg Hx     Hypertension Neg Hx     Migraines Neg Hx     Ovarian Cancer Neg Hx     Rashes/Skin Problems Neg Hx     Seizures Neg Hx Stroke Neg Hx     Thyroid Disease Neg Hx         Patient's past medical history, surgical history, family history, medications, and allergies  were all reviewed and updated as appropriate today. Review of Systems   HENT:  Negative for congestion. Cardiovascular:  Negative for chest pain. Gastrointestinal:  Negative for abdominal pain. Genitourinary:         Vaginal odor   Neurological:  Negative for dizziness. Hematological:  Negative for adenopathy. /72   Pulse (!) 125   Temp 98 °F (36.7 °C)   LMP 01/26/1996      Physical Exam  Exam conducted with a chaperone present. Constitutional:       Appearance: Normal appearance. Genitourinary:     Vagina: Vaginal discharge present. Cervix: No erythema. Neurological:      Mental Status: She is alert and oriented to person, place, and time. Mental status is at baseline. Assessment:  Encounter Diagnosis   Name Primary? Vaginal odor Yes       Plan:  1. Vaginal odor  -Swabs collected and sent today to check for BV and yeast. We will call with results. - Vaginal Pathogens Probes *A      Return if symptoms worsen or fail to improve.              Rony Figueroa, NOHELIA - CNP

## 2022-12-16 NOTE — PROGRESS NOTES
Dr Shikha Murry      Date /Time 12/16/2022       10:07 AM EDT  Name Dina Whitman             1960   Location  [address]  MRN [id number]                Chief Complaint   Patient presents with    Knee Pain     left        History of Present Illness      Dina Whitman is a 57 y.o. female is here for post-op visit after LEFT  [address] Total Knee Arthroplasty    Patient presents the office today for follow-up visit. Patient is just over 9+ weeks status post revision left total knee arthroplasty for infection. She currently is residing at home. She has been off her antibiotics for 3 weeks. Ht 5' 2.01\" (1.575 m)   Wt 204 lb (92.5 kg)   LMP 01/26/1996   BMI 37.30 kg/m²      Constitutional:       General: He is not in acute distress. Appearance: Normal appearance. LEFT Knee: incision clean, intact, healing appropriately. No surrounding  erythema or fluctuance. Neuro intact distal. No evidence of DVT. 5-100. Knee instability present as expected. Imaging       Left Knee: [address] [facility]  Radiographs: X-rays were ordered today and reviewed of the left knee. 3 views. AP, lateral, and skyline views. They demonstrate femoral component in place. Tibial cement spacer in place      Assessment and Plan    Rhea Jernigan was seen today for knee pain. Diagnoses and all orders for this visit:    History of total left knee replacement  -     XR KNEE LEFT (3 VIEWS); Future  -     US ARTHR/ASP/INJ MAJOR JNT/BURSA LEFT; Future  -     CBC with Auto Differential; Future  -     Sedimentation Rate; Future  -     C-Reactive Protein; Future    [facility]  -     CBC with Auto Differential; Future  -     Sedimentation Rate; Future  -     C-Reactive Protein; Future    Pain due to total left knee replacement, initial encounter ([facility]  -     CBC with Auto Differential; Future  -     Sedimentation Rate; Future  -     C-Reactive Protein;  Future      We will perform a left knee aspiration today and sent it for [employer]. She is due for lab work. We will evaluate acute phase reactants at next visit along with knee aspiration with Dr. Sidra Lemus.  Patient will also obtain acute phase reactants before the next visit. Follow-up in 1 week with Dr. Sidra Lemus    I discussed in detail the risks, benefits, and complications of an aspiration/injection which included but is not limited to infection, skin reactions, hot swollen joints, and anaphylaxis with the patient. The patient verbalized good understanding and gave informed consent for the left knee procedure. The patient was placed in the supine position on the exam table and the skin was prepped using sterile alcohol solution. The superiolateral aspect of the left knee was prepped and draped with sterile technique. The skin and subcutaneous tissues were anesthestized with 5 mL of 1% lidocaine, injected with a 22-gauge needle. After approximately 5 minutes, an 18-gauge needle attached to a 60-ml syringe was inserted into the knee. Then, 20 ml of clear yellowish fluid were with mild blood tinge was aspirated. The syringe was removed and the puncture site sealed with a Band-Aid. Technique: Under sterile conditions a [employer] ultrasound unit with a variable frequency (6.0-15.0 MHz) linear transducer was used to localize the placement of a 22-gauge needle into the Knee. Findings: Successful needle placement for Knee Aspiration. Final images were taken and saved for permanent record. The patient tolerated the injection well. The patient was instructed to call the office immediately if there is any pain, redness, warmth, fever, or chills. No orders of the defined types were placed in this encounter. Electronically signed by Mary Skelton PA-C on 12/16/2022 at 9:17 AM  This dictation was generated by voice recognition computer software.   Although all attempts are made to edit the dictation for accuracy, there may be errors in the transcription that are not intended.

## 2022-12-17 LAB
CANDIDA SPECIES, DNA PROBE: ABNORMAL
GARDNERELLA VAGINALIS, DNA PROBE: ABNORMAL
TRICHOMONAS VAGINALIS DNA: ABNORMAL

## 2022-12-19 DIAGNOSIS — B37.9 YEAST INFECTION: Primary | ICD-10-CM

## 2022-12-19 RX ORDER — FLUCONAZOLE 150 MG/1
150 TABLET ORAL ONCE
Qty: 1 TABLET | Refills: 0 | Status: SHIPPED | OUTPATIENT
Start: 2022-12-19 | End: 2022-12-19

## 2022-12-20 ENCOUNTER — CARE COORDINATION (OUTPATIENT)
Dept: CARE COORDINATION | Age: 62
End: 2022-12-20

## 2022-12-20 ENCOUNTER — TELEPHONE (OUTPATIENT)
Dept: PRIMARY CARE CLINIC | Age: 62
End: 2022-12-20

## 2022-12-20 NOTE — TELEPHONE ENCOUNTER
Pt calling saying she had no appetite yesterday and this morning before breakfast her blood sugar was 73. After eating, it came up to 121. She says since she was discharged from the rehab facility she has not had much appetite and she has been having tingling in both arms, hands, fingers. She says she has had nausea as well but \"has a pill for that\".     She says this was all going on at her office visit here on Fri but was more focused on addressing her yeast infection    She wonders if she should lower her insulin dosing

## 2022-12-20 NOTE — TELEPHONE ENCOUNTER
Unable to assess this without a visit. I have not seen since she was discharged from hospital. I would recommend a hospital follow up visit to be scheduled.

## 2022-12-20 NOTE — CARE COORDINATION
Ambulatory Care Coordination Note  12/20/2022    ACC: Demar Mehta RN  General Assessment    Do you have any symptoms that are causing concern?: Yes  Progression since Onset: Rapid Worsening  Reported Symptoms: Weakness, Fatigue, Other (Comment: lethargy; somnelence with associated c/o tingling of face, extremities, poor appetite)        ACM attempted review by phone, which required frequent cues to prompt responses from pt. In a short period of time (<2-3 minutes) ACM noted pt had greater pauses between responses with increasingly sluggish demeanor of speech. Pt did answer appropriate to questions, but with increasing effort to yield pt response in short duration of this call. Pt agrees to call 911 if needed. ACM advised pt to consider in order to get medical assessment for symptoms. Pt reports she is home alone. ACM advised pt of intent, & did follow w/outreach, to Jesus Hennessy, pt's son listed on HIPAA & noted as pt's EC - who states intent to go to check on pt. Reviewed ACM did review w/pt appropriate use of 911    Offered patient enrollment in the Remote Patient Monitoring (RPM) program for in-home monitoring: Patient declined. 11.11.22    Plan: pt/family will implement use of 911 or go to nearest emergency for pt symptoms. Pt will follow w/PCP as reviewed directly w/PCP earlier today.   Currently scheduled 12.28.22  Care Coordination Interventions    Referral from Primary Care Provider: No  Suggested Interventions and Community Resources  Diabetes Education: Completed  Fall Risk Prevention: Completed  Home Health Services: Completed (Comment: [facility] [phone])  Occupational Therapy: Completed (Comment: Erlinda Chaudhry, OT)  Pharmacist: Declined  Physical Therapy: Completed (Comment: Fermin, PT via [facility])  Registered Dietician: Completed (Comment: 9.19.22 referral)  Specialty Services Referral: Completed (Comment: Dr Marquita Currie (GI); Dr Teresa Dubose ([facility]))  Other Services: Declined (Comment: RPM)  Zone Management Tools: Completed (Comment: DM)  Other Services or Interventions: reviewed self mgmt DM, health maintenance          Goals Addressed                      This Visit's Progress      Conditions and Symptoms (pt-stated)   Worsening            Prior to Admission medications    Medication Sig Start Date End Date Taking? Authorizing Provider   ondansetron (ZOFRAN) 4 MG tablet Take 1 tablet by mouth every 8 hours as needed for Nausea or Vomiting 12/6/22   Isabel Rico DO   Accu-Chek Softclix Lancets MISC USE TO TEST 1-3 TIMES A DAY AS NEEDED FOR SYMPTOMS OR IRREGULAR GLUCOSE 12/5/22   Isaebl Rico DO   oxyCODONE (ROXICODONE) 5 MG immediate release tablet Take 5 mg by mouth every 6 hours as needed for Pain. Historical Provider, MD   aspirin 81 MG EC tablet Take 1 tablet by mouth in the morning and at bedtime 10/14/22 11/17/22  OLIVIA Olguin   Insulin Pen Needle ([employer] PEN NEEDLES) 32G X 4 MM MISC USE TO INJECT INSULIN FOUR TIMES A DAY 9/22/22   NOHELAI Marie CNP   losartan (COZAAR) 100 MG tablet TAKE ONE TABLET BY MOUTH DAILY 8/1/22   Isabel Rico DO   metFORMIN (GLUCOPHAGE) 1000 MG tablet TAKE ONE TABLET BY MOUTH TWICE A DAY WITH MEALS 7/20/22   Isabel Rico DO   montelukast (SINGULAIR) 10 MG tablet TAKE ONE TABLET BY MOUTH ONCE NIGHTLY 7/14/22   Isabel Rico DO   insulin glargine (LANTUS SOLOSTAR) 100 UNIT/ML injection pen Inject 35 Units into the skin nightly  Patient taking differently: Inject 30 Units into the skin nightly 7/6/22   Isabel Rico DO   blood glucose monitor strips Test 1-3 times a day as needed for symptoms of irregular glucose. DX: E11.9 please provide strips that are accepted by patient's insurance.  6/28/22   NOHELIA Marie CNP   ibuprofen (ADVIL;MOTRIN) 800 MG tablet Take 1 tablet by mouth 2 times daily as needed for Pain 6/28/22   NOHELIA Marie CNP   atorvastatin (LIPITOR) 10 MG tablet TAKE ONE TABLET BY MOUTH DAILY 4/25/22   NOHELIA Lopez - CNP   amLODIPine (NORVASC) 5 MG tablet TAKE ONE TABLET BY MOUTH DAILY 11/24/21   Isabel Rico DO   Calcium Carb-Cholecalciferol 600-800 MG-UNIT TABS Take 1 tablet by mouth 2 times daily    Historical Provider, MD   cetirizine (ZYRTEC) 10 MG tablet Take 1 tablet by mouth daily 12/3/15   Nadya Rich DO       Future Appointments   Date Time Provider Gabriel Case   12/23/2022 10:30 AM Kadi Valladares MD [facility]   12/28/2022  9:40 AM DO RUPERT [facility]

## 2022-12-21 ENCOUNTER — CARE COORDINATION (OUTPATIENT)
Dept: CARE COORDINATION | Age: 62
End: 2022-12-21

## 2022-12-21 NOTE — CARE COORDINATION
Ambulatory Care Coordination Note  12/21/2022    ACC: Jas Cole, RN    Unable to reach pt d/t voicemail FULL. Outreach made to pt's son, Steven Cloud, listed on HIPAA who reports he did get in touch w/pt who reportedly stated she \"was up all night and hadn't slept\". ... \"was just tired\" when she received call from ACM circa 4:30pm yesterday. Steven Cloud reports belief HHC continues evidenced by report that pt anticipates follow up SN visit on Friday. However -  (Note: ACM followed w/call to [facility], following this call, & pt is no longer active w/HH)    ACM had reviewed w/You in wrap up, ACM inability to reach pt or leave vm in review pt status directly w/pt this a.m. Requested that Steven Cloud please advise pt to call PCP to schedule follow up visit, since ACM could not leave her a vm. Further encouraged ACM remains available for return outreach as needed. Steven Cloud verbalized understanding of above and agrees to advise pt. Offered patient enrollment in the Remote Patient Monitoring (RPM) program for in-home monitoring: Patient declined. 11.11.22  Plan: continue routine [facility] outreach  Care Coordination Interventions    Referral from Primary Care Provider: No  Suggested Interventions and Community Resources  Diabetes Education: Completed  Fall Risk Prevention: Completed  Home Health Services: Completed (Comment: [employer] [phone])  Occupational Therapy: Completed (Comment: Kristina Estrella OT)  Pharmacist: Declined  Physical Therapy: Completed (Comment: Ruma Hughes, PT via [employer])  Registered Dietician: Completed (Comment: 9.19.22 referral)  Specialty Services Referral: Completed (Comment: Dr Genaro Bone (); Dr Candice Santiago ([facility]  Other Services: Declined (Comment: RPM)  Zone Management Tools: Completed (Comment: DM)  Other Services or Interventions: reviewed self mgmt DM, health maintenance          Goals Addressed                      This Visit's Progress      Conditions and Symptoms (pt-stated) Improving            Prior to Admission medications    Medication Sig Start Date End Date Taking? Authorizing Provider   ondansetron (ZOFRAN) 4 MG tablet Take 1 tablet by mouth every 8 hours as needed for Nausea or Vomiting 12/6/22   Isabel Rico DO   Accu-Chek Softclix Lancets MISC USE TO TEST 1-3 TIMES A DAY AS NEEDED FOR SYMPTOMS OR IRREGULAR GLUCOSE 12/5/22   Isabel Rico DO   oxyCODONE (ROXICODONE) 5 MG immediate release tablet Take 5 mg by mouth every 6 hours as needed for Pain. Historical Provider, MD   aspirin 81 MG EC tablet Take 1 tablet by mouth in the morning and at bedtime 10/14/22 11/17/22  OLIVIA Olguin   Insulin Pen Needle ([employer] PEN NEEDLES) 32G X 4 MM MISC USE TO INJECT INSULIN FOUR TIMES A DAY 9/22/22   NOHELIA Marie - CNP   losartan (COZAAR) 100 MG tablet TAKE ONE TABLET BY MOUTH DAILY 8/1/22   Isabel Rico DO   metFORMIN (GLUCOPHAGE) 1000 MG tablet TAKE ONE TABLET BY MOUTH TWICE A DAY WITH MEALS 7/20/22   Isabel Rico DO   montelukast (SINGULAIR) 10 MG tablet TAKE ONE TABLET BY MOUTH ONCE NIGHTLY 7/14/22   Isabel Rico DO   insulin glargine (LANTUS SOLOSTAR) 100 UNIT/ML injection pen Inject 35 Units into the skin nightly  Patient taking differently: Inject 30 Units into the skin nightly 7/6/22   Isabel Rico DO   blood glucose monitor strips Test 1-3 times a day as needed for symptoms of irregular glucose. DX: E11.9 please provide strips that are accepted by patient's insurance.  6/28/22   NOHELIA Marie - CNP   ibuprofen (ADVIL;MOTRIN) 800 MG tablet Take 1 tablet by mouth 2 times daily as needed for Pain 6/28/22   Peter Jerome APRN - CNP   atorvastatin (LIPITOR) 10 MG tablet TAKE ONE TABLET BY MOUTH DAILY 4/25/22   Peter Jerome APRN - CNP   amLODIPine (NORVASC) 5 MG tablet TAKE ONE TABLET BY MOUTH DAILY 11/24/21   Isabel Rico DO   Calcium Carb-Cholecalciferol 600-800 MG-UNIT TABS Take 1 tablet by mouth 2 times daily    Historical Provider, MD   cetirizine (ZYRTEC) 10 MG tablet Take 1 tablet by mouth daily 12/3/15   DO Joaquina       Future Appointments   Date Time Provider Gabriel Case   12/23/2022 10:30 AM Nicki Dao MD [facility]   12/28/2022  9:40 AM DO RUPERT [facility]

## 2022-12-21 NOTE — CARE COORDINATION
I agree with the Care Coordinator's Plan of Care    Scheduled for hospital follow up appt 12/28. ER precautions.

## 2022-12-23 ENCOUNTER — OFFICE VISIT (OUTPATIENT)
Dept: ORTHOPEDIC SURGERY | Age: 62
End: 2022-12-23

## 2022-12-23 VITALS — WEIGHT: 204 LBS | BODY MASS INDEX: 37.54 KG/M2 | HEIGHT: 62 IN

## 2022-12-23 DIAGNOSIS — T84.59XA INFECTION OF TOTAL KNEE REPLACEMENT, INITIAL ENCOUNTER (HCC): ICD-10-CM

## 2022-12-23 DIAGNOSIS — Z96.659 INFECTION OF TOTAL KNEE REPLACEMENT, INITIAL ENCOUNTER (HCC): ICD-10-CM

## 2022-12-23 DIAGNOSIS — Z96.652 HISTORY OF TOTAL LEFT KNEE REPLACEMENT: Primary | ICD-10-CM

## 2022-12-23 NOTE — PROGRESS NOTES
Dr Ara Peabody      Date /Time 12/23/2022       10:07 AM EDT  Name Veronica Reno             1960   Location  [facility]  MRN [id number]                Chief Complaint   Patient presents with    Follow-up     Left TKA 10/10/2022        History of Present Illness      Veronica Reno is a 57 y.o. female is here for post-op visit after LEFT  59356 Total Knee Arthroplasty    Patient presents the office today for follow-up visit. Patient is just over 10+ weeks status post revision left total knee arthroplasty for infection. She currently is residing at home. She has been off her antibiotics for 4 weeks. Patient denies any fevers, chills, or drainage        Ht 5' 2\" (1.575 m)   Wt 204 lb (92.5 kg)   LMP 01/26/1996   BMI 37.31 kg/m²      Constitutional:       General: He is not in acute distress. Appearance: Normal appearance. LEFT Knee: incision clean, intact, healing appropriately. No surrounding  erythema or fluctuance. Neuro intact distal. No evidence of DVT. 5-100. Knee instability present as expected. Laboratory evaluation  CRP dated 12/19/2022-4.67 with a normal of less than 5    Synovasure results                              Imaging       Left Knee: [facility]  Previous Radiographs: X-rays were ordered today and reviewed of the left knee. 3 views. AP, lateral, and skyline views. They demonstrate femoral component in place. Tibial cement spacer in place      Assessment and Plan    Gillian Pickens was seen today for follow-up. Diagnoses and all orders for this visit:    History of total left knee replacement        I discussed with Veronica Reno that her history, symptoms, signs, and imaging are most consistent with  previous explantation of total knee arthroplasty for infection    I have reviewed patient's Synovasure. Alpha defense and CRP are negative for infection. Patient does have an isolated positive neutrophil elastase.   Her blood CRP which is dated 12/19/2022 is also negative for signs of infection. She has enough negative to give us confidence that she has eradicated left knee infection however there are elements that are borderline. We will proceed with a revision left total knee arthroplasty. I had a discussion with the patient that if in surgery, there appears to have gross purulence or frozen section is positive for acute inflammation, that she may have first stage knee replacement yet again followed by IV antibiotics for 6 weeks. However, I am hoping for second stage as long as infection appears to be cleared. The procedure would be left  83577 Revision Total Knee Arthroplasty, second stage    Perioperative considerations include: Pre-operative clearance from medical subspecialty. We reviewed the risks, benefits, alternatives of this approach. We discussed risks including, but not limited to, bleeding, pain, infection, scarring, damage to the neurovascular structures, blood clots, pulmonary embolus, stiffness, implant instability or loosening, implant failure, incomplete relief of pain, and incomplete return of function. Her risk of infection is significantly higher considering previous history of it and possible recurrence. It is also independently higher due to her history of diabetes. I have urged her to maintain good control around the time of second stage surgery. We also reviewed the surgical details, expected recovery, and rehabilitation (6-9 months). She expressed understanding and will undergo preoperative medical evaluation and optimization. Electronically signed by Rolando Rojo MD on 12/23/2022 at 10:08 AM  This dictation was generated by voice recognition computer software. Although all attempts are made to edit the dictation for accuracy, there may be errors in the transcription that are not intended.

## 2022-12-23 NOTE — LETTER
Dr. Mimi Hanley,    I am hoping to perform second stage reimplantation revision of her left total knee in January. She tells me that her diabetes has been well controlled which is great news. She does have some borderline positivity for persistent infection currently, however most the labs indicate that the infection has been eradicated. Therefore, I believe second stage knee revision is most likely the plan in January. I just wanted to keep you in the loop.     Thank so much  Srini

## 2022-12-23 NOTE — LETTER
[facility]  Surgery Scheduling Form:    22     DEMOGRAPHICS    Patient Name:  Marita Calabrese  Patient :  1960   Patient SS#:  [id number]    Patient Phone:  [phone] (home)  Alt. Patient Phone:    Patient Address:  [address] [address]    PCP:  Darrin Meza DO  Insurance:  Payor: Dana Gloriao / Plan: Valerio Carmona PPO / Product Type: Medicare /   Insurance ID Number:  Payer/Plan Subscr  Sex Relation Sub. Ins. ID Effective Group Num   1.  Dana MoroccoHector Hue 1960 Female Self [id number]                                   [id number]       DIAGNOSIS & PROCEDURE    Diagnosis: Left Knee  T84.59XA Infection Total knee replacement    Operation: LEFT Knee  57824 Revision Total Knee Arthroplasty, second stage    Provider:  Emmett Bowens MD    Location:  [facility] INFORMATION    Requested Date:  2023   Requested Time:  1:00 pm       Patient Arrival Time:  11:00 am   OR Time Required:  140 Minutes  Admission:  []Outpatient   []23 hour  [x]Same Day Admit:   1-2  days  []Inpatient    Anesthesia:  []General  [x]Spinal  [x]MAC/Sedation  Regional Anesthesia:  []None  []Lumbar Plexus Block  []Fascia Iliaca  []Femoral  [x]Adductor canal  []Interscalene Block  []Insert Catheter  []OrthoMix []Exparel       EQUIPMENT    Position:  [x]Supine  []Lateral  []Beach-chair  []Prone    OR Bed:  [x]Regular  [x]DeMayo knee positioner  []Parsons    Radiology:  []Large C-arm  []Small C-arm  []Portable X-ray    Implants:  Medacta Knee:  []Primary Set  []Revision Set  Max Biomet Knee:  []Primary Set [x]Revision Set    Pre-op Orders         Ortho mix     Ropivacaine 0.2% 30 mL    Bupivacaine-epinephrine 0.25% 30 mL    Dexamethasone 4 mg    Toradol 30 mg    Clonidine 100 mcg    Base-sodium chloride 0.9% to 30 mL  []On Q ball   600 mL ropivacaine 0.2%      SUTURE: []#5 Ethibond  [x]#2 Ethibond  [x]#2 Quill  []#1 PDS  [x]#1 Vicryl                   [x]2-0 Vicryl  [x]3-0 Monocryl  []2-0 Nylon  []3-0 Nylon  []3-0 PDS                    []Dermabond  []Steri-strips (in half)  DRESSING:  [x]Prineo dermabond  []4x4 gauze  [x]ABDs  [x]Tegaderm                         []Staples  []Pravena incisional vac  BRACE: []Pelvic Binder  []Hip X-ACT  []Knee TROM  [x]Knee immobilizer                 []Shoulder Immob. (w/abd. pillow)  []Sling  []Ice Unit  [x]Ace-Wrap      [x]Max Biomet:  Valeri Morgan [phone], Micky Jarrell. [email or web address]  []Medacta: Gladis Harris [phone], [email or web address]  []Fx Shoulder: Meeta Wise [phone], Rhonda Domínguez. [email or web address]  []Monse: Lj Fairchild [phone], [email or web address]    Comments:        Lavelle Pina MD  [address] [facility]  12/23/2022       10:19 AM EST

## 2022-12-28 ENCOUNTER — TELEPHONE (OUTPATIENT)
Dept: ORTHOPEDIC SURGERY | Age: 62
End: 2022-12-28

## 2022-12-28 ENCOUNTER — OFFICE VISIT (OUTPATIENT)
Dept: PRIMARY CARE CLINIC | Age: 62
End: 2022-12-28
Payer: MEDICARE

## 2022-12-28 VITALS
SYSTOLIC BLOOD PRESSURE: 121 MMHG | HEART RATE: 114 BPM | TEMPERATURE: 98.3 F | OXYGEN SATURATION: 97 % | DIASTOLIC BLOOD PRESSURE: 77 MMHG

## 2022-12-28 DIAGNOSIS — Z96.659 CHRONIC INFECTION OF KNEE JOINT PROSTHESIS, SEQUELA: ICD-10-CM

## 2022-12-28 DIAGNOSIS — T84.59XS CHRONIC INFECTION OF KNEE JOINT PROSTHESIS, SEQUELA: ICD-10-CM

## 2022-12-28 DIAGNOSIS — I10 BENIGN ESSENTIAL HTN: Chronic | ICD-10-CM

## 2022-12-28 DIAGNOSIS — Z79.4 TYPE 2 DIABETES MELLITUS WITHOUT COMPLICATION, WITH LONG-TERM CURRENT USE OF INSULIN (HCC): Primary | ICD-10-CM

## 2022-12-28 DIAGNOSIS — E11.9 TYPE 2 DIABETES MELLITUS WITHOUT COMPLICATION, WITH LONG-TERM CURRENT USE OF INSULIN (HCC): Primary | ICD-10-CM

## 2022-12-28 PROBLEM — M50.30 DEGENERATIVE DISC DISEASE, CERVICAL: Status: RESOLVED | Noted: 2017-10-02 | Resolved: 2022-12-28

## 2022-12-28 PROBLEM — T84.59XA CHRONIC INFECTION OF PROSTHETIC KNEE (HCC): Status: ACTIVE | Noted: 2022-10-10

## 2022-12-28 PROCEDURE — 3078F DIAST BP <80 MM HG: CPT | Performed by: FAMILY MEDICINE

## 2022-12-28 PROCEDURE — 99214 OFFICE O/P EST MOD 30 MIN: CPT | Performed by: FAMILY MEDICINE

## 2022-12-28 PROCEDURE — 3074F SYST BP LT 130 MM HG: CPT | Performed by: FAMILY MEDICINE

## 2022-12-28 PROCEDURE — 3044F HG A1C LEVEL LT 7.0%: CPT | Performed by: FAMILY MEDICINE

## 2022-12-28 ASSESSMENT — ENCOUNTER SYMPTOMS
NAUSEA: 0
ABDOMINAL PAIN: 0
VOMITING: 0
DIARRHEA: 0
SHORTNESS OF BREATH: 0

## 2022-12-28 NOTE — PATIENT INSTRUCTIONS
CONTINUE MONITORING BP AND BLOOD SUGAR DAILY. IF BS < 80, THEN REDUCE LANTUS TO 25 UNITS. OKAY TO TAKE MELATONIN 5 MG NIGHTLY.

## 2022-12-28 NOTE — PROGRESS NOTES
[address] [facility] [address] PRIMARY CARE  [address] [facility] [address] [address]  Dept: [phone]  Dept Fax: [phone]     12/28/2022      Gaviota Ny   1960     Chief Complaint   Patient presents with    Follow-Up from Hospital       HPI    Pt comes in today for hospital follow up. Admitted 10/10/22 - 10/18/22      Discharged to [facility] and returned home beginning of November. 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 Maribel Millard. Was on IV abx 10/10/22 - 11/18/22 with additional 7 days of PO abx. Followed with ID Dr. Mike Day. Blood sugars running low. Occasional AM hypoglycemia. She reduced her Lantus to 30 units. AM blood sugars running  now. A1c checked during hospitalization 10/16/22 = 5.7% down from 7.0%. Saw Dr. Maribel Millard 12/23/22. Possible borderline remaining infection. Will proceed with second stage of left total knee revision with the understanding that if obvious infection during surgery will need to repeat stage I. Difficulty sleeping. Staying up reading her bible, writing and watching TV. Still NWB to LLE. Using wheelchair and crutches. [facility] discharged her. She is living independently. No data recorded     Prior to Visit Medications    Medication Sig Taking?  Authorizing Provider   insulin glargine (LANTUS SOLOSTAR) 100 UNIT/ML injection pen Inject 30 Units into the skin nightly  Isabel Rico, DO   Accu-Chek Softclix Lancets MISC USE TO TEST 1-3 TIMES A DAY AS NEEDED FOR SYMPTOMS OR IRREGULAR GLUCOSE  Isabel Rico, DO   aspirin 81 MG EC tablet Take 1 tablet by mouth in the morning and at bedtime  OLIVIA Olivas   Insulin Pen Needle ([employer] PEN NEEDLES) 32G X 4 MM MISC USE TO INJECT INSULIN FOUR TIMES A DAY  Reji Rodriguez APRN - CNP   losartan (COZAAR) 100 MG tablet TAKE ONE TABLET BY MOUTH DAILY Isabel Rico DO   metFORMIN (GLUCOPHAGE) 1000 MG tablet TAKE ONE TABLET BY MOUTH TWICE A DAY WITH MEALS  Isabel Rico DO   montelukast (SINGULAIR) 10 MG tablet TAKE ONE TABLET BY MOUTH ONCE NIGHTLY  Isabel Rico DO   blood glucose monitor strips Test 1-3 times a day as needed for symptoms of irregular glucose. DX: E11.9 please provide strips that are accepted by patient's insurance.   NOHELIA Naranjo - CNP   ibuprofen (ADVIL;MOTRIN) 800 MG tablet Take 1 tablet by mouth 2 times daily as needed for Pain  NOHELIA Naranjo - CNP   atorvastatin (LIPITOR) 10 MG tablet TAKE ONE TABLET BY MOUTH DAILY  NOHELIA Naranjo - CNP   amLODIPine (NORVASC) 5 MG tablet TAKE ONE TABLET BY MOUTH DAILY  Isabel Rico DO   Calcium Carb-Cholecalciferol 600-800 MG-UNIT TABS Take 1 tablet by mouth 2 times daily  Historical Provider, MD   cetirizine (ZYRTEC) 10 MG tablet Take 1 tablet by mouth daily  Chuy Yoo DO        Past Medical History:   Diagnosis Date    Allergic rhinitis     HTN (hypertension)     Hyperlipidemia     Obstructive sleep apnea (adult) (pediatric)     Non-compliant with CPAP    Osteoarthritis     Type II or unspecified type diabetes mellitus without mention of complication, not stated as uncontrolled         Social History     Tobacco Use    Smoking status: Former     Types: Cigars    Smokeless tobacco: Never    Tobacco comments:     was smoking 5 black and milds per day, but has not smoked in 4 days   Vaping Use    Vaping Use: Never used   Substance Use Topics    Alcohol use: Yes     Comment: 1 alcoholic beverage every other month    Drug use: Not Currently     Comment: H/o cocaine use        Past Surgical History:   Procedure Laterality Date    COLON SURGERY  2008    10\" removed - chronic constipation    DUPUYTRENS CONTRACTURE SURGERY Left 1/29/2019    LEFT 1ST DORSAL COMPARMENT DE' QUERVAIN'S RELEASE performed by Denita Cabrera MD at [address] HERNIA REPAIR      4x     HYSTERECTOMY, TOTAL ABDOMINAL (CERVIX REMOVED)      JOINT REPLACEMENT  2007    Left knee partial    KNEE SURGERY  1990's    rt knee reconstructed    NECK SURGERY  05/12/2011    c 4,5,6,7 (ACDF)    OVARY REMOVAL      REVISION TOTAL KNEE ARTHROPLASTY Left 10/10/2022    REVISION LEFT TOTAL KNEE ARTHROPLASTY FOR INFECTION WITH ADDUCTOR CANAL  FOR PAIN CONTROL              Barriedanielito DEGROOT performed by Shikha Murry MD at [facility] 5 2009    Left shoulder    SHOULDER SURGERY  1990's    lt & rt shoulder impingment    SINUS ENDOSCOPY  9-15-14    FUNCTIONAL ENDOSCOPIC SINUS SURGERY       TONSILLECTOMY AND ADENOIDECTOMY      TOTAL KNEE ARTHROPLASTY Left 03/09/2010    Eros to Total    TUBAL LIGATION      UMBILICAL HERNIA REPAIR  8/2010    Dr. Osmel Mcmanus        Allergies   Allergen Reactions    Gabapentin Other (See Comments), Dizziness or Vertigo and Hallucinations     BLURRED VISION      Hydrocodone     Oxycodone     Demerol Itching and Dermatitis     Unknown reaction; patient has tolerated hydrocodone, oxycodone, hydromorphone and fentanyl per chart review as of 09/16/22. Levaquin [Levofloxacin In D5w] Nausea Only    Morphine Itching     Unknown reaction; patient has tolerated hydrocodone, oxycodone, hydromorphone and fentanyl per chart review as of 09/16/22. Tramadol Other (See Comments)     Unknown reaction; patient has tolerated hydrocodone, oxycodone, hydromorphone and fentanyl per chart review as of 09/16/22.         Family History   Problem Relation Age of Onset    Diabetes Father     Heart Disease Father         CAD    High Blood Pressure Father     High Cholesterol Father     Kidney Disease Brother     Diabetes Son     Other Son         DEANA    Heart Disease Sister         1/2 sister    Breast Cancer Paternal Cousin     Rheum Arthritis Neg Hx     Osteoarthritis Neg Hx     Asthma Neg Hx     Cancer Neg Hx     Heart Failure Neg Hx     Hypertension Neg Hx Migraines Neg Hx     Ovarian Cancer Neg Hx     Rashes/Skin Problems Neg Hx     Seizures Neg Hx     Stroke Neg Hx     Thyroid Disease Neg Hx         Patient's past medical history, surgical history, family history, medications, and allergies  were all reviewed and updated as appropriate today. Review of Systems   Constitutional:  Negative for chills, fever and unexpected weight change. Respiratory:  Negative for shortness of breath. Cardiovascular:  Negative for chest pain. Gastrointestinal:  Negative for abdominal pain, diarrhea, nausea and vomiting. Musculoskeletal:  Positive for arthralgias and joint swelling. /77   Pulse (!) 114   Temp 98.3 °F (36.8 °C)   LMP 01/26/1996   SpO2 97%      Physical Exam  Constitutional:       General: She is not in acute distress. Appearance: Normal appearance. She is not ill-appearing. Cardiovascular:      Rate and Rhythm: Normal rate. Pulmonary:      Effort: Pulmonary effort is normal.   Neurological:      General: No focal deficit present. Mental Status: She is alert. Psychiatric:         Mood and Affect: Mood normal.       Assessment:  Encounter Diagnoses   Name Primary? Type 2 diabetes mellitus without complication, with long-term current use of insulin (HCC) Yes    Chronic infection of knee joint prosthesis, sequela     Benign essential HTN     Pre-op exam     Severe obesity (BMI 35.0-39. 9) with comorbidity ([address]        Plan:    - Patient has been home bound and eating a simpler diet resulting in lower blood sugars. She has reduced her Lantus to 30 units and blood sugars have stabilized. Will monitor closely with instructions to reduce to 25 units if persistently < 80.   - Ortho following closely. Second stage of surgery pending. Medically cleared for 2nd stage of her surgery if indicated. - BP well controlled. New Prescriptions    No medications on file        No orders of the defined types were placed in this encounter. Return in about 3 months (around 3/28/2023) for FOLLOW UP DMII.     Total time spent with patient including direct consultation, evaluation, review of medical records and documentation = 411 Chacha Das, DO

## 2023-01-03 DIAGNOSIS — Z01.818 PREOP TESTING: Primary | ICD-10-CM

## 2023-01-03 DIAGNOSIS — Z96.652 HISTORY OF TOTAL LEFT KNEE REPLACEMENT: ICD-10-CM

## 2023-01-04 ENCOUNTER — TELEPHONE (OUTPATIENT)
Dept: PRIMARY CARE CLINIC | Age: 63
End: 2023-01-04

## 2023-01-04 ENCOUNTER — TELEPHONE (OUTPATIENT)
Dept: ORTHOPEDIC SURGERY | Age: 63
End: 2023-01-04

## 2023-01-04 DIAGNOSIS — Z96.652 HISTORY OF TOTAL LEFT KNEE REPLACEMENT: ICD-10-CM

## 2023-01-04 DIAGNOSIS — Z01.818 PREOP TESTING: ICD-10-CM

## 2023-01-04 NOTE — TELEPHONE ENCOUNTER
SUBMITTED INPATIENT SX PA L KNEE. EXPEDITED REQUEST TO [facility] WEBSITE.  PENDING STATUS [id number].

## 2023-01-04 NOTE — TELEPHONE ENCOUNTER
MAVERICK/ lorrie
PATIENT REQUESTING TO BE SCHEDULED FOR SURGERY AT [place]. PLEASE ADVISE.
PATIENT RETURNING CALL FOR [address] TO SCHEDULE SX     [phone] [facility]
RC/ Spoke  with patient     lorrie
RC/ Spoke with patient    lorrie
Surgery  Scheduling     Contact Name: Fadi Hawk Request: LT KNEE SX  Patient Contact Number: [phone]    PATIENT IS RETURNING CALL TO SCHEDULE LT KNEE SURGERY.
no tinnitus

## 2023-01-04 NOTE — TELEPHONE ENCOUNTER
----- Message from [facility] sent at 1/4/2023  8:14 AM EST -----  Subject: Message to Provider    QUESTIONS  Information for Provider? Nuria Giraldo is schedule for knee replacement on   1/9/2023. She just had an appt on 12/28 with Isabel. She has a pre op   appt today,1/4, at 2:40. Nuria Giraldo would like to know if she should come in   today. Please give her a call.   ---------------------------------------------------------------------------  --------------  Kaitlin LINDSAY  [phone]; OK to leave message on voicemail  ---------------------------------------------------------------------------  --------------  SCRIPT ANSWERS  Relationship to Patient?  Self

## 2023-01-04 NOTE — TELEPHONE ENCOUNTER
Called patient regarding appointment. Azul Thrasher messaged surgeon to see if 12/28/22 appointment would work. Per Azul Thrasher, patients surgeon stated that she would not need to come in because she just had an appointment. When calling patient about appointment she informed me that she needs mrsa and blood work. I informed her that she may need to come in for MRSA. Patient is going to [facility] lab before appointment and will let us know if they were able to swab her for MRSA.

## 2023-01-04 NOTE — PROGRESS NOTES
Angelina Lisay    Age 57 y.o.    female    1960    MRN [id number]    1/9/2023  Arrival Time_____________  OR Time____________205 Min     Procedure(s):  REVISION LEFT TOTAL KNEE ARTHROPLASTY, SECOND STAGE           MONTSERRAT BIOMET NOTE: ADDUCTOR CANAL BLOCK                      General   Surgeon(s): April Garcia, MD      DAY ADMIT ___  SDS/OP ___  OUTPT IN BED ___        Phone [phone] (home)     PCP _____________________ Phone_________________ Epic ( ) Epic CE ( ) Appt ________    ADDITIONAL INFO __________________________________ Cardio/Consult _____________    NOTES _____________________________________________________________________    ____________________________________________________________________________    PAT APPT DATE:________ TIME: ________  FAXED QAD: _______  (__) H&P w/ Hospitalist  __________________________________________________________________________  Preop Nurse phone screen complete: _____________  (__) CBC     (__) W/ DIFF ___________     (__) Hgb A1C    ___________  (__) CHEST X RAY   __________  (__) LIPID PROFILE  ___________  (__) EKG   __________  (__) PT/PTT   ___________  (__) PFT's   __________  (__) BMP   ___________  (__) CAROTIDS  __________  (__) CMP   ___________  (__) VEIN MAPPING  __________  (__) U/A   ___________  (__) HISTORY & PHYSICAL __________  (__) URINE C & S  ___________  (__) CARDIAC CLEARANCE __________  (__) U/A W/ FLEX  ___________  (__) PULM.  CLEARANCE __________  (__) SERUM PREGNANCY ___________  (__) Check Epic DOS orders __________  (__) TYPE & SCREEN __________repeat ( ) (__)  __________________ __________  (__) ALBUMIN   ___________  (__)  __________________ __________  (__) TRANSFERRIN  ___________  (__)  __________________ __________  (__) LIVER PROFILE  ___________  (__)  __________________ __________  (__) MRSA NASAL SWAB ___________  (__) URINE PREG DOS __________  (__) SED RATE  ___________  (__) BLOOD SUGAR DOS __________  (__) C-REACTIVE PROTEIN ___________    (__) VITAMIN D HYDROXY ___________  (__) BLOOD THINNERS __________    (__) ACE/ ARBS: _____________________     (__) BETABLOCKERS __________________

## 2023-01-05 ENCOUNTER — CARE COORDINATION (OUTPATIENT)
Dept: CARE COORDINATION | Age: 63
End: 2023-01-05

## 2023-01-05 ENCOUNTER — TELEPHONE (OUTPATIENT)
Dept: ORTHOPEDIC SURGERY | Age: 63
End: 2023-01-05

## 2023-01-05 LAB
ABO/RH: NORMAL
ALBUMIN SERPL-MCNC: 4 G/DL (ref 3.4–5)
ANION GAP SERPL CALCULATED.3IONS-SCNC: 14 MMOL/L (ref 3–16)
ANTIBODY SCREEN: NORMAL
APTT: 31.5 SEC (ref 23–34.3)
BASOPHILS ABSOLUTE: 0.1 K/UL (ref 0–0.2)
BASOPHILS RELATIVE PERCENT: 0.9 %
BILIRUBIN URINE: NEGATIVE
BLOOD, URINE: NEGATIVE
BUN BLDV-MCNC: 13 MG/DL (ref 7–20)
CALCIUM SERPL-MCNC: 10.1 MG/DL (ref 8.3–10.6)
CHLORIDE BLD-SCNC: 100 MMOL/L (ref 99–110)
CLARITY: CLEAR
CO2: 24 MMOL/L (ref 21–32)
COLOR: YELLOW
CREAT SERPL-MCNC: 0.5 MG/DL (ref 0.6–1.2)
EOSINOPHILS ABSOLUTE: 0.4 K/UL (ref 0–0.6)
EOSINOPHILS RELATIVE PERCENT: 3 %
GFR SERPL CREATININE-BSD FRML MDRD: >60 ML/MIN/{1.73_M2}
GLUCOSE BLD-MCNC: 217 MG/DL (ref 70–99)
GLUCOSE URINE: NEGATIVE MG/DL
HCT VFR BLD CALC: 33.6 % (ref 36–48)
HEMOGLOBIN: 10.4 G/DL (ref 12–16)
INR BLD: 0.97 (ref 0.87–1.14)
KETONES, URINE: NEGATIVE MG/DL
LEUKOCYTE ESTERASE, URINE: NEGATIVE
LYMPHOCYTES ABSOLUTE: 3 K/UL (ref 1–5.1)
LYMPHOCYTES RELATIVE PERCENT: 20.6 %
MCH RBC QN AUTO: 23.9 PG (ref 26–34)
MCHC RBC AUTO-ENTMCNC: 30.9 G/DL (ref 31–36)
MCV RBC AUTO: 77.2 FL (ref 80–100)
MICROSCOPIC EXAMINATION: NORMAL
MONOCYTES ABSOLUTE: 0.7 K/UL (ref 0–1.3)
MONOCYTES RELATIVE PERCENT: 4.7 %
NEUTROPHILS ABSOLUTE: 10.2 K/UL (ref 1.7–7.7)
NEUTROPHILS RELATIVE PERCENT: 70.8 %
NITRITE, URINE: NEGATIVE
PDW BLD-RTO: 16.9 % (ref 12.4–15.4)
PH UA: 6.5 (ref 5–8)
PLATELET # BLD: 330 K/UL (ref 135–450)
PMV BLD AUTO: 10.1 FL (ref 5–10.5)
POTASSIUM SERPL-SCNC: 3.9 MMOL/L (ref 3.5–5.1)
PROTEIN UA: NEGATIVE MG/DL
PROTHROMBIN TIME: 12.7 SEC (ref 11.7–14.5)
RBC # BLD: 4.36 M/UL (ref 4–5.2)
SODIUM BLD-SCNC: 138 MMOL/L (ref 136–145)
SPECIFIC GRAVITY UA: 1.02 (ref 1–1.03)
TRANSFERRIN: 258 MG/DL (ref 200–360)
URINE TYPE: NORMAL
UROBILINOGEN, URINE: 0.2 E.U./DL
WBC # BLD: 14.4 K/UL (ref 4–11)

## 2023-01-05 NOTE — PROGRESS NOTES
1. Do not eat or drink anything after 12 midnight prior to surgery. This includes no water, chewing gum mints, or ice chips. You may brush your teeth and gargle the day of surgery but DO NOT SWALLOW THE WATER. 2. Please see your family doctor/pediatrician for a history and physical and/or concerning medications. Bring any test results/reports from your physician's office. If you are under the care of a heart doctor or specialist please be aware that you may be asked to see him or her for clearance. 3. You may be asked to stop blood thinners such as Coumadin, Plavix, Fragmin, and Lovenox or Anti-inflammatories such as Aspirin, Ibuprofen, Advil, and Naproxen prior to your surgery. Please check with your doctor before stopping these or any other medications. 4. Do not smoke, and do not drink any alcoholic beverages 24 hours prior to surgery. 5. You MUST make arrangements for a responsible adult to take you home after your surgery. For your safety, you will not be allowed to leave alone or drive yourself home. Your surgery will be cancelled if you do not have a ride home. Also for your safety, it is strongly suggested someone stay with you the first 24 hrs after your surgery. 6. A parent/legal guardian must accompany a child scheduled for surgery and plan to stay at the hospital until the child is discharged. Please do not bring other children with you. 7. For your comfort,please wear simple, loose fitting clothing to the hospital.  Please do not bring valuables (money, credit cards, checkbooks, etc.) Do not wear any makeup (including no eye makeup) or nail polish on your fingers or toes. 8. For your safety, please DO NOT wear any jewelry or piercings on day of surgery. All body piercing jewelry must be removed. 9. If you have dentures, they will be removed before going to the OR; for your convenience we will provide you with a container.   If you wear contact lenses or glasses, they will be removed, they will be removed, please bring a case for them. 10. If appicable,Please see your family doctor/pediatrician for a history & physical and/or concerning medications. Bring any test results/reports from your physician's office. 11. Remember to bring Blood Bank bracelet to the hospital on the day of surgery. 12. If you have a Living Will and Durable Power of  for Healthcare, please bring in a copy. 15. Notify your Surgeon if you develop any illness between now and surgery  time, cough, cold, fever, sore throat, nausea, vomiting, etc.  Please notify your surgeon if you experience dizziness, shortness of breath or blurred vision between now & the time of your surgery   14. DO NOT shave your operative site 96 hours prior to surgery. For face & neck surgery, men may use an electric razor 48 hours prior to surgery. 15. Shower the night before surgery with ___Antibacterial soap ___Hibiclens   16. To provide excellent care visitors will be limited to one in the room at any given time. 17.  Please bring picture ID and insurance card. 18.  Visit our web site for additional information:  [facility]/surgery.            L/d ASA on 1/5/23 / L/D Ibuprofen 1/5/23

## 2023-01-05 NOTE — TELEPHONE ENCOUNTER
OTHER    Subject: [facility] MEDICARE INS FOR 2023  Patient  Request: Fernanda Diego  Contact Number: [phone]    PATIENT DID CALL IN TODAY TO VERIFY HER [facility] MEDICARE INS FOR THIS YEAR. REQ A RETURN CALL TO MAKE SURE EVERYTHING IS GOOD FOR SX ON Monday.    PLEASE RETURN CALL TO THE ABOVE NUMBER.

## 2023-01-05 NOTE — CARE COORDINATION
Unable to leave vm or reach pt directly. Note previous [address] outreach - unable to reach pt directly.     As pt is scheduled for ortho surgery 1.9.23 and potential for subacute stay for rehab -  Will make final attempt tomorrow to reach pt in review of pausing ACC until later date when pt able to more fully engage towards efforts to achieve weight loss goal.

## 2023-01-06 ENCOUNTER — TELEPHONE (OUTPATIENT)
Dept: ORTHOPEDIC SURGERY | Age: 63
End: 2023-01-06

## 2023-01-06 ENCOUNTER — CARE COORDINATION (OUTPATIENT)
Dept: CARE COORDINATION | Age: 63
End: 2023-01-06

## 2023-01-06 LAB
ESTIMATED AVERAGE GLUCOSE: 105.4 MG/DL
HBA1C MFR BLD: 5.3 %
URINE CULTURE, ROUTINE: NORMAL

## 2023-01-06 NOTE — TELEPHONE ENCOUNTER
PATIENT CALLED STATES THAT SHE NEEDS A CALL BACK TODAY.  SHE HAS QUESTIONS REGARDING HER SX WHICH IS Monday 1/9/22

## 2023-01-06 NOTE — CARE COORDINATION
Ambulatory Care Coordination Note  1/6/2023    ACC: Sheilah Phoenix, RN    mutually agreed to end current ACC episode while pt has planned ortho surgery, scheduled Mon 1.9.23 - to allow time for recovery before resuming ACC goals for weight loss. Mutually agree to resume ACC at later date/time- perhaps in another month or so. Pt willing to resume engaging in [facility] when she is ready post recovery knee surgery. Offered patient enrollment in the Remote Patient Monitoring (RPM) program for in-home monitoring: Patient declined. 10.6.22 & 11.11.22  Pt verbalizes understanding of above and agreement with the following  Plan: pt will schedule future/return visit w/PCP as appropriate  Pt willing to resume [facility] outreach in future s/p recovery of knee surgery; and when she feels able during/post PT   Pt agrees to outreach direct to ACM, as needed, between routine follow up outreach initiated by [facility]   Care Coordination Interventions    Referral from Primary Care Provider: No  Suggested Interventions and Community Resources  Diabetes Education: Completed  Fall Risk Prevention: Completed  Home Health Services: Completed (Comment: Tan[facility] Mu[address] [address] [phone])  Occupational Therapy: Completed (Comment: Ortiz Rivas, OT)  Pharmacist: Declined  Physical Therapy: Completed (Comment: [facility] via [de-identified] [place] [phone])  Registered Dietician: Completed (Comment: 9.19.22 referral)  Specialty Services Referral: Completed (Comment: Dr Mary Flowers (GI); Dr Angela Caraballo ([facility]))  Other Services: Declined (Comment: RPM)  Zone Management Tools: Completed (Comment: DM)  Other Services or Interventions: reviewed self mgmt DM, health maintenance          Goals Addressed                      This Visit's Progress      Nutrition Plan   Improving      I will follow a nutritional plan as directed  - Regular Diet     Barriers: lack of education  Plan for overcoming my barriers: I will eat consistently throughout the day, follow MyPlate guidelines, and limit portion sizes. Confidence: 9/10  Anticipated Goal Completion Date: 2/7/23        reduce abdominal girth (pt-stated)   No change      Reduction of lower abdominal girth at waistline  Barriers: plateua after initial successful weight loss & reduction in pant/dress size  Plan for overcoming my barriers: engage w/RD through Care Coordination and following w/GI  Confidence: 10/10  Anticipated Goal Completion Date: 3.31.23              Prior to Admission medications    Medication Sig Start Date End Date Taking? Authorizing Provider   insulin glargine (LANTUS SOLOSTAR) 100 UNIT/ML injection pen Inject 30 Units into the skin nightly 12/28/22   Isabel Rico DO   Accu-Chek Softclix Lancets MISC USE TO TEST 1-3 TIMES A DAY AS NEEDED FOR SYMPTOMS OR IRREGULAR GLUCOSE 12/5/22   Isabel Rico DO   aspirin 81 MG EC tablet Take 1 tablet by mouth in the morning and at bedtime 10/14/22 11/17/22  OLIVIA Alcaraz   Insulin Pen Needle ([employer] PEN NEEDLES) 32G X 4 MM MISC USE TO INJECT INSULIN FOUR TIMES A DAY 9/22/22   NOHELIA Cole CNP   losartan (COZAAR) 100 MG tablet TAKE ONE TABLET BY MOUTH DAILY 8/1/22   Isabel Rico DO   metFORMIN (GLUCOPHAGE) 1000 MG tablet TAKE ONE TABLET BY MOUTH TWICE A DAY WITH MEALS 7/20/22   Isabel Rico DO   montelukast (SINGULAIR) 10 MG tablet TAKE ONE TABLET BY MOUTH ONCE NIGHTLY 7/14/22   Isabel Merino DO   blood glucose monitor strips Test 1-3 times a day as needed for symptoms of irregular glucose. DX: E11.9 please provide strips that are accepted by patient's insurance.  6/28/22   NOHELIA Cole CNP   ibuprofen (ADVIL;MOTRIN) 800 MG tablet Take 1 tablet by mouth 2 times daily as needed for Pain 6/28/22   NOHELIA Cole CNP   atorvastatin (LIPITOR) 10 MG tablet TAKE ONE TABLET BY MOUTH DAILY 4/25/22   NOHELIA Cole CNP   amLODIPine (NORVASC) 5 MG tablet TAKE ONE TABLET BY MOUTH DAILY 11/24/21 Isabel Rico, DO   Calcium Carb-Cholecalciferol 600-800 MG-UNIT TABS Take 1 tablet by mouth 2 times daily    Historical Provider, MD   cetirizine (ZYRTEC) 10 MG tablet Take 1 tablet by mouth daily 12/3/15   John Evangelista, DO       No future appointments.

## 2023-01-06 NOTE — TELEPHONE ENCOUNTER
Jordon Jessica [id number]    Date: 01/09/23  Type of SX:  INPATIENT  Location: [facility]  CPT: 21756   DX Code: T84.59XA  SX area: L KNEE  Insurance: [address]

## 2023-01-06 NOTE — TELEPHONE ENCOUNTER
Surgery 01/09/2023 [facility] 1:00 pm   ARRIVAL 11:00 am     Confirmed w/ patient    jm

## 2023-01-06 NOTE — CARE COORDINATION
Per MARY ANN BOLTON, patient wishes to suspend ACC and RD outreach until after she has had her knee surgery and subsequent physical therapy. Will follow up as appropriate.     Electronically signed by Pepe Hughes RD on 1/6/2023 at 11:14 AM

## 2023-01-07 LAB — MRSA CULTURE ONLY: NORMAL

## 2023-01-09 ENCOUNTER — HOSPITAL ENCOUNTER (INPATIENT)
Age: 63
LOS: 2 days | Discharge: HOME | DRG: 468 | End: 2023-01-11
Attending: ORTHOPAEDIC SURGERY | Admitting: ORTHOPAEDIC SURGERY
Payer: MEDICARE

## 2023-01-09 ENCOUNTER — ANESTHESIA EVENT (OUTPATIENT)
Dept: OPERATING ROOM | Age: 63
DRG: 468 | End: 2023-01-09
Payer: MEDICARE

## 2023-01-09 ENCOUNTER — ANESTHESIA (OUTPATIENT)
Dept: OPERATING ROOM | Age: 63
DRG: 468 | End: 2023-01-09
Payer: MEDICARE

## 2023-01-09 ENCOUNTER — APPOINTMENT (OUTPATIENT)
Dept: GENERAL RADIOLOGY | Age: 63
DRG: 468 | End: 2023-01-09
Attending: ORTHOPAEDIC SURGERY
Payer: MEDICARE

## 2023-01-09 DIAGNOSIS — Z96.652 HISTORY OF TOTAL LEFT KNEE REPLACEMENT: Primary | ICD-10-CM

## 2023-01-09 DIAGNOSIS — Z96.659 INFECTION OF PROSTHETIC KNEE JOINT, INITIAL ENCOUNTER (HCC): ICD-10-CM

## 2023-01-09 DIAGNOSIS — Z96.659 CHRONIC INFECTION OF KNEE JOINT PROSTHESIS, SUBSEQUENT ENCOUNTER: Primary | ICD-10-CM

## 2023-01-09 DIAGNOSIS — T84.59XA INFECTION OF PROSTHETIC KNEE JOINT, INITIAL ENCOUNTER (HCC): ICD-10-CM

## 2023-01-09 DIAGNOSIS — T84.59XD CHRONIC INFECTION OF KNEE JOINT PROSTHESIS, SUBSEQUENT ENCOUNTER: Primary | ICD-10-CM

## 2023-01-09 PROBLEM — T84.54XA INFECTION OF PROSTHETIC LEFT KNEE JOINT (HCC): Status: ACTIVE | Noted: 2023-01-09

## 2023-01-09 LAB
ABO/RH: NORMAL
ANTIBODY SCREEN: NORMAL
APPEARANCE FLUID: NORMAL
BASO FLUID: 1 %
CELL COUNT FLUID TYPE: NORMAL
CLOT EVALUATION: NORMAL
COLOR FLUID: NORMAL
GLUCOSE BLD-MCNC: 104 MG/DL (ref 70–99)
GLUCOSE BLD-MCNC: 193 MG/DL (ref 70–99)
GLUCOSE BLD-MCNC: 202 MG/DL (ref 70–99)
LYMPHOCYTES, BODY FLUID: 6 %
MACROPHAGE FLUID: 34 %
NEUTROPHIL, FLUID: 59 %
NUCLEATED CELLS FLUID: 500 /CUMM
NUMBER OF CELLS COUNTED FLUID: 100
PERFORMED ON: ABNORMAL
RBC FLUID: NORMAL /CUMM

## 2023-01-09 PROCEDURE — 87206 SMEAR FLUORESCENT/ACID STAI: CPT

## 2023-01-09 PROCEDURE — C1776 JOINT DEVICE (IMPLANTABLE): HCPCS | Performed by: ORTHOPAEDIC SURGERY

## 2023-01-09 PROCEDURE — 86900 BLOOD TYPING SEROLOGIC ABO: CPT

## 2023-01-09 PROCEDURE — 73560 X-RAY EXAM OF KNEE 1 OR 2: CPT

## 2023-01-09 PROCEDURE — 87205 SMEAR GRAM STAIN: CPT

## 2023-01-09 PROCEDURE — 7100000001 HC PACU RECOVERY - ADDTL 15 MIN: Performed by: ORTHOPAEDIC SURGERY

## 2023-01-09 PROCEDURE — 2500000003 HC RX 250 WO HCPCS: Performed by: ORTHOPAEDIC SURGERY

## 2023-01-09 PROCEDURE — 6370000000 HC RX 637 (ALT 250 FOR IP): Performed by: ANESTHESIOLOGY

## 2023-01-09 PROCEDURE — 3700000001 HC ADD 15 MINUTES (ANESTHESIA): Performed by: ORTHOPAEDIC SURGERY

## 2023-01-09 PROCEDURE — 86901 BLOOD TYPING SEROLOGIC RH(D): CPT

## 2023-01-09 PROCEDURE — 2700000000 HC OXYGEN THERAPY PER DAY

## 2023-01-09 PROCEDURE — 0SHD08Z INSERTION OF SPACER INTO LEFT KNEE JOINT, OPEN APPROACH: ICD-10-PCS | Performed by: ORTHOPAEDIC SURGERY

## 2023-01-09 PROCEDURE — 6360000002 HC RX W HCPCS: Performed by: ORTHOPAEDIC SURGERY

## 2023-01-09 PROCEDURE — 7100000000 HC PACU RECOVERY - FIRST 15 MIN: Performed by: ORTHOPAEDIC SURGERY

## 2023-01-09 PROCEDURE — 2500000003 HC RX 250 WO HCPCS

## 2023-01-09 PROCEDURE — 89051 BODY FLUID CELL COUNT: CPT

## 2023-01-09 PROCEDURE — 6360000002 HC RX W HCPCS: Performed by: ANESTHESIOLOGY

## 2023-01-09 PROCEDURE — 87116 MYCOBACTERIA CULTURE: CPT

## 2023-01-09 PROCEDURE — 3E0T3BZ INTRODUCTION OF ANESTHETIC AGENT INTO PERIPHERAL NERVES AND PLEXI, PERCUTANEOUS APPROACH: ICD-10-PCS | Performed by: ANESTHESIOLOGY

## 2023-01-09 PROCEDURE — 86850 RBC ANTIBODY SCREEN: CPT

## 2023-01-09 PROCEDURE — 2580000003 HC RX 258

## 2023-01-09 PROCEDURE — 6360000002 HC RX W HCPCS

## 2023-01-09 PROCEDURE — 3600000004 HC SURGERY LEVEL 4 BASE: Performed by: ORTHOPAEDIC SURGERY

## 2023-01-09 PROCEDURE — 1200000000 HC SEMI PRIVATE

## 2023-01-09 PROCEDURE — 2580000003 HC RX 258: Performed by: ORTHOPAEDIC SURGERY

## 2023-01-09 PROCEDURE — 64447 NJX AA&/STRD FEMORAL NRV IMG: CPT | Performed by: ANESTHESIOLOGY

## 2023-01-09 PROCEDURE — 87070 CULTURE OTHR SPECIMN AEROBIC: CPT

## 2023-01-09 PROCEDURE — 2780000010 HC IMPLANT OTHER: Performed by: ORTHOPAEDIC SURGERY

## 2023-01-09 PROCEDURE — 2720000010 HC SURG SUPPLY STERILE: Performed by: ORTHOPAEDIC SURGERY

## 2023-01-09 PROCEDURE — 3700000000 HC ANESTHESIA ATTENDED CARE: Performed by: ORTHOPAEDIC SURGERY

## 2023-01-09 PROCEDURE — 0SPD08Z REMOVAL OF SPACER FROM LEFT KNEE JOINT, OPEN APPROACH: ICD-10-PCS | Performed by: ORTHOPAEDIC SURGERY

## 2023-01-09 PROCEDURE — 2580000003 HC RX 258: Performed by: PHYSICIAN ASSISTANT

## 2023-01-09 PROCEDURE — 6370000000 HC RX 637 (ALT 250 FOR IP): Performed by: PHYSICIAN ASSISTANT

## 2023-01-09 PROCEDURE — 2709999900 HC NON-CHARGEABLE SUPPLY: Performed by: ORTHOPAEDIC SURGERY

## 2023-01-09 PROCEDURE — 87102 FUNGUS ISOLATION CULTURE: CPT

## 2023-01-09 PROCEDURE — 87075 CULTR BACTERIA EXCEPT BLOOD: CPT

## 2023-01-09 PROCEDURE — 0SPD0JZ REMOVAL OF SYNTHETIC SUBSTITUTE FROM LEFT KNEE JOINT, OPEN APPROACH: ICD-10-PCS | Performed by: ORTHOPAEDIC SURGERY

## 2023-01-09 PROCEDURE — 6360000002 HC RX W HCPCS: Performed by: PHYSICIAN ASSISTANT

## 2023-01-09 PROCEDURE — C1713 ANCHOR/SCREW BN/BN,TIS/BN: HCPCS | Performed by: ORTHOPAEDIC SURGERY

## 2023-01-09 PROCEDURE — 2500000003 HC RX 250 WO HCPCS: Performed by: ANESTHESIOLOGY

## 2023-01-09 PROCEDURE — 0SRD0J9 REPLACEMENT OF LEFT KNEE JOINT WITH SYNTHETIC SUBSTITUTE, CEMENTED, OPEN APPROACH: ICD-10-PCS | Performed by: ORTHOPAEDIC SURGERY

## 2023-01-09 PROCEDURE — 3600000014 HC SURGERY LEVEL 4 ADDTL 15MIN: Performed by: ORTHOPAEDIC SURGERY

## 2023-01-09 DEVICE — GRAFT KIT PTTY 10 CC CONVENIENCE: Type: IMPLANTABLE DEVICE | Site: KNEE | Status: FUNCTIONAL

## 2023-01-09 DEVICE — IMPLANTABLE DEVICE
Type: IMPLANTABLE DEVICE | Site: KNEE | Status: FUNCTIONAL
Brand: PERSONA®

## 2023-01-09 DEVICE — IMPLANTABLE DEVICE
Type: IMPLANTABLE DEVICE | Site: KNEE | Status: FUNCTIONAL
Brand: PERSONA® VIVACIT-E®

## 2023-01-09 DEVICE — IMPLANTABLE DEVICE: Type: IMPLANTABLE DEVICE | Site: KNEE | Status: FUNCTIONAL

## 2023-01-09 DEVICE — PALACOS® R IS A FAST-CURING, RADIOPAQUE, POLY(METHYL METHACRYLATE)-BASED BONE CEMENT.PALACOS ® R CONTAINS THE X-RAY CONTRAST MEDIUM ZIRCONIUM DIOXIDE. TO IMPROVE VISIBILITY IN THE SURGICAL FIELD PALACOS ® R HAS BEEN COLOURED WITH CHLOROPHYLL (E141). THE BONE CEMENT IS PREPARED DIRECTLY BEFORE USE BY MIXING A POLYMER POWDER COMPONENT WITH A LIQUID MONOMER COMPONENT. A DUCTILE DOUGH FORMS WHICH CURES WITHIN A FEW MINUTES.
Type: IMPLANTABLE DEVICE | Site: KNEE | Status: FUNCTIONAL
Brand: PALACOS®

## 2023-01-09 DEVICE — COMPONENT FEM CEM 7+ LT KNEE REV COCR PERSONA: Type: IMPLANTABLE DEVICE | Site: KNEE | Status: FUNCTIONAL

## 2023-01-09 DEVICE — GRAFT BNE PTTY 10 CC INJ CEM-OSTETIC: Type: IMPLANTABLE DEVICE | Site: KNEE | Status: FUNCTIONAL

## 2023-01-09 RX ORDER — SODIUM CHLORIDE, SODIUM LACTATE, POTASSIUM CHLORIDE, CALCIUM CHLORIDE 600; 310; 30; 20 MG/100ML; MG/100ML; MG/100ML; MG/100ML
INJECTION, SOLUTION INTRAVENOUS CONTINUOUS
Status: DISCONTINUED | OUTPATIENT
Start: 2023-01-09 | End: 2023-01-09 | Stop reason: HOSPADM

## 2023-01-09 RX ORDER — MIDAZOLAM HYDROCHLORIDE 1 MG/ML
INJECTION INTRAMUSCULAR; INTRAVENOUS PRN
Status: DISCONTINUED | OUTPATIENT
Start: 2023-01-09 | End: 2023-01-09 | Stop reason: SDUPTHER

## 2023-01-09 RX ORDER — ACETAMINOPHEN 500 MG
1000 TABLET ORAL ONCE
Status: COMPLETED | OUTPATIENT
Start: 2023-01-09 | End: 2023-01-09

## 2023-01-09 RX ORDER — ASPIRIN 81 MG/1
81 TABLET ORAL 2 TIMES DAILY
Status: DISCONTINUED | OUTPATIENT
Start: 2023-01-10 | End: 2023-01-11 | Stop reason: HOSPADM

## 2023-01-09 RX ORDER — CETIRIZINE HYDROCHLORIDE 10 MG/1
10 TABLET ORAL DAILY
Status: DISCONTINUED | OUTPATIENT
Start: 2023-01-10 | End: 2023-01-11 | Stop reason: HOSPADM

## 2023-01-09 RX ORDER — SODIUM CHLORIDE 9 MG/ML
INJECTION, SOLUTION INTRAVENOUS PRN
Status: DISCONTINUED | OUTPATIENT
Start: 2023-01-09 | End: 2023-01-09 | Stop reason: HOSPADM

## 2023-01-09 RX ORDER — SODIUM CHLORIDE, SODIUM LACTATE, POTASSIUM CHLORIDE, CALCIUM CHLORIDE 600; 310; 30; 20 MG/100ML; MG/100ML; MG/100ML; MG/100ML
INJECTION, SOLUTION INTRAVENOUS CONTINUOUS PRN
Status: DISCONTINUED | OUTPATIENT
Start: 2023-01-09 | End: 2023-01-09 | Stop reason: SDUPTHER

## 2023-01-09 RX ORDER — SODIUM CHLORIDE 0.9 % (FLUSH) 0.9 %
5-40 SYRINGE (ML) INJECTION PRN
Status: DISCONTINUED | OUTPATIENT
Start: 2023-01-09 | End: 2023-01-11 | Stop reason: HOSPADM

## 2023-01-09 RX ORDER — LABETALOL HYDROCHLORIDE 5 MG/ML
5 INJECTION, SOLUTION INTRAVENOUS EVERY 10 MIN PRN
Status: DISCONTINUED | OUTPATIENT
Start: 2023-01-09 | End: 2023-01-09 | Stop reason: HOSPADM

## 2023-01-09 RX ORDER — CALCIUM CARBONATE-CHOLECALCIFEROL TAB 250 MG-125 UNIT 250-125 MG-UNIT
2 TAB ORAL 2 TIMES DAILY
Status: DISCONTINUED | OUTPATIENT
Start: 2023-01-10 | End: 2023-01-11 | Stop reason: HOSPADM

## 2023-01-09 RX ORDER — PHENYLEPHRINE HCL IN 0.9% NACL 1 MG/10 ML
SYRINGE (ML) INTRAVENOUS PRN
Status: DISCONTINUED | OUTPATIENT
Start: 2023-01-09 | End: 2023-01-09 | Stop reason: SDUPTHER

## 2023-01-09 RX ORDER — GENTAMICIN SULFATE 40 MG/ML
INJECTION, SOLUTION INTRAMUSCULAR; INTRAVENOUS PRN
Status: DISCONTINUED | OUTPATIENT
Start: 2023-01-09 | End: 2023-01-09 | Stop reason: ALTCHOICE

## 2023-01-09 RX ORDER — PROPOFOL 10 MG/ML
INJECTION, EMULSION INTRAVENOUS PRN
Status: DISCONTINUED | OUTPATIENT
Start: 2023-01-09 | End: 2023-01-09 | Stop reason: SDUPTHER

## 2023-01-09 RX ORDER — ACETAMINOPHEN 325 MG/1
650 TABLET ORAL EVERY 6 HOURS
Status: DISCONTINUED | OUTPATIENT
Start: 2023-01-09 | End: 2023-01-11 | Stop reason: HOSPADM

## 2023-01-09 RX ORDER — ONDANSETRON 2 MG/ML
4 INJECTION INTRAMUSCULAR; INTRAVENOUS
Status: DISCONTINUED | OUTPATIENT
Start: 2023-01-09 | End: 2023-01-09 | Stop reason: HOSPADM

## 2023-01-09 RX ORDER — KETAMINE HCL IN NACL, ISO-OSM 100MG/10ML
SYRINGE (ML) INJECTION PRN
Status: DISCONTINUED | OUTPATIENT
Start: 2023-01-09 | End: 2023-01-09 | Stop reason: SDUPTHER

## 2023-01-09 RX ORDER — SODIUM CHLORIDE 0.9 % (FLUSH) 0.9 %
5-40 SYRINGE (ML) INJECTION PRN
Status: DISCONTINUED | OUTPATIENT
Start: 2023-01-09 | End: 2023-01-09 | Stop reason: HOSPADM

## 2023-01-09 RX ORDER — SODIUM CHLORIDE 0.9 % (FLUSH) 0.9 %
5-40 SYRINGE (ML) INJECTION EVERY 12 HOURS SCHEDULED
Status: DISCONTINUED | OUTPATIENT
Start: 2023-01-09 | End: 2023-01-09 | Stop reason: HOSPADM

## 2023-01-09 RX ORDER — TRANEXAMIC ACID 100 MG/ML
INJECTION, SOLUTION INTRAVENOUS PRN
Status: DISCONTINUED | OUTPATIENT
Start: 2023-01-09 | End: 2023-01-09 | Stop reason: SDUPTHER

## 2023-01-09 RX ORDER — ESMOLOL HYDROCHLORIDE 10 MG/ML
INJECTION INTRAVENOUS PRN
Status: DISCONTINUED | OUTPATIENT
Start: 2023-01-09 | End: 2023-01-09 | Stop reason: SDUPTHER

## 2023-01-09 RX ORDER — OXYCODONE HYDROCHLORIDE 5 MG/1
10 TABLET ORAL EVERY 4 HOURS PRN
Status: DISCONTINUED | OUTPATIENT
Start: 2023-01-09 | End: 2023-01-11

## 2023-01-09 RX ORDER — SODIUM CHLORIDE 9 MG/ML
INJECTION, SOLUTION INTRAVENOUS PRN
Status: DISCONTINUED | OUTPATIENT
Start: 2023-01-09 | End: 2023-01-11 | Stop reason: HOSPADM

## 2023-01-09 RX ORDER — INSULIN LISPRO 100 [IU]/ML
0-4 INJECTION, SOLUTION INTRAVENOUS; SUBCUTANEOUS NIGHTLY
Status: DISCONTINUED | OUTPATIENT
Start: 2023-01-09 | End: 2023-01-11 | Stop reason: HOSPADM

## 2023-01-09 RX ORDER — DEXAMETHASONE SODIUM PHOSPHATE 4 MG/ML
INJECTION, SOLUTION INTRA-ARTICULAR; INTRALESIONAL; INTRAMUSCULAR; INTRAVENOUS; SOFT TISSUE PRN
Status: DISCONTINUED | OUTPATIENT
Start: 2023-01-09 | End: 2023-01-09 | Stop reason: SDUPTHER

## 2023-01-09 RX ORDER — ONDANSETRON 2 MG/ML
INJECTION INTRAMUSCULAR; INTRAVENOUS PRN
Status: DISCONTINUED | OUTPATIENT
Start: 2023-01-09 | End: 2023-01-09 | Stop reason: SDUPTHER

## 2023-01-09 RX ORDER — MELOXICAM 7.5 MG/1
7.5 TABLET ORAL DAILY
Status: DISCONTINUED | OUTPATIENT
Start: 2023-01-10 | End: 2023-01-11

## 2023-01-09 RX ORDER — DIPHENHYDRAMINE HYDROCHLORIDE 50 MG/ML
12.5 INJECTION INTRAMUSCULAR; INTRAVENOUS
Status: DISCONTINUED | OUTPATIENT
Start: 2023-01-09 | End: 2023-01-09 | Stop reason: HOSPADM

## 2023-01-09 RX ORDER — DIPHENHYDRAMINE HYDROCHLORIDE 50 MG/ML
25 INJECTION INTRAMUSCULAR; INTRAVENOUS EVERY 6 HOURS PRN
Status: DISCONTINUED | OUTPATIENT
Start: 2023-01-09 | End: 2023-01-10

## 2023-01-09 RX ORDER — VANCOMYCIN HYDROCHLORIDE 1 G/20ML
INJECTION, POWDER, LYOPHILIZED, FOR SOLUTION INTRAVENOUS PRN
Status: DISCONTINUED | OUTPATIENT
Start: 2023-01-09 | End: 2023-01-09 | Stop reason: SDUPTHER

## 2023-01-09 RX ORDER — CELECOXIB 100 MG/1
400 CAPSULE ORAL ONCE
Status: COMPLETED | OUTPATIENT
Start: 2023-01-09 | End: 2023-01-09

## 2023-01-09 RX ORDER — AMLODIPINE BESYLATE 5 MG/1
5 TABLET ORAL DAILY
Status: DISCONTINUED | OUTPATIENT
Start: 2023-01-10 | End: 2023-01-11 | Stop reason: HOSPADM

## 2023-01-09 RX ORDER — DEXTROSE MONOHYDRATE 100 MG/ML
INJECTION, SOLUTION INTRAVENOUS CONTINUOUS PRN
Status: DISCONTINUED | OUTPATIENT
Start: 2023-01-09 | End: 2023-01-11 | Stop reason: HOSPADM

## 2023-01-09 RX ORDER — ROCURONIUM BROMIDE 10 MG/ML
INJECTION, SOLUTION INTRAVENOUS PRN
Status: DISCONTINUED | OUTPATIENT
Start: 2023-01-09 | End: 2023-01-09 | Stop reason: SDUPTHER

## 2023-01-09 RX ORDER — SODIUM CHLORIDE 0.9 % (FLUSH) 0.9 %
5-40 SYRINGE (ML) INJECTION EVERY 12 HOURS SCHEDULED
Status: DISCONTINUED | OUTPATIENT
Start: 2023-01-09 | End: 2023-01-11 | Stop reason: HOSPADM

## 2023-01-09 RX ORDER — INSULIN LISPRO 100 [IU]/ML
0-8 INJECTION, SOLUTION INTRAVENOUS; SUBCUTANEOUS
Status: DISCONTINUED | OUTPATIENT
Start: 2023-01-10 | End: 2023-01-11 | Stop reason: HOSPADM

## 2023-01-09 RX ORDER — INSULIN GLARGINE 100 [IU]/ML
30 INJECTION, SOLUTION SUBCUTANEOUS NIGHTLY
Status: DISCONTINUED | OUTPATIENT
Start: 2023-01-09 | End: 2023-01-11 | Stop reason: HOSPADM

## 2023-01-09 RX ORDER — HYDROMORPHONE HCL 110MG/55ML
PATIENT CONTROLLED ANALGESIA SYRINGE INTRAVENOUS PRN
Status: DISCONTINUED | OUTPATIENT
Start: 2023-01-09 | End: 2023-01-09 | Stop reason: SDUPTHER

## 2023-01-09 RX ORDER — LIDOCAINE HYDROCHLORIDE 10 MG/ML
1 INJECTION, SOLUTION EPIDURAL; INFILTRATION; INTRACAUDAL; PERINEURAL
Status: DISCONTINUED | OUTPATIENT
Start: 2023-01-09 | End: 2023-01-09 | Stop reason: HOSPADM

## 2023-01-09 RX ORDER — ONDANSETRON 2 MG/ML
4 INJECTION INTRAMUSCULAR; INTRAVENOUS EVERY 6 HOURS PRN
Status: DISCONTINUED | OUTPATIENT
Start: 2023-01-09 | End: 2023-01-11 | Stop reason: HOSPADM

## 2023-01-09 RX ORDER — ATORVASTATIN CALCIUM 10 MG/1
10 TABLET, FILM COATED ORAL DAILY
Status: DISCONTINUED | OUTPATIENT
Start: 2023-01-10 | End: 2023-01-11 | Stop reason: HOSPADM

## 2023-01-09 RX ORDER — OXYCODONE HYDROCHLORIDE 5 MG/1
5 TABLET ORAL EVERY 4 HOURS PRN
Status: DISCONTINUED | OUTPATIENT
Start: 2023-01-09 | End: 2023-01-11

## 2023-01-09 RX ORDER — SODIUM CHLORIDE, SODIUM LACTATE, POTASSIUM CHLORIDE, CALCIUM CHLORIDE 600; 310; 30; 20 MG/100ML; MG/100ML; MG/100ML; MG/100ML
INJECTION, SOLUTION INTRAVENOUS CONTINUOUS
Status: DISCONTINUED | OUTPATIENT
Start: 2023-01-09 | End: 2023-01-11 | Stop reason: HOSPADM

## 2023-01-09 RX ORDER — MONTELUKAST SODIUM 10 MG/1
10 TABLET ORAL NIGHTLY
Status: DISCONTINUED | OUTPATIENT
Start: 2023-01-09 | End: 2023-01-11 | Stop reason: HOSPADM

## 2023-01-09 RX ORDER — FENTANYL CITRATE 50 UG/ML
INJECTION, SOLUTION INTRAMUSCULAR; INTRAVENOUS PRN
Status: DISCONTINUED | OUTPATIENT
Start: 2023-01-09 | End: 2023-01-09 | Stop reason: SDUPTHER

## 2023-01-09 RX ORDER — INSULIN LISPRO 100 [IU]/ML
0.08 INJECTION, SOLUTION INTRAVENOUS; SUBCUTANEOUS
Status: DISCONTINUED | OUTPATIENT
Start: 2023-01-10 | End: 2023-01-11 | Stop reason: HOSPADM

## 2023-01-09 RX ORDER — VANCOMYCIN HYDROCHLORIDE 1 G/20ML
INJECTION, POWDER, LYOPHILIZED, FOR SOLUTION INTRAVENOUS PRN
Status: DISCONTINUED | OUTPATIENT
Start: 2023-01-09 | End: 2023-01-09 | Stop reason: ALTCHOICE

## 2023-01-09 RX ORDER — MAGNESIUM SULFATE IN WATER 40 MG/ML
2000 INJECTION, SOLUTION INTRAVENOUS ONCE
Status: COMPLETED | OUTPATIENT
Start: 2023-01-09 | End: 2023-01-09

## 2023-01-09 RX ORDER — LOSARTAN POTASSIUM 100 MG/1
100 TABLET ORAL DAILY
Status: DISCONTINUED | OUTPATIENT
Start: 2023-01-10 | End: 2023-01-11 | Stop reason: HOSPADM

## 2023-01-09 RX ORDER — ONDANSETRON 4 MG/1
4 TABLET, ORALLY DISINTEGRATING ORAL EVERY 8 HOURS PRN
Status: DISCONTINUED | OUTPATIENT
Start: 2023-01-09 | End: 2023-01-11 | Stop reason: HOSPADM

## 2023-01-09 RX ORDER — POLYETHYLENE GLYCOL 3350 17 G/17G
17 POWDER, FOR SOLUTION ORAL DAILY
Status: DISCONTINUED | OUTPATIENT
Start: 2023-01-10 | End: 2023-01-11 | Stop reason: HOSPADM

## 2023-01-09 RX ORDER — SENNA PLUS 8.6 MG/1
1 TABLET ORAL DAILY PRN
Status: DISCONTINUED | OUTPATIENT
Start: 2023-01-09 | End: 2023-01-11 | Stop reason: HOSPADM

## 2023-01-09 RX ORDER — CEFAZOLIN SODIUM 1 G/3ML
INJECTION, POWDER, FOR SOLUTION INTRAMUSCULAR; INTRAVENOUS PRN
Status: DISCONTINUED | OUTPATIENT
Start: 2023-01-09 | End: 2023-01-09 | Stop reason: SDUPTHER

## 2023-01-09 RX ORDER — LIDOCAINE HYDROCHLORIDE 20 MG/ML
INJECTION, SOLUTION EPIDURAL; INFILTRATION; INTRACAUDAL; PERINEURAL PRN
Status: DISCONTINUED | OUTPATIENT
Start: 2023-01-09 | End: 2023-01-09 | Stop reason: SDUPTHER

## 2023-01-09 RX ORDER — DIPHENHYDRAMINE HCL 25 MG
25 TABLET ORAL EVERY 6 HOURS PRN
Status: DISCONTINUED | OUTPATIENT
Start: 2023-01-09 | End: 2023-01-10

## 2023-01-09 RX ADMIN — PROPOFOL 20 MG: 10 INJECTION, EMULSION INTRAVENOUS at 14:13

## 2023-01-09 RX ADMIN — Medication 25 MG: at 14:51

## 2023-01-09 RX ADMIN — HYDROMORPHONE HYDROCHLORIDE 0.5 MG: 2 INJECTION, SOLUTION INTRAMUSCULAR; INTRAVENOUS; SUBCUTANEOUS at 16:39

## 2023-01-09 RX ADMIN — SODIUM CHLORIDE, SODIUM LACTATE, POTASSIUM CHLORIDE, AND CALCIUM CHLORIDE: .6; .31; .03; .02 INJECTION, SOLUTION INTRAVENOUS at 14:06

## 2023-01-09 RX ADMIN — CELECOXIB 400 MG: 100 CAPSULE ORAL at 12:00

## 2023-01-09 RX ADMIN — FENTANYL CITRATE 50 MCG: 50 INJECTION INTRAMUSCULAR; INTRAVENOUS at 15:09

## 2023-01-09 RX ADMIN — ESMOLOL HYDROCHLORIDE 20 MG: 10 INJECTION, SOLUTION INTRAVENOUS at 15:18

## 2023-01-09 RX ADMIN — INSULIN GLARGINE 30 UNITS: 100 INJECTION, SOLUTION SUBCUTANEOUS at 23:27

## 2023-01-09 RX ADMIN — ESMOLOL HYDROCHLORIDE 5 MG: 10 INJECTION, SOLUTION INTRAVENOUS at 14:32

## 2023-01-09 RX ADMIN — ESMOLOL HYDROCHLORIDE 20 MG: 10 INJECTION, SOLUTION INTRAVENOUS at 15:33

## 2023-01-09 RX ADMIN — ROCURONIUM BROMIDE 50 MG: 10 SOLUTION INTRAVENOUS at 14:15

## 2023-01-09 RX ADMIN — LABETALOL HYDROCHLORIDE 5 MG: 5 INJECTION INTRAVENOUS at 17:16

## 2023-01-09 RX ADMIN — ROCURONIUM BROMIDE 10 MG: 10 SOLUTION INTRAVENOUS at 15:11

## 2023-01-09 RX ADMIN — FENTANYL CITRATE 50 MCG: 50 INJECTION INTRAMUSCULAR; INTRAVENOUS at 13:25

## 2023-01-09 RX ADMIN — TRANEXAMIC ACID 1000 MG: 100 INJECTION, SOLUTION INTRAVENOUS at 16:17

## 2023-01-09 RX ADMIN — Medication 10 ML: at 23:29

## 2023-01-09 RX ADMIN — ACETAMINOPHEN 325MG 650 MG: 325 TABLET ORAL at 23:24

## 2023-01-09 RX ADMIN — ROCURONIUM BROMIDE 10 MG: 10 SOLUTION INTRAVENOUS at 15:59

## 2023-01-09 RX ADMIN — MONTELUKAST SODIUM 10 MG: 10 TABLET ORAL at 23:25

## 2023-01-09 RX ADMIN — ESMOLOL HYDROCHLORIDE 30 MG: 10 INJECTION, SOLUTION INTRAVENOUS at 16:10

## 2023-01-09 RX ADMIN — MIDAZOLAM HYDROCHLORIDE 2 MG: 2 INJECTION, SOLUTION INTRAMUSCULAR; INTRAVENOUS at 13:25

## 2023-01-09 RX ADMIN — Medication 10 MG: at 15:54

## 2023-01-09 RX ADMIN — CEFAZOLIN 2000 MG: 10 INJECTION, POWDER, FOR SOLUTION INTRAVENOUS at 23:31

## 2023-01-09 RX ADMIN — HYDROMORPHONE HYDROCHLORIDE 0.5 MG: 2 INJECTION, SOLUTION INTRAMUSCULAR; INTRAVENOUS; SUBCUTANEOUS at 15:38

## 2023-01-09 RX ADMIN — MAGNESIUM SULFATE IN WATER 2000 MG: 40 INJECTION, SOLUTION INTRAVENOUS at 14:33

## 2023-01-09 RX ADMIN — SODIUM CHLORIDE, POTASSIUM CHLORIDE, SODIUM LACTATE AND CALCIUM CHLORIDE 125 ML/HR: 600; 310; 30; 20 INJECTION, SOLUTION INTRAVENOUS at 23:24

## 2023-01-09 RX ADMIN — ESMOLOL HYDROCHLORIDE 10 MG: 10 INJECTION, SOLUTION INTRAVENOUS at 14:37

## 2023-01-09 RX ADMIN — FENTANYL CITRATE 50 MCG: 50 INJECTION INTRAMUSCULAR; INTRAVENOUS at 15:13

## 2023-01-09 RX ADMIN — SUGAMMADEX 200 MG: 100 INJECTION, SOLUTION INTRAVENOUS at 16:20

## 2023-01-09 RX ADMIN — Medication 100 MCG: at 14:36

## 2023-01-09 RX ADMIN — VANCOMYCIN HYDROCHLORIDE 1500 MG: 1 INJECTION, POWDER, LYOPHILIZED, FOR SOLUTION INTRAVENOUS at 14:30

## 2023-01-09 RX ADMIN — LIDOCAINE HYDROCHLORIDE 100 MG: 20 INJECTION, SOLUTION EPIDURAL; INFILTRATION; INTRACAUDAL; PERINEURAL at 14:12

## 2023-01-09 RX ADMIN — FENTANYL CITRATE 50 MCG: 50 INJECTION INTRAMUSCULAR; INTRAVENOUS at 14:25

## 2023-01-09 RX ADMIN — CEFAZOLIN 2 G: 1 INJECTION, POWDER, FOR SOLUTION INTRAMUSCULAR; INTRAVENOUS at 14:20

## 2023-01-09 RX ADMIN — DEXAMETHASONE SODIUM PHOSPHATE 8 MG: 4 INJECTION, SOLUTION INTRAMUSCULAR; INTRAVENOUS at 14:26

## 2023-01-09 RX ADMIN — TRANEXAMIC ACID 1000 MG: 100 INJECTION, SOLUTION INTRAVENOUS at 14:38

## 2023-01-09 RX ADMIN — PROPOFOL 150 MG: 10 INJECTION, EMULSION INTRAVENOUS at 14:12

## 2023-01-09 RX ADMIN — ONDANSETRON 4 MG: 2 INJECTION INTRAMUSCULAR; INTRAVENOUS at 15:09

## 2023-01-09 RX ADMIN — HYDROMORPHONE HYDROCHLORIDE 0.5 MG: 2 INJECTION, SOLUTION INTRAMUSCULAR; INTRAVENOUS; SUBCUTANEOUS at 15:58

## 2023-01-09 RX ADMIN — FENTANYL CITRATE 50 MCG: 50 INJECTION INTRAMUSCULAR; INTRAVENOUS at 14:44

## 2023-01-09 RX ADMIN — ACETAMINOPHEN 1000 MG: 500 TABLET ORAL at 12:00

## 2023-01-09 ASSESSMENT — PAIN SCALES - GENERAL
PAINLEVEL_OUTOF10: 0
PAINLEVEL_OUTOF10: 2
PAINLEVEL_OUTOF10: 0

## 2023-01-09 ASSESSMENT — PAIN DESCRIPTION - LOCATION: LOCATION: KNEE

## 2023-01-09 ASSESSMENT — PAIN DESCRIPTION - ORIENTATION: ORIENTATION: LEFT

## 2023-01-09 NOTE — ANESTHESIA PROCEDURE NOTES
Peripheral Block    Patient location during procedure: PACU  Reason for block: post-op pain management  Start time: 1/9/2023 1:25 PM  Staffing  Anesthesiologist: Adalberto Beauchamp MD  Preanesthetic Checklist  Completed: patient identified, IV checked, site marked, risks and benefits discussed, surgical/procedural consents, equipment checked, pre-op evaluation, timeout performed, anesthesia consent given, oxygen available and monitors applied/VS acknowledged  Peripheral Block   Patient position: supine  Prep: ChloraPrep  Provider prep: mask and sterile gloves  Patient monitoring: cardiac monitor, continuous pulse ox, frequent blood pressure checks and IV access  Block type: Femoral  Adductor canal  Laterality: left  Injection technique: single-shot  Guidance: ultrasound guided  Local infiltration: lidocaine  Infiltration strength: 1 %  Local infiltration: lidocaine    Needle   Needle type: combined needle/nerve stimulator   Needle gauge: 22 G  Needle localization: ultrasound guidance  Needle length: 5 cm  Assessment   Injection assessment: negative aspiration for heme, no paresthesia on injection and local visualized surrounding nerve on ultrasound  Slow fractionated injection: yes  Hemodynamics: stable  Real-time US image taken/store: yes  Outcomes: uncomplicated    Additional Notes  Sartorius and Vastus Medialis Muscle, Femoral artery and Saphenous nerve are identified; the tip of the needle and the spread of the local anesthetic around the Saphenous nerve are visualized. The Saphenous nerve appeared to be anatomically normal and there were no abnormal pathologically findings seen.  25ml 0.5% bupivicaine given aspirating every 5ml

## 2023-01-09 NOTE — PROGRESS NOTES
D: Patient taking ice ships without difficulty, call light is in reach, patient continues to be sleepy, but wakes easily when spoken to.

## 2023-01-09 NOTE — DISCHARGE INSTRUCTIONS
Total Knee Replacement  Discharge Instructions    To prevent Clot formation, you have been placed on the following medication:  Take aspirin 81 mg twice a day starting day after surgery   Surgical Site Care:  Patient does have a negative pressure dressing applied, Prevena dressing. This is attached to a suction device with canister. Normally should last 7 days. You will know to remove dressing when either the canister is full or the device starts beeping. At that point please turn off suction device, remove dressing, and place new waterproof dressing over incision. Remove Prevena on post-operative day # 7. The battery pack attached to the purple dressing will automatically turn off 7 days after your surgery. 7 days after your surgery, peel off the dressing like you would a large band-aid and the entire dressing and battery pack may be thrown in the garbage. Apply ice over your dressing for 20 minutes prior to removing it. This cools down the adhesive and allows it to peel off of the skin easier. No showering with Prevena dressing. It is important to keep your incision covered for 2 weeks after surgery. After the Purple Prevena dressing is removed on post-op day 7, cover your incision with sterile gauze and tegaderm for another 7 days. You may change this dressing as needed when moist/wet. You may shower with a waterproof dressing on after you remove the Prevena. Do not let the water hit the dressing directly, then pat it dry after showering. Change the dressing after showering if dressing becomes moist or wet.   Make an appointment one week from surgery to remove accordion drain from knee  Physical Therapy:  Weight Bearing Status:  Weight bearing as tolerated  Outpatient therapy-should start within 2-3 days  Precautions  Per Physical Therapy Handout  Pain Medications  You were given tramadol  Wean off pain medications as you deem appropriate as long as pain is under control  Be sure to drink plenty of fluids (recommend water) while taking narcotic pain medications to prevent constipation  You may take an over the counter laxative or stool softener as needed to prevent/treat constipation as well, we recommend Senokot S OTC. We recommend that you consider taking these medications the entire time you are taking pain medication. Cold packs/Ice packs/Machine  May be used as much as necessary to control swelling/inflammation/soreness  Be sure to have a barrier (cloth, clothing, towel) between the site and the ice pack to prevent frostbite  Contact [facility] office if  Increased redness, swelling, drainage of any kind, and/or pain to surgery site. As well as new onset fevers and or chills. These could signify an infection. Calf or thigh tenderness to touch as well as increased swelling or redness. This could signify a clot formation. Numbness or tingling to an area around the incision site or below the incision site (toes). Any rash appears, increased  or new onset nausea/vomiting occur. This may indicate a reaction to a medication. Phone # [phone]  Follow up with Dr. Abdi Kirkland or Aurora Varma PA-STEFANIE at scheduled appointment time. Please continue to use your Incentive Spirometer every hour while awake.

## 2023-01-09 NOTE — PROGRESS NOTES
D: Patient here from [address] [place] [address] via stretcher, taken to bay 6 in PACU, all current drips, treatments including presence of an oral airway, skin issues including surgical dressing, and plan of care were reviewed by both RN's, patient transferred in stable condition. A: Assessment completed and documented.

## 2023-01-09 NOTE — PROGRESS NOTES
D: Patient now more awake and oriented x 4, but falls back to sleep easily, she was able to remove oral airway herself. A: Discussed plan of care with patient who agreed, call light is in reach.

## 2023-01-09 NOTE — H&P
Update History & Physical     The patient's History and Physical of 1/6/2023 was reviewed with the patient and I examined the patient. There was no change. The surgical site was confirmed by the patient and me. Plan: The risks, benefits, expected outcome, and alternative to the recommended procedure have been discussed with the patient / family. Patient understands and wants to proceed with the procedure.       Electronically signed by Nilton Hernandez MD on 1/9/2023 at 12:34 PM

## 2023-01-09 NOTE — ANESTHESIA PRE PROCEDURE
Department of Anesthesiology  Preprocedure Note       Name:  Nano Ramos   Age:  57 y.o.  :  1960                                          MRN:  [id number]         Date:  2023      Surgeon: Tatyana Warren): Monie Wheatley MD    Procedure: Procedure(s):  REVISION LEFT TOTAL KNEE ARTHROPLASTY, SECOND STAGE           MONTSERRAT BIOMET NOTE: ADDUCTOR CANAL BLOCK    Medications prior to admission:   Prior to Admission medications    Medication Sig Start Date End Date Taking? Authorizing Provider   insulin glargine (LANTUS SOLOSTAR) 100 UNIT/ML injection pen Inject 30 Units into the skin nightly 22   Isabel Rico DO   Accu-Chek Softclix Lancets MISC USE TO TEST 1-3 TIMES A DAY AS NEEDED FOR SYMPTOMS OR IRREGULAR GLUCOSE 22   Isabel Rico DO   aspirin 81 MG EC tablet Take 1 tablet by mouth in the morning and at bedtime 10/14/22 11/17/22  OLIVIA Grace   Insulin Pen Needle ([employer] PEN NEEDLES) 32G X 4 MM MISC USE TO INJECT INSULIN FOUR TIMES A DAY 22   NOHELIA Nathan CNP   losartan (COZAAR) 100 MG tablet TAKE ONE TABLET BY MOUTH DAILY 22   Isabel Rico DO   metFORMIN (GLUCOPHAGE) 1000 MG tablet TAKE ONE TABLET BY MOUTH TWICE A DAY WITH MEALS 22   Isabel Rico, DO   montelukast (SINGULAIR) 10 MG tablet TAKE ONE TABLET BY MOUTH ONCE NIGHTLY 22   Hillary Haydee Halsted, DO   blood glucose monitor strips Test 1-3 times a day as needed for symptoms of irregular glucose. DX: E11.9 please provide strips that are accepted by patient's insurance.  22   NOHELIA Nathan CNP   ibuprofen (ADVIL;MOTRIN) 800 MG tablet Take 1 tablet by mouth 2 times daily as needed for Pain 22   NOHELIA Nathan CNP   atorvastatin (LIPITOR) 10 MG tablet TAKE ONE TABLET BY MOUTH DAILY 22   NOHELIA Nathna CNP   amLODIPine (NORVASC) 5 MG tablet TAKE ONE TABLET BY MOUTH DAILY 21   Hillary Haydee Halsted, DO   Calcium Carb-Cholecalciferol 600-800 MG-UNIT TABS Take 1 tablet by mouth 2 times daily    Historical Provider, MD   cetirizine (ZYRTEC) 10 MG tablet Take 1 tablet by mouth daily 12/3/15   John aMrcial, DO       Current medications:    Current Facility-Administered Medications   Medication Dose Route Frequency Provider Last Rate Last Admin    lidocaine PF 1 % injection 1 mL  1 mL IntraDERmal Once PRN Jose A Merlos MD        lactated ringers infusion   IntraVENous Continuous Jose A Merlos MD        sodium chloride flush 0.9 % injection 5-40 mL  5-40 mL IntraVENous 2 times per day Jose A Merlos MD        sodium chloride flush 0.9 % injection 5-40 mL  5-40 mL IntraVENous PRN Jose A Merlos MD        0.9 % sodium chloride infusion   IntraVENous PRN Jose A Merlos MD        tranexamic acid (CYKLOKAPRON) 1,000 mg in sodium chloride 0.9 % 100 mL IVPB  1,000 mg IntraVENous [facility] [address] [facility]        Followed by   Kasandra Nix tranexamic acid (CYKLOKAPRON) 1,000 mg in sodium chloride 0.9 % 100 mL IVPB  1,000 mg IntraVENous On Call to [address] [facility], PA-C        sodium chloride flush 0.9 % injection 5-40 mL  5-40 mL IntraVENous 2 times per day OLIVIA Belcher-STEFANIE        sodium chloride flush 0.9 % injection 5-40 mL  5-40 mL IntraVENous PRN OLIVIA Belcher-C        0.9 % sodium chloride infusion   IntraVENous PRN Levi Narvaez PA-C        ceFAZolin (ANCEF) 2000 mg in dextrose 5 % 100 mL IVPB  2,000 mg IntraVENous On Call to [address] PA-C        vancomycin 1500 mg in dextrose 5% 300 mL IVPB  1,500 mg IntraVENous On Call to [address] [facility], JESSICA        magnesium sulfate 2000 mg in 50 mL IVPB premix  2,000 mg IntraVENous Once Alicia Buckley MD           Allergies:     Allergies   Allergen Reactions    Gabapentin Other (See Comments), Dizziness or Vertigo and Hallucinations     BLURRED VISION      Hydrocodone     Oxycodone     Demerol Itching and Dermatitis     Unknown reaction; patient has tolerated hydrocodone, oxycodone, hydromorphone and fentanyl per chart review as of 09/16/22.  Levaquin [Levofloxacin In D5w] Nausea Only    Morphine Itching     Unknown reaction; patient has tolerated hydrocodone, oxycodone, hydromorphone and fentanyl per chart review as of 09/16/22.  Tramadol Other (See Comments)     Unknown reaction; patient has tolerated hydrocodone, oxycodone, hydromorphone and fentanyl per chart review as of 09/16/22. Problem List:    Patient Active Problem List   Diagnosis Code    Benign essential HTN I10    Osteoarthritis of right knee M17.11    Seasonal allergies J30.2    Type 2 diabetes mellitus without complication ([facility] [address] D75.1    Obesity (BMI 30-39. 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       Past Medical History:        Diagnosis Date    Allergic rhinitis     HTN (hypertension)     Hyperlipidemia     Obstructive sleep apnea (adult) (pediatric)     Non-compliant with CPAP    Osteoarthritis     Type II or unspecified type diabetes mellitus without mention of complication, not stated as uncontrolled        Past Surgical History:        Procedure Laterality Date    COLON SURGERY  2008    10\" removed - chronic constipation    DUPUYTRENS CONTRACTURE SURGERY Left 1/29/2019    LEFT 1ST DORSAL COMPARMENT DE' QUERVAIN'S RELEASE performed by Drea Duval MD at [address]      4x     HYSTERECTOMY, TOTAL ABDOMINAL (CERVIX REMOVED)      JOINT REPLACEMENT  2007    Left knee partial    KNEE SURGERY  1990's    rt knee reconstructed    NECK SURGERY  05/12/2011    c 4,5,6,7 (ACDF)    OVARY REMOVAL      REVISION TOTAL KNEE ARTHROPLASTY Left 10/10/2022    REVISION LEFT TOTAL KNEE ARTHROPLASTY FOR INFECTION WITH ADDUCTOR CANAL  FOR PAIN CONTROL              Ihsan Loredo; ZANE performed by Beverly Nunez MD at [facility] 2009    Left shoulder    SHOULDER SURGERY  1990's    lt & rt shoulder impingment    SINUS ENDOSCOPY  9-15-14    FUNCTIONAL ENDOSCOPIC SINUS SURGERY       TONSILLECTOMY AND ADENOIDECTOMY      TOTAL KNEE ARTHROPLASTY Left 03/09/2010    Eros to Total    TUBAL LIGATION      UMBILICAL HERNIA REPAIR  8/2010    Dr. Celestine Elkins       Social History:    Social History     Tobacco Use    Smoking status: Former     Types: Cigars    Smokeless tobacco: Never    Tobacco comments:     was smoking 5 black and milds per day, but has not smoked in 4 days   Substance Use Topics    Alcohol use: Yes     Comment: 1 alcoholic beverage every other month                                Counseling given: Not Answered  Tobacco comments: was smoking 5 black and milds per day, but has not smoked in 4 days      Vital Signs (Current):   Vitals:    01/09/23 1130 01/09/23 1200   BP:  114/72   Pulse:  70   Resp:  14   Temp:  98.6 °F (37 °C)   TempSrc:  Temporal   SpO2:  98%   Weight: 205 lb (93 kg)                                               BP Readings from Last 3 Encounters:   01/09/23 114/72   12/28/22 121/77   12/28/22 121/77       NPO Status: Time of last liquid consumption: 0830                        Time of last solid consumption: 2300                        Date of last liquid consumption: 01/09/23                        Date of last solid food consumption: 01/08/23    BMI:   Wt Readings from Last 3 Encounters:   01/09/23 205 lb (93 kg)   12/23/22 204 lb (92.5 kg)   12/16/22 204 lb (92.5 kg)     Body mass index is 37.49 kg/m².     CBC:   Lab Results   Component Value Date/Time    WBC 14.4 01/05/2023 09:20 AM    RBC 4.36 01/05/2023 09:20 AM    HGB 10.4 01/05/2023 09:20 AM    HCT 33.6 01/05/2023 09:20 AM    MCV 77.2 01/05/2023 09:20 AM    RDW 16.9 01/05/2023 09:20 AM     01/05/2023 09:20 AM       CMP:   Lab Results   Component Value Date/Time     01/05/2023 09:20 AM    K 3.9 01/05/2023 09:20 AM    K 3.5 10/18/2022 09:25 AM     01/05/2023 09:20 AM    CO2 24 01/05/2023 09:20 AM    BUN 13 01/05/2023 09:20 AM    CREATININE 0.5 01/05/2023 09:20 AM    GFRAA >60 10/15/2022 11:18 AM    GFRAA >60 08/06/2012 05:35 PM    AGRATIO 1.3 11/07/2022 01:00 PM    LABGLOM >60 01/05/2023 09:20 AM    GLUCOSE 217 01/05/2023 09:20 AM    PROT 6.8 11/15/2022 02:00 PM    PROT 7.2 11/07/2022 01:00 PM    CALCIUM 10.1 01/05/2023 09:20 AM    BILITOT 0.3 11/15/2022 02:00 PM    ALKPHOS 101 11/15/2022 02:00 PM    AST 19 11/15/2022 02:00 PM    ALT 18 11/15/2022 02:00 PM       POC Tests:   Recent Labs     01/09/23  1206   POCGLU 104*       Coags:   Lab Results   Component Value Date/Time    PROTIME 12.7 01/05/2023 09:20 AM    INR 0.97 01/05/2023 09:20 AM    APTT 31.5 01/05/2023 09:20 AM       HCG (If Applicable): No results found for: PREGTESTUR, PREGSERUM, HCG, HCGQUANT     ABGs: No results found for: PHART, PO2ART, PWF9MAQ, IZL3BWK, BEART, O2NSXBLR     Type & Screen (If Applicable):  Lab Results   Component Value Date    LABABO B 03/09/2010    [address] Negative 03/09/2010       Drug/Infectious Status (If Applicable):  No results found for: HIV, HEPCAB    COVID-19 Screening (If Applicable):   Lab Results   Component Value Date/Time    COVID19 Not Detected 10/18/2022 11:13 AM    COVID19 Not Detected 01/30/2021 03:55 PM           Anesthesia Evaluation  Patient summary reviewed no history of anesthetic complications:   Airway: Mallampati: II  TM distance: >3 FB   Neck ROM: full  Mouth opening: > = 3 FB   Dental:    (+) partials      Pulmonary:normal exam  breath sounds clear to auscultation  (+) sleep apnea:      (-) COPD and asthma                           Cardiovascular:  Exercise tolerance: good (>4 METS),   (+) hypertension:,     (-) CAD,  angina and  CHEN      Rhythm: regular  Rate: normal                    Neuro/Psych:      (-) seizures and TIA           GI/Hepatic/Renal:   (+) morbid obesity     (-) GERD, liver disease and no renal disease       Endo/Other:    (+) Diabetes, . Abdominal:             Vascular: negative vascular ROS. Other Findings:           Anesthesia Plan      general     ASA 3     (I discussed with the patient the risks and benefits of regional anesthesia (inlcuding infection, bleeding, damage to nerves and surrounding structures) PIV, General, IV Narcotics, PACU. All questions were answered the patient agrees with the plan and wishes to proceed.)  Induction: intravenous.                             Jeffrey Lowery MD   1/9/2023

## 2023-01-09 NOTE — OP NOTE
Orthopaedic Surgery  Operative Report      Patient Name:  Anya Alfred  Patient :  1960  MRN: [id number]    Date: 23     Pre-operative Diagnosis:   T84.59XA Infection Total knee replacement  Obesity, BMI 37.5    Post-operative Diagnosis:    Same    Procedure: LEFT  37456 - 22 Revision Total Knee Arthroplasty, stage II for infection  73677 placement of incisional wound VAC    Surgeon:  Surgeon(s) and Role:     * Shala Jensen MD - Primary    Assistant: Circulator: Eva San RN  Surgical Assistant: Kassi Low; Supriya Art; Oseas Norris  Relief Scrub: [facility]  Scrub Person First: Mary Saunemin    Anesthesia: General endotracheal anesthesia, Intraoperative local infiltration - Ortho-cocktail mixture, and Regional with adductor canal block    Estimated blood loss: 300    Specimens:   ID Type Source Tests Collected by Time Destination   1 : LEFT KNEE SYNOVIAL FLUID Joint/Joint Fluid Joint, Knee CELL COUNT WITH DIFFERENTIAL, BODY FLUID Shala Jensen MD 2023 1454    2 : LEFT KNEE TISSUE #1 Specimen Joint, Knee CULTURE, FUNGUS, CULTURE, SURGICAL, CULTURE WITH SMEAR, ACID FAST CLARI Jensen MD 2023 1457    3 : LEFT KNEE TISSUE #2 Specimen Joint, Knee CULTURE, FUNGUS, CULTURE, SURGICAL, CULTURE WITH SMEAR, ACID FAST CLARI Jensen MD 2023 1500    4 : LEFT KNEE TISSUE #3 Specimen Joint, Knee CULTURE, FUNGUS, CULTURE, SURGICAL, CULTURE WITH SMEAR, ACID FAST CLARI Jensen MD 2023 1504    5 : LEFT KNEE TISSUE #4 Specimen Joint, Knee CULTURE, FUNGUS, CULTURE, SURGICAL, CULTURE WITH SMEAR, ACID FAST CLARI Jensen MD 2023 1507    6 : LEFT KNEE TISSUE #5 Specimen Joint, Knee CULTURE, FUNGUS, CULTURE, SURGICAL, CULTURE WITH SMEAR, ACID FAST Vanessa Amezcua MD 2023 6881        Complications: None    Drains: None    Condition: Stable    Implants:   Implant Name Type Inv.  Item Serial No.  Lot No. LRB No. Used Action   COMPONENT PAT [id number] 5MM STD KNEE VIVACIT-E PARIS - [id number]  COMPONENT PAT [id number]. 5MM STD KNEE VIVACIT-E PARIS  [employer]- [id number] Left 1 Implanted   COMPONENT FEM PARIS 7+ LT KNEE REV COCR PERSONA - [id number]  COMPONENT FEM PARIS 7+ LT KNEE REV [facility]- [id number] Left 1 Implanted   PSN REV STRAIGHT SPLINE STEM EXT [id number] - [id number] Knee PSN REV STRAIGHT SPLINE STEM EXT [id number]  [employer]- [id number] Left 1 Implanted   Persona Revision Trabecular Metal Femoral Posterior Augment    [employer]- [id number] Left 1 Implanted   Persona Revision Trabecular Metal Femoral Posterior Augment    [employer]- [id number] Left 1 Implanted   Persona Revision Trabecular Metal Femoral Distal Augment    [employer]- [id number] Left 1 Implanted   Persona Revision Trabecular Metal Femoral Distal Augment     [id number] Left 1 Implanted   Persona Revision Trabecular Metal Tibial Augment Half Block Left Lateral    [employer]- [id number] Left 1 Implanted   Persona Revision Trabecular Metal Tibial Augment Half Block Left Medial    [employer]- [id number] Left 1 Implanted   PSN REV TIB FIXED KEEL CMT SZ E L - [id number]  PSN REV TIB FIXED KEEL CMT SZ E L  [employer]- [id number] Left 1 Implanted   PSN REV STRAIGHT SPLINE STEM EXT [id number] - [id number]  PSN REV STRAIGHT SPLINE STEM EXT [id number]  [employer]- [id number] Left 1 Implanted   AUGMENT TIBIAL CENTRAL CONE SZ EXTRA SMALL - [id number] Joint Component AUGMENT TIBIAL CENTRAL CONE SZ EXTRA SMALL  [employer]CR [id number] Left 1 Implanted   COMPONENT ARTC SURF PS 6-9 EF 14 MM LT TIB KNEE FIX BEAR - [id number]  COMPONENT ARTC SURF PS 6-9 EF 14 MM LT TIB KNEE FIX BEAR  [employer]- [id number] Left 1 Implanted   CEMENT BONE 40GM HI VISC PALACOS R - [id number]  CEMENT BONE 40GM HI VISC PALACOS R  [facility] [id number] Left 1 Implanted   CEMENT BONE 40GM HI VISC SARKIS R - [id number]  CEMENT BONE 40GM HI VISC Jayme Cordova  [facility] [id number] Left 1 Implanted   Putty Convenience Kit [id number] - [id number]   [id number] [employer]- [id number] Left 1 Implanted   GRAFT BNE PTTY 10 CC INJ PARIS-OSTETIC - [id number]  GRAFT BNE PTTY 10 CC INJ PARIS-OSTETIC  LINK[employer] NOLAN- [id number] Left 2 Implanted       Findings:   1. Culture negative left knee prosthetic knee infection  2. Stage I had been completed with removal and mobile antibiotic spacer placement  3. No overt loosening of the implants  4. Significant synovitis and synovial fluid effusion present    Indications: The patient has been battling left knee pain for months to years. She has been battling prosthetic joint infection for months. I treated her previously with a stage I explant of her knee as well as antibiotic spacer placement that was mobile with all polytibia and PS femur. She did reasonably well from an infection standpoint and had negative cultures and aspiration results since the surgery. She was ready for stage II implant with the understanding that infection risk is still higher with recurrence with a history of previous infection. Her diabetes is better controlled now than ever before. Patient wanted to regain mobility and be active as possible. Patient understood the risk benefits and alternatives in detail and wanted to proceed with the above operation. Procedure Details:   I marked the surgical site of the left knee for surgery. He was taken back to the operating theater laid supine the table the bony prominences well-padded. General anesthesia was induced. We transferred the patient to the operating table supine. The leg was prepped and draped in standard sterile fashion. An appropriate leg positioner,DeMayo , was used to stabilize the extremity. Antibiotics 2 g of Ancef were given.   With a history of previous infection, weight-based vancomycin was given in addition. Tranexamic acid 1 g was given at start. Tourniquet was insufflated for the majority of the case including synovectomy and revision knee replacement. Overall time was 68 minutes. Revision total knee replacement:  I began with excising the anterior scar to the knee. I dissected thick soft tissue flaps. I then incised the medial parapatellar arthrotomy. Large amounts of effusion fluid was evacuated and some was sent off for cell count with differential.  I acquired to culture specimens from the synovium. I performed a thorough total synovectomy to gain both exposure as well as remove the potential for bioburden. The knee was then exposed with a series of Hohmann type retractors. I then mobilized the distal femur. I used a series of osteotomes to work around the implant and the cement mantle was removed with the implant itself. I then exposed the tibia and did the same. Both implants appear to be quite well fixed to the bone without overt signs of loosening. However, no additional bone loss was encountered, as the cement mantle was removed with the implants on both parts. I then performed the reconstruction. I performed a series of reaming for appropriately sized stems. I used the tibia stemmed and then reamed for a tibial cone. Extra small tibial cone was then broached and I was happy with appropriate size. Based off of the broach itself, I made a 0-0 tibia cut with respect to varus valgus and slope. Care was taken to protect both the MCL as well as the patella tendon in the face of severe bone loss present over the proximal tibia. Once prepped, I then impacted the actual tibial component with 5 mm augments present on both sides. I then turned my attention to the femoral exposure. I used a sagittal saw blade to make freehand cuts to remove some of the cement remaining as well as reduce the bioburden.   After reaming to the appropriate size, I floated a trial segment of the femur onto the bone itself. I then balanced the knee from here. I was happy with the size 12 poly at the time both in extension and flexion. +2 femur on the flexion side was already used. I had used a 5 mm lateral augment to start to gain appropriate external rotation for appropriate patellar tracking and balance. Once happy with the femur itself, I used the cut through function to be able to cut the appropriate size augments for appropriate bony contact as well as cut the box itself. The trial was then removed. Attention was then turned to the patella where a series of retractors expose the patella itself. I used a sagittal saw blade to freshen up the cut and expose the previous holes. Size 32 mm patella was then prepped with the triangle of holes in the opposite direction as previously drilled for additional fixation. I was happy with the patella tracking at this point. All trial components were then removed. Third-generation cement technique was then employed. I placed the tibial cone with the appropriate rotation just buried below the tibial cut surface. The back surfaces of the implants were coated immediately after cement was mixed and ready. The cut bone surfaces were also washed and dried until cancellous bone was visualized. Appropriate cement was then placed at the proximal tibial surface. The tibial baseplate was then impacted first.  Excess cement was removed. The trial polyethylene liner was then inserted. The distal femur was then exposed. Again, cement was placed onto the distal femoral surface. The actual component was then impacted into position with no complications. Excess was again removed. The knee was then extended. Small bit of cement was used around the patella to secure the patellar button. This was held in position for a minimum of 15 minutes and until the cement had hardened.     Tourniquet was then let down.  Hemostasis was then achieved again.  Excess cement was chipped away and removed to avoid impingement and free bodies.  The stability and unrestricted motion of the knee was then confirmed once more.  I switched out the Cydney bearing and trialed a 14 poly at this point.  I was happy with the stability both in extension, mid flexion, as well as deep flexion.  Therefore, size 14 routine PS bearing was then impacted and snapped into position.  The knee was then reduced.     Closure, placement of incisional wound VAC:  We performed sequential closure.  I irrigated the wound thoroughly with 3 L normal saline.  I had previously mixed 2 packs of antibiotic beads with 4 g of vancomycin and 4 packs gentamicin.  This was then inserted.  I also injected a mixture of Marcaine and Ortho cocktail solution into the perioperative field.  Adequate hemostasis was achieved.  #1 PDS approximated the capsular tissue.  #2 Quill suture approximated the fascial layer and medial parapatellar arthrotomy.  I closed over a large Hemovac drain placed superficial to the fascia.  2-0 Monocryl interrupted sutures closed the subcutaneous tissue layer.  Staples were then applied, followed by negative incisional wound VAC Prevena plus dressing.   Patient then was reversed in anesthesia, transferred back the postoperative care unit without any complications.    All instrument counts were correct x2.  I was present throughout the entire to the case with the exception of skin closure.    Modifier 22: Increased time and complexity  This case took approximately 25% more time than a standard revision knee replacement.  This was due to BMI of 37.5.  Extra time and care was taken for patient positioning, exposure, instrumentation, as well as implantation.  This, in no way, signifies that the ultimate outcome was compromised in any way.    PLAN:  - WBAT with assist device  - IV vancomycin and Ancef while inpatient, discharged with 3 months  extended course p.o. antibiotics, likely Bactrim  - Follow cultures  - aspirin 81 mg BID  - ambulate postop with PT  - resume home meds, diet  - f/u scheduled with me in 2-3 weeks  - dispo: Plan for admit today    REHAB: No knee immobilizer is needed. Patient is encouraged to fully flex and extend his knee when sitting upright and when in bed.        Electronically signed by Eugene Baxter MD on 1/9/2023 at 4:27 PM

## 2023-01-10 LAB
AFB SMEAR: NORMAL
ANION GAP SERPL CALCULATED.3IONS-SCNC: 12 MMOL/L (ref 3–16)
BASOPHILS ABSOLUTE: 0 K/UL (ref 0–0.2)
BASOPHILS RELATIVE PERCENT: 0.1 %
BUN BLDV-MCNC: 19 MG/DL (ref 7–20)
CALCIUM SERPL-MCNC: 9.8 MG/DL (ref 8.3–10.6)
CHLORIDE BLD-SCNC: 99 MMOL/L (ref 99–110)
CO2: 24 MMOL/L (ref 21–32)
CREAT SERPL-MCNC: 0.7 MG/DL (ref 0.6–1.2)
EOSINOPHILS ABSOLUTE: 0 K/UL (ref 0–0.6)
EOSINOPHILS RELATIVE PERCENT: 0 %
FUNGUS STAIN: NORMAL
GFR SERPL CREATININE-BSD FRML MDRD: >60 ML/MIN/{1.73_M2}
GLUCOSE BLD-MCNC: 161 MG/DL (ref 70–99)
GLUCOSE BLD-MCNC: 166 MG/DL (ref 70–99)
GLUCOSE BLD-MCNC: 223 MG/DL (ref 70–99)
GLUCOSE BLD-MCNC: 230 MG/DL (ref 70–99)
GLUCOSE BLD-MCNC: 243 MG/DL (ref 70–99)
HCT VFR BLD CALC: 29.5 % (ref 36–48)
HEMOGLOBIN: 9 G/DL (ref 12–16)
LYMPHOCYTES ABSOLUTE: 1.3 K/UL (ref 1–5.1)
LYMPHOCYTES RELATIVE PERCENT: 6.3 %
MCH RBC QN AUTO: 23.8 PG (ref 26–34)
MCHC RBC AUTO-ENTMCNC: 30.6 G/DL (ref 31–36)
MCV RBC AUTO: 77.8 FL (ref 80–100)
MONOCYTES ABSOLUTE: 0.9 K/UL (ref 0–1.3)
MONOCYTES RELATIVE PERCENT: 4.4 %
NEUTROPHILS ABSOLUTE: 18.8 K/UL (ref 1.7–7.7)
NEUTROPHILS RELATIVE PERCENT: 89.2 %
PDW BLD-RTO: 17 % (ref 12.4–15.4)
PERFORMED ON: ABNORMAL
PLATELET # BLD: 310 K/UL (ref 135–450)
PMV BLD AUTO: 9.7 FL (ref 5–10.5)
POTASSIUM REFLEX MAGNESIUM: 4.6 MMOL/L (ref 3.5–5.1)
RBC # BLD: 3.79 M/UL (ref 4–5.2)
SODIUM BLD-SCNC: 135 MMOL/L (ref 136–145)
WBC # BLD: 21.1 K/UL (ref 4–11)

## 2023-01-10 PROCEDURE — 6370000000 HC RX 637 (ALT 250 FOR IP): Performed by: NURSE PRACTITIONER

## 2023-01-10 PROCEDURE — 97110 THERAPEUTIC EXERCISES: CPT

## 2023-01-10 PROCEDURE — 85025 COMPLETE CBC W/AUTO DIFF WBC: CPT

## 2023-01-10 PROCEDURE — 94761 N-INVAS EAR/PLS OXIMETRY MLT: CPT

## 2023-01-10 PROCEDURE — 97166 OT EVAL MOD COMPLEX 45 MIN: CPT

## 2023-01-10 PROCEDURE — 97535 SELF CARE MNGMENT TRAINING: CPT

## 2023-01-10 PROCEDURE — 80048 BASIC METABOLIC PNL TOTAL CA: CPT

## 2023-01-10 PROCEDURE — 1200000000 HC SEMI PRIVATE

## 2023-01-10 PROCEDURE — 97162 PT EVAL MOD COMPLEX 30 MIN: CPT

## 2023-01-10 PROCEDURE — 2580000003 HC RX 258: Performed by: PHYSICIAN ASSISTANT

## 2023-01-10 PROCEDURE — 6370000000 HC RX 637 (ALT 250 FOR IP): Performed by: PHYSICIAN ASSISTANT

## 2023-01-10 PROCEDURE — 97116 GAIT TRAINING THERAPY: CPT

## 2023-01-10 PROCEDURE — 2700000000 HC OXYGEN THERAPY PER DAY

## 2023-01-10 PROCEDURE — 97530 THERAPEUTIC ACTIVITIES: CPT

## 2023-01-10 PROCEDURE — 36415 COLL VENOUS BLD VENIPUNCTURE: CPT

## 2023-01-10 PROCEDURE — 6360000002 HC RX W HCPCS: Performed by: PHYSICIAN ASSISTANT

## 2023-01-10 PROCEDURE — 6370000000 HC RX 637 (ALT 250 FOR IP): Performed by: SPECIALIST/TECHNOLOGIST

## 2023-01-10 RX ORDER — SULFAMETHOXAZOLE AND TRIMETHOPRIM 800; 160 MG/1; MG/1
1 TABLET ORAL EVERY 12 HOURS SCHEDULED
Status: DISCONTINUED | OUTPATIENT
Start: 2023-01-10 | End: 2023-01-11 | Stop reason: HOSPADM

## 2023-01-10 RX ORDER — DIPHENHYDRAMINE HCL 25 MG
50 TABLET ORAL EVERY 6 HOURS PRN
Status: DISCONTINUED | OUTPATIENT
Start: 2023-01-10 | End: 2023-01-11 | Stop reason: HOSPADM

## 2023-01-10 RX ORDER — DIPHENHYDRAMINE HCL 25 MG
25 TABLET ORAL ONCE
Status: COMPLETED | OUTPATIENT
Start: 2023-01-10 | End: 2023-01-10

## 2023-01-10 RX ORDER — DIPHENHYDRAMINE HYDROCHLORIDE 50 MG/ML
25 INJECTION INTRAMUSCULAR; INTRAVENOUS EVERY 6 HOURS PRN
Status: DISCONTINUED | OUTPATIENT
Start: 2023-01-10 | End: 2023-01-11 | Stop reason: HOSPADM

## 2023-01-10 RX ADMIN — DIPHENHYDRAMINE HCL 25 MG: 25 TABLET ORAL at 18:36

## 2023-01-10 RX ADMIN — MONTELUKAST SODIUM 10 MG: 10 TABLET ORAL at 20:17

## 2023-01-10 RX ADMIN — INSULIN LISPRO 2 UNITS: 100 INJECTION, SOLUTION INTRAVENOUS; SUBCUTANEOUS at 08:42

## 2023-01-10 RX ADMIN — ACETAMINOPHEN 325MG 650 MG: 325 TABLET ORAL at 10:21

## 2023-01-10 RX ADMIN — OXYCODONE 10 MG: 5 TABLET ORAL at 15:10

## 2023-01-10 RX ADMIN — Medication 10 ML: at 08:06

## 2023-01-10 RX ADMIN — INSULIN GLARGINE 30 UNITS: 100 INJECTION, SOLUTION SUBCUTANEOUS at 20:24

## 2023-01-10 RX ADMIN — ACETAMINOPHEN 325MG 650 MG: 325 TABLET ORAL at 22:30

## 2023-01-10 RX ADMIN — VANCOMYCIN HYDROCHLORIDE 1500 MG: 10 INJECTION, POWDER, LYOPHILIZED, FOR SOLUTION INTRAVENOUS at 03:53

## 2023-01-10 RX ADMIN — MELOXICAM 7.5 MG: 7.5 TABLET ORAL at 08:03

## 2023-01-10 RX ADMIN — ACETAMINOPHEN 325MG 650 MG: 325 TABLET ORAL at 17:39

## 2023-01-10 RX ADMIN — ONDANSETRON 4 MG: 4 TABLET, ORALLY DISINTEGRATING ORAL at 10:18

## 2023-01-10 RX ADMIN — Medication 10 ML: at 20:17

## 2023-01-10 RX ADMIN — METFORMIN HYDROCHLORIDE 1000 MG: 500 TABLET ORAL at 17:39

## 2023-01-10 RX ADMIN — Medication 2 TABLET: at 17:39

## 2023-01-10 RX ADMIN — ATORVASTATIN CALCIUM 10 MG: 10 TABLET, FILM COATED ORAL at 08:04

## 2023-01-10 RX ADMIN — SULFAMETHOXAZOLE AND TRIMETHOPRIM 1 TABLET: 800; 160 TABLET ORAL at 20:17

## 2023-01-10 RX ADMIN — OXYCODONE 10 MG: 5 TABLET ORAL at 10:16

## 2023-01-10 RX ADMIN — CEFAZOLIN 2000 MG: 10 INJECTION, POWDER, FOR SOLUTION INTRAVENOUS at 07:59

## 2023-01-10 RX ADMIN — ASPIRIN 81 MG: 81 TABLET, COATED ORAL at 20:17

## 2023-01-10 RX ADMIN — DIPHENHYDRAMINE HCL 25 MG: 25 TABLET ORAL at 05:15

## 2023-01-10 RX ADMIN — DIPHENHYDRAMINE HCL 25 MG: 25 TABLET ORAL at 20:17

## 2023-01-10 RX ADMIN — METFORMIN HYDROCHLORIDE 1000 MG: 500 TABLET ORAL at 08:04

## 2023-01-10 RX ADMIN — Medication 2 TABLET: at 08:03

## 2023-01-10 RX ADMIN — CETIRIZINE HYDROCHLORIDE 10 MG: 10 TABLET, FILM COATED ORAL at 08:03

## 2023-01-10 RX ADMIN — OXYCODONE 5 MG: 5 TABLET ORAL at 03:49

## 2023-01-10 RX ADMIN — ACETAMINOPHEN 325MG 650 MG: 325 TABLET ORAL at 03:48

## 2023-01-10 RX ADMIN — ASPIRIN 81 MG: 81 TABLET, COATED ORAL at 08:04

## 2023-01-10 RX ADMIN — INSULIN LISPRO 7 UNITS: 100 INJECTION, SOLUTION INTRAVENOUS; SUBCUTANEOUS at 08:42

## 2023-01-10 RX ADMIN — POLYETHYLENE GLYCOL 3350 17 G: 17 POWDER, FOR SOLUTION ORAL at 08:06

## 2023-01-10 RX ADMIN — SULFAMETHOXAZOLE AND TRIMETHOPRIM 1 TABLET: 800; 160 TABLET ORAL at 10:21

## 2023-01-10 RX ADMIN — ONDANSETRON 4 MG: 4 TABLET, ORALLY DISINTEGRATING ORAL at 22:32

## 2023-01-10 ASSESSMENT — PAIN DESCRIPTION - LOCATION
LOCATION: KNEE

## 2023-01-10 ASSESSMENT — PAIN SCALES - GENERAL
PAINLEVEL_OUTOF10: 8
PAINLEVEL_OUTOF10: 5
PAINLEVEL_OUTOF10: 8
PAINLEVEL_OUTOF10: 6
PAINLEVEL_OUTOF10: 0
PAINLEVEL_OUTOF10: 8
PAINLEVEL_OUTOF10: 8
PAINLEVEL_OUTOF10: 6
PAINLEVEL_OUTOF10: 8
PAINLEVEL_OUTOF10: 8
PAINLEVEL_OUTOF10: 6
PAINLEVEL_OUTOF10: 8
PAINLEVEL_OUTOF10: 7

## 2023-01-10 ASSESSMENT — PAIN DESCRIPTION - DESCRIPTORS
DESCRIPTORS: SHARP
DESCRIPTORS: ACHING

## 2023-01-10 ASSESSMENT — PAIN - FUNCTIONAL ASSESSMENT
PAIN_FUNCTIONAL_ASSESSMENT: ACTIVITIES ARE NOT PREVENTED
PAIN_FUNCTIONAL_ASSESSMENT: ACTIVITIES ARE NOT PREVENTED
PAIN_FUNCTIONAL_ASSESSMENT: PREVENTS OR INTERFERES SOME ACTIVE ACTIVITIES AND ADLS

## 2023-01-10 ASSESSMENT — PAIN DESCRIPTION - ORIENTATION
ORIENTATION: LEFT

## 2023-01-10 NOTE — PROGRESS NOTES
Physical Therapy  Facility/Department: [place] [address] - MED SURG/ORTHO  Physical Therapy Initial Assessment/Treatment     Name: Anya Alfred  : 1960  MRN: [id number]  Date of Service: 1/10/2023    Discharge Recommendations:  Home with assist PRN, Outpatient PT   PT Equipment Recommendations  Equipment Needed: No (Pt owns all necessary DME)      Patient Diagnosis(es): The primary encounter diagnosis was Chronic infection of knee joint prosthesis, subsequent encounter. A diagnosis of Infection of prosthetic knee joint, initial encounter [facility]) was also pertinent to this visit. Past Medical History:  has a past medical history of Allergic rhinitis, HTN (hypertension), Hyperlipidemia, Obstructive sleep apnea (adult) (pediatric), Osteoarthritis, and Type II or unspecified type diabetes mellitus without mention of complication, not stated as uncontrolled. Past Surgical History:  has a past surgical history that includes joint replacement (); Rotator cuff repair (); Colon surgery (); shoulder surgery (); knee surgery (); Neck surgery (2011); Umbilical hernia repair (2010); Ovary removal; Sinus endoscopy (9-15-14); Tonsillectomy and adenoidectomy; Tubal ligation; Total knee arthroplasty (Left, 2010); dupuytrens contracture surgery (Left, 2019); hernia repair; Hysterectomy, total abdominal; Revision total knee arthroplasty (Left, 10/10/2022); and Revision total knee arthroplasty (Left, 2023). Assessment   Body Structures, Functions, Activity Limitations Requiring Skilled Therapeutic Intervention: Decreased functional mobility ; Decreased ROM; Decreased strength;Decreased safe awareness;Decreased endurance;Decreased balance; Increased pain  Assessment: Pt presents to [facility] POD#1 L TKA revision with orders for FWBAT with AD. Pt typically IND for all functional mobilty and homemaking tasks, but has requiring increased lately due to [place] [address] status prior to surgery.  Pt currently functioning below baseline level requiring CGA progressing to SBA with RW and decreased weight bearing on LLE. Pt would benefit from continued skilled PT to address current deficits. Recommend home with prn assist and OP PT per surgeon protocol. Treatment Diagnosis: impaired functional mobility  Therapy Prognosis: Good  Decision Making: Medium Complexity  Requires PT Follow-Up: Yes  Activity Tolerance  Activity Tolerance: Patient tolerated evaluation without incident     Plan   Physcial Therapy Plan  General Plan: 2 times a day 7 days a week  Current Treatment Recommendations: Strengthening, Balance training, Functional mobility training, Transfer training, ROM, Gait training, Stair training, Neuromuscular re-education, Endurance training, Pain management, Safety education & training, Home exercise program, Patient/Caregiver education & training, Therapeutic activities  Safety Devices  Type of Devices:  (Pt left with OT at end of session)  Restraints  Restraints Initially in Place: No     Restrictions  Restrictions/Precautions  Restrictions/Precautions: Weight Bearing, Fall Risk  Lower Extremity Weight Bearing Restrictions  Left Lower Extremity Weight Bearing: Weight Bearing As Tolerated  Position Activity Restriction  Other position/activity restrictions: \"Ambulate in room progressing to hallway with assistive device four times daily (including PT) when PT advises. 2)  Bathroom privileges with assistance. 3) Up in bedside chair at least TID as tolerated. \", IV, incisional wound vac/drain     Subjective   General  Chart Reviewed: Yes  Patient assessed for rehabilitation services?: Yes  Response To Previous Treatment: Not applicable  Family / Caregiver Present: No  Referring Practitioner: Paul Covarrubias PA-C  Referral Date : 01/09/23  Diagnosis: Revision L TKA  Follows Commands: Within Functional Limits  General Comment  Comments: RN cleared pt for PT eval  Subjective  Subjective: Pt semi-supine in bed upon arrival, agreeable to PT tx  Pain: 7-8/10 L Knee     Social/Functional History  Social/Functional History  Lives With: Alone  Type of Home: Apartment  Home Layout: One level  Home Access: Elevator  Bathroom Shower/Tub: Tub/Shower unit  Bathroom Toilet: Standard  Bathroom Equipment: Grab bars in shower, Grab bars around toilet, Tub transfer bench, Toilet raiser, Hand-held shower  Bathroom Accessibility: Walker accessible  Home Equipment: Wheelchair-manual, Rollator, Walker, rolling, Grab bars  Has the patient had two or more falls in the past year or any fall with injury in the past year?: No  Receives Help From: Friend(s), Family, Other (comment) ([place] on Aging)  ADL Assistance: Independent  Homemaking Assistance: Needs assistance  Meal Prep:  (pt unable to stay standing long enough for cooking)  Vacuuming:  (pt was receiving laundry services through insurance)  Ambulation Assistance: Independent  Transfer Assistance: Independent  Active : Yes  Occupation: Retired  Type of Occupation: [employer] [employer]: travel, [employer]    Vision/Hearing  Vision  Vision: Impaired  Vision Exceptions: Wears glasses for reading  Hearing  Hearing: Exceptions to [facility]  Hearing Exceptions: Hard of hearing/hearing concerns      Cognition   Orientation  Overall Orientation Status: Within Normal Limits  Orientation Level: Oriented X4     Objective   Heart Rate: 100  Heart Rate Source: Monitor  BP: 107/67  BP Location: Right Arm  BP Method: Automatic  Patient Position: Sitting  MAP (Calculated): 80  Resp: 18  SpO2: 100 %  O2 Device: None (Room air)    Gross Assessment  AROM: Generally decreased, functional  Strength: Generally decreased, functional  Tone: Normal  Sensation: Intact     Bed Mobility Training  Bed Mobility Training: Yes  Supine to Sit: Supervision; Additional time  Scooting: Supervision; Additional time  Balance  Sitting: Intact  Standing: Impaired  Standing - Static: Constant support;Good  Standing - Dynamic: Constant support;Good;Fair  Transfer Training  Transfer Training: Yes  Interventions: Verbal cues; Safety awareness training  Sit to Stand: Contact-guard assistance; Adaptive equipment; Additional time;Stand-by assistance  Stand to Sit: Contact-guard assistance; Adaptive equipment; Additional time;Stand-by assistance  Toilet Transfer: Contact-guard assistance; Adaptive equipment; Additional time;Stand-by assistance (CGA for descent, SBA for stand; w/ RW and R grab bar)  Gait Training  Gait Training: Yes  Left Side Weight Bearing: As tolerated  Gait  Overall Level of Assistance: Contact-guard assistance;Stand-by assistance; Additional time; Adaptive equipment  Interventions: Safety awareness training;Verbal cues  Base of Support: Shift to right  Speed/María: Pace decreased (< 100 feet/min); Shuffled; Slow  Step Length: Right shortened  Stance: Left decreased  Gait Abnormalities: Decreased step clearance; Antalgic  Distance (ft): 20 Feet  Assistive Device: Gait belt;Walker, rolling    Exercise Treatment: Pt perform LLE ankle pumps, quad sets, heel slides, SLR (with assist), and glute sets.  Hand out provided    -PAC Score  -PAC Inpatient Mobility Raw Score : 17 (01/10/23 1406)  -PAC Inpatient T-Scale Score : 42.13 (01/10/23 1406)  Mobility Inpatient CMS 0-100% Score: 50.57 (01/10/23 1406)  Mobility Inpatient CMS G-Code Modifier : CK (01/10/23 1406)    Goals  Short Term Goals  Time Frame for Short Term Goals: 7 days (1/17/23) unless otherwise noted  Short Term Goal 1: Pt will perform bed mobility with mod I  Short Term Goal 2: Pt will perform transfers with supervision and RW  Short Term Goal 3: Pt will ambulate 50 ft with RW and supervision  Short Term Goal 4: Pt will perform 12-15 reps of BLE exercises with handout provided by 1/13/23  Patient Goals   Patient Goals : \"to walk with RW and a little help\"--1/10 Goal Met       Education  Patient Education  Education Given To: Patient  Education Provided: Role of Therapy;Plan of Care;Home Exercise Program;Precautions;Equipment;Transfer Training  Education Method: Verbal;Printed Information/Hand-outs  Barriers to Learning: None  Education Outcome: Verbalized understanding;Demonstrated understanding;Continued education needed    Disease Specific Education: Pt educated on weight bearing status, post-op precautions, appropriate DME, and safe mobility with AD. Pt verbalized understanding    Patient Educated in safety with car transfers and  dispensed instruction on car transfers with use of assistive device with patient demonstrating:  [x] Verbalizing understanding of appropriate technique, maintaining any ordered precautions for car transfer training s/p TJR  [] [place] with simulated car transfer with walker  [x] He/she requires min(A) and will have someone at discharge to help with transfers with patient verbalizing understanding of any ordered TJR precautions employing appropriate technique.  [x] Other: Educated patient in written car transfer instruction with patient verbalizing understanding of appropriate techniques.    Therapy Time   Individual Concurrent Group Co-treatment   Time In 1018         Time Out 1100         Minutes 42         Timed Code Treatment Minutes: 32 Minutes (10 min eval)     If pt is unable to be seen after this session, please let this note serve as discharge summary.  Please see case management note for discharge disposition.  Thank you.    Melinda Du, PT

## 2023-01-10 NOTE — PROGRESS NOTES
Occupational Therapy  Facility/Department: [place] [address] - MED SURG/ORTHO  Occupational Therapy Initial Assessment & Treatment    Name: Dina Whitman  : 1960  MRN: [id number]  Date of Service: 1/10/2023    Discharge Recommendations:  Home with assist PRN, Outpatient OT  OT Equipment Recommendations  Equipment Needed: No (pt has needed DME)     Patient Diagnosis(es): The primary encounter diagnosis was Chronic infection of knee joint prosthesis, subsequent encounter. A diagnosis of Infection of prosthetic knee joint, initial encounter [facility]) was also pertinent to this visit. Past Medical History:  has a past medical history of Allergic rhinitis, HTN (hypertension), Hyperlipidemia, Obstructive sleep apnea (adult) (pediatric), Osteoarthritis, and Type II or unspecified type diabetes mellitus without mention of complication, not stated as uncontrolled. Past Surgical History:  has a past surgical history that includes joint replacement (); Rotator cuff repair (); Colon surgery (); shoulder surgery (); knee surgery (); Neck surgery (2011); Umbilical hernia repair (2010); Ovary removal; Sinus endoscopy (9-15-14); Tonsillectomy and adenoidectomy; Tubal ligation; Total knee arthroplasty (Left, 2010); dupuytrens contracture surgery (Left, 2019); hernia repair; Hysterectomy, total abdominal; Revision total knee arthroplasty (Left, 10/10/2022); and Revision total knee arthroplasty (Left, 2023). Assessment   Performance deficits / Impairments: Decreased functional mobility ; Decreased ADL status; Decreased endurance;Decreased balance;Decreased high-level IADLs;Decreased coordination  Assessment: Pt is a 58 yo F who presented to [facility] for L TKA revision. Pt from home alone in apartment w/ AE throughout. Pt reports IND w/ ADLs and intermittment assist for IADLs; amb w/o AD at baseline.  Upon arrival, pt transferring into bathroom at conclusion of PT eval. Pt completed toileting w/ CGA for balance and modA for threading brief before pulling up herself. Pt completed grooming tasks in stance at sink w/ SBA, no concerns or obvious LOB throughout. Pt req grossly CGA, progressing to SBA for func mob/transfers w/ RW, tolerating x3 STS bouts at chair to challenge standing balance. Pt is functioning slightly below her PLOF and will benefit from inpatient OT services to address above deficits. Rec home w/ PRN assist and OPOT services to maximize pt's functional status and safety upon return to home envrionment. Prognosis: Good  Decision Making: Medium Complexity  REQUIRES OT FOLLOW-UP: Yes  Activity Tolerance  Activity Tolerance: Patient Tolerated treatment well        [facility] Daily Activity Inpatient   How much help for putting on and taking off regular lower body clothing?: A Lot  How much help for Bathing?: A Little  How much help for Toileting?: A Little  How much help for putting on and taking off regular upper body clothing?: None  How much help for taking care of personal grooming?: None  How much help for eating meals?: None  [facility] Inpatient Daily Activity Raw Score: 20  AM-[facility] Inpatient ADL T-Scale Score : 42.03  ADL Inpatient CMS 0-100% Score: 38.32  ADL Inpatient CMS G-Code Modifier : CJ     Plan   Occupational Therapy Plan  Times Per Week: 2 more sessions  Current Treatment Recommendations: Strengthening, Balance training, Functional mobility training, Endurance training, Gait training, Patient/Caregiver education & training, Positioning, Pain management, Safety education & training, Self-Care / ADL, Home management training     Restrictions  Restrictions/Precautions  Restrictions/Precautions: Weight Bearing, Fall Risk  Lower Extremity Weight Bearing Restrictions  Left Lower Extremity Weight Bearing: Weight Bearing As Tolerated  Position Activity Restriction  Other position/activity restrictions:  \"Ambulate in room progressing to hallway with assistive device four times daily (including PT) when PT advises. 2)  Bathroom privileges with assistance. 3) Up in bedside chair at least TID as tolerated. \", IV, incisional wound vac/drain    Subjective   General  Chart Reviewed: Yes, Orders, Progress Notes, History and Physical  Patient assessed for rehabilitation services?: Yes  Subjective  Subjective: pt transferring to bathroom w/ PT upon arrival, agreeable to OT eval  Pain: pt endorsed pain throughout session, did not formally rate or describe       Social/Functional History  Social/Functional History  Lives With: Alone  Type of Home: Apartment  Home Layout: One level  Home Access: Elevator  Bathroom Shower/Tub: Tub/Shower unit  Bathroom Toilet: Standard  Bathroom Equipment: Grab bars in shower, Grab bars around toilet, Tub transfer bench, Toilet raiser, Hand-held shower  Bathroom Accessibility: Walker accessible  Home Equipment: Wheelchair-manual, Rollator, Walker, rolling, Grab bars  Has the patient had two or more falls in the past year or any fall with injury in the past year?: No  Receives Help From: Friend(s), Family, Other (comment) ([place] [facility])  ADL Assistance: Independent  Homemaking Assistance: Needs assistance  Meal Prep:  (pt unable to stay standing long enough for cooking)  Vacuuming:  (pt was receiving laundry services through insurance)  Ambulation Assistance: Independent  Transfer Assistance: Independent  Active : Yes  Occupation: Retired  Type of Occupation: [address]: travel, concerts       Objective   Heart Rate: 100  Heart Rate Source: Monitor  BP: 107/67  BP Location: Right Arm  BP Method: Automatic  Patient Position: Sitting  MAP (Calculated): 80  Resp: 18  SpO2: 100 %  O2 Device: None (Room air)            Observation/Palpation  Posture: Fair  Safety Devices  Type of Devices: All fall risk precautions in place;Call light within reach; Chair alarm in place;Gait belt;Patient at risk for falls; Left in chair;Nurse notified  Restraints  Restraints Initially in Place: No  Bed Mobility Training  Bed Mobility Training: No (pt received transferring into bathroom at end of PT eval)  Balance  Sitting: Intact  Standing: Impaired  Standing - Static: Constant support;Good (w/ RW)  Standing - Dynamic: Constant support;Good;Fair (w/ RW. pt ambulated from bed>bathroom>chair, no major concerns for or LOB throughout)  Transfer Training  Transfer Training: Yes  Interventions: Verbal cues; Safety awareness training  Sit to Stand: Contact-guard assistance; Adaptive equipment; Additional time;Stand-by assistance (CGA initially progressed to SBA; w/ RW)  Stand to Sit: Contact-guard assistance; Adaptive equipment; Additional time;Stand-by assistance (initially CGA, progressed to SBA; w/ RW)  Toilet Transfer: Contact-guard assistance; Adaptive equipment; Additional time;Stand-by assistance (CGA for descent, SBA for stand; w/ RW and R grab bar)       AROM: Within functional limits  PROM: Within functional limits  Strength: Within functional limits  Coordination: Within functional limits    Dynamic Standing Balance Exercises: pt completed x3 STS transfers from chair to challenge standing balance w/in WB px. pt demo'd good balance throughout bouts, no LOB    ADL  Grooming: Stand by assistance;Setup (in stance at sink)  Grooming Skilled Clinical Factors: oral care  LE Dressing:  Moderate assistance (seated at toilet)  LE Dressing Skilled Clinical Factors: don brief; A for navigating brief over hospital socks  Toileting: Contact guard assistance;Dependent/Total (seated at toilet)  Toileting Skilled Clinical Factors: A for balance during valentino care; purewick placed at [address]: Impaired  Vision Exceptions: Wears glasses for reading  Hearing  Hearing: Exceptions to [facility]  Hearing Exceptions: Hard of hearing/hearing concerns    Cognition  Overall Cognitive Status: WNL  Orientation  Overall Orientation Status: Within Normal Limits  Orientation Level: Oriented X4 Education Given To: Patient  Education Provided: Role of Therapy;Plan of Care;Precautions;IADL Safety;Transfer Training;ADL Adaptive Strategies; Fall Prevention Strategies  Education Method: Demonstration;Verbal  Barriers to Learning: None  Education Outcome: Verbalized understanding;Continued education needed    Disease Specific Education: Pt educated on weight bearing status, post-op precautions, appropriate DME, and safe mobility with AD. Pt verbalized understanding        Goals  Short Term Goals  Time Frame for Short Term Goals: 2 more sessions or 1 week (1/17/2023) unless otherwise noted  Short Term Goal 1: Pt will complete all func mob/transfers w/ SPV and LRAD (1/15)  Short Term Goal 2: Pt will complete toileting w/ SPV  Short Term Goal 3: Pt will complete total body dressing w/ SBA and set-up  Short Term Goal 4: Pt will complete x10 BUE therex to increase UE strength and endurance req for functional transfers  Patient Goals   Patient goals : \"I want to be able to get around by myself (IND)\"       Therapy Time   Individual Concurrent Group Co-treatment   Time In 1057         Time Out 1133         Minutes 36         Timed Code Treatment Minutes: 26 Minutes (10 min eval)     If pt is unable to be seen after this session, please let this note serve as discharge summary. Please see case management note for discharge disposition. Thank you.      Rufus Paula OTR/L

## 2023-01-10 NOTE — ANESTHESIA POSTPROCEDURE EVALUATION
Department of Anesthesiology  Postprocedure Note    Patient: Shelbie Fischer  MRN: [id number]  YOB: 1960  Date of evaluation: 1/9/2023      Procedure Summary     Date: 01/09/23 Room / Location: [address] [place] [address] [address] / [place]    Anesthesia Start: 8372 Anesthesia Stop: 4020    Procedure: REVISION LEFT TOTAL KNEE ARTHROPLASTY, SECOND STAGE           Lizbethma Gabrielle  (Left: Knee) Diagnosis:       Infection of prosthetic knee joint, [facility]      (LEFT KNEE INFECTION TOTAL KNEE REPLACEMENT)    Surgeons: Álvaro Silveira MD Responsible Provider: Bonifacio Andres MD    Anesthesia Type: general ASA Status: 3          Anesthesia Type: No value filed.     Sabrina Phase I: Sabrina Score: 9    Sabrina Phase II:        Anesthesia Post Evaluation    Comments: Postoperative Anesthesia Note    Name:    Shelbie Fischer  MRN:      [id number]    Patient Vitals in the past 12 hrs:  01/09/23 1845, BP:139/85, Pulse:100, Resp:13, SpO2:96 %  01/09/23 1840, BP:127/82, Pulse:(!) 101, Resp:10, SpO2:96 %  01/09/23 1835, BP:(!) 138/101, Pulse:(!) 104, Resp:12, SpO2:95 %  01/09/23 1830, BP:(!) 146/96, Pulse:(!) 105, Resp:13, SpO2:94 %  01/09/23 1825, BP:(!) 151/97, Pulse:(!) 105, Resp:10, SpO2:95 %  01/09/23 1820, BP:(!) 156/141, Pulse:99, Resp:11, SpO2:94 %  01/09/23 1815, BP:(!) 161/87, Pulse:99, Resp:19, SpO2:93 %  01/09/23 1810, BP:(!) 149/91, Pulse:100, Resp:17, SpO2:94 %  01/09/23 1805, BP:(!) 150/86, Pulse:(!) 105, Resp:16, SpO2:94 %  01/09/23 1800, BP:(!) 153/89, Pulse:98, Resp:16, SpO2:94 %  01/09/23 1755, BP:139/85, Pulse:95, Resp:13, SpO2:95 %  01/09/23 1750, BP:(!) 136/101, Pulse:93, Resp:15, SpO2:94 %  01/09/23 1745, Pulse:92, Resp:23, SpO2:94 %  01/09/23 1744, BP:(!) 158/90, Pulse:95, Resp:23, SpO2:94 %  01/09/23 1740, BP:137/87, Pulse:96, Resp:19, SpO2:97 %  01/09/23 1735, BP:(!) 157/90, Pulse:93, Resp:18, SpO2:98 %  01/09/23 1730, BP:137/84, Pulse:94, Resp:16, SpO2:95 %  01/09/23 1725, BP:(!) 158/87, Pulse:89, Resp:15, SpO2:95 %  01/09/23 1720, BP:(!) 156/96, Pulse:93, Resp:13, SpO2:96 %  01/09/23 1715, BP:(!) 165/111, Pulse:(!) 116, Resp:18, SpO2:96 %  01/09/23 1710, BP:(!) 154/96, Pulse:(!) 116, Resp:12, SpO2:96 %  01/09/23 1705, BP:(!) 152/94, Pulse:(!) 114, Resp:11, SpO2:93 %  01/09/23 1700, BP:(!) 153/93, Pulse:(!) 115, Resp:13, SpO2:94 %  01/09/23 1655, BP:(!) 150/87, Temp:96.8 °F (36 °C), Temp src:Temporal, Pulse:(!) 110, Resp:15, SpO2:93 %  01/09/23 1650, BP:(!) 155/88, Pulse:(!) 111, Resp:13, SpO2:91 %  01/09/23 1640, BP:(!) 155/88, Temp:96.8 °F (36 °C), Temp src:Temporal, Pulse:(!) 107, Resp:12, SpO2:94 %  01/09/23 1330, SpO2:99 %  01/09/23 1315, SpO2:95 %  01/09/23 1300, SpO2:94 %  01/09/23 1200, BP:114/72, Temp:98.6 °F (37 °C), Temp src:Temporal, Pulse:70, Resp:14, SpO2:98 %  01/09/23 1130, Weight:205 lb (93 kg)     LABS:    CBC  Lab Results       Component                Value               Date/Time                  WBC                      14.4 (H)            01/05/2023 09:20 AM        HGB                      10.4 (L)            01/05/2023 09:20 AM        HCT                      33.6 (L)            01/05/2023 09:20 AM        PLT                      330                 01/05/2023 09:20 AM   RENAL  Lab Results       Component                Value               Date/Time                  NA                       138                 01/05/2023 09:20 AM        K                        3.9                 01/05/2023 09:20 AM        K                        3.5                 10/18/2022 09:25 AM        CL                       100                 01/05/2023 09:20 AM        CO2                      24                  01/05/2023 09:20 AM        BUN                      13                  01/05/2023 09:20 AM        CREATININE               0.5 (L)             01/05/2023 09:20 AM        GLUCOSE                  217 (H)             01/05/2023 09:20 AM   COAGS  Lab Results       Component                Value Date/Time                  PROTIME                  12.7                01/05/2023 09:20 AM        INR                      0.97                01/05/2023 09:20 AM        APTT                     31.5                01/05/2023 09:20 AM     Intake & Output:  @[id number]@    Nausea & Vomiting:  No    Level of Consciousness:  Awake    Pain Assessment:  Adequate analgesia    Anesthesia Complications:  No apparent anesthetic complications    SUMMARY      Vital signs stable  OK to discharge from Stage I post anesthesia care.   Care transferred from Anesthesiology department on discharge from perioperative area

## 2023-01-10 NOTE — PROGRESS NOTES
Department of Orthopedic Surgery  Physician Assistant   Progress Note    Subjective:       Systemic or Specific Complaints:No Complaints and pain is well controlled.  Has some nausea with pain medication, dissolving zofran helps    Objective:     Patient Vitals for the past 24 hrs:   BP Temp Temp src Pulse Resp SpO2 Weight   01/10/23 0835 -- -- -- -- -- 98 % --   01/10/23 0805 113/69 97.8 °F (36.6 °C) Oral 89 18 98 % --   01/10/23 0345 107/67 97.7 °F (36.5 °C) -- 83 18 -- --   01/10/23 0030 134/75 97.6 °F (36.4 °C) Oral -- 18 96 % --   01/09/23 2322 112/74 97.8 °F (36.6 °C) Oral 91 18 -- --   01/09/23 2215 134/80 97.4 °F (36.3 °C) Oral 82 16 96 % --   01/09/23 2110 -- -- -- 97 12 96 % --   01/09/23 2105 105/75 -- -- 99 (!) 9 96 % --   01/09/23 2100 -- -- -- 97 13 96 % --   01/09/23 2055 112/73 -- -- 99 12 96 % --   01/09/23 2050 115/75 -- -- 99 10 96 % --   01/09/23 2045 108/67 -- -- 100 10 96 % --   01/09/23 2040 108/71 -- -- 100 13 96 % --   01/09/23 2035 109/73 -- -- 99 15 96 % --   01/09/23 2030 (!) 121/106 -- -- (!) 102 14 95 % --   01/09/23 2025 125/85 -- -- 99 16 96 % --   01/09/23 2020 124/84 -- -- (!) 103 19 97 % --   01/09/23 2015 125/79 -- -- (!) 103 17 96 % --   01/09/23 2010 118/80 -- -- (!) 102 15 96 % --   01/09/23 2005 125/83 -- -- 100 13 96 % --   01/09/23 2000 (!) 135/101 -- -- 99 19 96 % --   01/09/23 1955 126/81 -- -- (!) 106 19 96 % --   01/09/23 1950 (!) 135/98 -- -- (!) 103 19 95 % --   01/09/23 1945 137/84 -- -- (!) 104 17 96 % --   01/09/23 1940 (!) 128/102 -- -- 98 19 96 % --   01/09/23 1935 122/80 -- -- 99 18 94 % --   01/09/23 1930 (!) 138/91 -- -- 99 15 96 % --   01/09/23 1925 -- -- -- (!) 102 22 95 % --   01/09/23 1920 135/81 -- -- 100 18 94 % --   01/09/23 1915 132/83 -- -- 98 23 95 % --   01/09/23 1910 139/84 -- -- (!) 109 20 95 % --   01/09/23 1905 (!) 149/93 -- -- (!) 106 10 95 % --   01/09/23 1900 (!) 139/98 -- -- 100 11 95 % --   01/09/23 1855 130/87 -- -- (!) 102 10 95 % -- 01/09/23 1850 (!) 142/89 -- -- 100 (!) 9 95 % --   01/09/23 1845 139/85 -- -- 100 13 96 % --   01/09/23 1840 127/82 -- -- (!) 101 10 96 % --   01/09/23 1835 (!) 138/101 -- -- (!) 104 12 95 % --   01/09/23 1830 (!) 146/96 -- -- (!) 105 13 94 % --   01/09/23 1825 (!) 151/97 -- -- (!) 105 10 95 % --   01/09/23 1820 (!) 156/141 -- -- 99 11 94 % --   01/09/23 1815 (!) 161/87 -- -- 99 19 93 % --   01/09/23 1810 (!) 149/91 -- -- 100 17 94 % --   01/09/23 1805 (!) 150/86 -- -- (!) 105 16 94 % --   01/09/23 1800 (!) 153/89 -- -- 98 16 94 % --   01/09/23 1755 139/85 -- -- 95 13 95 % --   01/09/23 1750 (!) 136/101 -- -- 93 15 94 % --   01/09/23 1745 -- -- -- 92 23 94 % --   01/09/23 1744 (!) 158/90 -- -- 95 23 94 % --   01/09/23 1740 137/87 -- -- 96 19 97 % --   01/09/23 1735 (!) 157/90 -- -- 93 18 98 % --   01/09/23 1730 137/84 -- -- 94 16 95 % --   01/09/23 1725 (!) 158/87 -- -- 89 15 95 % --   01/09/23 1720 (!) 156/96 -- -- 93 13 96 % --   01/09/23 1715 (!) 165/111 -- -- (!) 116 18 96 % --   01/09/23 1710 (!) 154/96 -- -- (!) 116 12 96 % --   01/09/23 1705 (!) 152/94 -- -- (!) 114 11 93 % --   01/09/23 1700 (!) 153/93 -- -- (!) 115 13 94 % --   01/09/23 1655 (!) 150/87 96.8 °F (36 °C) Temporal (!) 110 15 93 % --   01/09/23 1650 (!) 155/88 -- -- (!) 111 13 91 % --   01/09/23 1640 (!) 155/88 96.8 °F (36 °C) Temporal (!) 107 12 94 % --   01/09/23 1330 -- -- -- -- -- 99 % --   01/09/23 1315 -- -- -- -- -- 95 % --   01/09/23 1300 -- -- -- -- -- 94 % --   01/09/23 1200 114/72 98.6 °F (37 °C) Temporal 70 14 98 % --   01/09/23 1130 -- -- -- -- -- -- 205 lb (93 kg)       General: alert, appears stated age, cooperative, and no distress   Wound: Wound clean and dry no evidence of infection. post op dressing in place, no drainage to outside of dressing.  Lexy Alexandra incisional vac functioning appropriately   Motion: Painful range of Motion in affected extremity   DVT Exam: No evidence of DVT seen on physical exam.  No cords or calf tenderness. Additional exam: Pt seen sitting up in bed at time of interview  Post op dressing well maintained  Leg in external rotation, able to correct to neutral  Thigh mildly swollen, compartments soft and compressible  EHL FHL gastroc ant tib motor intact  DP and PT pulse 2+, foot WWP  Sensation intact to light touch    Data Review  CBC:   Lab Results   Component Value Date/Time    WBC 21.1 01/10/2023 06:00 AM    RBC 3.79 01/10/2023 06:00 AM    HGB 9.0 01/10/2023 06:00 AM    HCT 29.5 01/10/2023 06:00 AM     01/10/2023 06:00 AM       Renal:   Lab Results   Component Value Date/Time     01/10/2023 06:00 AM    K 4.6 01/10/2023 06:00 AM    CL 99 01/10/2023 06:00 AM    CO2 24 01/10/2023 06:00 AM    BUN 19 01/10/2023 06:00 AM    CREATININE 0.7 01/10/2023 06:00 AM    GLUCOSE 243 01/10/2023 06:00 AM    CALCIUM 9.8 01/10/2023 06:00 AM       mL      Assessment:     S/p left revision total knee arthroplasty, stage II for infection. Postop day 1  Placement of incisional wound VAC  Hemovac accordion drain 150cc output  Abductor canal block  Date of surgery 1/9/2020. By Dr. Severa Clarity:      1: Weightbearing as tolerated to the left lower extremity with assistive device. Encourage flexion and extension of the knee when sitting and when in bed, no need for knee immobilizer at this time. 3 months extended course p.o. antibiotics at discharge  2:  Continue Deep venous thrombosis prophylaxis aspirin 81 mg twice daily for 4 weeks postop  3:  Continue Pain Control-scheduled Tylenol  4: Operative cultures taken-no organisms seen on gram stain, no growth to date  5: 2 doses IV Ancef completed postoperatively. IV vancomycin completed postop  6: PT/OT eval pending  7: planning to DC home, has all DME.  Likely tomorrow    Gloria Alexandre

## 2023-01-10 NOTE — PLAN OF CARE
Encourage patient to ask for pain medication before pain is above a 5 on the 1/10 pain scale. Medicate before PT or increased activity. Use alternatives such as ice (if ordered) or distraction as often as possible to minimize  need for medication. Protect patient from fall injury by keeping bed in low position, use of non skid socks and bed alarm system. Keep belongings and call light with reach at all times and following fall protocol. Monitor patient's blood glucose and amount of food eaten. Encourage non low carb diet. Monitor incision and amount drainage from knee surgery site. Do not put pillow under knee. Encourage patient to be up during the day and only use the purwick at night.

## 2023-01-10 NOTE — PROGRESS NOTES
I reviewed the chart and discussed the case with nursing. There are no current medical needs and/or concerns at this time. If a need shall arise, please do not hesitate to contact us. Hospitalist group signing off. Thank you!   Sami Castro CNP

## 2023-01-10 NOTE — PROGRESS NOTES
Physical Therapy  Facility/Department: [facility] C5 - MED SURG/ORTHO  Daily Treatment Note  NAME: Kathrine Davila  : 1960  MRN: [id number]    Date of Service: 1/10/2023    Discharge Recommendations:  Home with assist PRN, Outpatient PT   PT Equipment Recommendations  Equipment Needed: No    [facility] 6 Clicks Inpatient Mobility:  AM-PAC Mobility Inpatient   How much difficulty turning over in bed?: A Little  How much difficulty sitting down on / standing up from a chair with arms?: A Little  How much difficulty moving from lying on back to sitting on side of bed?: A Little  How much help from another person moving to and from a bed to a chair?: A Little  How much help from another person needed to walk in hospital room?: A Little  How much help from another person for climbing 3-5 steps with a railing?: A Lot  AM-PAC Inpatient Mobility Raw Score : 17  AM-PAC Inpatient T-Scale Score : 42.13  Mobility Inpatient CMS 0-100% Score: 50.57  Mobility Inpatient CMS G-Code Modifier : CK    Patient Diagnosis(es): The primary encounter diagnosis was Chronic infection of knee joint prosthesis, subsequent encounter. A diagnosis of Infection of prosthetic knee joint, initial encounter [facility]) was also pertinent to this visit. Assessment   Assessment: Pt progressing well towards PT goals; grossly SBA with RW for transfers and ambulation. Pt able to perform 10-12 reps of BLE exercises with cues. Ice machine in place at end of session. Pt would benefit from continued skilled PT to address current deficits and maximize independence. Recommend home with prn assist and OP PT upon d/c  Activity Tolerance: Patient tolerated treatment well  Equipment Needed: No     Plan    Physcial Therapy Plan  General Plan: 2 times a day 7 days a week  Current Treatment Recommendations: Strengthening;Balance training;Functional mobility training;Transfer training;ROM;Gait training;Stair training;Neuromuscular re-education; Endurance training;Pain management; Safety education & training;Home exercise program;Patient/Caregiver education & training; Therapeutic activities     Restrictions  Restrictions/Precautions  Restrictions/Precautions: Weight Bearing, Fall Risk  Lower Extremity Weight Bearing Restrictions  Left Lower Extremity Weight Bearing: Weight Bearing As Tolerated  Position Activity Restriction  Other position/activity restrictions: \"Ambulate in room progressing to hallway with assistive device four times daily (including PT) when PT advises. 2)  Bathroom privileges with assistance. 3) Up in bedside chair at least TID as tolerated. \", IV, incisional wound vac/drain     Subjective    Subjective  Subjective: Pt sitting up in chair upon arrival, agreeable to PT tx. Pain: 7/10; L Knee  Orientation  Overall Orientation Status: Within Normal Limits  Orientation Level: Oriented X4  Cognition  Overall Cognitive Status: WNL     Objective  Vitals  /70, HR 98 bpm, SpO2 100%     Bed Mobility Training  Bed Mobility Training: Yes  Supine to Sit: Supervision; Additional time  Sit to Supine: Supervision; Additional time  Scooting: Supervision; Additional time  Balance  Sitting: Intact  Standing: Impaired  Standing - Static: Constant support;Good  Standing - Dynamic: Constant support;Good;Fair  Transfer Training  Transfer Training: Yes  Interventions: Verbal cues; Safety awareness training  Sit to Stand: Adaptive equipment; Additional time;Stand-by assistance  Stand to Sit: Adaptive equipment; Additional time;Stand-by assistance  Toilet Transfer: Stand-by assistance; Adaptive equipment; Additional time  Gait Training  Gait Training: Yes  Left Side Weight Bearing: As tolerated  Gait  Overall Level of Assistance: Contact-guard assistance;Stand-by assistance; Additional time; Adaptive equipment  Interventions: Safety awareness training;Verbal cues  Base of Support: Shift to right  Speed/María: Pace decreased (< 100 feet/min); Shuffled; Slow  Step Length: Right shortened  Stance: Left decreased  Gait Abnormalities: Decreased step clearance; Antalgic  Distance (ft): 60 Feet (60 ft + 10 ft x2)  Assistive Device: Gait belt;Walker, rolling    PT Exercises  Exercise Treatment: Pt perform seated LAQ, dorsiflexion, plantar flexion and supine hip abduction, SLR, heel slides, quad sets all Bx10-12 reps with cues for proper form    Safety Devices  Type of Devices:  (Pt left with OT at end of session)  Restraints  Restraints Initially in Place: No     Goals  Short Term Goals  Time Frame for Short Term Goals: 7 days (1/17/23) unless otherwise noted  Short Term Goal 1: Pt will perform bed mobility with mod I  Short Term Goal 2: Pt will perform transfers with supervision and RW  Short Term Goal 3: Pt will ambulate 50 ft with RW and supervision  Short Term Goal 4: Pt will perform 12-15 reps of BLE exercises with handout provided by 1/13/23  Patient Goals   Patient Goals : \"to walk with RW and a little help\"--1/10 Goal Met    Education  Patient Education  Education Given To: Patient  Education Provided: Role of Therapy;Plan of Care;Home Exercise Program;Precautions; Equipment;Transfer Training  Education Method: Verbal;Printed Information/Hand-outs  Barriers to Learning: None  Education Outcome: Verbalized understanding;Demonstrated understanding;Continued education needed    Therapy Time   Individual Concurrent Group Co-treatment   Time In 1506         Time Out 1546         Minutes 40         Timed Code Treatment Minutes: 40 Minutes     If pt is unable to be seen after this session, please let this note serve as discharge summary. Please see case management note for discharge disposition. Thank you.     Zac Sierra, PT

## 2023-01-10 NOTE — PROGRESS NOTES
4 Eyes Skin Assessment     The patient is being assess for   Post-Op Surgical    I agree that 2 RN's have performed a thorough Head to Toe Skin Assessment on the patient. ALL assessment sites listed below have been assessed. Areas assessed for pressure by both nurses:   []   Head, Face, and Ears   []   Shoulders, Back, and Chest, Abdomen  []   Arms, Elbows, and Hands   []   Coccyx, Sacrum, and Ischium  []   Legs, Feet, and Heels        Skin Assessed Under all Medical Devices by both nurses:  O2 device tubing              All Mepilex Borders were peeled back and area peeked at by both nurses:  Yes  Please list where Mepilex Borders are located:  no mepilex border             [employer] this note so that the co-signing nurse is able to place an eSignature**    Co-signer eSignature: Electronically signed by Tere Niño RN on 1/9/23 at 10:54 PM EST    Does the Patient have Skin Breakdown related to pressure?   Yes LDA WOUND CARE was Initiated documentation include the Ivett-wound, Wound Assessment, Measurements, Dressing Treatment, Drainage, and Color\",     (Insert Photo here***)         Dre Prevention initiated:  Yes   Wound Care Orders initiated:  No      WOC nurse consulted for Pressure Injury (Stage 3,4, Unstageable, DTI, NWPT, Complex wounds)and New or Established Ostomies:  No      Primary Nurse eSignature: Electronically signed by Tere Niño RN on 1/9/23 at 10:55 PM EST

## 2023-01-10 NOTE — H&P
Hospital Medicine  Consult History & Physical        Chief Complaint:  Left revision total knee arthroplasty     Date of Service: Pt seen/examined in consultation on 1/10/2023    History Of Present Illness:      57 y.o. female who we are asked to see/evaluate by Shikha Lam MD for medical management of DM, HTN. S/p left revision total knee arthroplasty, stage II for infection.      Past Medical History:        Diagnosis Date    Allergic rhinitis     HTN (hypertension)     Hyperlipidemia     Obstructive sleep apnea (adult) (pediatric)     Non-compliant with CPAP    Osteoarthritis     Type II or unspecified type diabetes mellitus without mention of complication, not stated as uncontrolled        Past Surgical History:        Procedure Laterality Date    COLON SURGERY  2008    10\" removed - chronic constipation    DUPUYTRENS CONTRACTURE SURGERY Left 1/29/2019    LEFT 1ST DORSAL COMPARMENT DE' [facility] performed by Cisco Babin MD at [place] [address]      4x     HYSTERECTOMY, TOTAL ABDOMINAL (CERVIX REMOVED)      JOINT REPLACEMENT  2007    Left knee partial    KNEE SURGERY  1990's    rt knee reconstructed    NECK SURGERY  05/12/2011    c 4,5,6,7 (ACDF)    OVARY REMOVAL      REVISION TOTAL KNEE ARTHROPLASTY Left 10/10/2022    REVISION LEFT TOTAL KNEE ARTHROPLASTY FOR INFECTION WITH ADDUCTOR CANAL  FOR PAIN CONTROL              Allen Whitman; [address] performed by Shikha Lam MD at [place] [address] Left 1/9/2023    REVISION LEFT TOTAL KNEE ARTHROPLASTY, SECOND STAGE           Allen Whitman  performed by Shikha Lam MD at [place] [address]  2009    Left shoulder    SHOULDER SURGERY  1990's    lt & rt shoulder impingment    SINUS ENDOSCOPY  9-15-14    FUNCTIONAL ENDOSCOPIC SINUS SURGERY       TONSILLECTOMY AND ADENOIDECTOMY      TOTAL KNEE ARTHROPLASTY Left 03/09/2010    [place] to Total    [place]  8/2010    Dr. Daly Levels Medications Prior to Admission:    Prior to Admission medications    Medication Sig Start Date End Date Taking? Authorizing Provider   insulin glargine (LANTUS SOLOSTAR) 100 UNIT/ML injection pen Inject 30 Units into the skin nightly 12/28/22   Isabel Rico,    Accu-Chek Softclix Lancets MISC USE TO TEST 1-3 TIMES A DAY AS NEEDED FOR SYMPTOMS OR IRREGULAR GLUCOSE 12/5/22   Isabel Rico DO   aspirin 81 MG EC tablet Take 1 tablet by mouth in the morning and at bedtime 10/14/22 11/17/22  OLIVIA Callejas   Insulin Pen Needle ([employer] PEN NEEDLES) 32G X 4 MM MISC USE TO INJECT INSULIN FOUR TIMES A DAY 9/22/22   NOHELIA Dooley CNP   losartan (COZAAR) 100 MG tablet TAKE ONE TABLET BY MOUTH DAILY 8/1/22   Isabel Rico DO   metFORMIN (GLUCOPHAGE) 1000 MG tablet TAKE ONE TABLET BY MOUTH TWICE A DAY WITH MEALS 7/20/22   Isabel Rico DO   montelukast (SINGULAIR) 10 MG tablet TAKE ONE TABLET BY MOUTH ONCE NIGHTLY 7/14/22   Hillary Heddie Dance, DO   blood glucose monitor strips Test 1-3 times a day as needed for symptoms of irregular glucose. DX: E11.9 please provide strips that are accepted by patient's insurance. 6/28/22   NOHELIA Dooley CNP   ibuprofen (ADVIL;MOTRIN) 800 MG tablet Take 1 tablet by mouth 2 times daily as needed for Pain 6/28/22   NOHELIA Dooley CNP   atorvastatin (LIPITOR) 10 MG tablet TAKE ONE TABLET BY MOUTH DAILY 4/25/22   NOHELIA Dooley CNP   amLODIPine (NORVASC) 5 MG tablet TAKE ONE TABLET BY MOUTH DAILY 11/24/21   Isabel Rico DO   Calcium Carb-Cholecalciferol 600-800 MG-UNIT TABS Take 1 tablet by mouth 2 times daily    Historical Provider, MD   cetirizine (ZYRTEC) 10 MG tablet Take 1 tablet by mouth daily 12/3/15   John Lay, DO       Allergies:  Gabapentin, Hydrocodone, Oxycodone, Demerol, Levaquin [levofloxacin in d5w], Morphine, and Tramadol    Social History:       The patient currently lives at home    TOBACCO:   reports that she has quit smoking. Her smoking use included cigars. She has never used smokeless tobacco.  ETOH:   reports current alcohol use. Family History:      Positive as follows:        Problem Relation Age of Onset    Diabetes Father     Heart Disease Father         CAD    High Blood Pressure Father     High Cholesterol Father     Kidney Disease Brother     Diabetes Son     Other Son         DEANA    Heart Disease Sister         1/2 sister    Breast Cancer Paternal Cousin     Rheum Arthritis Neg Hx     Osteoarthritis Neg Hx     Asthma Neg Hx     Cancer Neg Hx     Heart Failure Neg Hx     Hypertension Neg Hx     Migraines Neg Hx     Ovarian Cancer Neg Hx     Rashes/Skin Problems Neg Hx     Seizures Neg Hx     Stroke Neg Hx     Thyroid Disease Neg Hx        REVIEW OF SYSTEMS COMPLETED:   Pertinent positives as noted in the HPI. All other systems reviewed and negative. PHYSICAL EXAM PERFORMED:  /67   Pulse 100   Temp 97.8 °F (36.6 °C) (Oral)   Resp 18   Wt 205 lb (93 kg)   LMP 01/26/1996   SpO2 100%   BMI 37.49 kg/m²   General appearance: No apparent distress, appears stated age and cooperative. HEENT: Normal cephalic, atraumatic without obvious deformity. Pupils equal, round, and reactive to light. Extra ocular muscles intact. Conjunctivae/corneas clear. Neck: Supple, with full range of motion. No jugular venous distention. Trachea midline. Respiratory:  Normal respiratory effort. Clear to auscultation, bilaterally without Rales/Wheezes/Rhonchi. Cardiovascular: Regular rate and rhythm with normal S1/S2 without murmurs, rubs or gallops. Abdomen: Soft, non-tender, non-distended with normal bowel sounds. Musculoskeletal: Left knee in ace wrap. No clubbing, cyanosis or edema bilaterally. Skin: Skin color, texture, turgor normal.  No rashes or lesions. Neurologic:  Neurovascularly intact without any focal sensory/motor deficits.  Cranial nerves: II-XII intact, grossly non-focal.  Psychiatric: Alert and oriented, thought content appropriate, normal insight  Capillary Refill: Brisk,3 seconds, normal   Peripheral Pulses: +2 palpable, equal bilaterally     Labs:     Recent Labs     01/10/23  0600   WBC 21.1*   HGB 9.0*   HCT 29.5*        Recent Labs     01/10/23  0600   *   K 4.6   CL 99   CO2 24   BUN 19   CREATININE 0.7   CALCIUM 9.8     No results for input(s): AST, ALT, BILIDIR, BILITOT, ALKPHOS in the last 72 hours. No results for input(s): INR in the last 72 hours. No results for input(s): Hamp Memory in the last 72 hours. Urinalysis:    Lab Results   Component Value Date/Time    NITRU Negative 01/05/2023 09:20 AM    WBCUA 0-2 09/16/2022 07:29 PM    BACTERIA 1+ 09/16/2022 07:29 PM    RBCUA 0-2 09/16/2022 07:29 PM    BLOODU Negative 01/05/2023 09:20 AM    SPECGRAV 1.018 01/05/2023 09:20 AM    GLUCOSEU Negative 01/05/2023 09:20 AM    GLUCOSEU NEGATIVE 05/21/2012 12:24 PM       Radiology: I have reviewed the radiology reports with the following interpretation:     XR KNEE LEFT (1-2 VIEWS)   Final Result   Revision of the total knee arthroplasty                    ASSESSMENT:    Active Hospital Problems    Diagnosis Date Noted    S/P revision of total knee, left [Z96.652] 01/09/2023     Priority: Medium    Infection of prosthetic left knee joint ([facility] [address] Yaakov Lara 01/09/2023     Priority: Medium       PLAN:  DM2 - controlled on home oral antiGlycemics/Insulin. Follow Lantus 30 at night and SSI  regimen. Continue metformin. Last HbA1c 5.3% dated 1/5/23. Anticipate resuming/continuing home regimen at discharge. HTN - w/out known CAD and no evidence of active signs/sxs of ischemia/failure. Currently controlled on home meds w/ vitals reviewed and documented as above. HyperLipidemia - controlled on home Statin.  Continue, w/ f/u and med adjustment w/ PCP    Left knee infection - Abx per ortho     DVT Prophylaxis: ASA ppx per ortho  Diet: ADULT DIET;  Regular; 4 carb choices (60 gm/meal)  Code Status: Full Code    PT/OT Eval Status: Active and ongoing    Dispo - Possibly tomorrow     Thank you for the consultation, will follow up as needed    Electronically signed by Gillian Smith DO on 1/10/23 at 1:41 PM EST

## 2023-01-11 ENCOUNTER — TELEPHONE (OUTPATIENT)
Dept: ORTHOPEDIC SURGERY | Age: 63
End: 2023-01-11

## 2023-01-11 VITALS
HEART RATE: 97 BPM | TEMPERATURE: 97.7 F | RESPIRATION RATE: 18 BRPM | DIASTOLIC BLOOD PRESSURE: 70 MMHG | OXYGEN SATURATION: 92 % | WEIGHT: 205 LBS | SYSTOLIC BLOOD PRESSURE: 136 MMHG | BODY MASS INDEX: 37.49 KG/M2

## 2023-01-11 LAB
BASOPHILS ABSOLUTE: 0.1 K/UL (ref 0–0.2)
BASOPHILS RELATIVE PERCENT: 0.4 %
EOSINOPHILS ABSOLUTE: 0.2 K/UL (ref 0–0.6)
EOSINOPHILS RELATIVE PERCENT: 0.7 %
GLUCOSE BLD-MCNC: 103 MG/DL (ref 70–99)
GLUCOSE BLD-MCNC: 123 MG/DL (ref 70–99)
HCT VFR BLD CALC: 30.2 % (ref 36–48)
HEMOGLOBIN: 9.5 G/DL (ref 12–16)
LYMPHOCYTES ABSOLUTE: 3.4 K/UL (ref 1–5.1)
LYMPHOCYTES RELATIVE PERCENT: 15.6 %
MCH RBC QN AUTO: 24.5 PG (ref 26–34)
MCHC RBC AUTO-ENTMCNC: 31.5 G/DL (ref 31–36)
MCV RBC AUTO: 77.9 FL (ref 80–100)
MONOCYTES ABSOLUTE: 1.4 K/UL (ref 0–1.3)
MONOCYTES RELATIVE PERCENT: 6.5 %
NEUTROPHILS ABSOLUTE: 16.7 K/UL (ref 1.7–7.7)
NEUTROPHILS RELATIVE PERCENT: 76.8 %
PDW BLD-RTO: 17.3 % (ref 12.4–15.4)
PERFORMED ON: ABNORMAL
PERFORMED ON: ABNORMAL
PLATELET # BLD: 292 K/UL (ref 135–450)
PMV BLD AUTO: 9.3 FL (ref 5–10.5)
RBC # BLD: 3.88 M/UL (ref 4–5.2)
WBC # BLD: 21.8 K/UL (ref 4–11)

## 2023-01-11 PROCEDURE — 6370000000 HC RX 637 (ALT 250 FOR IP): Performed by: PHYSICIAN ASSISTANT

## 2023-01-11 PROCEDURE — 97110 THERAPEUTIC EXERCISES: CPT

## 2023-01-11 PROCEDURE — 2580000003 HC RX 258: Performed by: PHYSICIAN ASSISTANT

## 2023-01-11 PROCEDURE — 6370000000 HC RX 637 (ALT 250 FOR IP): Performed by: SPECIALIST/TECHNOLOGIST

## 2023-01-11 PROCEDURE — 97116 GAIT TRAINING THERAPY: CPT

## 2023-01-11 PROCEDURE — 6370000000 HC RX 637 (ALT 250 FOR IP): Performed by: NURSE PRACTITIONER

## 2023-01-11 PROCEDURE — 97530 THERAPEUTIC ACTIVITIES: CPT

## 2023-01-11 PROCEDURE — 36415 COLL VENOUS BLD VENIPUNCTURE: CPT

## 2023-01-11 PROCEDURE — 85025 COMPLETE CBC W/AUTO DIFF WBC: CPT

## 2023-01-11 PROCEDURE — 97535 SELF CARE MNGMENT TRAINING: CPT

## 2023-01-11 RX ORDER — TRAMADOL HYDROCHLORIDE 50 MG/1
50-100 TABLET ORAL EVERY 6 HOURS PRN
Qty: 30 TABLET | Refills: 0 | Status: SHIPPED | OUTPATIENT
Start: 2023-01-11 | End: 2023-01-18

## 2023-01-11 RX ORDER — CELECOXIB 100 MG/1
100 CAPSULE ORAL 2 TIMES DAILY
Qty: 60 CAPSULE | Refills: 0 | Status: SHIPPED | OUTPATIENT
Start: 2023-01-12 | End: 2023-02-11

## 2023-01-11 RX ORDER — KETOROLAC TROMETHAMINE 30 MG/ML
15 INJECTION, SOLUTION INTRAMUSCULAR; INTRAVENOUS EVERY 6 HOURS PRN
Status: DISCONTINUED | OUTPATIENT
Start: 2023-01-11 | End: 2023-01-11 | Stop reason: HOSPADM

## 2023-01-11 RX ORDER — SULFAMETHOXAZOLE AND TRIMETHOPRIM 800; 160 MG/1; MG/1
1 TABLET ORAL EVERY 12 HOURS SCHEDULED
Qty: 60 TABLET | Refills: 2 | Status: SHIPPED | OUTPATIENT
Start: 2023-01-11 | End: 2023-04-10

## 2023-01-11 RX ORDER — ONDANSETRON 4 MG/1
4 TABLET, ORALLY DISINTEGRATING ORAL EVERY 8 HOURS PRN
Qty: 42 TABLET | Refills: 0 | Status: SHIPPED | OUTPATIENT
Start: 2023-01-11 | End: 2023-01-25

## 2023-01-11 RX ORDER — CELECOXIB 100 MG/1
100 CAPSULE ORAL 2 TIMES DAILY
Status: DISCONTINUED | OUTPATIENT
Start: 2023-01-12 | End: 2023-01-11 | Stop reason: HOSPADM

## 2023-01-11 RX ORDER — ASPIRIN 81 MG/1
81 TABLET ORAL 2 TIMES DAILY
Qty: 30 TABLET | Refills: 3 | Status: SHIPPED | OUTPATIENT
Start: 2023-01-11 | End: 2023-02-10

## 2023-01-11 RX ORDER — ASPIRIN 81 MG/1
81 TABLET ORAL 2 TIMES DAILY
Qty: 30 TABLET | Refills: 3 | Status: SHIPPED | OUTPATIENT
Start: 2023-01-11 | End: 2023-01-11 | Stop reason: SDUPTHER

## 2023-01-11 RX ORDER — ACETAMINOPHEN 325 MG/1
650 TABLET ORAL EVERY 6 HOURS
Qty: 240 TABLET | Refills: 0 | Status: SHIPPED | OUTPATIENT
Start: 2023-01-11 | End: 2023-01-11 | Stop reason: SDUPTHER

## 2023-01-11 RX ORDER — ONDANSETRON 4 MG/1
4 TABLET, ORALLY DISINTEGRATING ORAL EVERY 8 HOURS PRN
Qty: 42 TABLET | Refills: 0 | Status: SHIPPED | OUTPATIENT
Start: 2023-01-11 | End: 2023-01-11 | Stop reason: SDUPTHER

## 2023-01-11 RX ORDER — OXYCODONE HYDROCHLORIDE 5 MG/1
5 TABLET ORAL EVERY 6 HOURS PRN
Qty: 28 TABLET | Refills: 0 | Status: SHIPPED | OUTPATIENT
Start: 2023-01-11 | End: 2023-01-11 | Stop reason: SDUPTHER

## 2023-01-11 RX ORDER — CELECOXIB 100 MG/1
100 CAPSULE ORAL 2 TIMES DAILY
Qty: 60 CAPSULE | Refills: 3 | Status: SHIPPED | OUTPATIENT
Start: 2023-01-12 | End: 2023-01-11 | Stop reason: SDUPTHER

## 2023-01-11 RX ORDER — POLYETHYLENE GLYCOL 3350 17 G/17G
17 POWDER, FOR SOLUTION ORAL DAILY
Qty: 10 EACH | Refills: 0 | Status: SHIPPED | OUTPATIENT
Start: 2023-01-12 | End: 2023-01-11 | Stop reason: SDUPTHER

## 2023-01-11 RX ORDER — OXYCODONE HYDROCHLORIDE 5 MG/1
5 TABLET ORAL EVERY 6 HOURS PRN
Qty: 28 TABLET | Refills: 0 | Status: SHIPPED
Start: 2023-01-11 | End: 2023-01-11 | Stop reason: HOSPADM

## 2023-01-11 RX ORDER — POLYETHYLENE GLYCOL 3350 17 G/17G
17 POWDER, FOR SOLUTION ORAL DAILY
Qty: 10 EACH | Refills: 0 | Status: SHIPPED | OUTPATIENT
Start: 2023-01-12 | End: 2023-01-22

## 2023-01-11 RX ORDER — ACETAMINOPHEN 325 MG/1
650 TABLET ORAL EVERY 6 HOURS
Qty: 240 TABLET | Refills: 0 | Status: SHIPPED | OUTPATIENT
Start: 2023-01-11 | End: 2023-02-10

## 2023-01-11 RX ORDER — SULFAMETHOXAZOLE AND TRIMETHOPRIM 800; 160 MG/1; MG/1
1 TABLET ORAL EVERY 12 HOURS SCHEDULED
Qty: 60 TABLET | Refills: 2 | Status: SHIPPED | OUTPATIENT
Start: 2023-01-11 | End: 2023-01-11 | Stop reason: SDUPTHER

## 2023-01-11 RX ORDER — TRAMADOL HYDROCHLORIDE 50 MG/1
50 TABLET ORAL EVERY 6 HOURS PRN
Status: DISCONTINUED | OUTPATIENT
Start: 2023-01-11 | End: 2023-01-11 | Stop reason: HOSPADM

## 2023-01-11 RX ADMIN — SULFAMETHOXAZOLE AND TRIMETHOPRIM 1 TABLET: 800; 160 TABLET ORAL at 09:18

## 2023-01-11 RX ADMIN — ASPIRIN 81 MG: 81 TABLET, COATED ORAL at 09:18

## 2023-01-11 RX ADMIN — DIPHENHYDRAMINE HCL 50 MG: 25 TABLET ORAL at 14:48

## 2023-01-11 RX ADMIN — MELOXICAM 7.5 MG: 7.5 TABLET ORAL at 09:18

## 2023-01-11 RX ADMIN — Medication 10 ML: at 09:24

## 2023-01-11 RX ADMIN — LOSARTAN POTASSIUM 100 MG: 100 TABLET, FILM COATED ORAL at 09:20

## 2023-01-11 RX ADMIN — POLYETHYLENE GLYCOL 3350 17 G: 17 POWDER, FOR SOLUTION ORAL at 09:18

## 2023-01-11 RX ADMIN — ACETAMINOPHEN 325MG 650 MG: 325 TABLET ORAL at 09:18

## 2023-01-11 RX ADMIN — OXYCODONE 10 MG: 5 TABLET ORAL at 02:40

## 2023-01-11 RX ADMIN — AMLODIPINE BESYLATE 5 MG: 5 TABLET ORAL at 09:20

## 2023-01-11 RX ADMIN — OXYCODONE 10 MG: 5 TABLET ORAL at 10:21

## 2023-01-11 RX ADMIN — ACETAMINOPHEN 325MG 650 MG: 325 TABLET ORAL at 16:30

## 2023-01-11 RX ADMIN — ATORVASTATIN CALCIUM 10 MG: 10 TABLET, FILM COATED ORAL at 09:18

## 2023-01-11 RX ADMIN — Medication 2 TABLET: at 09:23

## 2023-01-11 RX ADMIN — METFORMIN HYDROCHLORIDE 1000 MG: 500 TABLET ORAL at 09:23

## 2023-01-11 RX ADMIN — ACETAMINOPHEN 325MG 650 MG: 325 TABLET ORAL at 03:51

## 2023-01-11 RX ADMIN — DIPHENHYDRAMINE HCL 50 MG: 25 TABLET ORAL at 03:51

## 2023-01-11 RX ADMIN — DIPHENHYDRAMINE HCL 50 MG: 25 TABLET ORAL at 10:21

## 2023-01-11 RX ADMIN — METFORMIN HYDROCHLORIDE 1000 MG: 500 TABLET ORAL at 16:30

## 2023-01-11 RX ADMIN — Medication 2 TABLET: at 16:30

## 2023-01-11 RX ADMIN — CETIRIZINE HYDROCHLORIDE 10 MG: 10 TABLET, FILM COATED ORAL at 09:18

## 2023-01-11 ASSESSMENT — PAIN DESCRIPTION - DESCRIPTORS
DESCRIPTORS: ACHING

## 2023-01-11 ASSESSMENT — PAIN SCALES - GENERAL
PAINLEVEL_OUTOF10: 10
PAINLEVEL_OUTOF10: 8
PAINLEVEL_OUTOF10: 6
PAINLEVEL_OUTOF10: 7
PAINLEVEL_OUTOF10: 9
PAINLEVEL_OUTOF10: 6

## 2023-01-11 ASSESSMENT — PAIN DESCRIPTION - ORIENTATION
ORIENTATION: LEFT
ORIENTATION: LEFT

## 2023-01-11 ASSESSMENT — PAIN DESCRIPTION - LOCATION
LOCATION: KNEE

## 2023-01-11 NOTE — PROGRESS NOTES
Discharge instructions reviewed with patient. Reveiwed all post op instructions from Dr Emil Luis. Pt to remove purvena dressing in 7 days, and replace with new dressing as provided. Pt educated on how to empty the hemovac drain and she demonstrated correctly. She will keep that in until she sees Dr Emil Luis in 7 days. All meds were called into her local pharmacy. All questions reviewed and pt verbalized understanding. All belongings with patient.

## 2023-01-11 NOTE — PROGRESS NOTES
Physical Therapy  Pt declined participation in a 2nd PT session this pm; states she is scheduled to leave in less than 2 hours and wants to defer PT to rest and then get prepared to d/c. Hold attempt and keep following for any changes.   Jose Enrique Kunz PTA

## 2023-01-11 NOTE — DISCHARGE SUMMARY
Department of Orthopedic Surgery  Physician Assistant   Discharge Summary    The Osvaldo Galeazzi is a 57 y.o. female underwent total knee replacement procedure without complication. Osvaldo Galeazzi was admitted to the floor following Her recovery in the PACU. Discharge Diagnosis  left revision Knee Replacement, stage II    Current Inpatient Medications    Current Facility-Administered Medications: [START ON 1/12/2023] celecoxib (CELEBREX) capsule 100 mg, 100 mg, Oral, BID  ketorolac (TORADOL) injection 15 mg, 15 mg, IntraVENous, Q6H PRN  traMADol (ULTRAM) tablet 50 mg, 50 mg, Oral, Q6H PRN  sulfamethoxazole-trimethoprim (BACTRIM DS;SEPTRA DS) 800-160 MG per tablet 1 tablet, 1 tablet, Oral, 2 times per day  diphenhydrAMINE (BENADRYL) tablet 50 mg, 50 mg, Oral, Q6H PRN **OR** diphenhydrAMINE (BENADRYL) injection 25 mg, 25 mg, IntraVENous, Q6H PRN  amLODIPine (NORVASC) tablet 5 mg, 5 mg, Oral, Daily  atorvastatin (LIPITOR) tablet 10 mg, 10 mg, Oral, Daily  calcium carb-cholecalciferol 250-3. 125 MG-MCG per tab 2 tablet, 2 tablet, Oral, BID  cetirizine (ZYRTEC) tablet 10 mg, 10 mg, Oral, Daily  insulin glargine (LANTUS) injection vial 30 Units, 30 Units, SubCUTAneous, Nightly  metFORMIN (GLUCOPHAGE) tablet 1,000 mg, 1,000 mg, Oral, BID WC  montelukast (SINGULAIR) tablet 10 mg, 10 mg, Oral, Nightly  losartan (COZAAR) tablet 100 mg, 100 mg, Oral, Daily  insulin lispro (HUMALOG) injection vial 7 Units, 0.08 Units/kg, SubCUTAneous, TID WC  insulin lispro (HUMALOG) injection vial 0-8 Units, 0-8 Units, SubCUTAneous, TID WC  insulin lispro (HUMALOG) injection vial 0-4 Units, 0-4 Units, SubCUTAneous, Nightly  glucose chewable tablet 16 g, 4 tablet, Oral, PRN  dextrose bolus 10% 125 mL, 125 mL, IntraVENous, PRN **OR** dextrose bolus 10% 250 mL, 250 mL, IntraVENous, PRN  glucagon (rDNA) injection 1 mg, 1 mg, SubCUTAneous, PRN  dextrose 10 % infusion, , IntraVENous, Continuous PRN  lactated ringers infusion, , IntraVENous, Continuous  sodium chloride flush 0.9 % injection 5-40 mL, 5-40 mL, IntraVENous, 2 times per day  sodium chloride flush 0.9 % injection 5-40 mL, 5-40 mL, IntraVENous, PRN  0.9 % sodium chloride infusion, , IntraVENous, PRN  acetaminophen (TYLENOL) tablet 650 mg, 650 mg, Oral, Q6H  polyethylene glycol (GLYCOLAX) packet 17 g, 17 g, Oral, Daily  senna (SENOKOT) tablet 8.6 mg, 1 tablet, Oral, Daily PRN  ondansetron (ZOFRAN-ODT) disintegrating tablet 4 mg, 4 mg, Oral, Q8H PRN **OR** ondansetron (ZOFRAN) injection 4 mg, 4 mg, IntraVENous, Q6H PRN  aspirin EC tablet 81 mg, 81 mg, Oral, BID    Post-operatively the patients diet was advanced as tolerated and their incision was checked on POD #1. The incision is dressing in place, clean, dry, and intact with no signs of infection. The patient remained neurovascularly intact in the lower extremity and had intact pulses distally. Patients calf remained soft and showed no evidence of DVT. The patient was able to move their leg and ankle/foot without any problems post-operatively. Physical therapy and occupational therapy were consulted and began working with the patient post-operatively. The patient progressed with PT/OT as would be expected and continued to improve through their stay. The patients pain was initially controlled with IV medications but we were able to transition to oral pain medications soon after arrival to the floor and their pain remained under good control through their hospital stay. From a medical standpoint the patient remained stable and continued to have the medicine team follow throughout their stay. The patients dressing was changed/incison was checked on day of d/c. The patient will be discharged at this time to Home  with their current diet restrictions and will continue to follow the total knee precautions outlined to them by us and PT/OT. Condition on Discharge: Stable    Plan  Return visit in 1 week. for drain removal.  Patient was instructed on the use of pain medications, the signs and symptoms of infection, and was given our number to call should they have any questions or concerns following discharge. For opioid prescriptions given at discharge the following statement is provided for compliance with [employer] rules. Patient being given increased dosage/quantity of opoid pain medication in excess of [employer] guidelines which noted a 30 MED daily of opioids due to the fact that he/she has undergone major orthopaedic surgery as outlined in rule 4731-11-13. Dosages and further duration of the pain medication will be re-evaluated at her post op visit in 2 weeks. Patient was educated on dosing expectations and limits of prescribing as a result of the new [facility] Board rules enacted August 31, 2017. Please also note that this is not the initial opoid prescription issued to this patient but a continuation of medication utilized during the hospital admission as noted in the medical record. [place]ARRS report has also been utilized to screen for any abuse history or suspicious activity as outlined in [place]. All efforts have been taken to prevent abuse potential and misuse of opioid medications including education, screening, and close clinical follow up.

## 2023-01-11 NOTE — CARE COORDINATION
Per discussion with nursing and ortho team at 1100 huddle, pt likely discharge ready today. Outpt PT appt tomorrow. NN from CM team. CM signing off. Consult if needs arise.

## 2023-01-11 NOTE — TELEPHONE ENCOUNTER
Appointment Request     Patient requesting earlier appointment: Yes [place]   Appointment offered to patient: Suzanne Villagomez  Patient Contact Number: [phone]     Pt WOULD LIKE TO SEE DR SNOW TO GET THE DRAINAGE TUBES OUT ASA AND WAS RECOMMENDED THAT SHE SEE DR Elissa Walden. Pt CAN'T GET A RIDE ALL THE WAY TO [place] AND IS ASKING IF SHE COULD BE SEEN @ THE [place] LOCATION WHEN SH HAS PT?  JAN 13, 17, OR 20. CURRENTLY, SHE KEPT THE [place] APPT ON 01/24/23. PLEASE CALL TO [facility] @ THE # ABOVE.

## 2023-01-11 NOTE — PROGRESS NOTES
Department of Orthopedic Surgery  Physician Assistant   Progress Note    Subjective:       Systemic or Specific Complaints: some pain today, has worked well with therapy. Feels OK to DC home, has OP PT appt tomorrow    Objective:     Patient Vitals for the past 24 hrs:   BP Temp Temp src Pulse Resp SpO2   01/11/23 0901 124/78 -- -- 92 -- 98 %   01/11/23 0740 124/78 97.7 °F (36.5 °C) Oral 92 18 98 %   01/11/23 0310 -- -- -- -- 18 --   01/11/23 0221 128/81 98 °F (36.7 °C) Oral 89 18 95 %   01/10/23 1937 112/79 97.4 °F (36.3 °C) Oral 96 18 96 %   01/10/23 1601 136/70 97.7 °F (36.5 °C) Oral 98 -- 100 %   01/10/23 1540 -- -- -- -- 18 --   01/10/23 1510 -- -- -- -- 18 --   01/10/23 1124 107/67 -- -- 100 -- 100 %   01/10/23 1046 -- -- -- -- 18 --   01/10/23 1016 -- -- -- -- 18 --       General: alert, appears stated age, cooperative, and no distress   Wound: Wound clean and dry no evidence of infection. post op dressing in place, no drainage to outside of dressing. Claude Alberta incisional vac functioning appropriately   Motion: Painful range of Motion in affected extremity   DVT Exam: No evidence of DVT seen on physical exam.  No cords or calf tenderness.      Additional exam:  Pt seen sitting up in bed at time of interview  Post op dressing well maintained, taken down for inspection  Leg in external rotation, able to correct to neutral  Thigh mildly swollen, compartments soft and compressible  EHL FHL gastroc ant tib motor intact  DP and PT pulse 2+, foot WWP  Sensation intact to light touch    Data Review  CBC:   Lab Results   Component Value Date/Time    WBC 21.8 01/11/2023 05:49 AM    RBC 3.88 01/11/2023 05:49 AM    HGB 9.5 01/11/2023 05:49 AM    HCT 30.2 01/11/2023 05:49 AM     01/11/2023 05:49 AM       Renal:   Lab Results   Component Value Date/Time     01/10/2023 06:00 AM    K 4.6 01/10/2023 06:00 AM    CL 99 01/10/2023 06:00 AM    CO2 24 01/10/2023 06:00 AM    BUN 19 01/10/2023 06:00 AM    CREATININE 0.7 01/10/2023 06:00 AM    GLUCOSE 243 01/10/2023 06:00 AM    CALCIUM 9.8 01/10/2023 06:00 AM       mL      Assessment:     S/p left revision total knee arthroplasty, stage II for infection. Postop day 2  Placement of incisional wound VAC  Hemovac accordion drain 100 cc output  Abductor canal block  Date of surgery 1/9/2020. By Dr. Kevon Hernandez:      1: Weightbearing as tolerated to the left lower extremity with assistive device. Encourage flexion and extension of the knee when sitting and when in bed, no need for knee immobilizer at this time. 3 months extended course p.o. antibiotics at discharge  2:  Continue Deep venous thrombosis prophylaxis aspirin 81 mg twice daily for 4 weeks postop  3:  Continue Pain Control-scheduled Tylenol  4: Operative cultures taken-no organisms seen on gram stain, no growth to date  5: 2 doses IV Ancef completed postoperatively. IV vancomycin completed postop  6: PT/OT eval recommending home with assist PRN, OP PT  7: planning to DC home, DCP updated.  Pt has all DME    OLIVIA Koroma

## 2023-01-11 NOTE — PROGRESS NOTES
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 A diagnosis of Infection of prosthetic knee joint, initial encounter [facility]) was also pertinent to this visit. Assessment    Assessment: Pt tolerated therapy well this date and is making good progress w/ therapy. Pt seen today to focus on ADLs in prep for d/c. Pt able to complete total body dressing at EOB w/ SPV and set-up and func mob/transfers w/ SPV grossly. Pt ruminating on feeling \"so itchy\" throughout session and needing to speak with PA to address medications; RN notified and aware. Pt reported having increased pain from previous session despite req decreased assist. Pt cont to present below her PLOF and benefits from inpatient OT services. Cont to rec home w/ PRN and OPOT at d/c.   Activity Tolerance: Patient tolerated treatment well;Patient limited by pain  Discharge Recommendations: Home with assist PRN;Outpatient OT  Equipment Needed: No     AM-PAC Daily Activity Inpatient   How much help for putting on and taking off regular lower body clothing?: None  How much help for Bathing?: A Little  How much help for Toileting?: A Little  How much help for putting on and taking off regular upper body clothing?: None  How much help for taking care of personal grooming?: None  How much help for eating meals?: None  AM-PAC Inpatient Daily Activity Raw Score: 22  AM-PAC Inpatient ADL T-Scale Score : 47.1  ADL Inpatient CMS 0-100% Score: 25.8  ADL Inpatient CMS G-Code Modifier : CJ      Plan   Occupational Therapy Plan  Times Per Week: 1 more sessions  Current Treatment Recommendations: Strengthening;Balance training;Functional mobility training; Endurance training;Gait training;Patient/Caregiver education & training;Positioning;Pain management; Safety education & training;Self-Care / ADL; Home management training     Restrictions  Restrictions/Precautions  Restrictions/Precautions: Weight Bearing; Fall Risk  Lower Extremity Weight Bearing Restrictions  Left Lower Extremity Weight Bearing: Weight Bearing As Tolerated  Position Activity Restriction  Other position/activity restrictions: \"Ambulate in room progressing to hallway with assistive device four times daily (including PT) when PT advises. 2)  Bathroom privileges with assistance. 3) Up in bedside chair at least TID as tolerated. \", incisional wound vac/drain    Subjective   Subjective  Subjective: Pt seated in bed and agreeable to OT tx session  Pain: pt endorsing 6/10 pain in L knee  Orientation  Overall Orientation Status: Within Normal Limits  Orientation Level: Oriented X4  Pain: pt endorsing 6/10 in L knee  Cognition  Overall Cognitive Status: WNL        Objective    Vitals  Vitals  Heart Rate: 93  Heart Rate Source: Monitor  BP: 136/70  BP Location: Right upper arm  BP Method: Automatic  Patient Position: Sitting  MAP (Calculated): 92  SpO2: 97 %  O2 Device: None (Room air)    Bed Mobility Training  Bed Mobility Training: Yes  Interventions: Verbal cues; Safety awareness training  Supine to Sit: Additional time;Modified independent  Sit to Supine: Other (comment) (pt in chair at EOS)  Scooting: Supervision; Additional time  Balance  Sitting: Intact  Standing: Impaired  Standing - Static: Constant support;Good  Standing - Dynamic: Constant support;Good  Transfer Training  Transfer Training: Yes  Interventions: Verbal cues; Safety awareness training  Sit to Stand: Adaptive equipment; Additional time;Supervision (up to SW)  Stand to Sit: Adaptive equipment;Supervision; Additional time (w/ SW)  Bed to Chair: Adaptive equipment; Additional time;Supervision (w/ SW)  Gait Training  Gait Training: Yes  Left Side Weight Bearing: As tolerated  Gait  Overall Level of Assistance: Adaptive equipment; Additional time;Supervision (w/ SW)  Interventions: Safety awareness training;Verbal cues       ADL  Grooming: Setup;Supervision (in stance at sink)  Grooming Skilled Clinical Factors: oral care  UE Dressing: Supervision;Setup (seated at EOB)  UE Dressing Skilled Clinical Factors: doff gown, don bra and shirt  LE Dressing: Supervision;Setup; Increased time to complete (seated at EOB)  LE Dressing Skilled Clinical Factors: doff brief; don underwear, pants, socks and shoes  Toileting: Stand by assistance  Toileting Skilled Clinical Factors: A for balance during valentino care in stance          Safety Devices  Type of Devices: Bed alarm in place;Gait belt;Nurse notified; Chair alarm in place;Call light within reach; All fall risk precautions in place; Left in chair;Patient at risk for falls  Restraints  Restraints Initially in Place: No     Patient Education  Education Given To: Patient  Education Provided: Role of Therapy;Plan of Care;Precautions;IADL Safety;Transfer Training;ADL Adaptive Strategies; Fall Prevention Strategies  Education Method: Demonstration;Verbal  Barriers to Learning: None  Education Outcome: Verbalized understanding;Continued education needed    Disease Specific Education: Pt educated on weight bearing status, post-op precautions, appropriate DME, and safe mobility with AD.  Pt verbalized understanding    Goals  Short Term Goals  Time Frame for Short Term Goals: 2 more sessions or 1 week (1/17/2023) unless otherwise noted  Short Term Goal 1: Pt will complete all func mob/transfers w/ SPV and LRAD (1/15) - goal met 1/11  Short Term Goal 2: Pt will complete toileting w/ SPV  Short Term Goal 3: Pt will complete total body dressing w/ SBA and set-up - goal met 1/11  Short Term Goal 4: Pt will complete x10 BUE therex to increase UE strength and endurance req for functional transfers  Patient Goals   Patient goals : \"I want to be able to get around by myself (IND)\"       Therapy Time   Individual Concurrent Group Co-treatment   Time In 1508         Time Out 1548         Minutes 40         Timed Code Treatment Minutes: 40 Minutes     If pt is unable to be seen after this session, please let this note serve as discharge summary. Please see case management note for discharge disposition. Thank you.      Hawa Claire, OTR/L

## 2023-01-11 NOTE — PROGRESS NOTES
Physical Therapy  Facility/Department: [facility] C5 - MED SURG/ORTHO  Daily Treatment Note  NAME: Danna Perkins  : 1960  MRN: [id number]    Date of Service: 2023    Discharge Recommendations:  Home with assist PRN, Outpatient PT   PT Equipment Recommendations  Equipment Needed: No  [facility] 6 Clicks Inpatient Mobility:  AM-PAC Mobility Inpatient   How much difficulty turning over in bed?: None  How much difficulty sitting down on / standing up from a chair with arms?: None  How much difficulty moving from lying on back to sitting on side of bed?: None  How much help from another person moving to and from a bed to a chair?: None  How much help from another person needed to walk in hospital room?: None  How much help from another person for climbing 3-5 steps with a railing?: A Lot  AM-PAC Inpatient Mobility Raw Score : 22  AM-PAC Inpatient T-Scale Score : 53.28  Mobility Inpatient CMS 0-100% Score: 20.91  Mobility Inpatient CMS G-Code Modifier : CJ    Patient Diagnosis(es): The primary encounter diagnosis was Chronic infection of knee joint prosthesis, subsequent encounter. A diagnosis of Infection of prosthetic knee joint, initial encounter [facility]) was also pertinent to this visit. Assessment   Assessment: Pt progressing well towards PT goals; grossly Supervision with RW for transfers and ambulation. Pt able to perform 10-12 reps of BLE exercises with cues. Pt would benefit from continued skilled PT to address current deficits and maximize independence. Recommend home with prn assist and OP PT upon d/c  Activity Tolerance: Patient tolerated treatment well  Equipment Needed: No     Plan    Physcial Therapy Plan  General Plan: 2 times a day 7 days a week  Current Treatment Recommendations: Strengthening;Balance training;Functional mobility training;Transfer training;ROM;Gait training;Stair training;Neuromuscular re-education; Endurance training;Pain management; Safety education & training;Home exercise program;Patient/Caregiver education & training; Therapeutic activities     Restrictions  Restrictions/Precautions  Restrictions/Precautions: Weight Bearing, Fall Risk  Lower Extremity Weight Bearing Restrictions  Left Lower Extremity Weight Bearing: Weight Bearing As Tolerated  Position Activity Restriction  Other position/activity restrictions: \"Ambulate in room progressing to hallway with assistive device four times daily (including PT) when PT advises. 2)  Bathroom privileges with assistance. 3) Up in bedside chair at least TID as tolerated. \", IV, incisional wound vac/drain     Subjective    Subjective  Subjective: Pt long sitting in bed upon arrival, agreeable to PT tx. Pain: 7/10; L Knee  Orientation  Overall Orientation Status: Within Normal Limits     Objective   Vitals  Heart Rate: 92  BP: 124/78  BP Location: Left lower arm  MAP (Calculated): 93  SpO2: 98 %  O2 Device: None (Room air)  Bed Mobility Training  Bed Mobility Training: Yes  Interventions: Verbal cues; Safety awareness training  Supine to Sit: Additional time;Modified independent  Sit to Supine: Additional time;Modified independent  Scooting: Supervision; Additional time  Balance  Sitting: Intact  Standing: Impaired (walker)  Standing - Static: Constant support;Good  Standing - Dynamic: Constant support;Good  Transfer Training  Transfer Training: Yes  Interventions: Verbal cues; Safety awareness training  Sit to Stand: Adaptive equipment; Additional time;Supervision  Stand to Sit: Adaptive equipment; Additional time;Supervision  Gait Training  Gait Training: Yes  Left Side Weight Bearing: As tolerated  Gait  Overall Level of Assistance: Additional time; Adaptive equipment;Supervision  Interventions: Safety awareness training;Verbal cues  Speed/María: Pace decreased (< 100 feet/min); Shuffled; Slow  Step Length: Right shortened  Stance: Left decreased  Gait Abnormalities: Decreased step clearance; Antalgic  Distance (ft): 100 Feet (x2)  Assistive Device: Gait belt;Walker, rolling     PT Exercises  Exercise Treatment: Pt perform seated LAQ, dorsiflexion, plantar flexion and supine hip abduction, SLR, heel slides, quad sets all Bx10-12 reps with cues for proper form     Safety Devices  Type of Devices: Bed alarm in place; Left in bed;Gait belt;Nurse notified       Goals  Short Term Goals  Time Frame for Short Term Goals: 7 days (1/17/23) unless otherwise noted  Short Term Goal 1: Pt will perform bed mobility with mod I  -1/11 met  Short Term Goal 2: Pt will perform transfers with supervision and RW   -1/11 met  Short Term Goal 3: Pt will ambulate 50 ft with RW and supervision   -1/11 100' walker supervision  Short Term Goal 4: Pt will perform 12-15 reps of BLE exercises with handout provided by 1/13/23   -1/11 progressing  Patient Goals   Patient Goals : \"to walk with RW and a little help\"--1/10 Goal Met    Education  Patient Education  Education Given To: Patient  Education Provided: Role of Therapy;Plan of Care;Home Exercise Program;Precautions; Equipment;Transfer Training  Education Method: Verbal;Printed Information/Hand-outs  Barriers to Learning: None  Education Outcome: Verbalized understanding;Demonstrated understanding;Continued education needed    Therapy Time   Individual Concurrent Group Co-treatment   Time In 0815         Time Out 0910         Minutes 55         Timed Code Treatment Minutes: 12[address] [facility] [place]

## 2023-01-11 NOTE — PROGRESS NOTES
Pt very welcoming and talked about her brett, her surgeries and recovery. Pt has a very deep brett in god and is well supported by her brett community, her son and other family members. Likes to read and talk scriptures with people especially people with similar brett. Writer offered listening presence and prayer with pt at the end of visit. Spiritual care will continue to follow prn.

## 2023-01-11 NOTE — PLAN OF CARE
Problem: Chronic Conditions and Co-morbidities  Goal: Patient's chronic conditions and co-morbidity symptoms are monitored and maintained or improved  1/10/2023 1918 by Durga Bolden RN  Outcome: Progressing  1/10/2023 0824 by Artie Mayorga RN  Outcome: Progressing     Problem: Discharge Planning  Goal: Discharge to home or other facility with appropriate resources  1/10/2023 1918 by Durga Bolden RN  Outcome: Progressing  1/10/2023 0824 by Artie Mayorga RN  Outcome: Progressing     Problem: Pain  Goal: Verbalizes/displays adequate comfort level or baseline comfort level  1/10/2023 1918 by Durga Bolden RN  Outcome: Progressing  1/10/2023 0824 by Artie Mayorga RN  Outcome: Progressing

## 2023-01-11 NOTE — PLAN OF CARE
Problem: Pain  Goal: Verbalizes/displays adequate comfort level or baseline comfort level  1/10/2023 2311 by Briana Gibbs, RN  Note: Resting quietly with eyes closed, respirations easy and regular. No signs of discomfort noted. Call light remains in reach.     1/10/2023 1918 by Paty High, RN  Outcome: Progressing

## 2023-01-12 ENCOUNTER — CARE COORDINATION (OUTPATIENT)
Dept: CASE MANAGEMENT | Age: 63
End: 2023-01-12

## 2023-01-12 ENCOUNTER — TELEPHONE (OUTPATIENT)
Dept: ORTHOPEDIC SURGERY | Age: 63
End: 2023-01-12

## 2023-01-12 NOTE — TELEPHONE ENCOUNTER
General Question     Subject: DRAIN IS LEAKING  Patient and /or Facility Request: Lashonda Rogers  Contact Number: [phone]    THE PT HAD SX ON Monday, ON LEFT KNEE, AND ONE OF THE DRAINS IS LEAKING DOWN HER LEG FROM THE POINT OF ENTRY

## 2023-01-12 NOTE — CARE COORDINATION
[facility] Care Transitions Initial Follow Up Call    Call within 2 business days of discharge: Yes    Care Transition Nurse contacted the patient by telephone to perform post hospital discharge assessment. Verified name and  with patient as identifiers. Provided introduction to self, and explanation of the Care Transition Nurse role. Patient: Cabrera Kirby Patient : 1960   MRN: [id number]  Reason for Admission: chronic infection of knee joint prosthesis  Discharge Date: 23 RARS: Readmission Risk Score: 11      Last Discharge [address]       Date Complaint Diagnosis Description Type Department Provider    23  Chronic infection of knee joint prosthesis, subsequent encounter . .. Admission (Discharged) Carla Licona MD            Was this an external facility discharge? No Discharge Facility:     Challenges to be reviewed by the provider   Additional needs identified to be addressed with provider: No  none               Method of communication with provider: none. Attempted to reach patient via phone for initial post hospital transition call. Message received \"mailbox is full and can not accept any messages at this time\"    Patient returned call to CTN. Pleasant and agreeable to transition call. Stated that she is happy to be back home. Discussed knee pain and management of pain at length. Stated that she took her medication on an empty stomach then ate a yogurt, but still she vomited. CTN reviewed with patient that eating before taking medications helps prevent the nausea and stomach upset. Discussed recent hospitalization and confirmed that she has AVS. CTN reviewing medications and she remembered that they didn't  her antibiotic. Patient had to end call with CTN because she had someone that could get her prescription and needed to call them right away. Patient ended call with CTN prior to medication reconciliation.        Care Transition Nurse reviewed discharge instructions with patient who verbalized understanding. The patient was given an opportunity to ask questions and does not have any further questions or concerns at this time. Were discharge instructions available to patient? Yes. Reviewed appropriate site of care based on symptoms and resources available to patient including: PCP  Specialist. The patient agrees to contact the PCP office for questions related to their healthcare. Advance Care Planning:   Does patient have an Advance Directive:  unable to discuss at initial call . Medication reconciliation was NOT performed with patient, who verbalizes understanding of administration of home medications.  Medications reviewed, 1111F entered: no, patient abruptly ended call and CTN was unable to complete    Was patient discharged with a pulse oximeter? no    Non-face-to-face services provided:  Obtained and reviewed discharge summary and/or continuity of care documents    Offered patient enrollment in the Remote Patient Monitoring (RPM) program for in-home monitoring: NA.    Care Transitions 24 Hour Call    Do you have a copy of your discharge instructions?: Yes  Do you have all of your prescriptions and are they filled?: Yes (Comment: reviewed 11.11.22)  Have you been contacted by a [employer] [address]?: No  Have you scheduled your follow up appointment?: Yes  How are you going to get to your appointment?: Car - family or friend to transport  Patient DME: Straight cane  Do you have support at home?: Alone  Do you feel like you have everything you need to keep you well at home?: Yes  Are you an active caregiver in your home?: No  Care Transitions Interventions         Follow Up  Future Appointments   Date Time Provider Gabriel Case   1/12/2023  2:30 PM MILLIE Cardoza PT   1/17/2023 10:00 AM MILLIE Cardoza PT   1/18/2023  8:00 AM Chaka Mcbride PA-C [facility]   1/20/2023 10:45 AM Roman Page Memos LINDA SEYMOUR 1/24/2023 10:00 AM Lucrodney Samaniego, PT [place] PT [facility]   1/24/2023 11:15 AM Maru Castanon MD AND ORTH[facility]   1/26/2023 10:45 AM Lucendia Belt, PT [place] PT [facility]   1/31/2023 10:45 AM Ama Cagle, PT [place] PT [facility]   2/2/2023 10:45 AM Ama Cagle, PT [facility] PT [facility]       Care Transition Nurse provided contact information. Plan for follow-up call in 1-2 days based on severity of symptoms and risk factors.   Plan for next call: symptom management-was prescriptions picked up    Carson Rivera RN

## 2023-01-12 NOTE — TELEPHONE ENCOUNTER
Did speak with patient regarding issue with drain leaking.I did reach out to Racquel. Patient is aware she will be getting a return call.

## 2023-01-12 NOTE — TELEPHONE ENCOUNTER
Spoke with Chapito Evangelista from [facility], she called regarding the need for a OT eval sign off/order. She state the patient has been waiting over 30 days. Chapito Evangelista said if someone could give her a call back regarding this issue she would appreciate it.  Contact number [phone]

## 2023-01-13 ENCOUNTER — OFFICE VISIT (OUTPATIENT)
Dept: ORTHOPEDIC SURGERY | Age: 63
End: 2023-01-13

## 2023-01-13 ENCOUNTER — CARE COORDINATION (OUTPATIENT)
Dept: CASE MANAGEMENT | Age: 63
End: 2023-01-13

## 2023-01-13 VITALS — BODY MASS INDEX: 37.73 KG/M2 | HEIGHT: 62 IN | WEIGHT: 205 LBS

## 2023-01-13 DIAGNOSIS — Z96.652 HISTORY OF TOTAL LEFT KNEE REPLACEMENT: Primary | ICD-10-CM

## 2023-01-13 PROCEDURE — 99024 POSTOP FOLLOW-UP VISIT: CPT | Performed by: ORTHOPAEDIC SURGERY

## 2023-01-13 NOTE — CARE COORDINATION
[facility] Care Transitions Follow Up Call    Patient: Karthik Zaragoza  Patient : 1960   MRN: [id number]  Reason for Admission:   Discharge Date: 23 RARS: Readmission Risk Score: 11    Attempted to reach patient via phone for transition call. No option to l/m due to mailbox full.  Text message received that patient will call back        Follow Up  Future Appointments   Date Time Provider Gabriel Case   2023  8:00 AM Magdalena Silva PA-C [facility]   2023 10:00 AM Casa Morton, PT Yvonne Brooks PT [facility]   2023 11:15 AM Power Walton MD AND ORTHO [facility]   2023 10:45 AM MILLIE Spencer PT [facility]   2023 10:45 AM Casa Morton, PT LINDA SERRANO PT [facility]   2023 10:45 AM Missy Rendon, PT LINDA SERRANO PT [facility]      Care Transitions Subsequent and Final Call    Subsequent and Final Calls  Care Transitions Interventions  Other Interventions:             Tommy Olmos RN

## 2023-01-13 NOTE — PROGRESS NOTES
Dr Lavelle Pina      Date /Time 1/13/2023       9:56 AM EST  Name Dennie Pesa             1960   Location  [facility]  MRN [id number]                Chief Complaint   Patient presents with    Follow-up     REV Left TKA Surgery 01/09/2023        History of Present Illness      Dennie Pesa is a 57 y.o. female is here for post-op visit after LEFT  45938 Revision Total Knee Arthroplasty    Patient is 5 days status post left revision total knee arthroplasty for infection. She called yesterday with drainage after she partially pulled out the drain. She states that the drain came out the rest of the way this morning when she was putting on her pants. The drainage then increased out of the drain hole but nothing from the incision. She denies any fever or chills. Physical Exam    Based off 1997 Exam Criteria    Ht 5' 2\" (1.575 m)   Wt 205 lb (93 kg)   LMP 01/26/1996   BMI 37.49 kg/m²      Constitutional:       General: He is not in acute distress. Appearance: Normal appearance. LEFT Knee: incision clean, intact, healing appropriately. No surrounding  erythema or fluctuance. Neuro intact distal. No evidence of DVT. Bloody serous drainage from drain hole. Imaging             Assessment and Plan    Juan Lawson was seen today for follow-up. Diagnoses and all orders for this visit:    History of total left knee replacement      The drainage has stopped before she left. Gricel plus left intact. New dressing applied over the drain site. She will keep her scheduled appointment this Wednesday for removal of Prevena floss and conversion to normal dressings and for wound check. Please take x-rays at that time. Electronically signed by Lavelle Pina MD on 1/13/2023 at 9:56 AM  This dictation was generated by voice recognition computer software. Although all attempts are made to edit the dictation for accuracy, there may be errors in the transcription that are not intended.

## 2023-01-14 LAB
ANAEROBIC CULTURE: NORMAL
CULTURE SURGICAL: NORMAL
GRAM STAIN RESULT: NORMAL

## 2023-01-17 ENCOUNTER — CARE COORDINATION (OUTPATIENT)
Dept: CASE MANAGEMENT | Age: 63
End: 2023-01-17

## 2023-01-17 NOTE — CARE COORDINATION
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 \"mailbox is full and can not accept any messages at this time. \"    Follow Up  Future Appointments   Date Time Provider Gabriel Case   2023  8:00 AM Sherryle China, PA-C [facility]   2023 10:00 AM Cathaleen Shackleton, PT Francisco Primes PT [facility]   2023 11:15 AM Jason Poole MD AND ORTHO [facility]   2023 10:45 AM Cathaleen Shackleton, PT Francisco Primes PT [facility]   2023 10:45 AM Cathaleen Shackleton, PT [facility]   2023 10:45 AM Hola Burrows, PT [facility] PT [facility]     Care Transitions Subsequent and Final Call    Subsequent and Final Calls  Care Transitions Interventions  Other Interventions:             Rhett Fatima RN

## 2023-01-18 ENCOUNTER — OFFICE VISIT (OUTPATIENT)
Dept: ORTHOPEDIC SURGERY | Age: 63
End: 2023-01-18
Payer: MEDICARE

## 2023-01-18 VITALS — WEIGHT: 205 LBS | BODY MASS INDEX: 37.73 KG/M2 | HEIGHT: 62 IN

## 2023-01-18 DIAGNOSIS — Z96.652 HISTORY OF TOTAL LEFT KNEE REPLACEMENT: Primary | ICD-10-CM

## 2023-01-18 PROCEDURE — 99024 POSTOP FOLLOW-UP VISIT: CPT | Performed by: PHYSICIAN ASSISTANT

## 2023-01-18 PROCEDURE — 20611 DRAIN/INJ JOINT/BURSA W/US: CPT | Performed by: PHYSICIAN ASSISTANT

## 2023-01-18 NOTE — PROGRESS NOTES
Dr Nolan Lopez      Date /Time 1/18/2023       9:56 AM EST  Name Ioana Henry             1960   [facility]  [facility]  MRN [id number]                Chief Complaint   Patient presents with    Follow-up     Revision Left TKA 01/0/2023        History of Present Illness      Ioana eHnry is a 57 y.o. female is here for post-op visit after LEFT  07482 Revision Total Knee Arthroplasty    Patient is 5 days status post left revision total knee arthroplasty for infection. Last week we did remove the patient's drain and rested away. She was placed in a compressive dressing but the patient removed it despite us informing her to keep it on at all times. She is here and the drainage is stopped. She denies any fever or chills. Physical Exam    Based off 1997 Exam Criteria    Ht 5' 2\" (1.575 m)   Wt 205 lb (93 kg)   LMP 01/26/1996   BMI 37.49 kg/m²      Constitutional:       General: He is not in acute distress. Appearance: Normal appearance. LEFT Knee: incision clean, intact, healing appropriately. No surrounding  erythema or fluctuance. Neuro intact distal. No evidence of DVT. Large knee joint effusion    Imaging       X-rays were ordered and reviewed of the left knee. 3 views. AP, lateral, and skyline views. They demonstrate a left total knee arthroplasty in good position. No evidence of loosening or periprosthetic fracture. Assessment and Plan    Mayra Millard was seen today for follow-up. Diagnoses and all orders for this visit:    History of total left knee replacement  -     XR KNEE LEFT (3 VIEWS); Future      The drainage has stopped before she left. But patient has a large knee joint effusion superficial to the capsule. I have drained the fluid sterilely today. She is placed in a compressive knee sleeve but informed that she has to keep this on at all times. We will see the patient back in 2 weeks or sooner if problems arise.   She should restart physical therapy with a concentration on range of motion. I discussed in detail the risks, benefits, and complications of an aspiration/injection which included but is not limited to infection, skin reactions, hot swollen joints, and anaphylaxis with the patient. The patient verbalized good understanding and gave informed consent for the left knee procedure. The patient was placed in the supine position on the exam table and the skin was prepped using sterile alcohol solution. The superiolateral aspect of the left knee was prepped and draped with sterile technique. The skin and subcutaneous tissues were anesthestized with 5 mL of 1% lidocaine, injected with a 22-gauge needle. After approximately 5 minutes, an 18-gauge needle attached to a 60-ml syringe was inserted into the knee. Then, 110 ml of dark red blood was aspirated. The syringe was removed and the puncture site sealed with a Band-Aid. Technique: Under sterile conditions a [employer] or Steal ultrasound unit with a variable frequency (6.0-15.0 MHz) linear transducer was used to localize the placement of a 22-gauge needle into the Knee. Findings: Successful needle placement for Knee Aspiration/injection. Final images were taken and saved for permanent record. The patient tolerated the injection well. The patient was instructed to call the office immediately if there is any pain, redness, warmth, fever, or chills. Electronically signed by Nuria Castro PA-C on 1/18/2023 at 8:34 AM  This dictation was generated by voice recognition computer software. Although all attempts are made to edit the dictation for accuracy, there may be errors in the transcription that are not intended.

## 2023-01-20 ENCOUNTER — TELEPHONE (OUTPATIENT)
Dept: ORTHOPEDIC SURGERY | Age: 63
End: 2023-01-20

## 2023-01-20 ENCOUNTER — OFFICE VISIT (OUTPATIENT)
Dept: ORTHOPEDIC SURGERY | Age: 63
End: 2023-01-20

## 2023-01-20 VITALS — WEIGHT: 205 LBS | HEIGHT: 62 IN | BODY MASS INDEX: 37.73 KG/M2

## 2023-01-20 DIAGNOSIS — Z96.652 HISTORY OF TOTAL LEFT KNEE REPLACEMENT: Primary | ICD-10-CM

## 2023-01-20 DIAGNOSIS — E66.01 SEVERE OBESITY (BMI 35.0-39.9) WITH COMORBIDITY (HCC): ICD-10-CM

## 2023-01-20 NOTE — PROGRESS NOTES
Dr Diana Friedman      Date /Time 1/20/2023       9:56 AM EST  Name Jakob Figueroa             1960   [facility]  MRN [id number]                Chief Complaint   Patient presents with    Follow-up     Rev Left TKA 01/09/2023        History of Present Illness      Jakob Figueroa is a 57 y.o. female is here for post-op visit after LEFT  83748 Revision Total Knee Arthroplasty    Patient is 5 days status post left revision total knee arthroplasty for infection. Patient was seen just this Wednesday. There was 110 cc of fluid drawn off of her knee. She is placed into a knee sleeve. She is here today stating that the knee sleeve is too tight for her. My physician assistant did see her on Wednesday and drained her knee. Physical Exam    Based off 1997 Exam Criteria    Ht 5' 2\" (1.575 m)   Wt 205 lb (93 kg)   LMP 01/26/1996   BMI 37.49 kg/m²      Constitutional:       General: He is not in acute distress. Appearance: Normal appearance. LEFT Knee: incision clean, intact, healing appropriately. No surrounding  erythema or fluctuance. Neuro intact distal. No evidence of DVT. Large knee joint effusion    Imaging       Previous X-rays were ordered and reviewed of the left knee. 3 views. AP, lateral, and skyline views. They demonstrate a left total knee arthroplasty in good position. No evidence of loosening or periprosthetic fracture. Assessment and Plan    Terris Canavan was seen today for follow-up. Diagnoses and all orders for this visit:    History of total left knee replacement    Severe obesity (BMI 35.0-39. 9) with comorbidity ([address]        I do believe some of her effusion has returned. She states she is wearing her brace but I am doubtful that she is wearing it all the time with the reoccurrence of swelling. I have encouraged her to wear it is much as possible. I do not feel it is appropriate to remove her staples at this time. New dressing applied. Follow-up in 1 week. Patient is to call sooner if any problems arise especially drainage or redness. Electronically signed by Flor Hope PA-C on 1/20/2023 at 11:13 AM  This dictation was generated by voice recognition computer software. Although all attempts are made to edit the dictation for accuracy, there may be errors in the transcription that are not intended.

## 2023-01-24 ENCOUNTER — HOSPITAL ENCOUNTER (OUTPATIENT)
Dept: PHYSICAL THERAPY | Age: 63
Setting detail: THERAPIES SERIES
Discharge: HOME | End: 2023-01-24

## 2023-01-24 DIAGNOSIS — I10 BENIGN ESSENTIAL HTN: Chronic | ICD-10-CM

## 2023-01-24 DIAGNOSIS — E78.2 HYPERLIPIDEMIA, MIXED: ICD-10-CM

## 2023-01-24 RX ORDER — LOSARTAN POTASSIUM 100 MG/1
TABLET ORAL
Qty: 60 TABLET | Refills: 5 | Status: SHIPPED | OUTPATIENT
Start: 2023-01-24

## 2023-01-24 RX ORDER — ATORVASTATIN CALCIUM 10 MG/1
TABLET, FILM COATED ORAL
Qty: 90 TABLET | Refills: 1 | Status: SHIPPED | OUTPATIENT
Start: 2023-01-24

## 2023-01-24 NOTE — TELEPHONE ENCOUNTER
Pt calling saying she asked [facility] [phone] to call last week for refill on Amlodipine.     Last ov 12-28-22 HC  No future ov

## 2023-01-24 NOTE — FLOWSHEET NOTE
Psychiatric and [address],  [facility], [facility] [address]                  [address] [facility],[address]        Arlene Fierro [place]  Phone: [phone]  Fax: [phone]        Physical Therapy  Cancellation/No-show Note  Patient Name:  Isaura Tabor  :  1960   Date:  2023  Cancelled visits to date: 0  No-shows to date: 2    For today's appointment patient:  []  Cancelled  []  Rescheduled appointment  [x]  No-show     Reason given by patient:  []  Patient ill  []  Conflicting appointment  []  No transportation    []  Conflict with work  [x]  No reason given  []  Other:     Comments:      Electronically signed by:  Rox Harris PT

## 2023-01-26 ENCOUNTER — HOSPITAL ENCOUNTER (OUTPATIENT)
Dept: PHYSICAL THERAPY | Age: 63
Setting detail: THERAPIES SERIES
Discharge: HOME | End: 2023-01-26

## 2023-01-26 NOTE — FLOWSHEET NOTE
[facility] and [address],  [facility], [facility] [address]                  [address] [facility],[address]        Arlene Fierro  Phone: [phone]  Fax: [phone]        Physical Therapy  Cancellation/No-show Note  Patient Name:  Bobby Sneed  :  1960   Date:  2023  Cancelled visits to date: 0  No-shows to date: 3    For today's appointment patient:  []  Cancelled  []  Rescheduled appointment  [x]  No-show     Reason given by patient:  []  Patient ill  []  Conflicting appointment  []  No transportation    []  Conflict with work  [x]  No reason given  []  Other:     Comments:      Electronically signed by:  Long Kaur PT

## 2023-01-27 ENCOUNTER — OFFICE VISIT (OUTPATIENT)
Dept: ORTHOPEDIC SURGERY | Age: 63
End: 2023-01-27

## 2023-01-27 ENCOUNTER — TELEPHONE (OUTPATIENT)
Dept: ORTHOPEDIC SURGERY | Age: 63
End: 2023-01-27

## 2023-01-27 VITALS — HEIGHT: 62 IN | BODY MASS INDEX: 37.73 KG/M2 | WEIGHT: 205 LBS

## 2023-01-27 DIAGNOSIS — Z96.652 S/P REVISION OF TOTAL KNEE, LEFT: Primary | ICD-10-CM

## 2023-01-27 NOTE — TELEPHONE ENCOUNTER
Other PATIENT LOST KEYS AND WOULD LIKE A CALL BACK IF THEY ARE FOUND. PATIENT SAYS SHE WAS WAITING DOWN ON THE FIRST FLOOR NEAR THE IMAGING CENTER AT [facility]. [phone]

## 2023-01-27 NOTE — PROGRESS NOTES
Dr Manuel Rondon      Date /Time 1/27/2023       9:56 AM EST  Name Russ Fontenot             1960   Location  [facility]  MRN [id number]                Chief Complaint   Patient presents with    Post-Op Check     LT knee  PO sx 19/23        History of Present Illness      Russ Fontenot is a 57 y.o. female is here for post-op visit after LEFT  97452 Revision Total Knee Arthroplasty    Patient is 2.5 weeks status post left revision total knee arthroplasty for infection. Patient doing well. She is complaining of irritation from the staples. Patient denies any fever, chills, or drainage. Physical Exam    Based off 1997 Exam Criteria    Ht 5' 2\" (1.575 m)   Wt 205 lb (93 kg)   LMP 01/26/1996   BMI 37.49 kg/m²      Constitutional:       General: He is not in acute distress. Appearance: Normal appearance. LEFT Knee: incision clean, intact, healing appropriately. No surrounding  erythema or fluctuance. Neuro intact distal. No evidence of DVT. Large knee joint effusion    Imaging       X-rays were ordered and reviewed of the left knee. 3 views. AP, lateral, and skyline views. They demonstrate a left total knee arthroplasty in good position. No evidence of loosening or periprosthetic fracture. Assessment and Plan    Melanie Garcia was seen today for post-op check. Diagnoses and all orders for this visit:    S/P revision of total knee, left  -     XR KNEE LEFT (3 VIEWS); Future        Patient staples were removed today and Steri-Strips applied. She will restart physical therapy. We will see her back in 4 weeks or sooner if problems arise. When patient follows up in 4 weeks if she continues to have knee joint effusion would aspirated and sent for [facility]            Electronically signed by Manuel Rondon MD on 1/27/2023 at 10:00 AM  This dictation was generated by voice recognition computer software.   Although all attempts are made to edit the dictation for accuracy, there may be errors in the transcription that are not intended.

## 2023-01-30 DIAGNOSIS — I10 BENIGN ESSENTIAL HTN: Chronic | ICD-10-CM

## 2023-01-30 RX ORDER — AMLODIPINE BESYLATE 5 MG/1
TABLET ORAL
Qty: 60 TABLET | Refills: 5 | Status: SHIPPED | OUTPATIENT
Start: 2023-01-30

## 2023-01-31 ENCOUNTER — TELEPHONE (OUTPATIENT)
Dept: ORTHOPEDIC SURGERY | Age: 63
End: 2023-01-31

## 2023-01-31 ENCOUNTER — HOSPITAL ENCOUNTER (OUTPATIENT)
Dept: PHYSICAL THERAPY | Age: 63
Setting detail: THERAPIES SERIES
Discharge: HOME | End: 2023-01-31
Payer: MEDICARE

## 2023-01-31 PROCEDURE — 97110 THERAPEUTIC EXERCISES: CPT

## 2023-01-31 PROCEDURE — 97161 PT EVAL LOW COMPLEX 20 MIN: CPT

## 2023-01-31 PROCEDURE — 97016 VASOPNEUMATIC DEVICE THERAPY: CPT

## 2023-01-31 NOTE — TELEPHONE ENCOUNTER
PATIENT STOPPED IN HAS A LOT OF FLUID ON HER KNEE AND IS WONDERING IF SHE SHOULD GO TO THE ER OR IF SHE SHOULD BE SEEN IN THE OFFICE. PLEASE CALL PATIENT BACK ASAP.

## 2023-01-31 NOTE — PLAN OF CARE
The [facility] and [address],  Sports Performance and Rehabilitation, [facility] [address]                  [address] [facility],5Th Floor        Arlene Fierro  Phone: [phone]  Fax: [phone]     Physical Therapy Certification    Dear Elizabeth Driscoll MD,    We had the pleasure of evaluating the following patient for physical therapy services at [address] [place] [address]. A summary of our findings can be found in the initial assessment below. This includes our plan of care. If you have any questions or concerns regarding these findings, please do not hesitate to contact me at the office phone number checked above. Thank you for the referral.       Physician Signature:_______________________________Date:__________________  By signing above (or electronic signature), therapists plan is approved by physician    Patient: Danna Perkins   : 1960   MRN: [id number]  Referring Physician: Phyllis Sesay MD      Evaluation Date: 2023      Medical Diagnosis Information:  Left knee pain [M25.562]   L TKR [Z96.659]  L Effusion [M25.462]                                       Insurance information: [facility] Medicare        Precautions/ Contra-indications: WBAT    C-SSRS Triggered by Intake questionnaire (Past 2 wk assessment):   [x] No, Questionnaire did not trigger screening.   [] Yes, Patient intake triggered further evaluation      [] C-SSRS Screening completed  [] PCP notified via Plan of Care  [] Emergency services notified     Latex Allergy:  [x]NO      []YES  Preferred Language for Healthcare:   [x]English       []other:    SUBJECTIVE: Patient stated complaint: L TKR revision on 2023 after infection of previous knee replacement. Patient's main complaint of pain and effusion. Having difficulty sleeping at night.        Relevant Medical History: Bilateral TKR's   Functional Disability Index: LEFS - Not filled out     Pain Scale: 8.5/10 on average   Easing factors: Ice, Elevation, Ibprofen/Tylenol, Compression Sleeve    Provocative factors: Walking, Bending knee, Sleeping      Type: [x]Constant   []Intermittent  []Radiating []Localized []other:     Numbness/Tingling: None     Occupation/School: Retired     Living Status/Prior Level of Function: Independent with ADLs and IADLs, [employer]/Walks/Seeing friends     OBJECTIVE:     ROM LEFT RIGHT   Knee ext 0    Knee Flex 95    Strength  LEFT RIGHT   Knee EXT (quad) NT    Knee Flex (HS) NT    Circumference  Mid apex     42   37.5     Reflexes/Sensation:    [x]Dermatomes/Myotomes intact    [x]Reflexes equal and normal bilaterally   []Other:    Joint mobility: Normal globally, Patellar hypermobility due to swelling/edema    [x]Normal    []Hypo   []Hyper    Palpation: TTP along medial aspect of knee, Swelling/effusion, Warmer temperature     Functional Mobility/Transfers: Independent     Bandages/Dressings/Incisions: Steri strips still present, but 1-2 have fallen off. Scars shows good healing.     Gait: Was previously in wheelchair from October-January, Currently using rollator with no major gait deviations     Orthopedic Special Tests: None                        [x] Patient history, allergies, meds reviewed. Medical chart reviewed. See intake form.     Review Of Systems (ROS):  [x]Performed Review of systems (Integumentary, CardioPulmonary, Neurological) by intake and observation. Intake form has been scanned into medical record. Patient has been instructed to contact their primary care physician regarding ROS issues if not already being addressed at this time.      Co-morbidities/Complexities (which will affect course of rehabilitation):  []None           Arthritic conditions   []Rheumatoid arthritis (M05.9)  []Osteoarthritis (M19.91)   Cardiovascular conditions   [x]Hypertension (I10)  [x]Hyperlipidemia (E78.5)  []Angina pectoris (I20)  []Atherosclerosis (I70)   Musculoskeletal conditions   []Disc pathology  []Congenital spine pathologies   []Prior surgical intervention  []Osteoporosis (M81.8)  []Osteopenia (M85.8)   Endocrine conditions   []Hypothyroid (E03.9)  []Hyperthyroid Gastrointestinal conditions   []Constipation (V36.80)   Metabolic conditions   [x]Morbid obesity (E66.01)  []Diabetes type 1(E10.65) or 2 (E11.65)   []Neuropathy (G60.9)     Pulmonary conditions   []Asthma (J45)  []Coughing   []COPD (J44.9)   Psychological Disorders  []Anxiety (F41.9)  []Depression (F32.9)   []Other:   []Other:          Barriers to/and or personal factors that will affect rehab potential:              []Age  []Sex              []Motivation/Lack of Motivation                        [x]Co-Morbidities              []Cognitive Function, education/learning barriers              []Environmental, home barriers              []profession/work barriers  [x]past PT/medical experience  []other:  Justification: Patient has undergone PT for previous TKR and is knowledgeable of typical progression. Co-morbidities and history of infection may negatively impact rehab. Falls Risk Assessment (30 days):   [x] Falls Risk assessed and no intervention required.   [] Falls Risk assessed and Patient requires intervention due to being higher risk   TUG score (>12s at risk):     [] Falls education provided, including         ASSESSMENT:   Functional Impairments:     []Noted lumbar/proximal hip/LE joint hypomobility   [x]Decreased LE functional ROM   []Decreased core/proximal hip strength and neuromuscular control   [x]Decreased LE functional strength   [x]Reduced balance/proprioceptive control   []other:      Functional Activity Limitations (from functional questionnaire and intake)   [x]Reduced ability to tolerate prolonged functional positions   [x]Reduced ability or difficulty with changes of positions or transfers between positions   []Reduced ability to maintain good posture and demonstrate good body mechanics with sitting, bending, and lifting   [x]Reduced ability to sleep   [x] Reduced ability or tolerance with driving and/or computer work   [x]Reduced ability to perform lifting, carrying tasks   [x]Reduced ability to squat   []Reduced ability to forward bend   [x]Reduced ability to ambulate prolonged functional periods/distances/surfaces   [x]Reduced ability to ascend/descend stairs   [x]Reduced ability to run, hop, cut or jump   []other:    Participation Restrictions   []Reduced participation in self care activities   [x]Reduced participation in home management activities   []Reduced participation in work activities   [x]Reduced participation in social activities. [x]Reduced participation in sport/recreation activities. Classification :    [x]Signs/symptoms consistent with post-surgical status including decreased ROM, strength and function.    []Signs/symptoms consistent with joint sprain/strain   []Signs/symptoms consistent with patella-femoral syndrome   []Signs/symptoms consistent with knee OA/hip OA   []Signs/symptoms consistent with internal derangement of knee/Hip   []Signs/symptoms consistent with functional hip weakness/NMR control      []Signs/symptoms consistent with tendinitis/tendinosis    []signs/symptoms consistent with pathology which may benefit from Dry needling      []other:      Prognosis/Rehab Potential:      []Excellent   [x]Good    []Fair   []Poor    Tolerance of evaluation/treatment:    []Excellent   [x]Good    []Fair   []Poor  Physical Therapy Evaluation Complexity Justification  [x] A history of present problem with:  [] no personal factors and/or comorbidities that impact the plan of care;  []1-2 personal factors and/or comorbidities that impact the plan of care  [x]3 personal factors and/or comorbidities that impact the plan of care  [x] An examination of body systems using standardized tests and measures addressing any of the following: body structures and functions (impairments), activity limitations, and/or participation restrictions;:  [] a total of 1-2 or more elements   [x] a total of 3 or more elements   [] a total of 4 or more elements   [x] A clinical presentation with:  [x] stable and/or uncomplicated characteristics   [] evolving clinical presentation with changing characteristics  [] unstable and unpredictable characteristics;   [x] Clinical decision making of [] low, [] moderate, [] high complexity using standardized patient assessment instrument and/or measurable assessment of functional outcome. [x] EVAL (LOW) 33732 (typically 20 minutes face-to-face)  [] EVAL (MOD) 04001 (typically 30 minutes face-to-face)  [] EVAL (HIGH) 16355 (typically 45 minutes face-to-face)  [] RE-EVAL       PLAN:   Frequency/Duration:  2 days per week for 12 Weeks:  Interventions:  [x]  Therapeutic exercise including: strength training, ROM, for Lower extremity and core   [x]  NMR activation and proprioception for LE, Glutes and Core   [x]  Manual therapy as indicated for LE, Hip and spine to include: Dry Needling/IASTM, STM, PROM, Gr I-IV mobilizations, manipulation. [x] Modalities as needed that may include: thermal agents, E-stim, Biofeedback, US, iontophoresis as indicated  [x] Patient education on joint protection, postural re-education, activity modification, progression of HEP. HEP instruction:     Access Code: Deanna Castillo  URL: [email or web address]  Date: 01/31/2023  Prepared by: Isaiah Bauman    Exercises  Sidelying Hip Abduction - 1 x daily - 7 x weekly - 3 sets - 10 reps  Supine Bridge - 1 x daily - 7 x weekly - 3 sets - 10 reps  Supine Active Straight Leg Raise - 1 x daily - 7 x weekly - 3 sets - 10 reps  Supine Heel Slide - 1 x daily - 7 x weekly - 3 sets - 10 reps      GOALS:  Patient stated goal: Be able to travel later this year   [] Progressing: [] Met: [] Not Met: [] Adjusted    Therapist goals for Patient:   Short Term Goals: To be achieved in: 2 weeks  1.  Independent in HEP and progression per patient tolerance, in order to prevent re-injury. [] Progressing: [] Met: [] Not Met: [] Adjusted  2. Patient will have a decrease in pain to facilitate improvement in movement, function, and ADLs as indicated by Functional Deficits. [] Progressing: [] Met: [] Not Met: [] Adjusted    Long Term Goals: To be achieved in: 12 weeks  1. LEFS score will improve by at least 9 points to reach [employer] 112 to assist with reaching prior level of function. [] Progressing: [] Met: [] Not Met: [] Adjusted  2. Patient will demonstrate increased AROM to at least 120 degrees knee flexion to allow for proper joint functioning as indicated by patients Functional Deficits. [] Progressing: [] Met: [] Not Met: [] Adjusted  3. Patient will be able to ambulate at least 10 minutes without AD with no increase in symptoms. [] Progressing: [] Met: [] Not Met: [] Adjusted  4. Patient will be able to perform grocery shopping and laundry independently without increase in symptoms to allow for improved household management. [] Progressing: [] Met: [] Not Met: [] Adjusted  5. Patient will be able to tolerate standing for at least 30 minutes for participation in choir activities. [] Progressing: [] Met: [] Not Met: [] Adjusted     Electronically signed by:  Fernanda Segovia PT, Javier Leal, [facility]     Therapist was present, directed the patient's care, made skilled judgement, and was responsible for assessment and treatment of the patient.

## 2023-01-31 NOTE — TELEPHONE ENCOUNTER
Patient did not present for appointment today. She called and is having increased swelling.   I did ask recommend she reschedule for tomorrow

## 2023-01-31 NOTE — TELEPHONE ENCOUNTER
Patient Is requesting a call back regarding pain in leg and unable to walk to make theire appointment

## 2023-02-01 ENCOUNTER — OFFICE VISIT (OUTPATIENT)
Dept: ORTHOPEDIC SURGERY | Age: 63
End: 2023-02-01
Payer: MEDICARE

## 2023-02-01 VITALS — WEIGHT: 205 LBS | BODY MASS INDEX: 37.73 KG/M2 | HEIGHT: 62 IN

## 2023-02-01 DIAGNOSIS — Z96.652 S/P REVISION OF TOTAL KNEE, LEFT: Primary | ICD-10-CM

## 2023-02-01 LAB
APPEARANCE FLUID: NORMAL
CELL COUNT FLUID TYPE: NORMAL
CLOT EVALUATION: NORMAL
COLOR FLUID: NORMAL
LYMPHOCYTES, BODY FLUID: 10 %
MACROPHAGE FLUID: 13 %
MONOCYTE, FLUID: 3 %
MONONUCLEAR UNIDENTIFIED CELLS FLUID: 5 %
NEUTROPHIL, FLUID: 69 %
NUCLEATED CELLS FLUID: 726 /CUMM
NUMBER OF CELLS COUNTED FLUID: 100
RBC FLUID: NORMAL /CUMM
VOLUME: 20 ML

## 2023-02-01 PROCEDURE — 20611 DRAIN/INJ JOINT/BURSA W/US: CPT | Performed by: PHYSICIAN ASSISTANT

## 2023-02-01 PROCEDURE — 99024 POSTOP FOLLOW-UP VISIT: CPT | Performed by: PHYSICIAN ASSISTANT

## 2023-02-01 NOTE — PROGRESS NOTES
Dr Fredo Silva      Date /Time 2/1/2023       9:56 AM EST  Name Gretchen Roberson             1960   Location  [facility]  MRN [id number]                Chief Complaint   Patient presents with    Follow-up     Rev Left TKA 01/09/2023        History of Present Illness      Gretchen Roberson is a 57 y.o. female is here for post-op visit after LEFT  [address]99 Revision Total Knee Arthroplasty    Patient presents the office today for an early follow-up visit. She called yesterday with increased swelling. She denies any fever, chills, or drainage. She was a revision total knee arthroplasty for infection. I did discuss the case with Dr. Mildred Coyle.  He recommended aspiration today in the office. Physical Exam    Based off 1997 Exam Criteria    Ht 5' 2\" (1.575 m)   Wt 205 lb (93 kg)   LMP 01/26/1996   BMI 37.49 kg/m²      Constitutional:       General: He is not in acute distress. Appearance: Normal appearance. LEFT Knee: incision clean, intact, healing appropriately. No surrounding  erythema or fluctuance. Neuro intact distal. No evidence of DVT. Large knee joint effusion present. Imaging             Assessment and Plan    [facility] was seen today for follow-up. Diagnoses and all orders for this visit:    S/P revision of total knee, left  -     US ARTHR/ASP/INJ MAJOR JNT/BURSA LEFT; Future    Patient did have a knee aspiration today done under sterile technique. Ultrasound guidance was used. Patient will have fluid sent for aerobic cultures, anaerobic cultures, cell count and gram stain. Patient was placed into a compressive Ace wrap after the aspiration. She was should leave this on for at least 12-24 hours. Then convert back to her compression sleeve. Continue to use her DEBBY hose. She will follow-up with Dr. Mildred Coyle in approximately 10 days or sooner if problems arise.     I discussed in detail the risks, benefits, and complications of an aspiration/injection which included but is not limited to infection, skin reactions, hot swollen joints, and anaphylaxis with the patient. The patient verbalized good understanding and gave informed consent for the left knee procedure. The patient was placed in the supine position on the exam table and the skin was prepped using sterile alcohol solution. The superiolateral aspect of the left knee was prepped and draped with sterile technique. The skin and subcutaneous tissues were anesthestized with 5 mL of 1% lidocaine, injected with a 22-gauge needle. After approximately 5 minutes, an 18-gauge needle attached to a 60-ml syringe was inserted into the knee. Then, 170 ml of watery bloody fluid was aspirated. The syringe was removed and the puncture site sealed with a Band-Aid. Technique: Under sterile conditions a [employer] ultrasound unit with a variable frequency (6.0-15.0 MHz) linear transducer was used to localize the placement of a 22-gauge needle into the Knee. Findings: Successful needle placement for Knee Aspiration. Final images were taken and saved for permanent record. The patient tolerated the injection well. The patient was instructed to call the office immediately if there is any pain, redness, warmth, fever, or chills. Electronically signed by Mingo Flores PA-C on 2/1/2023 at 9:56 AM  This dictation was generated by voice recognition computer software. Although all attempts are made to edit the dictation for accuracy, there may be errors in the transcription that are not intended.

## 2023-02-02 LAB — PATH CONSULT FLUID: NORMAL

## 2023-02-03 LAB — ANAEROBIC CULTURE: NORMAL

## 2023-02-04 LAB
BODY FLUID CULTURE, STERILE: NORMAL
GRAM STAIN RESULT: NORMAL

## 2023-02-06 ENCOUNTER — HOSPITAL ENCOUNTER (OUTPATIENT)
Dept: PHYSICAL THERAPY | Age: 63
Setting detail: THERAPIES SERIES
Discharge: HOME | End: 2023-02-06
Payer: MEDICARE

## 2023-02-06 DIAGNOSIS — Z96.652 HISTORY OF TOTAL LEFT KNEE REPLACEMENT: Primary | ICD-10-CM

## 2023-02-06 PROCEDURE — 97016 VASOPNEUMATIC DEVICE THERAPY: CPT

## 2023-02-06 PROCEDURE — 97140 MANUAL THERAPY 1/> REGIONS: CPT

## 2023-02-06 PROCEDURE — 97110 THERAPEUTIC EXERCISES: CPT

## 2023-02-06 PROCEDURE — MISC915 KNEE SLEEVE OPEN OR CLOSED - BREG: Performed by: PHYSICIAN ASSISTANT

## 2023-02-06 NOTE — FLOWSHEET NOTE
The [facility] and [address],  Sports Performance and Rehabilitation, [place] [id number] [place] [address]                  [address] [place] [address],[address]        [address], [address] [place] [address] Av  Phone: [phone]  Fax: [phone]    Physical Therapy Treatment Note/ Progress Report:           Date:  2023    Patient Name:  Mark Todd    :  1960  MRN: [id number]  Restrictions/Precautions:    Medical/Treatment Diagnosis Information:  Left knee pain [M25.562]  L TKR [Z96.659]  L Effusion [M25.462]       Insurance/Certification information:  [facility] Medicare   Physician Information:  Magno Veliz MD  Has the plan of care been signed (Y/N):        []  Yes  [x]  No     Date of Patient follow up with Physician: Unknown      Is this a Progress Report:     []  Yes  [x]  No        If Yes:  Date Range for reporting period:  Beginning2023  Ending    Progress report will be due (10 Rx or 30 days whichever is less): 3/9/8214       Recertification will be due (POC Duration  / 90 days whichever is less): 4/3/2023      Visit # Insurance Allowable Auth Required   In-person 2 MN []  Yes [x]  No        Functional Scale: LEFS  (85% Disability)             Date assessed:  23       Number of Comorbidities:  []0     []1-2    [x]3+    Latex Allergy:  [x]NO      []YES  Preferred Language for Healthcare:   [x]English       []other:      Pain level:  8/10     SUBJECTIVE: Knee was drained since evaluation but feels as though some of it has returned. Feels as though walking with rollator has gotten easier.      OBJECTIVE:   Observation:   Test measurements:  Knee girth mid patella after GameReady: 41 cm    RESTRICTIONS/PRECAUTIONS: WBAT    Exercises/Interventions:     Therapeutic Ex (74931)/NMR re-education (40367) Reps Notes/CUES   Bridges 20x    SLR 3x10    S/L SLR ABD 3x10       Sit to stand  2x10    Bike  7' Full revolution                                  Manual Intervention (78690)     Patellar mobs/PROM of knee/ STM to hamstring/quad 15'                HEP instruction:      Access Code: Juan Purdy  URL: [email or web address]  Date: 01/31/2023  Prepared by: Honorio Dial     Exercises  Sidelying Hip Abduction - 1 x daily - 7 x weekly - 3 sets - 10 reps  Supine Bridge - 1 x daily - 7 x weekly - 3 sets - 10 reps  Supine Active Straight Leg Raise - 1 x daily - 7 x weekly - 3 sets - 10 reps  Supine Heel Slide - 1 x daily - 7 x weekly - 3 sets - 10 reps      Therapeutic Exercise and NMR EXR  [x] (92442) Provided verbal/tactile cueing for activities related to strengthening, flexibility, endurance, ROM for improvements in LE, proximal hip, and core control with self care, mobility, lifting, ambulation. [x] (10883) Provided verbal/tactile cueing for activities related to improving balance, coordination, kinesthetic sense, posture, motor skill, proprioception  to assist with LE, proximal hip, and core control in self care, mobility, lifting, ambulation and eccentric single leg control.      NMR and Therapeutic Activities:    [x] (67622 or 52029) Provided verbal/tactile cueing for activities related to improving balance, coordination, kinesthetic sense, posture, motor skill, proprioception and motor activation to allow for proper function of core, proximal hip and LE with self care and ADLs  [x] (91171) Gait Re-education- Provided training and instruction to the patient for proper LE, core and proximal hip recruitment and positioning and eccentric body weight control with ambulation re-education including up and down stairs     Home Exercise Program:    [x] (57348) Reviewed/Progressed HEP activities related to strengthening, flexibility, endurance, ROM of core, proximal hip and LE for functional self-care, mobility, lifting and ambulation/stair navigation   [] (02466)Reviewed/Progressed HEP activities related to improving balance, coordination, kinesthetic sense, posture, motor skill, proprioception of core, proximal hip and LE for self care, mobility, lifting, and ambulation/stair navigation      Manual Treatments:  PROM / STM / Oscillations-Mobs:  G-I, II, III, IV (PA's, Inf., Post.)  [x] (61981) Provided manual therapy to mobilize LE, proximal hip and/or LS spine soft tissue/joints for the purpose of modulating pain, promoting relaxation,  increasing ROM, reducing/eliminating soft tissue swelling/inflammation/restriction, improving soft tissue extensibility and allowing for proper ROM for normal function with self care, mobility, lifting and ambulation. Modalities:     [x] GAME READY (VASO)- for significant edema, swelling, pain control. Charges  Timed Code Treatment Minutes: 40   Total Treatment Minutes: 55       [] EVAL (LOW) 65200   [] EVAL (MOD) 44380  [] EVAL (HIGH) 32236   [] RE-EVAL     [x] BT(48986) x   2  [] IONTO  [] NMR (55017) x     [x] VASO - Knee medium 15'  [x] Manual (32058) x  1    [] Other:  [] TA x      [] Mech Traction (60489)  [] ES(attended) (26866)      [] ES (un) (96831):       GOALS:   Patient stated goal: Be able to travel later this year   [] Progressing: [] Met: [] Not Met: [] Adjusted     Therapist goals for Patient:   Short Term Goals: To be achieved in: 2 weeks  1. Independent in HEP and progression per patient tolerance, in order to prevent re-injury. [] Progressing: [] Met: [] Not Met: [] Adjusted  2. Patient will have a decrease in pain to facilitate improvement in movement, function, and ADLs as indicated by Functional Deficits. [] Progressing: [] Met: [] Not Met: [] Adjusted     Long Term Goals: To be achieved in: 12 weeks  1. LEFS score will improve by at least 9 points to reach [employer] 112 to assist with reaching prior level of function. [] Progressing: [] Met: [] Not Met: [] Adjusted  2. Patient will demonstrate increased AROM to at least 120 degrees knee flexion to allow for proper joint functioning as indicated by patients Functional Deficits.    [] Progressing: [] Met: [] Not Met: [] Adjusted  3. Patient will be able to ambulate at least 10 minutes without AD with no increase in symptoms. [] Progressing: [] Met: [] Not Met: [] Adjusted  4. Patient will be able to perform grocery shopping and laundry independently without increase in symptoms to allow for improved household management. [] Progressing: [] Met: [] Not Met: [] Adjusted  5. Patient will be able to tolerate standing for at least 30 minutes for participation in choir activities. [] Progressing: [] Met: [] Not Met: [] Adjusted       Overall Progression Towards Functional goals/ Treatment Progress Update:  [] Patient is progressing as expected towards functional goals listed. [] Progression is slowed due to complexities/Impairments listed. [] Progression has been slowed due to co-morbidities. [x] Plan just implemented, too soon to assess goals progression <30days   [] Goals require adjustment due to lack of progress  [] Patient is not progressing as expected and requires additional follow up with physician  [] Other    Prognosis for POC: [x] Good [] Fair  [] Poor      Patient requires continued skilled intervention: [x] Yes  [] No    Treatment/Activity Tolerance:  [x] Patient able to complete treatment  [] Patient limited by fatigue  [] Patient limited by pain    [] Patient limited by other medical complications  [] Other:     ASSESSMENT: Noe Johnson continues to progress well with therapy. Her knee ROM is progressing accordingly despite continued swelling and high pain levels. She would continue to benefit from physical therapy services to maximize functional outcomes and progress towards goals.        PLAN: See eval  [x] Continue per plan of care [] Alter current plan (see comments above)  [] Plan of care initiated [] Hold pending MD visit [] Discharge      Electronically signed by:  Miki Ram PT, Don Zaragoza, SPT    Therapist was present, directed the patient's care, made skilled judgement, and was responsible for assessment and treatment of the patient. Note: If patient does not return for scheduled/ recommended follow up visits, this note will serve as a discharge from care along with most recent update on progress.

## 2023-02-10 ENCOUNTER — OFFICE VISIT (OUTPATIENT)
Dept: ORTHOPEDIC SURGERY | Age: 63
End: 2023-02-10

## 2023-02-10 ENCOUNTER — HOSPITAL ENCOUNTER (OUTPATIENT)
Dept: PHYSICAL THERAPY | Age: 63
Setting detail: THERAPIES SERIES
Discharge: HOME | End: 2023-02-10
Payer: MEDICARE

## 2023-02-10 VITALS — BODY MASS INDEX: 37.73 KG/M2 | HEIGHT: 62 IN | WEIGHT: 205 LBS

## 2023-02-10 DIAGNOSIS — Z96.652 S/P REVISION OF TOTAL KNEE, LEFT: Primary | ICD-10-CM

## 2023-02-10 PROCEDURE — 97016 VASOPNEUMATIC DEVICE THERAPY: CPT

## 2023-02-10 PROCEDURE — 97140 MANUAL THERAPY 1/> REGIONS: CPT

## 2023-02-10 PROCEDURE — 97110 THERAPEUTIC EXERCISES: CPT

## 2023-02-10 NOTE — FLOWSHEET NOTE
The [address] and [address],  [facility], [facility] hospital 6142 [address]                  793 [facility],5Th Floor        Arleen Fierro  Phone: [phone]  Fax: [phone]    Physical Therapy Treatment Note/ Progress Report:           Date:  2/10/2023    Patient Name:  Shanthi Jefferson    :  1960  MRN: [id number]  Restrictions/Precautions:    Medical/Treatment Diagnosis Information:  Left knee pain [M25.562]  L TKR [Z96.659]  L Effusion [M25.462]       Insurance/Certification information:  [place] [employer]   Physician Information:  Janina Edmondson MD  Has the plan of care been signed (Y/N):        []  Yes  [x]  No     Date of Patient follow up with Physician: Unknown      Is this a Progress Report:     []  Yes  [x]  No        If Yes:  Date Range for reporting period:  Beginning2023  Ending    Progress report will be due (10 Rx or 30 days whichever is less): 7982       Recertification will be due (POC Duration  / 90 days whichever is less): 4/3/2023      Visit # Insurance Allowable Auth Required   In-person 3 MN []  Yes [x]  No        Functional Scale: LEFS  (85% Disability)             Date assessed:  23       Number of Comorbidities:  []0     []1-2    [x]3+    Latex Allergy:  [x]NO      []YES  Preferred Language for Healthcare:   [x]English       []other:      Pain level:  6/10     SUBJECTIVE: Pt reports a good follow up with MD, asked about being able to get in the pool but was told to wait atleast 2 more weeks    OBJECTIVE:   Observation: 2/10/23  Test measurements:  knee flexion to 122, knee extension to 0; moderate swelling global in knee    RESTRICTIONS/PRECAUTIONS: WBAT    Exercises/Interventions:     Therapeutic Ex (05081)/NMR re-education (47741) Reps Notes/CUES   Bike 5'    Bridges  Marching bridges X10  2x10    SLR  LAQ 3x10  2x10 1# weight         Sit to stand  2x10    FSU to airex 15x3\" Manual Intervention (39964)     Patellar mobs/PROM of knee/ STM to hamstring/quad 15'                HEP instruction:      Access Code: [email or web address]  URL: [email or web address]  Date: 01/31/2023  Prepared by: Annette Love     Exercises  Sidelying Hip Abduction - 1 x daily - 7 x weekly - 3 sets - 10 reps  Supine Bridge - 1 x daily - 7 x weekly - 3 sets - 10 reps  Supine Active Straight Leg Raise - 1 x daily - 7 x weekly - 3 sets - 10 reps  Supine Heel Slide - 1 x daily - 7 x weekly - 3 sets - 10 reps      Therapeutic Exercise and NMR EXR  [x] (20969) Provided verbal/tactile cueing for activities related to strengthening, flexibility, endurance, ROM for improvements in LE, proximal hip, and core control with self care, mobility, lifting, ambulation. [x] (67599) Provided verbal/tactile cueing for activities related to improving balance, coordination, kinesthetic sense, posture, motor skill, proprioception  to assist with LE, proximal hip, and core control in self care, mobility, lifting, ambulation and eccentric single leg control.      NMR and Therapeutic Activities:    [x] (88251 or 27254) Provided verbal/tactile cueing for activities related to improving balance, coordination, kinesthetic sense, posture, motor skill, proprioception and motor activation to allow for proper function of core, proximal hip and LE with self care and ADLs  [x] (46009) Gait Re-education- Provided training and instruction to the patient for proper LE, core and proximal hip recruitment and positioning and eccentric body weight control with ambulation re-education including up and down stairs     Home Exercise Program:    [x] (89000) Reviewed/Progressed HEP activities related to strengthening, flexibility, endurance, ROM of core, proximal hip and LE for functional self-care, mobility, lifting and ambulation/stair navigation   [] (75237)Reviewed/Progressed HEP activities related to improving balance, coordination, kinesthetic sense, posture, motor skill, proprioception of core, proximal hip and LE for self care, mobility, lifting, and ambulation/stair navigation      Manual Treatments:  PROM / STM / Oscillations-Mobs:  G-I, II, III, IV (PA's, Inf., Post.)  [x] (04429) Provided manual therapy to mobilize LE, proximal hip and/or LS spine soft tissue/joints for the purpose of modulating pain, promoting relaxation,  increasing ROM, reducing/eliminating soft tissue swelling/inflammation/restriction, improving soft tissue extensibility and allowing for proper ROM for normal function with self care, mobility, lifting and ambulation. Modalities:     [x] GAME READY (VASO)- for significant edema, swelling, pain control. Charges  Timed Code Treatment Minutes: 40   Total Treatment Minutes: 55       [] EVAL (LOW) 64588   [] EVAL (MOD) 34557  [] EVAL (HIGH) 71900   [] RE-EVAL     [x] RK(09572) x   2  [] IONTO  [] NMR (36132) x     [x] VASO - Knee medium 15'  [x] Manual (40033) x  1    [] Other:  [] TA x      [] Mech Traction (56604)  [] ES(attended) (30854)      [] ES (un) (24776):       GOALS:   Patient stated goal: Be able to travel later this year   [] Progressing: [] Met: [] Not Met: [] Adjusted     Therapist goals for Patient:   Short Term Goals: To be achieved in: 2 weeks  1. Independent in HEP and progression per patient tolerance, in order to prevent re-injury. [] Progressing: [] Met: [] Not Met: [] Adjusted  2. Patient will have a decrease in pain to facilitate improvement in movement, function, and ADLs as indicated by Functional Deficits. [] Progressing: [] Met: [] Not Met: [] Adjusted     Long Term Goals: To be achieved in: 12 weeks  1. LEFS score will improve by at least 9 points to reach [employer] 112 to assist with reaching prior level of function. [] Progressing: [] Met: [] Not Met: [] Adjusted  2.  Patient will demonstrate increased AROM to at least 120 degrees knee flexion to allow for proper joint functioning as indicated by patients Functional Deficits. [] Progressing: [] Met: [] Not Met: [] Adjusted  3. Patient will be able to ambulate at least 10 minutes without AD with no increase in symptoms. [] Progressing: [] Met: [] Not Met: [] Adjusted  4. Patient will be able to perform grocery shopping and laundry independently without increase in symptoms to allow for improved household management. [] Progressing: [] Met: [] Not Met: [] Adjusted  5. Patient will be able to tolerate standing for at least 30 minutes for participation in choir activities. [] Progressing: [] Met: [] Not Met: [] Adjusted       Overall Progression Towards Functional goals/ Treatment Progress Update:  [] Patient is progressing as expected towards functional goals listed. [] Progression is slowed due to complexities/Impairments listed. [] Progression has been slowed due to co-morbidities. [x] Plan just implemented, too soon to assess goals progression <30days   [] Goals require adjustment due to lack of progress  [] Patient is not progressing as expected and requires additional follow up with physician  [] Other    Prognosis for POC: [x] Good [] Fair  [] Poor      Patient requires continued skilled intervention: [x] Yes  [] No    Treatment/Activity Tolerance:  [x] Patient able to complete treatment  [] Patient limited by fatigue  [] Patient limited by pain    [] Patient limited by other medical complications  [] Other:     ASSESSMENT: Chen Leiva reported improved pain overall today and tolerated increased functional workload this visit. She would continue to benefit from physical therapy services to maximize functional outcomes and progress towards goals.        PLAN: See eval  [x] Continue per plan of care [] Alter current plan (see comments above)  [] Plan of care initiated [] Hold pending MD visit [] Discharge      Electronically signed by:  Juan Jose Cooley PT, ANGEL Pulido    Therapist was present, directed the patient's care, made skilled judgement, and was responsible for assessment and treatment of the patient. Note: If patient does not return for scheduled/ recommended follow up visits, this note will serve as a discharge from care along with most recent update on progress.

## 2023-02-10 NOTE — PROGRESS NOTES
Dr Asia Hou      Date /Time 2/10/2023       9:56 AM EST  Name Fara Hill             1960   Location  [facility]  MRN [id number]                Chief Complaint   Patient presents with    Follow-up     CK Left Knee Revision TKA 01/09/2023          History of Present Illness      Fara Hill is a 57 y.o. female is here for post-op visit after LEFT  97355 Revision Total Knee Arthroplasty    Patient presents for follow-up visit concerning a revision left total knee arthroplasty. At her last visit we did aspirate approximately 120 cc of blood tinged fluid. It was sent for testing. She is here to review results. Physical Exam    Based off 1997 Exam Criteria    LMP 01/26/1996      Constitutional:       General: He is not in acute distress. Appearance: Normal appearance. LEFT Knee: incision clean, intact, healing appropriately. No surrounding  erythema or fluctuance. Neuro intact distal. No evidence of DVT. Large knee joint effusion present. Laboratory evaluation    Aerobic cultures-no growth  Anaerobic cultures-no growth  Gram stain-no organisms  Cell count- WBCs cells 726      Imaging             Assessment and Plan    Diagnoses and all orders for this visit:    S/P revision of total knee, left            Electronically signed by Tawnya Cooley PA-C on 2/10/2023 at 8:07 AM  This dictation was generated by voice recognition computer software. Although all attempts are made to edit the dictation for accuracy, there may be errors in the transcription that are not intended.

## 2023-02-10 NOTE — PROGRESS NOTES
Dr Tricia Reagan      Date /Time 2/10/2023       9:56 AM EST  Name Mark Todd             1960   Location  [facility]  MRN [id number]                Chief Complaint   Patient presents with    Follow-up     CK Left Knee Revision TKA 01/09/2023          History of Present Illness      Mark Todd is a 57 y.o. female is here for post-op visit after LEFT  81402 Revision Total Knee Arthroplasty    Patient presents for follow-up visit concerning a revision left total knee arthroplasty. At her last visit we did aspirate approximately 120 cc of blood tinged fluid. It was sent for testing. She is here to review results. Physical Exam    Based off 1997 Exam Criteria    Ht 5' 2\" (1.575 m)   Wt 205 lb (93 kg)   LMP 01/26/1996   BMI 37.49 kg/m²      Constitutional:       General: He is not in acute distress. Appearance: Normal appearance. LEFT Knee: incision clean, intact, healing appropriately. No surrounding  erythema or fluctuance. Neuro intact distal. No evidence of DVT. Large knee joint effusion present. Laboratory evaluation    Aerobic cultures-no growth  Anaerobic cultures-no growth  Gram stain-no organisms  Cell count- WBCs cells 726      Imaging             Assessment and Plan    Harmeet Campos was seen today for follow-up. Diagnoses and all orders for this visit:    S/P revision of total knee, left            Electronically signed by Tricia Reagan MD on 2/10/2023 at 8:36 AM  This dictation was generated by voice recognition computer software. Although all attempts are made to edit the dictation for accuracy, there may be errors in the transcription that are not intended.

## 2023-02-13 ENCOUNTER — HOSPITAL ENCOUNTER (OUTPATIENT)
Dept: PHYSICAL THERAPY | Age: 63
Setting detail: THERAPIES SERIES
Discharge: HOME | End: 2023-02-13
Payer: MEDICARE

## 2023-02-13 NOTE — FLOWSHEET NOTE
[facility] and [address],  [facility], [facility] [address]                  793 [facility],5Th Floor        Arlene Fierro [place]  Phone: [phone]  Fax: [phone]        Physical Therapy  Cancellation/No-show Note  Patient Name:  Cydney Eden  :  1960   Date:  2023  Cancelled visits to date: 1  No-shows to date: 3    For today's appointment patient:  [x]  Cancelled  []  Rescheduled appointment  []  No-show     Reason given by patient:  []  Patient ill  []  Conflicting appointment  []  No transportation    []  Conflict with work  [x]  No reason given  []  Other:     Comments:      Electronically signed by:  Kristopher Zambrano PT

## 2023-02-14 ENCOUNTER — TELEPHONE (OUTPATIENT)
Dept: ORTHOPEDIC SURGERY | Age: 63
End: 2023-02-14

## 2023-02-14 NOTE — TELEPHONE ENCOUNTER
General Question     Subject: Yeyo Musa   Patient and /or Facility Request: [facility] Number: [phone]     OUTSTANDING ORDERS     11/14 OT EVAL   12/20  LAB ORDERS     STILL OUTSTANDING AND NOT RETURNED. HAVE THEY BEEN RCV'D OR DO YOU NEED THEM REFAXED? ALL OTHER ORDERS HAVE BEEN RCV'D. PLEASE CALL IF YOU NEED THESE REFAXED.

## 2023-02-16 ENCOUNTER — HOSPITAL ENCOUNTER (OUTPATIENT)
Dept: PHYSICAL THERAPY | Age: 63
Setting detail: THERAPIES SERIES
Discharge: HOME | End: 2023-02-16
Payer: MEDICARE

## 2023-02-16 NOTE — FLOWSHEET NOTE
[facility] and [address],  [facility], [facility] [address]199 [address]                  793 [facility],5Th Floor        Arlene Fierro  Phone: [phone]  Fax: [phone]        Physical Therapy  Cancellation/No-show Note  Patient Name:  Juliana Diaz  :  1960   Date:  2023  Cancelled visits to date: 2  No-shows to date: 3    For today's appointment patient:  [x]  Cancelled  []  Rescheduled appointment  []  No-show     Reason given by patient:  [x]  Patient ill  []  Conflicting appointment  []  No transportation    []  Conflict with work  []  No reason given  []  Other:     Comments:      Electronically signed by:  Ceci Roper PT

## 2023-02-20 ENCOUNTER — HOSPITAL ENCOUNTER (OUTPATIENT)
Dept: PHYSICAL THERAPY | Age: 63
Setting detail: THERAPIES SERIES
Discharge: HOME | End: 2023-02-20
Payer: MEDICARE

## 2023-02-20 PROCEDURE — 97110 THERAPEUTIC EXERCISES: CPT

## 2023-02-20 PROCEDURE — 97016 VASOPNEUMATIC DEVICE THERAPY: CPT

## 2023-02-20 PROCEDURE — 97140 MANUAL THERAPY 1/> REGIONS: CPT

## 2023-02-20 NOTE — FLOWSHEET NOTE
The [facility] and [address],  [facility], Critical access hospital [address]                  793 [facility],5Th Floor        [address], [address]  Phone: [phone]  Fax: [phone]    Physical Therapy Treatment Note/ Progress Report:           Date:  2023    Patient Name:  Veronica Reno    :  1960  MRN: [id number]  Restrictions/Precautions:    Medical/Treatment Diagnosis Information:  Left knee pain [M25.562]  L TKR [Z96.659]  L Effusion [M25.462]       Insurance/Certification information:  [facility] Medicare   Physician Information:  Gordy Cantor MD  Has the plan of care been signed (Y/N):        []  Yes  [x]  No     Date of Patient follow up with Physician: 2023      Is this a Progress Report:     []  Yes  [x]  No        If Yes:  Date Range for reporting period:  Beginning2023  Ending    Progress report will be due (10 Rx or 30 days whichever is less): 9/3/4242       Recertification will be due (POC Duration  / 90 days whichever is less): 4/3/2023      Visit # Insurance Allowable Auth Required   In-person 4 MN []  Yes [x]  No        Functional Scale: LEFS  (85% Disability)             Date assessed:  23       Number of Comorbidities:  []0     []1-2    [x]3+    Latex Allergy:  [x]NO      []YES  Preferred Language for Healthcare:   [x]English       []other:      Pain level:  6/10     SUBJECTIVE: Pt reports main limitation is swelling/pain in knee but is improving overall. Was able to go to gym and do bike/hamstring machine over the weekend. Plans to go tomorrow too. See doctor on Friday.      OBJECTIVE:   Observation: Entered clinic ambulating with reciprocal gait and cane   Test measurements:  knee flexion to 122, knee extension to 0; moderate swelling global in knee (42 cm on 2023)    RESTRICTIONS/PRECAUTIONS: WBAT    Exercises/Interventions:     Therapeutic Ex (76152)/NMR re-education (53161) Reps Notes/CUES   Bike 5'    Calf stretch 2x30\"    Bridges  3X10     SLR  LAQ 3x10  3x10 1# weight   S/L SLR ABD 3x10 1# weight       Sit to stand  3x10    FSU to airex 2x10    LSU to 4\"  2x10    Calf Raises  2x10                        Manual Intervention (67757)     Patellar mobs/PROM of knee/ STM to hamstring/quad 8'                HEP instruction:      Access Code: Jerrod Alarcon  URL: [email or web address]  Date: 01/31/2023  Prepared by: Gloria Smith     Exercises  Sidelying Hip Abduction - 1 x daily - 7 x weekly - 3 sets - 10 reps  Supine Bridge - 1 x daily - 7 x weekly - 3 sets - 10 reps  Supine Active Straight Leg Raise - 1 x daily - 7 x weekly - 3 sets - 10 reps  Supine Heel Slide - 1 x daily - 7 x weekly - 3 sets - 10 reps      Therapeutic Exercise and NMR EXR  [x] (33556) Provided verbal/tactile cueing for activities related to strengthening, flexibility, endurance, ROM for improvements in LE, proximal hip, and core control with self care, mobility, lifting, ambulation. [x] (72816) Provided verbal/tactile cueing for activities related to improving balance, coordination, kinesthetic sense, posture, motor skill, proprioception  to assist with LE, proximal hip, and core control in self care, mobility, lifting, ambulation and eccentric single leg control.      NMR and Therapeutic Activities:    [x] (37615 or 80616) Provided verbal/tactile cueing for activities related to improving balance, coordination, kinesthetic sense, posture, motor skill, proprioception and motor activation to allow for proper function of core, proximal hip and LE with self care and ADLs  [x] (39624) Gait Re-education- Provided training and instruction to the patient for proper LE, core and proximal hip recruitment and positioning and eccentric body weight control with ambulation re-education including up and down stairs     Home Exercise Program:    [x] (30030) Reviewed/Progressed HEP activities related to strengthening, flexibility, endurance, ROM of core, proximal hip and LE for functional self-care, mobility, lifting and ambulation/stair navigation   [] (85384)Reviewed/Progressed HEP activities related to improving balance, coordination, kinesthetic sense, posture, motor skill, proprioception of core, proximal hip and LE for self care, mobility, lifting, and ambulation/stair navigation      Manual Treatments:  PROM / STM / Oscillations-Mobs:  G-I, II, III, IV (PA's, Inf., Post.)  [x] (14414) Provided manual therapy to mobilize LE, proximal hip and/or LS spine soft tissue/joints for the purpose of modulating pain, promoting relaxation,  increasing ROM, reducing/eliminating soft tissue swelling/inflammation/restriction, improving soft tissue extensibility and allowing for proper ROM for normal function with self care, mobility, lifting and ambulation. Modalities:     [x] GAME READY (VASO)- for significant edema, swelling, pain control. Charges  Timed Code Treatment Minutes: 45   Total Treatment Minutes: 60       [] EVAL (LOW) 50385   [] EVAL (MOD) 79438  [] EVAL (HIGH) 51802   [] RE-EVAL     [x] NI(17412) x   2  [] IONTO  [] NMR (88720) x     [x] VASO - Knee medium 10'  [x] Manual (92691) x  1    [] Other:  [] TA x      [] Mech Traction (08811)  [] ES(attended) (02207)      [] ES (un) (55560):       GOALS:   Patient stated goal: Be able to travel later this year   [] Progressing: [] Met: [] Not Met: [] Adjusted     Therapist goals for Patient:   Short Term Goals: To be achieved in: 2 weeks  1. Independent in HEP and progression per patient tolerance, in order to prevent re-injury. [] Progressing: [] Met: [] Not Met: [] Adjusted  2. Patient will have a decrease in pain to facilitate improvement in movement, function, and ADLs as indicated by Functional Deficits. [] Progressing: [] Met: [] Not Met: [] Adjusted     Long Term Goals: To be achieved in: 12 weeks  1. LEFS score will improve by at least 9 points to reach [employer] 112 to assist with reaching prior level of function. [] Progressing: [] Met: [] Not Met: [] Adjusted  2. Patient will demonstrate increased AROM to at least 120 degrees knee flexion to allow for proper joint functioning as indicated by patients Functional Deficits. [] Progressing: [] Met: [] Not Met: [] Adjusted  3. Patient will be able to ambulate at least 10 minutes without AD with no increase in symptoms. [] Progressing: [] Met: [] Not Met: [] Adjusted  4. Patient will be able to perform grocery shopping and laundry independently without increase in symptoms to allow for improved household management. [] Progressing: [] Met: [] Not Met: [] Adjusted  5. Patient will be able to tolerate standing for at least 30 minutes for participation in choir activities. [] Progressing: [] Met: [] Not Met: [] Adjusted       Overall Progression Towards Functional goals/ Treatment Progress Update:  [] Patient is progressing as expected towards functional goals listed. [] Progression is slowed due to complexities/Impairments listed. [] Progression has been slowed due to co-morbidities. [x] Plan just implemented, too soon to assess goals progression <30days   [] Goals require adjustment due to lack of progress  [] Patient is not progressing as expected and requires additional follow up with physician  [] Other    Prognosis for POC: [x] Good [] Fair  [] Poor      Patient requires continued skilled intervention: [x] Yes  [] No    Treatment/Activity Tolerance:  [x] Patient able to complete treatment  [] Patient limited by fatigue  [] Patient limited by pain    [] Patient limited by other medical complications  [] Other:     ASSESSMENT: Malina Johnson reported consistent pain levels today related to knee effusion but was still able to tolerate increased functional workload this visit. She would continue to benefit from physical therapy services to maximize functional outcomes and progress towards goals.        PLAN: See rylee  [x] Continue per plan of care [] Alter current plan (see comments above)  [] Plan of care initiated [] Hold pending MD visit [] Discharge      Electronically signed by:  Vladimir Martins PT, Ana Rosa Hammer, SPT    Therapist was present, directed the patient's care, made skilled judgement, and was responsible for assessment and treatment of the patient. Note: If patient does not return for scheduled/ recommended follow up visits, this note will serve as a discharge from care along with most recent update on progress.

## 2023-02-22 ENCOUNTER — TELEPHONE (OUTPATIENT)
Dept: ORTHOPEDIC SURGERY | Age: 63
End: 2023-02-22

## 2023-02-22 NOTE — TELEPHONE ENCOUNTER
1st call - Spoke with patient otld her I would call back after I spoke with Stevie/ Dr Mauricio Charlton       2nd Call after speaking with Braden Hallman Answer and Mailbox is full    Answer -      Prefer to see Dr Mauricio Charlton Thursday Crista Duarte) or Friday Desirae Ayers)     IF patient unable to wait/ Tr Tsang can see her at [facility] today 02/22/23    lorrie

## 2023-02-22 NOTE — TELEPHONE ENCOUNTER
General Question     Subject: LT KNEE  Patient and /or Facility Request: [facility]   Contact Number:  [phone]    PATIENT CALLING REGARDING HER LT KNEE. PATIENT HAS APPOINTMENT TO SEE DR. SNOW ON Friday, February 24, 2023. PATIENT CURRENTLY HAVING SHOOTING  AND SHARP PAIN IN HER LT KNEE ALONG WITH FLUID. PATIENT WAS ADVISED TO CONTACT TOMMY IF SHE HAS THESE SYMPTOMS AGAIN. PATIENT REQUESTING TOMMY TO CALL HER REGARDING THIS AT THE ABOVE NUMBER.

## 2023-02-22 NOTE — TELEPHONE ENCOUNTER
General Question     Subject: RETURNING A CALL   Patient and /or Facility Request: Joey Carlin  Contact Number: [phone]      PATIENT RETURNING A CALL ABOUT HER LEG. .   SHE WILL LIKE TO MAKE AN APPT FOR TODAY AT [address] [place] [address]. Braden Torres PATIENT STATES SHE GOING TO THE ER. ER AT [facility]. .. PLEASE CALL PATIENT BACK AT THE ABOVE NUMBER. Braden Torres

## 2023-02-23 ENCOUNTER — HOSPITAL ENCOUNTER (OUTPATIENT)
Dept: PHYSICAL THERAPY | Age: 63
Setting detail: THERAPIES SERIES
Discharge: HOME | End: 2023-02-23
Payer: MEDICARE

## 2023-02-23 PROCEDURE — 97110 THERAPEUTIC EXERCISES: CPT

## 2023-02-23 PROCEDURE — 97016 VASOPNEUMATIC DEVICE THERAPY: CPT

## 2023-02-23 PROCEDURE — 97140 MANUAL THERAPY 1/> REGIONS: CPT

## 2023-02-23 NOTE — FLOWSHEET NOTE
[facility] and [address],  Sports Performance and Rehabilitation, [facility] [address]                  [address] [facility],5[address]        Arlene Fierro  Phone: [phone]  Fax: [phone]    Physical Therapy Treatment Note/ Progress Report:           Date:  2023    Patient Name:  Jeffory Hodgkin    :  1960  MRN: [id number]  Restrictions/Precautions:    Medical/Treatment Diagnosis Information:  Left knee pain [M25.562]  L TKR [Z96.659]  L Effusion [M25.462]       Insurance/Certification information:  [facility] Medicare   Physician Information:  Brant Vogel MD  Has the plan of care been signed (Y/N):        []  Yes  [x]  No     Date of Patient follow up with Physician: 2023      Is this a Progress Report:     []  Yes  [x]  No        If Yes:  Date Range for reporting period:  Beginning 2023  Ending    Progress report will be due (10 Rx or 30 days whichever is less): 1360       Recertification will be due (POC Duration  / 90 days whichever is less): 4/3/2023      Visit # Insurance Allowable Auth Required   In-person 5 MN []  Yes [x]  No        Functional Scale: LEFS 1280 (85% Disability)             Date assessed:  23       Number of Comorbidities:  []0     []1-2    [x]3+    Latex Allergy:  [x]NO      []YES  Preferred Language for Healthcare:   [x]English       []other:      Pain level:  10/10     SUBJECTIVE: Pt came to session today with elevated pain levels. Was not able to go to gym yesterday. She reports going down 24 step flight of stairs with lateral step pattern 2x since last session and increased activity due to errands/work. Is having difficulty sleeping due to pain and getting out of car. She sees doctor on Friday.      OBJECTIVE:   Observation: Entered clinic ambulating with reciprocal gait and cane   Test measurements:  knee flexion to 122, knee extension to 0; moderate swelling global in knee (42 cm on 2/20/2023)    RESTRICTIONS/PRECAUTIONS: WBAT    Exercises/Interventions:     Therapeutic Ex (62014)/NMR re-education (15216) Reps Notes/CUES   Bike 5'    Calf stretch 2x30\"       SLR  LAQ 2x10  2x10 S/L SLR ABD 2x10    Sit to stand  3x10       FSU/LSU to 4\"  X10 ea    Calf Raises  2x10         Pt Edu: Healing timelines, avoiding over activity while knee is healing                Manual Intervention (34950)     Patellar mobs/PROM of knee/ STM to hamstring/quad 8'                HEP instruction:      Access Code: Yesenia Karlo  URL: [email or web address]  Date: 01/31/2023  Prepared by: Arlet Hinson     Exercises  Sidelying Hip Abduction - 1 x daily - 7 x weekly - 3 sets - 10 reps  Supine Bridge - 1 x daily - 7 x weekly - 3 sets - 10 reps  Supine Active Straight Leg Raise - 1 x daily - 7 x weekly - 3 sets - 10 reps  Supine Heel Slide - 1 x daily - 7 x weekly - 3 sets - 10 reps      Therapeutic Exercise and NMR EXR  [x] (64885) Provided verbal/tactile cueing for activities related to strengthening, flexibility, endurance, ROM for improvements in LE, proximal hip, and core control with self care, mobility, lifting, ambulation. [x] (87649) Provided verbal/tactile cueing for activities related to improving balance, coordination, kinesthetic sense, posture, motor skill, proprioception  to assist with LE, proximal hip, and core control in self care, mobility, lifting, ambulation and eccentric single leg control.      NMR and Therapeutic Activities:    [x] (08424 or 15987) Provided verbal/tactile cueing for activities related to improving balance, coordination, kinesthetic sense, posture, motor skill, proprioception and motor activation to allow for proper function of core, proximal hip and LE with self care and ADLs  [x] (99803) Gait Re-education- Provided training and instruction to the patient for proper LE, core and proximal hip recruitment and positioning and eccentric body weight control with ambulation re-education including up and down stairs     Home Exercise Program:    [x] (95048) Reviewed/Progressed HEP activities related to strengthening, flexibility, endurance, ROM of core, proximal hip and LE for functional self-care, mobility, lifting and ambulation/stair navigation   [] (27595)Reviewed/Progressed HEP activities related to improving balance, coordination, kinesthetic sense, posture, motor skill, proprioception of core, proximal hip and LE for self care, mobility, lifting, and ambulation/stair navigation      Manual Treatments:  PROM / STM / Oscillations-Mobs:  G-I, II, III, IV (PA's, Inf., Post.)  [x] (20018) Provided manual therapy to mobilize LE, proximal hip and/or LS spine soft tissue/joints for the purpose of modulating pain, promoting relaxation,  increasing ROM, reducing/eliminating soft tissue swelling/inflammation/restriction, improving soft tissue extensibility and allowing for proper ROM for normal function with self care, mobility, lifting and ambulation. Modalities:     [x] GAME READY (VASO)- for significant edema, swelling, pain control. Charges  Timed Code Treatment Minutes: 45   Total Treatment Minutes: 60       [] EVAL (LOW) 67079   [] EVAL (MOD) 06066  [] EVAL (HIGH) 07151   [] RE-EVAL     [x] ND(07185) x   2  [] IONTO  [] NMR (80870) x     [x] VASO - Knee medium 15'  [x] Manual (20536) x  1    [] Other:  [] TA x      [] Mech Traction (51647)  [] ES(attended) (46678)      [] ES (un) (85360):       GOALS:   Patient stated goal: Be able to travel later this year   [] Progressing: [] Met: [] Not Met: [] Adjusted     Therapist goals for Patient:   Short Term Goals: To be achieved in: 2 weeks  1. Independent in HEP and progression per patient tolerance, in order to prevent re-injury. [] Progressing: [] Met: [] Not Met: [] Adjusted  2. Patient will have a decrease in pain to facilitate improvement in movement, function, and ADLs as indicated by Functional Deficits.   [] Progressing: [] Met: [] Not Met: [] Adjusted     Long Term Goals: To be achieved in: 12 weeks  1. LEFS score will improve by at least 9 points to reach [employer] 112 to assist with reaching prior level of function. [] Progressing: [] Met: [] Not Met: [] Adjusted  2. Patient will demonstrate increased AROM to at least 120 degrees knee flexion to allow for proper joint functioning as indicated by patients Functional Deficits. [] Progressing: [] Met: [] Not Met: [] Adjusted  3. Patient will be able to ambulate at least 10 minutes without AD with no increase in symptoms. [] Progressing: [] Met: [] Not Met: [] Adjusted  4. Patient will be able to perform grocery shopping and laundry independently without increase in symptoms to allow for improved household management. [] Progressing: [] Met: [] Not Met: [] Adjusted  5. Patient will be able to tolerate standing for at least 30 minutes for participation in choir activities. [] Progressing: [] Met: [] Not Met: [] Adjusted       Overall Progression Towards Functional goals/ Treatment Progress Update:  [] Patient is progressing as expected towards functional goals listed. [] Progression is slowed due to complexities/Impairments listed. [] Progression has been slowed due to co-morbidities. [x] Plan just implemented, too soon to assess goals progression <30days   [] Goals require adjustment due to lack of progress  [] Patient is not progressing as expected and requires additional follow up with physician  [] Other    Prognosis for POC: [x] Good [] Fair  [] Poor      Patient requires continued skilled intervention: [x] Yes  [] No    Treatment/Activity Tolerance:  [x] Patient able to complete treatment  [] Patient limited by fatigue  [] Patient limited by pain    [] Patient limited by other medical complications  [] Other:     ASSESSMENT: We decreased work volume this session due to increased pain levels. Jacob De León was able to complete decreased reps with consistent discomfort.  Continued to discussed increased healing time due to surgery being a revision to help with Fernanda's expectations for return to higher activity. She would continue to benefit from physical therapy services to maximize functional outcomes and progress towards goals. PLAN:  [x] Continue per plan of care [] Alter current plan (see comments above)  [] Plan of care initiated [] Hold pending MD visit [] Discharge      Electronically signed by:  Maria R Al PT, Ritu Hebert, SPT    Therapist was present, directed the patient's care, made skilled judgement, and was responsible for assessment and treatment of the patient. Note: If patient does not return for scheduled/ recommended follow up visits, this note will serve as a discharge from care along with most recent update on progress.

## 2023-02-24 ENCOUNTER — OFFICE VISIT (OUTPATIENT)
Dept: ORTHOPEDIC SURGERY | Age: 63
End: 2023-02-24

## 2023-02-24 VITALS — BODY MASS INDEX: 37.73 KG/M2 | HEIGHT: 62 IN | WEIGHT: 205 LBS

## 2023-02-24 DIAGNOSIS — Z96.652 S/P REVISION OF TOTAL KNEE, LEFT: Primary | ICD-10-CM

## 2023-02-24 NOTE — PROGRESS NOTES
Dr Srini Torres      Date /Time 2/24/2023       9:56 AM EST  Name Fernanda Diego             1960   Location  [facility] DR ORTHOPEDIC SURG  MRN [id number]                Chief Complaint   Patient presents with    Follow-up     REV Left TKA 01/09/2023          History of Present Illness      Fernanda Diego is a 62 y.o. female is here for post-op visit after LEFT  96891 Revision Total Knee Arthroplasty      Patient presents to the office today for a follow-up visit concerning her left revision total knee arthroplasty.  Patient continues to complain of pain symptoms and swelling.  She denies any fever or chills.  She is complaining of swelling.  She does have occasional sharp pains.      Ht 5' 2\" (1.575 m)   Wt 205 lb (93 kg)   LMP 01/26/1996   BMI 37.49 kg/m²      Constitutional:       General: He is not in acute distress.     Appearance: Normal appearance.     LEFT Knee: incision clean, intact, healing appropriately. No surrounding  erythema or fluctuance. Neuro intact distal. No evidence of DVT.  Large knee joint effusion present.      Laboratory evaluation previous knee aspiration     Aerobic cultures-no growth  Anaerobic cultures-no growth  Gram stain-no organisms  Cell count- WBCs cells 726      Imaging       X-rays were ordered today and reviewed of the left knee.  3 views.  Standing AP, lateral, and skyline views.  They demonstrate a left total knee arthroplasty in good position.  No evidence of loosening or periprosthetic fracture      Assessment and Plan    Fernanda was seen today for follow-up.    Diagnoses and all orders for this visit:    S/P revision of total knee, left  -     XR KNEE LEFT (3 VIEWS); Future      Overall I think patient is doing well.  I would not recommend aspirin the knee again.  There is a risk of causing an infection.  I would continue with physical therapy and efforts at maximizing swelling control.  We will see the patient back in 3 month or sooner if problems  arise.      Electronically signed by Ara Peabody, MD on 2/24/2023 at 8:41 AM  This dictation was generated by voice recognition computer software. Although all attempts are made to edit the dictation for accuracy, there may be errors in the transcription that are not intended.

## 2023-02-27 ENCOUNTER — TELEPHONE (OUTPATIENT)
Dept: ORTHOPEDIC SURGERY | Age: 63
End: 2023-02-27

## 2023-02-28 ENCOUNTER — HOSPITAL ENCOUNTER (OUTPATIENT)
Dept: PHYSICAL THERAPY | Age: 63
Setting detail: THERAPIES SERIES
Discharge: HOME | End: 2023-02-28
Payer: MEDICARE

## 2023-02-28 ENCOUNTER — APPOINTMENT (OUTPATIENT)
Dept: PHYSICAL THERAPY | Age: 63
End: 2023-02-28
Payer: MEDICARE

## 2023-02-28 ENCOUNTER — OFFICE VISIT (OUTPATIENT)
Dept: ORTHOPEDIC SURGERY | Age: 63
End: 2023-02-28

## 2023-02-28 VITALS — HEIGHT: 62 IN | BODY MASS INDEX: 37.73 KG/M2 | WEIGHT: 205 LBS

## 2023-02-28 DIAGNOSIS — Z96.652 S/P REVISION OF TOTAL KNEE, LEFT: Primary | ICD-10-CM

## 2023-02-28 PROCEDURE — 99024 POSTOP FOLLOW-UP VISIT: CPT | Performed by: ORTHOPAEDIC SURGERY

## 2023-02-28 PROCEDURE — 97140 MANUAL THERAPY 1/> REGIONS: CPT

## 2023-02-28 PROCEDURE — 97110 THERAPEUTIC EXERCISES: CPT

## 2023-02-28 PROCEDURE — 97016 VASOPNEUMATIC DEVICE THERAPY: CPT

## 2023-02-28 NOTE — PROGRESS NOTES
Dr Rufus Lozoya      Date /Time 2/28/2023       9:56 AM EST  Name Katelyn Andres             1960   Location  [facility] SURG  MRN [id number]                Chief Complaint   Patient presents with    Follow-up     Left TKA 01/09/2023 - swelling          History of Present Illness      Katelyn Andres is a 57 y.o. female is here for post-op visit after LEFT  28530 Revision Total Knee Arthroplasty      Patient presents to the office today for a follow-up visit concerning her left revision total knee arthroplasty. Patient continues to complain of pain symptoms and swelling. She denies any fever or chills. She is complaining of swelling. She does have occasional sharp pains. Patient did call yesterday and was concerned about a blood clot. With her symptoms more over the anterior aspect of the knee instead of blindly ordering a venous Doppler we brought her in for a visit today. Ht 5' 2\" (1.575 m)   Wt 205 lb (93 kg)   LMP 01/26/1996   BMI 37.49 kg/m²      Constitutional:       General: He is not in acute distress. Appearance: Normal appearance. LEFT Knee: incision clean, intact, healing appropriately. No surrounding  erythema or fluctuance. Neuro intact distal. No evidence of DVT. Large knee joint effusion present. Negative Homans' sign      Laboratory evaluation previous knee aspiration     Aerobic cultures-no growth  Anaerobic cultures-no growth  Gram stain-no organisms  Cell count- WBCs cells 726      Imaging       Previous X-rays were ordered today and reviewed of the left knee. 3 views. Standing AP, lateral, and skyline views. They demonstrate a left total knee arthroplasty in good position. No evidence of loosening or periprosthetic fracture      Assessment and Plan    Tony Cope was seen today for follow-up. Diagnoses and all orders for this visit:    S/P revision of total knee, left        Overall I think patient is doing well.   Patient is simply having pain after a total knee arthroplasty. She had 3 big surgeries. The first was a total knee arthroplasty done by outside provider. Second was removal of hardware and third was reimplantation of total knee arthroplasty with both femoral and tibial stems. I have impressed upon her that what she is feeling in terms of pain is normal and expected and not a sign of anything being wrong. We have already had 2 postoperative aspirations and they were negative for infection. Would continue with physical therapy and emphasis on swelling control. I see no signs of DVT at this time. We will see the patient back in 2 months or sooner if problems arise    Electronically signed by Marcy Marroquin MD on 2/28/2023 at 8:38 AM  This dictation was generated by voice recognition computer software. Although all attempts are made to edit the dictation for accuracy, there may be errors in the transcription that are not intended.

## 2023-02-28 NOTE — FLOWSHEET NOTE
The [facility] and [address],  Sports Performance and Rehabilitation, [facility] [address]                  [address] [facility],5[address]        Arlene Fierro  Phone: [phone]  Fax: [phone]    Physical Therapy Treatment Note/ Progress Report:           Date:  2023    Patient Name:  Lalo Hills    :  1960  MRN: [id number]  Restrictions/Precautions:    Medical/Treatment Diagnosis Information:  Left knee pain [M25.562]  L TKR [Z96.659]; L Effusion [M25.462]       Insurance/Certification information:  [facility] Medicare   Physician Information:  Moises Souza MD  Has the plan of care been signed (Y/N):        []  Yes  [x]  No     Date of Patient follow up with Physician: 2023      Is this a Progress Report:     []  Yes  [x]  No        If Yes:  Date Range for reporting period:  Beginning 2023  Ending    Progress report will be due (10 Rx or 30 days whichever is less): 1598       Recertification will be due (POC Duration  / 90 days whichever is less): 4/3/2023      Visit # Insurance Allowable Auth Required   In-person 6 MN []  Yes [x]  No        Functional Scale: LEFS  (85% Disability)             Date assessed:  23       Number of Comorbidities:  []0     []1-2    [x]3+    Latex Allergy:  [x]NO      []YES  Preferred Language for Healthcare:   [x]English       []other:      Pain level:  8/10     SUBJECTIVE: Pt came to session today with continued discomfort. Discussed education given at appointment with doctor that this level of discomfort is to be expected given that her knee has now undergone 3 surgeries along with the rods used in most recent surgery. She feels more willing to participate now that she better understands this is to be expected.      OBJECTIVE:   Observation: Entered clinic ambulating with reciprocal gait and cane   Test measurements:  knee flexion to 122, knee extension to 0; moderate swelling global in knee (42 cm on 2/20/2023)    RESTRICTIONS/PRECAUTIONS: WBAT    Exercises/Interventions:     Therapeutic Ex (41123)/NMR re-education (31228) Reps Notes/CUES   Bike 10'    Calf stretch 3x30\"    Bridges  SLR  LAQ 2x10  2x10 S/L SLR ABD 2x10 NV  NV   Sit to stand  3x10       FSU/LSU to 4\"  2x10 ea Calf Raises  2x10             *LEFI* NV           Manual Intervention (32322)     Patellar mobs/PROM of knee/ STM to hamstring/quad 15'                HEP instruction:      Access Code: Mariza Root  URL: [email or web address]  Date: 01/31/2023  Prepared by: Jessenia Brunner     Exercises  Sidelying Hip Abduction - 1 x daily - 7 x weekly - 3 sets - 10 reps  Supine Bridge - 1 x daily - 7 x weekly - 3 sets - 10 reps  Supine Active Straight Leg Raise - 1 x daily - 7 x weekly - 3 sets - 10 reps  Supine Heel Slide - 1 x daily - 7 x weekly - 3 sets - 10 reps      Therapeutic Exercise and NMR EXR  [x] (13785) Provided verbal/tactile cueing for activities related to strengthening, flexibility, endurance, ROM for improvements in LE, proximal hip, and core control with self care, mobility, lifting, ambulation. [x] (95201) Provided verbal/tactile cueing for activities related to improving balance, coordination, kinesthetic sense, posture, motor skill, proprioception  to assist with LE, proximal hip, and core control in self care, mobility, lifting, ambulation and eccentric single leg control.      NMR and Therapeutic Activities:    [x] (38717 or 79455) Provided verbal/tactile cueing for activities related to improving balance, coordination, kinesthetic sense, posture, motor skill, proprioception and motor activation to allow for proper function of core, proximal hip and LE with self care and ADLs  [x] (61108) Gait Re-education- Provided training and instruction to the patient for proper LE, core and proximal hip recruitment and positioning and eccentric body weight control with ambulation re-education including up and down stairs     Home Exercise Program:    [x] (33472) Reviewed/Progressed HEP activities related to strengthening, flexibility, endurance, ROM of core, proximal hip and LE for functional self-care, mobility, lifting and ambulation/stair navigation   [] (44488)Reviewed/Progressed HEP activities related to improving balance, coordination, kinesthetic sense, posture, motor skill, proprioception of core, proximal hip and LE for self care, mobility, lifting, and ambulation/stair navigation      Manual Treatments:  PROM / STM / Oscillations-Mobs:  G-I, II, III, IV (PA's, Inf., Post.)  [x] (14904) Provided manual therapy to mobilize LE, proximal hip and/or LS spine soft tissue/joints for the purpose of modulating pain, promoting relaxation,  increasing ROM, reducing/eliminating soft tissue swelling/inflammation/restriction, improving soft tissue extensibility and allowing for proper ROM for normal function with self care, mobility, lifting and ambulation. Modalities:     [x] GAME READY (VASO)- for significant edema, swelling, pain control. Charges  Timed Code Treatment Minutes: 55   Total Treatment Minutes: 70       [] EVAL (LOW) 97277   [] EVAL (MOD) 94197  [] EVAL (HIGH) 23479   [] RE-EVAL     [x] HK(75925) x   3  [] IONTO  [] NMR (37124) x     [x] VASO - Knee medium 15'  [x] Manual (78624) x  1    [] Other:  [] TA x      [] Mech Traction (15914)  [] ES(attended) (67791)      [] ES (un) (00107):       GOALS:   Patient stated goal: Be able to travel later this year   [] Progressing: [] Met: [] Not Met: [] Adjusted     Therapist goals for Patient:   Short Term Goals: To be achieved in: 2 weeks  1. Independent in HEP and progression per patient tolerance, in order to prevent re-injury. [] Progressing: [] Met: [] Not Met: [] Adjusted  2. Patient will have a decrease in pain to facilitate improvement in movement, function, and ADLs as indicated by Functional Deficits. [] Progressing: [] Met: [] Not Met: [] Adjusted     Long Term Goals:  To be achieved in: 12 weeks  1. LEFS score will improve by at least 9 points to reach [employer] 112 to assist with reaching prior level of function. [] Progressing: [] Met: [] Not Met: [] Adjusted  2. Patient will demonstrate increased AROM to at least 120 degrees knee flexion to allow for proper joint functioning as indicated by patients Functional Deficits. [] Progressing: [] Met: [] Not Met: [] Adjusted  3. Patient will be able to ambulate at least 10 minutes without AD with no increase in symptoms. [] Progressing: [] Met: [] Not Met: [] Adjusted  4. Patient will be able to perform grocery shopping and laundry independently without increase in symptoms to allow for improved household management. [] Progressing: [] Met: [] Not Met: [] Adjusted  5. Patient will be able to tolerate standing for at least 30 minutes for participation in choir activities. [] Progressing: [] Met: [] Not Met: [] Adjusted       Overall Progression Towards Functional goals/ Treatment Progress Update:  [] Patient is progressing as expected towards functional goals listed. [] Progression is slowed due to complexities/Impairments listed. [] Progression has been slowed due to co-morbidities. [x] Plan just implemented, too soon to assess goals progression <30days   [] Goals require adjustment due to lack of progress  [] Patient is not progressing as expected and requires additional follow up with physician  [] Other    Prognosis for POC: [x] Good [] Fair  [] Poor      Patient requires continued skilled intervention: [x] Yes  [] No    Treatment/Activity Tolerance:  [x] Patient able to complete treatment  [] Patient limited by fatigue  [] Patient limited by pain    [] Patient limited by other medical complications  [] Other:     ASSESSMENT: Ilia Larson continues to demonstrate high pain and decreased LE strength but is more willing to participate and work through post op pain.  She would continue to benefit from physical therapy services to maximize functional outcomes and progress towards goals. PLAN:  [x] Continue per plan of care [] Alter current plan (see comments above)  [] Plan of care initiated [] Hold pending MD visit [] Discharge      Electronically signed by:  Wes Murphy PT, Narvis Snowball, SPT    Therapist was present, directed the patient's care, made skilled judgement, and was responsible for assessment and treatment of the patient. Note: If patient does not return for scheduled/ recommended follow up visits, this note will serve as a discharge from care along with most recent update on progress.

## 2023-03-03 ENCOUNTER — HOSPITAL ENCOUNTER (OUTPATIENT)
Dept: PHYSICAL THERAPY | Age: 63
Setting detail: THERAPIES SERIES
Discharge: HOME | End: 2023-03-03
Payer: MEDICARE

## 2023-03-07 ENCOUNTER — HOSPITAL ENCOUNTER (OUTPATIENT)
Dept: PHYSICAL THERAPY | Age: 63
Setting detail: THERAPIES SERIES
Discharge: HOME | End: 2023-03-07
Payer: MEDICARE

## 2023-03-07 PROCEDURE — 97016 VASOPNEUMATIC DEVICE THERAPY: CPT

## 2023-03-07 PROCEDURE — 97110 THERAPEUTIC EXERCISES: CPT

## 2023-03-07 NOTE — FLOWSHEET NOTE
[facility] and [address],  Sports Performance and Rehabilitation, [facility] [address]                  [address] [facility],5Th Floor        Arlene Fierro  Phone: [phone]  Fax: [phone]    Physical Therapy Treatment Note/ Progress Report:           Date:  3/7/2023    Patient Name:  Cydney Eden    :  1960  MRN: [id number]  Restrictions/Precautions:    Medical/Treatment Diagnosis Information:  Left knee pain [M25.562]  L TKR [Z96.659]; L Effusion [M25.462]       Insurance/Certification information:  [facility] Medicare   Physician Information:  Radha Zapata MD  Has the plan of care been signed (Y/N):        []  Yes  [x]  No     Date of Patient follow up with Physician: 2023      Is this a Progress Report:     []  Yes  [x]  No        If Yes:  Date Range for reporting period:  Beginning 2023  Ending 3/7/2023    Progress report will be due (10 Rx or 30 days whichever is less): 3/0/8421       Recertification will be due (POC Duration  / 90 days whichever is less): 5/3/2023      Visit # Insurance Allowable Auth Required   In-person 7 MN []  Yes [x]  No        Functional Scale: LEFS 12/80 (85% Disability)             Date assessed:  23    LEFS = 33/80 = 58% Deficit   3/7/2023     Number of Comorbidities:  []0     []1-2    [x]3+    Latex Allergy:  [x]NO      []YES  Preferred Language for Healthcare:   [x]English       []other:      Pain level:  7-8/10     SUBJECTIVE: Pt came to session today with improved mobility and has become more accepting of expected post op discomfort. Was on feet a lot on  but was able to rest yesterday.      OBJECTIVE:   Observation: Entered clinic ambulating with reciprocal gait and cane   Test measurements:  knee flexion to 122, knee extension to 0; moderate swelling global in knee (42 cm on 2023)    RESTRICTIONS/PRECAUTIONS: WBAT    Exercises/Interventions:     Therapeutic Ex (62002)/NMR re-education (62781) Reps Notes/CUES   Bike 5'    Calf stretch 3x30\"    Bridges  3X10  BOSU   SLR  LAQ 2x10  2x10 1# weight  1#   S/L SLR ABD 2x10 1# weight    LIBAN ABD  Leg Press 2x10  3x10 25#  75#         FSU/LSU  2x10 ea 6\"    Calf Raises  2x10                       Manual Intervention (25549)     Patellar mobs/PROM of knee/ STM to hamstring/quad 5'                HEP instruction:      Access Code: Monico Rodriguez  URL: [email or web address]  Date: 01/31/2023  Prepared by: Ceci Roper     Exercises  Sidelying Hip Abduction - 1 x daily - 7 x weekly - 3 sets - 10 reps  Supine Bridge - 1 x daily - 7 x weekly - 3 sets - 10 reps  Supine Active Straight Leg Raise - 1 x daily - 7 x weekly - 3 sets - 10 reps  Supine Heel Slide - 1 x daily - 7 x weekly - 3 sets - 10 reps      Therapeutic Exercise and NMR EXR  [x] (01985) Provided verbal/tactile cueing for activities related to strengthening, flexibility, endurance, ROM for improvements in LE, proximal hip, and core control with self care, mobility, lifting, ambulation. [x] (08507) Provided verbal/tactile cueing for activities related to improving balance, coordination, kinesthetic sense, posture, motor skill, proprioception  to assist with LE, proximal hip, and core control in self care, mobility, lifting, ambulation and eccentric single leg control.      NMR and Therapeutic Activities:    [x] (47541 or 32868) Provided verbal/tactile cueing for activities related to improving balance, coordination, kinesthetic sense, posture, motor skill, proprioception and motor activation to allow for proper function of core, proximal hip and LE with self care and ADLs  [x] (93481) Gait Re-education- Provided training and instruction to the patient for proper LE, core and proximal hip recruitment and positioning and eccentric body weight control with ambulation re-education including up and down stairs     Home Exercise Program:    [x] (30837) Reviewed/Progressed HEP activities related to strengthening, flexibility, endurance, ROM of core, proximal hip and LE for functional self-care, mobility, lifting and ambulation/stair navigation   [] (53302)Reviewed/Progressed HEP activities related to improving balance, coordination, kinesthetic sense, posture, motor skill, proprioception of core, proximal hip and LE for self care, mobility, lifting, and ambulation/stair navigation      Manual Treatments:  PROM / STM / Oscillations-Mobs:  G-I, II, III, IV (PA's, Inf., Post.)  [x] (06522) Provided manual therapy to mobilize LE, proximal hip and/or LS spine soft tissue/joints for the purpose of modulating pain, promoting relaxation,  increasing ROM, reducing/eliminating soft tissue swelling/inflammation/restriction, improving soft tissue extensibility and allowing for proper ROM for normal function with self care, mobility, lifting and ambulation. Modalities:     [x] GAME READY (VASO)- for significant edema, swelling, pain control. Charges  Timed Code Treatment Minutes: 45   Total Treatment Minutes: 60       [] EVAL (LOW) 65315   [] EVAL (MOD) 91743  [] EVAL (HIGH) 31279   [] RE-EVAL     [x] DU(79484) x   3  [] IONTO  [] NMR (15724) x     [x] VASO - Knee medium 15'  [] Manual (19422) x     [] Other:  [] TA x      [] Mech Traction (20383)  [] ES(attended) (40091)      [] ES (un) (00099):       GOALS:   Patient stated goal: Be able to travel later this year   [x] Progressing: [] Met: [] Not Met: [] Adjusted     Therapist goals for Patient:   Short Term Goals: To be achieved in: 2 weeks  1. Independent in HEP and progression per patient tolerance, in order to prevent re-injury. [] Progressing: [x] Met: [] Not Met: [] Adjusted  2. Patient will have a decrease in pain to facilitate improvement in movement, function, and ADLs as indicated by Functional Deficits. [x] Progressing: [] Met: [] Not Met: [] Adjusted     Long Term Goals: To be achieved in: 12 weeks  1.  LEFS score will improve by at least 9 points to reach [employer] 112 to assist with reaching prior level of function. [] Progressing: [x] Met: [] Not Met: [] Adjusted  2. Patient will demonstrate increased AROM to at least 120 degrees knee flexion to allow for proper joint functioning as indicated by patients Functional Deficits. [] Progressing: [x] Met: [] Not Met: [] Adjusted  3. Patient will be able to ambulate at least 10 minutes without AD with no increase in symptoms. [x] Progressing: [] Met: [] Not Met: [] Adjusted  4. Patient will be able to perform grocery shopping and laundry independently without increase in symptoms to allow for improved household management. [x] Progressing: [] Met: [] Not Met: [] Adjusted  5. Patient will be able to tolerate standing for at least 30 minutes for participation in choir activities. [x] Progressing: [] Met: [] Not Met: [] Adjusted       Overall Progression Towards Functional goals/ Treatment Progress Update:  [x] Patient is progressing as expected towards functional goals listed. [] Progression is slowed due to complexities/Impairments listed. [] Progression has been slowed due to co-morbidities. [] Plan just implemented, too soon to assess goals progression <30days   [] Goals require adjustment due to lack of progress  [] Patient is not progressing as expected and requires additional follow up with physician  [] Other    Prognosis for POC: [x] Good [] Fair  [] Poor      Patient requires continued skilled intervention: [x] Yes  [] No    Treatment/Activity Tolerance:  [x] Patient able to complete treatment  [] Patient limited by fatigue  [] Patient limited by pain    [] Patient limited by other medical complications  [] Other:     ASSESSMENT: Verona Santa was able to increase LE workload today with some encouragement for participation. She would continue to benefit from physical therapy services to maximize functional outcomes and progress towards goals.        PLAN:  [x] Continue per plan of care [] Alter current plan (see comments above)  [] Plan of care initiated [] Hold pending MD visit [] Discharge      Electronically signed by:  Carmen Damon PT, Rosey Datlon, ANGEL    Therapist was present, directed the patient's care, made skilled judgement, and was responsible for assessment and treatment of the patient. Note: If patient does not return for scheduled/ recommended follow up visits, this note will serve as a discharge from care along with most recent update on progress.

## 2023-03-09 ENCOUNTER — HOSPITAL ENCOUNTER (OUTPATIENT)
Dept: PHYSICAL THERAPY | Age: 63
Setting detail: THERAPIES SERIES
Discharge: HOME | End: 2023-03-09
Payer: MEDICARE

## 2023-03-09 PROCEDURE — 97110 THERAPEUTIC EXERCISES: CPT

## 2023-03-09 PROCEDURE — 97016 VASOPNEUMATIC DEVICE THERAPY: CPT

## 2023-03-09 NOTE — FLOWSHEET NOTE
The [facility] and [address],  Sports Performance and Rehabilitation, [facility] [address]                  [address] [facility],5[address] Floor        Arlene Fierro  Phone: [phone]  Fax: [phone]    Physical Therapy Treatment Note/ Progress Report:           Date:  3/9/2023    Patient Name:  Gail Query    :  1960  MRN: [id number]  Restrictions/Precautions:    Medical/Treatment Diagnosis Information:  Left knee pain [M25.562]  L TKR [Z96.659]; L Effusion [M25.462]       Insurance/Certification information:  [facility]   Physician Information:  Ronel Latham MD  Has the plan of care been signed (Y/N):        []  Yes  [x]  No     Date of Patient follow up with Physician: 2023      Is this a Progress Report:     []  Yes  [x]  No        If Yes:  Date Range for reporting period:  Beginning 2023  Ending 3/7/2023    Progress report will be due (10 Rx or 30 days whichever is less): 3/0/3690       Recertification will be due (POC Duration  / 90 days whichever is less): 5/3/2023      Visit # Insurance Allowable Auth Required   In-person 7 MN []  Yes [x]  No        Functional Scale: LEFS 12/80 (85% Disability)             Date assessed:  23    LEFS = 33/80 = 58% Deficit   3/7/2023     Number of Comorbidities:  []0     []1-2    [x]3+    Latex Allergy:  [x]NO      []YES  Preferred Language for Healthcare:   [x]English       []other:      Pain level:  8/10     SUBJECTIVE: Pt has been experiencing continued pain in knee. Has been compliant with HEP.      OBJECTIVE:   Observation: Entered clinic ambulating with reciprocal gait and cane   Test measurements:  knee flexion to 122, knee extension to 0; moderate swelling global in knee (42 cm on 2023)    RESTRICTIONS/PRECAUTIONS: WBAT    Exercises/Interventions:     Therapeutic Ex (53580)/NMR re-education (93000) Reps Notes/CUES   Bike 5'    Calf stretch 3x30\"    Bridges  3X10  BOSU   [facility] ABD  Leg Press 2x10  3x10 25#  75#         FSU/LSU  2x10 ea 6\"    Calf Raises  2x10 On decline                       Manual Intervention (12200)     Patellar mobs/PROM of knee/ STM to hamstring/quad 5'                HEP instruction:      Access Code: Tony Croft  URL: [email or web address]  Date: 01/31/2023  Prepared by: Tj Villalobos     Exercises  Sidelying Hip Abduction - 1 x daily - 7 x weekly - 3 sets - 10 reps  Supine Bridge - 1 x daily - 7 x weekly - 3 sets - 10 reps  Supine Active Straight Leg Raise - 1 x daily - 7 x weekly - 3 sets - 10 reps  Supine Heel Slide - 1 x daily - 7 x weekly - 3 sets - 10 reps      Therapeutic Exercise and NMR EXR  [x] (08432) Provided verbal/tactile cueing for activities related to strengthening, flexibility, endurance, ROM for improvements in LE, proximal hip, and core control with self care, mobility, lifting, ambulation. [x] (37615) Provided verbal/tactile cueing for activities related to improving balance, coordination, kinesthetic sense, posture, motor skill, proprioception  to assist with LE, proximal hip, and core control in self care, mobility, lifting, ambulation and eccentric single leg control.      NMR and Therapeutic Activities:    [x] (45554 or 92471) Provided verbal/tactile cueing for activities related to improving balance, coordination, kinesthetic sense, posture, motor skill, proprioception and motor activation to allow for proper function of core, proximal hip and LE with self care and ADLs  [x] (20052) Gait Re-education- Provided training and instruction to the patient for proper LE, core and proximal hip recruitment and positioning and eccentric body weight control with ambulation re-education including up and down stairs     Home Exercise Program:    [x] (00827) Reviewed/Progressed HEP activities related to strengthening, flexibility, endurance, ROM of core, proximal hip and LE for functional self-care, mobility, lifting and ambulation/stair navigation   [] (94931)Reviewed/Progressed HEP activities related to improving balance, coordination, kinesthetic sense, posture, motor skill, proprioception of core, proximal hip and LE for self care, mobility, lifting, and ambulation/stair navigation      Manual Treatments:  PROM / STM / Oscillations-Mobs:  G-I, II, III, IV (PA's, Inf., Post.)  [x] (75859) Provided manual therapy to mobilize LE, proximal hip and/or LS spine soft tissue/joints for the purpose of modulating pain, promoting relaxation,  increasing ROM, reducing/eliminating soft tissue swelling/inflammation/restriction, improving soft tissue extensibility and allowing for proper ROM for normal function with self care, mobility, lifting and ambulation. Modalities:     [x] GAME READY (VASO)- for significant edema, swelling, pain control. Charges  Timed Code Treatment Minutes: 40   Total Treatment Minutes: 55       [] EVAL (LOW) 28974   [] EVAL (MOD) 28615  [] EVAL (HIGH) 49749   [] RE-EVAL     [x] JZ(58237) x   3  [] IONTO  [] NMR (40752) x     [x] VASO - Knee medium 15'  [] Manual (38883) x     [] Other:  [] TA x      [] Mech Traction (90232)  [] ES(attended) (60650)      [] ES (un) (03972):       GOALS:   Patient stated goal: Be able to travel later this year   [x] Progressing: [] Met: [] Not Met: [] Adjusted     Therapist goals for Patient:   Short Term Goals: To be achieved in: 2 weeks  1. Independent in HEP and progression per patient tolerance, in order to prevent re-injury. [] Progressing: [x] Met: [] Not Met: [] Adjusted  2. Patient will have a decrease in pain to facilitate improvement in movement, function, and ADLs as indicated by Functional Deficits. [x] Progressing: [] Met: [] Not Met: [] Adjusted     Long Term Goals: To be achieved in: 12 weeks  1. LEFS score will improve by at least 9 points to reach [employer] 112 to assist with reaching prior level of function. [] Progressing: [x] Met: [] Not Met: [] Adjusted  2.  Patient will demonstrate increased AROM to at least 120 degrees knee flexion to allow for proper joint functioning as indicated by patients Functional Deficits. [] Progressing: [x] Met: [] Not Met: [] Adjusted  3. Patient will be able to ambulate at least 10 minutes without AD with no increase in symptoms. [x] Progressing: [] Met: [] Not Met: [] Adjusted  4. Patient will be able to perform grocery shopping and laundry independently without increase in symptoms to allow for improved household management. [x] Progressing: [] Met: [] Not Met: [] Adjusted  5. Patient will be able to tolerate standing for at least 30 minutes for participation in choir activities. [x] Progressing: [] Met: [] Not Met: [] Adjusted       Overall Progression Towards Functional goals/ Treatment Progress Update:  [x] Patient is progressing as expected towards functional goals listed. [] Progression is slowed due to complexities/Impairments listed. [] Progression has been slowed due to co-morbidities. [] Plan just implemented, too soon to assess goals progression <30days   [] Goals require adjustment due to lack of progress  [] Patient is not progressing as expected and requires additional follow up with physician  [] Other    Prognosis for POC: [x] Good [] Fair  [] Poor      Patient requires continued skilled intervention: [x] Yes  [] No    Treatment/Activity Tolerance:  [x] Patient able to complete treatment  [] Patient limited by fatigue  [] Patient limited by pain    [] Patient limited by other medical complications  [] Other:     ASSESSMENT: Joe Coates continues to require encouragement to participate in exercises due to high pain levels. She would continue to benefit from physical therapy services to maximize functional outcomes and progress towards goals.        PLAN:  [x] Continue per plan of care [] Alter current plan (see comments above)  [] Plan of care initiated [] Hold pending MD visit [] Discharge      Electronically signed by:  Gary Beltran PT, Jina Hunt Marcellus Nam, [facility]    Therapist was present, directed the patient's care, made skilled judgement, and was responsible for assessment and treatment of the patient. Note: If patient does not return for scheduled/ recommended follow up visits, this note will serve as a discharge from care along with most recent update on progress.

## 2023-03-10 ENCOUNTER — OFFICE VISIT (OUTPATIENT)
Dept: ORTHOPEDIC SURGERY | Age: 63
End: 2023-03-10

## 2023-03-10 VITALS — HEIGHT: 62 IN | WEIGHT: 205 LBS | BODY MASS INDEX: 37.73 KG/M2

## 2023-03-10 DIAGNOSIS — Z96.652 S/P REVISION OF TOTAL KNEE, LEFT: Primary | ICD-10-CM

## 2023-03-10 NOTE — PROGRESS NOTES
Dr Maribel Cortes      Date /Time 3/10/2023       9:56 AM EST  Name Hector Dhillon             1960   Location  [facility]  MRN [id number]                Chief Complaint   Patient presents with    Follow-up     Rev Left TKA SX 01/09/2023          History of Present Illness      Hector Dhillon is a 57 y.o. female is here for post-op visit after LEFT  [address]44 Revision Total Knee Arthroplasty      Patient presents to the office today for a follow-up visit concerning her left revision total knee arthroplasty. Patient is reporting improved pain symptoms and swelling. She still has significant swelling however. She denies any fever or chills. She is complaining of swelling. She does have occasional sharp pains. Ht 5' 2\" (1.575 m)   Wt 205 lb (93 kg)   LMP 01/26/1996   BMI 37.49 kg/m²      Constitutional:       General: He is not in acute distress. Appearance: Normal appearance. LEFT Knee: incision clean, intact, healed appropriately. No surrounding  erythema or fluctuance. Neuro intact distal. No evidence of DVT. Moderate to large knee joint effusion present. Negative Joe Tawnya' sign    Laboratory evaluation previous knee aspiration   Aspiration results from last time. Aerobic cultures-no growth  Anaerobic cultures-no growth  Gram stain-no organisms  Cell count- WBCs cells 726      Imaging       Previous X-rays were ordered today and reviewed of the left knee. 3 views. Standing AP, lateral, and skyline views. They demonstrate a left total knee arthroplasty in good position. No evidence of loosening or periprosthetic fracture    No new x-rays today. Assessment and Plan    Bridgette Murphy was seen today for follow-up. Diagnoses and all orders for this visit:    S/P revision of total knee, left      Overall I think patient is doing well. Patient is simply having pain after a total knee arthroplasty. She had 3 big surgeries. The first was a total knee arthroplasty done by outside provider. Second was removal of hardware and third was reimplantation of total knee arthroplasty with both femoral and tibial stems. I have impressed upon her that what she is feeling in terms of pain is normal and expected and not a sign of anything being wrong. We have already had 2 postoperative aspirations and they were negative for infection. Would continue with physical therapy and emphasis on swelling control. We will see the patient back in 2 months or sooner if problems arise    I reiterated our previous discussion. I will see her back in 2 months. We will x-ray the knee at that point. Electronically signed by Arik Lui MD on 3/10/2023 at 8:55 AM  This dictation was generated by voice recognition computer software. Although all attempts are made to edit the dictation for accuracy, there may be errors in the transcription that are not intended.

## 2023-03-14 ENCOUNTER — HOSPITAL ENCOUNTER (OUTPATIENT)
Dept: PHYSICAL THERAPY | Age: 63
Setting detail: THERAPIES SERIES
Discharge: HOME | End: 2023-03-14
Payer: MEDICARE

## 2023-03-14 PROCEDURE — 97016 VASOPNEUMATIC DEVICE THERAPY: CPT

## 2023-03-14 PROCEDURE — 97110 THERAPEUTIC EXERCISES: CPT

## 2023-03-14 NOTE — FLOWSHEET NOTE
The [facility] and [address],  [facility], Critical access hospital [address] [place] [address]                  793 [facility],5Th [facility]        Arlene Fierro  Phone: [phone]  Fax: [phone]    Physical Therapy Treatment Note/ Progress Report:           Date:  3/14/2023    Patient Name:  Marino Harkins    :  1960  MRN: [id number]  Restrictions/Precautions:    Medical/Treatment Diagnosis Information:  Left knee pain [M25.562]  L TKR [Z96.659]; L Effusion [M25.462]       Insurance/Certification information:  [facility] Medicare   Physician Information:  Aliya Chamorro MD  Has the plan of care been signed (Y/N):        []  Yes  [x]  No     Date of Patient follow up with Physician: 2023      Is this a Progress Report:     []  Yes  [x]  No        If Yes:  Date Range for reporting period:  Beginning 3/7/2023  Ending     Progress report will be due (10 Rx or 30 days whichever is less): 8038       Recertification will be due (POC Duration  / 90 days whichever is less): 5/3/2023      Visit # Insurance Allowable Auth Required   In-person 8 [place] []  Yes [x]  No        Functional Scale: LEFS 12/80 (85% Disability)             Date assessed:  23    LEFS = 33/80 = 58% Deficit   3/7/2023     Number of Comorbidities:  []0     []1-2    [x]3+    Latex Allergy:  [x]NO      []YES  Preferred Language for Healthcare:   [x]English       []other:      Pain level:  7-8/10     SUBJECTIVE: No major changes to report.      OBJECTIVE:   Observation: Entered clinic ambulating with reciprocal gait and no AD   Test measurements:  knee flexion to 122, knee extension to 0; moderate swelling global in knee (42 cm on 2023)    RESTRICTIONS/PRECAUTIONS: WBAT    Exercises/Interventions:     Therapeutic Ex (51775)/NMR re-education (85960) Reps Notes/CUES   Bike 5'    Calf stretch 3x30\"    Bridges  2X10  BOSU   LAQ 2x10 3#   LIBAN ABD  Leg Press- DL  Leg Press - Ecc 2x10  3x10  x8 25#  75#  35# FSU/LSU  2x10 ea 6\"    Calf Raises  2x12 On decline                       Manual Intervention (95025)     Patellar mobs/PROM of knee/ STM to hamstring/quad 5'                HEP instruction:      Access Code: Mark Florez  URL: [email or web address]  Date: 01/31/2023  Prepared by: Reji Ta     Exercises  Sidelying Hip Abduction - 1 x daily - 7 x weekly - 3 sets - 10 reps  Supine Bridge - 1 x daily - 7 x weekly - 3 sets - 10 reps  Supine Active Straight Leg Raise - 1 x daily - 7 x weekly - 3 sets - 10 reps  Supine Heel Slide - 1 x daily - 7 x weekly - 3 sets - 10 reps      Therapeutic Exercise and NMR EXR  [x] (85579) Provided verbal/tactile cueing for activities related to strengthening, flexibility, endurance, ROM for improvements in LE, proximal hip, and core control with self care, mobility, lifting, ambulation. [x] (20076) Provided verbal/tactile cueing for activities related to improving balance, coordination, kinesthetic sense, posture, motor skill, proprioception  to assist with LE, proximal hip, and core control in self care, mobility, lifting, ambulation and eccentric single leg control.      NMR and Therapeutic Activities:    [x] (01393 or 46701) Provided verbal/tactile cueing for activities related to improving balance, coordination, kinesthetic sense, posture, motor skill, proprioception and motor activation to allow for proper function of core, proximal hip and LE with self care and ADLs  [x] (49127) Gait Re-education- Provided training and instruction to the patient for proper LE, core and proximal hip recruitment and positioning and eccentric body weight control with ambulation re-education including up and down stairs     Home Exercise Program:    [x] (66344) Reviewed/Progressed HEP activities related to strengthening, flexibility, endurance, ROM of core, proximal hip and LE for functional self-care, mobility, lifting and ambulation/stair navigation   [] (95258)Reviewed/Progressed HEP activities related to improving balance, coordination, kinesthetic sense, posture, motor skill, proprioception of core, proximal hip and LE for self care, mobility, lifting, and ambulation/stair navigation      Manual Treatments:  PROM / STM / Oscillations-Mobs:  G-I, II, III, IV (PA's, Inf., Post.)  [x] (37024) Provided manual therapy to mobilize LE, proximal hip and/or LS spine soft tissue/joints for the purpose of modulating pain, promoting relaxation,  increasing ROM, reducing/eliminating soft tissue swelling/inflammation/restriction, improving soft tissue extensibility and allowing for proper ROM for normal function with self care, mobility, lifting and ambulation. Modalities:     [x] GAME READY (VASO)- for significant edema, swelling, pain control. Charges  Timed Code Treatment Minutes: 45   Total Treatment Minutes: 60       [] EVAL (LOW) 19841   [] EVAL (MOD) 16773  [] EVAL (HIGH) 19952   [] RE-EVAL     [x] CA(39092) x   3  [] IONTO  [] NMR (47363) x     [x] VASO - Knee medium 15'  [] Manual (30697) x     [] Other:  [] TA x      [] Mech Traction (53513)  [] ES(attended) (34815)      [] ES (un) (41008):       GOALS:   Patient stated goal: Be able to travel later this year   [x] Progressing: [] Met: [] Not Met: [] Adjusted     Therapist goals for Patient:   Short Term Goals: To be achieved in: 2 weeks  1. Independent in HEP and progression per patient tolerance, in order to prevent re-injury. [] Progressing: [x] Met: [] Not Met: [] Adjusted  2. Patient will have a decrease in pain to facilitate improvement in movement, function, and ADLs as indicated by Functional Deficits. [x] Progressing: [] Met: [] Not Met: [] Adjusted     Long Term Goals: To be achieved in: 12 weeks  1. LEFS score will improve by at least 9 points to reach [employer] 112 to assist with reaching prior level of function. [] Progressing: [x] Met: [] Not Met: [] Adjusted  2.  Patient will demonstrate increased AROM to at least 120 degrees knee flexion to allow for proper joint functioning as indicated by patients Functional Deficits. [] Progressing: [x] Met: [] Not Met: [] Adjusted  3. Patient will be able to ambulate at least 10 minutes without AD with no increase in symptoms. [x] Progressing: [] Met: [] Not Met: [] Adjusted  4. Patient will be able to perform grocery shopping and laundry independently without increase in symptoms to allow for improved household management. [x] Progressing: [] Met: [] Not Met: [] Adjusted  5. Patient will be able to tolerate standing for at least 30 minutes for participation in choir activities. [x] Progressing: [] Met: [] Not Met: [] Adjusted       Overall Progression Towards Functional goals/ Treatment Progress Update:  [x] Patient is progressing as expected towards functional goals listed. [] Progression is slowed due to complexities/Impairments listed. [] Progression has been slowed due to co-morbidities. [] Plan just implemented, too soon to assess goals progression <30days   [] Goals require adjustment due to lack of progress  [] Patient is not progressing as expected and requires additional follow up with physician  [] Other    Prognosis for POC: [x] Good [] Fair  [] Poor      Patient requires continued skilled intervention: [x] Yes  [] No    Treatment/Activity Tolerance:  [x] Patient able to complete treatment  [] Patient limited by fatigue  [] Patient limited by pain    [] Patient limited by other medical complications  [] Other:     ASSESSMENT: No changes to report. She would continue to benefit from physical therapy services to maximize functional outcomes and progress towards goals.        PLAN:  [x] Continue per plan of care [] Alter current plan (see comments above)  [] Plan of care initiated [] Hold pending MD visit [] Discharge      Electronically signed by:  Cate Gillette, PT, Priya Neely, SPT    Therapist was present, directed the patient's care, made skilled judgement, and was responsible for assessment and treatment of the patient. Note: If patient does not return for scheduled/ recommended follow up visits, this note will serve as a discharge from care along with most recent update on progress.

## 2023-03-16 ENCOUNTER — HOSPITAL ENCOUNTER (OUTPATIENT)
Dept: PHYSICAL THERAPY | Age: 63
Setting detail: THERAPIES SERIES
Discharge: HOME | End: 2023-03-16
Payer: MEDICARE

## 2023-03-16 PROCEDURE — 97140 MANUAL THERAPY 1/> REGIONS: CPT

## 2023-03-16 PROCEDURE — 97110 THERAPEUTIC EXERCISES: CPT

## 2023-03-16 PROCEDURE — 97016 VASOPNEUMATIC DEVICE THERAPY: CPT

## 2023-03-16 NOTE — FLOWSHEET NOTE
The [facility] and 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]; L Effusion [M25.462]       Insurance/Certification information:  [facility] Medicare   Physician Information:  Tahmina Quintero MD  Has the plan of care been signed (Y/N):        []  Yes  [x]  No     Date of Patient follow up with Physician:       Is this a Progress Report:     []  Yes  [x]  No        If Yes:  Date Range for reporting period:  Beginning 3/7/2023  Ending     Progress report will be due (10 Rx or 30 days whichever is less):        Recertification will be due (POC Duration  / 90 days whichever is less): 5/3/2023      Visit # Insurance Allowable Auth Required   In-person 9 MN []  Yes [x]  No        Functional Scale: LEFS 1280 (85% Disability)             Date assessed:  23    LEFS = 33/80 = 58% Deficit   3/7/2023     Number of Comorbidities:  []0     []1-2    [x]3+    Latex Allergy:  [x]NO      []YES  Preferred Language for Healthcare:   [x]English       []other:      Pain level:  7-8/10     SUBJECTIVE: Patient is increased pain due to being on her feet more than usual yesterday.      OBJECTIVE:   Observation: Entered clinic ambulating with reciprocal gait and no AD   Test measurements:  knee flexion to 122, knee extension to 0; moderate swelling global in knee (42 cm on 2023)    RESTRICTIONS/PRECAUTIONS: WBAT    Exercises/Interventions:     Therapeutic Ex (49136)/NMR re-education (45801) Reps Notes/CUES   Bike 5'    Calf stretch 3x30\"    Bridges 3X10 BOSU   SLR  LAQ 2x10  2x10 1# weight  3#   S/L SLR ABD 2x10 1# weight    Leg Press- DL  3x10  75#        Calf Raises  2x12 On decline    TKE machine  NV                 Manual Intervention (26718)     Patellar mobs/PROM of knee/ STM to hamstring/quad 8'                HEP instruction:      Access Code: Leonardo Reyes  URL: [email or web address]  Date: 01/31/2023  Prepared by: Frieda Morley     Exercises  Sidelying Hip Abduction - 1 x daily - 7 x weekly - 3 sets - 10 reps  Supine Bridge - 1 x daily - 7 x weekly - 3 sets - 10 reps  Supine Active Straight Leg Raise - 1 x daily - 7 x weekly - 3 sets - 10 reps  Supine Heel Slide - 1 x daily - 7 x weekly - 3 sets - 10 reps      Therapeutic Exercise and NMR EXR  [x] (12865) Provided verbal/tactile cueing for activities related to strengthening, flexibility, endurance, ROM for improvements in LE, proximal hip, and core control with self care, mobility, lifting, ambulation. [x] (42546) Provided verbal/tactile cueing for activities related to improving balance, coordination, kinesthetic sense, posture, motor skill, proprioception  to assist with LE, proximal hip, and core control in self care, mobility, lifting, ambulation and eccentric single leg control.      NMR and Therapeutic Activities:    [x] (42413 or 31413) Provided verbal/tactile cueing for activities related to improving balance, coordination, kinesthetic sense, posture, motor skill, proprioception and motor activation to allow for proper function of core, proximal hip and LE with self care and ADLs  [x] (68319) Gait Re-education- Provided training and instruction to the patient for proper LE, core and proximal hip recruitment and positioning and eccentric body weight control with ambulation re-education including up and down stairs     Home Exercise Program:    [x] (87344) Reviewed/Progressed HEP activities related to strengthening, flexibility, endurance, ROM of core, proximal hip and LE for functional self-care, mobility, lifting and ambulation/stair navigation [] (20792)Reviewed/Progressed HEP activities related to improving balance, coordination, kinesthetic sense, posture, motor skill, proprioception of core, proximal hip and LE for self care, mobility, lifting, and ambulation/stair navigation      Manual Treatments:  PROM / STM / Oscillations-Mobs:  G-I, II, III, IV (PA's, Inf., Post.)  [x] (92677) Provided manual therapy to mobilize LE, proximal hip and/or LS spine soft tissue/joints for the purpose of modulating pain, promoting relaxation,  increasing ROM, reducing/eliminating soft tissue swelling/inflammation/restriction, improving soft tissue extensibility and allowing for proper ROM for normal function with self care, mobility, lifting and ambulation. Modalities:     [x] GAME READY (VASO)- for significant edema, swelling, pain control. Charges  Timed Code Treatment Minutes: 40   Total Treatment Minutes: 55       [] EVAL (LOW) 56676   [] EVAL (MOD) 75228  [] EVAL (HIGH) 02651   [] RE-EVAL     [x] DM(43787) x  2  [] IONTO  [] NMR (59694) x     [x] VASO - Knee medium 15'  [x] Manual (27512) x 1     [] Other:  [] TA x      [] Mech Traction (30498)  [] ES(attended) (62537)      [] ES (un) (24837):       GOALS:   Patient stated goal: Be able to travel later this year   [x] Progressing: [] Met: [] Not Met: [] Adjusted     Therapist goals for Patient:   Short Term Goals: To be achieved in: 2 weeks  1. Independent in HEP and progression per patient tolerance, in order to prevent re-injury. [] Progressing: [x] Met: [] Not Met: [] Adjusted  2. Patient will have a decrease in pain to facilitate improvement in movement, function, and ADLs as indicated by Functional Deficits. [x] Progressing: [] Met: [] Not Met: [] Adjusted     Long Term Goals: To be achieved in: 12 weeks  1. LEFS score will improve by at least 9 points to reach [employer] 112 to assist with reaching prior level of function. [] Progressing: [x] Met: [] Not Met: [] Adjusted  2.  Patient will demonstrate increased AROM to at least 120 degrees knee flexion to allow for proper joint functioning as indicated by patients Functional Deficits. [] Progressing: [x] Met: [] Not Met: [] Adjusted  3. Patient will be able to ambulate at least 10 minutes without AD with no increase in symptoms. [x] Progressing: [] Met: [] Not Met: [] Adjusted  4. Patient will be able to perform grocery shopping and laundry independently without increase in symptoms to allow for improved household management. [x] Progressing: [] Met: [] Not Met: [] Adjusted  5. Patient will be able to tolerate standing for at least 30 minutes for participation in choir activities. [x] Progressing: [] Met: [] Not Met: [] Adjusted       Overall Progression Towards Functional goals/ Treatment Progress Update:  [x] Patient is progressing as expected towards functional goals listed. [] Progression is slowed due to complexities/Impairments listed. [] Progression has been slowed due to co-morbidities. [] Plan just implemented, too soon to assess goals progression <30days   [] Goals require adjustment due to lack of progress  [] Patient is not progressing as expected and requires additional follow up with physician  [] Other    Prognosis for POC: [x] Good [] Fair  [] Poor      Patient requires continued skilled intervention: [x] Yes  [] No    Treatment/Activity Tolerance:  [x] Patient able to complete treatment  [] Patient limited by fatigue  [] Patient limited by pain    [] Patient limited by other medical complications  [] Other:     ASSESSMENT: No changes to report. She would continue to benefit from physical therapy services to maximize functional outcomes and progress towards goals.        PLAN:  [x] Continue per plan of care [] Alter current plan (see comments above)  [] Plan of care initiated [] Hold pending MD visit [] Discharge      Electronically signed by:  Frieda Morley PT, Chrissy Rizvi, SPT    Therapist was present, directed the patient's care, made skilled judgement, and was responsible for assessment and treatment of the patient. Note: If patient does not return for scheduled/ recommended follow up visits, this note will serve as a discharge from care along with most recent update on progress.

## 2023-03-20 ENCOUNTER — HOSPITAL ENCOUNTER (OUTPATIENT)
Dept: PHYSICAL THERAPY | Age: 63
Setting detail: THERAPIES SERIES
Discharge: HOME | End: 2023-03-20
Payer: MEDICARE

## 2023-03-20 NOTE — FLOWSHEET NOTE
The [facility] and [address],  Sports [facility] Rehabilitation, [facility] [address]                  793 [facility],5Th Floor        [address] [facility], [address] [place] [address] Av  Phone: [phone]  Fax: [phone]        Physical Therapy  Cancellation/No-show Note  Patient Name:  Ene Douglas  :  1960   Date:  3/20/2023  Cancelled visits to date: 4  No-shows to date: 3    For today's appointment patient:  [x]  Cancelled  []  Rescheduled appointment  []  No-show     Reason given by patient:  []  Patient ill  []  Conflicting appointment  []  No transportation    []  Conflict with work  [x]  No reason given  []  Other:     Comments:      Electronically signed by:  Cheryle Pae, PT

## 2023-03-21 DIAGNOSIS — B37.9 YEAST INFECTION: ICD-10-CM

## 2023-03-21 DIAGNOSIS — R11.0 NAUSEA: Primary | ICD-10-CM

## 2023-03-21 RX ORDER — ONDANSETRON 4 MG/1
4 TABLET, ORALLY DISINTEGRATING ORAL 2 TIMES DAILY
Qty: 30 TABLET | Refills: 0 | Status: CANCELLED | OUTPATIENT
Start: 2023-03-21

## 2023-03-21 RX ORDER — ONDANSETRON 4 MG/1
TABLET, ORALLY DISINTEGRATING ORAL
COMMUNITY
Start: 2023-02-24 | End: 2023-03-21 | Stop reason: SDUPTHER

## 2023-03-21 RX ORDER — FLUCONAZOLE 150 MG/1
150 TABLET ORAL ONCE
Qty: 1 TABLET | Refills: 0 | Status: SHIPPED | OUTPATIENT
Start: 2023-03-21 | End: 2023-03-21

## 2023-03-21 RX ORDER — ONDANSETRON 4 MG/1
4 TABLET, ORALLY DISINTEGRATING ORAL EVERY 12 HOURS PRN
Qty: 30 TABLET | Refills: 0 | Status: SHIPPED | OUTPATIENT
Start: 2023-03-21

## 2023-03-21 NOTE — TELEPHONE ENCOUNTER
----- Message from Zenobia Post sent at 3/21/2023  8:42 AM EDT -----  Subject: Refill Request    QUESTIONS  Name of Medication? ondansetron (ZOFRAN-ODT) 4 MG disintegrating tablet  Patient-reported dosage and instructions? once or twice a day 4 mg tablet  How many days do you have left? 8  Preferred Pharmacy? [facility] [id number]  Pharmacy phone number (if available)? [phone]  Additional Information for Provider? Patient is running out because she is   still nauseated. She wants to know if she can get a refill or if she needs   to go through her orthopedic doctor. Please advise.   ---------------------------------------------------------------------------  --------------  CALL BACK INFO  What is the best way for the office to contact you? Do not leave any   message, patient will call back for answer  Preferred Call Back Phone Number? [phone]  ---------------------------------------------------------------------------  --------------  SCRIPT ANSWERS  Relationship to Patient?  Self

## 2023-03-21 NOTE — TELEPHONE ENCOUNTER
Please notify patient I have sent in medication for Zofran and Diflucan. If yeast symptoms are not improving she will need to come in for testing.

## 2023-03-21 NOTE — PROGRESS NOTES
Medication sent in for patient with complaint of yeast infection due to being on abx. If symptoms do not improve will need to come in for evaluation/testing. Patient informed.

## 2023-03-21 NOTE — TELEPHONE ENCOUNTER
----- Message from Nathan Barboza sent at 3/21/2023  8:39 AM EDT -----  Subject: Message to Provider    QUESTIONS  Information for Provider? Patient called in stating that she is on   antibiotics from knee replacement in Jan and she now has a yeast infection   again and wants to know if you can just call in a medication for her. She   has to stay on the antibiotics because she still has fluid in her leg. She   said this is going to be continuous until she is off the antibiotic. [facility] [place] is the pharmacy. Please advise.   ---------------------------------------------------------------------------  --------------  Cathy Mclaughlin INFO  [phone]; Do not leave any message, patient will call back for answer  ---------------------------------------------------------------------------  --------------  SCRIPT ANSWERS  Relationship to Patient?  Self

## 2023-03-23 ENCOUNTER — HOSPITAL ENCOUNTER (OUTPATIENT)
Dept: PHYSICAL THERAPY | Age: 63
Setting detail: THERAPIES SERIES
Discharge: HOME | End: 2023-03-23
Payer: MEDICARE

## 2023-03-23 PROCEDURE — 97110 THERAPEUTIC EXERCISES: CPT

## 2023-03-23 PROCEDURE — 97140 MANUAL THERAPY 1/> REGIONS: CPT

## 2023-03-23 NOTE — FLOWSHEET NOTE
Progressing: [x] Met: [] Not Met: [] Adjusted  2. Patient will demonstrate increased AROM to at least 120 degrees knee flexion to allow for proper joint functioning as indicated by patients Functional Deficits. [] Progressing: [x] Met: [] Not Met: [] Adjusted  3. Patient will be able to ambulate at least 10 minutes without AD with no increase in symptoms. [x] Progressing: [] Met: [] Not Met: [] Adjusted  4. Patient will be able to perform grocery shopping and laundry independently without increase in symptoms to allow for improved household management. [x] Progressing: [] Met: [] Not Met: [] Adjusted  5. Patient will be able to tolerate standing for at least 30 minutes for participation in choir activities. [x] Progressing: [] Met: [] Not Met: [] Adjusted       Overall Progression Towards Functional goals/ Treatment Progress Update:  [x] Patient is progressing as expected towards functional goals listed. [] Progression is slowed due to complexities/Impairments listed. [] Progression has been slowed due to co-morbidities. [] Plan just implemented, too soon to assess goals progression <30days   [] Goals require adjustment due to lack of progress  [] Patient is not progressing as expected and requires additional follow up with physician  [] Other    Prognosis for POC: [x] Good [] Fair  [] Poor      Patient requires continued skilled intervention: [x] Yes  [] No    Treatment/Activity Tolerance:  [x] Patient able to complete treatment  [] Patient limited by fatigue  [] Patient limited by pain    [] Patient limited by other medical complications  [] Other:     ASSESSMENT: Tony Cope continues to demonstrate increased strength and activity tolerance. She would continue to benefit from physical therapy services to maximize functional outcomes and progress towards goals.        PLAN:  [x] Continue per plan of care [] Alter current plan (see comments above)  [] Plan of care initiated [] Hold pending MD visit []

## 2023-03-27 ENCOUNTER — TELEPHONE (OUTPATIENT)
Dept: PRIMARY CARE CLINIC | Age: 63
End: 2023-03-27

## 2023-03-27 ENCOUNTER — APPOINTMENT (OUTPATIENT)
Dept: PHYSICAL THERAPY | Age: 63
End: 2023-03-27
Payer: MEDICARE

## 2023-03-27 NOTE — TELEPHONE ENCOUNTER
Pt calling in because for weeks now she has been nauseated and had a lot of stomach pain. She believes she is expiring diverticulitis. She is in a lot of pain and every morning she wakes up vomiting.

## 2023-03-27 NOTE — TELEPHONE ENCOUNTER
Appointment scheduled for Dr PROMISE [facility]. on 3/29/23. Patient states she is vomiting every day. States she is drinking water and Gatorade zero to stay hydrated.

## 2023-03-29 ENCOUNTER — HOSPITAL ENCOUNTER (EMERGENCY)
Age: 63
Discharge: HOME | End: 2023-03-29
Attending: EMERGENCY MEDICINE
Payer: MEDICARE

## 2023-03-29 ENCOUNTER — APPOINTMENT (OUTPATIENT)
Dept: CT IMAGING | Age: 63
End: 2023-03-29
Payer: MEDICARE

## 2023-03-29 ENCOUNTER — OFFICE VISIT (OUTPATIENT)
Dept: PRIMARY CARE CLINIC | Age: 63
End: 2023-03-29
Payer: MEDICARE

## 2023-03-29 VITALS
SYSTOLIC BLOOD PRESSURE: 105 MMHG | OXYGEN SATURATION: 95 % | RESPIRATION RATE: 16 BRPM | DIASTOLIC BLOOD PRESSURE: 50 MMHG | WEIGHT: 186 LBS | HEIGHT: 63 IN | BODY MASS INDEX: 32.96 KG/M2 | TEMPERATURE: 98.9 F | HEART RATE: 91 BPM

## 2023-03-29 VITALS
SYSTOLIC BLOOD PRESSURE: 90 MMHG | WEIGHT: 186.8 LBS | OXYGEN SATURATION: 97 % | TEMPERATURE: 97.5 F | DIASTOLIC BLOOD PRESSURE: 63 MMHG | HEART RATE: 118 BPM | BODY MASS INDEX: 34.17 KG/M2

## 2023-03-29 DIAGNOSIS — E11.9 TYPE 2 DIABETES MELLITUS WITHOUT COMPLICATION, WITH LONG-TERM CURRENT USE OF INSULIN (HCC): Chronic | ICD-10-CM

## 2023-03-29 DIAGNOSIS — Z87.19 HISTORY OF DIVERTICULOSIS: ICD-10-CM

## 2023-03-29 DIAGNOSIS — Z96.659 CHRONIC INFECTION OF KNEE JOINT PROSTHESIS, SUBSEQUENT ENCOUNTER: ICD-10-CM

## 2023-03-29 DIAGNOSIS — R11.2 NAUSEA AND VOMITING, UNSPECIFIED VOMITING TYPE: ICD-10-CM

## 2023-03-29 DIAGNOSIS — R10.84 GENERALIZED ABDOMINAL PAIN: Primary | ICD-10-CM

## 2023-03-29 DIAGNOSIS — Z79.4 TYPE 2 DIABETES MELLITUS WITHOUT COMPLICATION, WITH LONG-TERM CURRENT USE OF INSULIN (HCC): Chronic | ICD-10-CM

## 2023-03-29 DIAGNOSIS — Z96.652 S/P REVISION OF TOTAL KNEE, LEFT: ICD-10-CM

## 2023-03-29 DIAGNOSIS — R11.2 INTRACTABLE NAUSEA AND VOMITING: Primary | ICD-10-CM

## 2023-03-29 DIAGNOSIS — R10.9 ACUTE ABDOMINAL PAIN: ICD-10-CM

## 2023-03-29 DIAGNOSIS — I10 BENIGN ESSENTIAL HTN: Chronic | ICD-10-CM

## 2023-03-29 DIAGNOSIS — T84.59XD CHRONIC INFECTION OF KNEE JOINT PROSTHESIS, SUBSEQUENT ENCOUNTER: ICD-10-CM

## 2023-03-29 PROBLEM — T84.54XA INFECTION OF PROSTHETIC LEFT KNEE JOINT (HCC): Status: RESOLVED | Noted: 2023-01-09 | Resolved: 2023-03-29

## 2023-03-29 LAB
ALBUMIN SERPL-MCNC: 4.5 G/DL (ref 3.4–5)
ALP SERPL-CCNC: 121 U/L (ref 40–129)
ALT SERPL-CCNC: 18 U/L (ref 10–40)
ANION GAP SERPL CALCULATED.3IONS-SCNC: 16 MMOL/L (ref 3–16)
AST SERPL-CCNC: 29 U/L (ref 15–37)
BASOPHILS # BLD: 0.1 K/UL (ref 0–0.2)
BASOPHILS NFR BLD: 0.8 %
BILIRUB DIRECT SERPL-MCNC: <0.2 MG/DL (ref 0–0.3)
BILIRUB INDIRECT SERPL-MCNC: NORMAL MG/DL (ref 0–1)
BILIRUB SERPL-MCNC: 0.4 MG/DL (ref 0–1)
BILIRUB UR QL STRIP.AUTO: ABNORMAL
BUN SERPL-MCNC: 19 MG/DL (ref 7–20)
CALCIUM SERPL-MCNC: 10.6 MG/DL (ref 8.3–10.6)
CHLORIDE SERPL-SCNC: 102 MMOL/L (ref 99–110)
CLARITY UR: CLEAR
CO2 SERPL-SCNC: 21 MMOL/L (ref 21–32)
COLOR UR: YELLOW
CREAT SERPL-MCNC: 1.3 MG/DL (ref 0.6–1.2)
DEPRECATED RDW RBC AUTO: 16.1 % (ref 12.4–15.4)
EOSINOPHIL # BLD: 0.2 K/UL (ref 0–0.6)
EOSINOPHIL NFR BLD: 1.5 %
EPI CELLS #/AREA URNS HPF: ABNORMAL /HPF (ref 0–5)
GFR SERPLBLD CREATININE-BSD FMLA CKD-EPI: 46 ML/MIN/{1.73_M2}
GLUCOSE SERPL-MCNC: 78 MG/DL (ref 70–99)
GLUCOSE UR STRIP.AUTO-MCNC: NEGATIVE MG/DL
HCT VFR BLD AUTO: 36.2 % (ref 36–48)
HGB BLD-MCNC: 11.2 G/DL (ref 12–16)
HGB UR QL STRIP.AUTO: NEGATIVE
KETONES UR STRIP.AUTO-MCNC: NEGATIVE MG/DL
LEUKOCYTE ESTERASE UR QL STRIP.AUTO: ABNORMAL
LIPASE SERPL-CCNC: 27 U/L (ref 13–60)
LYMPHOCYTES # BLD: 2.7 K/UL (ref 1–5.1)
LYMPHOCYTES NFR BLD: 18.8 %
MCH RBC QN AUTO: 24.1 PG (ref 26–34)
MCHC RBC AUTO-ENTMCNC: 30.9 G/DL (ref 31–36)
MCV RBC AUTO: 78.1 FL (ref 80–100)
MONOCYTES # BLD: 1 K/UL (ref 0–1.3)
MONOCYTES NFR BLD: 6.9 %
MUCOUS THREADS #/AREA URNS LPF: ABNORMAL /LPF
NEUTROPHILS # BLD: 10.3 K/UL (ref 1.7–7.7)
NEUTROPHILS NFR BLD: 72 %
NITRITE UR QL STRIP.AUTO: NEGATIVE
PH UR STRIP.AUTO: 6 [PH] (ref 5–8)
PLATELET # BLD AUTO: 437 K/UL (ref 135–450)
PMV BLD AUTO: 9.1 FL (ref 5–10.5)
POTASSIUM SERPL-SCNC: 4.8 MMOL/L (ref 3.5–5.1)
PROT SERPL-MCNC: 7.9 G/DL (ref 6.4–8.2)
PROT UR STRIP.AUTO-MCNC: ABNORMAL MG/DL
RBC # BLD AUTO: 4.63 M/UL (ref 4–5.2)
RBC #/AREA URNS HPF: ABNORMAL /HPF (ref 0–4)
SODIUM SERPL-SCNC: 139 MMOL/L (ref 136–145)
SP GR UR STRIP.AUTO: 1.01 (ref 1–1.03)
UA DIPSTICK W REFLEX MICRO PNL UR: YES
URN SPEC COLLECT METH UR: ABNORMAL
UROBILINOGEN UR STRIP-ACNC: 1 E.U./DL
WBC # BLD AUTO: 14.3 K/UL (ref 4–11)
WBC #/AREA URNS HPF: ABNORMAL /HPF (ref 0–5)

## 2023-03-29 PROCEDURE — 80048 BASIC METABOLIC PNL TOTAL CA: CPT

## 2023-03-29 PROCEDURE — 81001 URINALYSIS AUTO W/SCOPE: CPT

## 2023-03-29 PROCEDURE — 2022F DILAT RTA XM EVC RTNOPTHY: CPT | Performed by: FAMILY MEDICINE

## 2023-03-29 PROCEDURE — 87086 URINE CULTURE/COLONY COUNT: CPT

## 2023-03-29 PROCEDURE — 96361 HYDRATE IV INFUSION ADD-ON: CPT

## 2023-03-29 PROCEDURE — 96375 TX/PRO/DX INJ NEW DRUG ADDON: CPT

## 2023-03-29 PROCEDURE — 99285 EMERGENCY DEPT VISIT HI MDM: CPT

## 2023-03-29 PROCEDURE — G8427 DOCREV CUR MEDS BY ELIG CLIN: HCPCS | Performed by: FAMILY MEDICINE

## 2023-03-29 PROCEDURE — 83690 ASSAY OF LIPASE: CPT

## 2023-03-29 PROCEDURE — G8417 CALC BMI ABV UP PARAM F/U: HCPCS | Performed by: FAMILY MEDICINE

## 2023-03-29 PROCEDURE — 2580000003 HC RX 258: Performed by: PHYSICIAN ASSISTANT

## 2023-03-29 PROCEDURE — 3074F SYST BP LT 130 MM HG: CPT | Performed by: FAMILY MEDICINE

## 2023-03-29 PROCEDURE — 3017F COLORECTAL CA SCREEN DOC REV: CPT | Performed by: FAMILY MEDICINE

## 2023-03-29 PROCEDURE — 6360000002 HC RX W HCPCS: Performed by: PHYSICIAN ASSISTANT

## 2023-03-29 PROCEDURE — 1036F TOBACCO NON-USER: CPT | Performed by: FAMILY MEDICINE

## 2023-03-29 PROCEDURE — 6360000004 HC RX CONTRAST MEDICATION: Performed by: PHYSICIAN ASSISTANT

## 2023-03-29 PROCEDURE — 99213 OFFICE O/P EST LOW 20 MIN: CPT | Performed by: FAMILY MEDICINE

## 2023-03-29 PROCEDURE — 96372 THER/PROPH/DIAG INJ SC/IM: CPT

## 2023-03-29 PROCEDURE — 74177 CT ABD & PELVIS W/CONTRAST: CPT

## 2023-03-29 PROCEDURE — 80076 HEPATIC FUNCTION PANEL: CPT

## 2023-03-29 PROCEDURE — 3078F DIAST BP <80 MM HG: CPT | Performed by: FAMILY MEDICINE

## 2023-03-29 PROCEDURE — G8482 FLU IMMUNIZE ORDER/ADMIN: HCPCS | Performed by: FAMILY MEDICINE

## 2023-03-29 PROCEDURE — 3044F HG A1C LEVEL LT 7.0%: CPT | Performed by: FAMILY MEDICINE

## 2023-03-29 PROCEDURE — 85025 COMPLETE CBC W/AUTO DIFF WBC: CPT

## 2023-03-29 PROCEDURE — 96374 THER/PROPH/DIAG INJ IV PUSH: CPT

## 2023-03-29 RX ORDER — ONDANSETRON 2 MG/ML
4 INJECTION INTRAMUSCULAR; INTRAVENOUS ONCE
Status: COMPLETED | OUTPATIENT
Start: 2023-03-29 | End: 2023-03-29

## 2023-03-29 RX ORDER — DICYCLOMINE HYDROCHLORIDE 10 MG/ML
20 INJECTION INTRAMUSCULAR ONCE
Status: COMPLETED | OUTPATIENT
Start: 2023-03-29 | End: 2023-03-29

## 2023-03-29 RX ORDER — DICYCLOMINE HCL 20 MG
20 TABLET ORAL 4 TIMES DAILY
Qty: 20 TABLET | Refills: 0 | Status: SHIPPED | OUTPATIENT
Start: 2023-03-29 | End: 2023-04-03

## 2023-03-29 RX ORDER — PROMETHAZINE HYDROCHLORIDE 25 MG/1
25 TABLET ORAL EVERY 6 HOURS PRN
Qty: 12 TABLET | Refills: 0 | Status: SHIPPED | OUTPATIENT
Start: 2023-03-29 | End: 2023-04-05

## 2023-03-29 RX ORDER — SODIUM CHLORIDE, SODIUM LACTATE, POTASSIUM CHLORIDE, AND CALCIUM CHLORIDE .6; .31; .03; .02 G/100ML; G/100ML; G/100ML; G/100ML
1000 INJECTION, SOLUTION INTRAVENOUS ONCE
Status: COMPLETED | OUTPATIENT
Start: 2023-03-29 | End: 2023-03-29

## 2023-03-29 RX ADMIN — HYDROMORPHONE HYDROCHLORIDE 0.5 MG: 1 INJECTION, SOLUTION INTRAMUSCULAR; INTRAVENOUS; SUBCUTANEOUS at 14:17

## 2023-03-29 RX ADMIN — SODIUM CHLORIDE, POTASSIUM CHLORIDE, SODIUM LACTATE AND CALCIUM CHLORIDE 1000 ML: 600; 310; 30; 20 INJECTION, SOLUTION INTRAVENOUS at 14:16

## 2023-03-29 RX ADMIN — IOPAMIDOL 75 ML: 755 INJECTION, SOLUTION INTRAVENOUS at 13:45

## 2023-03-29 RX ADMIN — ONDANSETRON 4 MG: 2 INJECTION INTRAMUSCULAR; INTRAVENOUS at 14:17

## 2023-03-29 RX ADMIN — DICYCLOMINE HYDROCHLORIDE 20 MG: 10 INJECTION, SOLUTION INTRAMUSCULAR at 15:03

## 2023-03-29 SDOH — ECONOMIC STABILITY: FOOD INSECURITY: WITHIN THE PAST 12 MONTHS, THE FOOD YOU BOUGHT JUST DIDN'T LAST AND YOU DIDN'T HAVE MONEY TO GET MORE.: NEVER TRUE

## 2023-03-29 SDOH — ECONOMIC STABILITY: INCOME INSECURITY: HOW HARD IS IT FOR YOU TO PAY FOR THE VERY BASICS LIKE FOOD, HOUSING, MEDICAL CARE, AND HEATING?: NOT HARD AT ALL

## 2023-03-29 SDOH — ECONOMIC STABILITY: FOOD INSECURITY: WITHIN THE PAST 12 MONTHS, YOU WORRIED THAT YOUR FOOD WOULD RUN OUT BEFORE YOU GOT MONEY TO BUY MORE.: NEVER TRUE

## 2023-03-29 SDOH — ECONOMIC STABILITY: HOUSING INSECURITY
IN THE LAST 12 MONTHS, WAS THERE A TIME WHEN YOU DID NOT HAVE A STEADY PLACE TO SLEEP OR SLEPT IN A SHELTER (INCLUDING NOW)?: NO

## 2023-03-29 ASSESSMENT — ENCOUNTER SYMPTOMS
SHORTNESS OF BREATH: 0
NAUSEA: 1
CONSTIPATION: 1
DIARRHEA: 1
DIARRHEA: 0
CONSTIPATION: 1
ABDOMINAL PAIN: 1
VOMITING: 1
BLOOD IN STOOL: 0
COUGH: 0
EYE REDNESS: 0
ABDOMINAL DISTENTION: 1
ABDOMINAL PAIN: 1
VOMITING: 1
NAUSEA: 1
SHORTNESS OF BREATH: 0

## 2023-03-29 ASSESSMENT — PAIN SCALES - GENERAL
PAINLEVEL_OUTOF10: 5
PAINLEVEL_OUTOF10: 10
PAINLEVEL_OUTOF10: 10

## 2023-03-29 ASSESSMENT — PAIN DESCRIPTION - LOCATION
LOCATION: ABDOMEN
LOCATION: ABDOMEN

## 2023-03-29 ASSESSMENT — PATIENT HEALTH QUESTIONNAIRE - PHQ9
SUM OF ALL RESPONSES TO PHQ QUESTIONS 1-9: 0
1. LITTLE INTEREST OR PLEASURE IN DOING THINGS: 0
SUM OF ALL RESPONSES TO PHQ QUESTIONS 1-9: 0
SUM OF ALL RESPONSES TO PHQ QUESTIONS 1-9: 0
SUM OF ALL RESPONSES TO PHQ9 QUESTIONS 1 & 2: 0
2. FEELING DOWN, DEPRESSED OR HOPELESS: 0
SUM OF ALL RESPONSES TO PHQ QUESTIONS 1-9: 0

## 2023-03-29 ASSESSMENT — PAIN - FUNCTIONAL ASSESSMENT: PAIN_FUNCTIONAL_ASSESSMENT: 0-10

## 2023-03-29 NOTE — ED PROVIDER NOTES
ED Attending Attestation Note     Date of evaluation: 3/29/2023    This patient was seen by the advance practice provider. I have seen and examined the patient, agree with the workup, evaluation, management and diagnosis. The care plan has been discussed. I have reviewed the ECG and concur with the EVY's interpretation. My assessment reveals adult female who presents with about a month of abdominal pain of a crampy sensation. C/o crampy pain all thorugh the abdomen on exam with voluntary guarding, not noted when distracted. CT without acute reason for pain; labs show leukocytosis, otherwise unremarkable.         Jessica Tejeda MD  03/29/23 3277

## 2023-03-29 NOTE — ED PROVIDER NOTES
[address] [facility] [address] ENCOUNTER          PHYSICIAN ASSISTANT NOTE       Date of evaluation: 3/29/2023    Chief Complaint     Abdominal Pain      History of Present Illness     Bobby Sneed is a 57 y.o. female with PMH of DM2, HTN, HLD, DEANA who presents from her PCP office for evaluation of nausea/vomiting and abdominal pain. Patient reports persistent nausea and vomiting over the past 2 months. She reports that she initially attributed her nausea to pain medications she was taking after knee surgery. However, she states that she is no longer taking pain medications and is still nauseated, especially in the mornings. She has been taking Zofran, which initially helped but has not been helpful recently. In the past few weeks but worse in the past several days, she notes abdominal pain. This is located centrally in the abdomen. It is constant. She feels sensation that she needs to have diarrhea but reports that she has been straining to pass stool. Her last BM was yesterday. She denies any urinary difficulties. Denies fevers, chills, chest pain, shortness of breath. Denies persistent dizziness or headache or neurologic complaints. She has hx of multiple abdominal surgeries. ASSESSMENT / PLAN  (MEDICAL DECISION MAKING)     INITIAL VITALS: BP: (!) 171/106, Temp: 98.9 °F (37.2 °C), Heart Rate: 98, Resp: 20, SpO2: 97 %    Bobby Sneed is a 57 y.o. female with PMH of DM2, HTN, HLD, DEANA who presents from her PCP office for evaluation of nausea/vomiting and abdominal pain. On exam, patient is afebrile, mildly tachycardic with otherwise normal vitals. Abdomen is soft with mild diffuse TTP. No focal neurologic deficit. Triage labs notable for leukocytosis of 14.3. Creatinine 1.3 (from baseline 0.8). Normal LFTs and lipase. IV access established. Patient given dilaudid, zofran and IVF bolus. CT abd/pelvis obtained revealed no acute findings.   Patient given additional bentyl for her symptoms with improvement. She was able to tolerate PO. UA obtained appears contaminated and pt without urinary symptoms - will not treat for UTI. Will plan to discharge pt home with referral to GI and prescription for bentyl. Is this patient to be included in the SEP-1 core measure due to severe sepsis or septic shock? No Exclusion criteria - the patient is NOT to be included for SEP-1 Core Measure due to: 2+ SIRS criteria are not met    Medical Decision Making  Problems Addressed:  Generalized abdominal pain: acute illness or injury  Nausea and vomiting, unspecified vomiting type: acute illness or injury    Amount and/or Complexity of Data Reviewed  External Data Reviewed: notes. Labs: ordered. Decision-making details documented in ED Course. Radiology: ordered. Decision-making details documented in ED Course. Risk  Prescription drug management. This patient was also evaluated by the attending physician. All care plans were discussed and agreed upon. Clinical Impression     1. Generalized abdominal pain    2. Nausea and vomiting, unspecified vomiting type        Disposition     PATIENT REFERRED TO:  Patti Munroe MD  [address] [id number]  [phone]    Schedule an appointment as soon as possible for a visit   (gastroenterology)    DISCHARGE MEDICATIONS:  Discharge Medication List as of 3/29/2023  4:46 PM        START taking these medications    Details   dicyclomine (BENTYL) 20 MG tablet Take 1 tablet by mouth 4 times daily for 5 days, Disp-20 tablet, R-0Normal      promethazine (PHENERGAN) 25 MG tablet Take 1 tablet by mouth every 6 hours as needed for Nausea WARNING:  May cause drowsiness. May impair ability to operate vehicles or machinery.   Do not use in combination with alcohol., Disp-12 tablet, R-0Normal             DISPOSITION Decision To Discharge 03/29/2023 04:45:57 PM        Diagnostic Results and Other Data     RADIOLOGY:  CT ABDOMEN PELVIS W IV CONTRAST Additional

## 2023-03-29 NOTE — ED NOTES
Patient sent by Dr. Veronica Davis for intractable nausea and vomiting.  MD left number if any questions [phone]     Fanny Brown RN  03/29/23 1495

## 2023-03-29 NOTE — PROGRESS NOTES
[address] [facility] [address] PRIMARY CARE  [address] [place] [address]  Dept: [phone]  Dept Fax: [phone]     3/29/2023      Heydi Robledo   1960     Chief Complaint   Patient presents with    GI Problem       HPI    Pt with h/o IDDM, HTN, HLD, DEANA comes in today for c/o persistent nausea/vomiting x 2.5 months. Initially thought it was related to opioid pain medication related to her orthopedic surgeries. 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 On Bactrim daily. Occasional nausea at onset. Has been doing PT with no evidence of residual infection since her most recent surgery. N/V has worsened over the last 2 weeks. She has lost ~ 20# in the last 3 months. Nausea/vomiting is worse in the morning. Has been drinking gatorade to stay hydrated but having trouble keeping anything down. C/o sharp, lower abdominal discomfort that is now constant that started 1 week ago. Bms alternating between constipation and diarrhea. No blood in stool. No fever. Blood sugars have been running normal. No longer taking NSAIDs or pain medications. Has vomited once today so far. Has a h/o diverticulosis. No recent flare. Colonoscopy last done 2021. Lantus 24 units nightly, not needing humalog. Last a1c = 5.3% < 3 months ago. Wt Readings from Last 5 Encounters:   03/29/23 186 lb 12.8 oz (84.7 kg)   03/10/23 205 lb (93 kg)   02/28/23 205 lb (93 kg)   02/24/23 205 lb (93 kg)   02/10/23 205 lb (93 kg)      BP Readings from Last 5 Encounters:   03/29/23 90/63   01/11/23 136/70   12/28/22 121/77   12/28/22 121/77   12/16/22 111/72         PHQ-9 Total Score: 0 (3/29/2023 10:13 AM)       Prior to Visit Medications    Medication Sig Taking?  Authorizing Provider   amLODIPine (NORVASC) 5 MG tablet TAKE ONE TABLET BY MOUTH DAILY  Isabel Rico, DO   ondansetron (ZOFRAN-ODT) 4 MG disintegrating tablet Take 1 tablet by mouth every 12

## 2023-03-29 NOTE — ED NOTES
Patient prepared for and ready to be discharged. Patient discharged at this time in no acute distress after verbalizing understanding of discharge instructions. Patient left after receiving After Visit Summary instructions.         Mary Jerez RN  03/29/23 1996

## 2023-03-29 NOTE — ED NOTES
Patient sates that she will be able to give a urine sample after she gets water and the L of fluids are completed. Patient on spo2 monitoring, meds given.       Mike Hayden RN  03/29/23 1696

## 2023-03-29 NOTE — DISCHARGE INSTRUCTIONS
Stay hydrated by drinking plenty of fluids. Take bentyl every 6 hours as needed for abdominal cramping. Take zofran as first line for nausea. Take phenergan as 2nd line for nausea. Follow up with GI. Return to the ED with new or worsening symptoms.

## 2023-03-30 ENCOUNTER — HOSPITAL ENCOUNTER (OUTPATIENT)
Dept: PHYSICAL THERAPY | Age: 63
Setting detail: THERAPIES SERIES
Discharge: HOME | End: 2023-03-30
Payer: MEDICARE

## 2023-03-30 LAB — BACTERIA UR CULT: NORMAL

## 2023-03-30 NOTE — FLOWSHEET NOTE
[facility] and [address],  Sports Performance and Rehabilitation, [facility] [address]                  [address] [facility],[address]        [place], [place]  Phone: [phone]  Fax: [phone]        Physical Therapy  Cancellation/No-show Note  Patient Name:  Usha Lopez  :  1960   Date:  3/30/2023  Cancelled visits to date: 5  No-shows to date: 3    For today's appointment patient:  [x]  Cancelled  []  Rescheduled appointment  []  No-show     Reason given by patient:  []  Patient ill  []  Conflicting appointment  []  No transportation    []  Conflict with work  [x]  No reason given  []  Other:     Comments:      Electronically signed by:  Pat Cunha PT

## 2023-04-03 ENCOUNTER — HOSPITAL ENCOUNTER (OUTPATIENT)
Dept: PHYSICAL THERAPY | Age: 63
Setting detail: THERAPIES SERIES
Discharge: HOME | End: 2023-04-03
Payer: MEDICARE

## 2023-04-03 ENCOUNTER — APPOINTMENT (OUTPATIENT)
Dept: PHYSICAL THERAPY | Age: 63
End: 2023-04-03
Payer: MEDICARE

## 2023-04-03 PROCEDURE — 97140 MANUAL THERAPY 1/> REGIONS: CPT

## 2023-04-03 PROCEDURE — 97110 THERAPEUTIC EXERCISES: CPT

## 2023-04-03 PROCEDURE — 97016 VASOPNEUMATIC DEVICE THERAPY: CPT

## 2023-04-03 NOTE — FLOWSHEET NOTE
Discharge      Electronically signed by:  Dania Herrera, PT, DPT, SCS    Note: If patient does not return for scheduled/ recommended follow up visits, this note will serve as a discharge from care along with most recent update on progress.

## 2023-04-06 ENCOUNTER — HOSPITAL ENCOUNTER (OUTPATIENT)
Dept: PHYSICAL THERAPY | Age: 63
Setting detail: THERAPIES SERIES
Discharge: HOME | End: 2023-04-06
Payer: MEDICARE

## 2023-04-06 PROCEDURE — 97110 THERAPEUTIC EXERCISES: CPT

## 2023-04-06 PROCEDURE — 97140 MANUAL THERAPY 1/> REGIONS: CPT

## 2023-04-06 PROCEDURE — 97016 VASOPNEUMATIC DEVICE THERAPY: CPT

## 2023-04-06 NOTE — FLOWSHEET NOTE
[facility] and 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];  L Effusion [M25.462]       Insurance/Certification information:  [facility] Medicare   Physician Information:  Ching Saldana MD  Has the plan of care been signed (Y/N):        []  Yes  [x]  No     Date of Patient follow up with Physician:       Is this a Progress Report:     []  Yes  [x]  No        If Yes:  Date Range for reporting period:  Beginning 3/7/2023  Ending     Progress report will be due (10 Rx or 30 days whichever is less): 5677       Recertification will be due (POC Duration  / 90 days whichever is less): 5/3/2023      Visit # Insurance Allowable Auth Required   In-person 12 [place] []  Yes [x]  No        Functional Scale: LEFS 1280 (85% Disability)             Date assessed:  23    LEFS = 33/80 = 58% Deficit   3/7/2023     Number of Comorbidities:  []0     []1-2    [x]3+    Latex Allergy:  [x]NO      []YES  Preferred Language for Healthcare:   [x]English       []other:      Pain level:  6/10     SUBJECTIVE: Patient reports that her knee feels a little rough due to it being rainy    OBJECTIVE:   Observation: Entered clinic ambulating with reciprocal gait and no AD   Test measurements:  knee flexion to 122, knee extension to 0; moderate swelling global in knee (42 cm on 2023, L knee 42 cm on 3/23/2023 compared to R knee 38 cm pre VASO  L Knee 40.5 cm post VASO    RESTRICTIONS/PRECAUTIONS: WBAT    Exercises/Interventions:     Therapeutic Ex (00733)/NMR re-education (90498) Reps Notes/CUES   Bike 5'    SLR  LAQ

## 2023-04-10 ENCOUNTER — APPOINTMENT (OUTPATIENT)
Dept: PHYSICAL THERAPY | Age: 63
End: 2023-04-10
Payer: MEDICARE

## 2023-04-17 ENCOUNTER — HOSPITAL ENCOUNTER (OUTPATIENT)
Dept: PHYSICAL THERAPY | Age: 63
Setting detail: THERAPIES SERIES
Discharge: HOME | End: 2023-04-17
Payer: MEDICARE

## 2023-04-17 NOTE — FLOWSHEET NOTE
[facility] and [address],  Sports Performance and Rehabilitation, [facility] [address]                  [address] [facility],5Th Floor        [place]  Phone: [phone]  Fax: [phone]        Physical Therapy  Cancellation/No-show Note  Patient Name:  Kaur Malin  :  1960   Date:  2023  Cancelled visits to date: 9  No-shows to date: 3    For today's appointment patient:  [x]  Cancelled  []  Rescheduled appointment  []  No-show     Reason given by patient:  []  Patient ill  []  Conflicting appointment  []  No transportation    []  Conflict with work  [x]  No reason given  []  Other:     Comments:      Electronically signed by:  Patrice Irizarry, PT

## 2023-04-18 ENCOUNTER — TELEPHONE (OUTPATIENT)
Dept: PHARMACY | Facility: CLINIC | Age: 63
End: 2023-04-18

## 2023-04-18 NOTE — TELEPHONE ENCOUNTER
[facility] CLINICAL PHARMACY: ADHERENCE REVIEW  Identified care gap per Zacarias fills with [employer] [facility]: ACE/ARB adherence      Recent Visits  23 Office Visit [address], DO Rodrigue Romano   22 Office Visit [address], DO Rodrigue Romano     Future Appointments  23 Appointment Emory Cancino, MD Rodrigue Mullins   23 Appointment Emory Cancino, MD [facility]       Patient also appears to be prescribed:STATIN  Per insurer report, LIS-0 - co-pays are based on tiers and patient is subject to coverage gap. ASSESSMENT    ACE/ARB ADHERENCE    Insurance Records claims through 04/10/2023 (Prior Year [place] = PASSED; YTD [place] = FIRST FILL; Potential Fail Date: 23):   Losartan last filled on 23 for 60 day supply. Next refill due: 23    Prescribed si tablet/capsule daily    Per Insurer Portal: last filled on 04.10.23 for 60 day supply. BP Readings from Last 3 Encounters:   23 (!) 105/50   23 90/63   23 136/70     Estimated Creatinine Clearance: 45 mL/min (A) (based on SCr of 1.3 mg/dL (H)). Lab Results   Component Value Date    CREATININE 1.3 (H) 2023     Lab Results   Component Value Date    K 4.8 2023     DIABETES ADHERENCE    Per Insurer Portal: Metformin last filled on 23 for 90 day supply. Lab Results   Component Value Date    LABA1C 5.3 2023    LABA1C 5.7 10/16/2022    LABA1C 7.0 2022     NOTE: A1c <9%    STATIN ADHERENCE    Per Insurer Portal: Atorvastatin last filled on 04.10.23 for 90 day supply. Lab Results   Component Value Date    CHOL 143 2021    TRIG 171 (H) 2021    HDL 28 (L) 2022    LDLCALC 78 2022    LDLDIRECT 109 (H) 2014     ALT   Date Value Ref Range Status   2023 18 10 - 40 U/L Final     Comment:     Specimen hemolysis has exceeded the interference as defined by Roche. Result may be affected.  Suggest recollection if

## 2023-04-20 ENCOUNTER — APPOINTMENT (OUTPATIENT)
Dept: PHYSICAL THERAPY | Age: 63
End: 2023-04-20
Payer: MEDICARE

## 2023-04-24 ENCOUNTER — APPOINTMENT (OUTPATIENT)
Dept: PHYSICAL THERAPY | Age: 63
End: 2023-04-24
Payer: MEDICARE

## 2023-04-28 DIAGNOSIS — E11.65 TYPE 2 DIABETES MELLITUS WITH HYPERGLYCEMIA, WITH LONG-TERM CURRENT USE OF INSULIN (HCC): ICD-10-CM

## 2023-04-28 DIAGNOSIS — Z79.4 TYPE 2 DIABETES MELLITUS WITH HYPERGLYCEMIA, WITH LONG-TERM CURRENT USE OF INSULIN (HCC): ICD-10-CM

## 2023-05-01 RX ORDER — INSULIN GLARGINE 100 [IU]/ML
24 INJECTION, SOLUTION SUBCUTANEOUS NIGHTLY
Qty: 8 ADJUSTABLE DOSE PRE-FILLED PEN SYRINGE | Refills: 1 | Status: SHIPPED | OUTPATIENT
Start: 2023-05-01

## 2023-05-02 RX ORDER — SULFAMETHOXAZOLE AND TRIMETHOPRIM 800; 160 MG/1; MG/1
TABLET ORAL
Qty: 60 TABLET | Refills: 2 | OUTPATIENT
Start: 2023-05-02

## 2023-06-01 ENCOUNTER — OFFICE VISIT (OUTPATIENT)
Dept: ENT CLINIC | Age: 63
End: 2023-06-01
Payer: MEDICARE

## 2023-06-01 VITALS
BODY MASS INDEX: 33.84 KG/M2 | WEIGHT: 191 LBS | DIASTOLIC BLOOD PRESSURE: 56 MMHG | TEMPERATURE: 98 F | OXYGEN SATURATION: 97 % | SYSTOLIC BLOOD PRESSURE: 122 MMHG | HEIGHT: 63 IN | HEART RATE: 128 BPM

## 2023-06-01 DIAGNOSIS — J01.90 ACUTE BACTERIAL RHINOSINUSITIS: Primary | ICD-10-CM

## 2023-06-01 DIAGNOSIS — J30.2 SEASONAL ALLERGIES: Chronic | ICD-10-CM

## 2023-06-01 DIAGNOSIS — B96.89 ACUTE BACTERIAL RHINOSINUSITIS: Primary | ICD-10-CM

## 2023-06-01 PROCEDURE — G8427 DOCREV CUR MEDS BY ELIG CLIN: HCPCS | Performed by: OTOLARYNGOLOGY

## 2023-06-01 PROCEDURE — 3017F COLORECTAL CA SCREEN DOC REV: CPT | Performed by: OTOLARYNGOLOGY

## 2023-06-01 PROCEDURE — 99213 OFFICE O/P EST LOW 20 MIN: CPT | Performed by: OTOLARYNGOLOGY

## 2023-06-01 PROCEDURE — 3078F DIAST BP <80 MM HG: CPT | Performed by: OTOLARYNGOLOGY

## 2023-06-01 PROCEDURE — 3074F SYST BP LT 130 MM HG: CPT | Performed by: OTOLARYNGOLOGY

## 2023-06-01 PROCEDURE — G8417 CALC BMI ABV UP PARAM F/U: HCPCS | Performed by: OTOLARYNGOLOGY

## 2023-06-01 PROCEDURE — 1036F TOBACCO NON-USER: CPT | Performed by: OTOLARYNGOLOGY

## 2023-06-01 RX ORDER — CEFDINIR 300 MG/1
600 CAPSULE ORAL DAILY
Qty: 28 CAPSULE | Refills: 0 | Status: SHIPPED | OUTPATIENT
Start: 2023-06-01 | End: 2023-06-15

## 2023-06-01 RX ORDER — FLUTICASONE PROPIONATE 50 MCG
SPRAY, SUSPENSION (ML) NASAL
Qty: 16 G | Refills: 2 | Status: SHIPPED | OUTPATIENT
Start: 2023-06-01

## 2023-06-16 ENCOUNTER — TELEPHONE (OUTPATIENT)
Dept: ENT CLINIC | Age: 63
End: 2023-06-16

## 2023-06-19 ENCOUNTER — TELEPHONE (OUTPATIENT)
Dept: PRIMARY CARE CLINIC | Age: 63
End: 2023-06-19

## 2023-06-19 DIAGNOSIS — E11.9 TYPE 2 DIABETES MELLITUS WITHOUT COMPLICATION, WITH LONG-TERM CURRENT USE OF INSULIN (HCC): Primary | Chronic | ICD-10-CM

## 2023-06-19 DIAGNOSIS — R11.0 NAUSEA: ICD-10-CM

## 2023-06-19 DIAGNOSIS — Z79.4 TYPE 2 DIABETES MELLITUS WITHOUT COMPLICATION, WITH LONG-TERM CURRENT USE OF INSULIN (HCC): Primary | Chronic | ICD-10-CM

## 2023-06-19 RX ORDER — ONDANSETRON 4 MG/1
TABLET, ORALLY DISINTEGRATING ORAL
Qty: 10 TABLET | Refills: 0 | Status: SHIPPED
Start: 2023-06-19 | End: 2023-07-10 | Stop reason: CLARIF

## 2023-06-19 NOTE — TELEPHONE ENCOUNTER
Called pt about today's appt with Dr LEMA [facility].    She says she still has 4 days of antibiotics left and wants to finish those before being seen. She says she was on the phone with central scheduling when I called    She is now requesting to have a diabetic check up appt. She is on the schedule for July 10. She is requesting lab orders to do prior to this visit.   She says she will probably go to the lab around 6-29

## 2023-06-19 NOTE — TELEPHONE ENCOUNTER
I can see her in the office tomorrow afternoon between 1:20 and 3 PM.  Please let me know if that will not work out for her.

## 2023-06-20 ENCOUNTER — OFFICE VISIT (OUTPATIENT)
Dept: ENT CLINIC | Age: 63
End: 2023-06-20
Payer: MEDICARE

## 2023-06-20 VITALS
OXYGEN SATURATION: 96 % | TEMPERATURE: 97.1 F | SYSTOLIC BLOOD PRESSURE: 134 MMHG | DIASTOLIC BLOOD PRESSURE: 86 MMHG | HEIGHT: 63 IN | WEIGHT: 195 LBS | HEART RATE: 96 BPM | BODY MASS INDEX: 34.55 KG/M2

## 2023-06-20 DIAGNOSIS — J01.90 ACUTE BACTERIAL RHINOSINUSITIS: Primary | ICD-10-CM

## 2023-06-20 DIAGNOSIS — J30.2 SEASONAL ALLERGIES: Chronic | ICD-10-CM

## 2023-06-20 DIAGNOSIS — B96.89 ACUTE BACTERIAL RHINOSINUSITIS: Primary | ICD-10-CM

## 2023-06-20 DIAGNOSIS — J32.9 RECURRENT SINUSITIS: Chronic | ICD-10-CM

## 2023-06-20 DIAGNOSIS — J34.2 NASAL SEPTAL DEVIATION: Chronic | ICD-10-CM

## 2023-06-20 PROCEDURE — G8427 DOCREV CUR MEDS BY ELIG CLIN: HCPCS | Performed by: OTOLARYNGOLOGY

## 2023-06-20 PROCEDURE — G8417 CALC BMI ABV UP PARAM F/U: HCPCS | Performed by: OTOLARYNGOLOGY

## 2023-06-20 PROCEDURE — 99214 OFFICE O/P EST MOD 30 MIN: CPT | Performed by: OTOLARYNGOLOGY

## 2023-06-20 PROCEDURE — 3075F SYST BP GE 130 - 139MM HG: CPT | Performed by: OTOLARYNGOLOGY

## 2023-06-20 PROCEDURE — 3079F DIAST BP 80-89 MM HG: CPT | Performed by: OTOLARYNGOLOGY

## 2023-06-20 PROCEDURE — 1036F TOBACCO NON-USER: CPT | Performed by: OTOLARYNGOLOGY

## 2023-06-20 PROCEDURE — 3017F COLORECTAL CA SCREEN DOC REV: CPT | Performed by: OTOLARYNGOLOGY

## 2023-06-20 NOTE — TELEPHONE ENCOUNTER
Patient called and informed. Patient did want to know if she would be able to change 7/10/23 to virtual visit since she is getting blood work done prior to visit?

## 2023-06-20 NOTE — PROGRESS NOTES
Chief Complaint   Patient presents with    Sinusitis        HISTORY OF PRESENT ILLNESS    Jennifer Dobson is a 57 y.o. female here for recheck and follow up of acute rhinosinusitis. She was seen previously on 6/1/2023 and was treated with Omnicef, Flonase, and the usual rhinosinusitis measures. She has been on Zyrtec and Flonase and Singulair for respiratory allergies. She stated that she felt some better after 8 days of the MENDES VAISHALI but she felt thought she had finished the course of antibiotics. She got worse and then, 2 days later, she was looking through her medications and \"discovered that I still had 6 days of antibiotics left. \"  So, she started back on the MENDES VAISHALI and has now 2 days remaining. Currently, she does not feel any better, than she did when the symptoms recurred/worsened after stopping the Omnicef. \"I took the pills for 8 days and then thought I was finshed and then found out I had 6 days left. \"  Stopped Monday 8 days ago. Symptoms came back two days later, stopped up and left nostril clogged with a lot of mucus filling the left side of the nose. I discovered that I had 6 days of antibiotic pills left. I started them again on Frday [4 days ago]. No better. Watery eyes and left nostril stuffy, blowing yellow greenish mucus. Pain in cheek bone left side, left forehead and temple area. REVIEW OF SYSTEMS  Constitutional:  Denied fever and chills. ENT/sinus:  sinus facial pain. Denied pain and ear drainage. EXAMINATION    WDWN, NAD  Ears:  TMs, EACs, mastoids and pinnae normal.    (+)  Nose:  NSD to left moderate, with moderately severe left nasal airway obstruction. Normal with no lesions.  (+)  Sinuses:  mildly tender left maxillary and frontal.  NT x 2   Throat,  OC/ OP:  Normal with no lesions. Neck:  NT, No masses. Nodes:  No lymphadenopathy. IMPRESSION / Robbie Frazier / Hernandez Vanegas:   Frank Ocampo was seen today for sinusitis.     Diagnoses and all orders for this

## 2023-06-23 ENCOUNTER — TELEPHONE (OUTPATIENT)
Dept: ENT CLINIC | Age: 63
End: 2023-06-23

## 2023-06-23 DIAGNOSIS — Z79.4 TYPE 2 DIABETES MELLITUS WITHOUT COMPLICATION, WITH LONG-TERM CURRENT USE OF INSULIN (HCC): Chronic | ICD-10-CM

## 2023-06-23 DIAGNOSIS — E11.9 TYPE 2 DIABETES MELLITUS WITHOUT COMPLICATION, WITH LONG-TERM CURRENT USE OF INSULIN (HCC): Chronic | ICD-10-CM

## 2023-06-23 RX ORDER — AZITHROMYCIN 250 MG/1
TABLET, FILM COATED ORAL
Qty: 11 TABLET | Refills: 0 | Status: SHIPPED | OUTPATIENT
Start: 2023-06-23

## 2023-06-23 NOTE — TELEPHONE ENCOUNTER
Phone call. Spoke to patient. Discussed current status. She continues to have sinus symptoms that have not improved after the Omnicef, except that she no longer has the headache and eye pressure. I advised her that I would send in a prescription for Zithromax for 10 days and that this would continue to work in her body for another 5 days so this would be to 15 days of treatment. I advised her to repeat all of the instructions for sinusitis care that were given at the previous visit. I advised her that if the sinus infection does not clear up with aggressive and maximal medical treatment that a CT scan would be needed and possibly functional endoscopic sinus surgery. She will return to see me in 3 to 4 weeks to determine any needed further testing, therapy, or evaluations.

## 2023-06-23 NOTE — TELEPHONE ENCOUNTER
Patient is calling because she is still having the symptoms of the sinus infection. She also states the left side is completely blocked.

## 2023-06-24 LAB
ALBUMIN SERPL-MCNC: 4.2 G/DL (ref 3.4–5)
ALBUMIN/GLOB SERPL: 1.3 {RATIO} (ref 1.1–2.2)
ALP SERPL-CCNC: 126 U/L (ref 40–129)
ALT SERPL-CCNC: <5 U/L (ref 10–40)
ANION GAP SERPL CALCULATED.3IONS-SCNC: 13 MMOL/L (ref 3–16)
AST SERPL-CCNC: 21 U/L (ref 15–37)
BASOPHILS # BLD: 0.1 K/UL (ref 0–0.2)
BASOPHILS NFR BLD: 0.9 %
BILIRUB SERPL-MCNC: 0.6 MG/DL (ref 0–1)
BUN SERPL-MCNC: 11 MG/DL (ref 7–20)
CALCIUM SERPL-MCNC: 10.1 MG/DL (ref 8.3–10.6)
CHLORIDE SERPL-SCNC: 102 MMOL/L (ref 99–110)
CHOLEST SERPL-MCNC: 128 MG/DL (ref 0–199)
CO2 SERPL-SCNC: 24 MMOL/L (ref 21–32)
CREAT SERPL-MCNC: 0.7 MG/DL (ref 0.6–1.2)
CREAT UR-MCNC: 185.6 MG/DL (ref 28–259)
DEPRECATED RDW RBC AUTO: 17.1 % (ref 12.4–15.4)
EOSINOPHIL # BLD: 0.8 K/UL (ref 0–0.6)
EOSINOPHIL NFR BLD: 5.2 %
EST. AVERAGE GLUCOSE BLD GHB EST-MCNC: 96.8 MG/DL
GFR SERPLBLD CREATININE-BSD FMLA CKD-EPI: >60 ML/MIN/{1.73_M2}
GLUCOSE SERPL-MCNC: 63 MG/DL (ref 70–99)
HBA1C MFR BLD: 5 %
HCT VFR BLD AUTO: 33.7 % (ref 36–48)
HDLC SERPL-MCNC: 24 MG/DL (ref 40–60)
HGB BLD-MCNC: 10.1 G/DL (ref 12–16)
LDL CHOLESTEROL CALCULATED: 82 MG/DL
LYMPHOCYTES # BLD: 2.7 K/UL (ref 1–5.1)
LYMPHOCYTES NFR BLD: 17.5 %
MCH RBC QN AUTO: 23.3 PG (ref 26–34)
MCHC RBC AUTO-ENTMCNC: 30 G/DL (ref 31–36)
MCV RBC AUTO: 77.7 FL (ref 80–100)
MICROALBUMIN UR DL<=1MG/L-MCNC: 2.5 MG/DL
MICROALBUMIN/CREAT UR: 13.5 MG/G (ref 0–30)
MONOCYTES # BLD: 0.9 K/UL (ref 0–1.3)
MONOCYTES NFR BLD: 5.8 %
NEUTROPHILS # BLD: 11 K/UL (ref 1.7–7.7)
NEUTROPHILS NFR BLD: 70.6 %
PLATELET # BLD AUTO: 396 K/UL (ref 135–450)
PMV BLD AUTO: 9.8 FL (ref 5–10.5)
POTASSIUM SERPL-SCNC: 4.4 MMOL/L (ref 3.5–5.1)
PROT SERPL-MCNC: 7.5 G/DL (ref 6.4–8.2)
RBC # BLD AUTO: 4.34 M/UL (ref 4–5.2)
SODIUM SERPL-SCNC: 139 MMOL/L (ref 136–145)
TRIGL SERPL-MCNC: 110 MG/DL (ref 0–150)
VLDLC SERPL CALC-MCNC: 22 MG/DL
WBC # BLD AUTO: 15.6 K/UL (ref 4–11)

## 2023-06-30 ENCOUNTER — OFFICE VISIT (OUTPATIENT)
Dept: ORTHOPEDIC SURGERY | Age: 63
End: 2023-06-30

## 2023-06-30 VITALS — WEIGHT: 195 LBS | BODY MASS INDEX: 34.55 KG/M2 | HEIGHT: 63 IN

## 2023-06-30 DIAGNOSIS — Z96.652 S/P REVISION OF TOTAL KNEE, LEFT: Primary | ICD-10-CM

## 2023-07-10 ENCOUNTER — TELEMEDICINE (OUTPATIENT)
Dept: PRIMARY CARE CLINIC | Age: 63
End: 2023-07-10
Payer: MEDICARE

## 2023-07-10 DIAGNOSIS — I10 BENIGN ESSENTIAL HTN: Chronic | ICD-10-CM

## 2023-07-10 DIAGNOSIS — E11.9 TYPE 2 DIABETES MELLITUS WITHOUT COMPLICATION, WITH LONG-TERM CURRENT USE OF INSULIN (HCC): Primary | ICD-10-CM

## 2023-07-10 DIAGNOSIS — Z79.4 TYPE 2 DIABETES MELLITUS WITHOUT COMPLICATION, WITH LONG-TERM CURRENT USE OF INSULIN (HCC): Primary | ICD-10-CM

## 2023-07-10 DIAGNOSIS — R11.0 NAUSEA: ICD-10-CM

## 2023-07-10 PROCEDURE — G8427 DOCREV CUR MEDS BY ELIG CLIN: HCPCS | Performed by: FAMILY MEDICINE

## 2023-07-10 PROCEDURE — 3044F HG A1C LEVEL LT 7.0%: CPT | Performed by: FAMILY MEDICINE

## 2023-07-10 PROCEDURE — 99213 OFFICE O/P EST LOW 20 MIN: CPT | Performed by: FAMILY MEDICINE

## 2023-07-10 PROCEDURE — 2022F DILAT RTA XM EVC RTNOPTHY: CPT | Performed by: FAMILY MEDICINE

## 2023-07-10 PROCEDURE — 3017F COLORECTAL CA SCREEN DOC REV: CPT | Performed by: FAMILY MEDICINE

## 2023-07-10 RX ORDER — INSULIN GLARGINE 100 [IU]/ML
20 INJECTION, SOLUTION SUBCUTANEOUS NIGHTLY
Qty: 8 ADJUSTABLE DOSE PRE-FILLED PEN SYRINGE | Refills: 1
Start: 2023-07-10

## 2023-07-10 RX ORDER — LINACLOTIDE 290 UG/1
CAPSULE, GELATIN COATED ORAL
COMMUNITY
Start: 2023-06-12

## 2023-07-10 NOTE — PROGRESS NOTES
Systems   Constitutional:  Negative for activity change, appetite change, fever and unexpected weight change. Respiratory:  Negative for cough. Cardiovascular:  Negative for chest pain. Gastrointestinal:  Positive for nausea. Negative for abdominal pain, diarrhea and vomiting. Vitals unable to obtain and physical exam is limited 2/2 virtual visit nature of this encounter. Patient-Reported Vitals 7/10/2023   Patient-Reported Weight 189   Patient-Reported Height 5'2\"   Patient-Reported Systolic 106   Patient-Reported Diastolic 84   Patient-Reported Pulse -   Patient-Reported Temperature 96.7   Patient-Reported SpO2 -            Physical Exam  Constitutional:       General: She is not in acute distress. Appearance: Normal appearance. She is not ill-appearing. Pulmonary:      Effort: Pulmonary effort is normal. No respiratory distress. Neurological:      General: No focal deficit present. Mental Status: She is alert. Psychiatric:         Mood and Affect: Mood normal.       Assessment:  Encounter Diagnoses   Name Primary? Type 2 diabetes mellitus without complication, with long-term current use of insulin (HCC) Yes    Benign essential HTN     Nausea        Plan:    - A1c low. Decrease Lantus to 20 units nightly. Monitor and notify for any hypoglycemia. - BP well controlled. - Follow up with GI re: med issue. New Prescriptions    No medications on file   These medications were prescribed by a provider not a part of this encounter    ONDANSETRON (ZOFRAN) 4 MG TABLET    Take 1 tablet by mouth every 12 hours as needed for Nausea or Vomiting        No orders of the defined types were placed in this encounter. Return in about 3 months (around 10/10/2023) for [facility] - .      Total time spent including virtual face to face consultation, chart review, coordination of care and documentation - Flower Garcia, was evaluated through a synchronous

## 2023-07-11 RX ORDER — ONDANSETRON 4 MG/1
4 TABLET, FILM COATED ORAL EVERY 12 HOURS PRN
Qty: 30 TABLET | Refills: 0 | Status: SHIPPED | OUTPATIENT
Start: 2023-07-11

## 2023-07-11 NOTE — TELEPHONE ENCOUNTER
Pharmacy is requesting refill on prescription.     Medication:   Requested Prescriptions     Pending Prescriptions Disp Refills    ondansetron (ZOFRAN) 4 MG tablet 30 tablet 0     Sig: Take 1 tablet by mouth every 12 hours as needed for Nausea or Vomiting     Last Filled:  n/a    Last appt: 7/10/2023   Next appt: Visit date not found

## 2023-07-12 ASSESSMENT — ENCOUNTER SYMPTOMS
DIARRHEA: 0
COUGH: 0
VOMITING: 0
NAUSEA: 1
ABDOMINAL PAIN: 0

## 2023-07-13 ENCOUNTER — TELEPHONE (OUTPATIENT)
Dept: ORTHOPEDIC SURGERY | Age: 63
End: 2023-07-13

## 2023-07-13 NOTE — TELEPHONE ENCOUNTER
General Question     Subject: patient call and she would like to know if she can get and Letter from Dr. Sarabia Daily stating that she can not travel due to her Lt knee Revision, she stated she would like to  that letter from the [facility] tomorrow if possible. She just need a call back regarding this matter. Please Advise.   Nitin Bragg  Contact Number: [phone]

## 2023-07-18 RX ORDER — ONDANSETRON 4 MG/1
4 TABLET, ORALLY DISINTEGRATING ORAL 3 TIMES DAILY PRN
Qty: 21 TABLET | Refills: 0 | Status: SHIPPED | OUTPATIENT
Start: 2023-07-18

## 2023-07-18 RX ORDER — ONDANSETRON 4 MG/1
4 TABLET, FILM COATED ORAL EVERY 12 HOURS PRN
Qty: 30 TABLET | Refills: 0 | Status: CANCELLED | OUTPATIENT
Start: 2023-07-18

## 2023-07-18 NOTE — TELEPHONE ENCOUNTER
Pt calling for refills to be sent to [facility] [phone] for :    Metformin   Ondansetron dissolvable    She says the Ondansetron is from her GI but says Dr PROMISE [facility]. has given it to her in the past.  She says she needs the dissolvable because the throws up the pill    Last ov 7-10-23 HC  No future ov

## 2023-07-31 ENCOUNTER — OFFICE VISIT (OUTPATIENT)
Dept: ENT CLINIC | Age: 63
End: 2023-07-31
Payer: MEDICARE

## 2023-07-31 VITALS
WEIGHT: 192 LBS | BODY MASS INDEX: 34.02 KG/M2 | HEIGHT: 63 IN | DIASTOLIC BLOOD PRESSURE: 86 MMHG | TEMPERATURE: 97.6 F | OXYGEN SATURATION: 98 % | SYSTOLIC BLOOD PRESSURE: 134 MMHG | HEART RATE: 96 BPM

## 2023-07-31 DIAGNOSIS — B96.89 ACUTE BACTERIAL RHINOSINUSITIS: Primary | ICD-10-CM

## 2023-07-31 DIAGNOSIS — J34.2 NASAL SEPTAL DEVIATION: Chronic | ICD-10-CM

## 2023-07-31 DIAGNOSIS — H93.13 SUBJECTIVE TINNITUS OF BOTH EARS: Chronic | ICD-10-CM

## 2023-07-31 DIAGNOSIS — J01.90 ACUTE BACTERIAL RHINOSINUSITIS: Primary | ICD-10-CM

## 2023-07-31 DIAGNOSIS — J30.2 SEASONAL ALLERGIES: Chronic | ICD-10-CM

## 2023-07-31 DIAGNOSIS — H91.93 BILATERAL HEARING LOSS, UNSPECIFIED HEARING LOSS TYPE: Chronic | ICD-10-CM

## 2023-07-31 PROCEDURE — 99214 OFFICE O/P EST MOD 30 MIN: CPT | Performed by: OTOLARYNGOLOGY

## 2023-07-31 PROCEDURE — G8417 CALC BMI ABV UP PARAM F/U: HCPCS | Performed by: OTOLARYNGOLOGY

## 2023-07-31 PROCEDURE — 3074F SYST BP LT 130 MM HG: CPT | Performed by: OTOLARYNGOLOGY

## 2023-07-31 PROCEDURE — 3017F COLORECTAL CA SCREEN DOC REV: CPT | Performed by: OTOLARYNGOLOGY

## 2023-07-31 PROCEDURE — 3078F DIAST BP <80 MM HG: CPT | Performed by: OTOLARYNGOLOGY

## 2023-07-31 PROCEDURE — 1036F TOBACCO NON-USER: CPT | Performed by: OTOLARYNGOLOGY

## 2023-07-31 PROCEDURE — G8427 DOCREV CUR MEDS BY ELIG CLIN: HCPCS | Performed by: OTOLARYNGOLOGY

## 2023-07-31 RX ORDER — DOXYCYCLINE HYCLATE 100 MG
100 TABLET ORAL 2 TIMES DAILY
Qty: 28 TABLET | Refills: 0 | Status: SHIPPED | OUTPATIENT
Start: 2023-07-31 | End: 2023-08-14

## 2023-07-31 NOTE — PATIENT INSTRUCTIONS
RHINOSINUSITIS CARE  You may use acetaminophen (eg. Tylenol) or Ibuprofen (eg. Advil) (over the counter medications) as needed for fever or pain. Take Probiotic while you are taking antibiotics, to prevent diarrhea, stomach upset, pseudomembranous colitis, and C. difficile diarrhea. This may be obtained at your pharmacy or health food store. Alternatively, you may eat one cup of yogurt with active or live cultures twice daily while taking the antibiotic. Continue for two to three days after completion of the antibiotic. Use a 12 hour decongestant spray, 0.05% oxymetazoline (e.g. Afrin, Duration, 4-Way). Spray each nostril twice three times a day for three days, then two times a day for 2 days, and then stop for two days and then repeat the cycle once. Take one Mucinex (blue box) maximum strength 1200 mg tablet every 12 hours, daily, for the next ten days. Use fluticasone 2 sprays in each nostril once daily for the next 14 days. It may take several days to several weeks for your sinusitis to clear up. It is important to take all your medications as prescribed. Please continue your antibiotics as prescribed.     Please call the office if your condition worsens or if symptoms persist after treatment is completed.        ===================================================================      ADVERSE AND SIDE EFFECTS OF MEDICATIONS:    Please be aware of the following possible adverse reactions, side effects, and complications of the following medications, including, but not limited to: allergic reaction, interactions with other medications, nausea, headache, diarrhea, persistent symptoms, failure to improve, and the following:     Doxycycline (antibiotic):  diarrhea, colitis (severe infection and inflammation of the large intestine), pseudomembranous colitis (severe infection and inflammation of the colon, usually due to C. difficile bacteria)  yeast or fungal  infections, Noriega-Edwardo syndrome (very

## 2023-08-07 ENCOUNTER — PROCEDURE VISIT (OUTPATIENT)
Dept: AUDIOLOGY | Age: 63
End: 2023-08-07
Payer: MEDICARE

## 2023-08-07 DIAGNOSIS — H93.11 TINNITUS OF RIGHT EAR: ICD-10-CM

## 2023-08-07 DIAGNOSIS — H90.3 SENSORINEURAL HEARING LOSS, BILATERAL: Primary | ICD-10-CM

## 2023-08-07 DIAGNOSIS — H93.8X3 SENSATION OF FULLNESS IN BOTH EARS: ICD-10-CM

## 2023-08-07 PROCEDURE — 92557 COMPREHENSIVE HEARING TEST: CPT

## 2023-08-07 PROCEDURE — 92567 TYMPANOMETRY: CPT

## 2023-08-07 RX ORDER — ONDANSETRON 4 MG/1
TABLET, ORALLY DISINTEGRATING ORAL
Qty: 21 TABLET | Refills: 0 | OUTPATIENT
Start: 2023-08-07

## 2023-08-07 NOTE — TELEPHONE ENCOUNTER
Duplicate request. Submitted to PA team for approval on 07/18/2023.  See encounter from 07/18/2023 for f/u

## 2023-08-07 NOTE — PROGRESS NOTES
Threshold: 35 dB HL  Word Recognition: Fair 76%, based on NU-6 25-word list at 75 dBHL using recorded speech stimuli. Tympanometry: Normal peak pressure and compliance, Type A tympanogram, consistent with normal middle ear function. Reliability: Good   Transducer: Inserts    See scanned audiogram dated 8/7/2023 for results. PATIENT EDUCATION:       The following items were discussed with the patient:   - Good Communication Strategies  - Hearing Loss and Hearing Aids  - Tinnitus Management Strategies      Educational information was shared in the After Visit Summary. RECOMMENDATIONS:                                                                                                                                                                                                                                                            The following items are recommended based on patient report and results from today's appointment:   - Continue medical follow-up with Yessica Belcher MD.   - Retest hearing as medically indicated and/or sooner if a change in hearing is noted. - If desired, schedule a Hearing Aid Evaluation (HAE) appointment to discuss hearing aid options. - Utilize \"Good Communication Strategies\" as discussed to assist in speech understanding with communication partners.     Scott Moreno  Audiologist    Chart CC'd to: Yessica Belcher MD      Degree of   Hearing Sensitivity dB Range   Within Normal Limits (WNL) 0 - 20   Mild 20 - 40   Moderate 40 - 55   Moderately-Severe 55 - 70   Severe 70 - 90   Profound 90 +

## 2023-08-08 RX ORDER — ONDANSETRON 4 MG/1
TABLET, ORALLY DISINTEGRATING ORAL
Qty: 21 TABLET | Refills: 0 | Status: SHIPPED | OUTPATIENT
Start: 2023-08-08

## 2023-08-08 NOTE — TELEPHONE ENCOUNTER
Medication:   Requested Prescriptions     Pending Prescriptions Disp Refills    ondansetron (ZOFRAN-ODT) 4 MG disintegrating tablet [Pharmacy Med Name: ONDANSETRON ODT 4 MG TABLET] 21 tablet 0     Sig: PLACE 1 TABLET BY MOUTH 3 TIMES A DAY AS NEEDED FOR NAUSEA AND/OR VOMITING     Last Filled:  7/18/2023    Last appt: 7/10/2023   Next appt: Visit date not found

## 2023-08-09 ENCOUNTER — TELEPHONE (OUTPATIENT)
Dept: ENT CLINIC | Age: 63
End: 2023-08-09

## 2023-08-09 NOTE — TELEPHONE ENCOUNTER
Pt.states she is feeling really terrible the medication is not helping she feels urban her head is so clogged and congestion. She would like a call back to let her know what she can do or be seen sooner?

## 2023-08-10 ENCOUNTER — APPOINTMENT (OUTPATIENT)
Dept: CT IMAGING | Age: 63
End: 2023-08-10
Payer: MEDICARE

## 2023-08-10 ENCOUNTER — HOSPITAL ENCOUNTER (EMERGENCY)
Age: 63
Discharge: HOME | End: 2023-08-10
Attending: STUDENT IN AN ORGANIZED HEALTH CARE EDUCATION/TRAINING PROGRAM
Payer: MEDICARE

## 2023-08-10 VITALS
TEMPERATURE: 98.8 F | DIASTOLIC BLOOD PRESSURE: 83 MMHG | HEART RATE: 105 BPM | RESPIRATION RATE: 22 BRPM | OXYGEN SATURATION: 97 % | SYSTOLIC BLOOD PRESSURE: 197 MMHG

## 2023-08-10 DIAGNOSIS — H91.90 HEARING LOSS, UNSPECIFIED HEARING LOSS TYPE, UNSPECIFIED LATERALITY: ICD-10-CM

## 2023-08-10 DIAGNOSIS — J34.89 SINUS PAIN: Primary | ICD-10-CM

## 2023-08-10 LAB
ANION GAP SERPL CALCULATED.3IONS-SCNC: 11 MMOL/L (ref 3–16)
BASOPHILS # BLD: 0.1 K/UL (ref 0–0.2)
BASOPHILS NFR BLD: 0.8 %
BUN SERPL-MCNC: 13 MG/DL (ref 7–20)
CALCIUM SERPL-MCNC: 9.9 MG/DL (ref 8.3–10.6)
CHLORIDE SERPL-SCNC: 103 MMOL/L (ref 99–110)
CO2 SERPL-SCNC: 25 MMOL/L (ref 21–32)
CREAT SERPL-MCNC: 0.5 MG/DL (ref 0.6–1.2)
DEPRECATED RDW RBC AUTO: 18.3 % (ref 12.4–15.4)
EOSINOPHIL # BLD: 1.3 K/UL (ref 0–0.6)
EOSINOPHIL NFR BLD: 7.6 %
GFR SERPLBLD CREATININE-BSD FMLA CKD-EPI: >60 ML/MIN/{1.73_M2}
GLUCOSE SERPL-MCNC: 93 MG/DL (ref 70–99)
HCT VFR BLD AUTO: 32.1 % (ref 36–48)
HGB BLD-MCNC: 9.9 G/DL (ref 12–16)
LYMPHOCYTES # BLD: 2.6 K/UL (ref 1–5.1)
LYMPHOCYTES NFR BLD: 14.8 %
MCH RBC QN AUTO: 22.9 PG (ref 26–34)
MCHC RBC AUTO-ENTMCNC: 31 G/DL (ref 31–36)
MCV RBC AUTO: 73.8 FL (ref 80–100)
MONOCYTES # BLD: 1 K/UL (ref 0–1.3)
MONOCYTES NFR BLD: 6 %
NEUTROPHILS # BLD: 12.3 K/UL (ref 1.7–7.7)
NEUTROPHILS NFR BLD: 70.8 %
PLATELET # BLD AUTO: 419 K/UL (ref 135–450)
PMV BLD AUTO: 9.5 FL (ref 5–10.5)
POTASSIUM SERPL-SCNC: 4 MMOL/L (ref 3.5–5.1)
RBC # BLD AUTO: 4.35 M/UL (ref 4–5.2)
SODIUM SERPL-SCNC: 139 MMOL/L (ref 136–145)
WBC # BLD AUTO: 17.4 K/UL (ref 4–11)

## 2023-08-10 PROCEDURE — 6360000004 HC RX CONTRAST MEDICATION: Performed by: STUDENT IN AN ORGANIZED HEALTH CARE EDUCATION/TRAINING PROGRAM

## 2023-08-10 PROCEDURE — 6370000000 HC RX 637 (ALT 250 FOR IP): Performed by: EMERGENCY MEDICINE

## 2023-08-10 PROCEDURE — 80048 BASIC METABOLIC PNL TOTAL CA: CPT

## 2023-08-10 PROCEDURE — 99285 EMERGENCY DEPT VISIT HI MDM: CPT

## 2023-08-10 PROCEDURE — 85025 COMPLETE CBC W/AUTO DIFF WBC: CPT

## 2023-08-10 PROCEDURE — 70487 CT MAXILLOFACIAL W/DYE: CPT

## 2023-08-10 PROCEDURE — 6370000000 HC RX 637 (ALT 250 FOR IP): Performed by: STUDENT IN AN ORGANIZED HEALTH CARE EDUCATION/TRAINING PROGRAM

## 2023-08-10 RX ORDER — AMOXICILLIN AND CLAVULANATE POTASSIUM 875; 125 MG/1; MG/1
1 TABLET, FILM COATED ORAL 2 TIMES DAILY
Qty: 20 TABLET | Refills: 0 | Status: SHIPPED | OUTPATIENT
Start: 2023-08-10 | End: 2023-08-10 | Stop reason: SDUPTHER

## 2023-08-10 RX ORDER — OXYCODONE HYDROCHLORIDE AND ACETAMINOPHEN 5; 325 MG/1; MG/1
1 TABLET ORAL
Status: COMPLETED | OUTPATIENT
Start: 2023-08-10 | End: 2023-08-10

## 2023-08-10 RX ORDER — AMOXICILLIN AND CLAVULANATE POTASSIUM 875; 125 MG/1; MG/1
1 TABLET, FILM COATED ORAL 2 TIMES DAILY
Qty: 20 TABLET | Refills: 0 | Status: SHIPPED | OUTPATIENT
Start: 2023-08-10 | End: 2023-08-20

## 2023-08-10 RX ORDER — AMOXICILLIN AND CLAVULANATE POTASSIUM 875; 125 MG/1; MG/1
1 TABLET, FILM COATED ORAL ONCE
Status: COMPLETED | OUTPATIENT
Start: 2023-08-10 | End: 2023-08-10

## 2023-08-10 RX ADMIN — AMOXICILLIN AND CLAVULANATE POTASSIUM 1 TABLET: 875; 125 TABLET, FILM COATED ORAL at 18:07

## 2023-08-10 RX ADMIN — OXYCODONE HYDROCHLORIDE AND ACETAMINOPHEN 1 TABLET: 5; 325 TABLET ORAL at 16:00

## 2023-08-10 RX ADMIN — IOPAMIDOL 75 ML: 755 INJECTION, SOLUTION INTRAVENOUS at 15:45

## 2023-08-10 ASSESSMENT — PAIN SCALES - GENERAL
PAINLEVEL_OUTOF10: 10
PAINLEVEL_OUTOF10: 8

## 2023-08-10 ASSESSMENT — PAIN - FUNCTIONAL ASSESSMENT: PAIN_FUNCTIONAL_ASSESSMENT: 0-10

## 2023-08-10 ASSESSMENT — ENCOUNTER SYMPTOMS
VOICE CHANGE: 0
SINUS PRESSURE: 1
SINUS PAIN: 1
RHINORRHEA: 1
SORE THROAT: 0
VOMITING: 0
TROUBLE SWALLOWING: 0
NAUSEA: 0
EYE PAIN: 0
PHOTOPHOBIA: 0

## 2023-08-10 ASSESSMENT — PAIN DESCRIPTION - DESCRIPTORS: DESCRIPTORS: PRESSURE

## 2023-08-10 ASSESSMENT — PAIN DESCRIPTION - LOCATION: LOCATION: FACE

## 2023-08-10 NOTE — TELEPHONE ENCOUNTER
Pt calling back stating she lost her hearing in left ear overnight and keeps getting worse. She would like a call back today. She said she wonders if she should go to the ER. I did advise, if she feels like she needs to go to ER, to go.

## 2023-08-10 NOTE — DISCHARGE INSTRUCTIONS
You were seen in the emergency department for hearing loss. Your exam and CT scan do not show the exact cause of this hearing loss but do show significant chronic sinus infection. Please take the antibiotic we have prescribed for the full 10-day course unless instructed otherwise by another doctor. Follow-up with Dr. Tammy Jimenez tomorrow in his clinic. Call first thing in the morning to confirm an appointment time. Call 911 or go to the nearest Emergency Department immediately for worsening symptoms, difficulty breathing, chest pain, lightheadedness, difficulty moving or talking, sensory loss, difficulty controlling your bowel or bladder function, altered level of consciousness, neck stiffness, uncontrollable nausea or vomiting, uncontrollable pain, inability to tolerate oral intake, fever, chills, severe bleeding, signs of infection (spreading redness, area feels very warm, swelling, pain, foul-smelling drainage), suicidal or homicidal ideation, or any other concerns. If we have prescribed you any new medications, please take them as prescribed and read the drug package insert carefully. Do not drink alcohol, drive, or operate machinery if you are taking narcotic pain medications.

## 2023-08-11 ENCOUNTER — OFFICE VISIT (OUTPATIENT)
Dept: ENT CLINIC | Age: 63
End: 2023-08-11
Payer: MEDICARE

## 2023-08-11 VITALS
TEMPERATURE: 97.5 F | SYSTOLIC BLOOD PRESSURE: 129 MMHG | WEIGHT: 192 LBS | HEART RATE: 107 BPM | DIASTOLIC BLOOD PRESSURE: 78 MMHG | BODY MASS INDEX: 34.02 KG/M2 | OXYGEN SATURATION: 97 % | HEIGHT: 63 IN

## 2023-08-11 DIAGNOSIS — J32.4 CHRONIC PANSINUSITIS: Primary | Chronic | ICD-10-CM

## 2023-08-11 DIAGNOSIS — J34.2 NASAL SEPTAL DEVIATION: Chronic | ICD-10-CM

## 2023-08-11 PROCEDURE — 99214 OFFICE O/P EST MOD 30 MIN: CPT | Performed by: OTOLARYNGOLOGY

## 2023-08-11 PROCEDURE — 1036F TOBACCO NON-USER: CPT | Performed by: OTOLARYNGOLOGY

## 2023-08-11 PROCEDURE — 3017F COLORECTAL CA SCREEN DOC REV: CPT | Performed by: OTOLARYNGOLOGY

## 2023-08-11 PROCEDURE — G8427 DOCREV CUR MEDS BY ELIG CLIN: HCPCS | Performed by: OTOLARYNGOLOGY

## 2023-08-11 PROCEDURE — G8417 CALC BMI ABV UP PARAM F/U: HCPCS | Performed by: OTOLARYNGOLOGY

## 2023-08-11 PROCEDURE — 3074F SYST BP LT 130 MM HG: CPT | Performed by: OTOLARYNGOLOGY

## 2023-08-11 PROCEDURE — 3078F DIAST BP <80 MM HG: CPT | Performed by: OTOLARYNGOLOGY

## 2023-08-11 NOTE — PROGRESS NOTES
Chief Complaint   Patient presents with    Sinusitis        HISTORY OF PRESENT ILLNESS    Missael Esteban is a 57 y.o. female who presented today for evaluation and management for sinusitis. Severe sinusitis yesterday, went to ER, treated, feels a little better today. Hearing a little better today. I couldn't hear anything yesterday, I can hear better today. Pressure in sinuses and ears. REVIEW OF SYSTEMS  Constitutional:  Denied fever and chills. ENT/sinus:  Denied pain and drainage. EXAMINATION    WDWN, NAD  Ears:  TMs, EACs, mastoids and pinnae were normal.    Nose:  NSD, polypoid tissue obstructing the left nasal airway. Otherwise, normal with no lesions. Sinuses:  NT x 4   Throat,  OC/ OP:  Normal with no lesions. Neck:  NT, No masses. Trachea midline. Nodes:  No lymphadenopathy. COUNSELING FOR ENDOSCOPIC SINUS SURGERY  I counseled Umair Thompson for the procedure of ENDOSCOPIC SINUS SURGERY, POSSIBLE POLYPECTOMY, SEPTAL RECONSTRUCTION (SEPTOPLASTY), OR PARTIAL EXCISION, OUTFRACTURE, OR CAUTERIZATION OF NASAL TURBINATES, POSSIBLE ANTERIOR CANINE FOSSA ANTROSTOMY THROUGH AN INCISION UNDER THE UPPER LIP (CROOK-RIC APPROACH). I advised that the purpose/goal of this surgery is for the treatment of and the attempt to improve chronic and/or recurrent sinusitis, nasal polyps and/or nasal obstruction, with difficulty breathing through the nose, removal or biopsy of any cyst/mass found in the maxillary sinus. I advised that the expected outcome and potential benefits of surgery, include, but are not limited to:  Decreased frequency of sinus infections, resolution of chronic sinusitis, improvement in nasal breathing.   Diagnosis of polyp or cyst.  I advised that certain expected conditions may occur after this surgery, usually temporary, including, but not limited to, bleeding, nasal drainage and/or crusting, pain and discomfort, numbness or pain of the upper lip, teeth, and/or gums, decrease

## 2023-08-16 RX ORDER — ONDANSETRON 4 MG/1
TABLET, ORALLY DISINTEGRATING ORAL
Qty: 21 TABLET | Refills: 0 | OUTPATIENT
Start: 2023-08-16

## 2023-08-16 NOTE — TELEPHONE ENCOUNTER
Medication:   Requested Prescriptions     Pending Prescriptions Disp Refills    ondansetron (ZOFRAN-ODT) 4 MG disintegrating tablet [Pharmacy Med Name: ONDANSETRON ODT 4 MG TABLET] 21 tablet 0     Sig: DISSOLVE ONE TABLET BY MOUTH THREE TIMES A DAY AS NEEDED FOR NAUSEA AND/OR VOMITING        Last Filled:  8/8/23    Last appt: 7/10/2023   Next appt: Visit date not found    Last OARRS: No flowsheet data found.

## 2023-08-17 ENCOUNTER — TELEPHONE (OUTPATIENT)
Dept: ENT CLINIC | Age: 63
End: 2023-08-17

## 2023-08-17 ENCOUNTER — TELEPHONE (OUTPATIENT)
Dept: PRIMARY CARE CLINIC | Age: 63
End: 2023-08-17

## 2023-08-17 DIAGNOSIS — N76.0 OTHER VAGINITIS: Primary | ICD-10-CM

## 2023-08-17 NOTE — TELEPHONE ENCOUNTER
Pt.calling to schedule surgery she stated they gave her a packet with no date. She would like a call back today. I inform her you have stepped away from your desk for a patient and you will give her a call back today.

## 2023-08-17 NOTE — TELEPHONE ENCOUNTER
I would have her check with the ENT who recommended it. I did not prescribe this for her. Be sure to take with food.

## 2023-08-17 NOTE — TELEPHONE ENCOUNTER
----- Message from Maggie Jeysonyoni sent at 8/17/2023 11:02 AM EDT -----  Subject: Message to Provider    QUESTIONS  Information for Provider? Hola Alcantara states that the   amoxicillin-clavulanate (AUGMENTIN) 875-125 MG per tablet is irritating her and is wondering if she would be able to do a once a day. She states that it always gives her a yeast infection. She states she was prescribed   it in the ED but she states followed up with ENT. Please f/u to advise patient as soon as possible.   ---------------------------------------------------------------------------  --------------  [address]  [phone]; OK to leave message on voicemail,OK to respond with electronic   message via [employer] portal (only for patients who have registered [employer]   account)  ---------------------------------------------------------------------------  --------------  SCRIPT ANSWERS  Relationship to Patient?  Self

## 2023-08-17 NOTE — TELEPHONE ENCOUNTER
Called the patient - scheduled for surgery on 9/12/2023 at 7:30 AM with an arrival time of 6 AM.    Dr. Brooke Trent - I need you to put an order in for a CT Sinus for [employer], so the patient can call and get it scheduled.     -Dr. Brooke Trent once you place the order - send this message to the clinical staff so they can call and give the patient the information to call and get it scheduled.

## 2023-08-17 NOTE — TELEPHONE ENCOUNTER
Pt.states she has developed a yeast infection due to her antibiotics for her sinus infection. She called her PCP they told her call here. She stated she went to the store spent $20 dollars on Monistat and it has done nothing for her but cause more irritation. She would like to know can she be prescribed something for her issue please and can she have a call back to let her know.

## 2023-08-18 RX ORDER — FLUCONAZOLE 150 MG/1
TABLET ORAL
Qty: 2 TABLET | Refills: 0 | Status: SHIPPED
Start: 2023-08-18 | End: 2023-08-31 | Stop reason: CLARIF

## 2023-08-18 NOTE — TELEPHONE ENCOUNTER
Pt is calling back because she is wanting something prescribed to her for her yeast infection. She states she feels like \"raw meat\" down there and it is hard for her to wipe when she uses the bathroom.

## 2023-08-21 ENCOUNTER — TELEPHONE (OUTPATIENT)
Dept: PHARMACY | Facility: CLINIC | Age: 63
End: 2023-08-21

## 2023-08-21 NOTE — TELEPHONE ENCOUNTER
[facility], [place] HEALTH CLINICAL PHARMACY: ADHERENCE REVIEW  Identified care gap per [place] [facility] ([place] Per insurer report, LIS-0 - co-pays are based on tiers and patient is subject to coverage gap.    fills with [facility]: ACE/ARB and Statin adherence    Patient also appears to be prescribed:DIABETES      ASSESSMENT    ACE/ARB ADHERENCE    Insurance Records claims through 08/14/2023 (Prior Year [address] = not reported; YTD [address] = 84%; Potential Fail Date: 09.29.23):   Losartan last filled on 08.11.23 for 90 day supply. Next refill due: 11.09.23    Per Insurer Portal: last filled on (same as above). .     BP Readings from Last 3 Encounters:   08/11/23 129/78   08/10/23 (!) 197/83   07/31/23 134/86     Estimated Creatinine Clearance: 119 mL/min (A) (based on SCr of 0.5 mg/dL (L)). Lab Results   Component Value Date    CREATININE 0.5 (L) 08/10/2023     Lab Results   Component Value Date    K 4.0 08/10/2023        DIABETES ADHERENCE    Per Reconcile Dispense History:   metformin last filled on 07.18.23 for 90 day supply. Lab Results   Component Value Date    LABA1C 5.0 06/23/2023    LABA1C 5.3 01/05/2023    LABA1C 5.7 10/16/2022     NOTE: A1c <9%     [facility] Records claims through 08/14/2023 (Prior Year PDC = not reported; YTD [address] = FIRST FILL; Potential Fail Date: 08.31.23): Atorvastatin last filled on 04.10.23 for 90 day supply. Next refill due: 07.09.23    Per Insurer Portal: last filled on (same as above). .    Lab Results   Component Value Date    CHOL 143 01/11/2021    TRIG 171 (H) 01/11/2021    HDL 24 (L) 06/23/2023    LDLCALC 82 06/23/2023    LDLDIRECT 109 (H) 11/25/2014     ALT   Date Value Ref Range Status   06/23/2023 <5 (L) 10 - 40 U/L Final     Comment:     Specimen lipemia has exceeded the interference as defined by Roche. Value  may be falsely decreased.        AST   Date Value Ref Range Status   06/23/2023 21 15 - 37 U/L Final     Comment:     Specimen lipemia has exceeded the interference

## 2023-08-22 ENCOUNTER — TELEPHONE (OUTPATIENT)
Dept: ENT CLINIC | Age: 63
End: 2023-08-22

## 2023-08-22 DIAGNOSIS — J01.90 ACUTE BACTERIAL RHINOSINUSITIS: ICD-10-CM

## 2023-08-22 DIAGNOSIS — J32.4 CHRONIC PANSINUSITIS: Primary | ICD-10-CM

## 2023-08-22 DIAGNOSIS — B96.89 ACUTE BACTERIAL RHINOSINUSITIS: ICD-10-CM

## 2023-08-22 DIAGNOSIS — J34.2 NASAL SEPTAL DEVIATION: ICD-10-CM

## 2023-08-22 DIAGNOSIS — E78.2 HYPERLIPIDEMIA, MIXED: ICD-10-CM

## 2023-08-22 RX ORDER — DOXYCYCLINE HYCLATE 100 MG
TABLET ORAL
Qty: 28 TABLET | Refills: 0 | OUTPATIENT
Start: 2023-08-22

## 2023-08-22 RX ORDER — ATORVASTATIN CALCIUM 10 MG/1
TABLET, FILM COATED ORAL
Qty: 90 TABLET | Refills: 1 | Status: SHIPPED | OUTPATIENT
Start: 2023-08-22

## 2023-08-22 NOTE — TELEPHONE ENCOUNTER
Medication:   Requested Prescriptions     Pending Prescriptions Disp Refills    atorvastatin (LIPITOR) 10 MG tablet [Pharmacy Med Name: ATORVASTATIN 10 MG TABLET] 90 tablet 1     Sig: TAKE ONE TABLET BY MOUTH DAILY     Last Filled:  7/25/2023    Last appt: 7/10/2023   Next appt: 8/31/2023

## 2023-08-22 NOTE — TELEPHONE ENCOUNTER
I called [address], they are out of refills for Atorvastatin, will send request to ordering provider. Will follow up on refill.

## 2023-08-22 NOTE — TELEPHONE ENCOUNTER
Pt.states she is having surgery she had a CT Scan done. She states she had one done at [facility] 8/10/23 CT Maxillofacial With Contrast She is asking does she need to have another one done. ?

## 2023-08-24 NOTE — TELEPHONE ENCOUNTER
[facility] CLINICAL PHARMACY REVIEW: ADHERENCE    Did not appear refill was needed but pharmacy reported they didn't have any refills on file. Per chart review, pharmacy did send refill request and PCP signed order. Per portal, atorvastatin filled on 2023.      Anton Keenan, PharmD, BCACP, [facility], toll free: [phone], option 1    =======================================================    For Pharmacy Admin Tracking Only  Program: Farzaneh in place:  No  Recommendation Provided To: Pharmacy: 1  Intervention Detail: Adherence Monitorin  Intervention Accepted By: Pharmacy: 1  Gap Closed?: Yes   Time Spent (min): 20

## 2023-08-28 ENCOUNTER — TELEPHONE (OUTPATIENT)
Dept: ORTHOPEDIC SURGERY | Age: 63
End: 2023-08-28

## 2023-08-28 NOTE — TELEPHONE ENCOUNTER
General Question     Subject: L KNEE PAIN   Patient and /or Facility Request: Durga Chandra  Contact Number: [phone]    PATIENT CALLED IN TO SEE IF SHE CAN SPEAK TO SOMEONE IN THE OFFICE ABOUT HER L KNEE SHE HAD AN FALL LAST WEEK . . ITS IN A LOT OF PAIN WHAT CAN SHE DO. . SHE NOT SLEEPING AT NIGHT. .. SHE CAN WALK ON IT BUT HER PAIN LEVEL IS AT  10. ..       PLEASE ADVISE

## 2023-08-29 ENCOUNTER — OFFICE VISIT (OUTPATIENT)
Dept: ORTHOPEDIC SURGERY | Age: 63
End: 2023-08-29
Payer: MEDICARE

## 2023-08-29 VITALS — HEIGHT: 63 IN | WEIGHT: 192 LBS | BODY MASS INDEX: 34.02 KG/M2

## 2023-08-29 DIAGNOSIS — Z96.652 S/P REVISION OF TOTAL KNEE, LEFT: Primary | ICD-10-CM

## 2023-08-29 PROCEDURE — G8417 CALC BMI ABV UP PARAM F/U: HCPCS | Performed by: ORTHOPAEDIC SURGERY

## 2023-08-29 PROCEDURE — 99213 OFFICE O/P EST LOW 20 MIN: CPT | Performed by: ORTHOPAEDIC SURGERY

## 2023-08-29 PROCEDURE — 1036F TOBACCO NON-USER: CPT | Performed by: ORTHOPAEDIC SURGERY

## 2023-08-29 PROCEDURE — G8427 DOCREV CUR MEDS BY ELIG CLIN: HCPCS | Performed by: ORTHOPAEDIC SURGERY

## 2023-08-29 PROCEDURE — 3017F COLORECTAL CA SCREEN DOC REV: CPT | Performed by: ORTHOPAEDIC SURGERY

## 2023-08-29 NOTE — PROGRESS NOTES
of motion with regards to flexion, abduction, internal and external rotation. There is no tenderness about the greater trochanter. L Knee: Physical exam of the left knee demonstrates well-healed incision. No open wounds or abrasions. Minimal knee joint effusion. Active range of motion 0-115. Extensor mechanism intact. 4+/5 quad strength. No gross instability to either varus or valgus stress test.      Imaging    X-rays were ordered today and reviewed of the left knee. 3 views. Standing AP, lateral, and skyline views. They demonstrate a left total knee arthroplasty in good position. No evidence of loosening or periprosthetic fracture. Procedure:  Orders Placed This Encounter   Procedures    XR KNEE LEFT (3 VIEWS)     Standing Status:   Future     Number of Occurrences:   1     Standing Expiration Date:   8/28/2024       Assessment and Plan  Basilio Iraheta was seen today for follow-up. Diagnoses and all orders for this visit:    S/P revision of total knee, left  -     XR KNEE LEFT (3 VIEWS); Future        Overall patient is doing well. I think this is simply a knee contusion and it will resolve on its own. Continue with home exercises. She continues to have pain greater than 6 weeks follow-up in the office. I discussed with Genaro Gabriel that her history, symptoms, signs, and imaging are most consistent with previous TKA replacement. We reviewed the natural history of these conditions and treatment options ranging from conservative measures (rest, icing, activity modification, physical therapy, pain meds) to surgical options. In terms of treatment, I recommended continuing with rest, icing, avoidance of painful activities, NSAIDs or pain meds as tolerated, and physical therapy. We discussed surgical options as well, should conservative measures fail. Electronically signed by Jung De La Torre MD on 8/29/2023 at 10:22 AM  This dictation was generated by voice recognition computer software.

## 2023-08-31 ENCOUNTER — OFFICE VISIT (OUTPATIENT)
Dept: PRIMARY CARE CLINIC | Age: 63
End: 2023-08-31
Payer: MEDICARE

## 2023-08-31 VITALS
DIASTOLIC BLOOD PRESSURE: 88 MMHG | BODY MASS INDEX: 34.22 KG/M2 | HEART RATE: 118 BPM | SYSTOLIC BLOOD PRESSURE: 132 MMHG | WEIGHT: 193.2 LBS

## 2023-08-31 DIAGNOSIS — I10 BENIGN ESSENTIAL HTN: Chronic | ICD-10-CM

## 2023-08-31 DIAGNOSIS — Z79.4 TYPE 2 DIABETES MELLITUS WITHOUT COMPLICATION, WITH LONG-TERM CURRENT USE OF INSULIN (HCC): Chronic | ICD-10-CM

## 2023-08-31 DIAGNOSIS — Z01.818 PRE-OP EXAM: ICD-10-CM

## 2023-08-31 DIAGNOSIS — J30.9 ALLERGIC SINUSITIS: Primary | ICD-10-CM

## 2023-08-31 DIAGNOSIS — E11.9 TYPE 2 DIABETES MELLITUS WITHOUT COMPLICATION, WITH LONG-TERM CURRENT USE OF INSULIN (HCC): Chronic | ICD-10-CM

## 2023-08-31 PROCEDURE — 3017F COLORECTAL CA SCREEN DOC REV: CPT | Performed by: FAMILY MEDICINE

## 2023-08-31 PROCEDURE — 3044F HG A1C LEVEL LT 7.0%: CPT | Performed by: FAMILY MEDICINE

## 2023-08-31 PROCEDURE — 93000 ELECTROCARDIOGRAM COMPLETE: CPT | Performed by: FAMILY MEDICINE

## 2023-08-31 PROCEDURE — G8427 DOCREV CUR MEDS BY ELIG CLIN: HCPCS | Performed by: FAMILY MEDICINE

## 2023-08-31 PROCEDURE — 3079F DIAST BP 80-89 MM HG: CPT | Performed by: FAMILY MEDICINE

## 2023-08-31 PROCEDURE — 1036F TOBACCO NON-USER: CPT | Performed by: FAMILY MEDICINE

## 2023-08-31 PROCEDURE — G8417 CALC BMI ABV UP PARAM F/U: HCPCS | Performed by: FAMILY MEDICINE

## 2023-08-31 PROCEDURE — 2022F DILAT RTA XM EVC RTNOPTHY: CPT | Performed by: FAMILY MEDICINE

## 2023-08-31 PROCEDURE — 3075F SYST BP GE 130 - 139MM HG: CPT | Performed by: FAMILY MEDICINE

## 2023-08-31 PROCEDURE — 99214 OFFICE O/P EST MOD 30 MIN: CPT | Performed by: FAMILY MEDICINE

## 2023-08-31 RX ORDER — AMLODIPINE BESYLATE 5 MG/1
TABLET ORAL
Qty: 90 TABLET | Refills: 1 | Status: SHIPPED | OUTPATIENT
Start: 2023-08-31

## 2023-08-31 RX ORDER — MONTELUKAST SODIUM 10 MG/1
10 TABLET ORAL NIGHTLY
Qty: 90 TABLET | Refills: 1 | Status: SHIPPED | OUTPATIENT
Start: 2023-08-31

## 2023-08-31 RX ORDER — LOSARTAN POTASSIUM 100 MG/1
TABLET ORAL
Qty: 90 TABLET | Refills: 1 | Status: SHIPPED | OUTPATIENT
Start: 2023-08-31

## 2023-08-31 ASSESSMENT — ENCOUNTER SYMPTOMS
DIARRHEA: 0
VOMITING: 0
NAUSEA: 0
SHORTNESS OF BREATH: 0
ABDOMINAL PAIN: 0

## 2023-08-31 NOTE — PROGRESS NOTES
BILATERAL FUNCTIONAL ENDOSCOPIC SINUS SURGERY at [facility] with DR. Holley Collier on 9/12/23
knee partial    KNEE SURGERY  1990's    rt knee reconstructed    NECK SURGERY  05/12/2011    c 4,5,6,7 (ACDF)    OVARY REMOVAL      REVISION TOTAL KNEE ARTHROPLASTY Left 10/10/2022    REVISION LEFT TOTAL KNEE ARTHROPLASTY FOR INFECTION WITH ADDUCTOR CANAL  FOR PAIN CONTROL              MONTSERRAT BIOMET; ZANE performed by Alma Dawson MD at [facility] Left 1/9/2023    REVISION LEFT TOTAL KNEE ARTHROPLASTY, SECOND STAGE           Billy Olivas  performed by Alma Dawson MD at [address] [place] [address]  2009    Left shoulder    SHOULDER SURGERY  1990's    lt & rt shoulder impingment    SINUS ENDOSCOPY  9-15-14    FUNCTIONAL ENDOSCOPIC SINUS SURGERY       TONSILLECTOMY AND ADENOIDECTOMY      TOTAL KNEE ARTHROPLASTY Left 03/09/2010    Eros to Total    TUBAL LIGATION      UMBILICAL HERNIA REPAIR  8/2010    Dr. Metta Schirmer        Allergies   Allergen Reactions    Gabapentin Other (See Comments), Dizziness or Vertigo and Hallucinations     BLURRED VISION      Demerol Itching and Dermatitis     Unknown reaction; patient has tolerated hydrocodone, oxycodone, hydromorphone and fentanyl per chart review as of 09/16/22. Levaquin [Levofloxacin In D5w] Nausea Only    Morphine Itching     Unknown reaction; patient has tolerated hydrocodone, oxycodone, hydromorphone and fentanyl per chart review as of 09/16/22. Tramadol Other (See Comments)     Unknown reaction; patient has tolerated hydrocodone, oxycodone, hydromorphone and fentanyl per chart review as of 09/16/22.         Family History   Problem Relation Age of Onset    Diabetes Father     Heart Disease Father         CAD    High Blood Pressure Father     High Cholesterol Father     Kidney Disease Brother     Diabetes Son     Other Son         DEANA    Heart Disease Sister         1/2 sister    Breast Cancer Paternal Cousin     Rheum Arthritis Neg Hx     Osteoarthritis Neg Hx     Asthma Neg Hx     Cancer Neg Hx     Heart Failure Neg Hx

## 2023-09-01 ENCOUNTER — HOSPITAL ENCOUNTER (OUTPATIENT)
Dept: CT IMAGING | Age: 63
Discharge: HOME | End: 2023-09-01
Payer: MEDICARE

## 2023-09-01 DIAGNOSIS — J34.2 NASAL SEPTAL DEVIATION: ICD-10-CM

## 2023-09-01 DIAGNOSIS — J32.4 CHRONIC PANSINUSITIS: ICD-10-CM

## 2023-09-01 PROCEDURE — 70486 CT MAXILLOFACIAL W/O DYE: CPT

## 2023-09-01 RX ORDER — ONDANSETRON 4 MG/1
TABLET, ORALLY DISINTEGRATING ORAL
Qty: 21 TABLET | Refills: 0 | Status: SHIPPED | OUTPATIENT
Start: 2023-09-01 | End: 2023-09-20

## 2023-09-01 NOTE — TELEPHONE ENCOUNTER
Medication:   Requested Prescriptions     Pending Prescriptions Disp Refills    ondansetron (ZOFRAN-ODT) 4 MG disintegrating tablet [Pharmacy Med Name: ONDANSETRON ODT 4 MG TABLET] 21 tablet      Sig: DISSOLVE ONE TABLET BY MOUTH THREE TIMES A DAY AS NEEDED FOR NAUSEA AND/OR VOMITING        Last Filled:  7/18/23    Patient Phone Number: [phone] (home)     Last appt: 8/31/2023 [facility] in about 3 [facility] (around 10[facility]10/2023[facility] Next appt: Visit date not found    Last OARRS: No flowsheet data found.

## 2023-09-05 ENCOUNTER — OFFICE VISIT (OUTPATIENT)
Dept: ORTHOPEDIC SURGERY | Age: 63
End: 2023-09-05

## 2023-09-05 VITALS — RESPIRATION RATE: 15 BRPM | WEIGHT: 193 LBS | BODY MASS INDEX: 34.2 KG/M2 | HEIGHT: 63 IN

## 2023-09-05 DIAGNOSIS — M65.30 TRIGGER FINGER, ACQUIRED: Primary | ICD-10-CM

## 2023-09-05 RX ORDER — LIDOCAINE HYDROCHLORIDE 10 MG/ML
0.5 INJECTION, SOLUTION INFILTRATION; PERINEURAL ONCE
Status: COMPLETED | OUTPATIENT
Start: 2023-09-05 | End: 2023-09-05

## 2023-09-05 RX ORDER — TRIAMCINOLONE ACETONIDE 40 MG/ML
20 INJECTION, SUSPENSION INTRA-ARTICULAR; INTRAMUSCULAR ONCE
Status: COMPLETED | OUTPATIENT
Start: 2023-09-05 | End: 2023-09-05

## 2023-09-05 RX ADMIN — LIDOCAINE HYDROCHLORIDE 0.5 ML: 10 INJECTION, SOLUTION INFILTRATION; PERINEURAL at 10:22

## 2023-09-05 RX ADMIN — TRIAMCINOLONE ACETONIDE 20 MG: 40 INJECTION, SUSPENSION INTRA-ARTICULAR; INTRAMUSCULAR at 10:22

## 2023-09-05 NOTE — PROGRESS NOTES
The nature of this medical problem is fully discussed with the patient, including all treatment options, as well as the pertinent risks, complications, prognosis and post treatment care. All questions are answered. The left  hand is prepared with Betadine and alcohol and the flexor tendon sheath of the left middle finger is injected with 1/2 milliliter of 1% lidocaine and 20 mg.of triamcinalone, with good filling. The patient is advised regarding the expected response and possible reactions from the injection. Acetaminophen or ibuprofen are prescribed for any significant post injection pain. The patient is asked to call me if full, painless function has not returned within 6 weeks. The possibility of recurrence of the problem is discussed.

## 2023-09-08 NOTE — PROGRESS NOTES
Name_______________________________________Printed:____________________  Date and time of surgery__9/12/2023__________0730____________Arrival Time:___0600_____________   1. The instructions given regarding when and if a patient needs to stop oral intake prior to surgery varies. Follow the specific instructions you were given                  ___Nothing to eat or to drink after Midnight the night before.                   ____Carbo loading or instructions will be given to select patients-if you have been given those instructions -please do the following                           The evening before your surgery after dinner before midnight drink 40 ounces of gatorade. If you are diabetic use sugar free. The morning of surgery drink 40 ounces of water. This needs to be finished 3 hours prior to your surgery start time. 2. Take the following pills with a small sip of water on the morning of surgery__amlodipine_________________________________________________                  Do not take blood pressure medications ending in pril or sartan the monica prior to surgery or the morning of surgery. Dr Rosa Bejarano patient are not to take any medications the AM of surgery. 3. Aspirin, Ibuprofen, Advil, Naproxen, Vitamin E and other Anti-inflammatory products and supplements should be stopped for 5 -7days before surgery or as directed by your physician. 4. Check with your Doctor regarding stopping Plavix, Coumadin,Eliquis, Lovenox,Effient,Pradaxa,Xarelto, Fragmin or other blood thinners and follow their instructions. 5. Do not smoke, and do not drink any alcoholic beverages 24 hours prior to surgery. This includes NA Beer. Refrain from the usage of any recreational drugs. 6. You may brush your teeth and gargle the morning of surgery. DO NOT SWALLOW WATER   7. You MUST make arrangements for a responsible adult to stay on site while you are here and take you home after your surgery.  You will not be allowed to leave alone

## 2023-09-11 ENCOUNTER — TELEPHONE (OUTPATIENT)
Dept: ENT CLINIC | Age: 63
End: 2023-09-11

## 2023-09-11 ENCOUNTER — ANESTHESIA EVENT (OUTPATIENT)
Dept: OPERATING ROOM | Age: 63
End: 2023-09-11
Payer: MEDICARE

## 2023-09-11 NOTE — TELEPHONE ENCOUNTER
Patient is calling. She has surgery tomorrow with Dr. Larisa Delgado. She is congested and stuffy since Sunday. Sunday she took Sudafed three times and a nausea tablet. Patient would like to know if she can take Sudafed and her nausea medication today with having surgery tomorrow? Patient said she wants to have surgery so if she can't she will just suffer for today.

## 2023-09-12 ENCOUNTER — HOSPITAL ENCOUNTER (OUTPATIENT)
Age: 63
Setting detail: OUTPATIENT SURGERY
Discharge: HOME | End: 2023-09-12
Attending: OTOLARYNGOLOGY | Admitting: OTOLARYNGOLOGY
Payer: MEDICARE

## 2023-09-12 ENCOUNTER — ANESTHESIA (OUTPATIENT)
Dept: OPERATING ROOM | Age: 63
End: 2023-09-12
Payer: MEDICARE

## 2023-09-12 VITALS
BODY MASS INDEX: 33.84 KG/M2 | TEMPERATURE: 96.9 F | DIASTOLIC BLOOD PRESSURE: 95 MMHG | RESPIRATION RATE: 16 BRPM | HEIGHT: 63 IN | OXYGEN SATURATION: 91 % | SYSTOLIC BLOOD PRESSURE: 165 MMHG | HEART RATE: 110 BPM | WEIGHT: 191 LBS

## 2023-09-12 DIAGNOSIS — J32.3 CHRONIC SPHENOIDAL SINUSITIS: ICD-10-CM

## 2023-09-12 DIAGNOSIS — J34.3 HYPERTROPHY OF BOTH INFERIOR NASAL TURBINATES: Primary | Chronic | ICD-10-CM

## 2023-09-12 DIAGNOSIS — G89.18 POST-OP PAIN: ICD-10-CM

## 2023-09-12 DIAGNOSIS — J32.1 CHRONIC FRONTAL SINUSITIS: ICD-10-CM

## 2023-09-12 DIAGNOSIS — J32.0 CHRONIC MAXILLARY SINUSITIS: ICD-10-CM

## 2023-09-12 DIAGNOSIS — J34.2 NASAL SEPTAL DEVIATION: Chronic | ICD-10-CM

## 2023-09-12 DIAGNOSIS — J32.2 CHRONIC ETHMOIDAL SINUSITIS: ICD-10-CM

## 2023-09-12 PROBLEM — J34.89 NASAL OBSTRUCTION: Chronic | Status: ACTIVE | Noted: 2023-09-12

## 2023-09-12 PROBLEM — J34.89 NASAL OBSTRUCTION: Status: ACTIVE | Noted: 2023-09-12

## 2023-09-12 LAB
GLUCOSE BLD-MCNC: 104 MG/DL (ref 70–99)
GLUCOSE BLD-MCNC: 168 MG/DL (ref 70–99)
PERFORMED ON: ABNORMAL
PERFORMED ON: ABNORMAL

## 2023-09-12 PROCEDURE — 6370000000 HC RX 637 (ALT 250 FOR IP): Performed by: STUDENT IN AN ORGANIZED HEALTH CARE EDUCATION/TRAINING PROGRAM

## 2023-09-12 PROCEDURE — 88312 SPECIAL STAINS GROUP 1: CPT

## 2023-09-12 PROCEDURE — 88311 DECALCIFY TISSUE: CPT

## 2023-09-12 PROCEDURE — C9399 UNCLASSIFIED DRUGS OR BIOLOG: HCPCS | Performed by: STUDENT IN AN ORGANIZED HEALTH CARE EDUCATION/TRAINING PROGRAM

## 2023-09-12 PROCEDURE — 2720000010 HC SURG SUPPLY STERILE: Performed by: OTOLARYNGOLOGY

## 2023-09-12 PROCEDURE — 3700000001 HC ADD 15 MINUTES (ANESTHESIA): Performed by: OTOLARYNGOLOGY

## 2023-09-12 PROCEDURE — 7100000011 HC PHASE II RECOVERY - ADDTL 15 MIN: Performed by: OTOLARYNGOLOGY

## 2023-09-12 PROCEDURE — 6360000002 HC RX W HCPCS: Performed by: STUDENT IN AN ORGANIZED HEALTH CARE EDUCATION/TRAINING PROGRAM

## 2023-09-12 PROCEDURE — 3600000014 HC SURGERY LEVEL 4 ADDTL 15MIN: Performed by: OTOLARYNGOLOGY

## 2023-09-12 PROCEDURE — 31254 NSL/SINS NDSC W/PRTL ETHMDCT: CPT | Performed by: OTOLARYNGOLOGY

## 2023-09-12 PROCEDURE — 7100000010 HC PHASE II RECOVERY - FIRST 15 MIN: Performed by: OTOLARYNGOLOGY

## 2023-09-12 PROCEDURE — 6370000000 HC RX 637 (ALT 250 FOR IP): Performed by: OTOLARYNGOLOGY

## 2023-09-12 PROCEDURE — 31267 ENDOSCOPY MAXILLARY SINUS: CPT | Performed by: OTOLARYNGOLOGY

## 2023-09-12 PROCEDURE — A4217 STERILE WATER/SALINE, 500 ML: HCPCS | Performed by: OTOLARYNGOLOGY

## 2023-09-12 PROCEDURE — 6360000002 HC RX W HCPCS: Performed by: OTOLARYNGOLOGY

## 2023-09-12 PROCEDURE — 7100000001 HC PACU RECOVERY - ADDTL 15 MIN: Performed by: OTOLARYNGOLOGY

## 2023-09-12 PROCEDURE — 3600000004 HC SURGERY LEVEL 4 BASE: Performed by: OTOLARYNGOLOGY

## 2023-09-12 PROCEDURE — 2500000003 HC RX 250 WO HCPCS: Performed by: OTOLARYNGOLOGY

## 2023-09-12 PROCEDURE — 2500000003 HC RX 250 WO HCPCS: Performed by: STUDENT IN AN ORGANIZED HEALTH CARE EDUCATION/TRAINING PROGRAM

## 2023-09-12 PROCEDURE — 2709999900 HC NON-CHARGEABLE SUPPLY: Performed by: OTOLARYNGOLOGY

## 2023-09-12 PROCEDURE — 88305 TISSUE EXAM BY PATHOLOGIST: CPT

## 2023-09-12 PROCEDURE — 3700000000 HC ANESTHESIA ATTENDED CARE: Performed by: OTOLARYNGOLOGY

## 2023-09-12 PROCEDURE — 7100000000 HC PACU RECOVERY - FIRST 15 MIN: Performed by: OTOLARYNGOLOGY

## 2023-09-12 PROCEDURE — 30520 REPAIR OF NASAL SEPTUM: CPT | Performed by: OTOLARYNGOLOGY

## 2023-09-12 PROCEDURE — 2580000003 HC RX 258: Performed by: OTOLARYNGOLOGY

## 2023-09-12 PROCEDURE — 2580000003 HC RX 258: Performed by: STUDENT IN AN ORGANIZED HEALTH CARE EDUCATION/TRAINING PROGRAM

## 2023-09-12 RX ORDER — METOPROLOL TARTRATE 5 MG/5ML
INJECTION INTRAVENOUS PRN
Status: DISCONTINUED | OUTPATIENT
Start: 2023-09-12 | End: 2023-09-12 | Stop reason: SDUPTHER

## 2023-09-12 RX ORDER — SODIUM CHLORIDE 9 MG/ML
INJECTION, SOLUTION INTRAVENOUS PRN
Status: DISCONTINUED | OUTPATIENT
Start: 2023-09-12 | End: 2023-09-12 | Stop reason: HOSPADM

## 2023-09-12 RX ORDER — ACETAMINOPHEN 325 MG/1
650 TABLET ORAL ONCE
Status: COMPLETED | OUTPATIENT
Start: 2023-09-12 | End: 2023-09-12

## 2023-09-12 RX ORDER — ESMOLOL HYDROCHLORIDE 10 MG/ML
INJECTION INTRAVENOUS PRN
Status: DISCONTINUED | OUTPATIENT
Start: 2023-09-12 | End: 2023-09-12 | Stop reason: SDUPTHER

## 2023-09-12 RX ORDER — SODIUM CHLORIDE 9 MG/ML
INJECTION, SOLUTION INTRAVENOUS CONTINUOUS PRN
Status: DISCONTINUED | OUTPATIENT
Start: 2023-09-12 | End: 2023-09-12 | Stop reason: SDUPTHER

## 2023-09-12 RX ORDER — CEFAZOLIN SODIUM 1 G/3ML
INJECTION, POWDER, FOR SOLUTION INTRAMUSCULAR; INTRAVENOUS PRN
Status: DISCONTINUED | OUTPATIENT
Start: 2023-09-12 | End: 2023-09-12 | Stop reason: SDUPTHER

## 2023-09-12 RX ORDER — CEFUROXIME AXETIL 250 MG/1
250 TABLET ORAL 2 TIMES DAILY
Qty: 14 TABLET | Refills: 0 | Status: SHIPPED | OUTPATIENT
Start: 2023-09-12 | End: 2023-09-19

## 2023-09-12 RX ORDER — MAGNESIUM HYDROXIDE 1200 MG/15ML
LIQUID ORAL CONTINUOUS PRN
Status: COMPLETED | OUTPATIENT
Start: 2023-09-12 | End: 2023-09-12

## 2023-09-12 RX ORDER — APREPITANT 40 MG/1
40 CAPSULE ORAL ONCE
Status: COMPLETED | OUTPATIENT
Start: 2023-09-12 | End: 2023-09-12

## 2023-09-12 RX ORDER — ROCURONIUM BROMIDE 10 MG/ML
INJECTION, SOLUTION INTRAVENOUS PRN
Status: DISCONTINUED | OUTPATIENT
Start: 2023-09-12 | End: 2023-09-12 | Stop reason: SDUPTHER

## 2023-09-12 RX ORDER — HYDROCODONE BITARTRATE AND ACETAMINOPHEN 7.5; 325 MG/1; MG/1
1 TABLET ORAL EVERY 6 HOURS PRN
Qty: 28 TABLET | Refills: 0 | Status: SHIPPED | OUTPATIENT
Start: 2023-09-12 | End: 2023-09-19

## 2023-09-12 RX ORDER — ONDANSETRON 2 MG/ML
INJECTION INTRAMUSCULAR; INTRAVENOUS PRN
Status: DISCONTINUED | OUTPATIENT
Start: 2023-09-12 | End: 2023-09-12 | Stop reason: SDUPTHER

## 2023-09-12 RX ORDER — HYDROMORPHONE HYDROCHLORIDE 2 MG/ML
0.5 INJECTION, SOLUTION INTRAMUSCULAR; INTRAVENOUS; SUBCUTANEOUS EVERY 5 MIN PRN
Status: DISCONTINUED | OUTPATIENT
Start: 2023-09-12 | End: 2023-09-12 | Stop reason: HOSPADM

## 2023-09-12 RX ORDER — PROPOFOL 10 MG/ML
INJECTION, EMULSION INTRAVENOUS PRN
Status: DISCONTINUED | OUTPATIENT
Start: 2023-09-12 | End: 2023-09-12 | Stop reason: SDUPTHER

## 2023-09-12 RX ORDER — FENTANYL CITRATE 50 UG/ML
50 INJECTION, SOLUTION INTRAMUSCULAR; INTRAVENOUS EVERY 5 MIN PRN
Status: DISCONTINUED | OUTPATIENT
Start: 2023-09-12 | End: 2023-09-12 | Stop reason: HOSPADM

## 2023-09-12 RX ORDER — MAGNESIUM SULFATE HEPTAHYDRATE 500 MG/ML
INJECTION, SOLUTION INTRAMUSCULAR; INTRAVENOUS PRN
Status: DISCONTINUED | OUTPATIENT
Start: 2023-09-12 | End: 2023-09-12 | Stop reason: SDUPTHER

## 2023-09-12 RX ORDER — BACITRACIN ZINC AND POLYMYXIN B SULFATE 500; 1000 [USP'U]/G; [USP'U]/G
OINTMENT TOPICAL
Status: COMPLETED | OUTPATIENT
Start: 2023-09-12 | End: 2023-09-12

## 2023-09-12 RX ORDER — PROMETHAZINE HYDROCHLORIDE 25 MG/1
25 TABLET ORAL EVERY 6 HOURS PRN
Qty: 40 TABLET | Refills: 0 | Status: SHIPPED | OUTPATIENT
Start: 2023-09-12

## 2023-09-12 RX ORDER — OXYMETAZOLINE HYDROCHLORIDE 0.05 G/100ML
2 SPRAY NASAL
Status: COMPLETED | OUTPATIENT
Start: 2023-09-12 | End: 2023-09-12

## 2023-09-12 RX ORDER — DEXAMETHASONE SODIUM PHOSPHATE 4 MG/ML
INJECTION, SOLUTION INTRA-ARTICULAR; INTRALESIONAL; INTRAMUSCULAR; INTRAVENOUS; SOFT TISSUE PRN
Status: DISCONTINUED | OUTPATIENT
Start: 2023-09-12 | End: 2023-09-12 | Stop reason: SDUPTHER

## 2023-09-12 RX ORDER — FENTANYL CITRATE 50 UG/ML
INJECTION, SOLUTION INTRAMUSCULAR; INTRAVENOUS PRN
Status: DISCONTINUED | OUTPATIENT
Start: 2023-09-12 | End: 2023-09-12 | Stop reason: SDUPTHER

## 2023-09-12 RX ORDER — SODIUM CHLORIDE 0.9 % (FLUSH) 0.9 %
5-40 SYRINGE (ML) INJECTION PRN
Status: DISCONTINUED | OUTPATIENT
Start: 2023-09-12 | End: 2023-09-12 | Stop reason: HOSPADM

## 2023-09-12 RX ORDER — MIDAZOLAM HYDROCHLORIDE 1 MG/ML
INJECTION INTRAMUSCULAR; INTRAVENOUS PRN
Status: DISCONTINUED | OUTPATIENT
Start: 2023-09-12 | End: 2023-09-12 | Stop reason: SDUPTHER

## 2023-09-12 RX ORDER — HYDROMORPHONE HYDROCHLORIDE 2 MG/ML
INJECTION, SOLUTION INTRAMUSCULAR; INTRAVENOUS; SUBCUTANEOUS PRN
Status: DISCONTINUED | OUTPATIENT
Start: 2023-09-12 | End: 2023-09-12 | Stop reason: SDUPTHER

## 2023-09-12 RX ORDER — SODIUM CHLORIDE, SODIUM LACTATE, POTASSIUM CHLORIDE, CALCIUM CHLORIDE 600; 310; 30; 20 MG/100ML; MG/100ML; MG/100ML; MG/100ML
INJECTION, SOLUTION INTRAVENOUS CONTINUOUS
Status: DISCONTINUED | OUTPATIENT
Start: 2023-09-12 | End: 2023-09-12 | Stop reason: HOSPADM

## 2023-09-12 RX ORDER — OXYMETAZOLINE HYDROCHLORIDE 0.05 G/100ML
SPRAY NASAL
Status: COMPLETED | OUTPATIENT
Start: 2023-09-12 | End: 2023-09-12

## 2023-09-12 RX ORDER — SODIUM CHLORIDE 0.9 % (FLUSH) 0.9 %
5-40 SYRINGE (ML) INJECTION EVERY 12 HOURS SCHEDULED
Status: DISCONTINUED | OUTPATIENT
Start: 2023-09-12 | End: 2023-09-12 | Stop reason: HOSPADM

## 2023-09-12 RX ORDER — LIDOCAINE HYDROCHLORIDE AND EPINEPHRINE 10; 10 MG/ML; UG/ML
INJECTION, SOLUTION INFILTRATION; PERINEURAL
Status: COMPLETED | OUTPATIENT
Start: 2023-09-12 | End: 2023-09-12

## 2023-09-12 RX ORDER — OXYCODONE HYDROCHLORIDE 5 MG/1
5 TABLET ORAL
Status: COMPLETED | OUTPATIENT
Start: 2023-09-12 | End: 2023-09-12

## 2023-09-12 RX ORDER — SODIUM CHLORIDE 9 MG/ML
INJECTION, SOLUTION INTRAVENOUS CONTINUOUS
Status: DISCONTINUED | OUTPATIENT
Start: 2023-09-12 | End: 2023-09-12 | Stop reason: HOSPADM

## 2023-09-12 RX ORDER — ONDANSETRON 2 MG/ML
4 INJECTION INTRAMUSCULAR; INTRAVENOUS
Status: DISCONTINUED | OUTPATIENT
Start: 2023-09-12 | End: 2023-09-12 | Stop reason: HOSPADM

## 2023-09-12 RX ORDER — EPINEPHRINE 1 MG/ML
INJECTION, SOLUTION INTRAMUSCULAR; SUBCUTANEOUS
Status: COMPLETED | OUTPATIENT
Start: 2023-09-12 | End: 2023-09-12

## 2023-09-12 RX ORDER — LIDOCAINE HYDROCHLORIDE 20 MG/ML
INJECTION, SOLUTION EPIDURAL; INFILTRATION; INTRACAUDAL; PERINEURAL PRN
Status: DISCONTINUED | OUTPATIENT
Start: 2023-09-12 | End: 2023-09-12 | Stop reason: SDUPTHER

## 2023-09-12 RX ADMIN — METOPROLOL TARTRATE 2.5 MG: 1 INJECTION, SOLUTION INTRAVENOUS at 11:11

## 2023-09-12 RX ADMIN — CEFAZOLIN 2 G: 1 INJECTION, POWDER, FOR SOLUTION INTRAVENOUS at 07:47

## 2023-09-12 RX ADMIN — FENTANYL CITRATE 50 MCG: 50 INJECTION, SOLUTION INTRAMUSCULAR; INTRAVENOUS at 07:44

## 2023-09-12 RX ADMIN — ESMOLOL HYDROCHLORIDE 50 MG: 10 INJECTION, SOLUTION INTRAVENOUS at 07:46

## 2023-09-12 RX ADMIN — PROPOFOL 150 MG: 10 INJECTION, EMULSION INTRAVENOUS at 07:44

## 2023-09-12 RX ADMIN — ESMOLOL HYDROCHLORIDE 50 MG: 10 INJECTION, SOLUTION INTRAVENOUS at 07:57

## 2023-09-12 RX ADMIN — PROPOFOL 20 MG: 10 INJECTION, EMULSION INTRAVENOUS at 09:45

## 2023-09-12 RX ADMIN — FENTANYL CITRATE 50 MCG: 50 INJECTION, SOLUTION INTRAMUSCULAR; INTRAVENOUS at 07:50

## 2023-09-12 RX ADMIN — SODIUM CHLORIDE: 9 INJECTION, SOLUTION INTRAVENOUS at 07:37

## 2023-09-12 RX ADMIN — PROPOFOL 30 MG: 10 INJECTION, EMULSION INTRAVENOUS at 10:02

## 2023-09-12 RX ADMIN — MAGNESIUM SULFATE HEPTAHYDRATE 0.25 G: 500 INJECTION, SOLUTION INTRAMUSCULAR; INTRAVENOUS at 10:44

## 2023-09-12 RX ADMIN — PROPOFOL 30 MG: 10 INJECTION, EMULSION INTRAVENOUS at 09:15

## 2023-09-12 RX ADMIN — NASAL DECONGESTANT 2 SPRAY: 0.05 SPRAY NASAL at 06:43

## 2023-09-12 RX ADMIN — MIDAZOLAM 1 MG: 1 INJECTION INTRAMUSCULAR; INTRAVENOUS at 07:50

## 2023-09-12 RX ADMIN — SUGAMMADEX 200 MG: 100 INJECTION, SOLUTION INTRAVENOUS at 10:55

## 2023-09-12 RX ADMIN — APREPITANT 40 MG: 40 CAPSULE ORAL at 06:42

## 2023-09-12 RX ADMIN — MAGNESIUM SULFATE HEPTAHYDRATE 0.25 G: 500 INJECTION, SOLUTION INTRAMUSCULAR; INTRAVENOUS at 10:03

## 2023-09-12 RX ADMIN — MAGNESIUM SULFATE HEPTAHYDRATE 0.25 G: 500 INJECTION, SOLUTION INTRAMUSCULAR; INTRAVENOUS at 10:31

## 2023-09-12 RX ADMIN — DEXAMETHASONE SODIUM PHOSPHATE 8 MG: 4 INJECTION, SOLUTION INTRAMUSCULAR; INTRAVENOUS at 08:00

## 2023-09-12 RX ADMIN — METOPROLOL TARTRATE 2.5 MG: 1 INJECTION, SOLUTION INTRAVENOUS at 11:04

## 2023-09-12 RX ADMIN — PHENYLEPHRINE HYDROCHLORIDE 100 MCG: 10 INJECTION INTRAVENOUS at 08:11

## 2023-09-12 RX ADMIN — MAGNESIUM SULFATE HEPTAHYDRATE 0.25 G: 500 INJECTION, SOLUTION INTRAMUSCULAR; INTRAVENOUS at 10:12

## 2023-09-12 RX ADMIN — HYDROMORPHONE HYDROCHLORIDE 0.5 MG: 2 INJECTION, SOLUTION INTRAMUSCULAR; INTRAVENOUS; SUBCUTANEOUS at 09:10

## 2023-09-12 RX ADMIN — OXYCODONE HYDROCHLORIDE 5 MG: 5 TABLET ORAL at 14:05

## 2023-09-12 RX ADMIN — PHENYLEPHRINE HYDROCHLORIDE 100 MCG: 10 INJECTION INTRAVENOUS at 07:54

## 2023-09-12 RX ADMIN — ONDANSETRON 4 MG: 2 INJECTION INTRAMUSCULAR; INTRAVENOUS at 10:20

## 2023-09-12 RX ADMIN — LIDOCAINE HYDROCHLORIDE 60 MG: 20 INJECTION, SOLUTION EPIDURAL; INFILTRATION; INTRACAUDAL; PERINEURAL at 07:41

## 2023-09-12 RX ADMIN — PHENYLEPHRINE HYDROCHLORIDE 50 MCG: 10 INJECTION INTRAVENOUS at 08:07

## 2023-09-12 RX ADMIN — PROPOFOL 20 MG: 10 INJECTION, EMULSION INTRAVENOUS at 10:54

## 2023-09-12 RX ADMIN — PROPOFOL 20 MG: 10 INJECTION, EMULSION INTRAVENOUS at 07:46

## 2023-09-12 RX ADMIN — SODIUM CHLORIDE: 9 INJECTION, SOLUTION INTRAVENOUS at 07:02

## 2023-09-12 RX ADMIN — ACETAMINOPHEN 650 MG: 325 TABLET ORAL at 06:42

## 2023-09-12 RX ADMIN — HYDROMORPHONE HYDROCHLORIDE 0.5 MG: 2 INJECTION, SOLUTION INTRAMUSCULAR; INTRAVENOUS; SUBCUTANEOUS at 08:46

## 2023-09-12 RX ADMIN — PROPOFOL 20 MG: 10 INJECTION, EMULSION INTRAVENOUS at 10:45

## 2023-09-12 RX ADMIN — ROCURONIUM BROMIDE 50 MG: 10 INJECTION, SOLUTION INTRAVENOUS at 07:45

## 2023-09-12 RX ADMIN — PHENYLEPHRINE HYDROCHLORIDE 50 MCG: 10 INJECTION INTRAVENOUS at 08:04

## 2023-09-12 RX ADMIN — PHENYLEPHRINE HYDROCHLORIDE 100 MCG: 10 INJECTION INTRAVENOUS at 08:17

## 2023-09-12 RX ADMIN — CEFAZOLIN 2000 MG: 2 INJECTION, POWDER, FOR SOLUTION INTRAMUSCULAR; INTRAVENOUS at 07:29

## 2023-09-12 ASSESSMENT — LIFESTYLE VARIABLES: SMOKING_STATUS: 1

## 2023-09-12 ASSESSMENT — PAIN SCALES - GENERAL: PAINLEVEL_OUTOF10: 10

## 2023-09-12 ASSESSMENT — PAIN - FUNCTIONAL ASSESSMENT: PAIN_FUNCTIONAL_ASSESSMENT: 0-10

## 2023-09-12 NOTE — H&P
Date of Surgery Update:  Zane Parson was seen, history and physical examination reviewed, and patient examined by me today. There have been no significant clinical changes since the completion of the previous history and physical.    The risk, benefits, and alternatives of the proposed procedure have been explained to the patient (or appropriate guardian) and understanding verbalized. All questions answered. Patient wishes to proceed.     Electronically signed by: Debbi Pearson MD,9/12/2023,7:22 AM

## 2023-09-12 NOTE — PROGRESS NOTES
Discharge instructions review with patient and pt  son on phone . All home medications have been reviewed, pt v/u. Discharge instructions signed. Pt discharged via wheelchair. Pt discharged with instructions, extra dressings, nasal irrigation kit and all belongings. Pt son taking stable pt home.

## 2023-09-12 NOTE — ANESTHESIA PRE PROCEDURE
\"POCHCT\" in the last 72 hours. Coags:   Lab Results   Component Value Date/Time    PROTIME 12.7 01/05/2023 09:20 AM    INR 0.97 01/05/2023 09:20 AM    APTT 31.5 01/05/2023 09:20 AM       HCG (If Applicable): No results found for: \"PREGTESTUR\", \"PREGSERUM\", \"HCG\", \"HCGQUANT\"     ABGs: No results found for: \"PHART\", \"PO2ART\", \"PXM3GEI\", \"VAA1WKJ\", \"BEART\", \"L1IGSUKN\"     Type & Screen (If Applicable):  Lab Results   Component Value Date    LABABO B 03/09/2010    [address] Negative 03/09/2010       Drug/Infectious Status (If Applicable):  No results found for: \"HIV\", \"HEPCAB\"    COVID-19 Screening (If Applicable):   Lab Results   Component Value Date/Time    COVID19 Not Detected 10/18/2022 11:13 AM    COVID19 Not Detected 01/30/2021 03:55 PM           Anesthesia Evaluation  Patient summary reviewed no history of anesthetic complications:   Airway: Mallampati: II  TM distance: >3 FB   Neck ROM: full  Mouth opening: > = 3 FB   Dental:    (+) partials      Pulmonary:normal exam  breath sounds clear to auscultation  (+) sleep apnea:  current smoker    (-) COPD and asthma          Patient did not smoke on day of surgery. Cardiovascular:  Exercise tolerance: good (>4 METS),   (+) hypertension:,     (-) CAD,  angina and  CHEN      Rhythm: regular  Rate: normal                    Neuro/Psych:      (-) seizures and TIA           GI/Hepatic/Renal:   (+) morbid obesity     (-) GERD, liver disease and no renal disease       Endo/Other:    (+) DiabetesType II DM, well controlled, , .                 Abdominal:             Vascular: negative vascular ROS. Other Findings:             Anesthesia Plan      general     ASA 3     (61 yo F scheduled for b/l FESS. Pt seen and examined, chart reviewed (including anesthesia, drug and allergy history). No interval changes to history and physical examination. Anesthetic plan, risks, benefits, alternatives, and personnel involved discussed with patient.   Patient

## 2023-09-12 NOTE — PROGRESS NOTES
Pt awake and alert at this time. Pt on RA, and VSS. Pt denies nausea, tolerating PO. Skin warm to surrounding surgical area. Pt meets criteria to be discharged from Phase 1.

## 2023-09-12 NOTE — BRIEF OP NOTE
Brief Postoperative Note      Patient: Jack Wolf  YOB: 1960  MRN: [id number]    Date of Procedure: 9/12/2023    Pre-Op Diagnosis Codes:     * Chronic maxillary sinusitis [J32.0]     * Chronic frontal sinusitis [J32.1]     * Chronic ethmoidal sinusitis [J32.2]     * Chronic sphenoidal sinusitis [J32.3]    Post-Op Diagnosis: {MH OR [id number]:[id number]}       Procedure(s):  BILATERAL FUNCTIONAL ENDOSCOPIC SINUS SURGERY ANTERIOR ETHMOIDECTOMY, MIDDLE MEATAL ANTROSTOMY, WITH EXCISION OF TISSUE, CLEARING OF FRONTAL RECESSES; SEPTAL RECONSTRUCTION AND OUTFRACTURE OF INFERIOR NASAL TURBINATES, WITH Frameri NAVIGATION STEREOTACTIC     Surgeon(s):  Darell Duran MD    Assistant:  * No surgical staff found *    Anesthesia: General    Estimated Blood Loss (mL): less than 183     Complications: None    Specimens:   ID Type Source Tests Collected by Time Destination   A : A. RIGHT SIDE SINUS CONTENTS Tissue Tissue SURGICAL PATHOLOGY Darell Duran MD 9/12/2023 0818    B : B. LEFT SIDE SINUS CONTENTS Tissue Tissue SURGICAL PATHOLOGY Darell Duran MD 9/12/2023 8281    C : C. SEPTAL BONE AND CARTILAGE Tissue Tissue SURGICAL PATHOLOGY Darell Duran MD 9/12/2023 2547    D : D. POLYP FROM LEFT NASAL SINUS AND MIDDLE MEATUS Tissue Tissue SURGICAL PATHOLOGY Darell Duran MD 9/12/2023 8180    E : E. LEFT SINUS MAXILLARY CONTENTS, RULE OUT FUNGUS Tissue Tissue SURGICAL PATHOLOGY Darell Duran MD 9/12/2023 1015        Implants:  * No implants in log *      Drains: * No LDAs found *    Findings: The nasal septum was deviated to the left in both the cartilaginous and bony portions with a left inferior spur and right inferior spur causing left nasal airway obstruction. There were multiple polyps in the left superior nasal cavity and middle meatus extending into the ethmoid sinuses and frontal recess area and the maxillary ostium. The maxillary ostium was narrow bilaterally.   The infundibulum was narrow

## 2023-09-12 NOTE — ANESTHESIA POSTPROCEDURE EVALUATION
Department of Anesthesiology  Postprocedure Note    Patient: Eliezer Leblanc  MRN: [id number]  YOB: 1960  Date of evaluation: 9/12/2023      Procedure Summary     Date: 09/12/23 Room / Location: [facility] OR [address] [place] [address]    Anesthesia Start: 9439 Anesthesia Stop: 8000    Procedure: BILATERAL FUNCTIONAL ENDOSCOPIC SINUS SURGERY (MAXILLARY, ETHMOID; FRONTAL; SPHENOID); SEPTAL RECONSTRUCTION WITH [employer] NAVIGATION (Bilateral: Nose) Diagnosis:       Chronic maxillary sinusitis      Chronic frontal sinusitis      Chronic ethmoidal sinusitis      Chronic sphenoidal sinusitis      (Chronic maxillary sinusitis [J32.0])      (Chronic frontal sinusitis [J32.1])      (Chronic ethmoidal sinusitis [J32.2])      (Chronic sphenoidal sinusitis [J32.3])    Surgeons: Pam Iverson MD Responsible Provider: Katy Solis MD    Anesthesia Type: general ASA Status: 3          Anesthesia Type: No value filed.     Sabrina Phase I: Sabrina Score: 8    Sabrina Phase II: Sabrina Score: 10      Anesthesia Post Evaluation    Patient location during evaluation: PACU  Patient participation: complete - patient participated  Level of consciousness: awake and alert  Airway patency: patent  Nausea & Vomiting: no nausea and no vomiting  Complications: no  Cardiovascular status: hemodynamically stable  Respiratory status: acceptable  Hydration status: stable  Multimodal analgesia pain management approach  Pain management: adequate

## 2023-09-12 NOTE — PROGRESS NOTES
Pt arrived from OR to PACU bay 4. Reported received from [address] staff. Pt asleep, unresponsive at time of arrival. Spontaneous respirations noted; pt arrives with simple mask in place, infusing 4L oxygen, vitals as charted. Surgical incisions dressings in place to nose.

## 2023-09-12 NOTE — PROGRESS NOTES
Discharge instructions reviewed with son Benja Bhandari by phone, responsible adult. He stated can stay with her until 3 am on 9/13/23, then I spoke to her neighbor Marta Strauss by phone who stated will be with her after that, as needed. All home medications have been reviewed, questions answered .

## 2023-09-12 NOTE — DISCHARGE INSTRUCTIONS
[facility] ENT    [facility]. Darwin Cooper M.D.  [address], [address]  [phone]        POST OPERATIVE INSTRUCTIONS  ENDOSCOPIC SINUS SURGERY AND SEPTAL RECONSTRUCTION        POST OPERATIVE PAIN  The severity of post operative pain will vary. Usually there is mild to moderate pain after endoscopic sinus surgery. Pain medication will help, but do not expect to be totally pain free. Use acetaminophen (e.g. Tylenol) for mild to moderate pain. Use the prescription pain medication for moderate to severe pain. Remember that many pain medications, such as Vicodin, Norco, and Percocet, contain acetaminophen. Do not exceed a total of 4000 mg of acetaminophen per 24 hours, from any and all sources, including your prescription pain medication, to avoid possible liver damage. ACTIVITY  When resting, rest in bed, face up, on the day of surgery and the first day after surgery. Elevate your head at all times, for the first three days after surgery, using two or three pillows when lying down. Keep moving your legs whenever you are lying down. Beginning this evening, you should be up and about, walking, at least several times a day, with quiet indoor activities. Advance your activity level as tolerated. Please refrain from any strenuous physical activity, heavy lifting over 15 pounds,  or exercise for two weeks after surgery. Do not drive a motorized vehicle, operate heavy or complicated machinery, or engage in any activities requiring normal judgement and concentration and decision making while you are taking narcotic pain medication. Refrain from any activity that may result in a blow or injury to the nose for six weeks. DRAINING AND BLEEDING are normal after nasal surgery. Change your mustache dressing under the nose as it becomes soiled or soaked. If bleeding occurs, lie with the head elevated on two pillows.   Put crushed ice in a plastic sandwich bag

## 2023-09-13 ENCOUNTER — TELEPHONE (OUTPATIENT)
Dept: ENT CLINIC | Age: 63
End: 2023-09-13

## 2023-09-14 ENCOUNTER — TELEPHONE (OUTPATIENT)
Dept: ENT CLINIC | Age: 63
End: 2023-09-14

## 2023-09-14 NOTE — TELEPHONE ENCOUNTER
Pt is calling to see if its okay that she took her tape off that was covering the opening of her nose.

## 2023-09-15 ENCOUNTER — OFFICE VISIT (OUTPATIENT)
Dept: ENT CLINIC | Age: 63
End: 2023-09-15

## 2023-09-15 VITALS
HEART RATE: 99 BPM | TEMPERATURE: 97.5 F | OXYGEN SATURATION: 95 % | DIASTOLIC BLOOD PRESSURE: 85 MMHG | BODY MASS INDEX: 34.38 KG/M2 | SYSTOLIC BLOOD PRESSURE: 141 MMHG | WEIGHT: 194 LBS | HEIGHT: 63 IN

## 2023-09-15 DIAGNOSIS — Z09 POSTOP CHECK: Primary | ICD-10-CM

## 2023-09-15 PROCEDURE — 99024 POSTOP FOLLOW-UP VISIT: CPT | Performed by: OTOLARYNGOLOGY

## 2023-09-20 RX ORDER — ONDANSETRON 4 MG/1
TABLET, ORALLY DISINTEGRATING ORAL
Qty: 21 TABLET | Refills: 0 | Status: SHIPPED | OUTPATIENT
Start: 2023-09-20

## 2023-09-20 NOTE — TELEPHONE ENCOUNTER
Medication:   Requested Prescriptions     Pending Prescriptions Disp Refills    ondansetron (ZOFRAN-ODT) 4 MG disintegrating tablet [Pharmacy Med Name: ONDANSETRON ODT 4 MG TABLET] 21 tablet 0     Sig: DISSOLVE ONE TABLET BY MOUTH THREE TIMES A DAY AS NEEDED FOR NAUSEA AND/OR VOMITING.      Last Filled:  n/a    Last appt: 8/31/2023   Next appt: Visit date not found

## 2023-09-25 ENCOUNTER — OFFICE VISIT (OUTPATIENT)
Dept: ENT CLINIC | Age: 63
End: 2023-09-25
Payer: MEDICARE

## 2023-09-25 VITALS
TEMPERATURE: 97.7 F | WEIGHT: 191 LBS | HEART RATE: 125 BPM | SYSTOLIC BLOOD PRESSURE: 128 MMHG | DIASTOLIC BLOOD PRESSURE: 75 MMHG | OXYGEN SATURATION: 97 % | HEIGHT: 63 IN | BODY MASS INDEX: 33.84 KG/M2

## 2023-09-25 DIAGNOSIS — J32.4 CHRONIC PANSINUSITIS: Primary | ICD-10-CM

## 2023-09-25 DIAGNOSIS — Z98.890 STATUS POST FUNCTIONAL ENDOSCOPIC SINUS SURGERY (FESS): ICD-10-CM

## 2023-09-25 PROCEDURE — 99999 PR OFFICE/OUTPT VISIT,PROCEDURE ONLY: CPT | Performed by: OTOLARYNGOLOGY

## 2023-09-25 PROCEDURE — 31237 NSL/SINS NDSC SURG BX POLYPC: CPT | Performed by: OTOLARYNGOLOGY

## 2023-09-25 NOTE — PATIENT INSTRUCTIONS
Continue nasal irrigations to both nostrils, three times a day, as instructed, until you return to the office. Continue post op instructions per after visit summary given after surgery.

## 2023-09-25 NOTE — PROGRESS NOTES
PROCEDURE:   Nasal endoscopy and sinus debridement #1 (CPT [id number])       INTERVAL HISTORY:    Doing well with no problems. \"I'm better than I was before you did the surgery but it's just still sore. \"        COUNSELING:  Risks, benefits, and alternatives of diagnostic nasal endoscopy were explained to the patient. Specific mention was made of the risk of nosebleed, drainage, throat numbness with difficulty swallowing, and pain from the procedure. The patient gave oral consent to proceed. FINDINGS:   The middle meatus, ethmoid cavity, and frontal recess were clear. The frontal sinus ostium was patent and the frontal sinus transilluminated well. The maxillary antrostomy was patent. DESCRIPTION OF PROCEDURE:   The left and right nasal nasal cavities were decongested and anesthetized with a mixture of equal parts of 0.05% oxymetazoline solution and 4% lidocaine solution by nasal sprayer. After an appropriate elapse of time, the nasal cavities were packed each with a cotton pledget moistened with 4% topical lidocaine. After an appropriate elapse of time,  the cotton pledgets were removed. The rigid nasal telescope was inserted into the left nasal cavity, using the [employer] system. Debridement of mucus crusts and debris was accomplished. Bleeding was minimal.  The tissues appeared to be healing well. There were no polyps. The procedure was repeated on the right side with identical findings. The patient tolerated the procedure well. IMPRESSION / Temi Whalen / Sharan Mac / PROCEDURES:       Diagnoses and all orders for this visit:    Chronic pansinusitis    Status post functional endoscopic sinus surgery (FESS)         RECOMMENDATIONS / PLAN:   Return in about 2 weeks (around 10/9/2023) for nasal endoscopy and possible sinus debridement.

## 2023-09-27 ENCOUNTER — TELEPHONE (OUTPATIENT)
Dept: ENT CLINIC | Age: 63
End: 2023-09-27

## 2023-09-27 NOTE — OP NOTE
intraoperative complications and estimated blood loss of 100 milliliters. Patient was then returned to the care of the anesthetist for awakening, extubation, and transport to the PACU.       Electronically signed by Tricia Bone MD on 9/26/2023 at 11:20 PM

## 2023-09-28 ENCOUNTER — OFFICE VISIT (OUTPATIENT)
Dept: ENT CLINIC | Age: 63
End: 2023-09-28

## 2023-09-28 VITALS
WEIGHT: 195 LBS | SYSTOLIC BLOOD PRESSURE: 136 MMHG | BODY MASS INDEX: 34.55 KG/M2 | OXYGEN SATURATION: 96 % | TEMPERATURE: 97.3 F | HEART RATE: 115 BPM | HEIGHT: 63 IN | DIASTOLIC BLOOD PRESSURE: 83 MMHG

## 2023-09-28 DIAGNOSIS — J01.90 ACUTE RHINOSINUSITIS: Primary | ICD-10-CM

## 2023-09-28 PROCEDURE — 99024 POSTOP FOLLOW-UP VISIT: CPT | Performed by: OTOLARYNGOLOGY

## 2023-09-28 RX ORDER — DOXYCYCLINE HYCLATE 100 MG
100 TABLET ORAL 2 TIMES DAILY
Qty: 28 TABLET | Refills: 0 | Status: SHIPPED | OUTPATIENT
Start: 2023-09-28 | End: 2023-10-12

## 2023-09-28 NOTE — PROGRESS NOTES
No chief complaint on file. HISTORY OF PRESENT ILLNESS      Sandie Pelayo is a 57 y.o. female who presented today for evaluation and management for a sinus infection. Patient stated that I can't breathe out of my left nose and it hurts to blow my nose, it is dripping. Pain around eyes was severe yesterday. \"    Post FESS 9/12/23. REVIEW OF SYSTEMS  Constitutional:  Denied fever and chills. ENT/sinus:  Denied otorrhea and sore throat. EXAMINATION    WDWN, NAD  Ears:  TMs, EACs, mastoids and pinnae were normal.    (+)  Nose:  Residual NSD to the left part at the nasal valve area mucosal congestion on the left. Mucus  Sinuses:  NT x 4   Throat,  OC/ OP:  Normal with no lesions. Neck:  NT, No masses. Trachea midline. Nodes:  No lymphadenopathy. IMPRESSION / Dre Reyes / ORDERS:   Diagnoses and all orders for this visit:    Acute rhinosinusitis  -     doxycycline hyclate (VIBRA-TABS) 100 MG tablet; Take 1 tablet by mouth 2 times daily for 14 days         RECOMMENDATIONS / PLAN:   See Patient Instructions on file for this visit, which were discussed with the patient. Return in about 2 weeks (around 10/12/2023) for nasal endoscopy and sinus debridement.

## 2023-09-28 NOTE — PATIENT INSTRUCTIONS
Please read and follow these instructions. Please call the office and speak to the MA or LPN with any questions regarding these instructions. RHINOSINUSITIS CARE  You may use acetaminophen (eg. Tylenol) or Ibuprofen (eg. Advil) (over the counter medications) as needed for fever or pain. Take Probiotic while you are taking antibiotics, to prevent diarrhea, stomach upset, pseudomembranous colitis, and C. difficile diarrhea. This may be obtained at your pharmacy or health food store. Alternatively, you may eat one cup of yogurt with active or live cultures twice daily while taking the antibiotic. Continue for two to three days after completion of the antibiotic. Use a 12 hour decongestant spray, 0.05% oxymetazoline (e.g. Afrin, Duration, 4-Way). Spray each nostril twice three times a day for three days, then two times a day for 2 days, and then stop for two days and then repeat the cycle once. Take one plain Mucinex (blue box) 1200 mg twice daily. Use a saline nasal/sinus irrigation system, e.g. NeilMed sinus rinse, twice daily to help to clear secretions and drainage. Use distilled water; DO NOT USE TAP WATER! This is because of the possibility of amoeba or other microorganisms in tap water, which can result in a fatal disease. Alternatively, you could use a blue bulb syringe and solution of 1/4 tsp of table salt in 8 ounces (one cup or 240 ml) of distilled water.     Use fluticasone 2 sprays in each nostril once daily for the next 14 days.      ===================================================================      ADVERSE AND SIDE EFFECTS OF MEDICATIONS:    Please be aware of the following possible adverse reactions, side effects, and complications of the following medications, including, but not limited to: allergic reaction, interactions with other medications, nausea, headache, diarrhea, persistent symptoms, failure to improve, and the following:     General side effects of antibiotic:

## 2023-10-11 ENCOUNTER — OFFICE VISIT (OUTPATIENT)
Dept: ENT CLINIC | Age: 63
End: 2023-10-11

## 2023-10-11 VITALS
HEIGHT: 63 IN | SYSTOLIC BLOOD PRESSURE: 149 MMHG | HEART RATE: 101 BPM | DIASTOLIC BLOOD PRESSURE: 89 MMHG | WEIGHT: 191 LBS | OXYGEN SATURATION: 96 % | TEMPERATURE: 97.6 F | BODY MASS INDEX: 33.84 KG/M2

## 2023-10-11 DIAGNOSIS — B37.9 YEAST INFECTION: ICD-10-CM

## 2023-10-11 DIAGNOSIS — J32.4 CHRONIC PANSINUSITIS: Primary | ICD-10-CM

## 2023-10-11 DIAGNOSIS — Z98.890 STATUS POST FUNCTIONAL ENDOSCOPIC SINUS SURGERY (FESS): ICD-10-CM

## 2023-10-11 RX ORDER — FLUCONAZOLE 150 MG/1
150 TABLET ORAL ONCE
Qty: 1 TABLET | Refills: 0 | Status: SHIPPED | OUTPATIENT
Start: 2023-10-11 | End: 2023-10-11

## 2023-10-11 NOTE — PROGRESS NOTES
PROCEDURE:   Nasal endoscopy (CPT [id number])       INTERVAL HISTORY:    Doing well with no problems. Post bilateral FESS anterior ethmoidect, MM antrostomy, frontal recess and septal reconstruction, on 9/12/2023. COUNSELING:  Risks, benefits, and alternatives of diagnostic nasal endoscopy were explained to the patient. Specific mention was made of the risk of nosebleed, drainage, throat numbness with difficulty swallowing, and pain from the procedure. The patient gave oral consent to proceed. FINDINGS:   There were bilateral post surgical changes of FESS. There was residual mild septal deviation to the left with a moderate deviation to the left at and narrowing the nasal valve area. The middle meati and ethmoid cavities were clear. The maxillary antrostomies were patent. DESCRIPTION OF PROCEDURE:   The left and right nasal nasal cavities were decongested and anesthetized with a mixture of equal parts of 0.05% oxymetazoline solution and 4% lidocaine solution by nasal sprayer. After an appropriate elapse of time, the nasal cavities were packed each with a cotton pledget moistened with 4% topical lidocaine. After an appropriate elapse of time,  the cotton pledgets were removed. The rigid nasal telescope was inserted into the right nasal cavity, using the [employer] system. Mucus was removed with suction on the left side. The tissues appeared to be healing well. There were no polyps. The procedure was repeated on the left side with identical findings. Debridement was not necessary in either side. The patient tolerated the procedure well. IMPRESSION / German Santos / Rivera Traore / PROCEDURES:       Diagnoses and all orders for this visit:    Chronic pansinusitis    Status post functional endoscopic sinus surgery (FESS)         RECOMMENDATIONS / PLAN:   Continue nasal irrigations to both nostrils, three times a day, as instructed, until you return to the office.     Continue post

## 2023-10-12 DIAGNOSIS — E11.65 TYPE 2 DIABETES MELLITUS WITH HYPERGLYCEMIA, WITHOUT LONG-TERM CURRENT USE OF INSULIN (HCC): ICD-10-CM

## 2023-10-13 RX ORDER — PEN NEEDLE, DIABETIC 32GX 5/32"
NEEDLE, DISPOSABLE MISCELLANEOUS
Qty: 100 EACH | Refills: 1 | Status: SHIPPED | OUTPATIENT
Start: 2023-10-13

## 2023-10-17 ENCOUNTER — OFFICE VISIT (OUTPATIENT)
Dept: ENT CLINIC | Age: 63
End: 2023-10-17

## 2023-10-17 VITALS
BODY MASS INDEX: 33.83 KG/M2 | SYSTOLIC BLOOD PRESSURE: 137 MMHG | DIASTOLIC BLOOD PRESSURE: 84 MMHG | HEIGHT: 63 IN | OXYGEN SATURATION: 96 % | TEMPERATURE: 97.8 F | HEART RATE: 92 BPM

## 2023-10-17 DIAGNOSIS — Z98.890 POST-OPERATIVE STATE: Primary | ICD-10-CM

## 2023-10-17 PROCEDURE — 99024 POSTOP FOLLOW-UP VISIT: CPT | Performed by: OTOLARYNGOLOGY

## 2023-10-17 RX ORDER — PSEUDOEPHEDRINE HCL 30 MG
30 TABLET ORAL EVERY 4 HOURS PRN
COMMUNITY

## 2023-10-25 ENCOUNTER — OFFICE VISIT (OUTPATIENT)
Dept: ENT CLINIC | Age: 63
End: 2023-10-25

## 2023-10-25 VITALS
OXYGEN SATURATION: 96 % | HEART RATE: 99 BPM | TEMPERATURE: 96.9 F | HEIGHT: 63 IN | DIASTOLIC BLOOD PRESSURE: 92 MMHG | BODY MASS INDEX: 34.55 KG/M2 | SYSTOLIC BLOOD PRESSURE: 143 MMHG | WEIGHT: 195 LBS

## 2023-10-25 DIAGNOSIS — Z98.890 STATUS POST FUNCTIONAL ENDOSCOPIC SINUS SURGERY (FESS): ICD-10-CM

## 2023-10-25 DIAGNOSIS — J32.4 CHRONIC PANSINUSITIS: Primary | Chronic | ICD-10-CM

## 2023-10-25 DIAGNOSIS — J34.2 NASAL SEPTAL DEVIATION: Chronic | ICD-10-CM

## 2023-10-25 DIAGNOSIS — J30.2 SEASONAL ALLERGIES: ICD-10-CM

## 2023-10-25 NOTE — PROGRESS NOTES
(FESS)    Seasonal allergies           RECOMMENDATIONS / PLAN:             Continue nasal irrigation twice daily. Continue Zyrtec and Singulair. Return in about 6 months (around 4/25/2024) for recheck/follow-up, and sooner if condition worsens.

## 2023-11-10 ENCOUNTER — TELEPHONE (OUTPATIENT)
Dept: PRIMARY CARE CLINIC | Age: 63
End: 2023-11-10

## 2023-11-10 NOTE — TELEPHONE ENCOUNTER
Patient called and would like to know if we can call her and go over her findings with her from her otolaryngology. She said she just wasn't understanding what the Dr was telling her. Dylan Whiteside

## 2023-11-27 ENCOUNTER — TELEPHONE (OUTPATIENT)
Dept: ORTHOPEDIC SURGERY | Age: 63
End: 2023-11-27

## 2023-11-27 NOTE — TELEPHONE ENCOUNTER
General Question     Subject: LT KNEE PAIN   Patient and /or Facility Request: Fernanda Diego  Contact Number: [phone]    PT HAD SX WITH [facility] ON 1-9-2023 FOR LT TKA.     PT CALLING IN DUE TO HAVING SOME RADIATING PAIN IN THE BACK OF THE LT KNEE GOING DOWN TO THE CALF .     PT STATES THAT THE PAIN HAS BEEN GOING ON FOR ABOUT 10 DAYS AT PIN LEVEL OF     PT WANTS TO KNOW IF SHE SHOULD SCHED AN APPT.     REACHED OUT TO TRIAGE.

## 2023-11-28 ENCOUNTER — OFFICE VISIT (OUTPATIENT)
Dept: ORTHOPEDIC SURGERY | Age: 63
End: 2023-11-28
Payer: MEDICARE

## 2023-11-28 VITALS — WEIGHT: 195 LBS | BODY MASS INDEX: 35.88 KG/M2 | HEIGHT: 62 IN

## 2023-11-28 DIAGNOSIS — M62.81 QUADRICEPS WEAKNESS: ICD-10-CM

## 2023-11-28 DIAGNOSIS — Z96.652 S/P REVISION OF TOTAL KNEE, LEFT: Primary | ICD-10-CM

## 2023-11-28 PROCEDURE — G8427 DOCREV CUR MEDS BY ELIG CLIN: HCPCS | Performed by: PHYSICIAN ASSISTANT

## 2023-11-28 PROCEDURE — G8484 FLU IMMUNIZE NO ADMIN: HCPCS | Performed by: PHYSICIAN ASSISTANT

## 2023-11-28 PROCEDURE — 1090F PRES/ABSN URINE INCON ASSESS: CPT | Performed by: PHYSICIAN ASSISTANT

## 2023-11-28 PROCEDURE — 3017F COLORECTAL CA SCREEN DOC REV: CPT | Performed by: PHYSICIAN ASSISTANT

## 2023-11-28 PROCEDURE — 99214 OFFICE O/P EST MOD 30 MIN: CPT | Performed by: PHYSICIAN ASSISTANT

## 2023-11-28 PROCEDURE — G8417 CALC BMI ABV UP PARAM F/U: HCPCS | Performed by: PHYSICIAN ASSISTANT

## 2023-11-28 PROCEDURE — 1036F TOBACCO NON-USER: CPT | Performed by: PHYSICIAN ASSISTANT

## 2023-11-28 PROCEDURE — G8399 PT W/DXA RESULTS DOCUMENT: HCPCS | Performed by: PHYSICIAN ASSISTANT

## 2023-11-28 PROCEDURE — 1123F ACP DISCUSS/DSCN MKR DOCD: CPT | Performed by: PHYSICIAN ASSISTANT

## 2023-11-28 NOTE — PROGRESS NOTES
2009    Left shoulder    SHOULDER SURGERY  1990's    lt & rt shoulder impingment    SINUS ENDOSCOPY  9-15-14    FUNCTIONAL ENDOSCOPIC SINUS SURGERY       SINUS ENDOSCOPY Bilateral 9/12/2023    BILATERAL FUNCTIONAL ENDOSCOPIC SINUS SURGERY (MAXILLARY, ETHMOID; FRONTAL; SPHENOID); SEPTAL RECONSTRUCTION WITH [employer] NAVIGATION performed by Tyler Pedro MD at [facility] Left 03/09/2010    Eros to Total    TUBAL Charlaine Buerger  8/2010    Dr. Radha Ahn     Social History     Tobacco Use    Smoking status: Former     Types: Cigars    Smokeless tobacco: Never    Tobacco comments:     was smoking 5 black and milds per day, but has not smoked in 4 days   Substance Use Topics    Alcohol use: Yes     Comment: 1 alcoholic beverage every other month      Current Outpatient Medications on File Prior to Visit   Medication Sig Dispense Refill    pseudoephedrine (SUDAFED) 30 MG tablet Take 1 tablet by mouth every 4 hours as needed for Congestion      Insulin Pen Needle ([employer] PEN NEEDLES) 32G X 4 MM MISC USE TO INJECT INSULIN FOUR TIMES A  each 1    ondansetron (ZOFRAN-ODT) 4 MG disintegrating tablet DISSOLVE ONE TABLET BY MOUTH THREE TIMES A DAY AS NEEDED FOR NAUSEA AND/OR VOMITING.  21 tablet 0    promethazine (PHENERGAN) 25 MG tablet Take 1 tablet by mouth every 6 hours as needed for Nausea 40 tablet 0    amLODIPine (NORVASC) 5 MG tablet TAKE ONE TABLET BY MOUTH DAILY 90 tablet 1    montelukast (SINGULAIR) 10 MG tablet Take 1 tablet by mouth nightly 90 tablet 1    losartan (COZAAR) 100 MG tablet TAKE ONE TABLET BY MOUTH DAILY 90 tablet 1    atorvastatin (LIPITOR) 10 MG tablet TAKE ONE TABLET BY MOUTH DAILY 90 tablet 1    metFORMIN (GLUCOPHAGE) 1000 MG tablet TAKE ONE TABLET BY MOUTH TWICE A DAY WITH MEALS 180 tablet 3    insulin glargine (LANTUS SOLOSTAR) 100 UNIT/ML injection pen Inject 20 Units into the skin nightly 8

## 2023-12-17 NOTE — MR AVS SNAPSHOT
Problem: Diabetes  Goal: Achieves glycemic balance  Description: Goal is to maintain blood sugar within range with no episodes of hypoglycemia  Outcome: Outcome Not Met, Continue to Monitor  Goal: Verbalizes/demonstrates understanding of NEW diagnosis of diabetes and management  Description: Document on Patient Education Activity  Outcome: Outcome Not Met, Continue to Monitor  Goal: Verbalizes understanding of diabetes management including how to use HbA1C to evaluate status of blood sugar over time (Diabetes is NOT a new diagnosis)  Description: Diabetes Education  Outcome: Outcome Not Met, Continue to Monitor  Goal: Demonstrates ability to self-administer insulin  Description: Document on Patient Education Activity  Outcome: Outcome Not Met, Continue to Monitor      39.86 kg/m2 Current Some Day Smoker          Additional Information about your Body Mass Index (BMI)           Your BMI as listed above is considered obese (30 or more). BMI is an estimate of body fat, calculated from your height and weight. The higher your BMI, the greater your risk of heart disease, high blood pressure, type 2 diabetes, stroke, gallstones, arthritis, sleep apnea, and certain cancers. BMI is not perfect. It may overestimate body fat in athletes and people who are more muscular. Even a small weight loss (between 5 and 10 percent of your current weight) by decreasing your calorie intake and becoming more physically active will help lower your risk of developing or worsening diseases associated with obesity. Learn more at: [email or web address]             Today's Medication Changes          These changes are accurate as of: 10/2/17 11:20 AM.  If you have any questions, ask your nurse or doctor. START taking these medications           celecoxib 200 MG capsule   Commonly known as:  CELEBREX   Instructions:   Take 1 capsule by mouth daily   Quantity:  30 capsule   Refills:  2   Started by:  OLIVIA Jose            Where to Get Your Medications      These medications were sent to [facility] [address]  [facility], [address] [place] [address]     Phone:  [phone]     celecoxib 200 MG capsule               Your Current Medications Are              celecoxib (CELEBREX) 200 MG capsule Take 1 capsule by mouth daily    amLODIPine (NORVASC) 5 MG tablet TAKE 1 TABLET DAILY    atorvastatin (LIPITOR) 10 MG tablet TAKE 1 TABLET DAILY    metFORMIN (GLUCOPHAGE) 1000 MG tablet Take 1 tablet by mouth 2 times daily (with meals)    losartan (COZAAR) 50 MG tablet TAKE 1 TABLET DAILY    ONE TOUCH LANCETS MISC 1 each by Does not apply route 2 times daily Please supply pt with Adore Martin Test blood sugars BID    Calcium Carb-Cholecalciferol (CALTRATE 600+D) 600-800 MG-UNIT TABS Take 1 tablet by mouth 2 times daily    CLICKFINE PEN NEEDLES 31G X 8 MM MISC USE ONCE DAILY    ONE TOUCH ULTRA TEST strip TEST BLOOD SUGAR BEFORE MEALS THREE TIMES A DAY    albuterol sulfate HFA (PROAIR HFA) 108 (90 BASE) MCG/ACT inhaler Inhale 2 puffs into the lungs every 6 hours as needed for Wheezing    ibuprofen (ADVIL;MOTRIN) 800 MG tablet Take 1 tablet by mouth every 8 hours as needed for Pain    cetirizine (ZYRTEC ALLERGY) 10 MG tablet Take 1 tablet by mouth daily    aspirin EC 81 MG EC tablet Take 81 mg by mouth daily. LAST DOSE 8-9-14      Allergies              Demerol     ITCHING    Levaquin [Levofloxacin In D5w] Nausea Only    Morphine Itching    Penicillins     Tramadol     \"NO CLUE\"      We Ordered/Performed the following           Ambulatory referral to Physical Therapy     Scheduling Instructions:    PT - 1 2 3 4 5 Times/ Week for ________________ weeks. __Evaluation and Treatment     __Modalities of choice  __Home Exercise Program Instruction  __ROM-Passive  __ROM-Assisted  __ROM-Active  __ROM-Active Assisted  __Iontophoresis   __General Strengthening  __OT Hand Tx         __Aquatic Therapy Program          ___________________________________________    Tawnya Ruvalcaba PA-C    Comments:    [facility] OUTPATIENT REHABILITATION AND THERAPY LOCATIONS:      [facility]  [phone], Fax [phone]    [facility]  Phone [phone], Fax [phone]    [facility] [address] [facility]  Phone [phone]    [facility] [phone], Fax [phone]    [facility] [phone], Fax [phone]    [facility]  Phone [phone], Fax [phone]      The patient can be scheduled with any member of the group, including the provider with the first available appointments.     XR CERVICAL SPINE (4-5 VIEWS)     Scheduling Instructions:    Room 9    Comments:    2V C-Spine Result Summary for XR CERVICAL SPINE (4-5 VIEWS)      Result Information     Status          Final result (Exam End: 10/2/2017  8:36 AM)           10/2/2017  8:36 AM      Narrative & Impression           Radiology result is complete; follow up with provider / physician office for radiology results                       Additional Information        Basic Information     Date Of Birth Sex Race Ethnicity Preferred Language    1960 Female Black Non-/Non  English      Problem List as of 10/2/2017  Date Reviewed: 10/2/2017                DDD (degenerative disc disease), cervical    Cervical radicular pain    Hyperlipidemia, mixed    Morbid obesity with BMI of 40.0-44.9, adult ([facility] [address]    Obstructive sleep apnea (adult) (pediatric) (Chronic)    Hx of total knee arthroplasty    Seasonal allergies    Sinusitis, chronic    Benign essential HTN (Chronic)    Osteoarthritis of right knee    Vitamin D deficiency    Alkaline phosphatase elevation      Immunizations as of 10/2/2017     Name Date    Influenza Vaccine, unspecified formulation 10/9/2016    Influenza Virus Vaccine 9/16/2013, 9/28/2011    Influenza, Quadv, 3 yrs and older, IM, Preservative Free 9/27/2017    Pneumococcal Polysaccharide (Rumvmbyrh49) 11/2/2015    Tdap (Boostrix, Adacel) 4/15/2015      Preventive Care        Date Due    Urine Check For Kidney Problems 12/6/2017    Diabetic Foot Exam 2/15/2018    Eye Exam By An Eye Doctor 8/16/2018    Hemoglobin A1C (Test For Long-Term Glucose Control) 8/23/2018    Cholesterol Screening 8/23/2018    Mammograms are recommended every 2 years for low/average risk patients aged 48 - 69, and every year for high risk patients per updated national guidelines. However these guidelines can be individualized by your provider. 1/31/2019    Colonoscopy 11/12/2023    Tetanus Combination Vaccine (2 - Td) 4/15/2025            MyChart Signup           Our records indicate that you have an active [employer] account. You can view your After Visit Summary by going to [email or web address] and logging in with your [employer] username and password. If you don't have a [employer] username and password but a parent or guardian has access to your record, the parent or guardian should login with their own [employer] username and password and access your record to view the After Visit Summary. Additional Information  If you have questions, please contact the physician practice where you receive care. Remember, [employer] is NOT to be used for urgent needs. For medical emergencies, dial 911. For questions regarding your [employer] account call [phone]. If you have a clinical question, please call your doctor's office.

## 2024-01-03 ENCOUNTER — TELEMEDICINE (OUTPATIENT)
Dept: PRIMARY CARE CLINIC | Age: 64
End: 2024-01-03
Payer: MEDICARE

## 2024-01-03 DIAGNOSIS — K59.09 CHRONIC CONSTIPATION: Primary | ICD-10-CM

## 2024-01-03 PROCEDURE — G8399 PT W/DXA RESULTS DOCUMENT: HCPCS | Performed by: FAMILY MEDICINE

## 2024-01-03 PROCEDURE — 1123F ACP DISCUSS/DSCN MKR DOCD: CPT | Performed by: FAMILY MEDICINE

## 2024-01-03 PROCEDURE — G8427 DOCREV CUR MEDS BY ELIG CLIN: HCPCS | Performed by: FAMILY MEDICINE

## 2024-01-03 PROCEDURE — 3017F COLORECTAL CA SCREEN DOC REV: CPT | Performed by: FAMILY MEDICINE

## 2024-01-03 PROCEDURE — 99213 OFFICE O/P EST LOW 20 MIN: CPT | Performed by: FAMILY MEDICINE

## 2024-01-03 PROCEDURE — 1090F PRES/ABSN URINE INCON ASSESS: CPT | Performed by: FAMILY MEDICINE

## 2024-01-03 RX ORDER — DOCUSATE SODIUM 100 MG/1
100 CAPSULE, LIQUID FILLED ORAL 2 TIMES DAILY
Qty: 60 CAPSULE | Refills: 2 | Status: SHIPPED | OUTPATIENT
Start: 2024-01-03

## 2024-01-03 ASSESSMENT — ENCOUNTER SYMPTOMS
CONSTIPATION: 1
NAUSEA: 1
BLOOD IN STOOL: 0
DIARRHEA: 0

## 2024-01-03 NOTE — PROGRESS NOTES
Procedure Laterality Date    COLON SURGERY  2008    10\" removed - chronic constipation    DUPUYTRENS CONTRACTURE SURGERY Left 1/29/2019    LEFT 1ST DORSAL COMPARMENT DE' QUERVAIN'S RELEASE performed by Augustine Arango MD at [facility] OR    HERNIA REPAIR      4x     HYSTERECTOMY, TOTAL ABDOMINAL (CERVIX REMOVED)      JOINT REPLACEMENT  2007    Left knee partial    KNEE SURGERY  1990's    rt knee reconstructed    NECK SURGERY  05/12/2011    c 4,5,6,7 (ACDF)    OVARY REMOVAL      REVISION TOTAL KNEE ARTHROPLASTY Left 10/10/2022    REVISION LEFT TOTAL KNEE ARTHROPLASTY FOR INFECTION WITH ADDUCTOR CANAL  FOR PAIN CONTROL              MONTSERRAT BIOMET; ZANE performed by Srini Torres MD at [facility] OR    REVISION TOTAL KNEE ARTHROPLASTY Left 1/9/2023    REVISION LEFT TOTAL KNEE ARTHROPLASTY, SECOND STAGE           MONTSERRAT BIOMET  performed by Srini Torres MD at [facility] OR    ROTATOR CUFF REPAIR  2009    Left shoulder    SHOULDER SURGERY  1990's    lt & rt shoulder impingment    SINUS ENDOSCOPY  9-15-14    FUNCTIONAL ENDOSCOPIC SINUS SURGERY       SINUS ENDOSCOPY Bilateral 9/12/2023    BILATERAL FUNCTIONAL ENDOSCOPIC SINUS SURGERY (MAXILLARY, ETHMOID; FRONTAL; SPHENOID); SEPTAL RECONSTRUCTION WITH [employer] NAVIGATION performed by Sami Smith MD at [facility] OR    TONSILLECTOMY AND ADENOIDECTOMY      TOTAL KNEE ARTHROPLASTY Left 03/09/2010    Eros to Total    TUBAL LIGATION      UMBILICAL HERNIA REPAIR  8/2010    Dr. Hillman        Allergies   Allergen Reactions    Gabapentin Other (See Comments), Dizziness or Vertigo and Hallucinations     BLURRED VISION      Demerol Itching and Dermatitis     Unknown reaction; patient has tolerated hydrocodone, oxycodone, hydromorphone and fentanyl per chart review as of 09/16/22.    Levaquin [Levofloxacin In D5w] Nausea Only    Morphine Itching     Unknown reaction; patient has tolerated hydrocodone, oxycodone, hydromorphone and fentanyl per chart review as of 09/16/22.    Tramadol Other

## 2024-01-16 ENCOUNTER — OFFICE VISIT (OUTPATIENT)
Dept: PRIMARY CARE CLINIC | Age: 64
End: 2024-01-16
Payer: MEDICARE

## 2024-01-16 VITALS
WEIGHT: 194.2 LBS | DIASTOLIC BLOOD PRESSURE: 78 MMHG | BODY MASS INDEX: 35.52 KG/M2 | HEART RATE: 108 BPM | TEMPERATURE: 98.2 F | SYSTOLIC BLOOD PRESSURE: 123 MMHG

## 2024-01-16 DIAGNOSIS — Z79.4 TYPE 2 DIABETES MELLITUS WITHOUT COMPLICATION, WITH LONG-TERM CURRENT USE OF INSULIN (HCC): Chronic | ICD-10-CM

## 2024-01-16 DIAGNOSIS — Z00.00 INITIAL MEDICARE ANNUAL WELLNESS VISIT: Primary | ICD-10-CM

## 2024-01-16 DIAGNOSIS — E11.9 TYPE 2 DIABETES MELLITUS WITHOUT COMPLICATION, WITH LONG-TERM CURRENT USE OF INSULIN (HCC): Chronic | ICD-10-CM

## 2024-01-16 DIAGNOSIS — Z12.31 ENCOUNTER FOR SCREENING MAMMOGRAM FOR MALIGNANT NEOPLASM OF BREAST: ICD-10-CM

## 2024-01-16 DIAGNOSIS — I10 BENIGN ESSENTIAL HTN: Chronic | ICD-10-CM

## 2024-01-16 PROBLEM — T84.59XA CHRONIC INFECTION OF PROSTHETIC KNEE (HCC): Status: RESOLVED | Noted: 2022-10-10 | Resolved: 2024-01-16

## 2024-01-16 PROBLEM — Z96.659 CHRONIC INFECTION OF PROSTHETIC KNEE (HCC): Status: RESOLVED | Noted: 2022-10-10 | Resolved: 2024-01-16

## 2024-01-16 PROBLEM — G89.18 POST-OP PAIN: Status: RESOLVED | Noted: 2023-09-12 | Resolved: 2024-01-16

## 2024-01-16 LAB — HBA1C MFR BLD: 5.5 %

## 2024-01-16 PROCEDURE — 3044F HG A1C LEVEL LT 7.0%: CPT | Performed by: FAMILY MEDICINE

## 2024-01-16 PROCEDURE — 83036 HEMOGLOBIN GLYCOSYLATED A1C: CPT | Performed by: FAMILY MEDICINE

## 2024-01-16 PROCEDURE — 3017F COLORECTAL CA SCREEN DOC REV: CPT | Performed by: FAMILY MEDICINE

## 2024-01-16 PROCEDURE — G8482 FLU IMMUNIZE ORDER/ADMIN: HCPCS | Performed by: FAMILY MEDICINE

## 2024-01-16 PROCEDURE — 3078F DIAST BP <80 MM HG: CPT | Performed by: FAMILY MEDICINE

## 2024-01-16 PROCEDURE — G0438 PPPS, INITIAL VISIT: HCPCS | Performed by: FAMILY MEDICINE

## 2024-01-16 PROCEDURE — 1123F ACP DISCUSS/DSCN MKR DOCD: CPT | Performed by: FAMILY MEDICINE

## 2024-01-16 PROCEDURE — 3074F SYST BP LT 130 MM HG: CPT | Performed by: FAMILY MEDICINE

## 2024-01-16 ASSESSMENT — PATIENT HEALTH QUESTIONNAIRE - PHQ9
SUM OF ALL RESPONSES TO PHQ QUESTIONS 1-9: 3
SUM OF ALL RESPONSES TO PHQ QUESTIONS 1-9: 3
2. FEELING DOWN, DEPRESSED OR HOPELESS: 2
SUM OF ALL RESPONSES TO PHQ QUESTIONS 1-9: 3
SUM OF ALL RESPONSES TO PHQ QUESTIONS 1-9: 3
SUM OF ALL RESPONSES TO PHQ9 QUESTIONS 1 & 2: 3
1. LITTLE INTEREST OR PLEASURE IN DOING THINGS: 1

## 2024-01-16 NOTE — PROGRESS NOTES
Medicare Annual Wellness Visit    Fernanda Diego is here for Medicare AWV    Assessment & Plan   Initial Medicare annual wellness visit  Benign essential HTN  Type 2 diabetes mellitus without complication, with long-term current use of insulin (HCC)  -     POCT glycosylated hemoglobin (Hb A1C)  Encounter for screening mammogram for malignant neoplasm of breast  -     DELL DIGITAL SCREEN W OR WO CAD BILATERAL; Future  Recommendations for Preventive Services Due: see orders and patient instructions/AVS.  Recommended screening schedule for the next 5-10 years is provided to the patient in written form: see Patient Instructions/AVS.     Return in 6 months (on 7/16/2024).     Subjective     The following acute and/or chronic problems were also addressed today:    Doing well.     DMII - has been well controlled. Lantus 20 units nightly. Metformin 1000 mg BID.     Hemoglobin A1C   Date Value Ref Range Status   01/16/2024 5.5 % Final   06/23/2023 5.0 See comment % Final     Comment:     Comment:  Diagnosis of Diabetes: > or = 6.5%  Increased risk of diabetes (Prediabetes): 5.7-6.4%  Glycemic Control: Nonpregnant Adults: <7.0%                    Pregnant: <6.0%       01/05/2023 5.3 See comment % Final     Comment:     Comment:  Diagnosis of Diabetes: > or = 6.5%  Increased risk of diabetes (Prediabetes): 5.7-6.4%  Glycemic Control: Nonpregnant Adults: <7.0%                    Pregnant: <6.0%       10/16/2022 5.7 See comment % Final     Comment:     Comment:  Diagnosis of Diabetes: > or = 6.5%  Increased risk of diabetes (Prediabetes): 5.7-6.4%  Glycemic Control: Nonpregnant Adults: <7.0%                    Pregnant: <6.0%       08/26/2022 7.0 See comment % Final     Comment:     Comment:  Diagnosis of Diabetes: > or = 6.5%  Increased risk of diabetes (Prediabetes): 5.7-6.4%  Glycemic Control: Nonpregnant Adults: <7.0%                    Pregnant: <6.0%            Following with GI. Has colonoscopy scheduled 2/5/24.

## 2024-01-16 NOTE — PATIENT INSTRUCTIONS
Personalized Preventive Plan for Fernanda Diego - 1/16/2024  Medicare offers a range of preventive health benefits. Some of the tests and screenings are paid in full while other may be subject to a deductible, co-insurance, and/or copay.    Some of these benefits include a comprehensive review of your medical history including lifestyle, illnesses that may run in your family, and various assessments and screenings as appropriate.    After reviewing your medical record and screening and assessments performed today your provider may have ordered immunizations, labs, imaging, and/or referrals for you.  A list of these orders (if applicable) as well as your Preventive Care list are included within your After Visit Summary for your review.    Other Preventive Recommendations:    A preventive eye exam performed by an eye specialist is recommended every 1-2 years to screen for glaucoma; cataracts, macular degeneration, and other eye disorders.  A preventive dental visit is recommended every 6 months.  Try to get at least 150 minutes of exercise per week or 10,000 steps per day on a pedometer .  Order or download the FREE \"Exercise & Physical Activity: Your Everyday Guide\" from [employer]. Call 1-521.484.8077 or search [employer] online.  You need 2180-2490 mg of calcium and 6839-9476 IU of vitamin D per day. It is possible to meet your calcium requirement with diet alone, but a vitamin D supplement is usually necessary to meet this goal.  When exposed to the sun, use a sunscreen that protects against both UVA and UVB radiation with an SPF of 30 or greater. Reapply every 2 to 3 hours or after sweating, drying off with a towel, or swimming.  Always wear a seat belt when traveling in a car. Always wear a helmet when riding a bicycle or motorcycle.

## 2024-01-18 ENCOUNTER — HOSPITAL ENCOUNTER (OUTPATIENT)
Dept: MAMMOGRAPHY | Age: 64
Discharge: HOME | End: 2024-01-18
Attending: FAMILY MEDICINE
Payer: MEDICARE

## 2024-01-18 VITALS — HEIGHT: 62 IN | WEIGHT: 189 LBS | BODY MASS INDEX: 34.78 KG/M2

## 2024-01-18 DIAGNOSIS — Z12.31 VISIT FOR SCREENING MAMMOGRAM: ICD-10-CM

## 2024-01-18 DIAGNOSIS — Z12.31 ENCOUNTER FOR SCREENING MAMMOGRAM FOR MALIGNANT NEOPLASM OF BREAST: ICD-10-CM

## 2024-01-18 PROCEDURE — 77063 BREAST TOMOSYNTHESIS BI: CPT

## 2024-02-07 DIAGNOSIS — Z79.4 TYPE 2 DIABETES MELLITUS WITHOUT COMPLICATION, WITH LONG-TERM CURRENT USE OF INSULIN (HCC): ICD-10-CM

## 2024-02-07 DIAGNOSIS — E11.9 TYPE 2 DIABETES MELLITUS WITHOUT COMPLICATION, WITH LONG-TERM CURRENT USE OF INSULIN (HCC): ICD-10-CM

## 2024-02-07 RX ORDER — INSULIN GLARGINE 100 [IU]/ML
INJECTION, SOLUTION SUBCUTANEOUS
Qty: 9 ML | Refills: 1 | Status: SHIPPED | OUTPATIENT
Start: 2024-02-07

## 2024-03-10 DIAGNOSIS — I10 BENIGN ESSENTIAL HTN: Chronic | ICD-10-CM

## 2024-03-11 ENCOUNTER — HOSPITAL ENCOUNTER (OUTPATIENT)
Dept: CT IMAGING | Age: 64
Discharge: HOME | End: 2024-03-11
Payer: MEDICARE

## 2024-03-11 DIAGNOSIS — R10.84 ABDOMINAL PAIN, GENERALIZED: ICD-10-CM

## 2024-03-11 LAB
PERFORMED ON: NORMAL
POC CREATININE: 0.6 MG/DL (ref 0.6–1.2)
POC SAMPLE TYPE: NORMAL

## 2024-03-11 PROCEDURE — 82565 ASSAY OF CREATININE: CPT

## 2024-03-11 PROCEDURE — 74177 CT ABD & PELVIS W/CONTRAST: CPT

## 2024-03-11 PROCEDURE — 6360000004 HC RX CONTRAST MEDICATION: Performed by: PHYSICIAN ASSISTANT

## 2024-03-11 RX ORDER — LOSARTAN POTASSIUM 100 MG/1
TABLET ORAL
Qty: 90 TABLET | Refills: 1 | Status: SHIPPED | OUTPATIENT
Start: 2024-03-11

## 2024-03-11 RX ADMIN — IOPAMIDOL 75 ML: 755 INJECTION, SOLUTION INTRAVENOUS at 18:32

## 2024-03-11 NOTE — TELEPHONE ENCOUNTER
Medication:   Requested Prescriptions     Pending Prescriptions Disp Refills    losartan (COZAAR) 100 MG tablet [Pharmacy Med Name: LOSARTAN POTASSIUM 100 MG TAB] 90 tablet 1     Sig: TAKE ONE TABLET BY MOUTH DAILY     Last Filled:  12/4/2023    Last appt: 1/16/2024   Next appt: Visit date not found

## 2024-03-14 DIAGNOSIS — J30.9 ALLERGIC SINUSITIS: ICD-10-CM

## 2024-03-14 RX ORDER — MONTELUKAST SODIUM 10 MG/1
10 TABLET ORAL NIGHTLY
Qty: 90 TABLET | Refills: 1 | Status: SHIPPED | OUTPATIENT
Start: 2024-03-14

## 2024-03-14 NOTE — TELEPHONE ENCOUNTER
Medication:   Requested Prescriptions     Pending Prescriptions Disp Refills    montelukast (SINGULAIR) 10 MG tablet [Pharmacy Med Name: MONTELUKAST SOD 10 MG TABLET] 90 tablet 1     Sig: TAKE ONE TABLET BY MOUTH ONCE NIGHTLY     Last Filled:  12/19/23    Last appt: 1/16/2024   Next appt: Visit date not found

## 2024-03-25 ENCOUNTER — TELEPHONE (OUTPATIENT)
Dept: ORTHOPEDIC SURGERY | Age: 64
End: 2024-03-25

## 2024-03-25 NOTE — TELEPHONE ENCOUNTER
General Question     Subject: ANNUAL CHECKUP  Patient and /or Facility Request: Fernanda Diego   Contact Number: [phone]       The pt wants to know if she's supposed to come in for a yearly check up on her knee?  Please call her at the above phone #

## 2024-04-18 ENCOUNTER — OFFICE VISIT (OUTPATIENT)
Dept: PRIMARY CARE CLINIC | Age: 64
End: 2024-04-18
Payer: MEDICARE

## 2024-04-18 VITALS
SYSTOLIC BLOOD PRESSURE: 117 MMHG | DIASTOLIC BLOOD PRESSURE: 72 MMHG | TEMPERATURE: 98.5 F | WEIGHT: 191.8 LBS | OXYGEN SATURATION: 98 % | BODY MASS INDEX: 35.08 KG/M2 | HEART RATE: 91 BPM

## 2024-04-18 DIAGNOSIS — B37.31 VAGINAL CANDIDIASIS: Primary | ICD-10-CM

## 2024-04-18 PROCEDURE — 1090F PRES/ABSN URINE INCON ASSESS: CPT | Performed by: FAMILY MEDICINE

## 2024-04-18 PROCEDURE — G8428 CUR MEDS NOT DOCUMENT: HCPCS | Performed by: FAMILY MEDICINE

## 2024-04-18 PROCEDURE — 1036F TOBACCO NON-USER: CPT | Performed by: FAMILY MEDICINE

## 2024-04-18 PROCEDURE — 3074F SYST BP LT 130 MM HG: CPT | Performed by: FAMILY MEDICINE

## 2024-04-18 PROCEDURE — 3078F DIAST BP <80 MM HG: CPT | Performed by: FAMILY MEDICINE

## 2024-04-18 PROCEDURE — G8399 PT W/DXA RESULTS DOCUMENT: HCPCS | Performed by: FAMILY MEDICINE

## 2024-04-18 PROCEDURE — 3017F COLORECTAL CA SCREEN DOC REV: CPT | Performed by: FAMILY MEDICINE

## 2024-04-18 PROCEDURE — G8417 CALC BMI ABV UP PARAM F/U: HCPCS | Performed by: FAMILY MEDICINE

## 2024-04-18 PROCEDURE — 1123F ACP DISCUSS/DSCN MKR DOCD: CPT | Performed by: FAMILY MEDICINE

## 2024-04-18 PROCEDURE — 99213 OFFICE O/P EST LOW 20 MIN: CPT | Performed by: FAMILY MEDICINE

## 2024-04-18 RX ORDER — NYSTATIN 100000 [USP'U]/G
POWDER TOPICAL
Qty: 15 G | Refills: 0 | Status: SHIPPED | OUTPATIENT
Start: 2024-04-18

## 2024-04-18 RX ORDER — FLUCONAZOLE 150 MG/1
TABLET ORAL
Qty: 3 TABLET | Refills: 0 | Status: SHIPPED | OUTPATIENT
Start: 2024-04-18

## 2024-04-18 SDOH — ECONOMIC STABILITY: FOOD INSECURITY: WITHIN THE PAST 12 MONTHS, THE FOOD YOU BOUGHT JUST DIDN'T LAST AND YOU DIDN'T HAVE MONEY TO GET MORE.: NEVER TRUE

## 2024-04-18 SDOH — ECONOMIC STABILITY: FOOD INSECURITY: WITHIN THE PAST 12 MONTHS, YOU WORRIED THAT YOUR FOOD WOULD RUN OUT BEFORE YOU GOT MONEY TO BUY MORE.: NEVER TRUE

## 2024-04-18 SDOH — ECONOMIC STABILITY: INCOME INSECURITY: HOW HARD IS IT FOR YOU TO PAY FOR THE VERY BASICS LIKE FOOD, HOUSING, MEDICAL CARE, AND HEATING?: NOT HARD AT ALL

## 2024-04-18 NOTE — PROGRESS NOTES
[facility]  [facility]  [address] [place] [id number]  Dept: [phone]  Dept Fax: [phone]     4/18/2024      Fernanda Diego   1960     Chief Complaint   Patient presents with    Vaginal Itching     Patient c/o vaginal itching after being on amoxicillin for dental work.       HPI    Pt comes in today for c/o vaginal itching and discharge since being on amoxicillin. Started 6 weeks ago. Has used OTC monistat x 3 with no improvement. Has had to take Amoxicillin multiple times over the last 2 months due to dental procedures and h/o joint prosthesis infection.     No data recorded     Prior to Visit Medications    Medication Sig Taking? Authorizing Provider   montelukast (SINGULAIR) 10 MG tablet TAKE ONE TABLET BY MOUTH ONCE NIGHTLY  Isabel Rico DO   losartan (COZAAR) 100 MG tablet TAKE ONE TABLET BY MOUTH DAILY  Isabel Rico DO   insulin glargine (LANTUS SOLOSTAR) 100 UNIT/ML injection pen Inject 20 units subcutaneously nightly  Isabel Rico DO   docusate sodium (COLACE) 100 MG capsule Take 1 capsule by mouth 2 times daily  Isabel Rico DO   pseudoephedrine (SUDAFED) 30 MG tablet Take 1 tablet by mouth every 4 hours as needed for Congestion  Patient not taking: Reported on 1/16/2024  Provider, MD Kris   Insulin Pen Needle ([employer] PEN NEEDLES) 32G X 4 MM MISC USE TO INJECT INSULIN FOUR TIMES A DAY  Isabel Rico DO   ondansetron (ZOFRAN-ODT) 4 MG disintegrating tablet DISSOLVE ONE TABLET BY MOUTH THREE TIMES A DAY AS NEEDED FOR NAUSEA AND/OR VOMITING.  Patient not taking: Reported on 1/16/2024  Isabel Rico DO   promethazine (PHENERGAN) 25 MG tablet Take 1 tablet by mouth every 6 hours as needed for Nausea  Patient not taking: Reported on 1/16/2024  Sami Smith MD   amLODIPine (NORVASC) 5 MG tablet TAKE ONE TABLET BY MOUTH DAILY  Isabel Rico DO

## 2024-04-29 DIAGNOSIS — E78.2 HYPERLIPIDEMIA, MIXED: ICD-10-CM

## 2024-04-29 NOTE — TELEPHONE ENCOUNTER
Medication:   Requested Prescriptions     Pending Prescriptions Disp Refills    atorvastatin (LIPITOR) 10 MG tablet [Pharmacy Med Name: ATORVASTATIN 10 MG TABLET] 90 tablet 1     Sig: TAKE 1 TABLET BY MOUTH DAILY     Last Filled:  1/30/24    Last appt: 4/18/2024   Next appt: 6/18/2024

## 2024-04-30 RX ORDER — ATORVASTATIN CALCIUM 10 MG/1
TABLET, FILM COATED ORAL
Qty: 90 TABLET | Refills: 1 | Status: SHIPPED | OUTPATIENT
Start: 2024-04-30

## 2024-05-02 ENCOUNTER — TELEPHONE (OUTPATIENT)
Dept: PRIMARY CARE CLINIC | Age: 64
End: 2024-05-02

## 2024-05-02 RX ORDER — CLOTRIMAZOLE AND BETAMETHASONE DIPROPIONATE 10; .64 MG/G; MG/G
CREAM TOPICAL
Qty: 45 G | Refills: 0 | Status: SHIPPED | OUTPATIENT
Start: 2024-05-02 | End: 2024-06-20 | Stop reason: CLARIF

## 2024-05-02 NOTE — TELEPHONE ENCOUNTER
Assessment/Plan:     Obesity, Class III, BMI 40-49.9 (morbid obesity) (HCC)  - Patient is pursuing Conservative Program  - Initial weight loss goal of 5-10% weight loss for improved health  - Previously following with the surgical program. Attempted and aborted sleeve gastrectomy.  S/P diagnostic laparoscopy, extensive lysis of adhesions on 5/11/2021 with Dr. Dimas López.  - Contrave 2 tablets twice a day tried in past, but was not helpful.   - Not a candidate for Qsymia or Topamax due to history of kidney stones.   - Was taking compounded semaglutide through another weight management program. This was helpful, but became too costly and needed to be discontinued.   - She would like to restart GLP-1 therapy.   - Patient denies personal history of pancreatitis. Patient also denies personal and family history of thyroid cancer and multiple endocrine neoplasia type 2 (MEN 2 tumor).   - Postmenopausal.  - She made an informed decision to start Zepbound.  - Start Zepbound 2.5 mg weekly. After you have taken the second pen, please give me an update, as we will likely increase the dose the next month if you are tolerating it well.  - Side effects of Zepbound include nausea, vomiting, diarrhea, or constipation. Keep an eye on your heart rate while on Zepbound. If you resting heart rate is greater than 100 beats per minutes, please notify me. If you develop severe abdominal pain, stop Zepbound and go to the emergency room, as that could be a sign of pancreatitis.   - Please notify me if you have surgery, upper endoscopy, or colonoscopy scheduled, as we typically hold Zepbound for one week prior to the procedure.    - Labs reviewed: Lipid and CMP 4/13/2023. TSH and A1C 9/21/2023. Trig, glucose, and alk phos elevated, which all may improve with weight loss. Remainder of the blood work within acceptable range.       Goals:  Do not skip meals.  Food log (ie.)  Rash is general area and powder is not working. Having burning feeling and moisture build up Would like to know if she would need an appointment or go to an OBGYN with your referral   www.Skipolanesspal.com,Funky Movespeople.com,loseit.com,[employer].com,etc. baritastic (use skinnytaste.com, [employer] or smartphone lizeth beenz.com for recipes)  No sugary beverages. At least 64oz of water daily.  Increase physical activity by 10 minutes daily. Gradually increase physical activity to a goal of 5 days per week for 30 minutes of MODERATE intensity PLUS 2 days per week of FULL BODY resistance training (use smartphone apps Hara, Home Workout, etc.)  Start food logging.  Increase plain water to goal of at least 64 oz daily.   Start Zepbound.      Hypertension  - Taking norvasc. May improve with weight loss and lifestyle modification. Continue management with prescribing provider.       NITISH (obstructive sleep apnea)  Not currently using CPAP. Discussed referral to sleep medicine and she was agreeable.     NAFLD (nonalcoholic fatty liver disease)  Will likely improve with weight loss.     Acid reflux  - Taking pepcid and prilosec. May improve with weight loss and lifestyle modification. Continue management with prescribing provider.          Vernoica was seen today for follow-up.    Diagnoses and all orders for this visit:    Obesity, Class III, BMI 40-49.9 (morbid obesity) (HCC)  -     tirzepatide (Zepbound) 2.5 mg/0.5 mL auto-injector; Inject 0.5 mL (2.5 mg total) under the skin once a week for 28 days    NITISH (obstructive sleep apnea)  -     Ambulatory referral to Sleep Medicine; Future  -     tirzepatide (Zepbound) 2.5 mg/0.5 mL auto-injector; Inject 0.5 mL (2.5 mg total) under the skin once a week for 28 days    NAFLD (nonalcoholic fatty liver disease)  -     tirzepatide (Zepbound) 2.5 mg/0.5 mL auto-injector; Inject 0.5 mL (2.5 mg total) under the skin once a week for 28 days    Gastroesophageal reflux disease, unspecified whether esophagitis present  -     tirzepatide (Zepbound) 2.5 mg/0.5 mL auto-injector; Inject 0.5 mL (2.5 mg total) under the skin once a week for 28 days    Primary  hypertension             Follow up in approximately  2 month nurse visit and 4 months  with Non-Surgical Physician/Advanced Practitioner.    Subjective:   Chief Complaint   Patient presents with    Follow-up     Mwm f/u; Waist-46.5in       Patient ID: Veronica Fairbanks  is a 57 y.o. female with excess weight/obesity here to pursue weight management.  Patient is pursuing Conservative Program. Acorio  #[id number] translated the office visit.  Most recent notes and records were reviewed.    HPI    Wt Readings from Last 10 Encounters:   01/05/24 111 kg (243 lb 12.8 oz)   01/03/24 111 kg (243 lb 12.8 oz)   12/15/23 99.3 kg (219 lb)   11/08/23 111 kg (244 lb 9.6 oz)   08/03/23 108 kg (237 lb)   07/24/23 105 kg (231 lb)   03/29/23 105 kg (231 lb)   02/21/23 105 kg (231 lb 6.4 oz)   01/25/23 103 kg (226 lb)   01/10/23 103 kg (227 lb 12.8 oz)     Previously following with the surgical program. Attempted and aborted sleeve gastrectomy.  S/P diagnostic laparoscopy, extensive lysis of adhesions on 5/11/2021 with Dr. Dimas López. Had prior abdominal injury several years ago.    Saw Dr. JULIANO Chávez in [facility] consultation in August 2021. Presents today to reestablish care.    Previously following with [employer] for weight loss. Tried Contrave in the past 2 tablets twice a day for 3 months, but was not helpful. Was then switched to compounded semaglutide for 6 months, was helpful but became too expensive. Appetite has increased since being off of the compounded semaglutide.  tends to bring sweets into the house, and therefore it is hard for Veronica to avoid this.    Got Rybelsus 14 mg in [place], but never started it.         Hydration- 4 glasses of coconut water, 1 glass of water, herbal tea  Alcohol- none  Tobacco- vaping   Exercise- none  Sleep- Has NITISH, not currently using CPAP    Goal: wants to be healthy and feel well and sleep well    Colonoscopy: UTD, due 2025  Mammogram: UTD, due July 2024      The  "following portions of the patient's history were reviewed and updated as appropriate: allergies, current medications, past family history, past medical history, past social history, past surgical history, and problem list.    Family History   Problem Relation Age of Onset    Pancreatic cancer Mother 72    Prostate cancer Father 60    No Known Problems Sister     No Known Problems Daughter     No Known Problems Maternal Grandmother     No Known Problems Maternal Grandfather     No Known Problems Paternal Grandmother     No Known Problems Paternal Grandfather     No Known Problems Sister     No Known Problems Daughter     No Known Problems Maternal Aunt     No Known Problems Maternal Aunt     No Known Problems Paternal Aunt     No Known Problems Paternal Aunt     No Known Problems Paternal Aunt         Review of Systems   HENT:  Negative for sore throat.    Respiratory:  Negative for cough and shortness of breath.    Cardiovascular:  Negative for chest pain and palpitations.   Gastrointestinal:  Positive for abdominal pain (intermittent from previous injury). Negative for constipation, diarrhea, nausea and vomiting.        + GERD taking medication   Psychiatric/Behavioral:          Denies depression   + anxiety       Objective:  /70   Pulse 85   Resp 16   Ht 5' 3\" (1.6 m)   Wt 111 kg (243 lb 12.8 oz)   LMP 08/03/2018 (Approximate)   BMI 43.19 kg/m²     Physical Exam  Vitals and nursing note reviewed.        Constitutional   General appearance: Abnormal.  well developed and morbidly obese.   Eyes No conjunctival injection.   Ears, Nose, Mouth, and Throat Oral mucosa moist.   Pulmonary   Respiratory effort: No increased work of breathing or signs of respiratory distress.    Cardiovascular    Examination of extremities for edema and/or varicosities: Normal.  no edema.   Abdomen   Abdomen: Abnormal.  The abdomen was obese.    Musculoskeletal   Normal range of motion  Neurological   Gait and station: Normal. "   Psychiatric   Orientation to person, place and time: Normal.    Affect: appropriate

## 2024-05-03 ENCOUNTER — OFFICE VISIT (OUTPATIENT)
Dept: ENT CLINIC | Age: 64
End: 2024-05-03
Payer: MEDICARE

## 2024-05-03 VITALS
HEIGHT: 63 IN | WEIGHT: 189 LBS | HEART RATE: 50 BPM | SYSTOLIC BLOOD PRESSURE: 128 MMHG | TEMPERATURE: 97.5 F | BODY MASS INDEX: 33.49 KG/M2 | DIASTOLIC BLOOD PRESSURE: 68 MMHG | OXYGEN SATURATION: 95 %

## 2024-05-03 DIAGNOSIS — J32.4 CHRONIC PANSINUSITIS: Chronic | ICD-10-CM

## 2024-05-03 DIAGNOSIS — J30.2 SEASONAL ALLERGIES: Primary | Chronic | ICD-10-CM

## 2024-05-03 DIAGNOSIS — Z98.890 STATUS POST FUNCTIONAL ENDOSCOPIC SINUS SURGERY (FESS): ICD-10-CM

## 2024-05-03 PROCEDURE — 99214 OFFICE O/P EST MOD 30 MIN: CPT | Performed by: OTOLARYNGOLOGY

## 2024-05-03 PROCEDURE — 3078F DIAST BP <80 MM HG: CPT | Performed by: OTOLARYNGOLOGY

## 2024-05-03 PROCEDURE — 1090F PRES/ABSN URINE INCON ASSESS: CPT | Performed by: OTOLARYNGOLOGY

## 2024-05-03 PROCEDURE — 1123F ACP DISCUSS/DSCN MKR DOCD: CPT | Performed by: OTOLARYNGOLOGY

## 2024-05-03 PROCEDURE — 1036F TOBACCO NON-USER: CPT | Performed by: OTOLARYNGOLOGY

## 2024-05-03 PROCEDURE — 3074F SYST BP LT 130 MM HG: CPT | Performed by: OTOLARYNGOLOGY

## 2024-05-03 PROCEDURE — G8417 CALC BMI ABV UP PARAM F/U: HCPCS | Performed by: OTOLARYNGOLOGY

## 2024-05-03 PROCEDURE — G8399 PT W/DXA RESULTS DOCUMENT: HCPCS | Performed by: OTOLARYNGOLOGY

## 2024-05-03 PROCEDURE — G8427 DOCREV CUR MEDS BY ELIG CLIN: HCPCS | Performed by: OTOLARYNGOLOGY

## 2024-05-03 PROCEDURE — 3017F COLORECTAL CA SCREEN DOC REV: CPT | Performed by: OTOLARYNGOLOGY

## 2024-05-13 NOTE — PROGRESS NOTES
1. Do not eat or drink anything after 12 midnight prior to surgery. This includes no water, chewing gum mints, or ice chips. You may brush your teeth and gargle the day of surgery but DO NOT SWALLOW THE WATER. 2. Please see your family doctor/pediatrician for a history and physical and/or concerning medications. Bring any test results/reports from your physician's office. If you are under the care of a heart doctor or specialist please be aware that you may be asked to see him or her for clearance. 3. You may be asked to stop blood thinners such as Coumadin, Plavix, Fragmin, and Lovenox or Anti-inflammatories such as Aspirin, Ibuprofen, Advil, and Naproxen prior to your surgery. Please check with your doctor before stopping these or any other medications. 4. Do not smoke, and do not drink any alcoholic beverages 24 hours prior to surgery. 5. You MUST make arrangements for a responsible adult to take you home after your surgery. For your safety, you will not be allowed to leave alone or drive yourself home. Your surgery will be cancelled if you do not have a ride home. Also for your safety, it is strongly suggested someone stay with you the first 24 hrs after your surgery. 6. A parent/legal guardian must accompany a child scheduled for surgery and plan to stay at the hospital until the child is discharged. Please do not bring other children with you. 7. For your comfort,please wear simple, loose fitting clothing to the hospital.  Please do not bring valuables (money, credit cards, checkbooks, etc.) Do not wear any makeup (including no eye makeup) or nail polish on your fingers or toes. 8. For your safety, please DO NOT wear any jewelry or piercings on day of surgery. All body piercing jewelry must be removed. 9. If you have dentures, they will be removed before going to the OR; for your convenience we will provide you with a container.   If you wear contact lenses or glasses, they will be removed, they will be removed, please bring a case for them. 10. If appicable,Please see your family doctor/pediatrician for a history & physical and/or concerning medications. Bring any test results/reports from your physician's office. 11. Remember to bring Blood Bank bracelet to the hospital on the day of surgery. 12. If you have a Living Will and Durable Power of  for Healthcare, please bring in a copy. 15. Notify your Surgeon if you develop any illness between now and surgery  time, cough, cold, fever, sore throat, nausea, vomiting, etc.  Please notify your surgeon if you experience dizziness, shortness of breath or blurred vision between now & the time of your surgery   14. DO NOT shave your operative site 96 hours prior to surgery. For face & neck surgery, men may use an electric razor 48 hours prior to surgery. 15. Shower the night before surgery with ___Antibacterial soap ___Hibiclens   16. To provide excellent care visitors will be limited to one in the room at any given time. 17.  Please bring picture ID and insurance card. 18.  Visit our web site for additional information:  [email or web address]

## 2024-05-14 RX ORDER — BLOOD SUGAR DIAGNOSTIC
STRIP MISCELLANEOUS
Qty: 200 STRIP | Refills: 3 | Status: SHIPPED | OUTPATIENT
Start: 2024-05-14

## 2024-05-14 NOTE — TELEPHONE ENCOUNTER
Medication:   Requested Prescriptions     Pending Prescriptions Disp Refills    blood glucose test strips (ACCU-CHEK DALE PLUS) strip [Pharmacy Med Name: ACCU-CHEK DALE PLUS TEST STRP] 200 strip 3     Sig: TEST 1 TO 3 TIMES A DAY AS NEEDED FOR SYMPTOMS OF IRREGULAR BLOOD SUGAR     Last Filled:  n/a    Last appt: 4/18/2024   Next appt: 6/18/2024

## 2024-06-03 DIAGNOSIS — E11.9 TYPE 2 DIABETES MELLITUS WITHOUT COMPLICATION, WITH LONG-TERM CURRENT USE OF INSULIN (HCC): ICD-10-CM

## 2024-06-03 DIAGNOSIS — Z79.4 TYPE 2 DIABETES MELLITUS WITHOUT COMPLICATION, WITH LONG-TERM CURRENT USE OF INSULIN (HCC): ICD-10-CM

## 2024-06-03 RX ORDER — INSULIN GLARGINE 100 [IU]/ML
INJECTION, SOLUTION SUBCUTANEOUS
Qty: 6 ML | Refills: 0 | Status: SHIPPED | OUTPATIENT
Start: 2024-06-03

## 2024-06-03 NOTE — TELEPHONE ENCOUNTER
Medication:   Requested Prescriptions     Pending Prescriptions Disp Refills    insulin glargine (LANTUS SOLOSTAR) 100 UNIT/ML injection pen [Pharmacy Med Name: LANTUS SOLOSTAR 100 UNIT/ML] 6 mL      Sig: INJECT 20 UNITS UNDER THE SKIN NIGHTLY     Last Filled:  5/1/24    Last appt: 4/18/2024   Next appt: 6/20/2024

## 2024-06-13 DIAGNOSIS — J01.90 ACUTE BACTERIAL RHINOSINUSITIS: ICD-10-CM

## 2024-06-13 DIAGNOSIS — J30.2 SEASONAL ALLERGIES: Chronic | ICD-10-CM

## 2024-06-13 DIAGNOSIS — B96.89 ACUTE BACTERIAL RHINOSINUSITIS: ICD-10-CM

## 2024-06-14 RX ORDER — FLUTICASONE PROPIONATE 50 MCG
SPRAY, SUSPENSION (ML) NASAL
Qty: 48 G | Refills: 0 | Status: SHIPPED | OUTPATIENT
Start: 2024-06-14

## 2024-06-20 ENCOUNTER — OFFICE VISIT (OUTPATIENT)
Dept: PRIMARY CARE CLINIC | Age: 64
End: 2024-06-20
Payer: MEDICARE

## 2024-06-20 VITALS
BODY MASS INDEX: 33.16 KG/M2 | DIASTOLIC BLOOD PRESSURE: 62 MMHG | WEIGHT: 187.2 LBS | HEART RATE: 82 BPM | OXYGEN SATURATION: 96 % | SYSTOLIC BLOOD PRESSURE: 109 MMHG

## 2024-06-20 DIAGNOSIS — E11.9 TYPE 2 DIABETES MELLITUS WITHOUT COMPLICATION, WITH LONG-TERM CURRENT USE OF INSULIN (HCC): Primary | Chronic | ICD-10-CM

## 2024-06-20 DIAGNOSIS — E78.2 HYPERLIPIDEMIA, MIXED: ICD-10-CM

## 2024-06-20 DIAGNOSIS — I10 BENIGN ESSENTIAL HTN: Chronic | ICD-10-CM

## 2024-06-20 DIAGNOSIS — Z79.4 TYPE 2 DIABETES MELLITUS WITHOUT COMPLICATION, WITH LONG-TERM CURRENT USE OF INSULIN (HCC): Primary | Chronic | ICD-10-CM

## 2024-06-20 DIAGNOSIS — R20.2 DISTAL PARESTHESIA: ICD-10-CM

## 2024-06-20 PROBLEM — M65.30 TRIGGER FINGER, ACQUIRED: Status: RESOLVED | Noted: 2021-06-08 | Resolved: 2024-06-20

## 2024-06-20 PROBLEM — Z96.652 S/P REVISION OF TOTAL KNEE, LEFT: Status: RESOLVED | Noted: 2023-01-09 | Resolved: 2024-06-20

## 2024-06-20 LAB — HBA1C MFR BLD: 4.9 %

## 2024-06-20 PROCEDURE — 1123F ACP DISCUSS/DSCN MKR DOCD: CPT | Performed by: FAMILY MEDICINE

## 2024-06-20 PROCEDURE — 83036 HEMOGLOBIN GLYCOSYLATED A1C: CPT | Performed by: FAMILY MEDICINE

## 2024-06-20 PROCEDURE — G8399 PT W/DXA RESULTS DOCUMENT: HCPCS | Performed by: FAMILY MEDICINE

## 2024-06-20 PROCEDURE — 1036F TOBACCO NON-USER: CPT | Performed by: FAMILY MEDICINE

## 2024-06-20 PROCEDURE — 3017F COLORECTAL CA SCREEN DOC REV: CPT | Performed by: FAMILY MEDICINE

## 2024-06-20 PROCEDURE — 3074F SYST BP LT 130 MM HG: CPT | Performed by: FAMILY MEDICINE

## 2024-06-20 PROCEDURE — 1090F PRES/ABSN URINE INCON ASSESS: CPT | Performed by: FAMILY MEDICINE

## 2024-06-20 PROCEDURE — 3044F HG A1C LEVEL LT 7.0%: CPT | Performed by: FAMILY MEDICINE

## 2024-06-20 PROCEDURE — 2022F DILAT RTA XM EVC RTNOPTHY: CPT | Performed by: FAMILY MEDICINE

## 2024-06-20 PROCEDURE — G8417 CALC BMI ABV UP PARAM F/U: HCPCS | Performed by: FAMILY MEDICINE

## 2024-06-20 PROCEDURE — 99214 OFFICE O/P EST MOD 30 MIN: CPT | Performed by: FAMILY MEDICINE

## 2024-06-20 PROCEDURE — 3078F DIAST BP <80 MM HG: CPT | Performed by: FAMILY MEDICINE

## 2024-06-20 PROCEDURE — G8427 DOCREV CUR MEDS BY ELIG CLIN: HCPCS | Performed by: FAMILY MEDICINE

## 2024-06-20 RX ORDER — INSULIN GLARGINE 100 [IU]/ML
INJECTION, SOLUTION SUBCUTANEOUS
Qty: 6 ML | Refills: 0
Start: 2024-06-20

## 2024-06-20 ASSESSMENT — ENCOUNTER SYMPTOMS
DIARRHEA: 0
VOMITING: 0
ABDOMINAL PAIN: 0
SHORTNESS OF BREATH: 0
NAUSEA: 0

## 2024-06-20 NOTE — PROGRESS NOTES
[facility]  [facility]  [address] [place] [id number]  Dept: [phone]  Dept Fax: [phone]     6/20/2024      Fernanda Diego   1960     Chief Complaint   Patient presents with    Diabetes       HPI    Pt comes in today for follow up DMII/HTN. Glucose readings have been excellent. Forgot her meter today. No hypoglycemia. Has been following a diet and exercising regularly. Taking Lantus 16 units nightly. Metformin 1000 mg BID.     Hemoglobin A1C   Date Value Ref Range Status   01/16/2024 5.5 % Final   06/23/2023 5.0 See comment % Final     Comment:     Comment:  Diagnosis of Diabetes: > or = 6.5%  Increased risk of diabetes (Prediabetes): 5.7-6.4%  Glycemic Control: Nonpregnant Adults: <7.0%                    Pregnant: <6.0%       01/05/2023 5.3 See comment % Final     Comment:     Comment:  Diagnosis of Diabetes: > or = 6.5%  Increased risk of diabetes (Prediabetes): 5.7-6.4%  Glycemic Control: Nonpregnant Adults: <7.0%                    Pregnant: <6.0%       10/16/2022 5.7 See comment % Final     Comment:     Comment:  Diagnosis of Diabetes: > or = 6.5%  Increased risk of diabetes (Prediabetes): 5.7-6.4%  Glycemic Control: Nonpregnant Adults: <7.0%                    Pregnant: <6.0%       08/26/2022 7.0 See comment % Final     Comment:     Comment:  Diagnosis of Diabetes: > or = 6.5%  Increased risk of diabetes (Prediabetes): 5.7-6.4%  Glycemic Control: Nonpregnant Adults: <7.0%                    Pregnant: <6.0%          Wt Readings from Last 5 Encounters:   06/20/24 84.9 kg (187 lb 3.2 oz)   05/03/24 85.7 kg (189 lb)   04/18/24 87 kg (191 lb 12.8 oz)   01/18/24 85.7 kg (189 lb)   01/16/24 88.1 kg (194 lb 3.2 oz)      BP Readings from Last 5 Encounters:   06/20/24 109/62   05/03/24 128/68   04/18/24 117/72   01/16/24 123/78   10/25/23 (!) 143/92         Has noticed tingling/numbness in her fingertips. Both hands. No weakness.     No data recorded

## 2024-06-20 NOTE — PATIENT INSTRUCTIONS
[facility] Laboratory Locations - No appointment necessary.  ? indicates the location is open Saturdays in addition to Monday through Friday.   Call your preferred location for test preparation, business hours and other information you need.   [facility] accepts all insurances.  CENTRAL  EAST  [place]    ? [facility]   [address]   [address]   [place] [address]    Ph: [phone]  [facility]   [address] [place] [address]    Ph: [phone]   ? [facility]   [address]    [place] [address]    Ph: [phone]     [facility]   [address] [place] [address]    Ph: [phone] ? [address] [place] [address]   Ph: [phone]  ? [facility]   [address] [place] [address]    Ph: [phone]      [place]   [address] [place] [address]   Ph: [phone]    NORTH    ? [facility]   [address] [place] [address]    Ph: [phone]  [facility]   [address] [place] [address]   Ph: [phone]  [place]   [address], [address]    PH: [phone]    [facility]   [address] [place] [address]    Ph: [phone]  [place]  [address] [place] [address]  Ph: [phone]  [facility]   [address] [place] [address]    Ph: [phone]

## 2024-06-25 DIAGNOSIS — E11.65 TYPE 2 DIABETES MELLITUS WITH HYPERGLYCEMIA, WITHOUT LONG-TERM CURRENT USE OF INSULIN (HCC): ICD-10-CM

## 2024-06-25 DIAGNOSIS — E11.9 TYPE 2 DIABETES MELLITUS WITHOUT COMPLICATION, WITH LONG-TERM CURRENT USE OF INSULIN (HCC): Chronic | ICD-10-CM

## 2024-06-25 DIAGNOSIS — Z79.4 TYPE 2 DIABETES MELLITUS WITHOUT COMPLICATION, WITH LONG-TERM CURRENT USE OF INSULIN (HCC): Chronic | ICD-10-CM

## 2024-06-25 DIAGNOSIS — R20.2 DISTAL PARESTHESIA: ICD-10-CM

## 2024-06-25 LAB
ALBUMIN SERPL-MCNC: 4.5 G/DL (ref 3.4–5)
ALBUMIN/GLOB SERPL: 2 {RATIO} (ref 1.1–2.2)
ALP SERPL-CCNC: 111 U/L (ref 40–129)
ALT SERPL-CCNC: 11 U/L (ref 10–40)
ANION GAP SERPL CALCULATED.3IONS-SCNC: 16 MMOL/L (ref 3–16)
AST SERPL-CCNC: 14 U/L (ref 15–37)
BASOPHILS # BLD: 0.2 K/UL (ref 0–0.2)
BASOPHILS NFR BLD: 1 %
BILIRUB SERPL-MCNC: 0.4 MG/DL (ref 0–1)
BUN SERPL-MCNC: 12 MG/DL (ref 7–20)
CALCIUM SERPL-MCNC: 10.5 MG/DL (ref 8.3–10.6)
CHLORIDE SERPL-SCNC: 102 MMOL/L (ref 99–110)
CHOLEST SERPL-MCNC: 116 MG/DL (ref 0–199)
CO2 SERPL-SCNC: 23 MMOL/L (ref 21–32)
CREAT SERPL-MCNC: 0.6 MG/DL (ref 0.6–1.2)
DEPRECATED RDW RBC AUTO: 19 % (ref 12.4–15.4)
EOSINOPHIL # BLD: 0.3 K/UL (ref 0–0.6)
EOSINOPHIL NFR BLD: 2 %
FOLATE SERPL-MCNC: 15 NG/ML (ref 4.78–24.2)
GFR SERPLBLD CREATININE-BSD FMLA CKD-EPI: >90 ML/MIN/{1.73_M2}
GLUCOSE SERPL-MCNC: 75 MG/DL (ref 70–99)
HCT VFR BLD AUTO: 34.5 % (ref 36–48)
HDLC SERPL-MCNC: 31 MG/DL (ref 40–60)
HGB BLD-MCNC: 10.9 G/DL (ref 12–16)
LDL CHOLESTEROL: 63 MG/DL
LYMPHOCYTES # BLD: 2.8 K/UL (ref 1–5.1)
LYMPHOCYTES NFR BLD: 18 %
MCH RBC QN AUTO: 24 PG (ref 26–34)
MCHC RBC AUTO-ENTMCNC: 31.6 G/DL (ref 31–36)
MCV RBC AUTO: 76.2 FL (ref 80–100)
MONOCYTES # BLD: 0.8 K/UL (ref 0–1.3)
MONOCYTES NFR BLD: 5.2 %
NEUTROPHILS # BLD: 11.5 K/UL (ref 1.7–7.7)
NEUTROPHILS NFR BLD: 73.8 %
PLATELET # BLD AUTO: 355 K/UL (ref 135–450)
PMV BLD AUTO: 10.1 FL (ref 5–10.5)
POTASSIUM SERPL-SCNC: 4.2 MMOL/L (ref 3.5–5.1)
PROT SERPL-MCNC: 6.8 G/DL (ref 6.4–8.2)
RBC # BLD AUTO: 4.53 M/UL (ref 4–5.2)
SODIUM SERPL-SCNC: 141 MMOL/L (ref 136–145)
TRIGL SERPL-MCNC: 112 MG/DL (ref 0–150)
TSH SERPL DL<=0.005 MIU/L-ACNC: 0.52 UIU/ML (ref 0.27–4.2)
VIT B12 SERPL-MCNC: 278 PG/ML (ref 211–911)
VLDLC SERPL CALC-MCNC: 22 MG/DL
WBC # BLD AUTO: 15.6 K/UL (ref 4–11)

## 2024-06-25 RX ORDER — PEN NEEDLE, DIABETIC 32GX 5/32"
NEEDLE, DISPOSABLE MISCELLANEOUS
Qty: 100 EACH | Refills: 0 | Status: SHIPPED | OUTPATIENT
Start: 2024-06-25

## 2024-06-25 NOTE — TELEPHONE ENCOUNTER
Medication:   Requested Prescriptions     Pending Prescriptions Disp Refills    KROGER PEN NEEDLES 32G X 4 MM MISC [Pharmacy Med Name: KRO PEN NEEDLE 4MM X 32G]       Sig: USE TO INJECT INSULIN FOUR TIMES A DAY.     Last Filled:  2/19/24    Last appt: 6/20/2024   Next appt: 10/21/2024

## 2024-06-26 NOTE — RESULT ENCOUNTER NOTE
Your labs are stable. Your white blood cell count continues to be high ever since your infection in your knee although it is better than last time. Be sure to keep your follow up with Dr. Torres.

## 2024-07-10 DIAGNOSIS — I10 BENIGN ESSENTIAL HTN: Chronic | ICD-10-CM

## 2024-07-10 RX ORDER — AMLODIPINE BESYLATE 5 MG/1
TABLET ORAL
Qty: 90 TABLET | Refills: 1 | Status: SHIPPED | OUTPATIENT
Start: 2024-07-10

## 2024-07-10 RX ORDER — LOSARTAN POTASSIUM 100 MG/1
TABLET ORAL
Qty: 90 TABLET | Refills: 1 | Status: SHIPPED | OUTPATIENT
Start: 2024-07-10

## 2024-07-10 NOTE — TELEPHONE ENCOUNTER
Medication:   Requested Prescriptions     Pending Prescriptions Disp Refills    amLODIPine (NORVASC) 5 MG tablet [Pharmacy Med Name: AMLODIPINE BESYLATE 5 MG TAB] 90 tablet 1     Sig: TAKE 1 TABLET BY MOUTH DAILY    metFORMIN (GLUCOPHAGE) 1000 MG tablet [Pharmacy Med Name: METFORMIN HCL 1,000 MG TABLET] 180 tablet 3     Sig: TAKE 1 TABLET BY MOUTH TWICE A DAY WITH A MEAL     Last Filled:  4/9/24    Last appt: 6/20/2024   Next appt: 10/21/2024

## 2024-07-17 DIAGNOSIS — E11.65 TYPE 2 DIABETES MELLITUS WITH HYPERGLYCEMIA, WITHOUT LONG-TERM CURRENT USE OF INSULIN (HCC): ICD-10-CM

## 2024-07-17 NOTE — TELEPHONE ENCOUNTER
Medication:   Requested Prescriptions     Pending Prescriptions Disp Refills    KROGER PEN NEEDLES 32G X 4 MM MISC [Pharmacy Med Name: KRO PEN NEEDLE 4MM X 32G]       Sig: USE TO INJECT INSULIN FOUR TIMES A DAY     Last Filled:  3/25/24    Last appt: 6/20/2024   Next appt: 10/21/2024

## 2024-07-18 RX ORDER — PEN NEEDLE, DIABETIC 32GX 5/32"
NEEDLE, DISPOSABLE MISCELLANEOUS
Qty: 100 EACH | Refills: 1 | Status: SHIPPED | OUTPATIENT
Start: 2024-07-18

## 2024-07-19 ENCOUNTER — OFFICE VISIT (OUTPATIENT)
Dept: ORTHOPEDIC SURGERY | Age: 64
End: 2024-07-19
Payer: MEDICARE

## 2024-07-19 VITALS — HEIGHT: 63 IN | BODY MASS INDEX: 33.13 KG/M2 | WEIGHT: 187 LBS

## 2024-07-19 DIAGNOSIS — M17.11 PRIMARY OSTEOARTHRITIS OF RIGHT KNEE: ICD-10-CM

## 2024-07-19 DIAGNOSIS — M25.561 RIGHT KNEE PAIN, UNSPECIFIED CHRONICITY: ICD-10-CM

## 2024-07-19 DIAGNOSIS — Z96.652 S/P REVISION OF TOTAL KNEE, LEFT: Primary | ICD-10-CM

## 2024-07-19 PROCEDURE — 1090F PRES/ABSN URINE INCON ASSESS: CPT | Performed by: PHYSICIAN ASSISTANT

## 2024-07-19 PROCEDURE — G8417 CALC BMI ABV UP PARAM F/U: HCPCS | Performed by: PHYSICIAN ASSISTANT

## 2024-07-19 PROCEDURE — 99213 OFFICE O/P EST LOW 20 MIN: CPT | Performed by: PHYSICIAN ASSISTANT

## 2024-07-19 PROCEDURE — G8399 PT W/DXA RESULTS DOCUMENT: HCPCS | Performed by: PHYSICIAN ASSISTANT

## 2024-07-19 PROCEDURE — G8428 CUR MEDS NOT DOCUMENT: HCPCS | Performed by: PHYSICIAN ASSISTANT

## 2024-07-19 PROCEDURE — 1123F ACP DISCUSS/DSCN MKR DOCD: CPT | Performed by: PHYSICIAN ASSISTANT

## 2024-07-19 NOTE — PROGRESS NOTES
these conditions and treatment options ranging from conservative measures (rest, icing, activity modification, physical therapy, pain meds) to surgical options.     In terms of treatment, I recommended continuing with rest, icing, avoidance of painful activities, NSAIDs or pain meds as tolerated, and physical therapy.     We discussed surgical options as well, should conservative measures fail.     I spent 30 minutes providing this service today.  This included the time spent seeing the patient, documenting, reviewing prior tests, and reviewing chart.  This time excludes any separately billed procedures     Electronically signed by Nickolas Soto PA-C on 7/19/2024 at 1:04 PM  This dictation was generated by voice recognition computer software.  Although all attempts are made to edit the dictation for accuracy, there may be errors in the transcription that are not intended.

## 2024-07-24 ENCOUNTER — OFFICE VISIT (OUTPATIENT)
Dept: ORTHOPEDIC SURGERY | Age: 64
End: 2024-07-24
Payer: MEDICARE

## 2024-07-24 VITALS — BODY MASS INDEX: 33.13 KG/M2 | WEIGHT: 187 LBS | HEIGHT: 63 IN

## 2024-07-24 DIAGNOSIS — M17.11 PRIMARY OSTEOARTHRITIS OF RIGHT KNEE: Primary | ICD-10-CM

## 2024-07-24 PROCEDURE — 20611 DRAIN/INJ JOINT/BURSA W/US: CPT | Performed by: PHYSICIAN ASSISTANT

## 2024-07-24 NOTE — PROGRESS NOTES
Diagnosis: Right knee osteoarthritis    Procedure: Right knee viscosupplementation #1    The patient is symptomatic from osteoarthritis of the right knee joint with documented radiological signs of arthritis. The patient has also failed 3 months of conservative treatment including home exercise, education, Tylenol and/or NSAIDs use. The patient was offered a Visco supplementation today. Risks, benefits, and alternatives to the injections were discussed in detail with the patient. The risks discussed included but are not limited to infection, skin reactions, hot swollen joints, and anaphylaxis. The patient gave verbal informed consent for the injection. The patient's skin was prepped with  3 sterile gauze  pads soaked with alcohol solution and the knee joint was injected with 2 mL of Gelsyn-3 intra-articularly under sterile conditions.    Technique: Under sterile conditions a [employer] ultrasound unit with a variable frequency (6.0-15.0 MHz) linear transducer was used to localize the placement of a 22-gauge needle into the knee joint.    Findings: Successful needle placement for intra-articular Visco supplementation injection.  Final images were taken and saved for permanent record.      The patient tolerated the injection reasonably well. The patient was given instructions to ice the kne and avoid strenuous activities for 24-48 hours. The patient was instructed to call the office immediately if there is increased pain, redness, warmth, fever, or chills. We will see the patient back in one week for their second injection.

## 2024-07-31 ENCOUNTER — OFFICE VISIT (OUTPATIENT)
Dept: ORTHOPEDIC SURGERY | Age: 64
End: 2024-07-31
Payer: MEDICARE

## 2024-07-31 VITALS — BODY MASS INDEX: 33.13 KG/M2 | WEIGHT: 187 LBS | HEIGHT: 63 IN

## 2024-07-31 DIAGNOSIS — M17.11 PRIMARY OSTEOARTHRITIS OF RIGHT KNEE: Primary | ICD-10-CM

## 2024-07-31 PROCEDURE — 20611 DRAIN/INJ JOINT/BURSA W/US: CPT | Performed by: PHYSICIAN ASSISTANT

## 2024-07-31 NOTE — PROGRESS NOTES
Diagnosis: Right knee OA    Procedure: Right knee viscosupplementation #2    The patient returns today for their second Gelsyn-3 injection in the right knee. The risks, benefits, and complications of the injections were again discussed in detail with the patient. The risks discussed included but are not limited to infection, skin reactions, hot swollen joints, and anaphylaxis. The patient gave verbal informed consent for the injection. The patient skin was prepped with 3 sterile gauze pads soaked with alcohol solution and the knee joint was injected with 2 mL of Gelsyn-3 intra-articularly under sterile conditions .     Technique: Under sterile conditions a [employer] ultrasound unit with a variable frequency (6.0-15.0 MHz) linear transducer was used to localize the placement of a 22-gauge needle into the blanca joint.    Findings: Successful needle placement for intra-articular Visco supplementation injection.  Final images were taken and saved for permanent record.      The patient tolerated the injection reasonably well. The patient was given instructions to ice the the knee and avoid strenuous activities for 24-48 hours. The patient was instructed to call the office immediately if there is increased pain, redness, warmth, fever, or chills. We will see the patient back in one week for the third injection.

## 2024-08-07 ENCOUNTER — OFFICE VISIT (OUTPATIENT)
Dept: ORTHOPEDIC SURGERY | Age: 64
End: 2024-08-07
Payer: MEDICARE

## 2024-08-07 VITALS — BODY MASS INDEX: 33.13 KG/M2 | HEIGHT: 63 IN | WEIGHT: 187 LBS

## 2024-08-07 DIAGNOSIS — M17.11 PRIMARY OSTEOARTHRITIS OF RIGHT KNEE: Primary | ICD-10-CM

## 2024-08-07 PROCEDURE — 20611 DRAIN/INJ JOINT/BURSA W/US: CPT | Performed by: PHYSICIAN ASSISTANT

## 2024-08-07 NOTE — PROGRESS NOTES
Diagnosis: Right knee osteoarthritis    Procedure: Right knee viscosupplementation #3    The patient returns today for their third and final Gelsyn-3 injection in the right knee. The risks, benefits, and complications of the injections were again discussed in detail with the patient. The risks discussed include but are not limited to infection, skin reactions, hot swollen joints, and anaphylaxis. The patient gave verbal informed consent for the injection. The patient's skin was prepped with 3 sterile gauze pads soaked with alcohol solution and the knee joint was injected with 2 mL of Gelsyn-3 intra-articularly under sterile conditions.    Technique: Under sterile conditions a [employer] ultrasound unit with a variable frequency (6.0-15.0 MHz) linear transducer was used to localize the placement of a 22-gauge needle into the knee joint.    Findings: Successful needle placement for intra-articular Visco supplementation injection.  Final images were taken and saved for permanent record.      The patient tolerated the injection reasonably well. The patient was given instructions to ice of the knee and avoid strenuous activity for 24-48 hours. The patient was instructed to call the office immediately if there is increased pain, redness, warmth, fever, or chills. We will see the patient back on an as-needed basis  from this point.

## 2024-08-14 ENCOUNTER — OFFICE VISIT (OUTPATIENT)
Dept: ORTHOPEDIC SURGERY | Age: 64
End: 2024-08-14
Payer: MEDICARE

## 2024-08-14 VITALS — WEIGHT: 187 LBS | BODY MASS INDEX: 33.13 KG/M2 | HEIGHT: 63 IN

## 2024-08-14 DIAGNOSIS — M25.511 RIGHT SHOULDER PAIN, UNSPECIFIED CHRONICITY: Primary | ICD-10-CM

## 2024-08-14 DIAGNOSIS — M25.811 IMPINGEMENT OF RIGHT SHOULDER: ICD-10-CM

## 2024-08-14 PROCEDURE — 20610 DRAIN/INJ JOINT/BURSA W/O US: CPT | Performed by: PHYSICIAN ASSISTANT

## 2024-08-14 PROCEDURE — G8399 PT W/DXA RESULTS DOCUMENT: HCPCS | Performed by: PHYSICIAN ASSISTANT

## 2024-08-14 PROCEDURE — 3017F COLORECTAL CA SCREEN DOC REV: CPT | Performed by: PHYSICIAN ASSISTANT

## 2024-08-14 PROCEDURE — 99214 OFFICE O/P EST MOD 30 MIN: CPT | Performed by: PHYSICIAN ASSISTANT

## 2024-08-14 PROCEDURE — G8417 CALC BMI ABV UP PARAM F/U: HCPCS | Performed by: PHYSICIAN ASSISTANT

## 2024-08-14 PROCEDURE — 1090F PRES/ABSN URINE INCON ASSESS: CPT | Performed by: PHYSICIAN ASSISTANT

## 2024-08-14 PROCEDURE — G8427 DOCREV CUR MEDS BY ELIG CLIN: HCPCS | Performed by: PHYSICIAN ASSISTANT

## 2024-08-14 PROCEDURE — 1036F TOBACCO NON-USER: CPT | Performed by: PHYSICIAN ASSISTANT

## 2024-08-14 PROCEDURE — 1123F ACP DISCUSS/DSCN MKR DOCD: CPT | Performed by: PHYSICIAN ASSISTANT

## 2024-08-14 RX ORDER — LIDOCAINE HYDROCHLORIDE 10 MG/ML
5 INJECTION, SOLUTION INFILTRATION; PERINEURAL ONCE
Status: COMPLETED | OUTPATIENT
Start: 2024-08-14 | End: 2024-08-14

## 2024-08-14 RX ORDER — TRIAMCINOLONE ACETONIDE 40 MG/ML
40 INJECTION, SUSPENSION INTRA-ARTICULAR; INTRAMUSCULAR ONCE
Status: COMPLETED | OUTPATIENT
Start: 2024-08-14 | End: 2024-08-14

## 2024-08-14 RX ORDER — BUPIVACAINE HYDROCHLORIDE 2.5 MG/ML
30 INJECTION, SOLUTION INFILTRATION; PERINEURAL ONCE
Status: COMPLETED | OUTPATIENT
Start: 2024-08-14 | End: 2024-08-14

## 2024-08-14 RX ADMIN — TRIAMCINOLONE ACETONIDE 40 MG: 40 INJECTION, SUSPENSION INTRA-ARTICULAR; INTRAMUSCULAR at 10:28

## 2024-08-14 RX ADMIN — LIDOCAINE HYDROCHLORIDE 5 ML: 10 INJECTION, SOLUTION INFILTRATION; PERINEURAL at 10:27

## 2024-08-14 RX ADMIN — BUPIVACAINE HYDROCHLORIDE 75 MG: 2.5 INJECTION, SOLUTION INFILTRATION; PERINEURAL at 10:27

## 2024-08-14 SDOH — HEALTH STABILITY: PHYSICAL HEALTH: ON AVERAGE, HOW MANY DAYS PER WEEK DO YOU ENGAGE IN MODERATE TO STRENUOUS EXERCISE (LIKE A BRISK WALK)?: 0 DAYS

## 2024-08-14 NOTE — PROGRESS NOTES
providing this service today.  This included the time spent seeing the patient, documenting, reviewing prior tests, and reviewing chart.  This time excludes any separately billed procedures     Electronically signed by Nickolas Soto PA-C on 8/14/2024 at 10:32 AM  This dictation was generated by voice recognition computer software.  Although all attempts are made to edit the dictation for accuracy, there may be errors in the transcription that are not intended.

## 2024-08-22 NOTE — PLAN OF CARE
[facility]- Outpatient Rehabilitation and Therapy [address], [address], [place] [address] office: [phone] fax: [phone]     Physical Therapy Initial Evaluation Certification      Dear Nickolas Soto PA-C,    We had the pleasure of evaluating the following patient for physical therapy services at [facility].  A summary of our findings can be found in the initial assessment below.  This includes our plan of care.  If you have any questions or concerns regarding these findings, please do not hesitate to contact me at the office phone number listed above.  Thank you for the referral.     Physician Signature:_______________________________Date:__________________  By signing above (or electronic signature), therapist’s plan is approved by physician       Physical Therapy: TREATMENT/PROGRESS NOTE   Patient: Fernanda Diego (63 y.o. female)   Examination Date: 2024   :  1960 MRN: [id number]   Visit #: 1   Insurance Allowable Auth Needed   BMN []Yes    [x]No    Insurance: Payor: [facility] MEDICARE / Plan: [facility] MEDICARE COMPLETE / Product Type: *No Product type* /   Insurance ID: [id number] - (Medicare Managed)  Secondary Insurance (if applicable):    Treatment Diagnosis:     ICD-10-CM    1. Right shoulder pain, unspecified chronicity  M25.511          Medical Diagnosis:  Impingement of right shoulder [M25.811]   Referring Physician: Nickolas Soto PA-C  PCP: Isabel Rico DO     Plan of care signed (Y/N):     Date of Patient follow up with Physician:      Plan of Care Report: EVAL today  POC update due: (10 visits /OR AUTH LIMITS, whichever is less)  2024                                             Medical History:  Comorbidities:  Diabetes (Type I or II)  Hypertension  Osteoarthritis  Relevant Medical History: Hx of R RCR 15-20 years ago                                          Precautions/ Contra-indications:           Latex allergy:

## 2024-08-23 ENCOUNTER — HOSPITAL ENCOUNTER (OUTPATIENT)
Dept: PHYSICAL THERAPY | Age: 64
Setting detail: THERAPIES SERIES
Discharge: HOME | End: 2024-08-23
Payer: MEDICARE

## 2024-08-23 DIAGNOSIS — M25.511 RIGHT SHOULDER PAIN, UNSPECIFIED CHRONICITY: Primary | ICD-10-CM

## 2024-08-23 PROCEDURE — 97161 PT EVAL LOW COMPLEX 20 MIN: CPT

## 2024-08-23 PROCEDURE — 97140 MANUAL THERAPY 1/> REGIONS: CPT

## 2024-08-23 PROCEDURE — 97110 THERAPEUTIC EXERCISES: CPT

## 2024-08-28 ENCOUNTER — APPOINTMENT (OUTPATIENT)
Dept: PHYSICAL THERAPY | Age: 64
End: 2024-08-28
Payer: MEDICARE

## 2024-08-29 DIAGNOSIS — E11.9 TYPE 2 DIABETES MELLITUS WITHOUT COMPLICATION, WITH LONG-TERM CURRENT USE OF INSULIN (HCC): Chronic | ICD-10-CM

## 2024-08-29 DIAGNOSIS — Z79.4 TYPE 2 DIABETES MELLITUS WITHOUT COMPLICATION, WITH LONG-TERM CURRENT USE OF INSULIN (HCC): Chronic | ICD-10-CM

## 2024-08-30 RX ORDER — INSULIN GLARGINE 100 [IU]/ML
INJECTION, SOLUTION SUBCUTANEOUS
Qty: 6 ML | Refills: 0 | Status: SHIPPED | OUTPATIENT
Start: 2024-08-30

## 2024-08-30 NOTE — TELEPHONE ENCOUNTER
Medication:   Requested Prescriptions     Pending Prescriptions Disp Refills    insulin glargine (LANTUS SOLOSTAR) 100 UNIT/ML injection pen [Pharmacy Med Name: LANTUS SOLOSTAR 100 UNIT/ML] 6 mL 0     Sig: INJECT 20 UNITS UNDER THE SKIN NIGHTLY     Last Filled:  6/3/24    Last appt: 6/20/2024   Next appt: 10/21/2024

## 2024-09-04 ENCOUNTER — HOSPITAL ENCOUNTER (OUTPATIENT)
Dept: PHYSICAL THERAPY | Age: 64
Setting detail: THERAPIES SERIES
Discharge: HOME | End: 2024-09-04
Payer: MEDICARE

## 2024-09-04 PROCEDURE — 97140 MANUAL THERAPY 1/> REGIONS: CPT

## 2024-09-04 PROCEDURE — 97110 THERAPEUTIC EXERCISES: CPT

## 2024-09-04 NOTE — FLOWSHEET NOTE
[facility]- Outpatient Rehabilitation and Therapy [address], [place] [address] office: [phone] fax: [phone]       Physical Therapy: TREATMENT/PROGRESS NOTE   Patient: Fernanda Diego (63 y.o. female)   Examination Date: 2024   :  1960 MRN: [id number]   Visit #: 2   Insurance Allowable Auth Needed   BMN []Yes    [x]No    Insurance: Payor: [facility] MEDICARE / Plan: [facility] MEDICARE COMPLETE / Product Type: *No Product type* /   Insurance ID: [id number] - (Medicare Managed)  Secondary Insurance (if applicable):    Treatment Diagnosis:     ICD-10-CM    1. Right shoulder pain, unspecified chronicity  M25.511          Medical Diagnosis:  Impingement of right shoulder [M25.811]   Referring Physician: Nickolas Soto PA-C  PCP: Isabel Rico DO     Plan of care signed (Y/N):     Date of Patient follow up with Physician:      Plan of Care Report: EVAL today  POC update due: (10 visits /OR AUTH LIMITS, whichever is less)  2024                                             Medical History:  Comorbidities:  Diabetes (Type I or II)  Hypertension  Osteoarthritis  Relevant Medical History: Hx of R RCR 15-20 years ago                                          Precautions/ Contra-indications:           Latex allergy:  NO  Pacemaker:    NO  Contraindications for Manipulation: None  Date of Surgery: 15-20 years ago: RCR  Other:    Red Flags:  None    Suicide Screening:   The patient did not verbalize a primary behavioral concern, suicidal ideation, suicidal intent, or demonstrate suicidal behaviors.    Preferred Language for Healthcare:   [x] English       [] other:    SUBJECTIVE EXAMINATION     Patient stated complaint:   Pt states she feels a bit better. States she still has some discomfort in her Upper trap.     Eval: Pt reports to physical therapy with complaints of R shoulder pain. She does have a history of RCR on this shoulder, but it has been 15-20 years. She has

## 2024-09-08 DIAGNOSIS — J30.9 ALLERGIC SINUSITIS: ICD-10-CM

## 2024-09-08 DIAGNOSIS — E78.2 HYPERLIPIDEMIA, MIXED: ICD-10-CM

## 2024-09-09 RX ORDER — MONTELUKAST SODIUM 10 MG/1
10 TABLET ORAL NIGHTLY
Qty: 90 TABLET | Refills: 1 | Status: SHIPPED | OUTPATIENT
Start: 2024-09-09

## 2024-09-09 RX ORDER — ATORVASTATIN CALCIUM 10 MG/1
TABLET, FILM COATED ORAL
Qty: 90 TABLET | Refills: 1 | Status: SHIPPED | OUTPATIENT
Start: 2024-09-09

## 2024-09-11 ENCOUNTER — HOSPITAL ENCOUNTER (OUTPATIENT)
Dept: PHYSICAL THERAPY | Age: 64
Setting detail: THERAPIES SERIES
End: 2024-09-11
Payer: MEDICARE

## 2024-09-16 RX ORDER — LANCETS
EACH MISCELLANEOUS
Qty: 100 EACH | Refills: 3 | Status: SHIPPED | OUTPATIENT
Start: 2024-09-16

## 2024-09-18 ENCOUNTER — HOSPITAL ENCOUNTER (OUTPATIENT)
Dept: PHYSICAL THERAPY | Age: 64
Setting detail: THERAPIES SERIES
Discharge: HOME | End: 2024-09-18
Payer: MEDICARE

## 2024-09-18 PROCEDURE — 97110 THERAPEUTIC EXERCISES: CPT

## 2024-09-18 PROCEDURE — 97140 MANUAL THERAPY 1/> REGIONS: CPT

## 2024-09-27 ENCOUNTER — TELEPHONE (OUTPATIENT)
Dept: ORTHOPEDIC SURGERY | Age: 64
End: 2024-09-27

## 2024-09-27 ENCOUNTER — HOSPITAL ENCOUNTER (OUTPATIENT)
Dept: PHYSICAL THERAPY | Age: 64
Setting detail: THERAPIES SERIES
End: 2024-09-27
Payer: MEDICARE

## 2024-09-27 ENCOUNTER — OFFICE VISIT (OUTPATIENT)
Dept: ORTHOPEDIC SURGERY | Age: 64
End: 2024-09-27
Payer: MEDICARE

## 2024-09-27 VITALS — WEIGHT: 187 LBS | BODY MASS INDEX: 33.13 KG/M2 | HEIGHT: 63 IN

## 2024-09-27 DIAGNOSIS — M75.102 NONTRAUMATIC ROTATOR CUFF TEAR, LEFT: ICD-10-CM

## 2024-09-27 DIAGNOSIS — M75.42 IMPINGEMENT SYNDROME OF LEFT SHOULDER: ICD-10-CM

## 2024-09-27 DIAGNOSIS — M25.512 ACUTE PAIN OF LEFT SHOULDER: Primary | ICD-10-CM

## 2024-09-27 DIAGNOSIS — M25.811 IMPINGEMENT OF RIGHT SHOULDER: ICD-10-CM

## 2024-09-27 PROCEDURE — 3017F COLORECTAL CA SCREEN DOC REV: CPT | Performed by: ORTHOPAEDIC SURGERY

## 2024-09-27 PROCEDURE — 1090F PRES/ABSN URINE INCON ASSESS: CPT | Performed by: ORTHOPAEDIC SURGERY

## 2024-09-27 PROCEDURE — G8399 PT W/DXA RESULTS DOCUMENT: HCPCS | Performed by: ORTHOPAEDIC SURGERY

## 2024-09-27 PROCEDURE — G8428 CUR MEDS NOT DOCUMENT: HCPCS | Performed by: ORTHOPAEDIC SURGERY

## 2024-09-27 PROCEDURE — 99213 OFFICE O/P EST LOW 20 MIN: CPT | Performed by: ORTHOPAEDIC SURGERY

## 2024-09-27 PROCEDURE — 1123F ACP DISCUSS/DSCN MKR DOCD: CPT | Performed by: ORTHOPAEDIC SURGERY

## 2024-09-27 PROCEDURE — G8417 CALC BMI ABV UP PARAM F/U: HCPCS | Performed by: ORTHOPAEDIC SURGERY

## 2024-09-27 PROCEDURE — 1036F TOBACCO NON-USER: CPT | Performed by: ORTHOPAEDIC SURGERY

## 2024-09-30 ENCOUNTER — HOSPITAL ENCOUNTER (OUTPATIENT)
Dept: PHYSICAL THERAPY | Age: 64
Setting detail: THERAPIES SERIES
End: 2024-09-30
Payer: MEDICARE

## 2024-10-07 ENCOUNTER — HOSPITAL ENCOUNTER (OUTPATIENT)
Dept: MRI IMAGING | Age: 64
Discharge: HOME | End: 2024-10-07
Attending: ORTHOPAEDIC SURGERY
Payer: MEDICARE

## 2024-10-07 DIAGNOSIS — M25.512 ACUTE PAIN OF LEFT SHOULDER: ICD-10-CM

## 2024-10-07 PROCEDURE — 73221 MRI JOINT UPR EXTREM W/O DYE: CPT

## 2024-10-11 ENCOUNTER — OFFICE VISIT (OUTPATIENT)
Dept: ORTHOPEDIC SURGERY | Age: 64
End: 2024-10-11
Payer: MEDICARE

## 2024-10-11 VITALS — HEIGHT: 63 IN | BODY MASS INDEX: 33.13 KG/M2 | WEIGHT: 187 LBS

## 2024-10-11 DIAGNOSIS — M19.012 OSTEOARTHRITIS OF LEFT ACROMIOCLAVICULAR JOINT: ICD-10-CM

## 2024-10-11 DIAGNOSIS — S46.012A TRAUMATIC COMPLETE TEAR OF LEFT ROTATOR CUFF, INITIAL ENCOUNTER: Primary | ICD-10-CM

## 2024-10-11 DIAGNOSIS — M25.812 IMPINGEMENT OF LEFT SHOULDER: ICD-10-CM

## 2024-10-11 DIAGNOSIS — M75.22 BICIPITAL TENDINITIS OF LEFT SHOULDER: ICD-10-CM

## 2024-10-11 PROCEDURE — 1036F TOBACCO NON-USER: CPT | Performed by: ORTHOPAEDIC SURGERY

## 2024-10-11 PROCEDURE — 1090F PRES/ABSN URINE INCON ASSESS: CPT | Performed by: ORTHOPAEDIC SURGERY

## 2024-10-11 PROCEDURE — 3017F COLORECTAL CA SCREEN DOC REV: CPT | Performed by: ORTHOPAEDIC SURGERY

## 2024-10-11 PROCEDURE — G8484 FLU IMMUNIZE NO ADMIN: HCPCS | Performed by: ORTHOPAEDIC SURGERY

## 2024-10-11 PROCEDURE — G8399 PT W/DXA RESULTS DOCUMENT: HCPCS | Performed by: ORTHOPAEDIC SURGERY

## 2024-10-11 PROCEDURE — G8417 CALC BMI ABV UP PARAM F/U: HCPCS | Performed by: ORTHOPAEDIC SURGERY

## 2024-10-11 PROCEDURE — 1123F ACP DISCUSS/DSCN MKR DOCD: CPT | Performed by: ORTHOPAEDIC SURGERY

## 2024-10-11 PROCEDURE — G8427 DOCREV CUR MEDS BY ELIG CLIN: HCPCS | Performed by: ORTHOPAEDIC SURGERY

## 2024-10-11 PROCEDURE — 99214 OFFICE O/P EST MOD 30 MIN: CPT | Performed by: ORTHOPAEDIC SURGERY

## 2024-10-11 RX ORDER — MUPIROCIN 20 MG/G
OINTMENT TOPICAL
Qty: 1 G | Refills: 0 | Status: SHIPPED | OUTPATIENT
Start: 2024-10-11

## 2024-10-11 NOTE — PROGRESS NOTES
Accu-Chek Softclix Lancets MISC USE TO TEST 1 TO 3 TIMES PER DAY FOR SYMPTOMS OF IRREGULAR GLUCOSE 100 each 3    montelukast (SINGULAIR) 10 MG tablet TAKE ONE TABLET BY MOUTH ONCE NIGHTLY 90 tablet 1    atorvastatin (LIPITOR) 10 MG tablet TAKE 1 TABLET BY MOUTH DAILY 90 tablet 1    insulin glargine (LANTUS SOLOSTAR) 100 UNIT/ML injection pen INJECT 20 UNITS UNDER THE SKIN NIGHTLY 6 mL 0    Insulin Pen Needle (KR[employer]ER PEN NEEDLES) 32G X 4 MM MISC USE TO INJECT INSULIN FOUR TIMES A  each 1    amLODIPine (NORVASC) 5 MG tablet TAKE 1 TABLET BY MOUTH DAILY 90 tablet 1    metFORMIN (GLUCOPHAGE) 1000 MG tablet TAKE 1 TABLET BY MOUTH TWICE A DAY WITH A MEAL 180 tablet 3    losartan (COZAAR) 100 MG tablet TAKE 1 TABLET BY MOUTH DAILY 90 tablet 1    fluticasone (FLONASE) 50 MCG/ACT nasal spray SPRAY TWO SPRAYS IN EACH NOSTRIL ONCE DAILY 48 g 0    blood glucose test strips (ACCU-CHEK DALE PLUS) strip TEST 1 TO 3 TIMES A DAY AS NEEDED FOR SYMPTOMS OF IRREGULAR BLOOD SUGAR 200 strip 3    linaCLOtide (LINZESS PO) Take by mouth      ibuprofen (ADVIL;MOTRIN) 800 MG tablet Take 1 tablet by mouth 2 times daily as needed for Pain 60 tablet 0    Calcium Carb-Cholecalciferol 600-800 MG-UNIT TABS Take 1 tablet by mouth in the morning and 1 tablet in the evening.      cetirizine (ZYRTEC) 10 MG tablet Take 1 tablet by mouth daily       No current facility-administered medications on file prior to visit.        ASCVD 10-YEAR RISK SCORE  The ASCVD Risk score ([place] DK, et al., 2019) failed to calculate for the following reasons:    The valid total cholesterol range is 130 to 320 mg/dL     Review of Systems  10-point ROS is negative other than HPI.    Physical Exam  Based off 1997 Exam Criteria  Ht 1.6 m (5' 2.99\")   Wt 84.8 kg (187 lb)   LMP 01/26/1996   BMI 33.13 kg/m²      Constitutional:       General: He is not in acute distress.     Appearance: Normal appearance.   Cardiovascular:      Rate and Rhythm: Normal rate and regular

## 2024-10-14 ENCOUNTER — PREP FOR PROCEDURE (OUTPATIENT)
Dept: ORTHOPEDIC SURGERY | Age: 64
End: 2024-10-14

## 2024-10-14 DIAGNOSIS — M75.22 BICIPITAL TENDINITIS OF LEFT SHOULDER: ICD-10-CM

## 2024-10-14 DIAGNOSIS — M19.012 PRIMARY OSTEOARTHRITIS OF LEFT SHOULDER: ICD-10-CM

## 2024-10-14 DIAGNOSIS — M25.812 SHOULDER IMPINGEMENT, LEFT: ICD-10-CM

## 2024-10-14 DIAGNOSIS — M75.52 BURSITIS OF SHOULDER, LEFT: ICD-10-CM

## 2024-10-14 PROBLEM — M75.122 COMPLETE ROTATOR CUFF TEAR OF LEFT SHOULDER: Status: ACTIVE | Noted: 2024-10-14

## 2024-10-21 ENCOUNTER — OFFICE VISIT (OUTPATIENT)
Dept: PRIMARY CARE CLINIC | Age: 64
End: 2024-10-21
Payer: MEDICARE

## 2024-10-21 VITALS
DIASTOLIC BLOOD PRESSURE: 79 MMHG | BODY MASS INDEX: 33.06 KG/M2 | HEART RATE: 96 BPM | OXYGEN SATURATION: 98 % | TEMPERATURE: 98.1 F | WEIGHT: 186.6 LBS | SYSTOLIC BLOOD PRESSURE: 135 MMHG

## 2024-10-21 DIAGNOSIS — B37.31 VAGINAL CANDIDIASIS: ICD-10-CM

## 2024-10-21 DIAGNOSIS — Z79.4 TYPE 2 DIABETES MELLITUS WITHOUT COMPLICATION, WITH LONG-TERM CURRENT USE OF INSULIN (HCC): Primary | Chronic | ICD-10-CM

## 2024-10-21 DIAGNOSIS — E11.9 TYPE 2 DIABETES MELLITUS WITHOUT COMPLICATION, WITH LONG-TERM CURRENT USE OF INSULIN (HCC): Primary | Chronic | ICD-10-CM

## 2024-10-21 DIAGNOSIS — M75.122 COMPLETE TEAR OF LEFT ROTATOR CUFF, UNSPECIFIED WHETHER TRAUMATIC: ICD-10-CM

## 2024-10-21 DIAGNOSIS — I10 BENIGN ESSENTIAL HTN: Chronic | ICD-10-CM

## 2024-10-21 PROBLEM — J32.1 CHRONIC FRONTAL SINUSITIS: Chronic | Status: RESOLVED | Noted: 2023-09-12 | Resolved: 2024-10-21

## 2024-10-21 PROBLEM — J32.0 CHRONIC MAXILLARY SINUSITIS: Chronic | Status: RESOLVED | Noted: 2023-09-12 | Resolved: 2024-10-21

## 2024-10-21 PROBLEM — M75.22 BICIPITAL TENDINITIS OF LEFT SHOULDER: Status: RESOLVED | Noted: 2024-10-14 | Resolved: 2024-10-21

## 2024-10-21 PROBLEM — M75.52 BURSITIS OF SHOULDER, LEFT: Status: RESOLVED | Noted: 2024-10-14 | Resolved: 2024-10-21

## 2024-10-21 LAB — HBA1C MFR BLD: 4.8 %

## 2024-10-21 PROCEDURE — 3044F HG A1C LEVEL LT 7.0%: CPT | Performed by: FAMILY MEDICINE

## 2024-10-21 PROCEDURE — G8417 CALC BMI ABV UP PARAM F/U: HCPCS | Performed by: FAMILY MEDICINE

## 2024-10-21 PROCEDURE — 83036 HEMOGLOBIN GLYCOSYLATED A1C: CPT | Performed by: FAMILY MEDICINE

## 2024-10-21 PROCEDURE — G8427 DOCREV CUR MEDS BY ELIG CLIN: HCPCS | Performed by: FAMILY MEDICINE

## 2024-10-21 PROCEDURE — 1036F TOBACCO NON-USER: CPT | Performed by: FAMILY MEDICINE

## 2024-10-21 PROCEDURE — G8482 FLU IMMUNIZE ORDER/ADMIN: HCPCS | Performed by: FAMILY MEDICINE

## 2024-10-21 PROCEDURE — 3075F SYST BP GE 130 - 139MM HG: CPT | Performed by: FAMILY MEDICINE

## 2024-10-21 PROCEDURE — G8399 PT W/DXA RESULTS DOCUMENT: HCPCS | Performed by: FAMILY MEDICINE

## 2024-10-21 PROCEDURE — 99214 OFFICE O/P EST MOD 30 MIN: CPT | Performed by: FAMILY MEDICINE

## 2024-10-21 PROCEDURE — 2022F DILAT RTA XM EVC RTNOPTHY: CPT | Performed by: FAMILY MEDICINE

## 2024-10-21 PROCEDURE — 1090F PRES/ABSN URINE INCON ASSESS: CPT | Performed by: FAMILY MEDICINE

## 2024-10-21 PROCEDURE — 1123F ACP DISCUSS/DSCN MKR DOCD: CPT | Performed by: FAMILY MEDICINE

## 2024-10-21 PROCEDURE — 3078F DIAST BP <80 MM HG: CPT | Performed by: FAMILY MEDICINE

## 2024-10-21 PROCEDURE — 3017F COLORECTAL CA SCREEN DOC REV: CPT | Performed by: FAMILY MEDICINE

## 2024-10-21 RX ORDER — LINACLOTIDE 290 UG/1
CAPSULE, GELATIN COATED ORAL
COMMUNITY
Start: 2024-09-16

## 2024-10-21 RX ORDER — FLUCONAZOLE 150 MG/1
150 TABLET ORAL
Qty: 2 TABLET | Refills: 0 | Status: SHIPPED | OUTPATIENT
Start: 2024-10-21 | End: 2024-10-27

## 2024-10-21 ASSESSMENT — ENCOUNTER SYMPTOMS
VOMITING: 0
SHORTNESS OF BREATH: 0
DIARRHEA: 0
ABDOMINAL PAIN: 0
NAUSEA: 0

## 2024-10-21 NOTE — PROGRESS NOTES
[facility]  [facility]  [address] [place] [address]  Dept: [phone]  Dept Fax: [phone]     10/21/2024      Fernanda Diego   1960     Chief Complaint   Patient presents with    Follow-up       HPI    Pt comes in today for routine follow up.     DMII - No further hypoglycemia. A1c has been low. Decreased Lantus to 10 units at last visit. Blood sugars consistently running normal.     Notes she has upcoming rotator cuff surgery 11/27.     HTN - No concerns. Taking Losartan and Amlodipine consistently.     C/o yeast infection not resolved with OTC treatment.     Wt Readings from Last 5 Encounters:   10/21/24 84.6 kg (186 lb 9.6 oz)   10/11/24 84.8 kg (187 lb)   09/27/24 84.8 kg (187 lb)   08/14/24 84.8 kg (187 lb)   08/07/24 84.8 kg (187 lb)      BP Readings from Last 5 Encounters:   10/21/24 135/79   06/20/24 109/62   05/03/24 128/68   04/18/24 117/72   01/16/24 123/78         No data recorded     Prior to Visit Medications    Medication Sig Taking? Authorizing Provider   mupirocin (BACTROBAN) 2 % ointment Apply twice daily to each nare for 5 days prior to surgical procedure  Srini Torres MD   Accu-Chek Softclix Lancets MISC USE TO TEST 1 TO 3 TIMES PER DAY FOR SYMPTOMS OF IRREGULAR GLUCOSE  Isabel Rico DO   montelukast (SINGULAIR) 10 MG tablet TAKE ONE TABLET BY MOUTH ONCE NIGHTLY  Isabel Rico DO   atorvastatin (LIPITOR) 10 MG tablet TAKE 1 TABLET BY MOUTH DAILY  Isabel Rico DO   insulin glargine (LANTUS SOLOSTAR) 100 UNIT/ML injection pen INJECT 20 UNITS UNDER THE SKIN NIGHTLY  Isabel Rico DO   Insulin Pen Needle (KROGER PEN NEEDLES) 32G X 4 MM MISC USE TO INJECT INSULIN FOUR TIMES A DAY  Isabel Rico DO   amLODIPine (NORVASC) 5 MG tablet TAKE 1 TABLET BY MOUTH DAILY  Isabel Rico DO   metFORMIN (GLUCOPHAGE) 1000 MG tablet TAKE 1 TABLET BY MOUTH TWICE A DAY WITH A

## 2024-10-31 ENCOUNTER — OFFICE VISIT (OUTPATIENT)
Dept: PRIMARY CARE CLINIC | Age: 64
End: 2024-10-31
Payer: MEDICARE

## 2024-10-31 VITALS
SYSTOLIC BLOOD PRESSURE: 136 MMHG | OXYGEN SATURATION: 95 % | DIASTOLIC BLOOD PRESSURE: 80 MMHG | WEIGHT: 187.2 LBS | HEART RATE: 57 BPM | BODY MASS INDEX: 33.17 KG/M2

## 2024-10-31 DIAGNOSIS — E11.9 TYPE 2 DIABETES MELLITUS WITHOUT COMPLICATION, WITH LONG-TERM CURRENT USE OF INSULIN (HCC): Chronic | ICD-10-CM

## 2024-10-31 DIAGNOSIS — Z01.818 PRE-OP EXAM: ICD-10-CM

## 2024-10-31 DIAGNOSIS — Z79.4 TYPE 2 DIABETES MELLITUS WITHOUT COMPLICATION, WITH LONG-TERM CURRENT USE OF INSULIN (HCC): Chronic | ICD-10-CM

## 2024-10-31 DIAGNOSIS — S46.012A TRAUMATIC COMPLETE TEAR OF LEFT ROTATOR CUFF, INITIAL ENCOUNTER: Primary | ICD-10-CM

## 2024-10-31 DIAGNOSIS — I10 BENIGN ESSENTIAL HTN: Chronic | ICD-10-CM

## 2024-10-31 PROCEDURE — 1090F PRES/ABSN URINE INCON ASSESS: CPT | Performed by: FAMILY MEDICINE

## 2024-10-31 PROCEDURE — G8428 CUR MEDS NOT DOCUMENT: HCPCS | Performed by: FAMILY MEDICINE

## 2024-10-31 PROCEDURE — G8417 CALC BMI ABV UP PARAM F/U: HCPCS | Performed by: FAMILY MEDICINE

## 2024-10-31 PROCEDURE — 3075F SYST BP GE 130 - 139MM HG: CPT | Performed by: FAMILY MEDICINE

## 2024-10-31 PROCEDURE — 1036F TOBACCO NON-USER: CPT | Performed by: FAMILY MEDICINE

## 2024-10-31 PROCEDURE — 99214 OFFICE O/P EST MOD 30 MIN: CPT | Performed by: FAMILY MEDICINE

## 2024-10-31 PROCEDURE — 1123F ACP DISCUSS/DSCN MKR DOCD: CPT | Performed by: FAMILY MEDICINE

## 2024-10-31 PROCEDURE — 2022F DILAT RTA XM EVC RTNOPTHY: CPT | Performed by: FAMILY MEDICINE

## 2024-10-31 PROCEDURE — G8482 FLU IMMUNIZE ORDER/ADMIN: HCPCS | Performed by: FAMILY MEDICINE

## 2024-10-31 PROCEDURE — 3017F COLORECTAL CA SCREEN DOC REV: CPT | Performed by: FAMILY MEDICINE

## 2024-10-31 PROCEDURE — 3079F DIAST BP 80-89 MM HG: CPT | Performed by: FAMILY MEDICINE

## 2024-10-31 PROCEDURE — G8399 PT W/DXA RESULTS DOCUMENT: HCPCS | Performed by: FAMILY MEDICINE

## 2024-10-31 PROCEDURE — 3044F HG A1C LEVEL LT 7.0%: CPT | Performed by: FAMILY MEDICINE

## 2024-11-01 ASSESSMENT — ENCOUNTER SYMPTOMS
VOMITING: 0
ABDOMINAL PAIN: 0
SHORTNESS OF BREATH: 0
DIARRHEA: 0
NAUSEA: 0

## 2024-11-01 NOTE — PROGRESS NOTES
[facility]  [facility]  [address] [place] [address]  Dept: [phone]  Dept Fax: [phone]     10/31/2024      Fernanda Diego   1960     Chief Complaint   Patient presents with    Pre-op Exam       HPI    Pt comes in today for pre-op exam.     Diagnosis: Rotator cuff tear - left shoulder    Planned surgery: Arthroscopic left shoulder rotator cuff repair    Date scheduled: 11/27/24     Anesthesia: General    Blood thinner/ASA/NSAIDs: None    H/o anesthesia complications: None    Pre-operative testing: EKG - labs per surgeon    H/o cardiac or pulmonary disease: None    Functional capacity: 3-5  ( <4 MET = Self care, ADLs, walking indoors, 3-5 MET = Light work around the house, climb stairs or walk up hill, 5-7 METs = Heavy cleaning around the house, moderate yard work, run a short distance, 8-10 METs = Moderate to strenuous recreational sports, daily or prolonged exercise)     DM - has been very well controlled. Was able to stop insulin at last visit. No hypoglycemia.     Wt Readings from Last 5 Encounters:   10/31/24 84.9 kg (187 lb 3.2 oz)   10/21/24 84.6 kg (186 lb 9.6 oz)   10/11/24 84.8 kg (187 lb)   09/27/24 84.8 kg (187 lb)   08/14/24 84.8 kg (187 lb)      BP Readings from Last 5 Encounters:   10/31/24 136/80   10/21/24 135/79   06/20/24 109/62   05/03/24 128/68   04/18/24 117/72         No data recorded     Prior to Visit Medications    Medication Sig Taking? Authorizing Provider   LINZESS 290 MCG CAPS capsule   Provider, MD Kris   mupirocin (BACTROBAN) 2 % ointment Apply twice daily to each nare for 5 days prior to surgical procedure  Srini Torres MD   Accu-Chek Softclix Lancets MISC USE TO TEST 1 TO 3 TIMES PER DAY FOR SYMPTOMS OF IRREGULAR GLUCOSE  Isabel Rico,    montelukast (SINGULAIR) 10 MG tablet TAKE ONE TABLET BY MOUTH ONCE NIGHTLY  Isabel Rico DO   atorvastatin (LIPITOR) 10 MG tablet TAKE 1 TABLET BY

## 2024-11-04 ENCOUNTER — OFFICE VISIT (OUTPATIENT)
Dept: ENT CLINIC | Age: 64
End: 2024-11-04
Payer: MEDICARE

## 2024-11-04 ENCOUNTER — TELEPHONE (OUTPATIENT)
Dept: ORTHOPEDIC SURGERY | Age: 64
End: 2024-11-04

## 2024-11-04 VITALS
HEART RATE: 78 BPM | SYSTOLIC BLOOD PRESSURE: 121 MMHG | OXYGEN SATURATION: 96 % | WEIGHT: 187 LBS | HEIGHT: 63 IN | BODY MASS INDEX: 33.13 KG/M2 | DIASTOLIC BLOOD PRESSURE: 77 MMHG | TEMPERATURE: 98.2 F

## 2024-11-04 DIAGNOSIS — H91.91 HEARING LOSS OF RIGHT EAR, UNSPECIFIED HEARING LOSS TYPE: ICD-10-CM

## 2024-11-04 DIAGNOSIS — J30.2 SEASONAL ALLERGIES: Primary | Chronic | ICD-10-CM

## 2024-11-04 DIAGNOSIS — J32.4 CHRONIC PANSINUSITIS: Chronic | ICD-10-CM

## 2024-11-04 PROCEDURE — G8417 CALC BMI ABV UP PARAM F/U: HCPCS | Performed by: OTOLARYNGOLOGY

## 2024-11-04 PROCEDURE — G8482 FLU IMMUNIZE ORDER/ADMIN: HCPCS | Performed by: OTOLARYNGOLOGY

## 2024-11-04 PROCEDURE — 99214 OFFICE O/P EST MOD 30 MIN: CPT | Performed by: OTOLARYNGOLOGY

## 2024-11-04 PROCEDURE — 3074F SYST BP LT 130 MM HG: CPT | Performed by: OTOLARYNGOLOGY

## 2024-11-04 PROCEDURE — 1123F ACP DISCUSS/DSCN MKR DOCD: CPT | Performed by: OTOLARYNGOLOGY

## 2024-11-04 PROCEDURE — G8399 PT W/DXA RESULTS DOCUMENT: HCPCS | Performed by: OTOLARYNGOLOGY

## 2024-11-04 PROCEDURE — 1036F TOBACCO NON-USER: CPT | Performed by: OTOLARYNGOLOGY

## 2024-11-04 PROCEDURE — 1090F PRES/ABSN URINE INCON ASSESS: CPT | Performed by: OTOLARYNGOLOGY

## 2024-11-04 PROCEDURE — G8427 DOCREV CUR MEDS BY ELIG CLIN: HCPCS | Performed by: OTOLARYNGOLOGY

## 2024-11-04 PROCEDURE — 3078F DIAST BP <80 MM HG: CPT | Performed by: OTOLARYNGOLOGY

## 2024-11-04 PROCEDURE — 3017F COLORECTAL CA SCREEN DOC REV: CPT | Performed by: OTOLARYNGOLOGY

## 2024-11-04 NOTE — TELEPHONE ENCOUNTER
General Question     Subject: patient is having sx on 11/27/24 on her lt Shoulder she just need to know what time she need to arrive. Please Advise.  Patient Johnathon Fernanda BHARDWAJ   Contact Number: [phone]

## 2024-11-04 NOTE — PROGRESS NOTES
hearing test is not normal.           Sami Smith MD    [facility]  Department of Otolaryngology/Head and Neck Surgery  [facility]  [address], [address]  [place] [address]  [phone]

## 2024-11-06 DIAGNOSIS — M75.22 BICIPITAL TENDINITIS OF LEFT SHOULDER: ICD-10-CM

## 2024-11-06 DIAGNOSIS — M19.012 OSTEOARTHRITIS OF LEFT ACROMIOCLAVICULAR JOINT: ICD-10-CM

## 2024-11-06 DIAGNOSIS — S46.012A TRAUMATIC COMPLETE TEAR OF LEFT ROTATOR CUFF, INITIAL ENCOUNTER: ICD-10-CM

## 2024-11-07 LAB
ANION GAP SERPL CALCULATED.3IONS-SCNC: 16 MMOL/L (ref 3–16)
BACTERIA UR CULT: NORMAL
BACTERIA URNS QL MICRO: ABNORMAL /HPF
BASOPHILS # BLD: 0.1 K/UL (ref 0–0.2)
BASOPHILS NFR BLD: 0.5 %
BILIRUB UR QL STRIP.AUTO: NEGATIVE
BUN SERPL-MCNC: 8 MG/DL (ref 7–20)
CALCIUM SERPL-MCNC: 10.3 MG/DL (ref 8.3–10.6)
CHARACTER UR: ABNORMAL
CHLORIDE SERPL-SCNC: 100 MMOL/L (ref 99–110)
CLARITY UR: ABNORMAL
CO2 SERPL-SCNC: 23 MMOL/L (ref 21–32)
COLOR UR: ABNORMAL
CREAT SERPL-MCNC: 0.8 MG/DL (ref 0.6–1.2)
DEPRECATED RDW RBC AUTO: 16.6 % (ref 12.4–15.4)
EOSINOPHIL # BLD: 0.2 K/UL (ref 0–0.6)
EOSINOPHIL NFR BLD: 1.3 %
EPI CELLS #/AREA URNS HPF: ABNORMAL /HPF (ref 0–5)
GFR SERPLBLD CREATININE-BSD FMLA CKD-EPI: 81 ML/MIN/{1.73_M2}
GLUCOSE SERPL-MCNC: 94 MG/DL (ref 70–99)
GLUCOSE UR STRIP.AUTO-MCNC: NEGATIVE MG/DL
HCT VFR BLD AUTO: 36.3 % (ref 36–48)
HGB BLD-MCNC: 11.2 G/DL (ref 12–16)
HGB UR QL STRIP.AUTO: NEGATIVE
HYALINE CASTS #/AREA URNS LPF: ABNORMAL /LPF (ref 0–2)
INR PPP: 1.05 (ref 0.85–1.15)
KETONES UR STRIP.AUTO-MCNC: ABNORMAL MG/DL
LEUKOCYTE ESTERASE UR QL STRIP.AUTO: ABNORMAL
LYMPHOCYTES # BLD: 2.8 K/UL (ref 1–5.1)
LYMPHOCYTES NFR BLD: 22 %
MCH RBC QN AUTO: 24.3 PG (ref 26–34)
MCHC RBC AUTO-ENTMCNC: 30.8 G/DL (ref 31–36)
MCV RBC AUTO: 78.8 FL (ref 80–100)
MONOCYTES # BLD: 0.7 K/UL (ref 0–1.3)
MONOCYTES NFR BLD: 5.4 %
MUCOUS THREADS #/AREA URNS LPF: ABNORMAL /LPF
NEUTROPHILS # BLD: 9 K/UL (ref 1.7–7.7)
NEUTROPHILS NFR BLD: 70.8 %
NITRITE UR QL STRIP.AUTO: NEGATIVE
PH UR STRIP.AUTO: 5.5 [PH] (ref 5–8)
PLATELET # BLD AUTO: 339 K/UL (ref 135–450)
PMV BLD AUTO: 9.8 FL (ref 5–10.5)
POTASSIUM SERPL-SCNC: 4.4 MMOL/L (ref 3.5–5.1)
PROT UR STRIP.AUTO-MCNC: 30 MG/DL
PROTHROMBIN TIME: 13.9 SEC (ref 11.9–14.9)
RBC # BLD AUTO: 4.61 M/UL (ref 4–5.2)
RBC #/AREA URNS HPF: ABNORMAL /HPF (ref 0–4)
RENAL EPI CELLS #/AREA UR COMP ASSIST: ABNORMAL /HPF (ref 0–1)
SODIUM SERPL-SCNC: 139 MMOL/L (ref 136–145)
SP GR UR STRIP.AUTO: 1.02 (ref 1–1.03)
UA DIPSTICK W REFLEX MICRO PNL UR: YES
URN SPEC COLLECT METH UR: ABNORMAL
UROBILINOGEN UR STRIP-ACNC: 1 E.U./DL
WBC # BLD AUTO: 12.7 K/UL (ref 4–11)
WBC #/AREA URNS HPF: ABNORMAL /HPF (ref 0–5)
YEAST URNS QL MICRO: PRESENT /HPF

## 2024-11-08 NOTE — TELEPHONE ENCOUNTER
General Question     Subject: SON WILL NEED A NOTE FOR WORK. REQUESTING A CALL ABOUT PAPERWORK.  Patient and /or Facility Request: Fernanda Diego   Contact Number: [phone]

## 2024-11-18 PROBLEM — M75.52 BURSITIS OF SHOULDER, LEFT: Status: ACTIVE | Noted: 2024-10-14

## 2024-11-18 PROBLEM — M75.22 BICIPITAL TENDINITIS OF LEFT SHOULDER: Status: ACTIVE | Noted: 2024-10-14

## 2024-11-19 ENCOUNTER — PROCEDURE VISIT (OUTPATIENT)
Dept: AUDIOLOGY | Age: 64
End: 2024-11-19
Payer: MEDICARE

## 2024-11-19 DIAGNOSIS — H90.3 SENSORINEURAL HEARING LOSS (SNHL) OF BOTH EARS: Primary | ICD-10-CM

## 2024-11-19 DIAGNOSIS — H90.3 ASYMMETRIC SNHL (SENSORINEURAL HEARING LOSS): ICD-10-CM

## 2024-11-19 PROCEDURE — 92567 TYMPANOMETRY: CPT

## 2024-11-19 PROCEDURE — 92557 COMPREHENSIVE HEARING TEST: CPT

## 2024-11-19 NOTE — PROGRESS NOTES
Fernanda Diego   1960, 64 y.o. female   [id number]       Referring Provider: Sami Smith MD  Referral Type: In an order in Epic    Reason for Visit: Evaluation of suspected change in hearing, tinnitus, or balance.    ADULT AUDIOLOGIC EVALUATION      Fernanda Diego is a 64 y.o. female seen today, 11/19/2024 , for a recheck audiologic evaluation.  Patient was seen by Sami Smith MD following today's evaluation.    AUDIOLOGIC AND OTHER PERTINENT MEDICAL HISTORY:      Fernanda Diego reports that her hearing is probably worse than it was on her last hearing test, especially in her right ear. She stated that she is often having to ask people to repeat themselves.     Case history and results from previous audiogram on 8/7/2023  Fernanda Diego reports having sinus issues since last visit with Dr. Smith on 7/31. She notes head and facial congestion as well as intermittent ear fullness bilaterally and right intermittent tinnitus only. She notes occupational noise exposure working at [employer], but always wore ear plugs.      She denied otalgia, otorrhea, tinnitus, history of head trauma, history of ear surgery, and family history of hearing loss        Date: 11/19/2024     IMPRESSIONS:      Today's results revealed a Normal sloping to Moderately-Severe Sensorineural hearing loss in the right ear and a Normal sloping to Moderately-Severe rising to Moderate Sensorineural hearing loss in the left ear. Fair/Poor speech understanding when in quiet. Tympanometry indicates normal middle ear function. Discussed test results and implications with patient. Discussed possible benefits of amplification.     Follow medical recommendations of Sami Smith MD.     ASSESSMENT AND FINDINGS:     Otoscopy unremarkable.    RIGHT EAR:  Hearing Sensitivity: Normal sloping to Moderately-Severe Sensorineural hearing loss  Speech Recognition Threshold: 20 dB HL - not in agreement with PTA. A 2-frequency PTA is in agreement (27 dB HL)  Word

## 2024-11-21 ENCOUNTER — TELEPHONE (OUTPATIENT)
Dept: ORTHOPEDIC SURGERY | Age: 64
End: 2024-11-21

## 2024-11-27 ENCOUNTER — HOSPITAL ENCOUNTER (OUTPATIENT)
Age: 64
Setting detail: OUTPATIENT SURGERY
Discharge: HOME | End: 2024-11-27
Attending: ORTHOPAEDIC SURGERY | Admitting: ORTHOPAEDIC SURGERY
Payer: MEDICARE

## 2024-11-27 ENCOUNTER — ANESTHESIA (OUTPATIENT)
Dept: OPERATING ROOM | Age: 64
End: 2024-11-27
Payer: MEDICARE

## 2024-11-27 ENCOUNTER — ANESTHESIA EVENT (OUTPATIENT)
Dept: OPERATING ROOM | Age: 64
End: 2024-11-27
Payer: MEDICARE

## 2024-11-27 VITALS
DIASTOLIC BLOOD PRESSURE: 82 MMHG | SYSTOLIC BLOOD PRESSURE: 141 MMHG | WEIGHT: 188.4 LBS | OXYGEN SATURATION: 93 % | HEART RATE: 77 BPM | RESPIRATION RATE: 18 BRPM | BODY MASS INDEX: 33.38 KG/M2 | HEIGHT: 63 IN | TEMPERATURE: 97.7 F

## 2024-11-27 DIAGNOSIS — M75.22 BICIPITAL TENDINITIS OF LEFT SHOULDER: ICD-10-CM

## 2024-11-27 DIAGNOSIS — S46.012A TRAUMATIC COMPLETE TEAR OF LEFT ROTATOR CUFF, INITIAL ENCOUNTER: Primary | ICD-10-CM

## 2024-11-27 LAB
GLUCOSE BLD-MCNC: 127 MG/DL (ref 70–99)
GLUCOSE BLD-MCNC: 168 MG/DL (ref 70–99)
PERFORMED ON: ABNORMAL
PERFORMED ON: ABNORMAL

## 2024-11-27 PROCEDURE — 6360000002 HC RX W HCPCS

## 2024-11-27 PROCEDURE — 3600000004 HC SURGERY LEVEL 4 BASE: Performed by: ORTHOPAEDIC SURGERY

## 2024-11-27 PROCEDURE — 2500000003 HC RX 250 WO HCPCS

## 2024-11-27 PROCEDURE — 3600000014 HC SURGERY LEVEL 4 ADDTL 15MIN: Performed by: ORTHOPAEDIC SURGERY

## 2024-11-27 PROCEDURE — 64415 NJX AA&/STRD BRCH PLXS IMG: CPT | Performed by: ANESTHESIOLOGY

## 2024-11-27 PROCEDURE — 2709999900 HC NON-CHARGEABLE SUPPLY: Performed by: ORTHOPAEDIC SURGERY

## 2024-11-27 PROCEDURE — 7100000011 HC PHASE II RECOVERY - ADDTL 15 MIN: Performed by: ORTHOPAEDIC SURGERY

## 2024-11-27 PROCEDURE — 6360000002 HC RX W HCPCS: Performed by: PHYSICIAN ASSISTANT

## 2024-11-27 PROCEDURE — 2500000003 HC RX 250 WO HCPCS: Performed by: ORTHOPAEDIC SURGERY

## 2024-11-27 PROCEDURE — 3700000000 HC ANESTHESIA ATTENDED CARE: Performed by: ORTHOPAEDIC SURGERY

## 2024-11-27 PROCEDURE — 2720000010 HC SURG SUPPLY STERILE: Performed by: ORTHOPAEDIC SURGERY

## 2024-11-27 PROCEDURE — 7100000001 HC PACU RECOVERY - ADDTL 15 MIN: Performed by: ORTHOPAEDIC SURGERY

## 2024-11-27 PROCEDURE — 2580000003 HC RX 258: Performed by: ANESTHESIOLOGY

## 2024-11-27 PROCEDURE — 6360000002 HC RX W HCPCS: Performed by: ANESTHESIOLOGY

## 2024-11-27 PROCEDURE — 6360000002 HC RX W HCPCS: Performed by: ORTHOPAEDIC SURGERY

## 2024-11-27 PROCEDURE — 7100000010 HC PHASE II RECOVERY - FIRST 15 MIN: Performed by: ORTHOPAEDIC SURGERY

## 2024-11-27 PROCEDURE — 2500000003 HC RX 250 WO HCPCS: Performed by: PHYSICIAN ASSISTANT

## 2024-11-27 PROCEDURE — C9290 INJ, BUPIVACAINE LIPOSOME: HCPCS | Performed by: ANESTHESIOLOGY

## 2024-11-27 PROCEDURE — 3700000001 HC ADD 15 MINUTES (ANESTHESIA): Performed by: ORTHOPAEDIC SURGERY

## 2024-11-27 PROCEDURE — C1713 ANCHOR/SCREW BN/BN,TIS/BN: HCPCS | Performed by: ORTHOPAEDIC SURGERY

## 2024-11-27 PROCEDURE — 2580000003 HC RX 258: Performed by: PHYSICIAN ASSISTANT

## 2024-11-27 PROCEDURE — 7100000000 HC PACU RECOVERY - FIRST 15 MIN: Performed by: ORTHOPAEDIC SURGERY

## 2024-11-27 DEVICE — ALPHAVENT SUTURE ANCHOR 4.75MM PEEK, SUTURE ANCHOR WITH 1.4MM AND 1.8MM (NON-SLIDING) XBRAID TT SUTURE TAPE
Type: IMPLANTABLE DEVICE | Site: SHOULDER | Status: FUNCTIONAL
Brand: ALPHAVENT

## 2024-11-27 DEVICE — ICONIX 2 NEEDLES WITH INTELLIBRAID TECHNOLOGY, 2.3MM ANCHOR WITH 2.0MM XBRAID TT
Type: IMPLANTABLE DEVICE | Site: SHOULDER | Status: FUNCTIONAL
Brand: ICONIX

## 2024-11-27 RX ORDER — ONDANSETRON 2 MG/ML
4 INJECTION INTRAMUSCULAR; INTRAVENOUS
Status: DISCONTINUED | OUTPATIENT
Start: 2024-11-27 | End: 2024-11-27 | Stop reason: HOSPADM

## 2024-11-27 RX ORDER — IPRATROPIUM BROMIDE AND ALBUTEROL SULFATE 2.5; .5 MG/3ML; MG/3ML
1 SOLUTION RESPIRATORY (INHALATION)
Status: DISCONTINUED | OUTPATIENT
Start: 2024-11-27 | End: 2024-11-27 | Stop reason: HOSPADM

## 2024-11-27 RX ORDER — BUPIVACAINE HYDROCHLORIDE 5 MG/ML
INJECTION, SOLUTION EPIDURAL; INTRACAUDAL
Status: COMPLETED | OUTPATIENT
Start: 2024-11-27 | End: 2024-11-27

## 2024-11-27 RX ORDER — SODIUM CHLORIDE 0.9 % (FLUSH) 0.9 %
5-40 SYRINGE (ML) INJECTION PRN
Status: DISCONTINUED | OUTPATIENT
Start: 2024-11-27 | End: 2024-11-27 | Stop reason: HOSPADM

## 2024-11-27 RX ORDER — SODIUM CHLORIDE 0.9 % (FLUSH) 0.9 %
5-40 SYRINGE (ML) INJECTION EVERY 12 HOURS SCHEDULED
Status: DISCONTINUED | OUTPATIENT
Start: 2024-11-27 | End: 2024-11-27 | Stop reason: HOSPADM

## 2024-11-27 RX ORDER — LABETALOL HYDROCHLORIDE 5 MG/ML
10 INJECTION, SOLUTION INTRAVENOUS
Status: DISCONTINUED | OUTPATIENT
Start: 2024-11-27 | End: 2024-11-27 | Stop reason: HOSPADM

## 2024-11-27 RX ORDER — SODIUM CHLORIDE 9 MG/ML
INJECTION, SOLUTION INTRAVENOUS PRN
Status: DISCONTINUED | OUTPATIENT
Start: 2024-11-27 | End: 2024-11-27 | Stop reason: HOSPADM

## 2024-11-27 RX ORDER — CYCLOBENZAPRINE HCL 10 MG
10 TABLET ORAL 3 TIMES DAILY PRN
Qty: 30 TABLET | Refills: 0 | Status: SHIPPED | OUTPATIENT
Start: 2024-11-27 | End: 2024-12-07

## 2024-11-27 RX ORDER — HYDROMORPHONE HYDROCHLORIDE 1 MG/ML
0.5 INJECTION, SOLUTION INTRAMUSCULAR; INTRAVENOUS; SUBCUTANEOUS EVERY 5 MIN PRN
Status: DISCONTINUED | OUTPATIENT
Start: 2024-11-27 | End: 2024-11-27 | Stop reason: HOSPADM

## 2024-11-27 RX ORDER — MIDAZOLAM HYDROCHLORIDE 1 MG/ML
INJECTION, SOLUTION INTRAMUSCULAR; INTRAVENOUS
Status: COMPLETED | OUTPATIENT
Start: 2024-11-27 | End: 2024-11-27

## 2024-11-27 RX ORDER — FENTANYL CITRATE 50 UG/ML
INJECTION, SOLUTION INTRAMUSCULAR; INTRAVENOUS
Status: COMPLETED | OUTPATIENT
Start: 2024-11-27 | End: 2024-11-27

## 2024-11-27 RX ORDER — ACETAMINOPHEN 325 MG/1
650 TABLET ORAL
Status: DISCONTINUED | OUTPATIENT
Start: 2024-11-27 | End: 2024-11-27 | Stop reason: HOSPADM

## 2024-11-27 RX ORDER — ROCURONIUM BROMIDE 10 MG/ML
INJECTION, SOLUTION INTRAVENOUS
Status: DISCONTINUED | OUTPATIENT
Start: 2024-11-27 | End: 2024-11-27 | Stop reason: SDUPTHER

## 2024-11-27 RX ORDER — PROCHLORPERAZINE EDISYLATE 5 MG/ML
5 INJECTION INTRAMUSCULAR; INTRAVENOUS
Status: DISCONTINUED | OUTPATIENT
Start: 2024-11-27 | End: 2024-11-27 | Stop reason: HOSPADM

## 2024-11-27 RX ORDER — DEXAMETHASONE SODIUM PHOSPHATE 4 MG/ML
INJECTION, SOLUTION INTRA-ARTICULAR; INTRALESIONAL; INTRAMUSCULAR; INTRAVENOUS; SOFT TISSUE
Status: DISCONTINUED | OUTPATIENT
Start: 2024-11-27 | End: 2024-11-27 | Stop reason: SDUPTHER

## 2024-11-27 RX ORDER — ONDANSETRON 4 MG/1
4 TABLET, FILM COATED ORAL 3 TIMES DAILY PRN
Qty: 15 TABLET | Refills: 0 | Status: SHIPPED | OUTPATIENT
Start: 2024-11-27

## 2024-11-27 RX ORDER — SODIUM CHLORIDE, SODIUM LACTATE, POTASSIUM CHLORIDE, CALCIUM CHLORIDE 600; 310; 30; 20 MG/100ML; MG/100ML; MG/100ML; MG/100ML
INJECTION, SOLUTION INTRAVENOUS CONTINUOUS
Status: DISCONTINUED | OUTPATIENT
Start: 2024-11-27 | End: 2024-11-27 | Stop reason: HOSPADM

## 2024-11-27 RX ORDER — MIDAZOLAM HYDROCHLORIDE 1 MG/ML
INJECTION, SOLUTION INTRAMUSCULAR; INTRAVENOUS
Status: COMPLETED
Start: 2024-11-27 | End: 2024-11-27

## 2024-11-27 RX ORDER — CEPHALEXIN 500 MG/1
500 CAPSULE ORAL 4 TIMES DAILY
Qty: 40 CAPSULE | Refills: 0 | Status: SHIPPED | OUTPATIENT
Start: 2024-11-27

## 2024-11-27 RX ORDER — ONDANSETRON 2 MG/ML
INJECTION INTRAMUSCULAR; INTRAVENOUS
Status: DISCONTINUED | OUTPATIENT
Start: 2024-11-27 | End: 2024-11-27 | Stop reason: SDUPTHER

## 2024-11-27 RX ORDER — FENTANYL CITRATE 50 UG/ML
25 INJECTION, SOLUTION INTRAMUSCULAR; INTRAVENOUS EVERY 5 MIN PRN
Status: DISCONTINUED | OUTPATIENT
Start: 2024-11-27 | End: 2024-11-27 | Stop reason: HOSPADM

## 2024-11-27 RX ORDER — OXYCODONE HYDROCHLORIDE 5 MG/1
5 TABLET ORAL EVERY 6 HOURS PRN
Qty: 28 TABLET | Refills: 0 | Status: SHIPPED | OUTPATIENT
Start: 2024-11-27 | End: 2024-12-04

## 2024-11-27 RX ORDER — FENTANYL CITRATE 50 UG/ML
INJECTION, SOLUTION INTRAMUSCULAR; INTRAVENOUS
Status: COMPLETED
Start: 2024-11-27 | End: 2024-11-27

## 2024-11-27 RX ORDER — LABETALOL HYDROCHLORIDE 5 MG/ML
INJECTION, SOLUTION INTRAVENOUS
Status: DISCONTINUED | OUTPATIENT
Start: 2024-11-27 | End: 2024-11-27 | Stop reason: SDUPTHER

## 2024-11-27 RX ORDER — LIDOCAINE HYDROCHLORIDE 20 MG/ML
INJECTION, SOLUTION INTRAVENOUS
Status: DISCONTINUED | OUTPATIENT
Start: 2024-11-27 | End: 2024-11-27 | Stop reason: SDUPTHER

## 2024-11-27 RX ORDER — ASPIRIN 81 MG/1
81 TABLET, CHEWABLE ORAL 2 TIMES DAILY
Qty: 60 TABLET | Refills: 0 | Status: SHIPPED | OUTPATIENT
Start: 2024-11-28

## 2024-11-27 RX ORDER — SUCCINYLCHOLINE/SOD CL,ISO/PF 200MG/10ML
SYRINGE (ML) INTRAVENOUS
Status: DISCONTINUED | OUTPATIENT
Start: 2024-11-27 | End: 2024-11-27 | Stop reason: SDUPTHER

## 2024-11-27 RX ORDER — NALOXONE HYDROCHLORIDE 0.4 MG/ML
INJECTION, SOLUTION INTRAMUSCULAR; INTRAVENOUS; SUBCUTANEOUS PRN
Status: DISCONTINUED | OUTPATIENT
Start: 2024-11-27 | End: 2024-11-27 | Stop reason: HOSPADM

## 2024-11-27 RX ORDER — PROPOFOL 10 MG/ML
INJECTION, EMULSION INTRAVENOUS
Status: DISCONTINUED | OUTPATIENT
Start: 2024-11-27 | End: 2024-11-27 | Stop reason: SDUPTHER

## 2024-11-27 RX ORDER — MELOXICAM 15 MG/1
15 TABLET ORAL DAILY
Qty: 90 TABLET | Refills: 0 | Status: SHIPPED | OUTPATIENT
Start: 2024-11-27

## 2024-11-27 RX ADMIN — MIDAZOLAM HYDROCHLORIDE 2 MG: 2 INJECTION, SOLUTION INTRAMUSCULAR; INTRAVENOUS at 10:55

## 2024-11-27 RX ADMIN — DEXAMETHASONE SODIUM PHOSPHATE 8 MG: 4 INJECTION INTRA-ARTICULAR; INTRALESIONAL; INTRAMUSCULAR; INTRAVENOUS; SOFT TISSUE at 11:55

## 2024-11-27 RX ADMIN — Medication 120 MG: at 11:46

## 2024-11-27 RX ADMIN — LABETALOL HYDROCHLORIDE 5 MG: 5 INJECTION, SOLUTION INTRAVENOUS at 13:21

## 2024-11-27 RX ADMIN — WATER 2000 MG: 1 INJECTION INTRAMUSCULAR; INTRAVENOUS; SUBCUTANEOUS at 11:53

## 2024-11-27 RX ADMIN — PROPOFOL 50 MG: 10 INJECTION, EMULSION INTRAVENOUS at 12:12

## 2024-11-27 RX ADMIN — PROPOFOL 40 MG: 10 INJECTION, EMULSION INTRAVENOUS at 13:09

## 2024-11-27 RX ADMIN — SODIUM CHLORIDE, POTASSIUM CHLORIDE, SODIUM LACTATE AND CALCIUM CHLORIDE: 600; 310; 30; 20 INJECTION, SOLUTION INTRAVENOUS at 10:32

## 2024-11-27 RX ADMIN — BUPIVACAINE HYDROCHLORIDE 10 ML: 5 INJECTION, SOLUTION EPIDURAL; INTRACAUDAL; PERINEURAL at 10:55

## 2024-11-27 RX ADMIN — SUGAMMADEX 200 MG: 100 INJECTION, SOLUTION INTRAVENOUS at 13:21

## 2024-11-27 RX ADMIN — BUPIVACAINE 10 ML: 13.3 INJECTION, SUSPENSION, LIPOSOMAL INFILTRATION at 10:55

## 2024-11-27 RX ADMIN — ROCURONIUM BROMIDE 20 MG: 10 INJECTION, SOLUTION INTRAVENOUS at 12:16

## 2024-11-27 RX ADMIN — TRANEXAMIC ACID 1000 MG: 100 INJECTION, SOLUTION INTRAVENOUS at 11:53

## 2024-11-27 RX ADMIN — FENTANYL CITRATE 50 MCG: 50 INJECTION, SOLUTION INTRAMUSCULAR; INTRAVENOUS at 12:52

## 2024-11-27 RX ADMIN — LABETALOL HYDROCHLORIDE 5 MG: 5 INJECTION, SOLUTION INTRAVENOUS at 13:24

## 2024-11-27 RX ADMIN — ONDANSETRON 4 MG: 2 INJECTION INTRAMUSCULAR; INTRAVENOUS at 11:55

## 2024-11-27 RX ADMIN — TRANEXAMIC ACID 1000 MG: 100 INJECTION, SOLUTION INTRAVENOUS at 12:56

## 2024-11-27 RX ADMIN — LIDOCAINE HYDROCHLORIDE 100 MG: 20 INJECTION, SOLUTION INTRAVENOUS at 11:46

## 2024-11-27 RX ADMIN — ROCURONIUM BROMIDE 50 MG: 10 INJECTION, SOLUTION INTRAVENOUS at 11:55

## 2024-11-27 RX ADMIN — SODIUM CHLORIDE, POTASSIUM CHLORIDE, SODIUM LACTATE AND CALCIUM CHLORIDE: 600; 310; 30; 20 INJECTION, SOLUTION INTRAVENOUS at 13:24

## 2024-11-27 RX ADMIN — PROPOFOL 40 MG: 10 INJECTION, EMULSION INTRAVENOUS at 11:48

## 2024-11-27 RX ADMIN — LABETALOL HYDROCHLORIDE 5 MG: 5 INJECTION, SOLUTION INTRAVENOUS at 12:16

## 2024-11-27 RX ADMIN — ROCURONIUM BROMIDE 20 MG: 10 INJECTION, SOLUTION INTRAVENOUS at 12:52

## 2024-11-27 RX ADMIN — FENTANYL CITRATE 50 MCG: 50 INJECTION, SOLUTION INTRAMUSCULAR; INTRAVENOUS at 12:12

## 2024-11-27 RX ADMIN — PHENYLEPHRINE HYDROCHLORIDE 100 MCG: 10 INJECTION, SOLUTION INTRAVENOUS at 12:01

## 2024-11-27 RX ADMIN — FENTANYL CITRATE 100 MCG: 50 INJECTION, SOLUTION INTRAMUSCULAR; INTRAVENOUS at 10:55

## 2024-11-27 RX ADMIN — PROPOFOL 160 MG: 10 INJECTION, EMULSION INTRAVENOUS at 11:46

## 2024-11-27 ASSESSMENT — PAIN SCALES - GENERAL: PAINLEVEL_OUTOF10: 0

## 2024-11-27 ASSESSMENT — PAIN - FUNCTIONAL ASSESSMENT
PAIN_FUNCTIONAL_ASSESSMENT: 0-10
PAIN_FUNCTIONAL_ASSESSMENT: 0-10

## 2024-11-27 NOTE — ANESTHESIA PRE PROCEDURE
Department of Anesthesiology  Preprocedure Note       Name:  Fernanda Diego   Age:  64 y.o.  :  1960                                          MRN:  [id number]         Date:  2024      Surgeon: Surgeon(s):  Srini Torres MD    Procedure: Procedure(s):  Arthroscopy Left Shoulder Rotator Cuff Repair, Debridement, Subacromial Decompression and open subpectoral biceps tenodesis    Medications prior to admission:   Prior to Admission medications    Medication Sig Start Date End Date Taking? Authorizing Provider   aspirin (ASPIRIN CHILDRENS) 81 MG chewable tablet Take 1 tablet by mouth in the morning and at bedtime 24  Yes Nickolas Soto PA-C   cephALEXin (KEFLEX) 500 MG capsule Take 1 capsule by mouth 4 times daily 24  Yes Nickolas Soto PA-C   cyclobenzaprine (FLEXERIL) 10 MG tablet Take 1 tablet by mouth 3 times daily as needed for Muscle spasms 24 Yes Nickolas Soto PA-C   meloxicam (MOBIC) 15 MG tablet Take 1 tablet by mouth daily 24  Yes Nickolas Soto PA-C   ondansetron (ZOFRAN) 4 MG tablet Take 1 tablet by mouth 3 times daily as needed for Nausea or Vomiting 24  Yes Nickolas Soto PA-C   oxyCODONE (ROXICODONE) 5 MG immediate release tablet Take 1 tablet by mouth every 6 hours as needed for Pain for up to 7 days. Intended supply: 7 days. Take lowest dose possible to manage pain Max Daily Amount: 20 mg 24 Yes Nickolas Soto PA-C   Accu-Chek Softclix Lancets MISC USE TO TEST 1 TO 3 TIMES PER DAY FOR SYMPTOMS OF IRREGULAR GLUCOSE 24  Yes Isabel Rico DO   montelukast (SINGULAIR) 10 MG tablet TAKE ONE TABLET BY MOUTH ONCE NIGHTLY 24  Yes Isabel Rico DO   atorvastatin (LIPITOR) 10 MG tablet TAKE 1 TABLET BY MOUTH DAILY 24  Yes Isabel Rico DO   amLODIPine (NORVASC) 5 MG tablet TAKE 1 TABLET BY MOUTH DAILY 7/10/24  Yes Isabel Rico DO   losartan (COZAAR) 100 MG tablet TAKE 1 TABLET

## 2024-11-27 NOTE — ANESTHESIA POSTPROCEDURE EVALUATION
Department of Anesthesiology  Postprocedure Note    Patient: Fernanda Diego  MRN: [id number]  YOB: 1960  Date of evaluation: 11/27/2024    Procedure Summary       Date: 11/27/24 Room / Location: [place] [address] / [facility]    Anesthesia Start: 1139 Anesthesia Stop: 1337    Procedure: Arthroscopy Left Shoulder Rotator Cuff Repair, Debridement, Subacromial Decompression and open subpectoral biceps tenodesis (Left: Shoulder) Diagnosis:       Complete rotator cuff tear of left shoulder      Bursitis of shoulder, left      Shoulder impingement, left      Bicipital tendinitis of left shoulder      Primary osteoarthritis of left shoulder      (Complete rotator cuff tear of left shoulder [M75.122])      (Bursitis of shoulder, left [M75.52])      (Shoulder impingement, left [M25.812])      (Bicipital tendinitis of left shoulder [M75.22])      (Primary osteoarthritis of left shoulder [M19.012])    Surgeons: Srini Torres MD Responsible Provider: Demond Thapa MD    Anesthesia Type: General, Regional ASA Status: 3            Anesthesia Type: General, Regional    Sabrina Phase I: Sabrina Score: 8    Sabrina Phase II: Sabrina Score: 10    Anesthesia Post Evaluation    Patient location during evaluation: PACU  Patient participation: complete - patient participated  Level of consciousness: awake  Airway patency: patent  Nausea & Vomiting: no nausea and no vomiting  Cardiovascular status: blood pressure returned to baseline and hemodynamically stable  Respiratory status: acceptable  Hydration status: euvolemic  Multimodal analgesia pain management approach  Pain management: adequate    There were no known notable events for this encounter.

## 2024-11-27 NOTE — OP NOTE
tendinous portion of the rotator cuff using Cobra self-retrieving passer. Careful placement of these sutures helped ensure good stress distribution between sutures. I also accounted for the appropriate reduction of cuff tissue back onto its native footprint. Double-row repair was performed. Suture limbs were tied down for apposition of tendon to bone.  1 Double-row anchors were placed transosseous-equivalent fashion laterally for adequate compression of the tendinous footprint.  Visualization through the lateral portal confirmed our restoration of the rotator cuff and thorough bursectomy.      Closure:  I then removed the arthroscope.  Appropriate irrigation was then performed for the biceps tendon incision.  2-0 interrupted Vicryls were used to close the subcutaneous region.  A running 4-0 Monocryl approximate the skin followed by Dermabond and Steri-Strip dressing.  The portal incisions were then closed with 3-0 Monocryl.  Steri-Strips were then applied to close all the portals.     A shoulder abduction pillow was then applied to the patient. Patient was then awakened from general anesthetic transported back to the postoperative care unit that the complications.  All instrument counts correct x2.  I was present for the entirety the case     Plan:  Nonweightbearing left upper extremity for 4 weeks  Aspirin 81 twice daily  Encouraged axillary care, active range of motion of the elbow wrist and fingers.  PT per protocol      Electronically signed by Srini Torres MD on 11/27/2024 at 1:00 PM

## 2024-11-27 NOTE — PROGRESS NOTES
Ambulatory Surgery/Procedure Discharge Note    Vitals:    11/27/24 1545   BP:    Pulse:    Resp:    Temp:    SpO2: 93%   After I.S. the patient maintained 93-96 SPO2 for 30 minutes. She stated she was ready for discharge and was a lot more awake.     In: 1675 [P.O.:480; I.V.:1075]  Out: 50     Restroom use offered before discharge.  Yes    Pain assessment:  none  Pain Level: 0    Patient discharged to home/self care. Patient discharged via wheel chair by transporter to waiting family/S.O.       11/27/2024 4:22 PMPt and S.O./family states \"ready to go home\". Pt alert and oriented x4. IV removed. Denies N/V or pain. Dressing to shoulder was clean, dry, and intact. Immobilizer sling in place. Pt tolerating po intake. Discharge instructions given to pt and son with pt permission. Pt and son verbalized understanding of all instructions. Left with all belongings, filled prescriptions, and discharge instructions. Ice and bandage change with given to the patient.   
PACU Transfer to [facility]    Vitals:    11/27/24 1445   BP: 128/79   Pulse: 70   Resp: 13   Temp: 97 °F (36.1 °C)   SpO2: 96%         Intake/Output Summary (Last 24 hours) at 11/27/2024 1452  Last data filed at 11/27/2024 1445  Gross per 24 hour   Intake 1555 ml   Output 50 ml   Net 1505 ml       Pain assessment:  none  Pain Level: 0    Patient transferred to care of [facility] RN.    11/27/2024 2:52 PM     
Patient admitted to PACU #5 from OR per stretcher at 1335 s/p Arthroscopy Left Shoulder Rotator Cuff Repair, Debridement, Subacromial Decompression and open subpectoral biceps tenodesis - Left. Report received at bedside in PACU per CRNA and OR nurse. Patient was reported to be hypertensive during surgery which she was treated for, see anesthesia record along with no complications reported. Patient received a pre-op interscalene nerve block with exparel with wrist band on. Patient connected to PACU monitoring equipment. IVF's infusing with site unremarkable. Patient arrived to PACU responsive but not fully wakeful from anesthesia with no difficulties or pain noted. Left shoulder surgical dressing with DSD, ABD Pad and Medipore Tape which remains C,D,I without any drainage noted. Ice pack applied. No further changes. Will continue to monitor.    
Procedure: peripheral block  MD: Dr. Thapa  Timeout performed.1055  Pt monitored closely on heart monitor, 2L NC, continuous pulse oximetry, EtCO2, and frequent BPs.   Pt remained alert and oriented x4. pt tolerated procedure well.  
SPO2 drops to 88-89 while sleeping. Patient dozing off while talking. Incentive spirometer education given. Patient using it at this time.   
contacts, dentures, or hearing aids to protect them while you are in surgery.      9. If you use a CPAP, please bring it with you on the day of your procedure.    10. If you use oxygen at home, please bring your oxygen tank with you to hospital..     11. We recommend that valuable personal belongings such as cash, cell phones, e-tablets, or jewelry, be left at home during your stay. The hospital will not be responsible for valuables that are not secured in the hospital safe. However, if your insurance requires a co-pay, you may want to bring a method of payment, i.e., Check or credit card, if you wish to pay your co-pay the day of surgery.      12. If you are to stay overnight, you may bring a bag with personal items. Please have any large items you may need brought in by your family after your arrival to your hospital room.    13. If you have a Living Will or Durable Power of , please bring a copy on the day of your procedure.     How we keep you safe and work to prevent surgical site infections:   1. Health care workers should always check your ID bracelet to verify your name and birth date. You will be asked many times to state your name, date of birth, and allergies.    2. Health care workers should always clean their hands with soap or alcohol gel before providing care to you. It is okay to ask anyone if they cleaned their hands before they touch you.    3. You will be actively involved in verifying the type of procedure you are having and ensuring the correct surgical site. This will be confirmed multiple times prior to your procedure. Do NOT queenie your surgery site UNLESS instructed to by your surgeon.     4. When you are in the operating room, your surgical site will be cleansed with a special soap, and in most cases, you will be given an antibiotic before the surgery begins.      What to expect AFTER your procedure?  1. Immediately following your procedure, your will be taken to the PACU for the

## 2024-11-27 NOTE — H&P
Update History & Physical     The patient's History and Physical of 11/1/2024 was reviewed with the patient, and I examined the patient. There were no changes - see below for exam of affected area.  Today's exam of affected area, in reference to the planned operation today, is unchanged from surgeon's office note on 10/1/2024 (see note)    Both the patient and I confirmed the surgical site.     Plan: The risks, benefits, expected outcome, and alternative to the recommended procedure have been discussed with the patient / family. Patient understands and wants to proceed with the procedure.      Electronically signed by Srini Torres MD on 11/27/2024 at 10:27 AM

## 2024-11-27 NOTE — DISCHARGE INSTRUCTIONS
Shoulder  Discharge Instructions    Surgical Site Care:  Keep incision clean and dry.  May shower on post-op day #3 with waterproof dressing on.  Change to new waterproof dressing between 5-7 days.   Physical Therapy:  No weight bearing on surgical arm  Precautions  Per Physical Therapy handout  Patient should remove sling several times a day and work on hand, wrist, and elbow range of motion exercises.  No range of motion of the shoulder  Sling to be applied at all times except for hygiene  Pain Medications  You were given oxycodone (Oxycontin, Oxyir)  Wean off pain medications as you deem appropriate as long as pain is under control  Contact the Office if you notice any reactions to the medication or need a refill  Be sure to drink plenty of fluids (recommend water) while taking narcotic pain medications to prevent constipation  You may take an over the counter laxative or stool softener as needed to prevent/treat constipation as well, we recommend Senokot S OTC.    Cold packs/Ice packs/Machine  May be used as much as necessary to control swelling/inflammation/soreness  Be sure to have a barrier (cloth, clothing, towel) between the site and the ice pack to prevent frostbite  Contact the office if  Increased redness, swelling, drainage of any kind, and/or pain to surgery site.  As well as new onset fevers and or chills.  These could signify an infection.  Arm tenderness to touch as well as increased swelling or redness.  This could signify a clot formation.  Numbness or tingling to an area around the incision site or below the incision site (fingers).  Any rash appears, increased  or new onset nausea/vomiting occur.  This may indicate a reaction to a medication.  Phone # [phone]  Follow up with Dr. Torres or Nickolas Soto PA-C at scheduled appointment time.   Please continue to use your Incentive Spirometer at home every hour while awake              PERIPHERAL NERVE BLOCK INSTRUCTIONS     Please remember while

## 2024-11-27 NOTE — ANESTHESIA PROCEDURE NOTES
Peripheral Block    Patient location during procedure: pre-op  Reason for block: procedure for pain, post-op pain management and at surgeon's request  Start time: 11/27/2024 10:55 AM  End time: 11/27/2024 11:00 AM  Staffing  Performed: anesthesiologist   Anesthesiologist: Demond Thapa MD  Performed by: Demond Thapa MD  Authorized by: Demond Thapa MD    Preanesthetic Checklist  Completed: patient identified, IV checked, site marked, risks and benefits discussed, surgical/procedural consents, equipment checked, pre-op evaluation, timeout performed, anesthesia consent given, oxygen available, monitors applied/VS acknowledged, fire risk safety assessment completed and verbalized and blood product R/B/A discussed and consented  Peripheral Block   Patient position: supine  Prep: ChloraPrep  Provider prep: mask and sterile gloves  Patient monitoring: cardiac monitor, continuous pulse ox, continuous capnometry, frequent blood pressure checks, IV access, oxygen and responsive to questions  Block type: Brachial plexus  Interscalene  Laterality: left  Injection technique: single-shot  Guidance: ultrasound guided    Needle   Needle type: insulated echogenic nerve stimulator needle   Needle gauge: 22 G  Needle localization: ultrasound guidance  Needle length: 8 cm  Assessment   Injection assessment: negative aspiration for heme, no paresthesia on injection, local visualized surrounding nerve on ultrasound and no intravascular symptoms  Paresthesia pain: none  Slow fractionated injection: yes  Hemodynamics: stable  Outcomes: uncomplicated and patient tolerated procedure well    Additional Notes  10 ml 0.5% bupivacaine + 10 ml exparel, mixed and injected in 2 ml increments with negative aspiration between. No complications.  Medications Administered  fentaNYL (SUBLIMAZE) injection - IntraVENous   100 mcg - 11/27/2024 10:55:00 AM  midazolam (VERSED) injection 2 mg/2mL - IntraVENous   2 mg - 11/27/2024 10:55:00

## 2024-11-29 ENCOUNTER — TELEPHONE (OUTPATIENT)
Dept: ORTHOPEDIC SURGERY | Age: 64
End: 2024-11-29

## 2024-11-29 NOTE — TELEPHONE ENCOUNTER
General Question     Subject: LT SHOULDER BANDAGES  Patient and /or Facility Request: Fernanda Diego   Contact Number: [phone]     PATIENT CALLING REGARDING WHEN SHE CAN CHANGE HER BANDAGE? PATIENT HAD SURGERY ON 11/27/24    SHE WOULD LIKE TO KNOW HOW MANY DAYS SHE HAS TO KEEP THE BANDAGE ON BEFORE SHE CAN TAKE THE TOP BANDAGE OFF.(THE WHITE BANDAGE)    WHEN THAT BANDAGE ON THE TOP COMES OFF, DOES IT NEEDS TO  BE PUT BACK ON.    PLEASE CALL BACK PATIENT AT THE ABOVE NUMBER.

## 2024-12-03 ENCOUNTER — PROCEDURE VISIT (OUTPATIENT)
Dept: AUDIOLOGY | Age: 64
End: 2024-12-03

## 2024-12-03 DIAGNOSIS — H90.3 SENSORINEURAL HEARING LOSS (SNHL) OF BOTH EARS: Primary | ICD-10-CM

## 2024-12-03 NOTE — PROGRESS NOTES
Fernanda Diego   1960, 64 y.o. female  [id number]       HEARING AID EVALUATION    Fernanda Diego was seen today, 12/3/2024 for a Hearing Aid Evaluation.  Patient has no prior history of hearing aid use.  Otoscopy revealed: Clear ear canals bilaterally.  A pair of demo Phonak Audeo P90 RL hearing aids were programmed to 100% with adjustments for comfort and echo sensation and were used in today's appointment.    INSURANCE:  Her insurance was contacted and she does not have a benefit for hearing aids (please see scanned insurance verification worksheet in Media tab).  Patient noted that she believes she has a benefit for hearing aids, and discussed this may go through a specific group.    LIFESTYLE EVALUATION:       Primary listening situations Fernanda Diego would like to improve:  Conversation in background noise  Hearing conversation over the phone at work  Hearing at plays, concerts    Other pertinent lifestyle needs: She is retired from her work at [employer] and now works at [employer], she is on the phone and in meetings with other people regularly, and especially notices concerned when there is background noise present.  She is quite active, does a lot of outdoor activities, goes to concerts and theater performances, and swims; she is interested in waterproof technology.    She is interested in rechargeable devices.    DEVICE SELECTION:       After a discussion of the various styles, technology levels, and features of available in hearing aid technology in relation to his lifestyle needs, Fernanda Diego has decided to consider the options and contact Audiology when a decision has been made..      Technology to be considered: (2) Phonak Audeo Infinio; 1M receivers; medium vented domes; color P7.    Patient cost due at time of fitting: $TBD, discussed benefits of advanced technology quoted at $3900 given her lifestyle; she was provided with pricing for all available technology levels..    PATIENT EDUCATION:

## 2024-12-13 ENCOUNTER — OFFICE VISIT (OUTPATIENT)
Dept: ORTHOPEDIC SURGERY | Age: 64
End: 2024-12-13

## 2024-12-13 VITALS — WEIGHT: 188 LBS | HEIGHT: 63 IN | BODY MASS INDEX: 33.31 KG/M2

## 2024-12-13 DIAGNOSIS — Z98.890 S/P SHOULDER SURGERY: Primary | ICD-10-CM

## 2024-12-13 PROCEDURE — 99024 POSTOP FOLLOW-UP VISIT: CPT | Performed by: ORTHOPAEDIC SURGERY

## 2024-12-13 NOTE — PROGRESS NOTES
Dr Srini Torres      Date /Time 12/13/2024       10:39 AM EST  Name Fernanda Diego             1960   [facility]  MRN [id number]                Chief Complaint   Patient presents with    Post-Op Check     1st PO Left Shoulder RCR 11/27/2024          History of Present Illness      Fernanda Diego is a 64 y.o. female is here for post-op visit after LEFT    39337 Arthroscopic Debridement, extensive  67155 Arthroscopic Subacromial decompression  35255 Arthroscopic rotator cuff repair  41177 Open subpectoral biceps tenodesis    Patient doing well.  Pain controlled.  Patient denies any fever, chills, or drainage.    Physical Exam    Based off 1997 Exam Criteria    Ht 1.6 m (5' 2.99\")   Wt 85.3 kg (188 lb)   LMP 01/26/1996   BMI 33.31 kg/m²      Constitutional:       General: He is not in acute distress.     Appearance: Normal appearance.     LEFT Shoulder: incision clean, intact, healing appropriately. No surrounding  erythema or fluctuance. Neuro intact distal. No evidence of DVT.        Imaging           Assessment and Plan    Fernanda was seen today for post-op check.    Diagnoses and all orders for this visit:    S/P shoulder surgery        Patient doing well.  Patient will continue in sling until she is 4 weeks postop.  At 4 weeks postop she can gradually discontinue the sling and start physical therapy.  We will see her back in 3 months or sooner if problems arise.    Electronically signed by Nickolas Soto PA-C on 12/13/2024 at 10:39 AM  This dictation was generated by voice recognition computer software.  Although all attempts are made to edit the dictation for accuracy, there may be errors in the transcription that are not intended.

## 2024-12-19 DIAGNOSIS — B37.9 YEAST INFECTION: Primary | ICD-10-CM

## 2024-12-19 RX ORDER — FLUCONAZOLE 150 MG/1
150 TABLET ORAL ONCE
Qty: 1 TABLET | Refills: 0 | Status: SHIPPED | OUTPATIENT
Start: 2024-12-19 | End: 2024-12-19

## 2024-12-23 DIAGNOSIS — B37.31 VAGINAL CANDIDIASIS: ICD-10-CM

## 2024-12-23 RX ORDER — FLUCONAZOLE 150 MG/1
150 TABLET ORAL ONCE
Qty: 1 TABLET | Refills: 0 | Status: SHIPPED | OUTPATIENT
Start: 2024-12-23 | End: 2024-12-23

## 2024-12-24 ENCOUNTER — TELEPHONE (OUTPATIENT)
Dept: PRIMARY CARE CLINIC | Age: 64
End: 2024-12-24

## 2024-12-24 NOTE — TELEPHONE ENCOUNTER
Patient called to follow up on medication ,said it wasn't at pharmacy when she went to pick it up?

## 2024-12-24 NOTE — TELEPHONE ENCOUNTER
Medication was sent yesterday at 4 pm. Please call patient back and let her know that it may take a while for her to pick it up due to holiday hours.

## 2025-01-02 NOTE — PLAN OF CARE
[facility]- [facility] [address], [address], [place] [address] office: [phone] fax: [phone]     Physical Therapy Initial Evaluation Certification      Dear Nickolas Soto PA-C ,    We had the pleasure of evaluating the following patient for physical therapy services at [facility].  A summary of our findings can be found in the initial assessment below.  This includes our plan of care.  If you have any questions or concerns regarding these findings, please do not hesitate to contact me at the office phone number listed above.  Thank you for the referral.     Physician Signature:_______________________________Date:__________________  By signing above (or electronic signature), therapist’s plan is approved by physician       Physical Therapy: TREATMENT/PROGRESS NOTE   Patient: Fernanda Diego (64 y.o. female)   Examination Date: 2025   :  1960 MRN: [id number]   Visit #: 1   Insurance Allowable Auth Needed   BMN []Yes    [x]No    Insurance: Payor: [facility] MEDICARE / Plan: [facility] MEDICARE COMPLETE / Product Type: *No Product type* /   Insurance ID: [id number] - (Medicare Managed)  Secondary Insurance (if applicable):    Treatment Diagnosis:     ICD-10-CM    1. Left shoulder pain, unspecified chronicity  M25.512       2. Pain and swelling of Left shoulder  M25.512     M25.412       3. Weakness of Left shoulder  R29.898          Medical Diagnosis:  S/P shoulder surgery [Z98.890]   Referring Physician: Nickolas Soto PA-C  PCP: Isabel Rico DO     Plan of care signed (Y/N):     Date of Patient follow up with Physician:      Plan of Care Report: EVAL today  POC update due: (10 visits /OR AUTH LIMITS, whichever is less) 2025                                             Medical History:  Comorbidities:  Diabetes (Type I or II)  Hypertension  Osteoarthritis  Relevant Medical History: Hx of R RCR ~15-20 years ago

## 2025-01-03 ENCOUNTER — HOSPITAL ENCOUNTER (OUTPATIENT)
Dept: PHYSICAL THERAPY | Age: 65
Setting detail: THERAPIES SERIES
Discharge: HOME | End: 2025-01-03
Payer: MEDICARE

## 2025-01-03 DIAGNOSIS — M25.512 LEFT SHOULDER PAIN, UNSPECIFIED CHRONICITY: Primary | ICD-10-CM

## 2025-01-03 DIAGNOSIS — M25.412 PAIN AND SWELLING OF LEFT SHOULDER: ICD-10-CM

## 2025-01-03 DIAGNOSIS — M25.512 PAIN AND SWELLING OF LEFT SHOULDER: ICD-10-CM

## 2025-01-03 DIAGNOSIS — R29.898 WEAKNESS OF LEFT SHOULDER: ICD-10-CM

## 2025-01-03 PROCEDURE — 97110 THERAPEUTIC EXERCISES: CPT

## 2025-01-03 PROCEDURE — 97161 PT EVAL LOW COMPLEX 20 MIN: CPT

## 2025-01-03 PROCEDURE — 97016 VASOPNEUMATIC DEVICE THERAPY: CPT

## 2025-01-03 PROCEDURE — 97140 MANUAL THERAPY 1/> REGIONS: CPT

## 2025-01-08 ENCOUNTER — HOSPITAL ENCOUNTER (OUTPATIENT)
Dept: PHYSICAL THERAPY | Age: 65
Setting detail: THERAPIES SERIES
Discharge: HOME | End: 2025-01-08
Payer: MEDICARE

## 2025-01-08 PROCEDURE — 97016 VASOPNEUMATIC DEVICE THERAPY: CPT

## 2025-01-08 PROCEDURE — 97140 MANUAL THERAPY 1/> REGIONS: CPT

## 2025-01-08 PROCEDURE — 97110 THERAPEUTIC EXERCISES: CPT

## 2025-01-08 NOTE — FLOWSHEET NOTE
[facility]- [facility] Therapy [address], [address], [place] [address] office: [phone] fax: [phone]         Physical Therapy: TREATMENT/PROGRESS NOTE   Patient: Fernanda Diego (64 y.o. female)   Examination Date: 2025   :  1960 MRN: [id number]   Visit #: 2   Insurance Allowable Auth Needed   BMN []Yes    [x]No    Insurance: Payor: [facility] MEDICARE / Plan: [facility] MEDICARE COMPLETE / Product Type: *No Product type* /   Insurance ID: [id number] - (Medicare Managed)  Secondary Insurance (if applicable):    Treatment Diagnosis:     ICD-10-CM    1. Left shoulder pain, unspecified chronicity  M25.512       2. Pain and swelling of Left shoulder  M25.512     M25.412       3. Weakness of Left shoulder  R29.898          Medical Diagnosis:  S/P shoulder surgery [Z98.890]   Referring Physician: Nickolas Soto PA-C  PCP: Isabel Rico DO     Plan of care signed (Y/N):     Date of Patient follow up with Physician:      Plan of Care Report: NO  POC update due: (10 visits /OR AUTH LIMITS, whichever is less) 2025                                             Medical History:  Comorbidities:  Diabetes (Type I or II)  Hypertension  Osteoarthritis  Relevant Medical History: Hx of R RCR ~15-20 years ago                                         Precautions/ Contra-indications:           Latex allergy:  NO  Pacemaker:    NO  Contraindications for Manipulation: recent surgical history (relative)  Date of Surgery: L RCR, SAD and Biceps Tenodesis on 24  Other:    Red Flags:  None    Suicide Screening:   The patient did not verbalize a primary behavioral concern, suicidal ideation, suicidal intent, or demonstrate suicidal behaviors.    Preferred Language for Healthcare:   [x] English       [] other:    SUBJECTIVE EXAMINATION     Patient stated complaint:   Pt states her shoulder is sore. Has been diligent with her HEP.     Eval: Pt presents post op L RCR, SAD and

## 2025-01-10 ENCOUNTER — APPOINTMENT (OUTPATIENT)
Dept: PHYSICAL THERAPY | Age: 65
End: 2025-01-10
Payer: MEDICARE

## 2025-01-12 SDOH — ECONOMIC STABILITY: FOOD INSECURITY: WITHIN THE PAST 12 MONTHS, YOU WORRIED THAT YOUR FOOD WOULD RUN OUT BEFORE YOU GOT MONEY TO BUY MORE.: NEVER TRUE

## 2025-01-12 SDOH — ECONOMIC STABILITY: INCOME INSECURITY: IN THE LAST 12 MONTHS, WAS THERE A TIME WHEN YOU WERE NOT ABLE TO PAY THE MORTGAGE OR RENT ON TIME?: NO

## 2025-01-12 SDOH — ECONOMIC STABILITY: FOOD INSECURITY: WITHIN THE PAST 12 MONTHS, THE FOOD YOU BOUGHT JUST DIDN'T LAST AND YOU DIDN'T HAVE MONEY TO GET MORE.: NEVER TRUE

## 2025-01-12 SDOH — ECONOMIC STABILITY: TRANSPORTATION INSECURITY
IN THE PAST 12 MONTHS, HAS LACK OF TRANSPORTATION KEPT YOU FROM MEETINGS, WORK, OR FROM GETTING THINGS NEEDED FOR DAILY LIVING?: NO

## 2025-01-12 SDOH — ECONOMIC STABILITY: TRANSPORTATION INSECURITY
IN THE PAST 12 MONTHS, HAS THE LACK OF TRANSPORTATION KEPT YOU FROM MEDICAL APPOINTMENTS OR FROM GETTING MEDICATIONS?: NO

## 2025-01-12 ASSESSMENT — PATIENT HEALTH QUESTIONNAIRE - PHQ9
SUM OF ALL RESPONSES TO PHQ QUESTIONS 1-9: 6
SUM OF ALL RESPONSES TO PHQ QUESTIONS 1-9: 6
3. TROUBLE FALLING OR STAYING ASLEEP: SEVERAL DAYS
1. LITTLE INTEREST OR PLEASURE IN DOING THINGS: NEARLY EVERY DAY
SUM OF ALL RESPONSES TO PHQ QUESTIONS 1-9: 6
SUM OF ALL RESPONSES TO PHQ QUESTIONS 1-9: 6
10. IF YOU CHECKED OFF ANY PROBLEMS, HOW DIFFICULT HAVE THESE PROBLEMS MADE IT FOR YOU TO DO YOUR WORK, TAKE CARE OF THINGS AT HOME, OR GET ALONG WITH OTHER PEOPLE: NOT DIFFICULT AT ALL
7. TROUBLE CONCENTRATING ON THINGS, SUCH AS READING THE NEWSPAPER OR WATCHING TELEVISION: NOT AT ALL
4. FEELING TIRED OR HAVING LITTLE ENERGY: SEVERAL DAYS
6. FEELING BAD ABOUT YOURSELF - OR THAT YOU ARE A FAILURE OR HAVE LET YOURSELF OR YOUR FAMILY DOWN: NOT AT ALL
9. THOUGHTS THAT YOU WOULD BE BETTER OFF DEAD, OR OF HURTING YOURSELF: NOT AT ALL
8. MOVING OR SPEAKING SO SLOWLY THAT OTHER PEOPLE COULD HAVE NOTICED. OR THE OPPOSITE, BEING SO FIGETY OR RESTLESS THAT YOU HAVE BEEN MOVING AROUND A LOT MORE THAN USUAL: NOT AT ALL
2. FEELING DOWN, DEPRESSED OR HOPELESS: NOT AT ALL
5. POOR APPETITE OR OVEREATING: SEVERAL DAYS
SUM OF ALL RESPONSES TO PHQ9 QUESTIONS 1 & 2: 3

## 2025-01-13 ASSESSMENT — PATIENT HEALTH QUESTIONNAIRE - PHQ9
6. FEELING BAD ABOUT YOURSELF - OR THAT YOU ARE A FAILURE OR HAVE LET YOURSELF OR YOUR FAMILY DOWN: NOT AT ALL
1. LITTLE INTEREST OR PLEASURE IN DOING THINGS: NEARLY EVERY DAY
SUM OF ALL RESPONSES TO PHQ QUESTIONS 1-9: 6
7. TROUBLE CONCENTRATING ON THINGS, SUCH AS READING THE NEWSPAPER OR WATCHING TELEVISION: NOT AT ALL
4. FEELING TIRED OR HAVING LITTLE ENERGY: SEVERAL DAYS
8. MOVING OR SPEAKING SO SLOWLY THAT OTHER PEOPLE COULD HAVE NOTICED. OR THE OPPOSITE - BEING SO FIDGETY OR RESTLESS THAT YOU HAVE BEEN MOVING AROUND A LOT MORE THAN USUAL: NOT AT ALL
10. IF YOU CHECKED OFF ANY PROBLEMS, HOW DIFFICULT HAVE THESE PROBLEMS MADE IT FOR YOU TO DO YOUR WORK, TAKE CARE OF THINGS AT HOME, OR GET ALONG WITH OTHER PEOPLE: NOT DIFFICULT AT ALL
5. POOR APPETITE OR OVEREATING: SEVERAL DAYS
2. FEELING DOWN, DEPRESSED OR HOPELESS: NOT AT ALL
9. THOUGHTS THAT YOU WOULD BE BETTER OFF DEAD, OR OF HURTING YOURSELF: NOT AT ALL
SUM OF ALL RESPONSES TO PHQ9 QUESTIONS 1 & 2: 3
3. TROUBLE FALLING OR STAYING ASLEEP: SEVERAL DAYS

## 2025-01-14 ENCOUNTER — OFFICE VISIT (OUTPATIENT)
Dept: PRIMARY CARE CLINIC | Age: 65
End: 2025-01-14
Payer: MEDICARE

## 2025-01-14 VITALS
SYSTOLIC BLOOD PRESSURE: 127 MMHG | WEIGHT: 187.6 LBS | BODY MASS INDEX: 33.24 KG/M2 | DIASTOLIC BLOOD PRESSURE: 69 MMHG | TEMPERATURE: 97.9 F | HEART RATE: 79 BPM

## 2025-01-14 DIAGNOSIS — E11.9 TYPE 2 DIABETES MELLITUS WITHOUT COMPLICATION, WITH LONG-TERM CURRENT USE OF INSULIN (HCC): ICD-10-CM

## 2025-01-14 DIAGNOSIS — Z79.4 TYPE 2 DIABETES MELLITUS WITHOUT COMPLICATION, WITH LONG-TERM CURRENT USE OF INSULIN (HCC): ICD-10-CM

## 2025-01-14 DIAGNOSIS — B37.31 VAGINAL CANDIDIASIS: Primary | ICD-10-CM

## 2025-01-14 PROCEDURE — 99213 OFFICE O/P EST LOW 20 MIN: CPT | Performed by: FAMILY MEDICINE

## 2025-01-14 PROCEDURE — G8417 CALC BMI ABV UP PARAM F/U: HCPCS | Performed by: FAMILY MEDICINE

## 2025-01-14 PROCEDURE — 3046F HEMOGLOBIN A1C LEVEL >9.0%: CPT | Performed by: FAMILY MEDICINE

## 2025-01-14 PROCEDURE — 2022F DILAT RTA XM EVC RTNOPTHY: CPT | Performed by: FAMILY MEDICINE

## 2025-01-14 PROCEDURE — 1090F PRES/ABSN URINE INCON ASSESS: CPT | Performed by: FAMILY MEDICINE

## 2025-01-14 PROCEDURE — 3074F SYST BP LT 130 MM HG: CPT | Performed by: FAMILY MEDICINE

## 2025-01-14 PROCEDURE — G8399 PT W/DXA RESULTS DOCUMENT: HCPCS | Performed by: FAMILY MEDICINE

## 2025-01-14 PROCEDURE — 1036F TOBACCO NON-USER: CPT | Performed by: FAMILY MEDICINE

## 2025-01-14 PROCEDURE — 3017F COLORECTAL CA SCREEN DOC REV: CPT | Performed by: FAMILY MEDICINE

## 2025-01-14 PROCEDURE — 3078F DIAST BP <80 MM HG: CPT | Performed by: FAMILY MEDICINE

## 2025-01-14 PROCEDURE — G8427 DOCREV CUR MEDS BY ELIG CLIN: HCPCS | Performed by: FAMILY MEDICINE

## 2025-01-14 PROCEDURE — 1123F ACP DISCUSS/DSCN MKR DOCD: CPT | Performed by: FAMILY MEDICINE

## 2025-01-15 ENCOUNTER — TELEPHONE (OUTPATIENT)
Dept: PRIMARY CARE CLINIC | Age: 65
End: 2025-01-15

## 2025-01-15 ENCOUNTER — APPOINTMENT (OUTPATIENT)
Dept: PHYSICAL THERAPY | Age: 65
End: 2025-01-15
Payer: MEDICARE

## 2025-01-15 LAB
BV BACTERIA DNA VAG QL NAA+PROBE: NORMAL
C GLABRATA DNA VAG QL NAA+PROBE: NORMAL
C GLABRATA DNA VAG QL NAA+PROBE: NORMAL
C KRUSEI DNA VAG QL NAA+PROBE: NORMAL
CANDIDA DNA VAG QL NAA+PROBE: NORMAL
T VAGINALIS DNA VAG QL NAA+PROBE: NORMAL

## 2025-01-15 RX ORDER — FLUCONAZOLE 150 MG/1
150 TABLET ORAL
Qty: 2 TABLET | Refills: 0 | Status: SHIPPED | OUTPATIENT
Start: 2025-01-15 | End: 2025-01-19

## 2025-01-15 NOTE — TELEPHONE ENCOUNTER
Pt calling because she was here yesterday and states a medication was suppose to be sent in for her yeast infection, please advise?

## 2025-01-15 NOTE — TELEPHONE ENCOUNTER
Left voice message letting patient know that she should take medication and let us know if doesn't see improvement due to the labs coming back as indeterminate.

## 2025-01-15 NOTE — TELEPHONE ENCOUNTER
Lvm letting patient know that medication has been sent. It does appear that is now an issue with the lab results. Results came back as indeterminate, meaning there was something that interfered with collection. Called Lab and they provided me this information.       Per , patient is to take medication and let us know if she has not seen any improvement.

## 2025-01-15 NOTE — PROGRESS NOTES
tablet TAKE 1 TABLET BY MOUTH DAILY  Isabel Rico DO   fluticasone (FLONASE) 50 MCG/ACT nasal spray SPRAY TWO SPRAYS IN EACH NOSTRIL ONCE DAILY  Sami Smith MD   blood glucose test strips (ACCU-CHEK DALE PLUS) strip TEST 1 TO 3 TIMES A DAY AS NEEDED FOR SYMPTOMS OF IRREGULAR BLOOD SUGAR  Isabel Rico DO   Calcium Carb-Cholecalciferol 600-800 MG-UNIT TABS Take 1 tablet by mouth in the morning and 1 tablet in the evening.  Provider, MD Kris   cetirizine (ZYRTEC) 10 MG tablet Take 1 tablet by mouth nightly  John Green DO        Past Medical History:   Diagnosis Date    Allergic rhinitis     Chronic back pain     Only when gas pain develops    GERD (gastroesophageal reflux disease) 2022    HTN (hypertension)     Hyperlipidemia     Obstructive sleep apnea (adult) (pediatric)     Non-compliant with CPAP    Osteoarthritis     Type 2 diabetes mellitus without complication ([facility]) 2014    Type II or unspecified type diabetes mellitus without mention of complication, not stated as uncontrolled     Wears glasses         Social History     Tobacco Use    Smoking status: Former     Current packs/day: 3.00     Average packs/day: 3.0 packs/day for 4.0 years (12.0 ttl pk-yrs)     Types: Cigars, Cigarettes    Smokeless tobacco: Never    Tobacco comments:     Still smoking off and on as of 11- / [place]   Vaping Use    Vaping status: Never Used   Substance Use Topics    Alcohol use: Yes     Comment: 1 alcoholic beverage every other month    Drug use: Never     Types: Marijuana (Weed)     Comment: H/o cocaine use, stopped marijuana 45 years ago        Past Surgical History:   Procedure Laterality Date    COLON SURGERY  2008    10\" removed - chronic constipation    DUPUYTRENS CONTRACTURE SURGERY Left 1/29/2019    LEFT 1ST DORSAL COMPARMENT DE' QUERVAIN'S RELEASE performed by Augustine Arango MD at [facility] OR    HERNIA REPAIR      4x     HYSTERECTOMY, TOTAL ABDOMINAL (CERVIX REMOVED)      JOINT

## 2025-01-17 ENCOUNTER — HOSPITAL ENCOUNTER (OUTPATIENT)
Dept: PHYSICAL THERAPY | Age: 65
Setting detail: THERAPIES SERIES
Discharge: HOME | End: 2025-01-17
Payer: MEDICARE

## 2025-01-21 ENCOUNTER — HOSPITAL ENCOUNTER (OUTPATIENT)
Dept: PHYSICAL THERAPY | Age: 65
Setting detail: THERAPIES SERIES
Discharge: HOME | End: 2025-01-21
Payer: MEDICARE

## 2025-01-21 PROCEDURE — 97016 VASOPNEUMATIC DEVICE THERAPY: CPT

## 2025-01-21 PROCEDURE — 97140 MANUAL THERAPY 1/> REGIONS: CPT

## 2025-01-21 PROCEDURE — 97110 THERAPEUTIC EXERCISES: CPT

## 2025-01-21 NOTE — FLOWSHEET NOTE
full AROM and reaching tasks.   [] Progressing: [] Met: [] Not Met: [] Adjusted       Overall Progression Towards Functional goals/ Treatment Progress Update:  [] Patient is progressing as expected towards functional goals listed.    [] Progression is slowed due to complexities/Impairments listed.  [] Progression has been slowed due to co-morbidities.  [x] Plan just implemented, too soon (<30days) to assess goals progression   [] Goals require adjustment due to lack of progress  [] Patient is not progressing as expected and requires additional follow up with physician  [] Other:     TREATMENT PLAN     Frequency/Duration: 1-2x/week for  12  weeks for the following treatment interventions:    Interventions:  Therapeutic Exercise (22649) including: strength training, ROM, and functional mobility  Therapeutic Activities (92503) including: functional mobility training and education.  Neuromuscular Re-education (71120) activation and proprioception, including postural re-education.    Gait Training (28627) for normalization of ambulation patterns and AD training.   Manual Therapy (00116) as indicated to include: Passive Range of Motion, Gr I-IV mobilizations, and Soft Tissue Mobilization  Modalities as needed that may include: Cryotherapy, Electrical Stimulation, Thermal Agents, and Vasoneumatic Compression  Patient education on joint protection, postural re-education, activity modification, and progression of HEP    Plan: POC initiated as per evaluation    Electronically Signed by Matty Damon PT  Date: 01/21/2025     Note: Portions of this note have been templated and/or copied from initial evaluation, reassessments and prior notes for documentation efficiency.    Note: If patient does not return for scheduled/recommended follow up visits, this note will serve as a discharge from care along with the most recent update on progress.    Ortho Evaluation

## 2025-01-24 ENCOUNTER — HOSPITAL ENCOUNTER (OUTPATIENT)
Dept: PHYSICAL THERAPY | Age: 65
Setting detail: THERAPIES SERIES
Discharge: HOME | End: 2025-01-24
Payer: MEDICARE

## 2025-01-24 PROCEDURE — 97016 VASOPNEUMATIC DEVICE THERAPY: CPT

## 2025-01-24 PROCEDURE — 97110 THERAPEUTIC EXERCISES: CPT

## 2025-01-24 PROCEDURE — 97140 MANUAL THERAPY 1/> REGIONS: CPT

## 2025-01-24 NOTE — FLOWSHEET NOTE
increasing ROM, reducing/eliminating soft tissue swelling/inflammation/restriction, improving soft tissue extensibility and allowing for proper ROM for normal function with self care, mobility, lifting and ambulation  (44540) VASOPNEUMATIC    GOALS     Patient stated goal: No pain   [] Progressing: [] Met: [] Not Met: [] Adjusted    Therapist goals for Patient:   Short Term Goals: To be achieved in: 2 weeks  1. Independent in HEP and progression per patient tolerance, in order to prevent re-injury.   [] Progressing: [] Met: [] Not Met: [] Adjusted  2. Patient will have a decrease in pain to <3/10 to facilitate improvement in movement, function, and ADLs as indicated by Functional Deficits.  [] Progressing: [] Met: [] Not Met: [] Adjusted    IF APPLICABLE:  [] Patient to demonstrate independence in wear and care for custom orthotic device. (Only if applicable for orthotic eval)     Long Term Goals: To be achieved in: 12 weeks  1. Disability index score of 15% or less for the Upper Extremity functional Scale to assist with reaching prior level of function with activities such as ADLs.  [] Progressing: [] Met: [] Not Met: [] Adjusted  2. Patient will demonstrate increased AROM of shoulder flexion to 150 without pain to allow for proper joint functioning to enable patient to lift up to the top shelf of the cabinet.   [] Progressing: [] Met: [] Not Met: [] Adjusted  3. Patient will demonstrate increased Strength of shoulder IR/ER to at least 4+/5 throughout without pain to allow for proper functional mobility to enable patient to return to lift overhead without compensation.   [] Progressing: [] Met: [] Not Met: [] Adjusted  4. Patient will return to working full duty without increased symptoms or restriction.   [] Progressing: [] Met: [] Not Met: [] Adjusted  5. Patient will return to cooking without increased symptoms or restriction   [] Progressing: [] Met: [] Not Met: [] Adjusted   6. Patient will demonstrate

## 2025-01-28 ENCOUNTER — OFFICE VISIT (OUTPATIENT)
Dept: PRIMARY CARE CLINIC | Age: 65
End: 2025-01-28
Payer: MEDICARE

## 2025-01-28 VITALS
OXYGEN SATURATION: 96 % | BODY MASS INDEX: 33.31 KG/M2 | DIASTOLIC BLOOD PRESSURE: 64 MMHG | HEART RATE: 56 BPM | SYSTOLIC BLOOD PRESSURE: 124 MMHG | WEIGHT: 188 LBS

## 2025-01-28 DIAGNOSIS — E53.8 B12 DEFICIENCY: ICD-10-CM

## 2025-01-28 DIAGNOSIS — K59.09 CHRONIC CONSTIPATION: ICD-10-CM

## 2025-01-28 DIAGNOSIS — E55.9 VITAMIN D DEFICIENCY: ICD-10-CM

## 2025-01-28 DIAGNOSIS — M19.012 PRIMARY OSTEOARTHRITIS OF LEFT SHOULDER: ICD-10-CM

## 2025-01-28 DIAGNOSIS — E11.9 TYPE 2 DIABETES MELLITUS WITHOUT COMPLICATION, WITH LONG-TERM CURRENT USE OF INSULIN (HCC): Chronic | ICD-10-CM

## 2025-01-28 DIAGNOSIS — L65.9 HAIR LOSS: ICD-10-CM

## 2025-01-28 DIAGNOSIS — Z79.4 TYPE 2 DIABETES MELLITUS WITHOUT COMPLICATION, WITH LONG-TERM CURRENT USE OF INSULIN (HCC): Chronic | ICD-10-CM

## 2025-01-28 DIAGNOSIS — B37.31 VAGINAL CANDIDIASIS: Primary | ICD-10-CM

## 2025-01-28 DIAGNOSIS — R11.0 CHRONIC NAUSEA: ICD-10-CM

## 2025-01-28 PROBLEM — M75.52 BURSITIS OF SHOULDER, LEFT: Status: RESOLVED | Noted: 2024-10-14 | Resolved: 2025-01-28

## 2025-01-28 PROBLEM — M25.812 SHOULDER IMPINGEMENT, LEFT: Status: RESOLVED | Noted: 2024-10-14 | Resolved: 2025-01-28

## 2025-01-28 LAB
25(OH)D3 SERPL-MCNC: 43.8 NG/ML
ACANTHOCYTES BLD QL SMEAR: ABNORMAL
ALBUMIN SERPL-MCNC: 4.4 G/DL (ref 3.4–5)
ALBUMIN/GLOB SERPL: 1.8 {RATIO} (ref 1.1–2.2)
ALP SERPL-CCNC: 126 U/L (ref 40–129)
ALT SERPL-CCNC: 16 U/L (ref 10–40)
ANION GAP SERPL CALCULATED.3IONS-SCNC: 11 MMOL/L (ref 3–16)
ANISOCYTOSIS BLD QL SMEAR: ABNORMAL
AST SERPL-CCNC: 20 U/L (ref 15–37)
BASOPHILS # BLD: 0 K/UL (ref 0–0.2)
BASOPHILS NFR BLD: 0 %
BILIRUB SERPL-MCNC: 0.5 MG/DL (ref 0–1)
BUN SERPL-MCNC: 10 MG/DL (ref 7–20)
BURR CELLS BLD QL SMEAR: ABNORMAL
CALCIUM SERPL-MCNC: 10.4 MG/DL (ref 8.3–10.6)
CHLORIDE SERPL-SCNC: 100 MMOL/L (ref 99–110)
CHOLEST SERPL-MCNC: 118 MG/DL (ref 0–199)
CO2 SERPL-SCNC: 28 MMOL/L (ref 21–32)
CREAT SERPL-MCNC: 0.6 MG/DL (ref 0.6–1.2)
DEPRECATED RDW RBC AUTO: 16.5 % (ref 12.4–15.4)
EOSINOPHIL # BLD: 0.2 K/UL (ref 0–0.6)
EOSINOPHIL NFR BLD: 2 %
FERRITIN SERPL IA-MCNC: 19.5 NG/ML (ref 15–150)
FOLATE SERPL-MCNC: 14.6 NG/ML (ref 4.78–24.2)
GFR SERPLBLD CREATININE-BSD FMLA CKD-EPI: >90 ML/MIN/{1.73_M2}
GLUCOSE SERPL-MCNC: 90 MG/DL (ref 70–99)
HBA1C MFR BLD: 5.2 %
HCT VFR BLD AUTO: 35.4 % (ref 36–48)
HDLC SERPL-MCNC: 27 MG/DL (ref 40–60)
HGB BLD-MCNC: 11.4 G/DL (ref 12–16)
LDL CHOLESTEROL: 62 MG/DL
LYMPHOCYTES # BLD: 3.3 K/UL (ref 1–5.1)
LYMPHOCYTES NFR BLD: 27 %
MCH RBC QN AUTO: 25.1 PG (ref 26–34)
MCHC RBC AUTO-ENTMCNC: 32.2 G/DL (ref 31–36)
MCV RBC AUTO: 78.2 FL (ref 80–100)
MICROCYTES BLD QL SMEAR: ABNORMAL
MONOCYTES # BLD: 1.2 K/UL (ref 0–1.3)
MONOCYTES NFR BLD: 10 %
NEUTROPHILS # BLD: 7.4 K/UL (ref 1.7–7.7)
NEUTROPHILS NFR BLD: 61 %
OVALOCYTES BLD QL SMEAR: ABNORMAL
PATH INTERP BLD-IMP: NO
PLATELET # BLD AUTO: 330 K/UL (ref 135–450)
PLATELET BLD QL SMEAR: ADEQUATE
PMV BLD AUTO: 10 FL (ref 5–10.5)
POIKILOCYTOSIS BLD QL SMEAR: ABNORMAL
POTASSIUM SERPL-SCNC: 4.1 MMOL/L (ref 3.5–5.1)
PROT SERPL-MCNC: 6.9 G/DL (ref 6.4–8.2)
RBC # BLD AUTO: 4.52 M/UL (ref 4–5.2)
SCHISTOCYTES BLD QL SMEAR: ABNORMAL
SLIDE REVIEW: ABNORMAL
SODIUM SERPL-SCNC: 139 MMOL/L (ref 136–145)
TOXIC GRANULES BLD QL SMEAR: PRESENT
TRIGL SERPL-MCNC: 146 MG/DL (ref 0–150)
TSH SERPL DL<=0.005 MIU/L-ACNC: 0.86 UIU/ML (ref 0.27–4.2)
VIT B12 SERPL-MCNC: 306 PG/ML (ref 211–911)
VLDLC SERPL CALC-MCNC: 29 MG/DL
WBC # BLD AUTO: 12.2 K/UL (ref 4–11)

## 2025-01-28 PROCEDURE — 1160F RVW MEDS BY RX/DR IN RCRD: CPT | Performed by: FAMILY MEDICINE

## 2025-01-28 PROCEDURE — 1123F ACP DISCUSS/DSCN MKR DOCD: CPT | Performed by: FAMILY MEDICINE

## 2025-01-28 PROCEDURE — G8427 DOCREV CUR MEDS BY ELIG CLIN: HCPCS | Performed by: FAMILY MEDICINE

## 2025-01-28 PROCEDURE — 2022F DILAT RTA XM EVC RTNOPTHY: CPT | Performed by: FAMILY MEDICINE

## 2025-01-28 PROCEDURE — 1159F MED LIST DOCD IN RCRD: CPT | Performed by: FAMILY MEDICINE

## 2025-01-28 PROCEDURE — 3044F HG A1C LEVEL LT 7.0%: CPT | Performed by: FAMILY MEDICINE

## 2025-01-28 PROCEDURE — 3074F SYST BP LT 130 MM HG: CPT | Performed by: FAMILY MEDICINE

## 2025-01-28 PROCEDURE — G8417 CALC BMI ABV UP PARAM F/U: HCPCS | Performed by: FAMILY MEDICINE

## 2025-01-28 PROCEDURE — G2211 COMPLEX E/M VISIT ADD ON: HCPCS | Performed by: FAMILY MEDICINE

## 2025-01-28 PROCEDURE — 3078F DIAST BP <80 MM HG: CPT | Performed by: FAMILY MEDICINE

## 2025-01-28 PROCEDURE — 1090F PRES/ABSN URINE INCON ASSESS: CPT | Performed by: FAMILY MEDICINE

## 2025-01-28 PROCEDURE — 99214 OFFICE O/P EST MOD 30 MIN: CPT | Performed by: FAMILY MEDICINE

## 2025-01-28 PROCEDURE — G8399 PT W/DXA RESULTS DOCUMENT: HCPCS | Performed by: FAMILY MEDICINE

## 2025-01-28 PROCEDURE — 83036 HEMOGLOBIN GLYCOSYLATED A1C: CPT | Performed by: FAMILY MEDICINE

## 2025-01-28 PROCEDURE — 3017F COLORECTAL CA SCREEN DOC REV: CPT | Performed by: FAMILY MEDICINE

## 2025-01-28 PROCEDURE — 1036F TOBACCO NON-USER: CPT | Performed by: FAMILY MEDICINE

## 2025-01-28 RX ORDER — FLUCONAZOLE 100 MG/1
100 TABLET ORAL DAILY
Qty: 7 TABLET | Refills: 0 | Status: SHIPPED | OUTPATIENT
Start: 2025-01-28 | End: 2025-02-04

## 2025-01-28 RX ORDER — NYSTATIN 100000 U/G
CREAM TOPICAL
Qty: 15 G | Refills: 0 | Status: SHIPPED | OUTPATIENT
Start: 2025-01-28

## 2025-01-28 NOTE — PROGRESS NOTES
[facility] PHYSICIAN PRACTICES  [facility] PRIMARY [facility]  [address] [place] [address]  Dept: [phone]  Dept Fax: [phone]     1/28/2025      Fernanda Diego   1960     Chief Complaint   Patient presents with    Hair/Scalp Problem       HPI    Pt comes in today for recurrent yeast infection. Exam completed at last visit. Vaginitis panel came back indeterminate. Exam consistent with candidal infection. She is still having some vaginal itching.     States left shoulder is still painful. Doing PT. She is s/p arthroscopic left shoulder rotator cuff repair 11/27/24.     Started with generalized hair loss ~ 3-4 weeks ago. No recent change in medications. No change in diet.     DMII - states her blood sugar was 147 yesterday which was higher than normal. Last A1c = 4.8%.     Hemoglobin A1C   Date Value Ref Range Status   01/28/2025 5.2 % Final   10/21/2024 4.8 % Final   06/20/2024 4.9 % Final   01/16/2024 5.5 % Final   06/23/2023 5.0 See comment % Final     Comment:     Comment:  Diagnosis of Diabetes: > or = 6.5%  Increased risk of diabetes (Prediabetes): 5.7-6.4%  Glycemic Control: Nonpregnant Adults: <7.0%                    Pregnant: <6.0%              C/o nausea/GERD. Has been taking zofran almost every morning. Wakes up feeling sick to her stomach. Has a h/o chronic constipation. Saw GI last year. Prescribed Linzess but was cost prohibitive. Still having abdominal bloating. Gregoria Peguero is her provider.     Wt Readings from Last 5 Encounters:   01/28/25 85.3 kg (188 lb)   01/14/25 85.1 kg (187 lb 9.6 oz)   12/13/24 85.3 kg (188 lb)   11/27/24 85.5 kg (188 lb 6.4 oz)   11/04/24 84.8 kg (187 lb)      BP Readings from Last 5 Encounters:   01/28/25 124/64   01/14/25 127/69   11/27/24 (!) 141/82   11/04/24 121/77   10/31/24 136/80         No data recorded     Prior to Visit Medications    Medication Sig Taking? Authorizing Provider   LINZESS 290 MCG CAPS capsule   Provider, MD Kris

## 2025-01-29 ENCOUNTER — HOSPITAL ENCOUNTER (OUTPATIENT)
Dept: PHYSICAL THERAPY | Age: 65
Setting detail: THERAPIES SERIES
Discharge: HOME | End: 2025-01-29
Payer: MEDICARE

## 2025-01-29 DIAGNOSIS — D50.9 IRON DEFICIENCY ANEMIA, UNSPECIFIED IRON DEFICIENCY ANEMIA TYPE: Primary | ICD-10-CM

## 2025-01-29 DIAGNOSIS — D72.829 LEUKOCYTOSIS, UNSPECIFIED TYPE: ICD-10-CM

## 2025-01-29 PROCEDURE — 97140 MANUAL THERAPY 1/> REGIONS: CPT

## 2025-01-29 PROCEDURE — 97110 THERAPEUTIC EXERCISES: CPT

## 2025-01-29 PROCEDURE — 97016 VASOPNEUMATIC DEVICE THERAPY: CPT

## 2025-01-29 ASSESSMENT — ENCOUNTER SYMPTOMS
SHORTNESS OF BREATH: 0
VOMITING: 0
COUGH: 0
ABDOMINAL PAIN: 0
NAUSEA: 1

## 2025-01-29 NOTE — FLOWSHEET NOTE
[facility]- Outpatient Rehabilitation and Therapy [address], [address], [place] [address] office: [phone] fax: [phone]         Physical Therapy: TREATMENT/PROGRESS NOTE   Patient: Fernanda Diego (64 y.o. female)   Examination Date: 2025   :  1960 MRN: [id number]   Visit #: 5   Insurance Allowable Auth Needed   BMN []Yes    [x]No    Insurance: Payor: [facility] MEDICARE / Plan: [facility] MEDICARE COMPLETE / Product Type: *No Product type* /   Insurance ID: [id number] - (Medicare Managed)  Secondary Insurance (if applicable):    Treatment Diagnosis:     ICD-10-CM    1. Left shoulder pain, unspecified chronicity  M25.512       2. Pain and swelling of Left shoulder  M25.512     M25.412       3. Weakness of Left shoulder  R29.898          Medical Diagnosis:  S/P shoulder surgery [Z98.890]   Referring Physician: Nickolas Soto PA-C  PCP: Isabel Rico DO     Plan of care signed (Y/N):     Date of Patient follow up with Physician:      Plan of Care Report: NO  POC update due: (10 visits /OR AUTH LIMITS, whichever is less) 2025                                             Medical History:  Comorbidities:  Diabetes (Type I or II)  Hypertension  Osteoarthritis  Relevant Medical History: Hx of R RCR ~15-20 years ago                                         Precautions/ Contra-indications:           Latex allergy:  NO  Pacemaker:    NO  Contraindications for Manipulation: recent surgical history (relative)  Date of Surgery: L RCR, SAD and Biceps Tenodesis on 24  Other:    Red Flags:  None    Suicide Screening:   The patient did not verbalize a primary behavioral concern, suicidal ideation, suicidal intent, or demonstrate suicidal behaviors.    Preferred Language for Healthcare:   [x] English       [] other:    SUBJECTIVE EXAMINATION     Patient stated complaint:   Pt states her shoulder is a bit sore.     Eval: Pt presents post op L RCR, SAD and Biceps Tenodesis on

## 2025-01-30 ENCOUNTER — APPOINTMENT (OUTPATIENT)
Dept: PHYSICAL THERAPY | Age: 65
End: 2025-01-30
Payer: MEDICARE

## 2025-02-04 ENCOUNTER — APPOINTMENT (OUTPATIENT)
Dept: PHYSICAL THERAPY | Age: 65
End: 2025-02-04
Payer: MEDICARE

## 2025-02-07 ENCOUNTER — HOSPITAL ENCOUNTER (OUTPATIENT)
Dept: PHYSICAL THERAPY | Age: 65
Setting detail: THERAPIES SERIES
Discharge: HOME | End: 2025-02-07
Payer: MEDICARE

## 2025-02-07 PROCEDURE — 97140 MANUAL THERAPY 1/> REGIONS: CPT

## 2025-02-07 PROCEDURE — 97110 THERAPEUTIC EXERCISES: CPT

## 2025-02-07 PROCEDURE — 97016 VASOPNEUMATIC DEVICE THERAPY: CPT

## 2025-02-07 NOTE — FLOWSHEET NOTE
[facility]- Outpatient Rehabilitation and Therapy [address], [address], [place] [address] office: [phone] fax: [phone]         Physical Therapy: TREATMENT/PROGRESS NOTE   Patient: Fernanda Diego (64 y.o. female)   Examination Date: 2025   :  1960 MRN: [id number]   Visit #: 6   Insurance Allowable Auth Needed   BMN []Yes    [x]No    Insurance: Payor: [facility] MEDICARE / Plan: [facility] MEDICARE COMPLETE / Product Type: *No Product type* /   Insurance ID: [id number] - (Medicare Managed)  Secondary Insurance (if applicable):    Treatment Diagnosis:     ICD-10-CM    1. Left shoulder pain, unspecified chronicity  M25.512       2. Pain and swelling of Left shoulder  M25.512     M25.412       3. Weakness of Left shoulder  R29.898          Medical Diagnosis:  S/P shoulder surgery [Z98.890]   Referring Physician: Nickolas Soto PA-C  PCP: Isabel Rico DO     Plan of care signed (Y/N):     Date of Patient follow up with Physician:      Plan of Care Report: NO  POC update due: (10 visits /OR AUTH LIMITS, whichever is less) 2025                                             Medical History:  Comorbidities:  Diabetes (Type I or II)  Hypertension  Osteoarthritis  Relevant Medical History: Hx of R RCR ~15-20 years ago                                         Precautions/ Contra-indications:           Latex allergy:  NO  Pacemaker:    NO  Contraindications for Manipulation: recent surgical history (relative)  Date of Surgery: L RCR, SAD and Biceps Tenodesis on 24  Other:    Red Flags:  None    Suicide Screening:   The patient did not verbalize a primary behavioral concern, suicidal ideation, suicidal intent, or demonstrate suicidal behaviors.    Preferred Language for Healthcare:   [x] English       [] other:    SUBJECTIVE EXAMINATION     Patient stated complaint:   Pt states her shoulder is doing well. States she has been sore at night lately.     Eval: Pt presents post op

## 2025-02-10 ENCOUNTER — TELEPHONE (OUTPATIENT)
Dept: PRIMARY CARE CLINIC | Age: 65
End: 2025-02-10

## 2025-02-10 NOTE — TELEPHONE ENCOUNTER
When patient calls back please transfer call to me for triage. Had to leave a message for pt to call office back.

## 2025-02-10 NOTE — TELEPHONE ENCOUNTER
Patient called and stated that she was in pain towards her lower back,said she's been on a heating pad for a week and issues urinating.patient wanted to know if she needs to go the ER or what should she do . Pt also said she looked up her symptoms and was pointing towards kidney stones ?????

## 2025-02-10 NOTE — TELEPHONE ENCOUNTER
Patient called stating she has been having issues since last Saturday. She is having pain with both urination and bowel movement.     Patient states her urine is dark even though she has been drinking nothing but water.    Patients pain is a level 9/10.      She is also having loss of appetite and nausea.       Patient informed of message and will go to the ER for help.

## 2025-02-10 NOTE — TELEPHONE ENCOUNTER
If she is having severe pain and difficulty urinating then likely needs to go to the ER. Please triage when she calls back.

## 2025-02-11 ENCOUNTER — HOSPITAL ENCOUNTER (OUTPATIENT)
Dept: PHYSICAL THERAPY | Age: 65
Setting detail: THERAPIES SERIES
End: 2025-02-11
Payer: MEDICARE

## 2025-02-11 ENCOUNTER — APPOINTMENT (OUTPATIENT)
Dept: CT IMAGING | Age: 65
End: 2025-02-11
Payer: MEDICARE

## 2025-02-11 ENCOUNTER — HOSPITAL ENCOUNTER (EMERGENCY)
Age: 65
Discharge: HOME | End: 2025-02-11
Attending: STUDENT IN AN ORGANIZED HEALTH CARE EDUCATION/TRAINING PROGRAM
Payer: MEDICARE

## 2025-02-11 VITALS
SYSTOLIC BLOOD PRESSURE: 129 MMHG | BODY MASS INDEX: 33.13 KG/M2 | HEART RATE: 55 BPM | RESPIRATION RATE: 18 BRPM | TEMPERATURE: 97.6 F | OXYGEN SATURATION: 95 % | HEIGHT: 63 IN | DIASTOLIC BLOOD PRESSURE: 79 MMHG | WEIGHT: 187 LBS

## 2025-02-11 DIAGNOSIS — K21.9 GASTROESOPHAGEAL REFLUX DISEASE WITHOUT ESOPHAGITIS: ICD-10-CM

## 2025-02-11 DIAGNOSIS — K57.90 DIVERTICULOSIS: Primary | ICD-10-CM

## 2025-02-11 LAB
ALBUMIN SERPL-MCNC: 4.1 G/DL (ref 3.4–5)
ALP SERPL-CCNC: 151 U/L (ref 40–129)
ALT SERPL-CCNC: 14 U/L (ref 10–40)
ANION GAP SERPL CALCULATED.3IONS-SCNC: 12 MMOL/L (ref 3–16)
AST SERPL-CCNC: 22 U/L (ref 15–37)
BACTERIA URNS QL MICRO: ABNORMAL /HPF
BASOPHILS # BLD: 0.1 K/UL (ref 0–0.2)
BASOPHILS NFR BLD: 1 %
BILIRUB DIRECT SERPL-MCNC: 0.2 MG/DL (ref 0–0.3)
BILIRUB INDIRECT SERPL-MCNC: 0.3 MG/DL (ref 0–1)
BILIRUB SERPL-MCNC: 0.5 MG/DL (ref 0–1)
BILIRUB UR QL STRIP.AUTO: NEGATIVE
BUN SERPL-MCNC: 11 MG/DL (ref 7–20)
CALCIUM SERPL-MCNC: 10.1 MG/DL (ref 8.3–10.6)
CHLORIDE SERPL-SCNC: 102 MMOL/L (ref 99–110)
CLARITY UR: CLEAR
CO2 SERPL-SCNC: 26 MMOL/L (ref 21–32)
COLOR UR: YELLOW
CREAT SERPL-MCNC: 0.8 MG/DL (ref 0.6–1.2)
CRYSTALS URNS MICRO: ABNORMAL /HPF
DEPRECATED RDW RBC AUTO: 16.2 % (ref 12.4–15.4)
EOSINOPHIL # BLD: 0.2 K/UL (ref 0–0.6)
EOSINOPHIL NFR BLD: 1.6 %
EPI CELLS #/AREA URNS HPF: ABNORMAL /HPF (ref 0–5)
GFR SERPLBLD CREATININE-BSD FMLA CKD-EPI: 81 ML/MIN/{1.73_M2}
GLUCOSE SERPL-MCNC: 134 MG/DL (ref 70–99)
GLUCOSE UR STRIP.AUTO-MCNC: NEGATIVE MG/DL
HCT VFR BLD AUTO: 38.8 % (ref 36–48)
HGB BLD-MCNC: 11.9 G/DL (ref 12–16)
HGB UR QL STRIP.AUTO: NEGATIVE
KETONES UR STRIP.AUTO-MCNC: ABNORMAL MG/DL
LEUKOCYTE ESTERASE UR QL STRIP.AUTO: NEGATIVE
LIPASE SERPL-CCNC: 38 U/L (ref 13–60)
LYMPHOCYTES # BLD: 3.1 K/UL (ref 1–5.1)
LYMPHOCYTES NFR BLD: 21.1 %
MCH RBC QN AUTO: 24.1 PG (ref 26–34)
MCHC RBC AUTO-ENTMCNC: 30.8 G/DL (ref 31–36)
MCV RBC AUTO: 78.3 FL (ref 80–100)
MONOCYTES # BLD: 0.9 K/UL (ref 0–1.3)
MONOCYTES NFR BLD: 6 %
NEUTROPHILS # BLD: 10.5 K/UL (ref 1.7–7.7)
NEUTROPHILS NFR BLD: 70.3 %
NITRITE UR QL STRIP.AUTO: NEGATIVE
PH UR STRIP.AUTO: 6 [PH] (ref 5–8)
PLATELET # BLD AUTO: 341 K/UL (ref 135–450)
PMV BLD AUTO: 9.9 FL (ref 5–10.5)
POTASSIUM SERPL-SCNC: 3.9 MMOL/L (ref 3.5–5.1)
PROT SERPL-MCNC: 7.4 G/DL (ref 6.4–8.2)
PROT UR STRIP.AUTO-MCNC: 30 MG/DL
RBC # BLD AUTO: 4.95 M/UL (ref 4–5.2)
RBC #/AREA URNS HPF: ABNORMAL /HPF (ref 0–4)
SODIUM SERPL-SCNC: 140 MMOL/L (ref 136–145)
SP GR UR STRIP.AUTO: >=1.03 (ref 1–1.03)
UA COMPLETE W REFLEX CULTURE PNL UR: ABNORMAL
UA DIPSTICK W REFLEX MICRO PNL UR: YES
URN SPEC COLLECT METH UR: ABNORMAL
UROBILINOGEN UR STRIP-ACNC: 0.2 E.U./DL
WBC # BLD AUTO: 14.9 K/UL (ref 4–11)
WBC #/AREA URNS HPF: ABNORMAL /HPF (ref 0–5)

## 2025-02-11 PROCEDURE — 80076 HEPATIC FUNCTION PANEL: CPT

## 2025-02-11 PROCEDURE — 80048 BASIC METABOLIC PNL TOTAL CA: CPT

## 2025-02-11 PROCEDURE — 96374 THER/PROPH/DIAG INJ IV PUSH: CPT

## 2025-02-11 PROCEDURE — 85025 COMPLETE CBC W/AUTO DIFF WBC: CPT

## 2025-02-11 PROCEDURE — 6360000004 HC RX CONTRAST MEDICATION

## 2025-02-11 PROCEDURE — 81001 URINALYSIS AUTO W/SCOPE: CPT

## 2025-02-11 PROCEDURE — 2580000003 HC RX 258

## 2025-02-11 PROCEDURE — 6370000000 HC RX 637 (ALT 250 FOR IP)

## 2025-02-11 PROCEDURE — 74177 CT ABD & PELVIS W/CONTRAST: CPT

## 2025-02-11 PROCEDURE — 99285 EMERGENCY DEPT VISIT HI MDM: CPT

## 2025-02-11 PROCEDURE — 83690 ASSAY OF LIPASE: CPT

## 2025-02-11 PROCEDURE — 2500000003 HC RX 250 WO HCPCS

## 2025-02-11 RX ORDER — PANTOPRAZOLE SODIUM 40 MG/1
40 TABLET, DELAYED RELEASE ORAL ONCE
Status: COMPLETED | OUTPATIENT
Start: 2025-02-11 | End: 2025-02-11

## 2025-02-11 RX ORDER — IOPAMIDOL 755 MG/ML
75 INJECTION, SOLUTION INTRAVASCULAR
Status: COMPLETED | OUTPATIENT
Start: 2025-02-11 | End: 2025-02-11

## 2025-02-11 RX ORDER — MAGNESIUM HYDROXIDE/ALUMINUM HYDROXICE/SIMETHICONE 120; 1200; 1200 MG/30ML; MG/30ML; MG/30ML
5 SUSPENSION ORAL EVERY 6 HOURS PRN
Qty: 355 ML | Refills: 0 | Status: SHIPPED | OUTPATIENT
Start: 2025-02-11

## 2025-02-11 RX ORDER — MAGNESIUM HYDROXIDE/ALUMINUM HYDROXICE/SIMETHICONE 120; 1200; 1200 MG/30ML; MG/30ML; MG/30ML
30 SUSPENSION ORAL ONCE
Status: COMPLETED | OUTPATIENT
Start: 2025-02-11 | End: 2025-02-11

## 2025-02-11 RX ORDER — FAMOTIDINE 20 MG/1
20 TABLET, FILM COATED ORAL 2 TIMES DAILY
Qty: 60 TABLET | Refills: 3 | Status: SHIPPED | OUTPATIENT
Start: 2025-02-11

## 2025-02-11 RX ADMIN — ALUMINUM HYDROXIDE, MAGNESIUM HYDROXIDE, AND SIMETHICONE 30 ML: 200; 200; 20 SUSPENSION ORAL at 13:43

## 2025-02-11 RX ADMIN — FAMOTIDINE 20 MG: 10 INJECTION, SOLUTION INTRAVENOUS at 13:44

## 2025-02-11 RX ADMIN — IOPAMIDOL 75 ML: 755 INJECTION, SOLUTION INTRAVENOUS at 15:28

## 2025-02-11 RX ADMIN — PANTOPRAZOLE SODIUM 40 MG: 40 TABLET, DELAYED RELEASE ORAL at 13:43

## 2025-02-11 ASSESSMENT — ENCOUNTER SYMPTOMS
COUGH: 0
DIARRHEA: 0
CONSTIPATION: 0
RHINORRHEA: 0
RECTAL PAIN: 0
SORE THROAT: 0
FACIAL SWELLING: 0
SHORTNESS OF BREATH: 0
WHEEZING: 0
CHEST TIGHTNESS: 0
ABDOMINAL PAIN: 1

## 2025-02-11 ASSESSMENT — PAIN DESCRIPTION - ORIENTATION: ORIENTATION: LOWER;LEFT

## 2025-02-11 ASSESSMENT — PAIN DESCRIPTION - LOCATION
LOCATION: ABDOMEN
LOCATION: ABDOMEN

## 2025-02-11 ASSESSMENT — LIFESTYLE VARIABLES
HOW OFTEN DO YOU HAVE A DRINK CONTAINING ALCOHOL: NEVER
HOW MANY STANDARD DRINKS CONTAINING ALCOHOL DO YOU HAVE ON A TYPICAL DAY: PATIENT DOES NOT DRINK

## 2025-02-11 ASSESSMENT — PAIN SCALES - GENERAL
PAINLEVEL_OUTOF10: 8
PAINLEVEL_OUTOF10: 8

## 2025-02-11 NOTE — ED PROVIDER NOTES
ED Attending Attestation Note     Date of evaluation: 2/11/2025    I have discussed the case with the EVY. I have personally performed a history, physical exam, and my own medical decision making. I have reviewed the note and agree with the findings and plan.     INITIAL VITALS: BP: 137/77, Temp: 97.6 °F (36.4 °C), Pulse: 60, Respirations: 18, SpO2: 97 %   Physical Exam  Vitals reviewed.   Cardiovascular:      Rate and Rhythm: Normal rate.   Abdominal:      Palpations: Abdomen is soft.   Skin:     General: Skin is warm and dry.   Neurological:      Mental Status: She is alert.         MDM:  My assessment reveals a well-appearing 64-year-old female presenting with abdominal pain and dysuria.  On examination, she has a soft and minimally tender abdomen.  Her workup today is altogether reassuring including labs and a negative CT scan for acute abnormality.  She reports improvement in her symptoms after Protonix, GI cocktail and Pepcid.  She will be discharged home with strict return precautions and PCP follow-up         Dae Vargas MD  02/11/25 9538    
  Psychiatric:         Mood and Affect: Mood normal.         Behavior: Behavior normal.          Stahlbaum, Sanjeev BHARDWAJ, JESSICA  02/11/25 3239

## 2025-02-18 SDOH — HEALTH STABILITY: PHYSICAL HEALTH: ON AVERAGE, HOW MANY MINUTES DO YOU ENGAGE IN EXERCISE AT THIS LEVEL?: 20 MIN

## 2025-02-18 SDOH — HEALTH STABILITY: PHYSICAL HEALTH: ON AVERAGE, HOW MANY DAYS PER WEEK DO YOU ENGAGE IN MODERATE TO STRENUOUS EXERCISE (LIKE A BRISK WALK)?: 6 DAYS

## 2025-02-21 ENCOUNTER — OFFICE VISIT (OUTPATIENT)
Dept: ORTHOPEDIC SURGERY | Age: 65
End: 2025-02-21

## 2025-02-21 ENCOUNTER — HOSPITAL ENCOUNTER (OUTPATIENT)
Dept: PHYSICAL THERAPY | Age: 65
Setting detail: THERAPIES SERIES
Discharge: HOME | End: 2025-02-21
Payer: MEDICARE

## 2025-02-21 VITALS — WEIGHT: 187 LBS | BODY MASS INDEX: 33.13 KG/M2 | HEIGHT: 63 IN

## 2025-02-21 DIAGNOSIS — M75.21 BICIPITAL TENDONITIS OF RIGHT SHOULDER: ICD-10-CM

## 2025-02-21 DIAGNOSIS — M25.811 IMPINGEMENT OF RIGHT SHOULDER: ICD-10-CM

## 2025-02-21 DIAGNOSIS — R52 PAIN: Primary | ICD-10-CM

## 2025-02-21 PROCEDURE — 97110 THERAPEUTIC EXERCISES: CPT

## 2025-02-21 PROCEDURE — 97016 VASOPNEUMATIC DEVICE THERAPY: CPT

## 2025-02-21 PROCEDURE — 97140 MANUAL THERAPY 1/> REGIONS: CPT

## 2025-02-21 NOTE — PROGRESS NOTES
Dr Srini Torres      Date /Time 2/21/2025             10:32 AM EDT  Name Fernanda Diego             1960   [facility]  MRN [id number]                Chief Complaint   Patient presents with    Follow-up     Ck Right Shoulder (Cortisone 08/14/2024)         History of Present Illness    Fernanda Diego is a 64 y.o. female who presents with  right Shoulder pain.    Sent in consultation by Isabel Rico DO, .    Injury Mechanism:  none.  Worker's Comp. & legal issues:   none.  Previous Treatments: Ice, Heat, and NSAIDs    Patient presents to the office today for follow-up visit.  Patient here concerning her right shoulder.  She did receive an intra-articular cortisone injection August 14, 2024 for combination of impingement and biceps tendinitis with suspected chronic rotator cuff tear.  The injection really did not work well.  Her pain continues despite injection and physical therapy.  Pain mostly over the anterior and lateral aspect of her shoulder.  Increased pain and weakness with overhead activities.    Previous history: Patient presents the office today for a new problem.  Patient here with a chief complaint of right shoulder pain.  Patient's right shoulder became painful approximately a month ago.  No specific injury or trauma.  She has had a previous rotator cuff repair remotely.  It was approximately 15-20 years ago.  Her pain is aggravated by activities especially activities over shoulder level.  She also complains of weakness.  She is currently taking ibuprofen and is a well-controlled diabetic.    Past Medical History  Past Medical History:   Diagnosis Date    Allergic rhinitis     Chronic back pain     Only when gas pain develops    GERD (gastroesophageal reflux disease) 2022    HTN (hypertension)     Hyperlipidemia     Obstructive sleep apnea (adult) (pediatric)     Non-compliant with CPAP    Osteoarthritis     Type 2 diabetes mellitus without complication ([facility]) 2014

## 2025-02-21 NOTE — PLAN OF CARE
[facility]- Outpatient Rehabilitation and Therapy [address], [address], [place] [address] office: [phone] fax: [phone]  Physical Therapy Re-Certification Plan of Care    Dear Nickolas Soto PA-C  ,    We had the pleasure of treating the following patient for physical therapy services at [facility]. A summary of our findings can be found in the updated assessment below.  This includes our plan of care.  If you have any questions or concerns regarding these findings, please do not hesitate to contact me at the office phone number checked above.  Thank you for the referral.     Physician Signature:________________________________Date:__________________  By signing above (or electronic signature), therapist's plan is approved by physician      Total Visits: 7     Overall Response to Treatment:  Patient is responding well to treatment and improvement is noted with regards to goals    Recommendation:    [x] Continue PT 1-2x / wk for 6 weeks.   [] Hold PT, pending MD visit   [] Discharge to [facility]. Follow up with PT or MD PRN.          Physical Therapy: TREATMENT/PROGRESS NOTE   Patient: Fernanda Diego (64 y.o. female)   Examination Date: 2025   :  1960 MRN: [id number]   Visit #: 7   Insurance Allowable Auth Needed   BMN []Yes    [x]No    Insurance: Payor: [facility] MEDICARE / Plan: [facility] MEDICARE COMPLETE / Product Type: *No Product type* /   Insurance ID: [id number] - (Medicare Managed)  Secondary Insurance (if applicable):    Treatment Diagnosis:     ICD-10-CM    1. Left shoulder pain, unspecified chronicity  M25.512       2. Pain and swelling of Left shoulder  M25.512     M25.412       3. Weakness of Left shoulder  R29.898          Medical Diagnosis:  S/P shoulder surgery [Z98.890]   Referring Physician: Nickolas Soto PA-C  PCP: Isabel Rico DO     Plan of care signed (Y/N):     Date of Patient follow up with Physician:      Plan of Care

## 2025-02-26 ENCOUNTER — APPOINTMENT (OUTPATIENT)
Dept: GENERAL RADIOLOGY | Age: 65
End: 2025-02-26
Payer: MEDICARE

## 2025-02-26 ENCOUNTER — APPOINTMENT (OUTPATIENT)
Dept: PHYSICAL THERAPY | Age: 65
End: 2025-02-26
Payer: MEDICARE

## 2025-02-26 ENCOUNTER — HOSPITAL ENCOUNTER (EMERGENCY)
Age: 65
Discharge: HOME | End: 2025-02-26
Attending: EMERGENCY MEDICINE
Payer: MEDICARE

## 2025-02-26 VITALS
RESPIRATION RATE: 16 BRPM | BODY MASS INDEX: 33.31 KG/M2 | HEART RATE: 52 BPM | WEIGHT: 188 LBS | OXYGEN SATURATION: 96 % | HEIGHT: 63 IN | DIASTOLIC BLOOD PRESSURE: 77 MMHG | SYSTOLIC BLOOD PRESSURE: 156 MMHG | TEMPERATURE: 98.6 F

## 2025-02-26 DIAGNOSIS — M25.522 LEFT ELBOW PAIN: Primary | ICD-10-CM

## 2025-02-26 PROCEDURE — 6370000000 HC RX 637 (ALT 250 FOR IP)

## 2025-02-26 PROCEDURE — 73080 X-RAY EXAM OF ELBOW: CPT

## 2025-02-26 PROCEDURE — 99283 EMERGENCY DEPT VISIT LOW MDM: CPT

## 2025-02-26 RX ORDER — ACETAMINOPHEN 500 MG
1000 TABLET ORAL
Status: DISCONTINUED | OUTPATIENT
Start: 2025-02-26 | End: 2025-02-26 | Stop reason: HOSPADM

## 2025-02-26 RX ADMIN — IBUPROFEN 600 MG: 200 TABLET, FILM COATED ORAL at 09:59

## 2025-02-26 ASSESSMENT — ENCOUNTER SYMPTOMS
SHORTNESS OF BREATH: 0
VOMITING: 0
ABDOMINAL DISTENTION: 0
ABDOMINAL PAIN: 0
NAUSEA: 0

## 2025-02-26 ASSESSMENT — LIFESTYLE VARIABLES
HOW MANY STANDARD DRINKS CONTAINING ALCOHOL DO YOU HAVE ON A TYPICAL DAY: PATIENT DOES NOT DRINK
HOW OFTEN DO YOU HAVE A DRINK CONTAINING ALCOHOL: NEVER

## 2025-02-26 ASSESSMENT — PAIN DESCRIPTION - LOCATION
LOCATION: ELBOW
LOCATION: ELBOW

## 2025-02-26 ASSESSMENT — PAIN DESCRIPTION - ORIENTATION
ORIENTATION: RIGHT
ORIENTATION: LEFT

## 2025-02-26 ASSESSMENT — PAIN SCALES - GENERAL
PAINLEVEL_OUTOF10: 10
PAINLEVEL_OUTOF10: 10

## 2025-02-26 ASSESSMENT — PAIN - FUNCTIONAL ASSESSMENT: PAIN_FUNCTIONAL_ASSESSMENT: 0-10

## 2025-02-26 NOTE — ED PROVIDER NOTES
ED Attending Attestation Note     Date of evaluation: 2/26/2025    This patient was seen by the advance practice provider.  I have seen and examined the patient, agree with the workup, evaluation, management and diagnosis. The care plan has been discussed.  My assessment reveals left elbow pan. Neurovasc intact    XR ELBOW LEFT (MIN 3 VIEWS) (Final result)  Result time 02/26/25 09:53:44  Final result by Shabbir Moody MD (02/26/25 09:53:44)                Impression:      No acute osseous abnormality.    Mild elbow joint arthrosis.    Electronically signed by Shabbir Moody MD            Narrative:    EXAM: XR ELBOW LEFT (MIN 3 VIEWS)    INDICATION: pain    COMPARISON: 11/21/2013.    TECHNIQUE: 3 views left elbow    FINDINGS:    No acute fracture. Mild radiocapitellar and ulnotrochlear joint space loss and osteophyte formation. No large elbow joint effusion or focal soft tissue abnormality.                 John Diego MD  02/26/25 4601

## 2025-02-26 NOTE — ED NOTES
Pt endorses improvement of pain at discharge. Pt verbalizes understanding of discharge instructions. Pt discharged with copy of AVS and sling.      Kevin Chavez RN  02/26/25 5649

## 2025-02-26 NOTE — ED PROVIDER NOTES
THE [facility]  EMERGENCY DEPARTMENT ENCOUNTER          PHYSICIAN ASSISTANT NOTE     Date of evaluation: 2/26/2025    Chief Complaint     Elbow Pain (Pt states she was reaching out to grab something yesterday and heard a pop in her elbow. Now c/o throbbing in L elbow)    History of Present Illness     Fernanda Diego is a 64 y.o. female with a past medical history of chronic back pain, GERD hypertension, hyperlipidemia, DEANA, left shoulder arthroscopy, osteoarthritis, type 2 diabetes who presents with elbow pain.  Patient states she was pushing boxes in her apartment yesterday and then reached above her head to grab some rice and heard and felt a pop in the left elbow.  States the pain hurts all over and is rating down into her left hand.  Patient denies any other symptoms including fever, chills, chest pain, shortness of breath abdominal pain, nausea, vomiting, numbness or tingling.  Patient states putting pressure on that arm increases the pain. Patient has not tried anything for the pain.    ASSESSMENT / PLAN  (MEDICAL DECISION MAKING)     INITIAL VITALS: BP: (!) 156/77, Temp: 98.6 °F (37 °C), Pulse: 52, Respirations: 16, SpO2: 96 %    Fernanda Diego is a 64 y.o. female  with a past medical history of chronic back pain, GERD hypertension, hyperlipidemia, DEANA, left shoulder arthroscopy, osteoarthritis, type 2 diabetes who presents with elbow pain.  Physical exam reveals Patient has mild tenderness located generally all over her left elbow, mostly posteriorly. Patient has full range of motion and mildly decreased extension on the left elbow due to the pain. Has full strength on flexion. Patient has sensation intact. 2+ radial pulse. Neurovascular intact. No erythema, edema, ecchymosis.  No increased warmth. Patient has full range of motion to left wrist, hand, fingers. No tenderness to left wrist, fingers, or hand.    X-ray left elbow was ordered. This revealed no acute osseous abnormality. Mild elbow joint

## 2025-02-26 NOTE — DISCHARGE INSTRUCTIONS
Follow-up with primary care provider and orthopedics.  Follow-up with orthopedic, however if you cannot get in with them I provided you a number above to call for follow-up with orthopedics.  Come back to the ER if there is any new or worsening symptoms, chest pain, shortness of breath.  Take Tylenol/ibuprofen for symptom management.  Use the sling you have at home as well for symptom management.

## 2025-02-28 ENCOUNTER — APPOINTMENT (OUTPATIENT)
Dept: PHYSICAL THERAPY | Age: 65
End: 2025-02-28
Payer: MEDICARE

## 2025-02-28 NOTE — PROGRESS NOTES
Occupational Therapy  Facility/Department: [facility] C5 - MED SURG/ORTHO  Daily Treatment Note  NAME: Osvaldo Galeazzi  : 1960  MRN: [id number]    Date of Service: 10/17/2022    Discharge Recommendations:  Subacute/Skilled Nursing Facility  OT Equipment Recommendations  ADL Assistive Devices: Transfer Tub Bench;Sock-Aid Hard;Reacher; Toileting - 3-in-1 Commode  Other: Equipment if not d/c to SNF    Barriers to home discharge:   [x] Reported available assist at home upon discharge limited   [x] Patient or family requests DC to other than home    [x] Patient reports expectation of post acute Discharge disposition to other than home   [x] Other: Patient lives home alone. NWB LLE. Patient Diagnosis(es): The primary encounter diagnosis was Infection of total left knee replacement, initial encounter ([facility] [address] A diagnosis of Infection of prosthetic knee joint, initial encounter [facility]) was also pertinent to this visit. Assessment    Assessment: Pt is making good progress towards therapy goals. Grossly SBA for functional transfer with SW with good adherence to LLE NWB status. Complete BUE exercises with 2# weight and ADLs. Pt is supv for UB ADLs and SBA for LB ADLs. Mod fatigue noted at EOS. Cont per POC. Activity Tolerance: Patient tolerated treatment well;Patient limited by fatigue  Discharge Recommendations: Subacute/Skilled Nursing Facility  Other: Equipment if not d/c to SNF      Plan   Occupational Therapy Plan  Times Per Week: 4-6x's a week while in acute care     Restrictions  Restrictions/Precautions  Restrictions/Precautions: Weight Bearing; Fall Risk;General Precautions  Required Braces or Orthoses?: Yes  Lower Extremity Weight Bearing Restrictions  Left Lower Extremity Weight Bearing: Non Weight Bearing  Required Braces or Orthoses  Left Lower Extremity Brace: Knee Immobilizer  Position Activity Restriction  Other position/activity restrictions:  \"Knee immobilizer only for 2 weeks until quadriceps muscle VOICEMAIL  1. Caller Name: mom                      Call Back Number: [phone]    2. Message: mom lvm requesting any updates from [place] specialist mom also mentioned she unable to acces mychart at the moment so requested a call back     3. Patient approves office to leave a detailed voicemail/MyChart message: yes   restores full strength and proprioception, as prophylactic measure. Full flexion and extension of the knee to be encouraged\"-- per OLIVIA Turner note on 10/12/22    Subjective   Subjective  Subjective: Pt seated in bed and agreeable to OT tx session. /76, HR 98, O2 94%  Pain: Shooting pain in LLE. Reports minimal pain  Orientation  Overall Orientation Status: Within Functional Limits  Pain: 7/10 left knee. patient received pain medicine earlier today. Cognition  Overall Cognitive Status: WFL        Objective   Bed Mobility Training  Bed Mobility Training: Yes  Overall Level of Assistance: Supervision  Supine to Sit: Supervision; Additional time (HOB elevated)  Scooting: Supervision; Additional time (to EOB)  Balance  Sitting: Intact  Standing: Impaired (w/ SW)  Standing - Static: Good  Standing - Dynamic: Fair  Transfer Training  Overall Level of Assistance: Stand-by assistance (W/ SW, Pt able to maintain LLE NWB restriction throughout mobility)  Interventions: Verbal cues; Safety awareness training  Sit to Stand: Stand-by assistance  Stand to Sit: Stand-by assistance  Stand Pivot Transfers: Stand-by assistance  Bed to Chair: Stand-by assistance  Toilet Transfer: Stand-by assistance     ADL  Feeding: Independent  Grooming: Supervision  Grooming Skilled Clinical Factors: in stance at sink with close supv for NWB status  UE Bathing Skilled Clinical Factors: supv for UB bathing during sponge bath on toilet  LE Bathing: Stand by assistance  LE Bathing Skilled Clinical Factors: Completed sponge bath while seated on toileting with SBA provided while cleaning buttocks  UE Dressing Skilled Clinical Factors: SBA for IV line management  LE Dressing: Minimal assistance  LE Dressing Skilled Clinical Factors: to don brief over LLE  Toileting: Contact guard assistance  Toileting Skilled Clinical Factors: CGA provided for clothing management  Additional Comments: pt declining further ADLs  OT Exercises  Exercise Treatment: Pt performed the following UE therex seated in chair with 2# weight: wrist flex/extension, forearm pronation/supination, elbow flex/extension, shoulder flexion, horizontal aBd/aDduction and shoulder press  Dynamic Sitting Balance Exercises: x5 lateral scoots to L/R to address functional strength for RW management and bed mobiliity           Patient Education  Education Given To: Patient  Education Provided: Role of Therapy;Plan of Care;Home Exercise Program;Precautions; ADL Adaptive Strategies;Transfer Training;Equipment; Fall Prevention Strategies  Education Method: Demonstration;Verbal  Barriers to Learning: None  Education Outcome: Verbalized understanding;Demonstrated understanding;Continued education needed    AM-PAC score  AM-PAC Inpatient Daily Activity Raw Score: 20 (10/17/22 1152)  AM-PAC Inpatient ADL T-Scale Score : 42.03 (10/17/22 1152)  ADL Inpatient CMS 0-100% Score: 38.32 (10/17/22 1152)  ADL Inpatient CMS G-Code Modifier : Angelina Clinton (10/17/22 1152)    Goals  Short Term Goals  Time Frame for Short Term Goals: 1 week 10/18-- ongoing 10/14  Short Term Goal 1: Pt will complete LE dressing wtih SBA  Short Term Goal 2: Pt will complete toilet transfers with SBA by 10/15  Short Term Goal 3: Pt will complete standing level ADLs with SBA for balance during LE clothing mgmt. Patient Goals   Patient goals : \"to be able to wipe myself independently\"       Therapy Time   Individual Concurrent Group Co-treatment   Time In 96 86 26         Time Out 1007         Minutes 62         Timed Code Treatment Minutes: 62 Minutes     If pt is unable to be seen after this session, please let this note serve as discharge summary. Please see case management note for discharge disposition. Thank you.       Romie Martins OT

## 2025-03-03 ENCOUNTER — TELEPHONE (OUTPATIENT)
Dept: PRIMARY CARE CLINIC | Age: 65
End: 2025-03-03

## 2025-03-03 ENCOUNTER — TELEPHONE (OUTPATIENT)
Dept: ORTHOPEDIC SURGERY | Age: 65
End: 2025-03-03

## 2025-03-03 NOTE — TELEPHONE ENCOUNTER
Pt states her blood sugars have been on the rise for the past 2 weeks,today her blood sugar read 237 pt states that she did not eat and had only been drinking water prior to taking her blood sugar this morning, pt states it normally has been around 98 - 116, she took her blood sugars again today and it went down to 188. Pt states she is in pain with a lot of arthritis and that she will contact her orthopedic doctor as well, wants to know what she needs to do.

## 2025-03-03 NOTE — TELEPHONE ENCOUNTER
General Question     Subject: LT SHOULDER  Patient and /or Facility Request: Fernanda Diego LASHAE   Contact Number: [phone]      PATIENT CALLING REGARDING SHE WAS IN THE ER FOR HER LT ELBOW. HER LT ELBOW POP AND SHE'S HAVING A GREAT DEAL OF PAIN IN HER LT SHOULDER.    SHE'S NOT ABLE TO TAKE THE PAIN LOCATION BECAUSE IT MAKES HER SICK ON THE STOMACH.    PATIENT HAS BEEN TAKING IBUPROFEN AND TYLENOL, BUT SHE HAS TO TAKE SO MANY TO EASE THE PAIN.    PATIENT STATED THAT FOR THE LAST MONTH OR SO, HER LT SHOULDER HAS BEE COLD AND ACHY PATIENT DOESN'T KNOW IF IT'S COMING FROM WHEN SHE GO TO PT, BUT SHE HAS MISSED SOME PT SESSIONS DUE TO THE PAIN.    PATIENT ALSO, CALLING FOLLOWING UP ON HER MRI FOR HER RT SHOULDER.    PLEASE CALL BACK PATIENT AT THE ABOVE NUMBER..

## 2025-03-03 NOTE — TELEPHONE ENCOUNTER
RC/ No answer       MRI [facility] [place]  - Approved @ [place] [facility]  [address], [place] [address] [phone] TO Schedule

## 2025-03-04 ENCOUNTER — HOSPITAL ENCOUNTER (OUTPATIENT)
Dept: PHYSICAL THERAPY | Age: 65
Setting detail: THERAPIES SERIES
End: 2025-03-04
Payer: MEDICARE

## 2025-03-05 ENCOUNTER — HOSPITAL ENCOUNTER (OUTPATIENT)
Dept: PHYSICAL THERAPY | Age: 65
Setting detail: THERAPIES SERIES
Discharge: HOME | End: 2025-03-05
Payer: MEDICARE

## 2025-03-05 ENCOUNTER — OFFICE VISIT (OUTPATIENT)
Dept: PRIMARY CARE CLINIC | Age: 65
End: 2025-03-05
Payer: MEDICARE

## 2025-03-05 VITALS
SYSTOLIC BLOOD PRESSURE: 111 MMHG | WEIGHT: 188.8 LBS | OXYGEN SATURATION: 96 % | DIASTOLIC BLOOD PRESSURE: 70 MMHG | BODY MASS INDEX: 33.44 KG/M2 | HEART RATE: 108 BPM

## 2025-03-05 DIAGNOSIS — E11.9 TYPE 2 DIABETES MELLITUS WITHOUT COMPLICATION, WITHOUT LONG-TERM CURRENT USE OF INSULIN (HCC): ICD-10-CM

## 2025-03-05 DIAGNOSIS — M25.522 LEFT ELBOW PAIN: ICD-10-CM

## 2025-03-05 DIAGNOSIS — M19.012 PRIMARY OSTEOARTHRITIS OF LEFT SHOULDER: Primary | ICD-10-CM

## 2025-03-05 DIAGNOSIS — I10 BENIGN ESSENTIAL HTN: Chronic | ICD-10-CM

## 2025-03-05 PROCEDURE — 1090F PRES/ABSN URINE INCON ASSESS: CPT | Performed by: FAMILY MEDICINE

## 2025-03-05 PROCEDURE — 3017F COLORECTAL CA SCREEN DOC REV: CPT | Performed by: FAMILY MEDICINE

## 2025-03-05 PROCEDURE — 1036F TOBACCO NON-USER: CPT | Performed by: FAMILY MEDICINE

## 2025-03-05 PROCEDURE — 1123F ACP DISCUSS/DSCN MKR DOCD: CPT | Performed by: FAMILY MEDICINE

## 2025-03-05 PROCEDURE — 97110 THERAPEUTIC EXERCISES: CPT

## 2025-03-05 PROCEDURE — 97016 VASOPNEUMATIC DEVICE THERAPY: CPT

## 2025-03-05 PROCEDURE — 99214 OFFICE O/P EST MOD 30 MIN: CPT | Performed by: FAMILY MEDICINE

## 2025-03-05 PROCEDURE — 3074F SYST BP LT 130 MM HG: CPT | Performed by: FAMILY MEDICINE

## 2025-03-05 PROCEDURE — 3044F HG A1C LEVEL LT 7.0%: CPT | Performed by: FAMILY MEDICINE

## 2025-03-05 PROCEDURE — 97140 MANUAL THERAPY 1/> REGIONS: CPT

## 2025-03-05 PROCEDURE — G8417 CALC BMI ABV UP PARAM F/U: HCPCS | Performed by: FAMILY MEDICINE

## 2025-03-05 PROCEDURE — G2211 COMPLEX E/M VISIT ADD ON: HCPCS | Performed by: FAMILY MEDICINE

## 2025-03-05 PROCEDURE — G8428 CUR MEDS NOT DOCUMENT: HCPCS | Performed by: FAMILY MEDICINE

## 2025-03-05 PROCEDURE — 2022F DILAT RTA XM EVC RTNOPTHY: CPT | Performed by: FAMILY MEDICINE

## 2025-03-05 PROCEDURE — 3078F DIAST BP <80 MM HG: CPT | Performed by: FAMILY MEDICINE

## 2025-03-05 PROCEDURE — G8399 PT W/DXA RESULTS DOCUMENT: HCPCS | Performed by: FAMILY MEDICINE

## 2025-03-05 RX ORDER — METFORMIN HYDROCHLORIDE 500 MG/1
500 TABLET, EXTENDED RELEASE ORAL
Qty: 90 TABLET | Refills: 1 | Status: SHIPPED | OUTPATIENT
Start: 2025-03-05

## 2025-03-05 RX ORDER — METHOCARBAMOL 500 MG/1
500 TABLET, FILM COATED ORAL NIGHTLY
Qty: 14 TABLET | Refills: 0 | Status: SHIPPED | OUTPATIENT
Start: 2025-03-05 | End: 2025-03-19

## 2025-03-05 RX ORDER — CELECOXIB 100 MG/1
100 CAPSULE ORAL 2 TIMES DAILY
Qty: 60 CAPSULE | Refills: 0 | Status: SHIPPED | OUTPATIENT
Start: 2025-03-05

## 2025-03-05 NOTE — PROGRESS NOTES
Physical Exam  Constitutional:       General: She is not in acute distress.     Appearance: Normal appearance. She is not ill-appearing.   Cardiovascular:      Rate and Rhythm: Normal rate and regular rhythm.   Pulmonary:      Effort: Pulmonary effort is normal.   Musculoskeletal:      Left elbow: No swelling or deformity.      Right lower leg: No edema.      Left lower leg: No edema.      Comments: LUE in sling   Neurological:      General: No focal deficit present.      Mental Status: She is alert.   Psychiatric:         Mood and Affect: Mood normal.         Assessment:  Encounter Diagnoses   Name Primary?    Primary osteoarthritis of left shoulder Yes    Type 2 diabetes mellitus without complication, without long-term current use of insulin (HCC)     Benign essential HTN     Left elbow pain        Plan:    - Patient with a number of MSK issues. Continues to recover from left shoulder surgery. Now with more recent left elbow pain/inflammation, presumably due to OA and right shoulder continues to be problematic. I have sent an update to her orthopedic surgeon and recommended her to limit the use of her sling. Start celebrex for pain control, robaxin in the evenings only.   - Blood sugar has been increasing since stopping the metformin. Will start back at lowest dose.   - BP stable.     New Prescriptions    CELECOXIB (CELEBREX) 100 MG CAPSULE    Take 1 capsule by mouth 2 times daily    METFORMIN (GLUCOPHAGE-XR) 500 MG EXTENDED RELEASE TABLET    Take 1 tablet by mouth daily (with breakfast)    METHOCARBAMOL (ROBAXIN) 500 MG TABLET    Take 1 tablet by mouth nightly for 14 days        No orders of the defined types were placed in this encounter.       Return in about 4 weeks (around 4/2/2025) for MAWV.         Isabel Rico DO

## 2025-03-05 NOTE — FLOWSHEET NOTE
visits, this note will serve as a discharge from care along with the most recent update on progress.    Ortho Evaluation

## 2025-03-06 ASSESSMENT — ENCOUNTER SYMPTOMS: COUGH: 0

## 2025-03-12 ENCOUNTER — OFFICE VISIT (OUTPATIENT)
Dept: PRIMARY CARE CLINIC | Age: 65
End: 2025-03-12
Payer: MEDICARE

## 2025-03-12 VITALS — TEMPERATURE: 98.5 F | SYSTOLIC BLOOD PRESSURE: 108 MMHG | HEART RATE: 105 BPM | DIASTOLIC BLOOD PRESSURE: 70 MMHG

## 2025-03-12 DIAGNOSIS — E11.9 TYPE 2 DIABETES MELLITUS WITHOUT COMPLICATION, WITHOUT LONG-TERM CURRENT USE OF INSULIN (HCC): ICD-10-CM

## 2025-03-12 DIAGNOSIS — H00.014 HORDEOLUM EXTERNUM LEFT UPPER EYELID: Primary | ICD-10-CM

## 2025-03-12 DIAGNOSIS — J32.4 CHRONIC PANSINUSITIS: ICD-10-CM

## 2025-03-12 DIAGNOSIS — I10 BENIGN ESSENTIAL HTN: Chronic | ICD-10-CM

## 2025-03-12 PROCEDURE — 1090F PRES/ABSN URINE INCON ASSESS: CPT | Performed by: FAMILY MEDICINE

## 2025-03-12 PROCEDURE — 2022F DILAT RTA XM EVC RTNOPTHY: CPT | Performed by: FAMILY MEDICINE

## 2025-03-12 PROCEDURE — 3044F HG A1C LEVEL LT 7.0%: CPT | Performed by: FAMILY MEDICINE

## 2025-03-12 PROCEDURE — G2211 COMPLEX E/M VISIT ADD ON: HCPCS | Performed by: FAMILY MEDICINE

## 2025-03-12 PROCEDURE — 1123F ACP DISCUSS/DSCN MKR DOCD: CPT | Performed by: FAMILY MEDICINE

## 2025-03-12 PROCEDURE — G8417 CALC BMI ABV UP PARAM F/U: HCPCS | Performed by: FAMILY MEDICINE

## 2025-03-12 PROCEDURE — 3017F COLORECTAL CA SCREEN DOC REV: CPT | Performed by: FAMILY MEDICINE

## 2025-03-12 PROCEDURE — 99214 OFFICE O/P EST MOD 30 MIN: CPT | Performed by: FAMILY MEDICINE

## 2025-03-12 PROCEDURE — G8399 PT W/DXA RESULTS DOCUMENT: HCPCS | Performed by: FAMILY MEDICINE

## 2025-03-12 PROCEDURE — 3078F DIAST BP <80 MM HG: CPT | Performed by: FAMILY MEDICINE

## 2025-03-12 PROCEDURE — 1036F TOBACCO NON-USER: CPT | Performed by: FAMILY MEDICINE

## 2025-03-12 PROCEDURE — G8428 CUR MEDS NOT DOCUMENT: HCPCS | Performed by: FAMILY MEDICINE

## 2025-03-12 PROCEDURE — 3074F SYST BP LT 130 MM HG: CPT | Performed by: FAMILY MEDICINE

## 2025-03-12 RX ORDER — METFORMIN HYDROCHLORIDE 500 MG/1
1000 TABLET, EXTENDED RELEASE ORAL 2 TIMES DAILY WITH MEALS
Qty: 180 TABLET | Refills: 1 | Status: SHIPPED | OUTPATIENT
Start: 2025-03-12

## 2025-03-12 RX ORDER — ERYTHROMYCIN 5 MG/G
OINTMENT OPHTHALMIC
Qty: 1 G | Refills: 0 | Status: SHIPPED | OUTPATIENT
Start: 2025-03-12 | End: 2025-03-22

## 2025-03-12 NOTE — PROGRESS NOTES
Consult: General    Orthopaedic Surgery Consult    Attending: Edward Gongora MD  Consulting Attending: Evette Oswald MD  Consult Reason: Back Pain  DOC: 8/19/2024    HPI: Selena Xiong 83 year old female w/ a PMH of CAD/PVD, T2DM, HTN, HLD, COPD, CVA, dementia and a hx of back pain who was admitted for presyncope/chest pain/dizziness. Ortho consulted for chronic back pain.     As mentioned in the patient's history, she has had a hx of chronic low back pain which she states has been ongoing for \"years\". Patient states though that it has progressively worsened over the last year. Denies any recent trauma. She denies any numbness and weakness. Occasionally she will experience burning pain in her toes, as well as, shooting pains down her legs to her toes in her LE b/l. States that she's had issues with constipation and urinary urgency, but denies any bowel or bladder incontinence.        Past Medical History:  Active Ambulatory Problems     Diagnosis Date Noted    Chest pain 03/23/2022    Shortness of breath 03/23/2022    Atherosclerosis of native coronary artery without angina pectoris 03/23/2022    Hypertension 03/23/2022    Pulmonary hypertension, unspecified  (CMD) 10/11/2023    Recurrent major depressive disorder, remission status unspecified (CMD) 10/11/2023    Type 2 diabetes mellitus with microalbuminuria, without long-term current use of insulin  (CMD) 10/11/2023    Peripheral vascular disease, unspecified (CMD) 10/11/2023    Type 2 diabetes mellitus with morbid obesity  (CMD) 10/11/2023    Back pain 10/11/2023    Dementia with behavioral disturbance  (CMD) 11/29/2023    Anginal equivalent (CMD) 11/29/2023    Cough 11/29/2023    Medicare annual wellness visit, subsequent 05/29/2024     Resolved Ambulatory Problems     Diagnosis Date Noted    No Resolved Ambulatory Problems     Past Medical History:   Diagnosis Date    Ambulates with cane     Anxiety disorder     Cerebral infarction  (CMD) 2015    CHF  (congestive heart failure)  (CMD)     Chronic kidney disease     COPD (chronic obstructive pulmonary disease)  (CMD)     Coronary artery disease     Dementia  (CMD)     Depression     Diabetes mellitus  (CMD)     Dyslipidemia     Essential (primary) hypertension     Gastroesophageal reflux disease     Glaucoma     Pulmonary HTN  (CMD)     PVD (peripheral vascular disease) (CMD)     Sleep apnea     Wears hearing aid in both ears     Wears reading eyeglasses        Past Surgical History:  Past Surgical History:   Procedure Laterality Date    Cardiac catherization  2015    [place]    Colonoscopy      Eye surgery Bilateral     Hernia repair         Medications:  Medications Prior to Admission   Medication Sig Dispense Refill    Ozempic, 0.25 or 0.5 MG/DOSE, 2 MG/3ML injection Inject 0.5 mg into the skin every 7 days.      furosemide (LASIX) 20 MG tablet TAKE 1 TABLET BY MOUTH EVERY OTHER DAY 45 tablet 3    meclizine (ANTIVERT) 25 MG tablet TAKE 1 TABLET BY MOUTH EVERY DAY AS NEEDED 30 tablet 1    metoPROLOL succinate (TOPROL-XL) 25 MG 24 hr tablet TAKE 1 TABLET BY MOUTH DAILY 90 tablet 3    amLODIPine (NORVASC) 5 MG tablet Take 5 mg by mouth daily.      atorvastatin (LIPITOR) 80 MG tablet Take 1 tablet by mouth daily. 90 tablet 1    albuterol 108 (90 Base) MCG/ACT inhaler INHALE 2 PUFFS BY MOUTH EVERY 4 HOURS AS NEEDED 18 g 0    hydroCHLOROthiazide (HYDRODIURIL) 25 MG tablet Take 25 mg by mouth daily.      Breztri Aerosphere 160-9-4.8 MCG/ACT Aerosol Take 2 puffs by mouth 2 times daily.      fluticasone-salmeterol 250-50 MCG/ACT inhaler Inhale 1 puff into the lungs in the morning and 1 puff in the evening.      petrolatum, white,-mineral oil (LUBRIFRESH PM) Ointment at bedtime.      hypromellose (GENTEAL) 0.3 % ophthalmic gel Place 1 drop into both eyes at bedtime.      CARBOXYMethylcellulose PF (REFRESH CELLUVISC) 1 % Gel ophthalmic gel Place 2 drops into both eyes at bedtime.      coenzyme Q10 100 MG capsule Take 100 mg  by mouth daily.       losartan (COZAAR) 100 MG tablet Take 100 mg by mouth daily.      gabapentin (NEURONTIN) 100 MG capsule Take 100 mg by mouth daily.       Current Facility-Administered Medications   Medication Dose Route Frequency Provider Last Rate Last Admin    acetaminophen (TYLENOL) tablet 650 mg  650 mg Oral Q4H PRN Evette Oswald MD   650 mg at 08/19/24 1521    aspirin (ECOTRIN) enteric coated tablet 81 mg  81 mg Oral Daily Evette Oswald MD   81 mg at 08/19/24 0905    docusate sodium (COLACE) capsule 100 mg  100 mg Oral BID PRN Evette Oswald MD   100 mg at 08/18/24 1437    LORazepam (ATIVAN) injection 0.5 mg  0.5 mg Intravenous Once Evette Oswald MD        melatonin tablet 3 mg  3 mg Oral Nightly PRN Jluis Caceres MD        dextrose 50 % injection 25 g  25 g Intravenous PRN Jluis Caceres MD        dextrose 50 % injection 12.5 g  12.5 g Intravenous PRN Jluis Caceres MD        glucagon (GLUCAGEN) injection 1 mg  1 mg Intramuscular PRN Jluis Caceres MD        dextrose (GLUTOSE) 40 % gel 15 g  15 g Oral PRN Jluis Caceres MD        dextrose (GLUTOSE) 40 % gel 30 g  30 g Oral PRN Jluis Caceres MD        insulin lispro (ADMELOG,HumaLOG) - Correction Dose   Subcutaneous 4 times per day Jluis Caceres MD        heparin (porcine) injection 5,000 Units  5,000 Units Subcutaneous 3 times per day Suzanna Ahn MD   5,000 Units at 08/19/24 1521    amLODIPine (NORVASC) tablet 5 mg  5 mg Oral Daily Suzanna Ahn MD   5 mg at 08/19/24 0905    atorvastatin (LIPITOR) tablet 80 mg  80 mg Oral Daily Suzanna Ahn MD   80 mg at 08/19/24 0905    furosemide (LASIX) tablet 20 mg  20 mg Oral Every Other Day Suzanna Ahn MD   20 mg at 08/19/24 0905    gabapentin (NEURONTIN) capsule 100 mg  100 mg Oral Daily Suzanna Ahn MD   100 mg at 08/19/24 0905    hydroCHLOROthiazide tablet 25 mg  25 mg Oral Daily Suzanna Ahn MD   25 mg at 08/19/24 0905    meclizine (ANTIVERT) tablet 25 mg   25 mg Oral Daily PRN Suzanna Ahn MD        metoPROLOL succinate (TOPROL-XL) ER tablet 25 mg  25 mg Oral Daily Suzanna Ahn MD   25 mg at 08/19/24 0905    albuterol inhaler 2 puff  2 puff Inhalation Q4H Resp PRN Suzanna Ahn MD        fluticasone-vilanterol (BREO ELLIPTA) 100-25 MCG/ACT inhaler 1 puff  1 puff Inhalation Daily Resp Suzanna Ahn MD   1 puff at 08/19/24 0905       Allergies:  ALLERGIES:  Patient has no known allergies.    Family History:  Family History   Family history unknown: Yes       Social History:  Social History     Tobacco Use    Smoking status: Never    Smokeless tobacco: Never   Vaping Use    Vaping status: never used   Substance Use Topics    Alcohol use: Yes     Comment: seldom    Drug use: Never       ROS:  Complete 14 point ROS performed. ROS otherwise negative except for pertinent positives, negatives noted in HPI.    Vitals:    08/19/24 1616   BP: 121/73   Pulse: 71   Resp: 16   Temp: 98.2 °F (36.8 °C)       Physical Exam:  General: Awake, alert, in NAD.   CV: Regular rate and rhythm.  Respiratory: Clear to auscultation bilaterally.  Neuro: Motor strength 5/5 in UE and LE b/l. Sensation of UE and LE intact b/l. No clonus. Rectal exam with good tone.   MSK:     RUE:  No deformities, swelling, ecchymosis, or lacerations.  Skin is intact, warm, pink, dry.   Full, painless PROM shoulder, elbow, wrist, and fingers.  Non tender to palpation of the clavicle, shoulder, elbow, wrist, and hand. Compartments soft.  Sensation intact of the axillary, radial, ulnar, and median nerve  Motor of the radial, PIN, AIN, ulnar and interossei nerves intact.   Radial pulse 2+. Capillary refill < 2 seconds.     LUE:  No deformities, swelling, ecchymosis, or lacerations.  Skin is intact, warm, pink, dry.   Full, painless PROM shoulder, elbow, wrist, and fingers.  Non tender to palpation of the clavicle, shoulder, elbow, wrist, and hand. Compartments soft.  Sensation intact of the axillary, radial,    Musculoskeletal:  Positive for arthralgias.       /70 (BP Site: Left Upper Arm, Patient Position: Sitting, BP Cuff Size: Large Adult)   Pulse (!) 105   Temp 98.5 °F (36.9 °C) (Oral)   LMP 01/26/1996      Physical Exam  Constitutional:       General: She is not in acute distress.     Appearance: Normal appearance. She is not ill-appearing.   Eyes:      General:         Left eye: Hordeolum (left upper eyelid swollen, mildly erythematous and medial aspect) present.     Conjunctiva/sclera:      Right eye: Right conjunctiva is not injected.      Left eye: Left conjunctiva is not injected.   Cardiovascular:      Rate and Rhythm: Normal rate and regular rhythm.      Heart sounds: No murmur heard.  Pulmonary:      Effort: Pulmonary effort is normal. No respiratory distress.   Neurological:      General: No focal deficit present.      Mental Status: She is alert.   Psychiatric:         Mood and Affect: Mood normal.         Assessment:  Encounter Diagnoses   Name Primary?    Hordeolum externum left upper eyelid Yes    Chronic pansinusitis     Type 2 diabetes mellitus without complication, without long-term current use of insulin (HCC)     Benign essential HTN        Plan:    - Recommend continued warm compresses. Topical abx to affected area.   - T2DM - A1c has been very stable but blood sugars are elevated persistently. She will increase metformin dose and continue watching diet.   - BP stable.     New Prescriptions    ERYTHROMYCIN (ROMYCIN) 5 MG/GM OPHTHALMIC OINTMENT    Apply 0.5 cm ribbon of ointment to left eyelid three times a day x 7 days        Orders Placed This Encounter   Procedures    [facility]        Return if symptoms worsen or fail to improve.         Isabel Rico, DO   ulnar, and median nerve  Motor of the radial, PIN, AIN, ulnar and interossei nerves intact.   Radial pulse 2+. Capillary refill < 2 seconds.     Pelvis:  Inspection: No deformities, swelling, ecchymosis, or lacerations.  Stable to AP, lateral compression.    RLE:  Inspection: No deformities, swelling, ecchymosis, or lacerations.  Skin is intact, warm, pink and dry.   Full, painless PROM hip, knee, and ankle.  Non tender to palpation of the hip, femur, knee, leg, ankle, and foot. Compartments soft.  Sensation intact of the saphenous, superficial peroneal, deep peroneal, sural.   Motor of the TA, EHL, FHL, and gastroc/soleus intact.  DP and PT pulses 2+. Capillary refill < 2 seconds.     LLE:  Inspection: No deformities, swelling, ecchymosis, or lacerations.  Skin is intact, warm, pink and dry.   Full, painless PROM hip, knee, and ankle.  Non tender to palpation of the hip, femur, knee, leg, ankle, and foot. Compartments soft.  Sensation intact of the saphenous, superficial peroneal, deep peroneal, sural.   Motor of the TA, EHL, FHL, and gastroc/soleus intact.  DP and PT pulses 2+. Capillary refill < 2 seconds.     Labs:  Lab Results   Component Value Date    WBC 5.7 08/19/2024    WBC 5.2 10/07/2019    HGB 11.5 (L) 08/19/2024    HGB 10.7 (L) 10/07/2019    HCT 37.2 08/19/2024    HCT 34.6 (L) 10/07/2019     08/19/2024     10/07/2019     Lab Results   Component Value Date    GLUCOSE 117 (H) 08/19/2024    GLUCOSE 113 (H) 10/07/2019    BUN 29 (H) 08/19/2024    BUN 26 (H) 10/07/2019     Lab Results   Component Value Date    INR 1.0 10/06/2019    INR  10/06/2019     INR Therapeutic Range: 2.0 to 3.0 (2.5 to 3.5 recommended for recurrent thrombotic episodes and mechanical prosthetic heart valves.)    PTT 27 10/06/2019    PTT PTT  Therapeutic Range:  45-70 seconds. 10/06/2019    PTT NOT APPLICABLE 10/06/2019       Imaging:  (8/18)  - MRI Lumbar Spine W/o: Lumbar degenerative disc and joint disease.  No  significant spinal canal or neuroforaminal stenosis.    Assessment/Plan:  84 y/o F, hx of CAD/PVD, T2DM, HTN, HLD, COPD, CVA, dementia and chronic low back pain. Ortho consulted for back pain. Has had a hx of it for years but progressively worsening over the last year. Denies any bowel/bladder incontinence. Denies any weakness or numbness but states she experiences burning pains in her toes & shotting pains down her legs to her toes b/l. MRI LS showed DDD/DJD. No signigicant canal/neuroforaminal stenosis. Motor and sensation intact in the UE and LE b/l. No clonus. Good rectal tone.    - No acute surgical interventions at this time   - Follow up outpatient after discharge with Edward Sood      Discussed case with orthopedic surgery senior resident Dr. Shah.  Case to be discussed/staffed with orthopedic surgery attending Edward Sood. Pending additional recs.        Signed:  Sergio Lockwood D.O  Emergency Medicine Resident, PGY-1

## 2025-03-13 ENCOUNTER — HOSPITAL ENCOUNTER (OUTPATIENT)
Dept: MRI IMAGING | Age: 65
Discharge: HOME | End: 2025-03-13
Attending: ORTHOPAEDIC SURGERY
Payer: MEDICARE

## 2025-03-13 DIAGNOSIS — M75.21 BICIPITAL TENDONITIS OF RIGHT SHOULDER: ICD-10-CM

## 2025-03-13 DIAGNOSIS — M25.811 IMPINGEMENT OF RIGHT SHOULDER: ICD-10-CM

## 2025-03-13 PROCEDURE — 73221 MRI JOINT UPR EXTREM W/O DYE: CPT

## 2025-03-14 ENCOUNTER — HOSPITAL ENCOUNTER (OUTPATIENT)
Dept: PHYSICAL THERAPY | Age: 65
Setting detail: THERAPIES SERIES
Discharge: HOME | End: 2025-03-14
Payer: MEDICARE

## 2025-03-14 ENCOUNTER — OFFICE VISIT (OUTPATIENT)
Dept: ORTHOPEDIC SURGERY | Age: 65
End: 2025-03-14
Payer: MEDICARE

## 2025-03-14 VITALS — BODY MASS INDEX: 33.44 KG/M2 | WEIGHT: 188.71 LBS | HEIGHT: 63 IN

## 2025-03-14 DIAGNOSIS — M25.811 IMPINGEMENT OF RIGHT SHOULDER: Primary | ICD-10-CM

## 2025-03-14 DIAGNOSIS — Z01.818 PRE-OP TESTING: ICD-10-CM

## 2025-03-14 DIAGNOSIS — M75.101 NON-TRAUMATIC ROTATOR CUFF TEAR, RIGHT: ICD-10-CM

## 2025-03-14 DIAGNOSIS — M75.21 BICIPITAL TENDONITIS OF RIGHT SHOULDER: ICD-10-CM

## 2025-03-14 DIAGNOSIS — M75.51 ACUTE SHOULDER BURSITIS, RIGHT: ICD-10-CM

## 2025-03-14 PROCEDURE — 1036F TOBACCO NON-USER: CPT | Performed by: ORTHOPAEDIC SURGERY

## 2025-03-14 PROCEDURE — G8428 CUR MEDS NOT DOCUMENT: HCPCS | Performed by: ORTHOPAEDIC SURGERY

## 2025-03-14 PROCEDURE — 97140 MANUAL THERAPY 1/> REGIONS: CPT

## 2025-03-14 PROCEDURE — 1123F ACP DISCUSS/DSCN MKR DOCD: CPT | Performed by: ORTHOPAEDIC SURGERY

## 2025-03-14 PROCEDURE — 3017F COLORECTAL CA SCREEN DOC REV: CPT | Performed by: ORTHOPAEDIC SURGERY

## 2025-03-14 PROCEDURE — G8417 CALC BMI ABV UP PARAM F/U: HCPCS | Performed by: ORTHOPAEDIC SURGERY

## 2025-03-14 PROCEDURE — 1090F PRES/ABSN URINE INCON ASSESS: CPT | Performed by: ORTHOPAEDIC SURGERY

## 2025-03-14 PROCEDURE — 97110 THERAPEUTIC EXERCISES: CPT

## 2025-03-14 PROCEDURE — 99214 OFFICE O/P EST MOD 30 MIN: CPT | Performed by: ORTHOPAEDIC SURGERY

## 2025-03-14 PROCEDURE — 97016 VASOPNEUMATIC DEVICE THERAPY: CPT

## 2025-03-14 PROCEDURE — G8399 PT W/DXA RESULTS DOCUMENT: HCPCS | Performed by: ORTHOPAEDIC SURGERY

## 2025-03-14 RX ORDER — MUPIROCIN 20 MG/G
OINTMENT TOPICAL
Qty: 1 G | Refills: 0 | Status: SHIPPED | OUTPATIENT
Start: 2025-03-14

## 2025-03-14 ASSESSMENT — ENCOUNTER SYMPTOMS
EYE PAIN: 1
EYE DISCHARGE: 0
EYE REDNESS: 0

## 2025-03-14 NOTE — FLOWSHEET NOTE
[facility]- Outpatient Rehabilitation and Therapy [address], [address], [place] [address] office: [phone] fax: [phone]      Physical Therapy: TREATMENT/PROGRESS NOTE   Patient: Fernanda Diego (64 y.o. female)   Examination Date: 2025   :  1960 MRN: [id number]   Visit #: 4   Insurance Allowable Auth Needed   BMN []Yes    [x]No    Insurance: Payor: [facility] MEDICARE / Plan: [facility] MEDICARE COMPLETE / Product Type: *No Product type* /   Insurance ID: [id number] - (Medicare Managed)  Secondary Insurance (if applicable):    Treatment Diagnosis:     ICD-10-CM    1. Left shoulder pain, unspecified chronicity  M25.512       2. Pain and swelling of Left shoulder  M25.512     M25.412       3. Weakness of Left shoulder  R29.898          Medical Diagnosis:  S/P shoulder surgery [Z98.890]   Referring Physician: Nickolas Soto PA-C  PCP: Isabel Rico DO     Plan of care signed (Y/N):     Date of Patient follow up with Physician:      Plan of Care Report: NO  POC update due: (10 visits /OR AUTH LIMITS, whichever is less) 3/21/25                                            Medical History:  Comorbidities:  Diabetes (Type I or II)  Hypertension  Osteoarthritis  Relevant Medical History: Hx of R RCR ~15-20 years ago                                         Precautions/ Contra-indications:           Latex allergy:  NO  Pacemaker:    NO  Contraindications for Manipulation: recent surgical history (relative)  Date of Surgery: L RCR, SAD and Biceps Tenodesis on 24  Other:    Red Flags:  None    Suicide Screening:   The patient did not verbalize a primary behavioral concern, suicidal ideation, suicidal intent, or demonstrate suicidal behaviors.    Preferred Language for Healthcare:   [x] English       [] other:    SUBJECTIVE EXAMINATION     Patient stated complaint:   Pt states her shoulder is doing alright. Sore when reaching up overhead lately.     Eval: Pt presents post op L

## 2025-03-14 NOTE — PROGRESS NOTES
Dr Srini Torres      Date /Time 3/14/2025             10:32 AM EDT  Name Fernanda Diego             1960   [facility]  [facility]  MRN [id number]                No chief complaint on file.       History of Present Illness    Fernanda Diego is a 64 y.o. female who presents with  right Shoulder pain.    Sent in consultation by Isabel Rico DO, .    Injury Mechanism:  none.  Worker's Comp. & legal issues:   none.  Previous Treatments: Ice, Heat, and NSAIDs    Patient presents to the office today for follow-up visit.  Patient here concerning her right shoulder.  She did receive an intra-articular cortisone injection August 14, 2024 for combination of impingement and biceps tendinitis with suspected chronic rotator cuff tear.  The injection really did not work well.  Her pain continues despite injection and physical therapy.  Pain mostly over the anterior and lateral aspect of her shoulder.  Increased pain and weakness with overhead activities.  We did order an MRI at her last visit.  She is here to review results.  Today her symptoms are really are concentrated anteriorly.    Previous history: Patient presents the office today for a new problem.  Patient here with a chief complaint of right shoulder pain.  Patient's right shoulder became painful approximately a month ago.  No specific injury or trauma.  She has had a previous rotator cuff repair remotely.  It was approximately 15-20 years ago.  Her pain is aggravated by activities especially activities over shoulder level.  She also complains of weakness.  She is currently taking ibuprofen and is a well-controlled diabetic.    Past Medical History  Past Medical History:   Diagnosis Date    Allergic rhinitis     Chronic back pain     Only when gas pain develops    GERD (gastroesophageal reflux disease) 2022    HTN (hypertension)     Hyperlipidemia     Obstructive sleep apnea (adult) (pediatric)     Non-compliant with CPAP    Osteoarthritis

## 2025-03-15 ENCOUNTER — PREP FOR PROCEDURE (OUTPATIENT)
Dept: ORTHOPEDIC SURGERY | Age: 65
End: 2025-03-15

## 2025-03-15 DIAGNOSIS — M25.811 IMPINGEMENT OF RIGHT SHOULDER: ICD-10-CM

## 2025-03-15 DIAGNOSIS — M75.21 BICIPITAL TENDINITIS OF RIGHT SHOULDER: ICD-10-CM

## 2025-03-15 DIAGNOSIS — J30.9 ALLERGIC SINUSITIS: ICD-10-CM

## 2025-03-15 DIAGNOSIS — M75.51 BURSITIS OF RIGHT SHOULDER: ICD-10-CM

## 2025-03-15 DIAGNOSIS — I10 BENIGN ESSENTIAL HTN: Chronic | ICD-10-CM

## 2025-03-15 PROBLEM — M75.111 PARTIAL TEAR OF RIGHT ROTATOR CUFF: Status: ACTIVE | Noted: 2025-03-15

## 2025-03-17 RX ORDER — LOSARTAN POTASSIUM 100 MG/1
100 TABLET ORAL DAILY
Qty: 90 TABLET | Refills: 1 | Status: SHIPPED | OUTPATIENT
Start: 2025-03-17

## 2025-03-17 RX ORDER — MONTELUKAST SODIUM 10 MG/1
10 TABLET ORAL NIGHTLY
Qty: 90 TABLET | Refills: 1 | Status: SHIPPED | OUTPATIENT
Start: 2025-03-17

## 2025-03-17 NOTE — TELEPHONE ENCOUNTER
Medication:   Requested Prescriptions     Pending Prescriptions Disp Refills    montelukast (SINGULAIR) 10 MG tablet [Pharmacy Med Name: MONTELUKAST SOD 10 MG TABLET] 90 tablet 1     Sig: TAKE ONE TABLET BY MOUTH ONCE NIGHTLY    losartan (COZAAR) 100 MG tablet [Pharmacy Med Name: LOSARTAN POTASSIUM 100 MG TAB] 90 tablet 1     Sig: TAKE 1 TABLET BY MOUTH DAILY     Last Filled:  12/11/24    Last appt: 3/12/2025   Next appt: 4/2/2025

## 2025-03-21 ENCOUNTER — APPOINTMENT (OUTPATIENT)
Dept: PHYSICAL THERAPY | Age: 65
End: 2025-03-21
Payer: MEDICARE

## 2025-03-24 ENCOUNTER — TELEPHONE (OUTPATIENT)
Dept: PRIMARY CARE CLINIC | Age: 65
End: 2025-03-24

## 2025-03-24 NOTE — TELEPHONE ENCOUNTER
Call to triage:    Spoke with patient, she states she is having confusion/forgetfulness. Two episodes of losing car keys while being out-once they were still in car, once in her purse.  Episodes of fatigue.   Abdominal pain.    Sugars have been running in he upper 300s lately.  Today was 368 fasting from the evening before.  Is drinking water.    Offered appointment with Ms Brooke today.  She declined. She is scheduled for tomorrow morning with Dr Rico.

## 2025-03-25 ENCOUNTER — RESULTS FOLLOW-UP (OUTPATIENT)
Dept: PRIMARY CARE CLINIC | Age: 65
End: 2025-03-25

## 2025-03-25 ENCOUNTER — OFFICE VISIT (OUTPATIENT)
Dept: PRIMARY CARE CLINIC | Age: 65
End: 2025-03-25
Payer: MEDICARE

## 2025-03-25 ENCOUNTER — HOSPITAL ENCOUNTER (OUTPATIENT)
Dept: PHYSICAL THERAPY | Age: 65
Setting detail: THERAPIES SERIES
Discharge: HOME | End: 2025-03-25
Payer: MEDICARE

## 2025-03-25 VITALS
OXYGEN SATURATION: 97 % | HEART RATE: 70 BPM | DIASTOLIC BLOOD PRESSURE: 64 MMHG | BODY MASS INDEX: 33.05 KG/M2 | WEIGHT: 186.6 LBS | SYSTOLIC BLOOD PRESSURE: 126 MMHG

## 2025-03-25 DIAGNOSIS — Z79.4 TYPE 2 DIABETES MELLITUS WITH HYPERGLYCEMIA, WITH LONG-TERM CURRENT USE OF INSULIN (HCC): Primary | ICD-10-CM

## 2025-03-25 DIAGNOSIS — E11.65 TYPE 2 DIABETES MELLITUS WITH HYPERGLYCEMIA, WITH LONG-TERM CURRENT USE OF INSULIN (HCC): ICD-10-CM

## 2025-03-25 DIAGNOSIS — I10 BENIGN ESSENTIAL HTN: Chronic | ICD-10-CM

## 2025-03-25 DIAGNOSIS — E11.65 TYPE 2 DIABETES MELLITUS WITH HYPERGLYCEMIA, WITH LONG-TERM CURRENT USE OF INSULIN (HCC): Primary | ICD-10-CM

## 2025-03-25 DIAGNOSIS — Z79.4 TYPE 2 DIABETES MELLITUS WITH HYPERGLYCEMIA, WITH LONG-TERM CURRENT USE OF INSULIN (HCC): ICD-10-CM

## 2025-03-25 LAB
ALBUMIN SERPL-MCNC: 4.1 G/DL (ref 3.4–5)
ALBUMIN/GLOB SERPL: 1.4 {RATIO} (ref 1.1–2.2)
ALP SERPL-CCNC: 154 U/L (ref 40–129)
ALT SERPL-CCNC: 13 U/L (ref 10–40)
ANION GAP SERPL CALCULATED.3IONS-SCNC: 11 MMOL/L (ref 3–16)
AST SERPL-CCNC: 15 U/L (ref 15–37)
BASOPHILS # BLD: 0.1 K/UL (ref 0–0.2)
BASOPHILS NFR BLD: 0.5 %
BILIRUB SERPL-MCNC: 0.4 MG/DL (ref 0–1)
BILIRUBIN, POC: NEGATIVE
BLOOD URINE, POC: NEGATIVE
BUN SERPL-MCNC: 12 MG/DL (ref 7–20)
CALCIUM SERPL-MCNC: 10.3 MG/DL (ref 8.3–10.6)
CHLORIDE SERPL-SCNC: 95 MMOL/L (ref 99–110)
CHP ED QC CHECK: NORMAL
CLARITY, POC: CLEAR
CO2 SERPL-SCNC: 25 MMOL/L (ref 21–32)
COLOR, POC: YELLOW
CREAT SERPL-MCNC: 0.7 MG/DL (ref 0.6–1.2)
DEPRECATED RDW RBC AUTO: 15.9 % (ref 12.4–15.4)
EOSINOPHIL # BLD: 0.1 K/UL (ref 0–0.6)
EOSINOPHIL NFR BLD: 0.9 %
GFR SERPLBLD CREATININE-BSD FMLA CKD-EPI: >90 ML/MIN/{1.73_M2}
GLUCOSE BLD-MCNC: 429 MG/DL
GLUCOSE SERPL-MCNC: 410 MG/DL (ref 70–99)
GLUCOSE URINE, POC: 500 MG/DL
HCT VFR BLD AUTO: 37.4 % (ref 36–48)
HGB BLD-MCNC: 11.9 G/DL (ref 12–16)
KETONES, POC: NEGATIVE MG/DL
LEUKOCYTE EST, POC: NEGATIVE
LYMPHOCYTES # BLD: 2.9 K/UL (ref 1–5.1)
LYMPHOCYTES NFR BLD: 21.1 %
MCH RBC QN AUTO: 23.7 PG (ref 26–34)
MCHC RBC AUTO-ENTMCNC: 31.7 G/DL (ref 31–36)
MCV RBC AUTO: 74.8 FL (ref 80–100)
MONOCYTES # BLD: 0.7 K/UL (ref 0–1.3)
MONOCYTES NFR BLD: 5.1 %
NEUTROPHILS # BLD: 9.9 K/UL (ref 1.7–7.7)
NEUTROPHILS NFR BLD: 72.4 %
NITRITE, POC: NEGATIVE
PH, POC: 6.5
PLATELET # BLD AUTO: 297 K/UL (ref 135–450)
PMV BLD AUTO: 10.4 FL (ref 5–10.5)
POTASSIUM SERPL-SCNC: 4 MMOL/L (ref 3.5–5.1)
PROT SERPL-MCNC: 7.1 G/DL (ref 6.4–8.2)
PROTEIN, POC: 30 MG/DL
RBC # BLD AUTO: 5 M/UL (ref 4–5.2)
SODIUM SERPL-SCNC: 131 MMOL/L (ref 136–145)
SPECIFIC GRAVITY, POC: 1.02
UROBILINOGEN, POC: 0.2 MG/DL
WBC # BLD AUTO: 13.6 K/UL (ref 4–11)

## 2025-03-25 PROCEDURE — 81002 URINALYSIS NONAUTO W/O SCOPE: CPT | Performed by: FAMILY MEDICINE

## 2025-03-25 PROCEDURE — 1160F RVW MEDS BY RX/DR IN RCRD: CPT | Performed by: FAMILY MEDICINE

## 2025-03-25 PROCEDURE — G8399 PT W/DXA RESULTS DOCUMENT: HCPCS | Performed by: FAMILY MEDICINE

## 2025-03-25 PROCEDURE — 1036F TOBACCO NON-USER: CPT | Performed by: FAMILY MEDICINE

## 2025-03-25 PROCEDURE — 97140 MANUAL THERAPY 1/> REGIONS: CPT

## 2025-03-25 PROCEDURE — G8417 CALC BMI ABV UP PARAM F/U: HCPCS | Performed by: FAMILY MEDICINE

## 2025-03-25 PROCEDURE — 97110 THERAPEUTIC EXERCISES: CPT

## 2025-03-25 PROCEDURE — 2022F DILAT RTA XM EVC RTNOPTHY: CPT | Performed by: FAMILY MEDICINE

## 2025-03-25 PROCEDURE — 3074F SYST BP LT 130 MM HG: CPT | Performed by: FAMILY MEDICINE

## 2025-03-25 PROCEDURE — 82962 GLUCOSE BLOOD TEST: CPT | Performed by: FAMILY MEDICINE

## 2025-03-25 PROCEDURE — G8427 DOCREV CUR MEDS BY ELIG CLIN: HCPCS | Performed by: FAMILY MEDICINE

## 2025-03-25 PROCEDURE — 1123F ACP DISCUSS/DSCN MKR DOCD: CPT | Performed by: FAMILY MEDICINE

## 2025-03-25 PROCEDURE — 1159F MED LIST DOCD IN RCRD: CPT | Performed by: FAMILY MEDICINE

## 2025-03-25 PROCEDURE — G2211 COMPLEX E/M VISIT ADD ON: HCPCS | Performed by: FAMILY MEDICINE

## 2025-03-25 PROCEDURE — 3017F COLORECTAL CA SCREEN DOC REV: CPT | Performed by: FAMILY MEDICINE

## 2025-03-25 PROCEDURE — 3078F DIAST BP <80 MM HG: CPT | Performed by: FAMILY MEDICINE

## 2025-03-25 PROCEDURE — 99214 OFFICE O/P EST MOD 30 MIN: CPT | Performed by: FAMILY MEDICINE

## 2025-03-25 PROCEDURE — 3044F HG A1C LEVEL LT 7.0%: CPT | Performed by: FAMILY MEDICINE

## 2025-03-25 PROCEDURE — 1090F PRES/ABSN URINE INCON ASSESS: CPT | Performed by: FAMILY MEDICINE

## 2025-03-25 RX ORDER — INSULIN GLARGINE 100 [IU]/ML
10 INJECTION, SOLUTION SUBCUTANEOUS NIGHTLY
Qty: 5 ADJUSTABLE DOSE PRE-FILLED PEN SYRINGE | Refills: 3 | Status: SHIPPED | OUTPATIENT
Start: 2025-03-25

## 2025-03-25 NOTE — TELEPHONE ENCOUNTER
Please CALL Fernanda to let her know labs are okay. Just the glucose is high. She should continue the metformin and start the lantus as ordered today and I will see her back in a week. If she feels worse at any point, then she needs to go to the ER.

## 2025-03-25 NOTE — PROGRESS NOTES
(BACTROBAN) 2 % ointment Apply twice daily to each nare for 5 days prior to surgical procedure  Srini Torres MD   metFORMIN (GLUCOPHAGE-XR) 500 MG extended release tablet Take 2 tablets by mouth 2 times daily (with meals)  Isabel Rico DO   celecoxib (CELEBREX) 100 MG capsule Take 1 capsule by mouth 2 times daily  Isabel Rico DO   omeprazole (PRILOSEC) 20 MG delayed release capsule Take 1 capsule by mouth every morning (before breakfast)  Sanjeev Augustine PA-C   famotidine (PEPCID) 20 MG tablet Take 1 tablet by mouth 2 times daily  Sanjeev Augustine PA-C   aluminum & magnesium hydroxide-simethicone (MAALOX) 200-200-20 MG/5ML SUSP suspension Take 5 mLs by mouth every 6 hours as needed for Indigestion Indications: Gastroesophageal Reflux Disease  Sanjeev Augustine PA-C   nystatin (MYCOSTATIN) 161689 UNIT/GM cream Apply topically 2 times daily.  Isabel Rico DO   Accu-Chek Softclix Lancets MISC USE TO TEST 1 TO 3 TIMES PER DAY FOR SYMPTOMS OF IRREGULAR GLUCOSE  Isabel Rico DO   atorvastatin (LIPITOR) 10 MG tablet TAKE 1 TABLET BY MOUTH DAILY  Isabel Rico DO   amLODIPine (NORVASC) 5 MG tablet TAKE 1 TABLET BY MOUTH DAILY  Isabel Rico DO   fluticasone (FLONASE) 50 MCG/ACT nasal spray SPRAY TWO SPRAYS IN EACH NOSTRIL ONCE DAILY  Sami Smith MD   blood glucose test strips (ACCU-CHEK DALE PLUS) strip TEST 1 TO 3 TIMES A DAY AS NEEDED FOR SYMPTOMS OF IRREGULAR BLOOD SUGAR  Isabel Rico DO   Calcium Carb-Cholecalciferol 600-800 MG-UNIT TABS Take 1 tablet by mouth in the morning and 1 tablet in the evening.  Provider, MD Kris   cetirizine (ZYRTEC) 10 MG tablet Take 1 tablet by mouth nightly  John Green DO        Past Medical History:   Diagnosis Date    Allergic rhinitis     GERD (gastroesophageal reflux disease) 2022    HTN (hypertension)     Hyperlipidemia     Obstructive sleep apnea (adult)

## 2025-03-25 NOTE — PLAN OF CARE
[facility]- Outpatient Rehabilitation and Therapy [address], [address], [place] [address] office: [phone] fax: [phone]    Physical Therapy Re-Certification Plan of Care    Dear Nickolas Soto PA-C  ,    We had the pleasure of treating the following patient for physical therapy services at [facility]. A summary of our findings can be found in the updated assessment below.  This includes our plan of care.  If you have any questions or concerns regarding these findings, please do not hesitate to contact me at the office phone number checked above.  Thank you for the referral.     Physician Signature:________________________________Date:__________________  By signing above (or electronic signature), therapist's plan is approved by physician      Total Visits: 9     Overall Response to Treatment:  Patient is responding well to treatment and improvement is noted with regards to goals    Recommendation:    [x] Continue PT 1x / wk for 4 weeks.   [] Hold PT, pending MD visit   [] Discharge to [facility]. Follow up with PT or MD PRN.     Physical Therapy: TREATMENT/PROGRESS NOTE   Patient: Fernanda Diego (64 y.o. female)   Examination Date: 2025   :  1960 MRN: [id number]   Visit #: 9   Insurance Allowable Auth Needed   BMN []Yes    [x]No    Insurance: Payor: [facility] MEDICARE / Plan: [facility] MEDICARE COMPLETE / Product Type: *No Product type* /   Insurance ID: [id number] - (Medicare Managed)  Secondary Insurance (if applicable):    Treatment Diagnosis:     ICD-10-CM    1. Left shoulder pain, unspecified chronicity  M25.512       2. Pain and swelling of Left shoulder  M25.512     M25.412       3. Weakness of Left shoulder  R29.898          Medical Diagnosis:  S/P shoulder surgery [Z98.890]  Shoulder pain [M25.519]   Referring Physician: Nickolas Soto PA-C  PCP: Isabel Rico DO     Plan of care signed (Y/N):     Date of Patient follow up with

## 2025-03-26 PROBLEM — M75.22 BICIPITAL TENDINITIS OF LEFT SHOULDER: Status: RESOLVED | Noted: 2024-10-14 | Resolved: 2025-03-26

## 2025-03-26 ASSESSMENT — ENCOUNTER SYMPTOMS
COUGH: 0
SHORTNESS OF BREATH: 0

## 2025-03-28 ENCOUNTER — HOSPITAL ENCOUNTER (OUTPATIENT)
Dept: PHYSICAL THERAPY | Age: 65
Setting detail: THERAPIES SERIES
Discharge: HOME | End: 2025-03-28
Payer: MEDICARE

## 2025-03-28 ENCOUNTER — HOSPITAL ENCOUNTER (OUTPATIENT)
Dept: MAMMOGRAPHY | Age: 65
Discharge: HOME | End: 2025-03-28
Payer: MEDICARE

## 2025-03-28 VITALS — WEIGHT: 186 LBS | BODY MASS INDEX: 32.96 KG/M2 | HEIGHT: 63 IN

## 2025-03-28 DIAGNOSIS — Z12.31 VISIT FOR SCREENING MAMMOGRAM: ICD-10-CM

## 2025-03-28 PROCEDURE — 97110 THERAPEUTIC EXERCISES: CPT

## 2025-03-28 PROCEDURE — 97140 MANUAL THERAPY 1/> REGIONS: CPT

## 2025-03-28 PROCEDURE — 77063 BREAST TOMOSYNTHESIS BI: CPT

## 2025-03-28 NOTE — FLOWSHEET NOTE
post op L RCR, SAD and Biceps Tenodesis on 11/27/24. She does not recall a specific injury that lead to the need for this surgery, but she was having trouble with overhead activity and pain. She admits to some non compliance with her sling and precautions lately.     MRI impression per EMR: 1. Tendinosis and partial-thickness tearing of the supraspinatus and infraspinatus tendons with a pinhole type full-thickness supraspinatus tendon tear with traversing fluid, without significant retraction and mild muscular atrophy of the supraspinatus   and infraspinatus  2. There is a split tear of the intra-articular and extra articular long head biceps tendon  3. Osteoarthritic changes with a glenohumeral joint effusion.      Test used Initial score  1/2/25 03/28/2025   Pain Summary VAS 6 0-3   Functional questionnaire Upper Extremity functional Scale 0% function 55/80   Other:              OBJECTIVE EXAMINATION     1/2/25  ROM/Strength: (Blank cells denote NT)    Mvmt (norm) AROM L AROM R 3/25/25 PROM L PROM R 3/25/25     CERVICAL Flex (60)        Ext (70)        SB(45)          Rotation (80)             SHOULDER Flexion (180) NT due to precautions WNL globally  145 90  151    Abduction (180)   145 90 ([place])  142    ER -0   75 30  86    ER -90 (90)          IR -0   T10 45  67    IR -90 (70)           ELBOW Flex/biceps (140)          Ext/triceps (0)          Pronation (80)          Supination (80)             WRIST Flexion (60)          Extension (60)          RD (20)          UD (20)                         MMT L MMT R 3/25/25     CERVICAL Cerv flexion       Cerv extension       Cerv SB       Cerv rotation          SHOULDER Flexion NT due to precautions 4 4+    Abduction  4 4    ER -0  4- 4    ER -90       IR -0  4 4+    IR -90        ELBOW Flex/biceps       Ext/triceps       Pronation       supination          WRIST Flexion       Extension       RD       UD                 Palpation:   Patient reported

## 2025-03-31 ENCOUNTER — RESULTS FOLLOW-UP (OUTPATIENT)
Dept: PRIMARY CARE CLINIC | Age: 65
End: 2025-03-31

## 2025-03-31 ENCOUNTER — TELEPHONE (OUTPATIENT)
Dept: PRIMARY CARE CLINIC | Age: 65
End: 2025-03-31

## 2025-03-31 ENCOUNTER — PHARMACY VISIT (OUTPATIENT)
Dept: PHARMACY | Age: 65
End: 2025-03-31
Payer: MEDICARE

## 2025-03-31 VITALS — SYSTOLIC BLOOD PRESSURE: 108 MMHG | HEART RATE: 108 BPM | DIASTOLIC BLOOD PRESSURE: 73 MMHG

## 2025-03-31 DIAGNOSIS — E78.2 HYPERLIPIDEMIA, MIXED: ICD-10-CM

## 2025-03-31 DIAGNOSIS — I10 BENIGN ESSENTIAL HTN: Chronic | ICD-10-CM

## 2025-03-31 DIAGNOSIS — Z79.4 TYPE 2 DIABETES MELLITUS WITHOUT COMPLICATION, WITH LONG-TERM CURRENT USE OF INSULIN: Primary | Chronic | ICD-10-CM

## 2025-03-31 DIAGNOSIS — R92.8 ABNORMAL MAMMOGRAM OF LEFT BREAST: Primary | ICD-10-CM

## 2025-03-31 DIAGNOSIS — E11.9 TYPE 2 DIABETES MELLITUS WITHOUT COMPLICATION, WITH LONG-TERM CURRENT USE OF INSULIN: Primary | Chronic | ICD-10-CM

## 2025-03-31 PROCEDURE — 99213 OFFICE O/P EST LOW 20 MIN: CPT

## 2025-03-31 RX ORDER — INSULIN GLARGINE 100 [IU]/ML
14 INJECTION, SOLUTION SUBCUTANEOUS NIGHTLY
Qty: 5 ADJUSTABLE DOSE PRE-FILLED PEN SYRINGE | Refills: 1 | Status: SHIPPED | OUTPATIENT
Start: 2025-03-31

## 2025-03-31 RX ORDER — KETOROLAC TROMETHAMINE 30 MG/ML
INJECTION, SOLUTION INTRAMUSCULAR; INTRAVENOUS
Qty: 1 EACH | Refills: 0 | Status: SHIPPED | OUTPATIENT
Start: 2025-03-31

## 2025-03-31 RX ORDER — HYDROCHLOROTHIAZIDE 12.5 MG/1
CAPSULE ORAL
Qty: 2 EACH | Refills: 11 | Status: SHIPPED | OUTPATIENT
Start: 2025-03-31

## 2025-03-31 NOTE — TELEPHONE ENCOUNTER
Patient called today stating that the surgeons office called her today stated that they may have to cancel her surgery due to her glucose being too high. Patient states that they informed her to get her blood work done today that was ordered by surgeon.    Patient is scheduled to see diabetic specialist today and  tomorrow.    Do you agree patient should get blood work done today before appt tomorrow?

## 2025-03-31 NOTE — PROGRESS NOTES
Date    LABA1C 5.2 2025    LABA1C 4.8 10/21/2024    LABA1C 4.9 2024      No results found for: \"MALBCR\"  Lab Results   Component Value Date    LABGLOM >90 2025     No results found for: \"CRCLEARANCE\"   Lab Results   Component Value Date    LDLDIRECT 109 (H) 2014    LDL 62 2025    CHOLFAST 118 2025    TRIGLYCFAST 146 2025    HDL 27 (L) 2025    VLDL 29 2025     Lab Results   Component Value Date    CREATININE 0.7 2025    BUN 12 2025     (L) 2025    K 4.0 2025    CL 95 (L) 2025    CO2 25 2025     Lab Results   Component Value Date    AST 15 2025    ALT 13 2025    BILIDIR 0.2 2025    BILITOT 0.4 2025    ALKPHOS 154 (H) 2025     Lab Results   Component Value Date    JQLMFFXS55 306 2025     Lab Results   Component Value Date    VITD25 43.8 2025     Lab Results   Component Value Date    TSH 0.52 2024        Nilo Leon, PharmD  PGY1 Pharmacy Resident   Wireless: [phone]  25       For Pharmacy Admin Tracking Only    Program: Medication Management  CPA in place:  Yes  Recommendation Provided To: Patient/Caregiver: 7 via In person  Intervention Detail: Adherence Monitorin, Dose Adjustment: 2, reason: Therapy Optimization, Lab(s) Ordered, New Rx: 2, reason: Needs Additional Therapy, and Referral to Other Provider  Intervention Accepted By: Patient/Caregiver: 7  Gap Closed?: No   Time Spent (min):  90

## 2025-03-31 NOTE — TELEPHONE ENCOUNTER
If surgeon is requesting her to have labs done then she should do them. The orders are already in. Reasonable to wait until later in the week as well if she isn't able to do them.

## 2025-03-31 NOTE — PATIENT INSTRUCTIONS
Call insurance for dietician in network    Bring lit to follow up visit for training    Increase Lantus to 14 units nightly     Increase metformin to 2 grams daily (2 tablets every morning & 2 tablets every evening)

## 2025-03-31 NOTE — TELEPHONE ENCOUNTER
Please call patient to notify that radiologist wanted to a left breast ultrasound. I have placed this order to be done at her convenience.

## 2025-03-31 NOTE — TELEPHONE ENCOUNTER
Patient called and informed. She states she will get them done today since her diabetic appt is in [facility].

## 2025-04-02 ENCOUNTER — OFFICE VISIT (OUTPATIENT)
Dept: PRIMARY CARE CLINIC | Age: 65
End: 2025-04-02
Payer: MEDICARE

## 2025-04-02 VITALS
DIASTOLIC BLOOD PRESSURE: 74 MMHG | OXYGEN SATURATION: 98 % | HEART RATE: 98 BPM | SYSTOLIC BLOOD PRESSURE: 108 MMHG | BODY MASS INDEX: 32.88 KG/M2 | WEIGHT: 185.6 LBS

## 2025-04-02 DIAGNOSIS — E11.9 TYPE 2 DIABETES MELLITUS WITHOUT COMPLICATION, WITH LONG-TERM CURRENT USE OF INSULIN: Chronic | ICD-10-CM

## 2025-04-02 DIAGNOSIS — Z00.00 MEDICARE ANNUAL WELLNESS VISIT, SUBSEQUENT: Primary | ICD-10-CM

## 2025-04-02 DIAGNOSIS — Z79.4 TYPE 2 DIABETES MELLITUS WITH HYPERGLYCEMIA, WITH LONG-TERM CURRENT USE OF INSULIN (HCC): ICD-10-CM

## 2025-04-02 DIAGNOSIS — E11.65 TYPE 2 DIABETES MELLITUS WITH HYPERGLYCEMIA, WITH LONG-TERM CURRENT USE OF INSULIN (HCC): ICD-10-CM

## 2025-04-02 DIAGNOSIS — I10 BENIGN ESSENTIAL HTN: Chronic | ICD-10-CM

## 2025-04-02 DIAGNOSIS — Z79.4 TYPE 2 DIABETES MELLITUS WITHOUT COMPLICATION, WITH LONG-TERM CURRENT USE OF INSULIN: Chronic | ICD-10-CM

## 2025-04-02 PROCEDURE — 1123F ACP DISCUSS/DSCN MKR DOCD: CPT | Performed by: FAMILY MEDICINE

## 2025-04-02 PROCEDURE — 3078F DIAST BP <80 MM HG: CPT | Performed by: FAMILY MEDICINE

## 2025-04-02 PROCEDURE — 3044F HG A1C LEVEL LT 7.0%: CPT | Performed by: FAMILY MEDICINE

## 2025-04-02 PROCEDURE — 3074F SYST BP LT 130 MM HG: CPT | Performed by: FAMILY MEDICINE

## 2025-04-02 PROCEDURE — G0439 PPPS, SUBSEQ VISIT: HCPCS | Performed by: FAMILY MEDICINE

## 2025-04-02 PROCEDURE — 3017F COLORECTAL CA SCREEN DOC REV: CPT | Performed by: FAMILY MEDICINE

## 2025-04-02 RX ORDER — METFORMIN HYDROCHLORIDE 500 MG/1
1000 TABLET, EXTENDED RELEASE ORAL 2 TIMES DAILY WITH MEALS
Qty: 360 TABLET | Refills: 1 | Status: SHIPPED | OUTPATIENT
Start: 2025-04-02

## 2025-04-02 ASSESSMENT — PATIENT HEALTH QUESTIONNAIRE - PHQ9
SUM OF ALL RESPONSES TO PHQ QUESTIONS 1-9: 2
1. LITTLE INTEREST OR PLEASURE IN DOING THINGS: SEVERAL DAYS
SUM OF ALL RESPONSES TO PHQ QUESTIONS 1-9: 2
2. FEELING DOWN, DEPRESSED OR HOPELESS: SEVERAL DAYS

## 2025-04-02 NOTE — PROGRESS NOTES
Medicare Annual Wellness Visit    Fernanda Diego is here for Medicare AWV    Assessment & Plan   Medicare annual wellness visit, subsequent  Type 2 diabetes mellitus with hyperglycemia, with long-term current use of insulin (HCC)  -     metFORMIN (GLUCOPHAGE-XR) 500 MG extended release tablet; Take 2 tablets by mouth 2 times daily (with meals), Disp-360 tablet, R-1Normal  Benign essential HTN     Has anticipated shoulder surgery in two weeks. Will determine next week if eligible to continue with this pending blood sugar improvement.     Return in 1 week (on 4/9/2025) for Pre-op - shoulder surgery.     Subjective     Saw Clinical Pharm yesterday due to persistent hyperglycemia. Unclear trigger as previously well controlled off of all medication. She is taking her metformin 1000 mg BID now and Lantus was increased to 15 units nightly. Feels this is helping. Has been dealing with constipation. Large BM yesterday. Feeling less fatigued. BS ~ 300 this AM. Went for labs this AM. Results pending. C-peptide.     Patient's complete Health Risk Assessment and screening values have been reviewed and are found in Flowsheets. The following problems were reviewed today and where indicated follow up appointments were made and/or referrals ordered.    Positive Risk Factor Screenings with Interventions:              Inactivity:  On average, how many days per week do you engage in moderate to strenuous exercise (like a brisk walk)?: 0 days (!) Abnormal  On average, how many minutes do you engage in exercise at this level?: 0 min  Interventions:  Recommendations: patient agrees to exercise for at least 150 minutes/week     Abnormal BMI (obese):  Body mass index is 32.88 kg/m². (!) Abnormal  Interventions:  exercise for at least 150 minutes/week      Dentist Screen:  Have you seen the dentist within the past year?: (!) No    Intervention:  Advised to schedule with their dentist    Hearing Screen:  Do you or your family notice any trouble

## 2025-04-02 NOTE — PATIENT INSTRUCTIONS
Personalized Preventive Plan for Fernanda Diego - 4/2/2025  Medicare offers a range of preventive health benefits. Some of the tests and screenings are paid in full while other may be subject to a deductible, co-insurance, and/or copay.  Some of these benefits include a comprehensive review of your medical history including lifestyle, illnesses that may run in your family, and various assessments and screenings as appropriate.  After reviewing your medical record and screening and assessments performed today your provider may have ordered immunizations, labs, imaging, and/or referrals for you.  A list of these orders (if applicable) as well as your Preventive Care list are included within your After Visit Summary for your review.

## 2025-04-03 DIAGNOSIS — M75.51 ACUTE SHOULDER BURSITIS, RIGHT: ICD-10-CM

## 2025-04-03 DIAGNOSIS — M75.101 NON-TRAUMATIC ROTATOR CUFF TEAR, RIGHT: ICD-10-CM

## 2025-04-03 DIAGNOSIS — E11.9 TYPE 2 DIABETES MELLITUS WITHOUT COMPLICATION, WITH LONG-TERM CURRENT USE OF INSULIN: Chronic | ICD-10-CM

## 2025-04-03 DIAGNOSIS — Z01.818 PRE-OP TESTING: ICD-10-CM

## 2025-04-03 DIAGNOSIS — M25.811 IMPINGEMENT OF RIGHT SHOULDER: ICD-10-CM

## 2025-04-03 DIAGNOSIS — Z79.4 TYPE 2 DIABETES MELLITUS WITHOUT COMPLICATION, WITH LONG-TERM CURRENT USE OF INSULIN: Chronic | ICD-10-CM

## 2025-04-03 DIAGNOSIS — M75.21 BICIPITAL TENDONITIS OF RIGHT SHOULDER: ICD-10-CM

## 2025-04-03 LAB
BILIRUB UR QL STRIP.AUTO: NEGATIVE
CLARITY UR: ABNORMAL
COLOR UR: YELLOW
GLUCOSE UR STRIP.AUTO-MCNC: >=1000 MG/DL
HGB UR QL STRIP.AUTO: NEGATIVE
INR PPP: 0.93 (ref 0.85–1.15)
KETONES UR STRIP.AUTO-MCNC: ABNORMAL MG/DL
LEUKOCYTE ESTERASE UR QL STRIP.AUTO: ABNORMAL
NITRITE UR QL STRIP.AUTO: NEGATIVE
PH UR STRIP.AUTO: 5.5 [PH] (ref 5–8)
PROT UR STRIP.AUTO-MCNC: 30 MG/DL
PROTHROMBIN TIME: 12.7 SEC (ref 11.9–14.9)
SP GR UR STRIP.AUTO: 1.03 (ref 1–1.03)
UA DIPSTICK W REFLEX MICRO PNL UR: YES
URN SPEC COLLECT METH UR: ABNORMAL
UROBILINOGEN UR STRIP-ACNC: 1 E.U./DL

## 2025-04-04 ENCOUNTER — TELEPHONE (OUTPATIENT)
Dept: ORTHOPEDIC SURGERY | Age: 65
End: 2025-04-04

## 2025-04-04 ENCOUNTER — HOSPITAL ENCOUNTER (OUTPATIENT)
Dept: PHYSICAL THERAPY | Age: 65
Setting detail: THERAPIES SERIES
Discharge: HOME | End: 2025-04-04
Payer: MEDICARE

## 2025-04-04 LAB
ANION GAP SERPL CALCULATED.3IONS-SCNC: 15 MMOL/L (ref 3–16)
BACTERIA UR CULT: NORMAL
BACTERIA URNS QL MICRO: ABNORMAL /HPF
BASOPHILS # BLD: 0.1 K/UL (ref 0–0.2)
BASOPHILS NFR BLD: 0.7 %
BUN SERPL-MCNC: 11 MG/DL (ref 7–20)
C PEPTIDE SERPL-MCNC: 1.2 NG/ML (ref 1.1–4.4)
CALCIUM SERPL-MCNC: 10.1 MG/DL (ref 8.3–10.6)
CHARACTER UR: ABNORMAL
CHLORIDE SERPL-SCNC: 98 MMOL/L (ref 99–110)
CO2 SERPL-SCNC: 22 MMOL/L (ref 21–32)
CREAT SERPL-MCNC: 0.8 MG/DL (ref 0.6–1.2)
CREAT UR-MCNC: 223 MG/DL (ref 28–259)
DEPRECATED RDW RBC AUTO: 16.1 % (ref 12.4–15.4)
EOSINOPHIL # BLD: 0.1 K/UL (ref 0–0.6)
EOSINOPHIL NFR BLD: 0.7 %
EPI CELLS #/AREA URNS HPF: ABNORMAL /HPF (ref 0–5)
EST. AVERAGE GLUCOSE BLD GHB EST-MCNC: 234.6 MG/DL
GFR SERPLBLD CREATININE-BSD FMLA CKD-EPI: 81 ML/MIN/{1.73_M2}
GLUCOSE SERPL-MCNC: 422 MG/DL (ref 70–99)
HBA1C MFR BLD: 9.8 %
HCT VFR BLD AUTO: 37.5 % (ref 36–48)
HGB BLD-MCNC: 11.6 G/DL (ref 12–16)
HYALINE CASTS #/AREA URNS LPF: ABNORMAL /LPF (ref 0–2)
LYMPHOCYTES # BLD: 2.7 K/UL (ref 1–5.1)
LYMPHOCYTES NFR BLD: 19.3 %
MCH RBC QN AUTO: 23.6 PG (ref 26–34)
MCHC RBC AUTO-ENTMCNC: 31 G/DL (ref 31–36)
MCV RBC AUTO: 76.3 FL (ref 80–100)
MICROALBUMIN UR DL<=1MG/L-MCNC: 8.01 MG/DL
MICROALBUMIN/CREAT UR: 35.9 MG/G (ref 0–30)
MONOCYTES # BLD: 0.7 K/UL (ref 0–1.3)
MONOCYTES NFR BLD: 5.1 %
MUCOUS THREADS #/AREA URNS LPF: ABNORMAL /LPF
NEUTROPHILS # BLD: 10.2 K/UL (ref 1.7–7.7)
NEUTROPHILS NFR BLD: 74.2 %
PLATELET # BLD AUTO: 295 K/UL (ref 135–450)
PMV BLD AUTO: 10.9 FL (ref 5–10.5)
POTASSIUM SERPL-SCNC: 4.3 MMOL/L (ref 3.5–5.1)
RBC # BLD AUTO: 4.92 M/UL (ref 4–5.2)
RBC #/AREA URNS HPF: ABNORMAL /HPF (ref 0–4)
SODIUM SERPL-SCNC: 135 MMOL/L (ref 136–145)
WBC # BLD AUTO: 13.8 K/UL (ref 4–11)
WBC #/AREA URNS HPF: ABNORMAL /HPF (ref 0–5)
YEAST URNS QL MICRO: PRESENT /HPF

## 2025-04-04 PROCEDURE — 97110 THERAPEUTIC EXERCISES: CPT

## 2025-04-04 PROCEDURE — 97140 MANUAL THERAPY 1/> REGIONS: CPT

## 2025-04-04 NOTE — FLOWSHEET NOTE
[facility]- Outpatient Rehabilitation and Therapy [address], [place] [address] office: [phone] fax: [phone]    Physical Therapy: TREATMENT/PROGRESS NOTE   Patient: Fernanda Diego (64 y.o. female)   Examination Date: 2025   :  1960 MRN: [id number]   Visit #: 11   Insurance Allowable Auth Needed   BMN []Yes    [x]No    Insurance: Payor: [facility] MEDICARE / Plan: [facility] MEDICARE COMPLETE / Product Type: *No Product type* /   Insurance ID: [id number] - (Medicare Managed)  Secondary Insurance (if applicable):    Treatment Diagnosis:     ICD-10-CM    1. Left shoulder pain, unspecified chronicity  M25.512       2. Pain and swelling of Left shoulder  M25.512     M25.412       3. Weakness of Left shoulder  R29.898          Medical Diagnosis:  Shoulder pain [M25.519]   Referring Physician: Nickolas Soto, JESSICA  PCP: Isabel Rico DO     Plan of care signed (Y/N): Y    Date of Patient follow up with Physician:      Plan of Care Report: NO  POC update due: (10 visits /OR AUTH LIMITS, whichever is less) 25                                            Medical History:  Comorbidities:  Diabetes (Type I or II)  Hypertension  Osteoarthritis  Relevant Medical History: Hx of R RCR ~15-20 years ago                                         Precautions/ Contra-indications:           Latex allergy:  NO  Pacemaker:    NO  Contraindications for Manipulation: recent surgical history (relative)  Date of Surgery: L RCR, SAD and Biceps Tenodesis on 24  Other:    Red Flags:  None    Suicide Screening:   The patient did not verbalize a primary behavioral concern, suicidal ideation, suicidal intent, or demonstrate suicidal behaviors.    Preferred Language for Healthcare:   [x] English       [] other:    SUBJECTIVE EXAMINATION     Patient stated complaint:   Pt states her shoulder continues to improve. Feels weak at times.     Eval: Pt presents post op L RCR, SAD and Biceps

## 2025-04-05 LAB — MRSA SPEC QL CULT: NORMAL

## 2025-04-06 DIAGNOSIS — I10 BENIGN ESSENTIAL HTN: Chronic | ICD-10-CM

## 2025-04-07 RX ORDER — AMLODIPINE BESYLATE 5 MG/1
5 TABLET ORAL DAILY
Qty: 90 TABLET | Refills: 1 | Status: SHIPPED | OUTPATIENT
Start: 2025-04-07

## 2025-04-08 ENCOUNTER — HOSPITAL ENCOUNTER (OUTPATIENT)
Dept: MAMMOGRAPHY | Age: 65
Discharge: HOME | End: 2025-04-08
Payer: MEDICARE

## 2025-04-08 ENCOUNTER — HOSPITAL ENCOUNTER (OUTPATIENT)
Dept: ULTRASOUND IMAGING | Age: 65
Discharge: HOME | End: 2025-04-08
Payer: MEDICARE

## 2025-04-08 DIAGNOSIS — R92.8 ABNORMAL MAMMOGRAM OF LEFT BREAST: ICD-10-CM

## 2025-04-08 DIAGNOSIS — R92.8 ABNORMAL MAMMOGRAM: ICD-10-CM

## 2025-04-08 PROCEDURE — G0279 TOMOSYNTHESIS, MAMMO: HCPCS

## 2025-04-09 ENCOUNTER — OFFICE VISIT (OUTPATIENT)
Dept: PRIMARY CARE CLINIC | Age: 65
End: 2025-04-09
Payer: MEDICARE

## 2025-04-09 VITALS
BODY MASS INDEX: 32.74 KG/M2 | DIASTOLIC BLOOD PRESSURE: 71 MMHG | SYSTOLIC BLOOD PRESSURE: 105 MMHG | TEMPERATURE: 97.7 F | HEIGHT: 63 IN | HEART RATE: 113 BPM | OXYGEN SATURATION: 97 % | WEIGHT: 184.8 LBS

## 2025-04-09 DIAGNOSIS — J30.2 SEASONAL ALLERGIES: ICD-10-CM

## 2025-04-09 DIAGNOSIS — E11.65 TYPE 2 DIABETES MELLITUS WITH HYPERGLYCEMIA, WITH LONG-TERM CURRENT USE OF INSULIN (HCC): Primary | ICD-10-CM

## 2025-04-09 DIAGNOSIS — R11.0 NAUSEA: ICD-10-CM

## 2025-04-09 DIAGNOSIS — I10 BENIGN ESSENTIAL HTN: Chronic | ICD-10-CM

## 2025-04-09 DIAGNOSIS — Z79.4 TYPE 2 DIABETES MELLITUS WITH HYPERGLYCEMIA, WITH LONG-TERM CURRENT USE OF INSULIN (HCC): Primary | ICD-10-CM

## 2025-04-09 PROCEDURE — 3074F SYST BP LT 130 MM HG: CPT | Performed by: FAMILY MEDICINE

## 2025-04-09 PROCEDURE — 1090F PRES/ABSN URINE INCON ASSESS: CPT | Performed by: FAMILY MEDICINE

## 2025-04-09 PROCEDURE — G2211 COMPLEX E/M VISIT ADD ON: HCPCS | Performed by: FAMILY MEDICINE

## 2025-04-09 PROCEDURE — 1123F ACP DISCUSS/DSCN MKR DOCD: CPT | Performed by: FAMILY MEDICINE

## 2025-04-09 PROCEDURE — 3046F HEMOGLOBIN A1C LEVEL >9.0%: CPT | Performed by: FAMILY MEDICINE

## 2025-04-09 PROCEDURE — G8399 PT W/DXA RESULTS DOCUMENT: HCPCS | Performed by: FAMILY MEDICINE

## 2025-04-09 PROCEDURE — 2022F DILAT RTA XM EVC RTNOPTHY: CPT | Performed by: FAMILY MEDICINE

## 2025-04-09 PROCEDURE — 1036F TOBACCO NON-USER: CPT | Performed by: FAMILY MEDICINE

## 2025-04-09 PROCEDURE — 99214 OFFICE O/P EST MOD 30 MIN: CPT | Performed by: FAMILY MEDICINE

## 2025-04-09 PROCEDURE — G8417 CALC BMI ABV UP PARAM F/U: HCPCS | Performed by: FAMILY MEDICINE

## 2025-04-09 PROCEDURE — 3017F COLORECTAL CA SCREEN DOC REV: CPT | Performed by: FAMILY MEDICINE

## 2025-04-09 PROCEDURE — 3078F DIAST BP <80 MM HG: CPT | Performed by: FAMILY MEDICINE

## 2025-04-09 PROCEDURE — G8428 CUR MEDS NOT DOCUMENT: HCPCS | Performed by: FAMILY MEDICINE

## 2025-04-09 RX ORDER — INSULIN GLARGINE 100 [IU]/ML
18 INJECTION, SOLUTION SUBCUTANEOUS NIGHTLY
Qty: 5 ADJUSTABLE DOSE PRE-FILLED PEN SYRINGE | Refills: 1
Start: 2025-04-09

## 2025-04-09 RX ORDER — ONDANSETRON 4 MG/1
4 TABLET, ORALLY DISINTEGRATING ORAL EVERY 12 HOURS PRN
Qty: 20 TABLET | Refills: 0 | Status: SHIPPED | OUTPATIENT
Start: 2025-04-09

## 2025-04-09 NOTE — PROGRESS NOTES
1.6 m (5' 2.99\")   Wt 83.8 kg (184 lb 12.8 oz)   LMP 01/26/1996   SpO2 97%   BMI 32.74 kg/m²      Physical Exam  Constitutional:       General: She is not in acute distress.     Appearance: Normal appearance. She is not ill-appearing.   Cardiovascular:      Rate and Rhythm: Normal rate.   Pulmonary:      Effort: Pulmonary effort is normal.   Neurological:      General: No focal deficit present.      Mental Status: She is alert.   Psychiatric:         Mood and Affect: Mood normal.         Assessment:  Encounter Diagnoses   Name Primary?    Type 2 diabetes mellitus with hyperglycemia, with long-term current use of insulin (HCC) Yes    Nausea     Benign essential HTN     Seasonal allergies        Plan:    - Blood sugars improving. Lantus increased to 18 units. Surgery has been postponed.   - Intermittent nausea - multifactorial. Uses zofran sparingly.   - BP stable.   - Continue anti-histamine. Has ENT follow up scheduled.     New Prescriptions    ONDANSETRON (ZOFRAN-ODT) 4 MG DISINTEGRATING TABLET    Take 1 tablet by mouth every 12 hours as needed for Nausea or Vomiting        No orders of the defined types were placed in this encounter.       Return in about 4 weeks (around 5/7/2025) for Follow up DMII.         Isabel Rico,

## 2025-04-10 ASSESSMENT — ENCOUNTER SYMPTOMS
SHORTNESS OF BREATH: 0
COUGH: 0

## 2025-04-11 ENCOUNTER — HOSPITAL ENCOUNTER (OUTPATIENT)
Dept: PHYSICAL THERAPY | Age: 65
Setting detail: THERAPIES SERIES
Discharge: HOME | End: 2025-04-11
Payer: MEDICARE

## 2025-04-11 PROCEDURE — 97110 THERAPEUTIC EXERCISES: CPT

## 2025-04-11 PROCEDURE — 97140 MANUAL THERAPY 1/> REGIONS: CPT

## 2025-04-11 NOTE — FLOWSHEET NOTE
girdle    Posture:   patient presents with guarded posture in ultrasling with abduction pillow    Bandages/Dressings/Incisions:  Patients wound/incision appears to be healing as expected    Dermatomes: Abnormal findings listed below  None, all WNL    Myotomes: Abnormal findings listed below  NT    Reflexes: Abnormal findings listed below  Not Tested    Specific Joint Mobility Testing/Accessory Motions:      Glenohumeral joint: NT    Gait:    Pattern: WNL  Assistive Device Used: no AD    Special Tests:  [] None Assessed   [] Following tests noted:    NT    Balance:  [x] WNL      [] NT       [] Dysfunction noted  Comment:     Falls Risk Assessment (30 days):   Falls Risk assessed and no intervention required.  Time Up and Go (TUG):   Not Assessed        Exercises/Interventions     Therapeutic Ex (45652)   NMR re-education (63428)  Therapeutic Activity (34730) Sets/time/resistance/reps Notes/Cues/Progressions   pulley 4'         Supine cane flexion S  10x10\"    Supine cane press  3# 2x10          TB shoulder ext Green TB 10x10\"    Scapular retraction 10x10\" green TB   Doorway pec/ER 10x10\"         wall slides: flx/abd 10x10\" ea     standing AROM Flx/SLOP  3x10ea  With yellow loop   YAZAN Row/ext 7# 2x10     YAZAN T/Y 2# 2x10 ea     Wall pushup  3x10    Wall walk Yellow loop 10x ea direction          Manual Intervention (92710) TIME    Gentle PROM, oscillations  15'                                   Modalities:      Education/Home Exercise Program: Patient HEP program created electronically.  Refer to [employer] access code:    Access Code: [email or web address]  URL: [email or web address]  Date: 01/03/2025  Prepared by: Matty Damon    Exercises  - Standing Scapular Retraction  - 1 x daily - 7 x weekly - 10 reps - 10 sec hold  - Seated Shoulder Shrugs  - 1 x daily - 7 x weekly - 3 sets - 10 reps  - Elbow Flexion PROM  - 1 x daily - 7 x weekly - 3 sets - 10 reps  - Seated Shoulder Flexion Towel Slide at Table Top Full Range of

## 2025-04-14 ENCOUNTER — PHARMACY VISIT (OUTPATIENT)
Dept: PHARMACY | Age: 65
End: 2025-04-14
Payer: MEDICARE

## 2025-04-14 VITALS — SYSTOLIC BLOOD PRESSURE: 128 MMHG | DIASTOLIC BLOOD PRESSURE: 82 MMHG | HEART RATE: 108 BPM

## 2025-04-14 DIAGNOSIS — Z79.4 TYPE 2 DIABETES MELLITUS WITH HYPERGLYCEMIA, WITH LONG-TERM CURRENT USE OF INSULIN (HCC): ICD-10-CM

## 2025-04-14 DIAGNOSIS — Z79.4 TYPE 2 DIABETES MELLITUS WITHOUT COMPLICATION, WITH LONG-TERM CURRENT USE OF INSULIN: Chronic | ICD-10-CM

## 2025-04-14 DIAGNOSIS — I10 BENIGN ESSENTIAL HTN: ICD-10-CM

## 2025-04-14 DIAGNOSIS — E11.65 TYPE 2 DIABETES MELLITUS WITH HYPERGLYCEMIA, WITH LONG-TERM CURRENT USE OF INSULIN (HCC): ICD-10-CM

## 2025-04-14 DIAGNOSIS — E78.2 HYPERLIPIDEMIA, MIXED: Primary | ICD-10-CM

## 2025-04-14 DIAGNOSIS — E11.9 TYPE 2 DIABETES MELLITUS WITHOUT COMPLICATION, WITH LONG-TERM CURRENT USE OF INSULIN: Chronic | ICD-10-CM

## 2025-04-14 PROCEDURE — 99213 OFFICE O/P EST LOW 20 MIN: CPT

## 2025-04-14 RX ORDER — KETOROLAC TROMETHAMINE 30 MG/ML
INJECTION, SOLUTION INTRAMUSCULAR; INTRAVENOUS
Qty: 1 EACH | Refills: 0 | Status: SHIPPED | OUTPATIENT
Start: 2025-04-14

## 2025-04-14 RX ORDER — INSULIN GLARGINE 100 [IU]/ML
18 INJECTION, SOLUTION SUBCUTANEOUS NIGHTLY
Qty: 5 ADJUSTABLE DOSE PRE-FILLED PEN SYRINGE | Refills: 1 | Status: CANCELLED | OUTPATIENT
Start: 2025-04-14

## 2025-04-14 RX ORDER — HYDROCHLOROTHIAZIDE 12.5 MG/1
CAPSULE ORAL
Qty: 6 EACH | Refills: 3 | Status: SHIPPED | OUTPATIENT
Start: 2025-04-14

## 2025-04-14 RX ORDER — DAPAGLIFLOZIN 10 MG/1
10 TABLET, FILM COATED ORAL EVERY MORNING
Qty: 30 TABLET | Refills: 2 | Status: SHIPPED | OUTPATIENT
Start: 2025-04-14

## 2025-04-14 NOTE — PROGRESS NOTES
Average packs/day: 3.0 packs/day for 4.0 years (12.0 ttl pk-yrs)     Types: Cigars, Cigarettes    Smokeless tobacco: Never    Tobacco comments:     Still smoking off and on as of 11- / SC   Substance Use Topics    Alcohol use: Yes     Comment: 1 alcoholic beverage every other month         Objective     Medication list reviewed and up to date.     Physical exam     /82 (BP Site: Right Upper Arm, Patient Position: Sitting, BP Cuff Size: Large Adult)   Pulse (!) 108   LMP 01/26/1996    There is no height or weight on file to calculate BMI.  Wt Readings from Last 3 Encounters:   04/09/25 83.8 kg (184 lb 12.8 oz)   04/02/25 84.2 kg (185 lb 9.6 oz)   03/28/25 84.4 kg (186 lb)      BP Readings from Last 3 Encounters:   04/14/25 128/82   04/09/25 105/71   04/02/25 108/74        Pertinent Labs:  Lab Results   Component Value Date    LABA1C 9.8 04/03/2025    LABA1C 5.2 01/28/2025    LABA1C 4.8 10/21/2024      No results found for: \"MALBCR\"  Lab Results   Component Value Date    LABGLOM 81 04/03/2025     No results found for: \"CRCLEARANCE\"   Lab Results   Component Value Date    LDLDIRECT 109 (H) 11/25/2014    LDL 62 01/28/2025    CHOLFAST 118 01/28/2025    TRIGLYCFAST 146 01/28/2025    HDL 27 (L) 01/28/2025    VLDL 29 01/28/2025     Lab Results   Component Value Date    CREATININE 0.8 04/03/2025    BUN 11 04/03/2025     (L) 04/03/2025    K 4.3 04/03/2025    CL 98 (L) 04/03/2025    CO2 22 04/03/2025     Lab Results   Component Value Date    AST 15 03/25/2025    ALT 13 03/25/2025    BILIDIR 0.2 02/11/2025    BILITOT 0.4 03/25/2025    ALKPHOS 154 (H) 03/25/2025     Lab Results   Component Value Date    DAGZAXNI29 306 01/28/2025     Lab Results   Component Value Date    VITD25 43.8 01/28/2025     Lab Results   Component Value Date    TSH 0.52 06/25/2024        Jacey Spencer, PharmD, BCACP  [facility]   [facility] Ph: [phone]  [facility] [address] Ph: [phone]  4/14/2025 10:34 PM    For

## 2025-04-23 DIAGNOSIS — E11.9 TYPE 2 DIABETES MELLITUS WITHOUT COMPLICATION, WITH LONG-TERM CURRENT USE OF INSULIN (HCC): Chronic | ICD-10-CM

## 2025-04-23 DIAGNOSIS — Z79.4 TYPE 2 DIABETES MELLITUS WITHOUT COMPLICATION, WITH LONG-TERM CURRENT USE OF INSULIN (HCC): Chronic | ICD-10-CM

## 2025-04-23 RX ORDER — HYDROCHLOROTHIAZIDE 12.5 MG/1
CAPSULE ORAL
Qty: 6 EACH | Refills: 3 | Status: SHIPPED | OUTPATIENT
Start: 2025-04-23

## 2025-04-23 RX ORDER — KETOROLAC TROMETHAMINE 30 MG/ML
INJECTION, SOLUTION INTRAMUSCULAR; INTRAVENOUS
Qty: 1 EACH | Refills: 0 | Status: SHIPPED | OUTPATIENT
Start: 2025-04-23

## 2025-04-23 NOTE — TELEPHONE ENCOUNTER
Patient called stating she needs [facility] glucom[facility] medical supply [facility].     She states she needs to have it before her next appt with Jacey.    She also wanted  to know that she got her sugar down to 143.

## 2025-04-24 RX ORDER — ONDANSETRON 4 MG/1
4 TABLET, ORALLY DISINTEGRATING ORAL EVERY 12 HOURS PRN
Qty: 20 TABLET | Refills: 0 | Status: SHIPPED | OUTPATIENT
Start: 2025-04-24

## 2025-04-24 NOTE — TELEPHONE ENCOUNTER
Medication:   Requested Prescriptions     Pending Prescriptions Disp Refills    ondansetron (ZOFRAN-ODT) 4 MG disintegrating tablet 20 tablet 0     Sig: Take 1 tablet by mouth every 12 hours as needed for Nausea or Vomiting     Last Filled:  n/a    Last appt: 4/9/2025   Next appt: 5/7/2025

## 2025-04-28 ENCOUNTER — TELEPHONE (OUTPATIENT)
Dept: PHARMACY | Age: 65
End: 2025-04-28

## 2025-04-28 DIAGNOSIS — Z79.4 TYPE 2 DIABETES MELLITUS WITHOUT COMPLICATION, WITH LONG-TERM CURRENT USE OF INSULIN (HCC): Primary | ICD-10-CM

## 2025-04-28 DIAGNOSIS — E11.9 TYPE 2 DIABETES MELLITUS WITHOUT COMPLICATION, WITH LONG-TERM CURRENT USE OF INSULIN (HCC): Primary | ICD-10-CM

## 2025-04-28 NOTE — TELEPHONE ENCOUNTER
Pt called to cancel visit today because she doesn't have her lit. Called Anupam to check status of lit. Requires PA. Requested PA be faxed to clinic for completion.     Jacey Spencer, PharmD, BCACP  [facility]   [facility] Ph: [phone]  [facility] No[facility] Ph: [phone]  4/28/2025 10:08 AM

## 2025-04-30 NOTE — TELEPHONE ENCOUNTER
SUBMITTED PA VIA CMM FOR READER AND SENSORS  (Key: [id number])  (Key: [id number])     Jacey Spencer, PharmD, BCACP  [facility]   [facility] Ph: [phone]  [facility] No[facility] Ph: [phone]  4/30/2025 4:12 PM

## 2025-05-01 ENCOUNTER — OFFICE VISIT (OUTPATIENT)
Dept: ENT CLINIC | Age: 65
End: 2025-05-01
Payer: MEDICARE

## 2025-05-01 VITALS
TEMPERATURE: 98.2 F | HEIGHT: 63 IN | WEIGHT: 189.2 LBS | DIASTOLIC BLOOD PRESSURE: 84 MMHG | BODY MASS INDEX: 33.52 KG/M2 | HEART RATE: 114 BPM | SYSTOLIC BLOOD PRESSURE: 121 MMHG

## 2025-05-01 DIAGNOSIS — J32.4 CHRONIC PANSINUSITIS: Primary | ICD-10-CM

## 2025-05-01 PROCEDURE — G8427 DOCREV CUR MEDS BY ELIG CLIN: HCPCS | Performed by: OTOLARYNGOLOGY

## 2025-05-01 PROCEDURE — 1036F TOBACCO NON-USER: CPT | Performed by: OTOLARYNGOLOGY

## 2025-05-01 PROCEDURE — 1123F ACP DISCUSS/DSCN MKR DOCD: CPT | Performed by: OTOLARYNGOLOGY

## 2025-05-01 PROCEDURE — 31231 NASAL ENDOSCOPY DX: CPT | Performed by: OTOLARYNGOLOGY

## 2025-05-01 PROCEDURE — 1159F MED LIST DOCD IN RCRD: CPT | Performed by: OTOLARYNGOLOGY

## 2025-05-01 PROCEDURE — 99213 OFFICE O/P EST LOW 20 MIN: CPT | Performed by: OTOLARYNGOLOGY

## 2025-05-01 PROCEDURE — G8417 CALC BMI ABV UP PARAM F/U: HCPCS | Performed by: OTOLARYNGOLOGY

## 2025-05-01 PROCEDURE — 3017F COLORECTAL CA SCREEN DOC REV: CPT | Performed by: OTOLARYNGOLOGY

## 2025-05-01 PROCEDURE — G8399 PT W/DXA RESULTS DOCUMENT: HCPCS | Performed by: OTOLARYNGOLOGY

## 2025-05-01 PROCEDURE — 3079F DIAST BP 80-89 MM HG: CPT | Performed by: OTOLARYNGOLOGY

## 2025-05-01 PROCEDURE — 1126F AMNT PAIN NOTED NONE PRSNT: CPT | Performed by: OTOLARYNGOLOGY

## 2025-05-01 PROCEDURE — 3074F SYST BP LT 130 MM HG: CPT | Performed by: OTOLARYNGOLOGY

## 2025-05-01 PROCEDURE — 1090F PRES/ABSN URINE INCON ASSESS: CPT | Performed by: OTOLARYNGOLOGY

## 2025-05-01 ASSESSMENT — ENCOUNTER SYMPTOMS
EYE REDNESS: 0
SINUS PRESSURE: 0
EYE PAIN: 0
NAUSEA: 0
CHOKING: 0
RHINORRHEA: 0
SHORTNESS OF BREATH: 0
COUGH: 0
TROUBLE SWALLOWING: 0
DIARRHEA: 0
FACIAL SWELLING: 0
SINUS PAIN: 0
EYE ITCHING: 0
VOICE CHANGE: 0
SORE THROAT: 0

## 2025-05-01 NOTE — TELEPHONE ENCOUNTER
PA denied with reason being that pt is not taking insulin. Called [employer] to dispute, as pt is taking insulin and chart notes were submitted to support this.     Submitted appeal with chart notes to support insulin use.     For Pharmacy Admin Tracking Only    Program: Medication Management  CPA in place:  Yes  Recommendation Provided To: Other: 1  Intervention Detail: Benefit Assistance  Intervention Accepted By: Other: 1  Time Spent (min): 20

## 2025-05-01 NOTE — PROGRESS NOTES
pansinusitis               Plan:     Reviewed [facility] records.   Reviewed 2023 CT sinuses.   No acute infection.   Patent sinuses.   Add hypertonic saline sprays.     HYPERTONIC SALINE     Sinus rinse kits are available for purchase at a drugstore.  If kit is used, follow the directions in the kit.    1 tsp baking soda  2-3 tsp non-iodized salt,sea salt, or kosher salt. (can be purchased with the baking supplies)  8 ounces distilled water: DO NOT USE TAP WATER. May use water boiled for 20 minutes. Allow water to cool to body temperature before using.    Using a squeeze bottle (highly recommended), Neti-Pot , or baby bulb syringe, fill with room temperature salt-water solution. Stand at sink with head bent.  Squeeze solution into nasal passages to wash out secretions.  This helps clean out the nose and sinuses. May be used as many times a day as needed. You may decrease the baking soda and sea salt by half if there is burning with the rinse.    Clean the bottle, Neti-Pot, or syringe with vinegar, rinse and dry completely.    Follow up as needed.     Rob Santana MD

## 2025-05-02 NOTE — FLOWSHEET NOTE
[facility] and [address],  [facility], Critical access hospital [address] 12[address]                  [address] [facility],[address]        [facility], [address] [place] [address]  Phone: [phone]  Fax: [phone]    Physical Therapy Treatment Note/ Progress Report:           Date:  2023    Patient Name:  Kathrine Davila    :  1960  MRN: [id number]  Restrictions/Precautions:    Medical/Treatment Diagnosis Information:  Left knee pain [M25.562]  L TKR [Z96.659]  L Effusion [M25.462]       Insurance/Certification information:  [facility]   Physician Information:  Betty Lopez MD  Has the plan of care been signed (Y/N):        []  Yes  [x]  No     Date of Patient follow up with Physician: Unknown      Is this a Progress Report:     []  Yes  [x]  No        If Yes:  Date Range for reporting period:  Beginning2023  Ending    Progress report will be due (10 Rx or 30 days whichever is less): 4888       Recertification will be due (POC Duration  / 90 days whichever is less): 4/3/2023      Visit # Insurance Allowable Auth Required   In-person 1 [place] []  Yes [x]  No        Functional Scale: LEFS     Date assessed:  Next Visit       Number of Comorbidities:  []0     []1-2    [x]3+    Latex Allergy:  [x]NO      []YES  Preferred Language for Healthcare:   [x]English       []other:      Pain level:  8/10     SUBJECTIVE:  See eval    OBJECTIVE: See eval  Observation:   Test measurements:      RESTRICTIONS/PRECAUTIONS: WBAT    Exercises/Interventions:     Therapeutic Ex (83051)/NMR re-education (08632) Reps Notes/CUES   Bridges 10x    SLR 10x    S/L SLR ABD 10x     Heel slides  10x                                            Manual Intervention (40957)     Patellar mobs/PROM of knee  5'                HEP instruction:      Access Code: Shara Sandhu  URL: [email or web address]  Date: 2023  Prepared by: Savannah Love     Exercises  Sidelying Hip Abduction - 1 x daily - 7 x weekly - 3 sets - 10 reps  Supine Bridge - 1 x daily - 7 x weekly - 3 sets - 10 reps  Supine Active Straight Leg Raise - 1 x daily - 7 x weekly - 3 sets - 10 reps  Supine Heel Slide - 1 x daily - 7 x weekly - 3 sets - 10 reps      Therapeutic Exercise and NMR EXR  [x] (22978) Provided verbal/tactile cueing for activities related to strengthening, flexibility, endurance, ROM for improvements in LE, proximal hip, and core control with self care, mobility, lifting, ambulation. [x] (10083) Provided verbal/tactile cueing for activities related to improving balance, coordination, kinesthetic sense, posture, motor skill, proprioception  to assist with LE, proximal hip, and core control in self care, mobility, lifting, ambulation and eccentric single leg control.      NMR and Therapeutic Activities:    [x] (68660 or 09962) Provided verbal/tactile cueing for activities related to improving balance, coordination, kinesthetic sense, posture, motor skill, proprioception and motor activation to allow for proper function of core, proximal hip and LE with self care and ADLs  [x] (77055) Gait Re-education- Provided training and instruction to the patient for proper LE, core and proximal hip recruitment and positioning and eccentric body weight control with ambulation re-education including up and down stairs     Home Exercise Program:    [x] (16907) Reviewed/Progressed HEP activities related to strengthening, flexibility, endurance, ROM of core, proximal hip and LE for functional self-care, mobility, lifting and ambulation/stair navigation   [] (19508)Reviewed/Progressed HEP activities related to improving balance, coordination, kinesthetic sense, posture, motor skill, proprioception of core, proximal hip and LE for self care, mobility, lifting, and ambulation/stair navigation      Manual Treatments:  PROM / STM / Oscillations-Mobs:  G-I, II, III, IV (PA's, Inf., Post.)  [x] (73031) Provided manual therapy to mobilize LE, proximal hip and/or LS spine soft tissue/joints for the purpose of modulating pain, promoting relaxation,  increasing ROM, reducing/eliminating soft tissue swelling/inflammation/restriction, improving soft tissue extensibility and allowing for proper ROM for normal function with self care, mobility, lifting and ambulation. Modalities:     [x] GAME READY (VASO)- for significant edema, swelling, pain control. Charges  Timed Code Treatment Minutes: 15   Total Treatment Minutes: 45       [x] EVAL (LOW) 53748   [] EVAL (MOD) 12996  [] EVAL (HIGH) 60290   [] RE-EVAL     [x] VO(23699) x   1  [] IONTO  [] NMR (10482) x     [x] VASO  [] Manual (71480) x      [] Other:  [] TA x      [] Mech Traction (02136)  [] ES(attended) (49154)      [] ES (un) (81690):       GOALS:   Patient stated goal: Be able to travel later this year   [] Progressing: [] Met: [] Not Met: [] Adjusted     Therapist goals for Patient:   Short Term Goals: To be achieved in: 2 weeks  1. Independent in HEP and progression per patient tolerance, in order to prevent re-injury. [] Progressing: [] Met: [] Not Met: [] Adjusted  2. Patient will have a decrease in pain to facilitate improvement in movement, function, and ADLs as indicated by Functional Deficits. [] Progressing: [] Met: [] Not Met: [] Adjusted     Long Term Goals: To be achieved in: 12 weeks  1. LEFS score will improve by at least 9 points to reach [employer] [address] to assist with reaching prior level of function. [] Progressing: [] Met: [] Not Met: [] Adjusted  2. Patient will demonstrate increased AROM to at least 120 degrees knee flexion to allow for proper joint functioning as indicated by patients Functional Deficits. [] Progressing: [] Met: [] Not Met: [] Adjusted  3. Patient will be able to ambulate at least 10 minutes without AD with no increase in symptoms. [] Progressing: [] Met: [] Not Met: [] Adjusted  4.  Patient will be able to perform grocery shopping and laundry independently without increase in Debility secondary to acute hypoxic respiratory failure symptoms to allow for improved household management. [] Progressing: [] Met: [] Not Met: [] Adjusted  5. Patient will be able to tolerate standing for at least 30 minutes for participation in choir activities. [] Progressing: [] Met: [] Not Met: [] Adjusted       Overall Progression Towards Functional goals/ Treatment Progress Update:  [] Patient is progressing as expected towards functional goals listed. [] Progression is slowed due to complexities/Impairments listed. [] Progression has been slowed due to co-morbidities. [x] Plan just implemented, too soon to assess goals progression <30days   [] Goals require adjustment due to lack of progress  [] Patient is not progressing as expected and requires additional follow up with physician  [] Other    Prognosis for POC: [x] Good [] Fair  [] Poor      Patient requires continued skilled intervention: [x] Yes  [] No    Treatment/Activity Tolerance:  [x] Patient able to complete treatment  [] Patient limited by fatigue  [] Patient limited by pain    [] Patient limited by other medical complications  [] Other:     ASSESSMENT: See eval      PLAN: See eval  [] Continue per plan of care [] Alter current plan (see comments above)  [x] Plan of care initiated [] Hold pending MD visit [] Discharge      Electronically signed by:  Briseyda Hendricks PT, ANGEL Fu    Therapist was present, directed the patient's care, made skilled judgement, and was responsible for assessment and treatment of the patient. Note: If patient does not return for scheduled/ recommended follow up visits, this note will serve as a discharge from care along with most recent update on progress.

## 2025-05-06 ENCOUNTER — PHARMACY VISIT (OUTPATIENT)
Dept: PHARMACY | Age: 65
End: 2025-05-06
Payer: MEDICARE

## 2025-05-06 ENCOUNTER — TELEPHONE (OUTPATIENT)
Dept: ENT CLINIC | Age: 65
End: 2025-05-06

## 2025-05-06 VITALS — DIASTOLIC BLOOD PRESSURE: 82 MMHG | HEART RATE: 57 BPM | SYSTOLIC BLOOD PRESSURE: 142 MMHG

## 2025-05-06 DIAGNOSIS — Z79.4 TYPE 2 DIABETES MELLITUS WITH HYPERGLYCEMIA, WITH LONG-TERM CURRENT USE OF INSULIN (HCC): ICD-10-CM

## 2025-05-06 DIAGNOSIS — E11.65 TYPE 2 DIABETES MELLITUS WITH HYPERGLYCEMIA, WITH LONG-TERM CURRENT USE OF INSULIN (HCC): ICD-10-CM

## 2025-05-06 DIAGNOSIS — J30.2 SEASONAL ALLERGIES: Primary | ICD-10-CM

## 2025-05-06 PROCEDURE — 99213 OFFICE O/P EST LOW 20 MIN: CPT

## 2025-05-06 RX ORDER — INSULIN GLARGINE 100 [IU]/ML
20 INJECTION, SOLUTION SUBCUTANEOUS NIGHTLY
Qty: 5 ADJUSTABLE DOSE PRE-FILLED PEN SYRINGE | Refills: 1
Start: 2025-05-06

## 2025-05-06 RX ORDER — INSULIN GLARGINE 100 [IU]/ML
22 INJECTION, SOLUTION SUBCUTANEOUS NIGHTLY
Qty: 5 ADJUSTABLE DOSE PRE-FILLED PEN SYRINGE | Refills: 1 | Status: SHIPPED
Start: 2025-05-06 | End: 2025-05-06

## 2025-05-06 NOTE — TELEPHONE ENCOUNTER
Called pharmacy, jardiance cost $31.80 for 30 ds. Will be ready tomorrow for .     Miki free of charge. Ready for .     Called and LVM to notify pt. Asked pt to call back when she has miki so we can schedule training if she doesn't want to wait another month.    Jacey Spencer, ReinaldoD, BCACP  [facility]   [facility] Ph: [phone]  [facility] Ph: [phone]  5/6/2025 1:57 PM

## 2025-05-06 NOTE — PATIENT INSTRUCTIONS
Restart the gym   Freestyle Miki  Start Jardiance 10 mg daily  Continue 20 units Lantus every night   Keep up the good work!

## 2025-05-06 NOTE — PROGRESS NOTES
Weight trend: decreasing  Barriers: impairment:  physical: spine surgery, knee surgery, shoulder surgery, lack of motivation, and stress  Sleep habits: doesn't sleep well d/t spine surgery, shoulder pain. Refuses to take narcotic pain meds.    Type 2 Diabetes Mellitus  HPI: Patient started having elevated blood sugars over the last 4 weeks. A1c had been running < 5%. She had slowly been tapered off of her medications as a result of hypoglycemia. Started back on Metformin on 3/5/25 when blood sugar increased to 200s fasting. Lantus was stopped in 2024 after A1c 4.8% on 10 units. C/o polyuria. Resumed lantus 10 units 3/25/25 and self adjusted to 12 units.   Year diagnosed = .   Related hospitalizations/ED visits: none   Comorbities: high risk ASCVD, HTN, and obesity   Considerations: minimize hypoglycemia, cost of medication, GFR, hx pancreatitis  Current symptoms/problems: blurry vision, neuropathy, and polydipsia.  Hypoglycemia? yes - not in a very long time (juice or candy) awareness, frequency, causes, timing  Has glucose tablets no  Has glucagon no  Checks feet no - does sees podiatrist  Eye exam within one year: yes (not due till  or July)     Current diabetes therapy: Metformin XR 1000 mg BID and Lantus 20 units nightly   Compliance:  compliant all of the time; pill boxes   Side effects: none with diabetes medications   Cost/insurance: [facility] insurance   Previous med trials: Januvia (can't remember why they changed, maybe insurance per patient?)     SMBG:   Glucometer (every morning)   3/31/25 average FBGs: 343-470s  25 FB, 202, 222, 261, 251  25 FBd avg - 137 ; 14d avg - 145; high = 173, low = 118    Hypertension:    Patient denies any signs/symptoms of hypertension.   She is not adherent to DASH diet  On Ace/ARB/ARNI   Medication side effects: no medication side effects noted.    Use of agents associated with hypertension: none.   Caffeine:  Home blood pressure monitoring:

## 2025-05-06 NOTE — TELEPHONE ENCOUNTER
Miki approved. Called pharmacy. Order in progress. Will order for tomorrow. No charge.     Pt did not fill Farxiga or dapagliflozin, not preferred, requires PA. Insurance prefers jardiance.    Jacey Spencer, PharmD, BCACP  [facility]   [facility] Ph: [phone]  [facility] Ph: [phone]  5/6/2025 11:33 AM    For Pharmacy Admin Tracking Only    Program: Medication Management  CPA in place:  Yes  Recommendation Provided To: Pharmacy: 2 and Other: 1  Intervention Detail: Benefit Assistance, Discontinued Rx: 1, reason: Cost/Formulary Change, and New Rx: 1, reason: Cost/Formulary Change  Intervention Accepted By: Pharmacy: 2 and Other: 1  Gap Closed?: No   Time Spent (min): 30

## 2025-05-07 ENCOUNTER — OFFICE VISIT (OUTPATIENT)
Dept: PRIMARY CARE CLINIC | Age: 65
End: 2025-05-07
Payer: MEDICARE

## 2025-05-07 VITALS
HEART RATE: 62 BPM | BODY MASS INDEX: 33.05 KG/M2 | WEIGHT: 186.6 LBS | OXYGEN SATURATION: 99 % | DIASTOLIC BLOOD PRESSURE: 66 MMHG | SYSTOLIC BLOOD PRESSURE: 100 MMHG

## 2025-05-07 DIAGNOSIS — E11.65 TYPE 2 DIABETES MELLITUS WITH HYPERGLYCEMIA, WITH LONG-TERM CURRENT USE OF INSULIN (HCC): Primary | ICD-10-CM

## 2025-05-07 DIAGNOSIS — M75.111 INCOMPLETE TEAR OF RIGHT ROTATOR CUFF, UNSPECIFIED WHETHER TRAUMATIC: ICD-10-CM

## 2025-05-07 DIAGNOSIS — J30.2 SEASONAL ALLERGIES: Primary | ICD-10-CM

## 2025-05-07 DIAGNOSIS — E78.2 HYPERLIPIDEMIA, MIXED: ICD-10-CM

## 2025-05-07 DIAGNOSIS — I10 BENIGN ESSENTIAL HTN: Chronic | ICD-10-CM

## 2025-05-07 DIAGNOSIS — Z79.4 TYPE 2 DIABETES MELLITUS WITH HYPERGLYCEMIA, WITH LONG-TERM CURRENT USE OF INSULIN (HCC): Primary | ICD-10-CM

## 2025-05-07 PROCEDURE — 1123F ACP DISCUSS/DSCN MKR DOCD: CPT | Performed by: FAMILY MEDICINE

## 2025-05-07 PROCEDURE — 3017F COLORECTAL CA SCREEN DOC REV: CPT | Performed by: FAMILY MEDICINE

## 2025-05-07 PROCEDURE — 1090F PRES/ABSN URINE INCON ASSESS: CPT | Performed by: FAMILY MEDICINE

## 2025-05-07 PROCEDURE — G8417 CALC BMI ABV UP PARAM F/U: HCPCS | Performed by: FAMILY MEDICINE

## 2025-05-07 PROCEDURE — G8399 PT W/DXA RESULTS DOCUMENT: HCPCS | Performed by: FAMILY MEDICINE

## 2025-05-07 PROCEDURE — G2211 COMPLEX E/M VISIT ADD ON: HCPCS | Performed by: FAMILY MEDICINE

## 2025-05-07 PROCEDURE — G8428 CUR MEDS NOT DOCUMENT: HCPCS | Performed by: FAMILY MEDICINE

## 2025-05-07 PROCEDURE — 2022F DILAT RTA XM EVC RTNOPTHY: CPT | Performed by: FAMILY MEDICINE

## 2025-05-07 PROCEDURE — 99214 OFFICE O/P EST MOD 30 MIN: CPT | Performed by: FAMILY MEDICINE

## 2025-05-07 PROCEDURE — 1036F TOBACCO NON-USER: CPT | Performed by: FAMILY MEDICINE

## 2025-05-07 PROCEDURE — 3074F SYST BP LT 130 MM HG: CPT | Performed by: FAMILY MEDICINE

## 2025-05-07 PROCEDURE — 3046F HEMOGLOBIN A1C LEVEL >9.0%: CPT | Performed by: FAMILY MEDICINE

## 2025-05-07 PROCEDURE — 3078F DIAST BP <80 MM HG: CPT | Performed by: FAMILY MEDICINE

## 2025-05-07 NOTE — PROGRESS NOTES
[facility]  [facility]  [address] [place] [address]  Dept: [phone]  Dept Fax: [phone]     5/7/2025      Fernanda Diego   1960     Chief Complaint   Patient presents with    Diabetes       HPI    Pt comes in today for follow up DMII. Has been following with Clinical Pharmacist. FSL awaiting auth. Potentially will do teaching next week.     Current regimen:  - Lantus 20 units nightly  - Metformin XR 1000 mg BID  - Started on Jardiance 10 mg daily (started yesterday) - was on Farxiga but Jardiance is preferred.     Blood sugars much improved. Testing low 100s fasting. 120-130s. No hypoglycemia.     Hemoglobin A1C   Date Value Ref Range Status   04/03/2025 9.8 See comment % Final     Comment:     Comment:  Diagnosis of Diabetes: > or = 6.5%  Increased risk of diabetes (Prediabetes): 5.7-6.4%  Glycemic Control: Nonpregnant Adults: <7.0%                    Pregnant: <6.0%       01/28/2025 5.2 % Final   10/21/2024 4.8 % Final   06/20/2024 4.9 % Final   01/16/2024 5.5 % Final        Wt Readings from Last 5 Encounters:   05/07/25 84.6 kg (186 lb 9.6 oz)   05/01/25 85.8 kg (189 lb 3.2 oz)   04/09/25 83.8 kg (184 lb 12.8 oz)   04/02/25 84.2 kg (185 lb 9.6 oz)   03/28/25 84.4 kg (186 lb)      BP Readings from Last 5 Encounters:   05/07/25 100/66   05/06/25 (!) 142/82   05/01/25 121/84   04/14/25 128/82   04/09/25 105/71       No data recorded     Prior to Visit Medications    Medication Sig Taking? Authorizing Provider   empagliflozin (JARDIANCE) 10 MG tablet Take 1 tablet by mouth daily  Isabel Rico, DO   insulin glargine (LANTUS SOLOSTAR) 100 UNIT/ML injection pen Inject 20 Units into the skin nightly  Isabel Rico, DO   ondansetron (ZOFRAN-ODT) 4 MG disintegrating tablet Take 1 tablet by mouth every 12 hours as needed for Nausea or Vomiting  Isabel Rico,    Continuous Glucose  (FREESTYLE JASMYNE 3 READER) YOUNG

## 2025-05-13 PROBLEM — M25.811 IMPINGEMENT OF RIGHT SHOULDER: Status: RESOLVED | Noted: 2025-03-15 | Resolved: 2025-05-13

## 2025-05-13 PROBLEM — M75.51 BURSITIS OF RIGHT SHOULDER: Status: RESOLVED | Noted: 2025-03-15 | Resolved: 2025-05-13

## 2025-05-13 PROBLEM — M75.21 BICIPITAL TENDINITIS OF RIGHT SHOULDER: Status: RESOLVED | Noted: 2025-03-15 | Resolved: 2025-05-13

## 2025-05-13 PROBLEM — M75.122 COMPLETE ROTATOR CUFF TEAR OF LEFT SHOULDER: Status: RESOLVED | Noted: 2024-10-14 | Resolved: 2025-05-13

## 2025-05-14 ENCOUNTER — PHARMACY VISIT (OUTPATIENT)
Dept: PHARMACY | Age: 65
End: 2025-05-14
Payer: MEDICARE

## 2025-05-14 DIAGNOSIS — Z79.4 TYPE 2 DIABETES MELLITUS WITH HYPERGLYCEMIA, WITH LONG-TERM CURRENT USE OF INSULIN (HCC): Primary | ICD-10-CM

## 2025-05-14 DIAGNOSIS — E11.65 TYPE 2 DIABETES MELLITUS WITH HYPERGLYCEMIA, WITH LONG-TERM CURRENT USE OF INSULIN (HCC): Primary | ICD-10-CM

## 2025-05-14 PROCEDURE — 99212 OFFICE O/P EST SF 10 MIN: CPT

## 2025-05-14 NOTE — PROGRESS NOTES
CLINICAL PHARMACY NOTE--Diabetes    Fernanda Diego is a 64 y.o. female with PMHX significant for OA, HTN, HLD, Obesity, and Type 2 Diabetes, DEANA (noncompliant with CPAP) referred by Dr Rico for diabetes counseling and medication management.     Interval update: patient presented for education on Freestyle Miki. First sensor applied in visit. Patient has not yet picked up Jardiance, she is waiting for her insurance credit to come in the mail or will p/u Monday when she gets paid. Reviewed FBG, improved since last visit (7d avg - 120, 14d avg - 129)    Assessment & Plan   ASSESSMENT/PLAN:  Type 2 Diabetes  A1c above goal <7% (5.2%--> 9.8%) - likely to due to lifestyle changes (poor sleep, diet, and activity 2/2 shoulder pain)  FBG above goal but much improved  Use FSL, return in ~3 weeks for review  Medications:  Continue Lantus 20 units nightly  Cont Metformin XR 1000mg BID  Start Jardiance 10 mg daily, counseled on dosing, benefits of therapy, possible side effects. Called pharmacy, Jardiance is preferred SGLT2i. BMP 4 wks  C-peptide wnl  SMBG PRN  Will discuss diet in further detail with future appts d/t time   Increase time of exercise/day. Decrease sedimentary time on couch playing games/watching movies    2.  Hypertension   BP at goal <130/80  at home, elevated in office  Continue Amlodipine + losartan   UACR elevated -->SGLT2 starting     3.  Hyperlipidemia  Continue moderate intensity statin.  LDL 62 (1/28/2025); at goal <70    Health Maintenance Due   Topic Date Due    Diabetic foot exam  01/10/2021    Diabetic retinal exam  06/20/2024    DTaP/Tdap/Td vaccine (2 - Td or Tdap) 04/15/2025       Education provided:  Reviewed A1c and blood sugar goals with patient  Counseled on changes to medication regimen and common side effects  Encouraged weight loss and aerobic exercise  Educated on diet   Discussed foot care.    No follow-ups on file.     Subjective   SUBJECTIVE/OBJECTIVE:    Treatment

## 2025-05-17 NOTE — PROGRESS NOTES
minimal degenerative joint changes. Impression: Extensor tenosynovitis left hand/wrist.                      Left ring and little trigger fingers, by history only. The nature of this medical problem is fully discussed with the patient, including all treatment options. All questions are answered. She is asked to contact her PCP and. If possible, be placed on a regimen of NSAID (as recommended by the [facility]) to treat the tenosynovitis. There is no clinical evidence of trigger finger today. If the fingers worsen to the point that triggering is occurring more continuously, she will return for possible steroid injections. Today I have spent 35 minutes with this patient including most or all of the following: reviewing the patient's record, independently interpreting tests and Xrays, obtaining a medical history, preforming a physical examination, making a diagnosis, counseling the patient/family/caregiver, ordering medications, tests or procedures, documenting clinical information in the electronic medical record and communicating the results of the encounter to the patient, family, caregiver, primary care and referring physician/practitioner.
17-May-2025 14:33

## 2025-05-19 ENCOUNTER — PHARMACY VISIT (OUTPATIENT)
Dept: PHARMACY | Age: 65
End: 2025-05-19
Payer: MEDICARE

## 2025-05-19 DIAGNOSIS — E11.65 TYPE 2 DIABETES MELLITUS WITH HYPERGLYCEMIA, WITH LONG-TERM CURRENT USE OF INSULIN (HCC): Primary | ICD-10-CM

## 2025-05-19 DIAGNOSIS — E11.65 TYPE 2 DIABETES MELLITUS WITH HYPERGLYCEMIA, WITH LONG-TERM CURRENT USE OF INSULIN (HCC): ICD-10-CM

## 2025-05-19 DIAGNOSIS — R39.15 URINARY URGENCY: ICD-10-CM

## 2025-05-19 DIAGNOSIS — R39.15 URINARY URGENCY: Primary | ICD-10-CM

## 2025-05-19 DIAGNOSIS — Z79.4 TYPE 2 DIABETES MELLITUS WITH HYPERGLYCEMIA, WITH LONG-TERM CURRENT USE OF INSULIN (HCC): Primary | ICD-10-CM

## 2025-05-19 DIAGNOSIS — Z79.4 TYPE 2 DIABETES MELLITUS WITH HYPERGLYCEMIA, WITH LONG-TERM CURRENT USE OF INSULIN (HCC): ICD-10-CM

## 2025-05-19 PROCEDURE — 99213 OFFICE O/P EST LOW 20 MIN: CPT

## 2025-05-19 NOTE — PROGRESS NOTES
it back.    Assessment & Plan   ASSESSMENT/PLAN:  Type 2 Diabetes  A1c above goal <7% (5.2%--> 9.8%) - likely to due to lifestyle changes (poor sleep, diet, and activity 2/2 shoulder pain)  FBG at/ below goal   May have been an acute elevation with shoulder issues/ poor sleep. Will monitor closely to avoid over treating. FSL helpful  Call FSL for trouble shooting  Medications:  hold Lantus for now  Cont Metformin XR 1000mg BID  Hold Jardiance for now  Will follow up on UA/ culture per Dr. Rico, patient h/o yeast infections with PCN, may be more sensitive  Discussed other possible labs, per PCP no further labs indicated at this time with low suspicion for euglycemic DKA in the setting of vomiting   SMBG PRN  Will discuss diet in further detail with future appts d/t time   Increase time of exercise/day. Decrease sedimentary time on couch playing games/watching movies    2.  Hypertension   BP at goal <130/80  at home, elevated in office  Continue Amlodipine + losartan   UACR elevated -->SGLT2 started, not tolerating      3.  Hyperlipidemia  Continue moderate intensity statin.  LDL 62 (1/28/2025); at goal <70    Health Maintenance Due   Topic Date Due    Diabetic foot exam  01/10/2021    Diabetic retinal exam  06/20/2024    DTaP/Tdap/Td vaccine (2 - Td or Tdap) 04/15/2025       Education provided:  Reviewed A1c and blood sugar goals with patient  Counseled on changes to medication regimen and common side effects  Encouraged weight loss and aerobic exercise  Educated on diet   Discussed foot care.    No follow-ups on file.     Subjective   SUBJECTIVE/OBJECTIVE:    Treatment Adherence:  Eating habits:  Struggles due to limitation placed by GI (due to diverticulitis)    BK- boiled eggs, dried toast +/- jelly, rice packets, frozen lean cuisines (no onion, edgar greens per GI), no dairy products   LN- lasagna, go out with friends but can't eat greasy foods   DN- pickled beets, no red meats, fruit cups, applesauce, pot

## 2025-05-19 NOTE — PATIENT INSTRUCTIONS
Get glucose tablets  Take 4 tablets for <70, repeat every 15 min until BG >80 then eat a snack  Urine test today   Continue drinking water  Hold Jardiance until we talk again  Hold Lantus until we talk again  Call Dr. Rico with any other issues with your urine   We'll call you within the next few days

## 2025-05-20 ENCOUNTER — PATIENT MESSAGE (OUTPATIENT)
Dept: PRIMARY CARE CLINIC | Age: 65
End: 2025-05-20

## 2025-05-20 ENCOUNTER — TELEPHONE (OUTPATIENT)
Dept: PRIMARY CARE CLINIC | Age: 65
End: 2025-05-20

## 2025-05-20 LAB
BACTERIA UR CULT: NORMAL
BACTERIA URNS QL MICRO: ABNORMAL /HPF
BILIRUB UR QL STRIP.AUTO: NEGATIVE
CLARITY UR: CLEAR
COLOR UR: YELLOW
EPI CELLS #/AREA URNS AUTO: 3 /HPF (ref 0–5)
GLUCOSE UR STRIP.AUTO-MCNC: >=1000 MG/DL
HGB UR QL STRIP.AUTO: NEGATIVE
HYALINE CASTS #/AREA URNS AUTO: 0 /LPF (ref 0–8)
KETONES UR STRIP.AUTO-MCNC: ABNORMAL MG/DL
LEUKOCYTE ESTERASE UR QL STRIP.AUTO: NEGATIVE
NITRITE UR QL STRIP.AUTO: NEGATIVE
PH UR STRIP.AUTO: 5.5 [PH] (ref 5–8)
PROT UR STRIP.AUTO-MCNC: ABNORMAL MG/DL
RBC CLUMPS #/AREA URNS AUTO: 2 /HPF (ref 0–4)
SP GR UR STRIP.AUTO: 1.04 (ref 1–1.03)
UA DIPSTICK W REFLEX MICRO PNL UR: YES
URN SPEC COLLECT METH UR: ABNORMAL
UROBILINOGEN UR STRIP-ACNC: 0.2 E.U./DL
WBC #/AREA URNS AUTO: 6 /HPF (ref 0–5)

## 2025-05-20 NOTE — TELEPHONE ENCOUNTER
Patient sent a [employer] message after this. Sent a message back requesting clarification on this.

## 2025-05-20 NOTE — TELEPHONE ENCOUNTER
Pt states this is her 3rd time having a pain management sensor and she is unable to get readings, pt states she is pricking her finger again but she is low on the strips. Pt states she did have someone assist her las time she tried the arm sensor but is unable to get a reading without the needled bending.

## 2025-05-28 ENCOUNTER — TELEPHONE (OUTPATIENT)
Dept: PHARMACY | Age: 65
End: 2025-05-28

## 2025-05-28 DIAGNOSIS — E11.65 TYPE 2 DIABETES MELLITUS WITH HYPERGLYCEMIA, WITH LONG-TERM CURRENT USE OF INSULIN (HCC): Primary | ICD-10-CM

## 2025-05-28 DIAGNOSIS — Z79.4 TYPE 2 DIABETES MELLITUS WITH HYPERGLYCEMIA, WITH LONG-TERM CURRENT USE OF INSULIN (HCC): Primary | ICD-10-CM

## 2025-05-28 RX ORDER — AVOBENZONE, HOMOSALATE, OCTISALATE, OCTOCRYLENE 30; 40; 45; 26 MG/ML; MG/ML; MG/ML; MG/ML
1 CREAM TOPICAL 2 TIMES DAILY
Qty: 100 EACH | Refills: 5 | Status: SHIPPED | OUTPATIENT
Start: 2025-05-28

## 2025-05-28 RX ORDER — GLUCOSAMINE HCL/CHONDROITIN SU 500-400 MG
CAPSULE ORAL
Qty: 100 STRIP | Refills: 5 | Status: SHIPPED | OUTPATIENT
Start: 2025-05-28

## 2025-05-29 ENCOUNTER — APPOINTMENT (OUTPATIENT)
Dept: CT IMAGING | Age: 65
End: 2025-05-29
Payer: MEDICARE

## 2025-05-29 ENCOUNTER — HOSPITAL ENCOUNTER (EMERGENCY)
Age: 65
Discharge: HOME | End: 2025-05-29
Attending: EMERGENCY MEDICINE
Payer: MEDICARE

## 2025-05-29 VITALS
WEIGHT: 186.6 LBS | BODY MASS INDEX: 33.05 KG/M2 | RESPIRATION RATE: 14 BRPM | OXYGEN SATURATION: 97 % | DIASTOLIC BLOOD PRESSURE: 94 MMHG | SYSTOLIC BLOOD PRESSURE: 159 MMHG | TEMPERATURE: 98.4 F | HEART RATE: 53 BPM

## 2025-05-29 DIAGNOSIS — N28.89 RENAL MASS: ICD-10-CM

## 2025-05-29 DIAGNOSIS — R10.84 GENERALIZED ABDOMINAL PAIN: Primary | ICD-10-CM

## 2025-05-29 LAB
ALBUMIN SERPL-MCNC: 4.1 G/DL (ref 3.4–5)
ALBUMIN/GLOB SERPL: 1.5 {RATIO} (ref 1.1–2.2)
ALP SERPL-CCNC: 112 U/L (ref 40–129)
ALT SERPL-CCNC: 7 U/L (ref 10–40)
AMORPH SED URNS QL MICRO: ABNORMAL /HPF
ANION GAP SERPL CALCULATED.3IONS-SCNC: 13 MMOL/L (ref 3–16)
AST SERPL-CCNC: 16 U/L (ref 15–37)
BACTERIA URNS QL MICRO: ABNORMAL /HPF
BASE EXCESS BLDV CALC-SCNC: 1.7 MMOL/L (ref -2–3)
BASOPHILS # BLD: 0.1 K/UL (ref 0–0.2)
BASOPHILS NFR BLD: 0.4 %
BILIRUB SERPL-MCNC: 0.4 MG/DL (ref 0–1)
BILIRUB UR QL STRIP.AUTO: NEGATIVE
BUN SERPL-MCNC: 9 MG/DL (ref 7–20)
CALCIUM SERPL-MCNC: 10 MG/DL (ref 8.3–10.6)
CHLORIDE SERPL-SCNC: 102 MMOL/L (ref 99–110)
CLARITY UR: ABNORMAL
CO2 BLDV-SCNC: 29 MMOL/L
CO2 SERPL-SCNC: 24 MMOL/L (ref 21–32)
COHGB MFR BLDV: 8.6 % (ref 0–1.5)
COLOR UR: YELLOW
CREAT SERPL-MCNC: 0.7 MG/DL (ref 0.6–1.2)
DEPRECATED RDW RBC AUTO: 15.7 % (ref 12.4–15.4)
DO-HGB MFR BLDV: 39.7 %
EOSINOPHIL # BLD: 0.3 K/UL (ref 0–0.6)
EOSINOPHIL NFR BLD: 2.2 %
EPI CELLS #/AREA URNS HPF: ABNORMAL /HPF (ref 0–5)
GFR SERPLBLD CREATININE-BSD FMLA CKD-EPI: >90 ML/MIN/{1.73_M2}
GLUCOSE BLD-MCNC: 104 MG/DL (ref 70–99)
GLUCOSE SERPL-MCNC: 107 MG/DL (ref 70–99)
GLUCOSE UR STRIP.AUTO-MCNC: NEGATIVE MG/DL
HCO3 BLDV-SCNC: 27.9 MMOL/L (ref 24–28)
HCT VFR BLD AUTO: 34.5 % (ref 36–48)
HGB BLD-MCNC: 11.3 G/DL (ref 12–16)
HGB UR QL STRIP.AUTO: NEGATIVE
KETONES UR STRIP.AUTO-MCNC: NEGATIVE MG/DL
LEUKOCYTE ESTERASE UR QL STRIP.AUTO: NEGATIVE
LIPASE SERPL-CCNC: 25 U/L (ref 13–60)
LYMPHOCYTES # BLD: 2.8 K/UL (ref 1–5.1)
LYMPHOCYTES NFR BLD: 21.7 %
MCH RBC QN AUTO: 25.9 PG (ref 26–34)
MCHC RBC AUTO-ENTMCNC: 32.8 G/DL (ref 31–36)
MCV RBC AUTO: 78.8 FL (ref 80–100)
METHGB MFR BLDV: 0.1 % (ref 0–1.5)
MONOCYTES # BLD: 0.8 K/UL (ref 0–1.3)
MONOCYTES NFR BLD: 6.4 %
MUCOUS THREADS #/AREA URNS LPF: ABNORMAL /LPF
NEUTROPHILS # BLD: 9.1 K/UL (ref 1.7–7.7)
NEUTROPHILS NFR BLD: 69.3 %
NITRITE UR QL STRIP.AUTO: NEGATIVE
PCO2 BLDV: 49.7 MMHG (ref 41–51)
PERFORMED ON: ABNORMAL
PH BLDV: 7.36 [PH] (ref 7.35–7.45)
PH UR STRIP.AUTO: 6 [PH] (ref 5–8)
PLATELET # BLD AUTO: 315 K/UL (ref 135–450)
PMV BLD AUTO: 9.2 FL (ref 5–10.5)
PO2 BLDV: <30 MMHG (ref 25–40)
POTASSIUM SERPL-SCNC: 3.8 MMOL/L (ref 3.5–5.1)
PROT SERPL-MCNC: 6.8 G/DL (ref 6.4–8.2)
PROT UR STRIP.AUTO-MCNC: NEGATIVE MG/DL
RBC # BLD AUTO: 4.38 M/UL (ref 4–5.2)
RBC #/AREA URNS HPF: ABNORMAL /HPF (ref 0–4)
SAO2 % BLDV: 57 %
SODIUM SERPL-SCNC: 139 MMOL/L (ref 136–145)
SP GR UR STRIP.AUTO: 1.02 (ref 1–1.03)
UA COMPLETE W REFLEX CULTURE PNL UR: ABNORMAL
UA DIPSTICK W REFLEX MICRO PNL UR: YES
URN SPEC COLLECT METH UR: ABNORMAL
UROBILINOGEN UR STRIP-ACNC: 0.2 E.U./DL
WBC # BLD AUTO: 13.1 K/UL (ref 4–11)
WBC #/AREA URNS HPF: ABNORMAL /HPF (ref 0–5)

## 2025-05-29 PROCEDURE — 96375 TX/PRO/DX INJ NEW DRUG ADDON: CPT

## 2025-05-29 PROCEDURE — 6360000004 HC RX CONTRAST MEDICATION: Performed by: EMERGENCY MEDICINE

## 2025-05-29 PROCEDURE — 80053 COMPREHEN METABOLIC PANEL: CPT

## 2025-05-29 PROCEDURE — 36415 COLL VENOUS BLD VENIPUNCTURE: CPT

## 2025-05-29 PROCEDURE — 74177 CT ABD & PELVIS W/CONTRAST: CPT

## 2025-05-29 PROCEDURE — 96374 THER/PROPH/DIAG INJ IV PUSH: CPT

## 2025-05-29 PROCEDURE — 83690 ASSAY OF LIPASE: CPT

## 2025-05-29 PROCEDURE — 82803 BLOOD GASES ANY COMBINATION: CPT

## 2025-05-29 PROCEDURE — 99285 EMERGENCY DEPT VISIT HI MDM: CPT

## 2025-05-29 PROCEDURE — 2580000003 HC RX 258: Performed by: PHYSICIAN ASSISTANT

## 2025-05-29 PROCEDURE — 6360000002 HC RX W HCPCS: Performed by: PHYSICIAN ASSISTANT

## 2025-05-29 PROCEDURE — 85025 COMPLETE CBC W/AUTO DIFF WBC: CPT

## 2025-05-29 PROCEDURE — 81001 URINALYSIS AUTO W/SCOPE: CPT

## 2025-05-29 PROCEDURE — 96361 HYDRATE IV INFUSION ADD-ON: CPT

## 2025-05-29 RX ORDER — SODIUM CHLORIDE, SODIUM LACTATE, POTASSIUM CHLORIDE, AND CALCIUM CHLORIDE .6; .31; .03; .02 G/100ML; G/100ML; G/100ML; G/100ML
1000 INJECTION, SOLUTION INTRAVENOUS ONCE
Status: COMPLETED | OUTPATIENT
Start: 2025-05-29 | End: 2025-05-29

## 2025-05-29 RX ORDER — KETOROLAC TROMETHAMINE 30 MG/ML
15 INJECTION, SOLUTION INTRAMUSCULAR; INTRAVENOUS ONCE
Status: COMPLETED | OUTPATIENT
Start: 2025-05-29 | End: 2025-05-29

## 2025-05-29 RX ORDER — IOPAMIDOL 755 MG/ML
75 INJECTION, SOLUTION INTRAVASCULAR
Status: COMPLETED | OUTPATIENT
Start: 2025-05-29 | End: 2025-05-29

## 2025-05-29 RX ORDER — ONDANSETRON 2 MG/ML
4 INJECTION INTRAMUSCULAR; INTRAVENOUS ONCE
Status: COMPLETED | OUTPATIENT
Start: 2025-05-29 | End: 2025-05-29

## 2025-05-29 RX ADMIN — ONDANSETRON 4 MG: 2 INJECTION, SOLUTION INTRAMUSCULAR; INTRAVENOUS at 10:03

## 2025-05-29 RX ADMIN — IOPAMIDOL 75 ML: 755 INJECTION, SOLUTION INTRAVENOUS at 11:08

## 2025-05-29 RX ADMIN — KETOROLAC TROMETHAMINE 15 MG: 30 INJECTION, SOLUTION INTRAMUSCULAR at 10:03

## 2025-05-29 RX ADMIN — SODIUM CHLORIDE, SODIUM LACTATE, POTASSIUM CHLORIDE, AND CALCIUM CHLORIDE 1000 ML: .6; .31; .03; .02 INJECTION, SOLUTION INTRAVENOUS at 10:02

## 2025-05-29 ASSESSMENT — PAIN - FUNCTIONAL ASSESSMENT
PAIN_FUNCTIONAL_ASSESSMENT: 0-10
PAIN_FUNCTIONAL_ASSESSMENT: ACTIVITIES ARE NOT PREVENTED

## 2025-05-29 ASSESSMENT — PAIN DESCRIPTION - PAIN TYPE: TYPE: ACUTE PAIN

## 2025-05-29 ASSESSMENT — PAIN DESCRIPTION - DESCRIPTORS
DESCRIPTORS: ACHING;SHARP
DESCRIPTORS: DISCOMFORT;SHARP

## 2025-05-29 ASSESSMENT — PAIN SCALES - GENERAL
PAINLEVEL_OUTOF10: 8
PAINLEVEL_OUTOF10: 8
PAINLEVEL_OUTOF10: 4

## 2025-05-29 ASSESSMENT — PAIN DESCRIPTION - ORIENTATION: ORIENTATION: LOWER

## 2025-05-29 ASSESSMENT — PAIN DESCRIPTION - LOCATION
LOCATION: ABDOMEN

## 2025-05-29 NOTE — DISCHARGE INSTRUCTIONS
You are seen for your abdominal pain.  The symptoms improved.  Return if your symptoms change or worsen.  There was a complex renal cyst on your CAT scan.  The CAT scan doctor recommends that you get an MRI.  This does not need to be done in the hospital today but you need to call your primary care physician to set this up outside of the hospital.

## 2025-05-29 NOTE — ED PROVIDER NOTES
ED Attending Attestation Note     Date of evaluation: 5/29/2025    This patient was seen by the advance practice provider.  I have seen and examined the patient, agree with the workup, evaluation, management and diagnosis. The care plan has been discussed.  I have reviewed the ECG and concur with the EVY's interpretation.  My assessment reveals nontoxic-appearing adult female with no left-sided abdominal pain, but somewhat tender generally and diffusely throughout the abdomen.  No rebound or guarding is appreciated..       Alonzo Turner MD  05/29/25 1542    
Conjunctivae normal.   Cardiovascular:      Rate and Rhythm: Normal rate.      Heart sounds: No murmur heard.     No friction rub. No gallop.   Pulmonary:      Effort: Pulmonary effort is normal. No respiratory distress.      Breath sounds: No stridor. No wheezing, rhonchi or rales.   Abdominal:      Tenderness: There is abdominal tenderness in the right lower quadrant and left lower quadrant. There is no right CVA tenderness, left CVA tenderness or guarding. Negative signs include Jeong's sign, McBurney's sign and obturator sign.      Hernia: No hernia is present.   Musculoskeletal:      Cervical back: Neck supple.   Skin:     General: Skin is warm and dry.   Neurological:      Mental Status: She is alert and oriented to person, place, and time.   Psychiatric:         Mood and Affect: Mood normal.         Behavior: Behavior normal.          Johnathon Bo, JESSICA  05/29/25 2780

## 2025-05-29 NOTE — ED NOTES
Patient prepared for and ready to be discharged. Patient discharged at this time in no acute distress after verbalizing understanding of discharge instructions. Patient left after receiving After Visit Summary instructions.        Azul Bush, RN  05/29/25 9766

## 2025-05-30 DIAGNOSIS — N28.89 RIGHT RENAL MASS: Primary | ICD-10-CM

## 2025-06-03 ENCOUNTER — PHARMACY VISIT (OUTPATIENT)
Dept: PHARMACY | Age: 65
End: 2025-06-03
Payer: MEDICARE

## 2025-06-03 VITALS
WEIGHT: 187.5 LBS | HEART RATE: 58 BPM | BODY MASS INDEX: 33.21 KG/M2 | SYSTOLIC BLOOD PRESSURE: 127 MMHG | DIASTOLIC BLOOD PRESSURE: 68 MMHG

## 2025-06-03 DIAGNOSIS — Z79.4 TYPE 2 DIABETES MELLITUS WITH HYPERGLYCEMIA, WITH LONG-TERM CURRENT USE OF INSULIN (HCC): Primary | ICD-10-CM

## 2025-06-03 DIAGNOSIS — E11.65 TYPE 2 DIABETES MELLITUS WITH HYPERGLYCEMIA, WITH LONG-TERM CURRENT USE OF INSULIN (HCC): Primary | ICD-10-CM

## 2025-06-03 DIAGNOSIS — I10 BENIGN ESSENTIAL HTN: ICD-10-CM

## 2025-06-03 DIAGNOSIS — E78.2 HYPERLIPIDEMIA, MIXED: ICD-10-CM

## 2025-06-03 PROCEDURE — 99212 OFFICE O/P EST SF 10 MIN: CPT

## 2025-06-03 NOTE — PROGRESS NOTES
CLINICAL PHARMACY NOTE--Diabetes    Fernanda Diego is a 64 y.o. female with PMHX significant for OA, HTN, HLD, Obesity, and Type 2 Diabetes, DEANA (noncompliant with CPAP) referred by Dr Rico for diabetes counseling and medication management.     Interval update: Patient was reporting hypoglycemic events and subseqently stopped lantus on 5/18. No more episodes of hypoglycemia. Was having issues with miki, and got 5 replacements. Now it's working properly.  Pt wants to get back to the [facility] to workout. Has MRI Thursday to evaluate cist on kidney. Was in ER for severe gas pain, now doing fine. Feels she needs to eat more veggies and time her meals better (I,e not eat after 6pm).     Assessment & Plan   ASSESSMENT/PLAN:  Type 2 Diabetes  A1c above goal <7% (5.2%--> 9.8%) - likely to due to lifestyle changes (poor sleep, diet, and activity 2/2 shoulder pain)  Miki TIR now at goal >70%  Medications:  Remain off Lantus and Jardiance  Cont Metformin XR 1000mg BID- instructed pt to hold for 48 hours after contrast  Will discuss diet in further detail with future appts d/t time   Increase time of exercise/day. Decrease sedimentary time on couch playing games/watching movies    2.  Hypertension   BP at goal <130/80   Continue Amlodipine + losartan   UACR elevated -->possible SGLT2 failure due to N&V/ itching, consider rechallenge in the future    3.  Hyperlipidemia  Continue moderate intensity statin.  LDL 62 (1/28/2025); at goal <70    Health Maintenance Due   Topic Date Due    Diabetic foot exam  01/10/2021    Diabetic retinal exam  06/20/2024    DTaP/Tdap/Td vaccine (2 - Td or Tdap) 04/15/2025    A1C test (Diabetic or Prediabetic)  07/03/2025       Education provided:  Reviewed A1c and blood sugar goals with patient  Counseled on changes to medication regimen and common side effects  Encouraged weight loss and aerobic exercise  Educated on diet   Discussed foot care.    Return in about 4 weeks (around 7/1/2025).

## 2025-06-05 ENCOUNTER — RESULTS FOLLOW-UP (OUTPATIENT)
Dept: PRIMARY CARE CLINIC | Age: 65
End: 2025-06-05

## 2025-06-05 ENCOUNTER — HOSPITAL ENCOUNTER (OUTPATIENT)
Dept: MRI IMAGING | Age: 65
Discharge: HOME | End: 2025-06-05
Attending: FAMILY MEDICINE
Payer: MEDICARE

## 2025-06-05 DIAGNOSIS — N28.89 RIGHT RENAL MASS: ICD-10-CM

## 2025-06-05 PROCEDURE — A9579 GAD-BASE MR CONTRAST NOS,1ML: HCPCS | Performed by: FAMILY MEDICINE

## 2025-06-05 PROCEDURE — 74183 MRI ABD W/O CNTR FLWD CNTR: CPT

## 2025-06-05 PROCEDURE — 6360000004 HC RX CONTRAST MEDICATION: Performed by: FAMILY MEDICINE

## 2025-06-05 RX ADMIN — GADOTERIDOL 18 ML: 279.3 INJECTION, SOLUTION INTRAVENOUS at 08:46

## 2025-06-18 DIAGNOSIS — E78.2 HYPERLIPIDEMIA, MIXED: ICD-10-CM

## 2025-06-18 RX ORDER — ATORVASTATIN CALCIUM 10 MG/1
10 TABLET, FILM COATED ORAL DAILY
Qty: 90 TABLET | Refills: 1 | Status: SHIPPED | OUTPATIENT
Start: 2025-06-18

## 2025-07-08 ENCOUNTER — PHARMACY VISIT (OUTPATIENT)
Dept: PHARMACY | Age: 65
End: 2025-07-08
Payer: MEDICARE

## 2025-07-08 ENCOUNTER — OFFICE VISIT (OUTPATIENT)
Dept: PRIMARY CARE CLINIC | Age: 65
End: 2025-07-08
Payer: MEDICARE

## 2025-07-08 VITALS
OXYGEN SATURATION: 95 % | DIASTOLIC BLOOD PRESSURE: 81 MMHG | WEIGHT: 189 LBS | BODY MASS INDEX: 33.48 KG/M2 | HEART RATE: 92 BPM | SYSTOLIC BLOOD PRESSURE: 125 MMHG

## 2025-07-08 DIAGNOSIS — E11.65 TYPE 2 DIABETES MELLITUS WITH HYPERGLYCEMIA, WITH LONG-TERM CURRENT USE OF INSULIN (HCC): Primary | ICD-10-CM

## 2025-07-08 DIAGNOSIS — Z79.4 TYPE 2 DIABETES MELLITUS WITH HYPERGLYCEMIA, WITH LONG-TERM CURRENT USE OF INSULIN (HCC): Primary | ICD-10-CM

## 2025-07-08 DIAGNOSIS — I10 BENIGN ESSENTIAL HTN: ICD-10-CM

## 2025-07-08 DIAGNOSIS — I10 BENIGN ESSENTIAL HTN: Chronic | ICD-10-CM

## 2025-07-08 DIAGNOSIS — E78.2 HYPERLIPIDEMIA, MIXED: ICD-10-CM

## 2025-07-08 PROBLEM — J34.89 NASAL OBSTRUCTION: Chronic | Status: RESOLVED | Noted: 2023-09-12 | Resolved: 2025-07-08

## 2025-07-08 PROBLEM — E66.01 MORBIDLY OBESE (HCC): Status: RESOLVED | Noted: 2020-09-28 | Resolved: 2025-07-08

## 2025-07-08 LAB — HBA1C MFR BLD: 5.7 %

## 2025-07-08 PROCEDURE — 3074F SYST BP LT 130 MM HG: CPT | Performed by: FAMILY MEDICINE

## 2025-07-08 PROCEDURE — 1160F RVW MEDS BY RX/DR IN RCRD: CPT | Performed by: FAMILY MEDICINE

## 2025-07-08 PROCEDURE — 1123F ACP DISCUSS/DSCN MKR DOCD: CPT | Performed by: FAMILY MEDICINE

## 2025-07-08 PROCEDURE — 3044F HG A1C LEVEL LT 7.0%: CPT | Performed by: FAMILY MEDICINE

## 2025-07-08 PROCEDURE — 99214 OFFICE O/P EST MOD 30 MIN: CPT | Performed by: FAMILY MEDICINE

## 2025-07-08 PROCEDURE — 2022F DILAT RTA XM EVC RTNOPTHY: CPT | Performed by: FAMILY MEDICINE

## 2025-07-08 PROCEDURE — 1090F PRES/ABSN URINE INCON ASSESS: CPT | Performed by: FAMILY MEDICINE

## 2025-07-08 PROCEDURE — 1036F TOBACCO NON-USER: CPT | Performed by: FAMILY MEDICINE

## 2025-07-08 PROCEDURE — 3017F COLORECTAL CA SCREEN DOC REV: CPT | Performed by: FAMILY MEDICINE

## 2025-07-08 PROCEDURE — 99212 OFFICE O/P EST SF 10 MIN: CPT

## 2025-07-08 PROCEDURE — 3078F DIAST BP <80 MM HG: CPT | Performed by: FAMILY MEDICINE

## 2025-07-08 PROCEDURE — G2211 COMPLEX E/M VISIT ADD ON: HCPCS | Performed by: FAMILY MEDICINE

## 2025-07-08 PROCEDURE — 1159F MED LIST DOCD IN RCRD: CPT | Performed by: FAMILY MEDICINE

## 2025-07-08 PROCEDURE — G8417 CALC BMI ABV UP PARAM F/U: HCPCS | Performed by: FAMILY MEDICINE

## 2025-07-08 PROCEDURE — G8427 DOCREV CUR MEDS BY ELIG CLIN: HCPCS | Performed by: FAMILY MEDICINE

## 2025-07-08 PROCEDURE — 83036 HEMOGLOBIN GLYCOSYLATED A1C: CPT | Performed by: FAMILY MEDICINE

## 2025-07-08 PROCEDURE — G8399 PT W/DXA RESULTS DOCUMENT: HCPCS | Performed by: FAMILY MEDICINE

## 2025-07-08 RX ORDER — HYDROCHLOROTHIAZIDE 12.5 MG/1
CAPSULE ORAL
Qty: 1 EACH | Refills: 0 | COMMUNITY
Start: 2025-07-08

## 2025-07-08 NOTE — PROGRESS NOTES
[facility]  [facility]  [address] [place] [id number]  Dept: [phone]  Dept Fax: [phone]     7/8/2025      Fernanda Diego   1960     Chief Complaint   Patient presents with    Follow-up       HPI    Pt comes in today for follow up T2DM, HTN, HLD.     No longer seeing Allergist and doing the allergy shots due to side effects. Has felt much better.     DMII - Has been using freestyle lit 3 but does not have one on currently. Average glucose 130. 96% in target.     Hemoglobin A1C   Date Value Ref Range Status   07/08/2025 5.7 % Final   04/03/2025 9.8 See comment % Final     Comment:     Comment:  Diagnosis of Diabetes: > or = 6.5%  Increased risk of diabetes (Prediabetes): 5.7-6.4%  Glycemic Control: Nonpregnant Adults: <7.0%                    Pregnant: <6.0%       01/28/2025 5.2 % Final   10/21/2024 4.8 % Final   06/20/2024 4.9 % Final        No data recorded     Prior to Visit Medications    Medication Sig Taking? Authorizing Provider   atorvastatin (LIPITOR) 10 MG tablet TAKE 1 TABLET BY MOUTH DAILY Yes Isabel Rico DO   ondansetron (ZOFRAN-ODT) 4 MG disintegrating tablet Take 1 tablet by mouth every 12 hours as needed for Nausea or Vomiting Yes Isabel Rico DO   Continuous Glucose  (FREESTYLE LIT 3 READER) YOUNG UAD Yes Isabel Rico DO   Continuous Glucose Sensor (FREESTYLE LIT 3 PLUS SENSOR) MISC Replace every 15 days Yes Isabel Rico DO   amLODIPine (NORVASC) 5 MG tablet TAKE 1 TABLET BY MOUTH DAILY Yes Isabel Rico DO   metFORMIN (GLUCOPHAGE-XR) 500 MG extended release tablet Take 2 tablets by mouth 2 times daily (with meals) Yes Isabel Rico DO   montelukast (SINGULAIR) 10 MG tablet TAKE ONE TABLET BY MOUTH ONCE NIGHTLY Yes Isabel Rico DO   losartan (COZAAR) 100 MG tablet TAKE 1 TABLET BY MOUTH DAILY Yes Isabel Rico DO

## 2025-07-08 NOTE — PROGRESS NOTES
CLINICAL PHARMACY NOTE--Diabetes    Fernanda Diego is a 64 y.o. female with PMHX significant for OA, HTN, HLD, Obesity, and Type 2 Diabetes, DEANA (noncompliant with CPAP) referred by Dr Rico for diabetes counseling and medication management.     Interval update: Patient was reporting hypoglycemic events and subseqently stopped lantus on 5/18. No more episodes of hypoglycemia.   -Was having issues with miki, now back to using it again.  Asked for sample bc insurance won't send another for a few days. She does fingersticks BID when not wearing CGM.  -Pt wants to get back to the [facility] to workout. Doing arm and leg workouts in chair  -Had allergy sxs, caused BG spike?  -Abd MRI - renal cysts, \"No evidence of significant renal lesion\"  -shoulder surgery/scope TBD  -she is happy that BMs moving better without laxative  -A1c 5.7% per pcp today    Assessment & Plan   Type 2 Diabetes  A1c below goal <7% (5.2%--> 9.8%-->5.7% today)   Miki TIR at goal >70%  No need for BID fingersticks unless she wants to continue this in absence of CGM.  Medications:  Metformin XR 1000mg BID  Increase time of exercise/day. Decrease sedimentary time on couch playing games/watching movies    2.  Hypertension   BP at goal <130/80 at pcp visit today   Continue Amlodipine + losartan   UACR elevated -->? SGLT2 failure due to N&V/ itching    3.  Hyperlipidemia  Continue moderate intensity statin.  LDL 62 (1/28/2025); at goal <70    Health Maintenance Due   Topic Date Due    Diabetic foot exam  01/10/2021    Diabetic retinal exam  06/20/2024    DTaP/Tdap/Td vaccine (2 - Td or Tdap) 04/15/2025    A1C test (Diabetic or Prediabetic)  07/03/2025       Education provided:  Reviewed A1c and blood sugar goals with patient  Counseled on changes to medication regimen and common side effects  Encouraged weight loss and aerobic exercise  Educated on diet   Discussed foot care.    Return in about 3 months (around 10/8/2025).- to coordinate with pcp appt.

## 2025-07-09 ASSESSMENT — ENCOUNTER SYMPTOMS
SHORTNESS OF BREATH: 0
DIARRHEA: 0
ABDOMINAL PAIN: 0
NAUSEA: 0
VOMITING: 0

## 2025-09-02 ENCOUNTER — OFFICE VISIT (OUTPATIENT)
Dept: PRIMARY CARE CLINIC | Age: 65
End: 2025-09-02
Payer: MEDICARE

## 2025-09-02 VITALS
SYSTOLIC BLOOD PRESSURE: 122 MMHG | WEIGHT: 186.6 LBS | BODY MASS INDEX: 33.05 KG/M2 | HEART RATE: 99 BPM | DIASTOLIC BLOOD PRESSURE: 73 MMHG | OXYGEN SATURATION: 97 % | TEMPERATURE: 97.8 F

## 2025-09-02 DIAGNOSIS — R14.0 ABDOMINAL BLOATING: Primary | ICD-10-CM

## 2025-09-02 DIAGNOSIS — I10 BENIGN ESSENTIAL HTN: Chronic | ICD-10-CM

## 2025-09-02 DIAGNOSIS — R10.13 EPIGASTRIC PAIN: ICD-10-CM

## 2025-09-02 DIAGNOSIS — Z79.4 TYPE 2 DIABETES MELLITUS WITHOUT COMPLICATION, WITH LONG-TERM CURRENT USE OF INSULIN (HCC): ICD-10-CM

## 2025-09-02 DIAGNOSIS — E11.9 TYPE 2 DIABETES MELLITUS WITHOUT COMPLICATION, WITH LONG-TERM CURRENT USE OF INSULIN (HCC): ICD-10-CM

## 2025-09-02 PROCEDURE — 3044F HG A1C LEVEL LT 7.0%: CPT | Performed by: FAMILY MEDICINE

## 2025-09-02 PROCEDURE — 1159F MED LIST DOCD IN RCRD: CPT | Performed by: FAMILY MEDICINE

## 2025-09-02 PROCEDURE — 1090F PRES/ABSN URINE INCON ASSESS: CPT | Performed by: FAMILY MEDICINE

## 2025-09-02 PROCEDURE — G8417 CALC BMI ABV UP PARAM F/U: HCPCS | Performed by: FAMILY MEDICINE

## 2025-09-02 PROCEDURE — G8427 DOCREV CUR MEDS BY ELIG CLIN: HCPCS | Performed by: FAMILY MEDICINE

## 2025-09-02 PROCEDURE — G8399 PT W/DXA RESULTS DOCUMENT: HCPCS | Performed by: FAMILY MEDICINE

## 2025-09-02 PROCEDURE — 3074F SYST BP LT 130 MM HG: CPT | Performed by: FAMILY MEDICINE

## 2025-09-02 PROCEDURE — 4004F PT TOBACCO SCREEN RCVD TLK: CPT | Performed by: FAMILY MEDICINE

## 2025-09-02 PROCEDURE — 99214 OFFICE O/P EST MOD 30 MIN: CPT | Performed by: FAMILY MEDICINE

## 2025-09-02 PROCEDURE — 3017F COLORECTAL CA SCREEN DOC REV: CPT | Performed by: FAMILY MEDICINE

## 2025-09-02 PROCEDURE — 1123F ACP DISCUSS/DSCN MKR DOCD: CPT | Performed by: FAMILY MEDICINE

## 2025-09-02 PROCEDURE — 2022F DILAT RTA XM EVC RTNOPTHY: CPT | Performed by: FAMILY MEDICINE

## 2025-09-02 PROCEDURE — 3078F DIAST BP <80 MM HG: CPT | Performed by: FAMILY MEDICINE

## 2025-09-02 PROCEDURE — 1160F RVW MEDS BY RX/DR IN RCRD: CPT | Performed by: FAMILY MEDICINE

## 2025-09-02 RX ORDER — ONDANSETRON 4 MG/1
4 TABLET, ORALLY DISINTEGRATING ORAL EVERY 12 HOURS PRN
Qty: 20 TABLET | Refills: 0 | Status: SHIPPED | OUTPATIENT
Start: 2025-09-02

## 2025-09-02 RX ORDER — FAMOTIDINE 20 MG/1
20 TABLET, FILM COATED ORAL 2 TIMES DAILY
Qty: 180 TABLET | Refills: 1 | Status: SHIPPED | OUTPATIENT
Start: 2025-09-02

## 2025-09-02 ASSESSMENT — ENCOUNTER SYMPTOMS
ABDOMINAL PAIN: 1
NAUSEA: 1
COUGH: 0
ABDOMINAL DISTENTION: 1
CONSTIPATION: 1
SHORTNESS OF BREATH: 0
BLOOD IN STOOL: 0
VOMITING: 1

## 2025-09-03 ENCOUNTER — HOSPITAL ENCOUNTER (EMERGENCY)
Age: 65
Discharge: HOME | End: 2025-09-03
Attending: STUDENT IN AN ORGANIZED HEALTH CARE EDUCATION/TRAINING PROGRAM
Payer: MEDICARE

## 2025-09-03 ENCOUNTER — APPOINTMENT (OUTPATIENT)
Dept: CT IMAGING | Age: 65
End: 2025-09-03
Payer: MEDICARE

## 2025-09-03 VITALS
HEART RATE: 90 BPM | TEMPERATURE: 97.7 F | WEIGHT: 185.3 LBS | RESPIRATION RATE: 16 BRPM | BODY MASS INDEX: 32.83 KG/M2 | SYSTOLIC BLOOD PRESSURE: 122 MMHG | OXYGEN SATURATION: 95 % | HEIGHT: 63 IN | DIASTOLIC BLOOD PRESSURE: 53 MMHG

## 2025-09-03 DIAGNOSIS — K85.90 ACUTE PANCREATITIS WITHOUT INFECTION OR NECROSIS, UNSPECIFIED PANCREATITIS TYPE: Primary | ICD-10-CM

## 2025-09-03 LAB
ALBUMIN SERPL-MCNC: 4.3 G/DL (ref 3.4–5)
ALBUMIN/GLOB SERPL: 1.5 {RATIO} (ref 1.1–2.2)
ALP SERPL-CCNC: 124 U/L (ref 40–129)
ALT SERPL-CCNC: 10 U/L (ref 10–40)
ANION GAP SERPL CALCULATED.3IONS-SCNC: 13 MMOL/L (ref 3–16)
AST SERPL-CCNC: 14 U/L (ref 15–37)
BACTERIA URNS QL MICRO: ABNORMAL /HPF
BASOPHILS # BLD: 0.1 K/UL (ref 0–0.2)
BASOPHILS NFR BLD: 0.8 %
BILIRUB SERPL-MCNC: 0.6 MG/DL (ref 0–1)
BILIRUB UR QL STRIP.AUTO: NEGATIVE
BUN SERPL-MCNC: 11 MG/DL (ref 7–20)
CALCIUM SERPL-MCNC: 10.7 MG/DL (ref 8.3–10.6)
CHLORIDE SERPL-SCNC: 101 MMOL/L (ref 99–110)
CLARITY UR: CLEAR
CO2 SERPL-SCNC: 25 MMOL/L (ref 21–32)
COLOR UR: YELLOW
CREAT SERPL-MCNC: 0.9 MG/DL (ref 0.6–1.2)
DEPRECATED RDW RBC AUTO: 16 % (ref 12.4–15.4)
EKG ATRIAL RATE: 94 BPM
EKG DIAGNOSIS: NORMAL
EKG P AXIS: 0 DEGREES
EKG P-R INTERVAL: 182 MS
EKG Q-T INTERVAL: 366 MS
EKG QRS DURATION: 76 MS
EKG QTC CALCULATION (BAZETT): 457 MS
EKG R AXIS: 26 DEGREES
EKG T AXIS: 41 DEGREES
EKG VENTRICULAR RATE: 94 BPM
EOSINOPHIL # BLD: 0.1 K/UL (ref 0–0.6)
EOSINOPHIL NFR BLD: 0.8 %
EPI CELLS #/AREA URNS HPF: ABNORMAL /HPF (ref 0–5)
GFR SERPLBLD CREATININE-BSD FMLA CKD-EPI: 70 ML/MIN/{1.73_M2}
GLUCOSE SERPL-MCNC: 115 MG/DL (ref 70–99)
GLUCOSE UR STRIP.AUTO-MCNC: NEGATIVE MG/DL
HCT VFR BLD AUTO: 37.1 % (ref 36–48)
HGB BLD-MCNC: 11.7 G/DL (ref 12–16)
HGB UR QL STRIP.AUTO: NEGATIVE
KETONES UR STRIP.AUTO-MCNC: ABNORMAL MG/DL
LEUKOCYTE ESTERASE UR QL STRIP.AUTO: NEGATIVE
LIPASE SERPL-CCNC: 204 U/L (ref 13–60)
LYMPHOCYTES # BLD: 2.4 K/UL (ref 1–5.1)
LYMPHOCYTES NFR BLD: 16.5 %
MCH RBC QN AUTO: 24.1 PG (ref 26–34)
MCHC RBC AUTO-ENTMCNC: 31.6 G/DL (ref 31–36)
MCV RBC AUTO: 76.2 FL (ref 80–100)
MONOCYTES # BLD: 0.9 K/UL (ref 0–1.3)
MONOCYTES NFR BLD: 6.5 %
NEUTROPHILS # BLD: 10.8 K/UL (ref 1.7–7.7)
NEUTROPHILS NFR BLD: 75.4 %
NITRITE UR QL STRIP.AUTO: NEGATIVE
PH UR STRIP.AUTO: 6 [PH] (ref 5–8)
PLATELET # BLD AUTO: 347 K/UL (ref 135–450)
PMV BLD AUTO: 9.2 FL (ref 5–10.5)
POTASSIUM SERPL-SCNC: 4 MMOL/L (ref 3.5–5.1)
PROT SERPL-MCNC: 7.1 G/DL (ref 6.4–8.2)
PROT UR STRIP.AUTO-MCNC: 30 MG/DL
RBC # BLD AUTO: 4.87 M/UL (ref 4–5.2)
RBC #/AREA URNS HPF: ABNORMAL /HPF (ref 0–4)
SODIUM SERPL-SCNC: 139 MMOL/L (ref 136–145)
SP GR UR STRIP.AUTO: 1.02 (ref 1–1.03)
TROPONIN, HIGH SENSITIVITY: 11 NG/L (ref 0–14)
TROPONIN, HIGH SENSITIVITY: 13 NG/L (ref 0–14)
UA DIPSTICK W REFLEX MICRO PNL UR: YES
URN SPEC COLLECT METH UR: ABNORMAL
UROBILINOGEN UR STRIP-ACNC: 0.2 E.U./DL
WBC # BLD AUTO: 14.3 K/UL (ref 4–11)
WBC #/AREA URNS HPF: ABNORMAL /HPF (ref 0–5)

## 2025-09-03 PROCEDURE — 93005 ELECTROCARDIOGRAM TRACING: CPT | Performed by: STUDENT IN AN ORGANIZED HEALTH CARE EDUCATION/TRAINING PROGRAM

## 2025-09-03 PROCEDURE — 80053 COMPREHEN METABOLIC PANEL: CPT

## 2025-09-03 PROCEDURE — 96361 HYDRATE IV INFUSION ADD-ON: CPT

## 2025-09-03 PROCEDURE — 83690 ASSAY OF LIPASE: CPT

## 2025-09-03 PROCEDURE — 81001 URINALYSIS AUTO W/SCOPE: CPT

## 2025-09-03 PROCEDURE — 96374 THER/PROPH/DIAG INJ IV PUSH: CPT

## 2025-09-03 PROCEDURE — 85025 COMPLETE CBC W/AUTO DIFF WBC: CPT

## 2025-09-03 PROCEDURE — 6370000000 HC RX 637 (ALT 250 FOR IP): Performed by: STUDENT IN AN ORGANIZED HEALTH CARE EDUCATION/TRAINING PROGRAM

## 2025-09-03 PROCEDURE — 99285 EMERGENCY DEPT VISIT HI MDM: CPT

## 2025-09-03 PROCEDURE — 84484 ASSAY OF TROPONIN QUANT: CPT

## 2025-09-03 PROCEDURE — 6360000004 HC RX CONTRAST MEDICATION: Performed by: STUDENT IN AN ORGANIZED HEALTH CARE EDUCATION/TRAINING PROGRAM

## 2025-09-03 PROCEDURE — 36415 COLL VENOUS BLD VENIPUNCTURE: CPT

## 2025-09-03 PROCEDURE — 2580000003 HC RX 258: Performed by: STUDENT IN AN ORGANIZED HEALTH CARE EDUCATION/TRAINING PROGRAM

## 2025-09-03 PROCEDURE — 6360000002 HC RX W HCPCS: Performed by: STUDENT IN AN ORGANIZED HEALTH CARE EDUCATION/TRAINING PROGRAM

## 2025-09-03 PROCEDURE — 74177 CT ABD & PELVIS W/CONTRAST: CPT

## 2025-09-03 PROCEDURE — 96375 TX/PRO/DX INJ NEW DRUG ADDON: CPT

## 2025-09-03 RX ORDER — IOPAMIDOL 755 MG/ML
75 INJECTION, SOLUTION INTRAVASCULAR
Status: COMPLETED | OUTPATIENT
Start: 2025-09-03 | End: 2025-09-03

## 2025-09-03 RX ORDER — ONDANSETRON 2 MG/ML
8 INJECTION INTRAMUSCULAR; INTRAVENOUS ONCE
Status: COMPLETED | OUTPATIENT
Start: 2025-09-03 | End: 2025-09-03

## 2025-09-03 RX ORDER — KETOROLAC TROMETHAMINE 30 MG/ML
15 INJECTION, SOLUTION INTRAMUSCULAR; INTRAVENOUS ONCE
Status: COMPLETED | OUTPATIENT
Start: 2025-09-03 | End: 2025-09-03

## 2025-09-03 RX ORDER — SODIUM CHLORIDE, SODIUM LACTATE, POTASSIUM CHLORIDE, AND CALCIUM CHLORIDE .6; .31; .03; .02 G/100ML; G/100ML; G/100ML; G/100ML
1000 INJECTION, SOLUTION INTRAVENOUS ONCE
Status: COMPLETED | OUTPATIENT
Start: 2025-09-03 | End: 2025-09-03

## 2025-09-03 RX ORDER — ACETAMINOPHEN 500 MG
1000 TABLET ORAL ONCE
Status: COMPLETED | OUTPATIENT
Start: 2025-09-03 | End: 2025-09-03

## 2025-09-03 RX ADMIN — ONDANSETRON 8 MG: 2 INJECTION, SOLUTION INTRAMUSCULAR; INTRAVENOUS at 11:58

## 2025-09-03 RX ADMIN — HYDROMORPHONE HYDROCHLORIDE 0.5 MG: 1 INJECTION, SOLUTION INTRAMUSCULAR; INTRAVENOUS; SUBCUTANEOUS at 12:00

## 2025-09-03 RX ADMIN — ACETAMINOPHEN 1000 MG: 500 TABLET ORAL at 14:59

## 2025-09-03 RX ADMIN — SODIUM CHLORIDE, SODIUM LACTATE, POTASSIUM CHLORIDE, AND CALCIUM CHLORIDE 1000 ML: .6; .31; .03; .02 INJECTION, SOLUTION INTRAVENOUS at 15:23

## 2025-09-03 RX ADMIN — IOPAMIDOL 75 ML: 755 INJECTION, SOLUTION INTRAVENOUS at 12:45

## 2025-09-03 RX ADMIN — KETOROLAC TROMETHAMINE 15 MG: 30 INJECTION, SOLUTION INTRAMUSCULAR at 15:24

## 2025-09-03 ASSESSMENT — PAIN - FUNCTIONAL ASSESSMENT
PAIN_FUNCTIONAL_ASSESSMENT: 0-10

## 2025-09-03 ASSESSMENT — ENCOUNTER SYMPTOMS
DIARRHEA: 1
BLOOD IN STOOL: 0
SHORTNESS OF BREATH: 0
VOMITING: 1
ABDOMINAL PAIN: 1
NAUSEA: 1

## 2025-09-03 ASSESSMENT — PAIN SCALES - GENERAL
PAINLEVEL_OUTOF10: 9

## 2025-09-03 ASSESSMENT — PAIN DESCRIPTION - LOCATION
LOCATION: ABDOMEN
LOCATION: ABDOMEN

## (undated) DEVICE — STERILE PATIENT PROTECTIVE PAD FOR IMP® KNEE POSITIONERS & COHESIVE WRAP (10 / CASE): Brand: DE MAYO KNEE POSITIONER®

## (undated) DEVICE — GOWN,SIRUS,POLYRNF,BRTHSLV,XL,30/CS: Brand: MEDLINE

## (undated) DEVICE — BLADE SHV L13CM DIA4MM EXCALIBUR AGG COOLCUT

## (undated) DEVICE — ZIMMER® A.T.S. CYCLINDRICAL TOURNIQUET CUFF, SINGLE PORT, SINGLE BLADDER 30 IN. 76 CM)

## (undated) DEVICE — NANOPASS REACH CRESCENT: Brand: NANOPASS

## (undated) DEVICE — GOWN SIRUS NONREIN XL W/TWL: Brand: MEDLINE INDUSTRIES, INC.

## (undated) DEVICE — SYSTEM AIRWAY NASAL CLEAR FLOW RELTOK

## (undated) DEVICE — TUBING PMP L16FT MAIN DISP FOR AR-6400 AR-6475 Â€“ ORDER MULTIPLES OF 10 EACH

## (undated) DEVICE — TUBING 1895522 5PK STRAIGHTSHOT TO XPS: Brand: STRAIGHTSHOT®

## (undated) DEVICE — TUBING, SUCTION, 3/16" X 12', STRAIGHT: Brand: MEDLINE

## (undated) DEVICE — 2108 SERIES SAGITTAL BLADE, NO OFFSET  (12.4 X 1.19 X 82.1MM)

## (undated) DEVICE — NEPTUNE E-SEP SMOKE EVACUATION PENCIL, COATED, 70MM BLADE, PUSH BUTTON SWITCH: Brand: NEPTUNE E-SEP

## (undated) DEVICE — TRAP FLUID

## (undated) DEVICE — DRAPE EENT SPLIT

## (undated) DEVICE — SUTURE ETHLN SZ 3-0 L30IN NONABSORBABLE BLK FSL L30MM 3/8 1671H

## (undated) DEVICE — SUTURE PLN GUT SZ 4-0 L18IN ABSRB YELLOWISH TAN L19MM PS-2 1627H

## (undated) DEVICE — CELLERATE RX 5 GM SURG PWD HYDROLYZED CLLGN

## (undated) DEVICE — PATIENT TRACKER 9734887XOM NON-INVASIVE

## (undated) DEVICE — MEDICINE CUP, GRADUATED, STER: Brand: MEDLINE

## (undated) DEVICE — BLADE SAW W12.5XL70MM THK1MM RECIP DBL SIDE OFFSET

## (undated) DEVICE — Device

## (undated) DEVICE — SYRINGE MED 30ML STD CLR PLAS LUERLOCK TIP N CTRL DISP

## (undated) DEVICE — CORD,CAUTERY,BIPOLAR,STERILE: Brand: MEDLINE

## (undated) DEVICE — BLANKET WRM W40.2XL55.9IN IORT LO BODY + MISTRAL AIR

## (undated) DEVICE — COVER,TABLE,HEAVY DUTY,50"X90",STRL: Brand: MEDLINE

## (undated) DEVICE — 4.75MM ALPHAVENT AND OMEGA AWL: Brand: ALPHAVENT, OMEGA

## (undated) DEVICE — PENCIL SMK EVAC ALL IN 1 DSGN ENH VISIBILITY IMPROVED AIR

## (undated) DEVICE — 3M™ IOBAN™ 2 ANTIMICROBIAL INCISE DRAPE 6640EZ: Brand: IOBAN™ 2

## (undated) DEVICE — TURNOVER KIT RM INF CTRL TECH

## (undated) DEVICE — SHOULDER ARTHROSCOPY: Brand: MEDLINE INDUSTRIES, INC.

## (undated) DEVICE — PREVENA PLUS INCISION MANAGEMENT SYSTEM: Brand: PREVENA PLUS™

## (undated) DEVICE — BOWL MED L 32OZ PLAS W/ MOLD GRAD EZ OPN PEEL PCH

## (undated) DEVICE — 1010 S-DRAPE TOWEL DRAPE 10/BX: Brand: STERI-DRAPE™

## (undated) DEVICE — STANDARD HYPODERMIC NEEDLE,POLYPROPYLENE HUB: Brand: MONOJECT

## (undated) DEVICE — BUR SHV L13CM DIA4MM 8 FLUT OVL FOR RAP AGG BNE RESECT

## (undated) DEVICE — UNDERGLOVE SURG SZ 8 BLU LTX FREE SYN POLYISOPRENE POLYMER

## (undated) DEVICE — PROTECTOR ULN NRV PUR FOAM HK LOOP STRP ANATOMICALLY

## (undated) DEVICE — SUTURE STRATAFIX SPRL SZ 2 0 L14IN ABSRB UD MH L36MM 1 2 CIR SXMD2B401

## (undated) DEVICE — BOWL AND CEMENT CARTRIDGE WITH BREAKAWAY FEMORAL NOZZLE AND MEDIUM PRESSURIZER: Brand: ACM

## (undated) DEVICE — YANKAUER,OPEN TIP,W/O VENT,STERILE: Brand: MEDLINE INDUSTRIES, INC.

## (undated) DEVICE — SUTURE ETHBND EXCEL SZ 2 L30IN NONABSORBABLE GRN L40MM V-37 MX69G

## (undated) DEVICE — SOLUTION IRRIG 500ML 0.9% SOD CHLO USP POUR PLAS BTL

## (undated) DEVICE — NEEDLE SPNL 25GA L2IN BLU SHT NEO LUM PUNC DISP

## (undated) DEVICE — BLADE SURG SAW SAG S STL AGG 905MM LEN 185MM W 127MM THCK

## (undated) DEVICE — SYRINGE,CONTROL,LL,FINGER,GRIP: Brand: MEDLINE INDUSTRIES, INC.

## (undated) DEVICE — SUTURE MCRYL SZ 2-0 L18IN ABSRB VLT L36MM CT-1 1/2 CIR Y739D

## (undated) DEVICE — PRE OP PACK: Brand: MEDLINE INDUSTRIES, INC.

## (undated) DEVICE — SUTURE ABSORBABLE MONOFILAMENT 1-0 CT1 27 IN VIO PDS + PDP341H

## (undated) DEVICE — GLOVE ORTHO 7 1/2   MSG9475

## (undated) DEVICE — GLOVE ORANGE PI 8   MSG9080

## (undated) DEVICE — SUTURE PDS II SZ 1 L36IN ABSRB VLT CT L40MM 1/2 CIR TAPR Z359T

## (undated) DEVICE — TOWEL,OR,DSP,ST,BLUE,STD,4/PK,20PK/CS: Brand: MEDLINE

## (undated) DEVICE — DRAPE SURGICAL HAND PROX AURORA

## (undated) DEVICE — MARKER,SKIN,WI/RULER AND LABELS: Brand: MEDLINE

## (undated) DEVICE — YANKAUER,BULB TIP,W/O VENT,RIGID,STERILE: Brand: MEDLINE

## (undated) DEVICE — GLOVE SURG SZ 8 L12IN FNGR THK79MIL GRN LTX FREE

## (undated) DEVICE — BLADE 1884080EM TRICUT 4MMX13CM M4 ROHS: Brand: FUSION®

## (undated) DEVICE — TOWEL,STOP FLAG GOLD N-W: Brand: MEDLINE

## (undated) DEVICE — COVER,TABLE,HEAVY DUTY,77"X90",STRL: Brand: MEDLINE

## (undated) DEVICE — PODIATRY PK

## (undated) DEVICE — SOLUTION IRRIG 2000ML 0.9% SOD CHL USP UROMATIC PLAS CONT

## (undated) DEVICE — HYPODERMIC SAFETY NEEDLE: Brand: MAGELLAN

## (undated) DEVICE — HANDPIECE SET WITH HIGH FLOW TIP AND SUCTION TUBE: Brand: INTERPULSE

## (undated) DEVICE — HEADLESS TROCHAR PIN 75MM: Brand: ZUK

## (undated) DEVICE — STAPLER SKIN H3.9MM WIRE DIA0.58MM CRWN 6.9MM 35 STPL ROT

## (undated) DEVICE — SYSTEM SKIN CLSR 22CM DERMBND PRINEO

## (undated) DEVICE — ABLATOR ENDOSCOPIC ELECTROCAUTERY 90 DEG RF ASPIRATING STERILE DISPOSABLE APOLLORF I90

## (undated) DEVICE — SHOULDER STABILIZATION KIT,                                    DISPOSABLE 12 PER BOX

## (undated) DEVICE — PADDING BNDG UNDERCAST 3.3 YDX3.9 IN N ABSORBENT WHT ARTFLX

## (undated) DEVICE — SUTURE VCRL + SZ 2-0 L18IN ABSRB UD CT1 L36MM 1/2 CIR VCP839D

## (undated) DEVICE — HOOD: Brand: FLYTE

## (undated) DEVICE — HOOD, PEEL-AWAY: Brand: FLYTE

## (undated) DEVICE — SURGICAL SUCTION CONNECTING TUBE WITH MALE CONNECTOR AND SUCTION CLAMP, 2 FT. LONG (.6 M), 5 MM I.D.: Brand: CONMED

## (undated) DEVICE — TUBING PMP L6FT CONT WAVE EXTN

## (undated) DEVICE — JP 3-SPRING RES W/15FR PVC DRAIN/TR: Brand: CARDINAL HEALTH

## (undated) DEVICE — BIPOLAR CABLE FLYING LEAD 12 FEET (3.7 M): Brand: MEGADYNE

## (undated) DEVICE — MASTISOL ADHESIVE LIQ 2/3ML

## (undated) DEVICE — STAPLER EXT SKIN 35 WIDE S STL STPL SQUEEZE HNDL VISISTAT

## (undated) DEVICE — 3 BONE CEMENT MIXER: Brand: MIXEVAC

## (undated) DEVICE — SCREWDRIVER SURG OD2MM HEX FEM CANN KNEE SET S STL NXGN

## (undated) DEVICE — SPONGE LAP W18XL18IN WHT COT 4 PLY FLD STRUNG RADPQ DISP ST

## (undated) DEVICE — STRIP,CLOSURE,WOUND,MEDI-STRIP,1/2X4: Brand: MEDLINE

## (undated) DEVICE — ZIMMER® STERILE DISPOSABLE TOURNIQUET CUFF WITH PLC, DUAL PORT, SINGLE BLADDER, 18 IN. (46 CM)

## (undated) DEVICE — BIPOLAR SEALER 23-112-1 AQM 6.0: Brand: AQUAMANTYS ®

## (undated) DEVICE — SYRINGE, LUER LOCK, 10ML: Brand: MEDLINE

## (undated) DEVICE — SKIN MARKER,REGULAR TIP WITH RULER AND LABELS: Brand: DEVON

## (undated) DEVICE — CHLORAPREP 26ML ORANGE

## (undated) DEVICE — NEEDLE SPNL 22GA L3.5IN BLK HUB S STL REG WALL FIT STYL W/

## (undated) DEVICE — PALACOS® R IS A FAST-CURING, RADIOPAQUE, POLY(METHYL METHACRYLATE)-BASED BONE CEMENT.PALACOS ® R CONTAINS THE X-RAY CONTRAST MEDIUM ZIRCONIUM DIOXIDE. TO IMPROVE VISIBILITY IN THE SURGICAL FIELD PALACOS ® R HAS BEEN COLOURED WITH CHLOROPHYLL (E141). THE BONE CEMENT IS PREPARED DIRECTLY BEFORE USE BY MIXING A POLYMER POWDER COMPONENT WITH A LIQUID MONOMER COMPONENT. A DUCTILE DOUGH FORMS WHICH CURES WITHIN A FEW MINUTES.
Type: IMPLANTABLE DEVICE | Site: KNEE | Status: NON-FUNCTIONAL
Brand: PALACOS®

## (undated) DEVICE — SPONGE GZ W4XL8IN COT WVN 12 PLY

## (undated) DEVICE — SUTURE STRATAFIX SPRL SZ 1 L14IN ABSRB VLT L48CM CTX 1/2 SXPD2B405

## (undated) DEVICE — PACK PROC ORTH LO EXT IX CUST

## (undated) DEVICE — TUBING, SUCTION, 1/4" X 10', STRAIGHT: Brand: MEDLINE

## (undated) DEVICE — MAT FLR FLD ASPIR DMND

## (undated) DEVICE — 3M™ STERI-DRAPE™ INSTRUMENT POUCH 1018: Brand: STERI-DRAPE™

## (undated) DEVICE — SYRINGE 20ML LL S/C 50

## (undated) DEVICE — NEEDLE, QUINCKE, 25GX2.5": Brand: MEDLINE

## (undated) DEVICE — PACK PROCEDURE SURG EXTREMITY MFFOP CUST

## (undated) DEVICE — INSTRUMENT TRACKER 9733533XOM ENT 1PK

## (undated) DEVICE — PREVENA INCISION MANAGEMENT SYSTEM- PEEL & PLACE DRESSING: Brand: PREVENA™ PEEL & PLACE™

## (undated) DEVICE — SPLINT KNEE UNIV FOR LESS THAN 36IN L20IN FOAM LAM E CNTCT

## (undated) DEVICE — SUTURE MONOCRYL + SZ 4-0 L27IN ABSRB UD L19MM PS-2 3/8 CIR MCP426H

## (undated) DEVICE — STERILE POLYISOPRENE POWDER-FREE SURGICAL GLOVES: Brand: PROTEXIS

## (undated) DEVICE — SYRINGE MED 5ML STD CLR PLAS LUERLOCK TIP N CTRL DISP

## (undated) DEVICE — APPLICATOR MEDICATED 26 CC SOLUTION HI LT ORNG CHLORAPREP

## (undated) DEVICE — SUTURE CHROMIC GUT SZ 4-0 L18IN ABSRB BRN L13MM P-3 3/8 CIR 1654G

## (undated) DEVICE — PACKING 440413 10PK DOYLE SLIMLINE: Brand: MEROCEL®

## (undated) DEVICE — CODMAN® SURGICAL PATTIES 1/2" X 3" (1.27CM X 7.62CM): Brand: CODMAN®

## (undated) DEVICE — SUTURE VICRYL + SZ 3-0 L27IN ABSRB UD CT-2 L26MM 1/2 CIR TAPR VCP232H

## (undated) DEVICE — TUBING FLD MGMT Y DBL SPIK DUALWAVE

## (undated) DEVICE — MAT FLR W32XL58IN

## (undated) DEVICE — PACKING NSL W4XL4CM SNUS STNT DISP MEROGEL

## (undated) DEVICE — DRILL BIT 1.6MM (1/16'') X 128.0MM

## (undated) DEVICE — KIT,ANTI FOG,W/SPONGE & FLUID,SOFT PACK: Brand: MEDLINE